# Patient Record
Sex: MALE | Race: WHITE | NOT HISPANIC OR LATINO | Employment: OTHER | URBAN - METROPOLITAN AREA
[De-identification: names, ages, dates, MRNs, and addresses within clinical notes are randomized per-mention and may not be internally consistent; named-entity substitution may affect disease eponyms.]

---

## 2021-09-14 ENCOUNTER — HOSPITAL ENCOUNTER (INPATIENT)
Facility: HOSPITAL | Age: 76
LOS: 4 days | Discharge: HOME | End: 2021-09-18
Attending: EMERGENCY MEDICINE | Admitting: INTERNAL MEDICINE

## 2021-09-14 DIAGNOSIS — K56.609 SBO (SMALL BOWEL OBSTRUCTION) (HCC): Primary | ICD-10-CM

## 2021-09-14 DIAGNOSIS — R10.9 ABDOMINAL PAIN IN MALE: ICD-10-CM

## 2021-09-14 DIAGNOSIS — R11.2 NAUSEA AND VOMITING IN ADULT: ICD-10-CM

## 2021-09-14 PROBLEM — N17.9 AKI (ACUTE KIDNEY INJURY) (HCC): Status: ACTIVE | Noted: 2021-09-14

## 2021-09-14 PROBLEM — I51.89 DIASTOLIC DYSFUNCTION: Status: ACTIVE | Noted: 2021-09-14

## 2021-09-14 PROBLEM — N18.9 CKD (CHRONIC KIDNEY DISEASE): Status: ACTIVE | Noted: 2021-09-14

## 2021-09-14 PROBLEM — E11.9 TYPE 2 DIABETES MELLITUS: Status: ACTIVE | Noted: 2021-09-14

## 2021-09-14 PROBLEM — D47.2 MGUS (MONOCLONAL GAMMOPATHY OF UNKNOWN SIGNIFICANCE): Status: ACTIVE | Noted: 2021-09-14

## 2021-09-14 PROBLEM — Z79.01 CHRONIC ANTICOAGULATION: Status: ACTIVE | Noted: 2021-09-14

## 2021-09-14 PROBLEM — I10 HTN (HYPERTENSION): Status: ACTIVE | Noted: 2021-09-14

## 2021-09-14 PROBLEM — K56.7 ILEUS: Status: ACTIVE | Noted: 2021-09-14

## 2021-09-14 PROBLEM — K57.80 DIVERTICULITIS OF INTESTINE WITH PERFORATION: Status: ACTIVE | Noted: 2021-09-14

## 2021-09-14 PROBLEM — Z95.828 PRESENCE OF IVC FILTER: Status: ACTIVE | Noted: 2021-09-14

## 2021-09-14 PROBLEM — J44.9 COPD (CHRONIC OBSTRUCTIVE PULMONARY DISEASE): Status: ACTIVE | Noted: 2021-09-14

## 2021-09-14 PROBLEM — Z86.718 HISTORY OF DVT (DEEP VEIN THROMBOSIS): Status: ACTIVE | Noted: 2021-09-14

## 2021-09-14 LAB
ALBUMIN SERPL-MCNC: 3.7 G/DL (ref 3.5–5.2)
ALBUMIN/GLOB SERPL: 1.2 G/DL
ALP SERPL-CCNC: 79 U/L (ref 39–117)
ALT SERPL W P-5'-P-CCNC: 44 U/L (ref 1–41)
ANION GAP SERPL CALCULATED.3IONS-SCNC: 11.8 MMOL/L (ref 5–15)
AST SERPL-CCNC: 36 U/L (ref 1–40)
BASOPHILS # BLD AUTO: 0.04 10*3/MM3 (ref 0–0.2)
BASOPHILS NFR BLD AUTO: 0.4 % (ref 0–1.5)
BILIRUB SERPL-MCNC: 1.2 MG/DL (ref 0–1.2)
BILIRUB UR QL STRIP: NEGATIVE
BUN SERPL-MCNC: 51 MG/DL (ref 8–23)
BUN/CREAT SERPL: 29.3 (ref 7–25)
CALCIUM SPEC-SCNC: 10 MG/DL (ref 8.6–10.5)
CHLORIDE SERPL-SCNC: 93 MMOL/L (ref 98–107)
CLARITY UR: CLEAR
CO2 SERPL-SCNC: 27.2 MMOL/L (ref 22–29)
COLOR UR: ABNORMAL
CREAT SERPL-MCNC: 1.74 MG/DL (ref 0.76–1.27)
D-LACTATE SERPL-SCNC: 1.2 MMOL/L (ref 0.5–2)
DEPRECATED RDW RBC AUTO: 48 FL (ref 37–54)
EOSINOPHIL # BLD AUTO: 0.02 10*3/MM3 (ref 0–0.4)
EOSINOPHIL NFR BLD AUTO: 0.2 % (ref 0.3–6.2)
ERYTHROCYTE [DISTWIDTH] IN BLOOD BY AUTOMATED COUNT: 13.9 % (ref 12.3–15.4)
GFR SERPL CREATININE-BSD FRML MDRD: 39 ML/MIN/1.73
GLOBULIN UR ELPH-MCNC: 3.1 GM/DL
GLUCOSE SERPL-MCNC: 127 MG/DL (ref 65–99)
GLUCOSE UR STRIP-MCNC: NEGATIVE MG/DL
HCT VFR BLD AUTO: 49.7 % (ref 37.5–51)
HGB BLD-MCNC: 15.7 G/DL (ref 13–17.7)
HGB UR QL STRIP.AUTO: NEGATIVE
HOLD SPECIMEN: NORMAL
HOLD SPECIMEN: NORMAL
IMM GRANULOCYTES # BLD AUTO: 0.05 10*3/MM3 (ref 0–0.05)
IMM GRANULOCYTES NFR BLD AUTO: 0.5 % (ref 0–0.5)
INR PPP: 2.61 (ref 0.9–1.1)
KETONES UR QL STRIP: ABNORMAL
LEUKOCYTE ESTERASE UR QL STRIP.AUTO: NEGATIVE
LIPASE SERPL-CCNC: 14 U/L (ref 13–60)
LYMPHOCYTES # BLD AUTO: 1.58 10*3/MM3 (ref 0.7–3.1)
LYMPHOCYTES NFR BLD AUTO: 14.3 % (ref 19.6–45.3)
MCH RBC QN AUTO: 29.7 PG (ref 26.6–33)
MCHC RBC AUTO-ENTMCNC: 31.6 G/DL (ref 31.5–35.7)
MCV RBC AUTO: 94 FL (ref 79–97)
MONOCYTES # BLD AUTO: 1.18 10*3/MM3 (ref 0.1–0.9)
MONOCYTES NFR BLD AUTO: 10.7 % (ref 5–12)
NEUTROPHILS NFR BLD AUTO: 73.9 % (ref 42.7–76)
NEUTROPHILS NFR BLD AUTO: 8.15 10*3/MM3 (ref 1.7–7)
NITRITE UR QL STRIP: NEGATIVE
NRBC BLD AUTO-RTO: 0 /100 WBC (ref 0–0.2)
PH UR STRIP.AUTO: 5.5 [PH] (ref 5–8)
PLATELET # BLD AUTO: 207 10*3/MM3 (ref 140–450)
PMV BLD AUTO: 9.4 FL (ref 6–12)
POTASSIUM SERPL-SCNC: 3.9 MMOL/L (ref 3.5–5.2)
PROT SERPL-MCNC: 6.8 G/DL (ref 6–8.5)
PROT UR QL STRIP: ABNORMAL
PROTHROMBIN TIME: 27.7 SECONDS (ref 11.7–14.2)
RBC # BLD AUTO: 5.29 10*6/MM3 (ref 4.14–5.8)
SARS-COV-2 RNA RESP QL NAA+PROBE: NOT DETECTED
SODIUM SERPL-SCNC: 132 MMOL/L (ref 136–145)
SP GR UR STRIP: >=1.03 (ref 1–1.03)
UROBILINOGEN UR QL STRIP: ABNORMAL
WBC # BLD AUTO: 11.02 10*3/MM3 (ref 3.4–10.8)
WHOLE BLOOD HOLD SPECIMEN: NORMAL
WHOLE BLOOD HOLD SPECIMEN: NORMAL

## 2021-09-14 PROCEDURE — 99284 EMERGENCY DEPT VISIT MOD MDM: CPT

## 2021-09-14 PROCEDURE — 83690 ASSAY OF LIPASE: CPT | Performed by: NURSE PRACTITIONER

## 2021-09-14 PROCEDURE — 81003 URINALYSIS AUTO W/O SCOPE: CPT | Performed by: NURSE PRACTITIONER

## 2021-09-14 PROCEDURE — 85025 COMPLETE CBC W/AUTO DIFF WBC: CPT | Performed by: NURSE PRACTITIONER

## 2021-09-14 PROCEDURE — 80053 COMPREHEN METABOLIC PANEL: CPT | Performed by: NURSE PRACTITIONER

## 2021-09-14 PROCEDURE — U0005 INFEC AGEN DETEC AMPLI PROBE: HCPCS | Performed by: NURSE PRACTITIONER

## 2021-09-14 PROCEDURE — 99221 1ST HOSP IP/OBS SF/LOW 40: CPT | Performed by: SURGERY

## 2021-09-14 PROCEDURE — 83605 ASSAY OF LACTIC ACID: CPT | Performed by: NURSE PRACTITIONER

## 2021-09-14 PROCEDURE — U0003 INFECTIOUS AGENT DETECTION BY NUCLEIC ACID (DNA OR RNA); SEVERE ACUTE RESPIRATORY SYNDROME CORONAVIRUS 2 (SARS-COV-2) (CORONAVIRUS DISEASE [COVID-19]), AMPLIFIED PROBE TECHNIQUE, MAKING USE OF HIGH THROUGHPUT TECHNOLOGIES AS DESCRIBED BY CMS-2020-01-R: HCPCS | Performed by: NURSE PRACTITIONER

## 2021-09-14 PROCEDURE — 85610 PROTHROMBIN TIME: CPT | Performed by: NURSE PRACTITIONER

## 2021-09-14 PROCEDURE — 25010000002 PIPERACILLIN SOD-TAZOBACTAM PER 1 G: Performed by: NURSE PRACTITIONER

## 2021-09-14 RX ORDER — SODIUM CHLORIDE 0.9 % (FLUSH) 0.9 %
10 SYRINGE (ML) INJECTION AS NEEDED
Status: DISCONTINUED | OUTPATIENT
Start: 2021-09-14 | End: 2021-09-18 | Stop reason: HOSPADM

## 2021-09-14 RX ORDER — INSULIN LISPRO 100 [IU]/ML
0-9 INJECTION, SOLUTION INTRAVENOUS; SUBCUTANEOUS
Status: DISCONTINUED | OUTPATIENT
Start: 2021-09-15 | End: 2021-09-18 | Stop reason: HOSPADM

## 2021-09-14 RX ORDER — CIPROFLOXACIN 500 MG/1
500 TABLET, FILM COATED ORAL
COMMUNITY
Start: 2021-09-13 | End: 2021-09-18 | Stop reason: HOSPADM

## 2021-09-14 RX ORDER — MORPHINE SULFATE 2 MG/ML
2 INJECTION, SOLUTION INTRAMUSCULAR; INTRAVENOUS EVERY 4 HOURS PRN
Status: DISCONTINUED | OUTPATIENT
Start: 2021-09-14 | End: 2021-09-18 | Stop reason: HOSPADM

## 2021-09-14 RX ORDER — NITROGLYCERIN 0.4 MG/1
0.4 TABLET SUBLINGUAL
Status: DISCONTINUED | OUTPATIENT
Start: 2021-09-14 | End: 2021-09-18 | Stop reason: HOSPADM

## 2021-09-14 RX ORDER — SODIUM CHLORIDE AND POTASSIUM CHLORIDE 150; 900 MG/100ML; MG/100ML
100 INJECTION, SOLUTION INTRAVENOUS CONTINUOUS
Status: DISCONTINUED | OUTPATIENT
Start: 2021-09-14 | End: 2021-09-18 | Stop reason: HOSPADM

## 2021-09-14 RX ORDER — SODIUM CHLORIDE 0.9 % (FLUSH) 0.9 %
10 SYRINGE (ML) INJECTION EVERY 12 HOURS SCHEDULED
Status: DISCONTINUED | OUTPATIENT
Start: 2021-09-14 | End: 2021-09-18 | Stop reason: HOSPADM

## 2021-09-14 RX ORDER — ONDANSETRON 2 MG/ML
4 INJECTION INTRAMUSCULAR; INTRAVENOUS EVERY 6 HOURS PRN
Status: DISCONTINUED | OUTPATIENT
Start: 2021-09-14 | End: 2021-09-18 | Stop reason: HOSPADM

## 2021-09-14 RX ORDER — NICOTINE POLACRILEX 4 MG
15 LOZENGE BUCCAL
Status: DISCONTINUED | OUTPATIENT
Start: 2021-09-14 | End: 2021-09-18 | Stop reason: HOSPADM

## 2021-09-14 RX ORDER — LISINOPRIL 20 MG/1
20 TABLET ORAL DAILY
COMMUNITY
End: 2021-09-18 | Stop reason: HOSPADM

## 2021-09-14 RX ORDER — ACETAMINOPHEN 325 MG/1
650 TABLET ORAL EVERY 4 HOURS PRN
Status: DISCONTINUED | OUTPATIENT
Start: 2021-09-14 | End: 2021-09-18 | Stop reason: HOSPADM

## 2021-09-14 RX ORDER — ISOSORBIDE MONONITRATE 60 MG/1
60 TABLET, EXTENDED RELEASE ORAL EVERY EVENING
COMMUNITY
Start: 2021-09-14

## 2021-09-14 RX ORDER — SIMVASTATIN 20 MG
TABLET ORAL
COMMUNITY
Start: 2021-07-15 | End: 2022-07-21 | Stop reason: SDUPTHER

## 2021-09-14 RX ORDER — SIMVASTATIN 20 MG
20 TABLET ORAL NIGHTLY
COMMUNITY
End: 2021-09-18 | Stop reason: HOSPADM

## 2021-09-14 RX ORDER — NALOXONE HCL 0.4 MG/ML
0.4 VIAL (ML) INJECTION
Status: DISCONTINUED | OUTPATIENT
Start: 2021-09-14 | End: 2021-09-18 | Stop reason: HOSPADM

## 2021-09-14 RX ORDER — WARFARIN SODIUM 1 MG/1
1 TABLET ORAL 2 TIMES WEEKLY
COMMUNITY
Start: 2021-08-02 | End: 2022-09-16

## 2021-09-14 RX ORDER — ACETAMINOPHEN 650 MG/1
650 SUPPOSITORY RECTAL EVERY 4 HOURS PRN
Status: DISCONTINUED | OUTPATIENT
Start: 2021-09-14 | End: 2021-09-18 | Stop reason: HOSPADM

## 2021-09-14 RX ORDER — WARFARIN SODIUM 5 MG/1
5 TABLET ORAL
Status: ON HOLD | COMMUNITY
End: 2022-09-29 | Stop reason: SDUPTHER

## 2021-09-14 RX ORDER — ONDANSETRON 4 MG/1
4 TABLET, ORALLY DISINTEGRATING ORAL
COMMUNITY
Start: 2021-09-13 | End: 2021-09-20

## 2021-09-14 RX ORDER — ACETAMINOPHEN 160 MG/5ML
650 SOLUTION ORAL EVERY 4 HOURS PRN
Status: DISCONTINUED | OUTPATIENT
Start: 2021-09-14 | End: 2021-09-18 | Stop reason: HOSPADM

## 2021-09-14 RX ORDER — DEXTROSE MONOHYDRATE 25 G/50ML
25 INJECTION, SOLUTION INTRAVENOUS
Status: DISCONTINUED | OUTPATIENT
Start: 2021-09-14 | End: 2021-09-18 | Stop reason: HOSPADM

## 2021-09-14 RX ORDER — METRONIDAZOLE 500 MG/1
500 TABLET ORAL 3 TIMES DAILY
COMMUNITY
Start: 2021-09-13 | End: 2021-09-18 | Stop reason: HOSPADM

## 2021-09-14 RX ORDER — LISINOPRIL 20 MG/1
TABLET ORAL
COMMUNITY
Start: 2021-07-15 | End: 2021-09-18 | Stop reason: HOSPADM

## 2021-09-14 RX ADMIN — TAZOBACTAM SODIUM AND PIPERACILLIN SODIUM 3.38 G: 375; 3 INJECTION, SOLUTION INTRAVENOUS at 17:12

## 2021-09-14 RX ADMIN — SODIUM CHLORIDE 1000 ML: 9 INJECTION, SOLUTION INTRAVENOUS at 16:26

## 2021-09-14 NOTE — H&P
Internal medicine history and physical  INTERNAL MEDICINE   [facility]       Patient Identification:  Name: Giovani España  Age: 76 y.o.  Sex: male  :  1945  MRN: [id number]                   Primary Care Physician: Derek Grider MD (Inactive)                               Date of admission:2021    Chief Complaint: Progressive abdominal pain and nausea and vomiting since Thursday night.    History of Present Illness:   Patient is a 76-year-old male who has complicated past medical history consisting of type 2 diabetes COPD hypertension diastolic dysfunction coronary artery disease peripheral vascular disease as well as history of DVT for which he has had IVC filter in place as well as history of chronic anticoagulation and history of monoclonal colopathy of unknown significance was in his usual state of his health until Thursday night when after eating Saulsberry steak, bread for dinner few hours later he developed significant abdominal discomfort and cramps for which he needed to go to the bathroom to have bowel movement.  Since Thursday night he has been having off-and-on abdominal cramps and despite taking Tums was not feeling any better.  He also had significant constipation did not have any blood with his bowel movements he tried to use some laxatives which eventually turned into diarrhea.  Throughout the weekend he was miserable with the symptoms with worsening abdominal pain and eventually was able to get intact with his primary care provider and was seen earlier today and was sent for a stat CT scan of the abdomen and pelvis.  According to the patient and the time he got the CT scan then he started to feel better in fact was feeling good enough to go to the drive-through to get some food.  While he was standing in the drive-through line he received a call from his primary care nurse practitioner that CT scan of the abdomen and pelvis shows evidence of evolving small bowel  obstruction with thickening of the small intestine likely due to inflammation.  Diffuse inflammation of the sigmoid colon with diverticuli noted concerning for diverticulitis with possible contained abscess.  Patient stated he was already feeling better and his appetite was finally back but because of the instruction to come back to the emergency room he did not eat anything and came to the ER.  He is feeling better except for mild discomfort in his belly.  We are asked admit the patient for evolving small bowel obstruction based on the CT scan finding with possible diverticulitis and perforation and general surgery evaluation.  Patient claims that his nephrostomy is feeling better since Thursday night.  Patient currently denies any fever and chills.    Upon further questioning patient blames all of this event he did earlier Thursday when he was throwing away the donuts that his wife brought in a day before which had some black ants on it.  He did eat donuts the day before with his wife without any problem.  While he was throwing them away he noticed that one of the donut did not have any black and on it and he could not help himself but ate a whole donut.  Afterwards he had a uneventful day and the fact was feeling good enough to eat Salsberry steak and bread for dinner and as stated above 3 hours later he was starting to have no symptoms.      Past Medical History:   T2DM (dx'd 2018), COPD, nocturnal hypoxia, tobacco smoker, FH lung cancer, HTN, diastolic dysfunction, HLD, CAD, PAD, hx DVT (right leg, IVC filter in place, confirmed PET CT 2019), chronic anticoagulation, MGUS (2019, PET-CT, bone marrow bx, (-) MM)), hx H pylori infection (dx'd, tx'd 2018), hx skin cancer, hx left knee meniscus tear.    Past Surgical History:  No past surgical history on file.   Home Meds:  (Not in a hospital admission)    Current Meds:     Current Facility-Administered Medications:   •  [COMPLETED] Insert peripheral IV, , , Once  "**AND** sodium chloride 0.9 % flush 10 mL, 10 mL, Intravenous, PRN, Rama Villasenor, GARCÍA  No current outpatient medications on file.  Allergies:  No Known Allergies  Social History:   Social History     Tobacco Use   • Smoking status: Not on file   Substance Use Topics   • Alcohol use: Not on file      Family History:  No family history on file.       Review of Systems  See history of present illness and past medical history.    Constitutional: Remarkable for no fever or chills  Cardiovascular: Remarkable for no chest pain or shortness of breath  GI: As described in the history of present illness currently feeling much better and her appetite back.  : Remarkable for no burning in urination frequency or urgency  Musculoskeletal: Remarkable for no new joint aches and pain  Neurological: Remarkable for no loss of consciousness or continence.      Vitals:   /85   Pulse 82   Temp 97.3 °F (36.3 °C) (Tympanic)   Resp 16   Ht 180.3 cm (71\")   Wt 71.2 kg (157 lb)   SpO2 96%   BMI 21.90 kg/m²   I/O: No intake or output data in the 24 hours ending 09/14/21 6908  Exam:  Patient is examined using the personal protective equipment as per guidelines from infection control for this particular patient as enacted.  Hand washing was performed before and after patient interaction.  General Appearance:    Alert, cooperative, no distress, appears stated age   Head:    Normocephalic, without obvious abnormality, atraumatic   Eyes:    PERRL, conjunctiva/corneas clear, EOM's intact, both eyes   Ears:    Normal external ear canals, both ears   Nose:   Nares normal, septum midline, mucosa normal, no drainage    or sinus tenderness   Throat:   Lips, tongue, gums normal; oral mucosa pink and moist   Neck:   Supple, symmetrical, trachea midline, no adenopathy;     thyroid:  no enlargement/tenderness/nodules; no carotid    bruit or JVD   Back:     Symmetric, no curvature, ROM normal, no CVA tenderness   Lungs:     Clear to " auscultation bilaterally, respirations unlabored   Chest Wall:    No tenderness or deformity    Heart:    Regular rate and rhythm, S1 and S2 normal, no murmur, rub   or gallop   Abdomen:    Soft mild generalized tenderness in the lower abdomen but no guarding rigidity or rebound noted.   Extremities:   Extremities normal, atraumatic, no cyanosis or edema   Pulses:   Pulses palpable in all extremities; symmetric all extremities   Skin:   Skin color normal, Skin is warm and dry,  no rashes or palpable lesions   Neurologic:   CNII-XII intact, motor strength grossly intact, sensation grossly intact to light touch, no focal deficits noted       Data Review:      I reviewed the patient's new clinical results.  Results from last 7 days   Lab Units 09/14/21  1644   WBC 10*3/mm3 11.02*   HEMOGLOBIN g/dL 15.7   PLATELETS 10*3/mm3 207     Results from last 7 days   Lab Units 09/14/21  1644   SODIUM mmol/L 132*   POTASSIUM mmol/L 3.9   CHLORIDE mmol/L 93*   CO2 mmol/L 27.2   BUN mg/dL 51*   CREATININE mg/dL 1.74*   CALCIUM mg/dL 10.0   GLUCOSE mg/dL 127*     No radiology results for the last 30 days.    IMPRESSION:   1.Findings compatible with distal small bowel obstruction with a transition point in the right lower quadrant with thickening of the walls of the distal small intestine most likely reflecting inflammation.   2.Multiple diverticula in the sigmoid colon with evidence of diffuse sigmoid inflammation most likely due to colitis.   3.A small collection of fluid and a small amount of gas adjacent to the distal sigmoid colon which may represent a large fluid-filled diverticulum or a contained perforation. No free peritoneal gas is identified.   4.Fusiform infrarenal abdominal aortic aneurysm reaching 3.2 cm in diameter. Advanced coronary artery disease.   5.Incidental, noncalcified, 9 x 4 mm pulmonary nodule in the right lower lobe. According to 2017 Fleischner Society Pulmonary Nodule Follow-Up Guidelines and  Recommendations, a follow up exam in 6-12 months is recommended. If no enlargement of the nodule    is demonstrated, further follow-up at 18-24 months should be performed.   6.The findings and recommendations were discussed with Ms. LISA HEADLEY on 9/14/2021 12:01 PM.     Dictated by: Efrem Gu M.D.  Assessment:  Active Hospital Problems    Diagnosis  POA   • **Abdominal pain [R10.9]  Unknown   • Nausea and vomiting [R11.2]  Unknown   • Ileus (CMS/HCC) [K56.7]  Unknown   • Diverticulitis of intestine with perforation [K57.80]  Unknown   • LEXIE (acute kidney injury) (CMS/HCC) [N17.9]  Unknown   • CKD (chronic kidney disease) [N18.9]  Unknown   • Type 2 diabetes mellitus (CMS/HCC) [E11.9]  Unknown   • COPD (chronic obstructive pulmonary disease) (CMS/HCC) [J44.9]  Unknown   • HTN (hypertension) [I10]  Unknown   • MGUS (monoclonal gammopathy of unknown significance) [D47.2]  Unknown   • History of DVT (deep vein thrombosis) [Z86.718]  Not Applicable   • Diastolic dysfunction [I51.89]  Unknown   • Presence of IVC filter [Z95.828]  Not Applicable   • Chronic anticoagulation [Z79.01]  Not Applicable       Medical decision making:  Progressive abdominal pain since Thursday with abnormal CT scan of the abdomen and pelvis revealing evolving small bowel obstruction associated with sigmoid inflammation and possible contained diverticular perforation in the setting of improved sense of wellbeing and restoration of appetite-plan is to admit the patient, keep him n.p.o. except for meds, IV fluids and IV Zosyn and consult general surgery service.  Diabetes mellitus-provide him with Accu-Cheks and sliding scale coverage and watch for hypoglycemia as he is n.p.o.  Hold Metformin if he is taking since he has contrast allergy.  COPD-continue with as needed nebulizer treatment we will continue his home medications for the details available.  Hypertension-continue antihypertensive regimen avoid hypotensive episodes.  Acute  on chronic renal failure-provided with IV fluids avoid nephrotoxic agents and hypotensive episodes.  History of DVT on chronic anticoagulation therapy and history of IVC filter-hold anticoagulation therapy in case if needed surgical intervention.  Diastolic dysfunction-monitor for volume overload.    Omid Hoffman MD   9/14/2021  18:48 EDT  Much of this encounter note is an electronic transcription/translation of spoken language to printed text. The electronic translation of spoken language may permit erroneous, or at times, nonsensical words or phrases to be inadvertently transcribed; Although I have reviewed the note for such errors, some may still exist

## 2021-09-14 NOTE — ED TRIAGE NOTES
Pt has lower abd pain.  Has had diarrhea x 5 days.  He had a ct scan at [facility] in [facility].and has a perforated colon.      Patient was placed in face mask during first look triage.  Patient was wearing a face mask throughout encounter.  I wore personal protective equipment throughout the encounter.  Hand hygiene was performed before and after patient encounter.

## 2021-09-14 NOTE — ED PROVIDER NOTES
EMERGENCY DEPARTMENT ENCOUNTER    Room Number:  08/08  Date of encounter:  9/14/2021  PCP: Derek Grider MD (Inactive)  Historian: patient   Full history not obtainable due to: none    HPI:  Chief Complaint: N/v    Context: Giovani España is a 76 y.o. male who presents to the ED c/o Nv onset 5 days ago. Symptoms have been constant and worse with eating or drinking. He states they were moderate to severe until Saturday when he started to improve. He has been able to keep down water and no emesis for 24 hours. He states today he actually had an appetite and felt like eating. Associated lower abdominal pain which also seems to be improving somewhat. He was finally able to get in with his PCP today who ordered him an outpatient ct scan. He was called after he left the scan and instructed to go to the ER due to a perforation of his colon.    No fever. No diarrhea. Not anticoagulated.      MEDICAL RECORD REVIEW:  EXAMINATION: CT of the abdomen and pelvis with contrast     DATE : 09/14/2021     HISTORY: Contrast abdominal pain. Nausea and vomiting.           IMPRESSION:   1.Findings compatible with distal small bowel obstruction with a transition point in the right lower quadrant with thickening of the walls of the distal small intestine most likely reflecting inflammation.   2.Multiple diverticula in the sigmoid colon with evidence of diffuse sigmoid inflammation most likely due to colitis.   3.A small collection of fluid and a small amount of gas adjacent to the distal sigmoid colon which may represent a large fluid-filled diverticulum or a contained perforation. No free peritoneal gas is identified.   4.Fusiform infrarenal abdominal aortic aneurysm reaching 3.2 cm in diameter. Advanced coronary artery disease.   5.Incidental, noncalcified, 9 x 4 mm pulmonary nodule in the right lower lobe. According to 2017 Fleischner Society Pulmonary Nodule Follow-Up Guidelines and Recommendations, a follow up exam in 6-12  months is recommended. If no enlargement of the nodule    is demonstrated, further follow-up at 18-24 months should be performed.   6.The findings and recommendations were discussed with Ms. LISA HEADLEY on 9/14/2021 12:01 PM.     Dictated by: Efrem Gu M.D.       PAST MEDICAL HISTORY    Active Ambulatory Problems     Diagnosis Date Noted   • No Active Ambulatory Problems     Resolved Ambulatory Problems     Diagnosis Date Noted   • No Resolved Ambulatory Problems     No Additional Past Medical History         PAST SURGICAL HISTORY  No past surgical history on file.      FAMILY HISTORY  No family history on file.      SOCIAL HISTORY  Social History     Socioeconomic History   • Marital status:      Spouse name: Not on file   • Number of children: Not on file   • Years of education: Not on file   • Highest education level: Not on file         ALLERGIES  Patient has no known allergies.        REVIEW OF SYSTEMS  Review of Systems   All systems reviewed and marked as negative except as listed in HPI       PHYSICAL EXAM    I have reviewed the triage vital signs and nursing notes.    ED Triage Vitals [09/14/21 1510]   Temp Heart Rate Resp BP SpO2   97.3 °F (36.3 °C) 103 16 -- 95 %      Temp src Heart Rate Source Patient Position BP Location FiO2 (%)   Tympanic Monitor -- -- --       GENERAL: Alert well developed, well nourished in no distress  HENT: NCAT, neck supple, trachea midline. MM dry.   EYES: no scleral icterus, PERRL, normal conjunctivae  CV: regular rhythm, regular rate, no murmur  RESPIRATORY: unlabored effort, CTAB  ABDOMEN: soft, lower abdominal tenderness with out rebound, no guarding , nondistended, bowel sounds hypoactive   MUSCULOSKELETAL: no gross deformity  NEURO: alert,  sensory and motor function of extremities grossly intact, speech clear, mental status normal/baseline  SKIN: warm, dry, no rash  PSYCH:  Appropriate mood and affect    Vital signs and nursing notes  reviewed.          LAB RESULTS  Labs Reviewed   COMPREHENSIVE METABOLIC PANEL - Abnormal; Notable for the following components:       Result Value    Glucose 127 (*)     BUN 51 (*)     Creatinine 1.74 (*)     Sodium 132 (*)     Chloride 93 (*)     ALT (SGPT) 44 (*)     eGFR Non African Amer 39 (*)     BUN/Creatinine Ratio 29.3 (*)     All other components within normal limits    Narrative:     GFR Normal >60  Chronic Kidney Disease <60  Kidney Failure <15     URINALYSIS W/ MICROSCOPIC IF INDICATED (NO CULTURE) - Abnormal; Notable for the following components:    Color, UA Dark Yellow (*)     Ketones, UA Trace (*)     Protein, UA Trace (*)     All other components within normal limits    Narrative:     Urine microscopic not indicated.   CBC WITH AUTO DIFFERENTIAL - Abnormal; Notable for the following components:    WBC 11.02 (*)     Lymphocyte % 14.3 (*)     Eosinophil % 0.2 (*)     Neutrophils, Absolute 8.15 (*)     Monocytes, Absolute 1.18 (*)     All other components within normal limits   PROTIME-INR - Abnormal; Notable for the following components:    Protime 27.7 (*)     INR 2.61 (*)     All other components within normal limits                                                                                                                     LIPASE - Normal   LACTIC ACID, PLASMA - Normal                                                                                          GREEN TOP   LAVENDER TOP   GOLD TOP - SST   LIGHT BLUE TOP       Ordered the above labs and independently reviewed the results.        PROCEDURES    Procedures        MEDICATIONS GIVEN IN ER    Medications - No data to display      PROGRESS, DATA ANALYSIS, CONSULTS, AND MEDICAL DECISION MAKING    All labs have been independently reviewed by me.  All radiology studies have been reviewed by me.   EKG's independently reviewed by me.  Discussion below represents my analysis of pertinent findings related to patient's condition, differential  diagnosis, treatment plan and final disposition.    DIFFERENTIAL DIAGNOSIS INCLUDE BUT NOT LIMITED TO: Gastroenteritis, flatus, appendicitis, pancreatitis, renal colic, nephrolithiasis, renal infarct, splenic infarct, mesenteric ischemia, perforated bowel, SBO, diverticulitis, colitis, abdominal wall pain, muscle spasm, IBS, biliary colic, cholecystitis      ED Course    Tue Sep 14, 2021   1803 Discussed pt with Dr Estrada who will consult .     [JS]   1803 WBC(!): 11.02 [JS]   1804 Lactate: 1.2 [JS]   1804 BUN(!): 51 [JS]   1804 Creatinine(!): 1.74 [JS]   1804 INR(!): 2.61 [JS]      ED Course User Index  [JS] Rama Villasenor APRN       AS OF 16:05 EDT VITALS:        BP -    HR - 103  TEMP - 97.3 °F (36.3 °C) (Tympanic)  O2 SATS - 95%        DIAGNOSIS  Diagnoses that have been ruled out:   None   Diagnoses that are still under consideration:   None   Final diagnoses:   SBO (small bowel obstruction) (CMS/HCC)   Abdominal pain in male   Nausea and vomiting in adult         DISPOSITION  Admission     Pt masked in first look. I wore appropriate PPE throughout my encounters with the pt. I performed hand hygiene on entry into the pt room and upon exit.     Dictated utilizing Dragon dictation:  Much of this encounter note is an electronic transcription/translation of spoken language to printed text. The electronic translation of spoken language may permit erroneous, or at times, nonsensical words or phrases to be inadvertently transcribed; Although I have reviewed the note for such errors, some may still exist.     Rama Villasenor APRN  09/15/21 1545       Rama Villasenor APRN  09/15/21 1545

## 2021-09-14 NOTE — ED PROVIDER NOTES
Pt presents to the ED c/o  nausea and vomiting since last Thursday.  He also reports mild abdominal distention.  He had an outpatient CT performed earlier today and his PCP called him and informed him that it showed he had a small bowel obstruction and possible perforated diverticulitis.  He denies fever or chills.     On exam,   Awake and alert, no acute distress  Abdomen is mildly distended but nontender throughout.     Plan: I have requested the radiology images from the outside facility.  We have started empiric Zosyn due to reported perforated diverticulitis.  He will be admitted for further management of small bowel obstruction.      I wore an N95 mask, face shield, and gloves during this patient encounter.  Patient also wearing a surgical mask.  Hand hygeine performed before and after seeing the patient.     Attestation:  The CURRY and I have discussed this patient's history, physical exam, and treatment plan.  I have reviewed the documentation and personally had a face to face interaction with the patient. I affirm the documentation and agree with the treatment and plan.  The attached note describes my personal findings.            Sukhdev Boyer MD  09/14/21 4688

## 2021-09-15 PROBLEM — K56.600 PARTIAL SMALL BOWEL OBSTRUCTION: Status: ACTIVE | Noted: 2021-09-15

## 2021-09-15 PROBLEM — R91.1 PULMONARY NODULE: Status: ACTIVE | Noted: 2021-09-15

## 2021-09-15 LAB
ALBUMIN SERPL-MCNC: 3.8 G/DL (ref 3.5–5.2)
ALBUMIN/GLOB SERPL: 1.4 G/DL
ALP SERPL-CCNC: 79 U/L (ref 39–117)
ALT SERPL W P-5'-P-CCNC: 42 U/L (ref 1–41)
ANION GAP SERPL CALCULATED.3IONS-SCNC: 11.1 MMOL/L (ref 5–15)
AST SERPL-CCNC: 27 U/L (ref 1–40)
BASOPHILS # BLD AUTO: 0.03 10*3/MM3 (ref 0–0.2)
BASOPHILS NFR BLD AUTO: 0.3 % (ref 0–1.5)
BILIRUB SERPL-MCNC: 1 MG/DL (ref 0–1.2)
BUN SERPL-MCNC: 45 MG/DL (ref 8–23)
BUN/CREAT SERPL: 33.1 (ref 7–25)
CALCIUM SPEC-SCNC: 9.7 MG/DL (ref 8.6–10.5)
CHLORIDE SERPL-SCNC: 99 MMOL/L (ref 98–107)
CO2 SERPL-SCNC: 24.9 MMOL/L (ref 22–29)
CREAT SERPL-MCNC: 1.36 MG/DL (ref 0.76–1.27)
D-LACTATE SERPL-SCNC: 1.2 MMOL/L (ref 0.5–2)
DEPRECATED RDW RBC AUTO: 49 FL (ref 37–54)
EOSINOPHIL # BLD AUTO: 0.03 10*3/MM3 (ref 0–0.4)
EOSINOPHIL NFR BLD AUTO: 0.3 % (ref 0.3–6.2)
ERYTHROCYTE [DISTWIDTH] IN BLOOD BY AUTOMATED COUNT: 13.7 % (ref 12.3–15.4)
GFR SERPL CREATININE-BSD FRML MDRD: 51 ML/MIN/1.73
GLOBULIN UR ELPH-MCNC: 2.8 GM/DL
GLUCOSE BLDC GLUCOMTR-MCNC: 107 MG/DL (ref 70–130)
GLUCOSE BLDC GLUCOMTR-MCNC: 120 MG/DL (ref 70–130)
GLUCOSE BLDC GLUCOMTR-MCNC: 122 MG/DL (ref 70–130)
GLUCOSE BLDC GLUCOMTR-MCNC: 93 MG/DL (ref 70–130)
GLUCOSE SERPL-MCNC: 97 MG/DL (ref 65–99)
HBA1C MFR BLD: 6 % (ref 4.8–5.6)
HCT VFR BLD AUTO: 50 % (ref 37.5–51)
HGB BLD-MCNC: 15.5 G/DL (ref 13–17.7)
IMM GRANULOCYTES # BLD AUTO: 0.04 10*3/MM3 (ref 0–0.05)
IMM GRANULOCYTES NFR BLD AUTO: 0.4 % (ref 0–0.5)
INR PPP: 3.12 (ref 0.9–1.1)
LYMPHOCYTES # BLD AUTO: 1.43 10*3/MM3 (ref 0.7–3.1)
LYMPHOCYTES NFR BLD AUTO: 14.3 % (ref 19.6–45.3)
MCH RBC QN AUTO: 29.7 PG (ref 26.6–33)
MCHC RBC AUTO-ENTMCNC: 31 G/DL (ref 31.5–35.7)
MCV RBC AUTO: 95.8 FL (ref 79–97)
MONOCYTES # BLD AUTO: 0.94 10*3/MM3 (ref 0.1–0.9)
MONOCYTES NFR BLD AUTO: 9.4 % (ref 5–12)
NEUTROPHILS NFR BLD AUTO: 7.54 10*3/MM3 (ref 1.7–7)
NEUTROPHILS NFR BLD AUTO: 75.3 % (ref 42.7–76)
NRBC BLD AUTO-RTO: 0 /100 WBC (ref 0–0.2)
PLATELET # BLD AUTO: 200 10*3/MM3 (ref 140–450)
PMV BLD AUTO: 9.4 FL (ref 6–12)
POTASSIUM SERPL-SCNC: 4.5 MMOL/L (ref 3.5–5.2)
PROT SERPL-MCNC: 6.6 G/DL (ref 6–8.5)
PROTHROMBIN TIME: 31.8 SECONDS (ref 11.7–14.2)
RBC # BLD AUTO: 5.22 10*6/MM3 (ref 4.14–5.8)
SODIUM SERPL-SCNC: 135 MMOL/L (ref 136–145)
WBC # BLD AUTO: 10.01 10*3/MM3 (ref 3.4–10.8)

## 2021-09-15 PROCEDURE — 94640 AIRWAY INHALATION TREATMENT: CPT

## 2021-09-15 PROCEDURE — 94664 DEMO&/EVAL PT USE INHALER: CPT

## 2021-09-15 PROCEDURE — 82962 GLUCOSE BLOOD TEST: CPT

## 2021-09-15 PROCEDURE — 80053 COMPREHEN METABOLIC PANEL: CPT | Performed by: INTERNAL MEDICINE

## 2021-09-15 PROCEDURE — 85610 PROTHROMBIN TIME: CPT | Performed by: INTERNAL MEDICINE

## 2021-09-15 PROCEDURE — 83605 ASSAY OF LACTIC ACID: CPT | Performed by: INTERNAL MEDICINE

## 2021-09-15 PROCEDURE — 83036 HEMOGLOBIN GLYCOSYLATED A1C: CPT | Performed by: INTERNAL MEDICINE

## 2021-09-15 PROCEDURE — 25010000002 PIPERACILLIN SOD-TAZOBACTAM PER 1 G: Performed by: INTERNAL MEDICINE

## 2021-09-15 PROCEDURE — 85025 COMPLETE CBC W/AUTO DIFF WBC: CPT | Performed by: INTERNAL MEDICINE

## 2021-09-15 PROCEDURE — 99232 SBSQ HOSP IP/OBS MODERATE 35: CPT | Performed by: SURGERY

## 2021-09-15 PROCEDURE — 25010000002 SODIUM CHLORIDE 0.9 % WITH KCL 20 MEQ 20-0.9 MEQ/L-% SOLUTION: Performed by: INTERNAL MEDICINE

## 2021-09-15 RX ORDER — WARFARIN SODIUM 5 MG/1
5 TABLET ORAL
Status: DISCONTINUED | OUTPATIENT
Start: 2021-09-16 | End: 2021-09-16 | Stop reason: DRUGHIGH

## 2021-09-15 RX ORDER — AMLODIPINE BESYLATE 5 MG/1
5 TABLET ORAL
Status: DISCONTINUED | OUTPATIENT
Start: 2021-09-15 | End: 2021-09-15

## 2021-09-15 RX ORDER — WARFARIN SODIUM 6 MG/1
6 TABLET ORAL
Status: DISCONTINUED | OUTPATIENT
Start: 2021-09-15 | End: 2021-09-16 | Stop reason: DRUGHIGH

## 2021-09-15 RX ORDER — ISOSORBIDE MONONITRATE 60 MG/1
60 TABLET, EXTENDED RELEASE ORAL DAILY
Status: DISCONTINUED | OUTPATIENT
Start: 2021-09-15 | End: 2021-09-18 | Stop reason: HOSPADM

## 2021-09-15 RX ORDER — ATORVASTATIN CALCIUM 20 MG/1
20 TABLET, FILM COATED ORAL DAILY
Status: DISCONTINUED | OUTPATIENT
Start: 2021-09-15 | End: 2021-09-18 | Stop reason: HOSPADM

## 2021-09-15 RX ORDER — LISINOPRIL 20 MG/1
20 TABLET ORAL DAILY
Status: DISCONTINUED | OUTPATIENT
Start: 2021-09-15 | End: 2021-09-15

## 2021-09-15 RX ORDER — BUDESONIDE AND FORMOTEROL FUMARATE DIHYDRATE 160; 4.5 UG/1; UG/1
2 AEROSOL RESPIRATORY (INHALATION)
Status: DISCONTINUED | OUTPATIENT
Start: 2021-09-15 | End: 2021-09-18 | Stop reason: HOSPADM

## 2021-09-15 RX ADMIN — ISOSORBIDE MONONITRATE 60 MG: 60 TABLET ORAL at 09:33

## 2021-09-15 RX ADMIN — SODIUM CHLORIDE, PRESERVATIVE FREE 10 ML: 5 INJECTION INTRAVENOUS at 00:02

## 2021-09-15 RX ADMIN — POTASSIUM CHLORIDE AND SODIUM CHLORIDE 100 ML/HR: 900; 150 INJECTION, SOLUTION INTRAVENOUS at 15:15

## 2021-09-15 RX ADMIN — TAZOBACTAM SODIUM AND PIPERACILLIN SODIUM 3.38 G: 375; 3 INJECTION, SOLUTION INTRAVENOUS at 23:00

## 2021-09-15 RX ADMIN — TAZOBACTAM SODIUM AND PIPERACILLIN SODIUM 3.38 G: 375; 3 INJECTION, SOLUTION INTRAVENOUS at 09:33

## 2021-09-15 RX ADMIN — METOPROLOL TARTRATE 25 MG: 25 TABLET, FILM COATED ORAL at 20:46

## 2021-09-15 RX ADMIN — ATORVASTATIN CALCIUM 20 MG: 20 TABLET, FILM COATED ORAL at 09:33

## 2021-09-15 RX ADMIN — METOPROLOL TARTRATE 25 MG: 25 TABLET, FILM COATED ORAL at 15:11

## 2021-09-15 RX ADMIN — POTASSIUM CHLORIDE AND SODIUM CHLORIDE 100 ML/HR: 900; 150 INJECTION, SOLUTION INTRAVENOUS at 00:02

## 2021-09-15 RX ADMIN — TAZOBACTAM SODIUM AND PIPERACILLIN SODIUM 3.38 G: 375; 3 INJECTION, SOLUTION INTRAVENOUS at 15:11

## 2021-09-15 RX ADMIN — WARFARIN 6 MG: 6 TABLET ORAL at 18:39

## 2021-09-15 RX ADMIN — SODIUM CHLORIDE, PRESERVATIVE FREE 10 ML: 5 INJECTION INTRAVENOUS at 20:46

## 2021-09-15 RX ADMIN — SODIUM CHLORIDE, PRESERVATIVE FREE 10 ML: 5 INJECTION INTRAVENOUS at 09:33

## 2021-09-15 RX ADMIN — LISINOPRIL 20 MG: 20 TABLET ORAL at 09:33

## 2021-09-15 RX ADMIN — TAZOBACTAM SODIUM AND PIPERACILLIN SODIUM 3.38 G: 375; 3 INJECTION, SOLUTION INTRAVENOUS at 00:02

## 2021-09-15 RX ADMIN — BUDESONIDE AND FORMOTEROL FUMARATE DIHYDRATE 2 PUFF: 160; 4.5 AEROSOL RESPIRATORY (INHALATION) at 21:12

## 2021-09-15 NOTE — PLAN OF CARE
Goal Outcome Evaluation:  Plan of Care Reviewed With: patient        Progress: no change  Outcome Summary: Diverticulitis n/v and abdmoninal pain, geeral surgery consulted, NPO, 2lpm NC, IV zoysn and fluid started, cont to have diarrhea, no c/o pain during this shift. Will cont to monitor

## 2021-09-15 NOTE — ED NOTES
Patient wore surgical mask and I wore N95 mask for entire encounter, and I wore gloves, gown and eye protection as well. Hand hygiene was performed before and after encounter.        Sissy Pereira RN  09/14/21 0339

## 2021-09-15 NOTE — CONSULTS
Cc: Abdominal pain    History of presenting illness:   This is a nice 76-year-old gentleman who says that he is been having about 5 days of nominal problems.  He describes some crampy pain and initially what he thought was constipation starting about 5 days ago.  It started after he ate a doughnut on which he said he found some ants crawling on it.  The next morning he was having fairly severe cramping.  He took some Pepto-Bismol and eventually took some laxatives and then began having diarrhea.  He had some associated nausea and vomiting.  This continued for a couple of days.  Yesterday he contacted his primary care provider who ordered a CT.  The CT was done at an outside hospital, but apparently suggested small bowel obstruction and possible diverticulitis with possible small abscess.  Again images are not available to me at this time.  Overall though, he feels better than he has for the last couple of days, he has passed some flatus and had loose stools, denies any nausea currently and pain is improved.    Past Medical History: Diabetes, COPD, hypertension, peripheral artery disease, monoclonal gammopathy of unknown significance, DVT, abdominal aortic aneurysm    Past Surgical History: Patient has had what sounds like a right inguinal hernia repair in the past.  He had a vena cava filter placed.  He describes having had a colonoscopy around 4 to 5 years ago.    Medications: Patient is unsure, full list of medications is still pending    Allergies: None known    Social History: Patient is a long-term smoker    Family History: Negative for known colon and rectal cancer    Review of Systems:  Constitutional: Positive for weakness and decreased appetite, negative for fever positive for some gradual weight loss over time  Neck: no swollen glands or dysphagia or odynophagia  Respiratory: negative for SOB, cough, hemoptysis or wheezing  Cardiovascular: negative for chest pain, palpitations or peripheral  edema  Gastrointestinal: Positive for abdominal pain, nausea, vomiting, diarrhea, constipation      Physical Exam:  BMI: 21.9  Temperature 97.3 heart rate 74 blood pressure 146/83  General: alert and oriented, appropriate, no acute distress  Eyes: No scleral icterus, extraocular movements are intact  Neck: Supple without lymphadenopathy or thyromegaly, trachea is in the midline  Respiratory: There is good bilateral chest expansion, no use of accessory muscles is noted  Cardiovascular: No jugular venous distention or peripheral edema is seen  Gastrointestinal: Soft, mildly distended, there is minimal tenderness, there is no guarding in the left lower quadrant, there is no mass, there is no hernia felt    Laboratory data:  White blood cell count 11.0 hemoglobin 15.7 creatinine 1.7 INR 2.6 sodium 132    Imaging data: CT images are not available, although per report they are trying to load them into our system.  Report from [facility] CT suggests partial small bowel obstruction with proximally dilated small bowel loops measuring up to 3.5 cm and some distally decompressed loops.  There is apparently evidence of thickening of the sigmoid colon with diverticular changes and a question of either a fluid-filled diverticulum or possible abscess.  No free air is reported.      Assessment and plan:   -Abdominal pain, nausea and vomiting, probable acute diverticulitis, difficult to classify entirely  -CT report of bowel obstruction, although clinically this does not appear to be the case.  At this time I do not see any indication for nasogastric tube placement based on his clinical evaluation, but would leave n.p.o. except for medications and ice chips.  -Per patient he does not have any prior history of acute diverticulitis, at the moment his abdominal exam is benign and I would not recommend any surgical intervention.  We are attempting to get the CT images, if we are not able to do so, repeat imaging may be necessary  here in order to further evaluate.  Discussed with patient.  -Intravenous antibiotics have been started and should be continued for the time being  -Probable dehydration, IV fluid resuscitation as appropriate      Chito Estrada MD, FACS  General, Minimally Invasive and Endoscopic Surgery  [facility]    [address], [address]  [place], [place], [address]  P: [phone]  F: [phone]

## 2021-09-15 NOTE — CASE MANAGEMENT/SOCIAL WORK
Discharge Planning Assessment  [facility]     Patient Name: Giovani España  MRN: [id number]  Today's Date: 9/15/2021    Admit Date: 9/14/2021    Discharge Needs Assessment     Row Name 09/15/21 7934       Living Environment    Lives With  spouse    Name(s) of Who Lives With Patient  spouse Rosaura    Current Living Arrangements  home/apartment/condo    Primary Care Provided by  self    Able to Return to Prior Arrangements  yes       Transition Planning    Patient/Family Anticipates Transition to  home with family    Transportation Anticipated  family or friend will provide       Discharge Needs Assessment    Equipment Currently Used at Home  other (see comments) INR machine    Concerns to be Addressed  adjustment to diagnosis/illness    Equipment Needed After Discharge  none        Discharge Plan     Row Name 09/15/21 1602       Plan    Patient/Family in Agreement with Plan  yes    Plan Comments  CCP spoke with pt @ bedside, confirmed face sheet and primary pharmacy as [facility] in [place].  Pt lives with spouse and is IDAL's.  Pt has his own machine at home to check INR, checks weekly on Thur.  Pt states plans are home.  Instructed that CCP would follow for assist. Emily Rosales RN    Row Name 09/15/21 4531       Plan    Plan  Plans are home, pt does not anticipate any needs.        Continued Care and Services - Admitted Since 9/14/2021    Coordination has not been started for this encounter.         Demographic Summary    No documentation.       Functional Status    No documentation.       Psychosocial    No documentation.       Abuse/Neglect    No documentation.       Legal     Row Name 09/15/21 8660       Financial/Legal    Who Manages Finances if Patient Unable  Pt states he has a Living Will  wife Rosaura is HCS, he realizes we do not have a copy on file        Substance Abuse    No documentation.       Patient Forms    No documentation.           Emily Rosales RN

## 2021-09-15 NOTE — PROGRESS NOTES
Pharmacy Consult: Warfarin Dosing/ Monitoring    Giovani España is a 76 y.o. male, estimated creatinine clearance is 47.7 mL/min (A) (by C-G formula based on SCr of 1.36 mg/dL (H)). weighing 70.8 kg (156 lb).    Results from last 7 days   Lab Units 09/15/21  0333 09/14/21  1644   INR   --  2.61*   HEMOGLOBIN g/dL 15.5 15.7   HEMATOCRIT % 50.0 49.7   PLATELETS 10*3/mm3 200 207     Results from last 7 days   Lab Units 09/15/21  0333 09/14/21  1644   SODIUM mmol/L 135* 132*   POTASSIUM mmol/L 4.5 3.9   CHLORIDE mmol/L 99 93*   CO2 mmol/L 24.9 27.2   BUN mg/dL 45* 51*   CREATININE mg/dL 1.36* 1.74*   CALCIUM mg/dL 9.7 10.0   BILIRUBIN mg/dL 1.0 1.2   ALK PHOS U/L 79 79   ALT (SGPT) U/L 42* 44*   AST (SGOT) U/L 27 36   GLUCOSE mg/dL 97 127*     Anticoagulation history: 6mg Sun and Wed, 5mg all other days    Hospital Anticoagulation:  Consulting provider: Dr Orellana  Start date: 9/15  Indication: DVT  Target INR: 2-3  Expected duration: indefinite   Bridge Therapy: No                Date 9/14 9/15           INR 2.61            Warfarin dose  6mg             Potential drug interactions:     Relevant nutrition status: clear liquids    Other:     Education complete?/ Date:     Assessment/Plan:  Dose: will resume home regimen (6mg today)  Monitor INR daily  Follow up daily    Pharmacy will continue to follow until discharge or discontinuation of warfarin.   Juanito Olivares PharmD

## 2021-09-15 NOTE — PROGRESS NOTES
Chief complaint: Follow-up partial small bowel obstruction/possible diverticulitis    Subjective:  Patient rested well overnight, minimal abdominal pain.  Denies any nausea or vomiting.  Continues to have diarrhea and passing some flatus.  Reports feeling hungry.    Review of systems:  Constitutional: Patient denies fever or chills, appetite is good  Respiratory: Patient denies cough or wheezing    Physical exam:  Patient afebrile, heart rate 83 blood pressure 118/73  General: Asleep when I entered the room, easily awakened and answers questions appropriately, no distress  Eyes: Extraocular movements are intact without icterus  Neck: Supple, trachea midline  Respiratory: No use of accessory muscles, good bilateral chest expansion  Gastrointestinal: Abdomen is soft, nondistended, no tenderness, bowel sounds positive, no hernia felt    Labs are reviewed.  White blood cell count normal today at 10.0 with no left shift.  Hemoglobin 15.5.  Chemistries are improved with sodium up to 135, creatinine improved to 1.36 after hydration.    Radiology study which is supposed to be coming over from [facility] still has not made it.    Assessment and plan:  -Crampy abdominal pain, nausea and vomiting and CT read from outside hospital suggesting partial bowel obstruction and possible acute diverticulitis  -Clinically patient looks quite well this morning, no evidence of bowel obstruction and I will start clear liquids  -Dehydration appears improved, continue IV fluid resuscitation  -Probably still reasonable to continue IV antibiotics for now, as he advances his diet he can probably be transitioned to oral.  We will probably need to consider repeating his CT here in the next day or so.  -At this time, no reason to discuss any surgical intervention, continue conservative management    Chito Estrada MD  General and Endoscopic Surgery  [facility]    [address], [address]  [place], [place], [address]  P:  [phone]  F: [phone]

## 2021-09-15 NOTE — PLAN OF CARE
Goal Outcome Evaluation:           Progress: improving   Pt diet upgraded to clear liquids.  Pt tolerating well.  No c/o of n/v or diarrhea since this AM.  Pt has has a frequent cough which he states is baseline.  Pt stated that he coughs due to excessive secretions.  Placed consult for spt to eval.  Vitals stable.  Will continue to monitor

## 2021-09-15 NOTE — PROGRESS NOTES
Name: Giovani España ADMIT: 2021   : 1945  PCP: Derek Grider MD (Inactive)    MRN: [id number] LOS: 1 days   AGE/SEX: 76 y.o. male  ROOM: [facility]     Subjective   Subjective   Decreased abdominal colic.  No nausea or vomiting.  Continues with watery diarrhea without fresh bright blood per rectum or melena.  No fever or chills.    Review of Systems  .  No dysuria or hematuria.  Cardiovascular/respiratory.  No chest pain/no shortness of breath/positive occasional dry cough.  No hemoptysis.  No ankle edema.     Objective   Objective   Vital Signs  Temp:  [97.3 °F (36.3 °C)-97.8 °F (36.6 °C)] 97.7 °F (36.5 °C)  Heart Rate:  [] 84  Resp:  [16-22] 18  BP: (109-167)/(69-93) 165/89  SpO2:  [93 %-98 %] 93 %  on  Flow (L/min):  [2] 2;   Device (Oxygen Therapy): nasal cannula  No intake or output data in the 24 hours ending 09/15/21 1346  Body mass index is 21.76 kg/m².      21  1607 09/15/21  0648   Weight: 71.2 kg (157 lb) 70.8 kg (156 lb)     Physical Exam  General.  Elderly gentleman.  Alert and oriented x3.  No apparent pain distress or diaphoresis.  Normal mood and affect.  Eyes.  Pupils equal round and reactive.  Intact extraocular musculature.  No pallor or jaundice.  Normal conjunctive and lids.  Oral cavity.  Moist mucous membrane.  Neck.  Supple.  No JVD.  No lymphadenopathy or thyromegaly.  Cardiovascular.  Regular rate and rhythm.  No gallops or murmurs.  Chest.  Poor to auscultation bilaterally with no added sounds.  Abdomen.  Soft lax.  Tenderness in the left iliac fossa.  No guarding or rebound.  No organomegaly.  Positive bowel sounds.  Extremities.  No clubbing cyanosis or edema.    Results Review:      Results from last 7 days   Lab Units 09/15/21  0333 21  1644   SODIUM mmol/L 135* 132*   POTASSIUM mmol/L 4.5 3.9   CHLORIDE mmol/L 99 93*   CO2 mmol/L 24.9 27.2   BUN mg/dL 45* 51*   CREATININE mg/dL 1.36* 1.74*   GLUCOSE mg/dL 97 127*   CALCIUM mg/dL 9.7 10.0   AST  (SGOT) U/L 27 36   ALT (SGPT) U/L 42* 44*     Estimated Creatinine Clearance: 47.7 mL/min (A) (by C-G formula based on SCr of 1.36 mg/dL (H)).  Results from last 7 days   Lab Units 09/15/21  0333   HEMOGLOBIN A1C % 6.00*     Results from last 7 days   Lab Units 09/15/21  0617 09/15/21  0229   GLUCOSE mg/dL 93 107                       Invalid input(s):  PHOS        Invalid input(s): LDLCALC  Results from last 7 days   Lab Units 09/15/21  0333 09/14/21  1644 09/14/21  1644   WBC 10*3/mm3 10.01  --  11.02*   HEMOGLOBIN g/dL 15.5  --  15.7   HEMATOCRIT % 50.0  --  49.7   PLATELETS 10*3/mm3 200  --  207   MCV fL 95.8   < > 94.0   MCH pg 29.7   < > 29.7   MCHC g/dL 31.0*   < > 31.6   RDW % 13.7   < > 13.9   RDW-SD fl 49.0   < > 48.0   MPV fL 9.4   < > 9.4   NEUTROPHIL % % 75.3   < > 73.9   LYMPHOCYTE % % 14.3*   < > 14.3*   MONOCYTES % % 9.4   < > 10.7   EOSINOPHIL % % 0.3   < > 0.2*   BASOPHIL % % 0.3   < > 0.4   IMM GRAN % % 0.4   < > 0.5   NEUTROS ABS 10*3/mm3 7.54*   < > 8.15*   LYMPHS ABS 10*3/mm3 1.43   < > 1.58   MONOS ABS 10*3/mm3 0.94*   < > 1.18*   EOS ABS 10*3/mm3 0.03   < > 0.02   BASOS ABS 10*3/mm3 0.03   < > 0.04   IMMATURE GRANS (ABS) 10*3/mm3 0.04   < > 0.05   NRBC /100 WBC 0.0   < > 0.0    < > = values in this interval not displayed.     Results from last 7 days   Lab Units 09/14/21  1644   INR  2.61*         Results from last 7 days   Lab Units 09/15/21  0333 09/14/21  1644   LACTATE mmol/L 1.2 1.2         Results from last 7 days   Lab Units 09/14/21  1644   LIPASE U/L 14             Results from last 7 days   Lab Units 09/14/21  1701   NITRITE UA  Negative           Imaging:  Imaging Results (Last 24 Hours)     ** No results found for the last 24 hours. **             I reviewed the patient's new clinical results / labs / tests / procedures      Assessment/Plan     Active Hospital Problems    Diagnosis  POA   • **Abdominal pain [R10.9]  Yes   • Partial small bowel obstruction (CMS/HCC) [K56.600]  Yes    • Nausea and vomiting [R11.2]  Yes   • Diverticulitis of intestine with perforation [K57.80]  Yes   • LEXIE (acute kidney injury) (CMS/HCC) [N17.9]  Yes   • Type 2 diabetes mellitus (CMS/HCC) [E11.9]  Yes   • COPD (chronic obstructive pulmonary disease) (CMS/HCC) [J44.9]  Yes   • HTN (hypertension) [I10]  Yes   • MGUS (monoclonal gammopathy of unknown significance) [D47.2]  Yes   • History of DVT (deep vein thrombosis) [Z86.718]  Not Applicable   • Diastolic dysfunction [I51.89]  Yes   • 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           · Partial small bowel obstruction with acute sigmoid diverticulitis and contained perforation.  Clinically improving after n.p.o. status/IV fluids/IV Zosyn.  Surgery has advanced to clear liquids and planning no surgical intervention at this time.  Surgery planning repeat CT in 2 days.  Continue IV fluid and IV Zosyn and pain control.  Lipase and liver function test are normal.  · Type 2 diabetes.  A1c 6.  Metformin on hold secondary to the renal failure.  Continue sliding scale insulin.  · History of COPD/pulmonary nodule by CAT scan.  Need to repeat CT scan of the chest in 6 months.  Will initiate Symbicort and Spiriva.  · Hypertension/chronic diastolic congestive heart failure/history of coronary artery disease.  Blood pressure is on the high side.  Currently on lisinopril and Imdur.  I will stop lisinopril secondary to the acute renal failure.  Will initiate Lopressor and monitor.  Resume anticoagulation.  · History of peripheral vascular disease.  Continue Lipitor and anticoagulation.  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  CBC is normal.  We will continue anticoagulation.  · Acute kidney failure.  Most likely secondary to prerenal azotemia because of volume depletion secondary to  nausea and vomiting and diarrhea.  Will hold Metformin/lisinopril at this time.  We will continue IV fluid.  Patient euvolemic today.  Improving renal function.  Will monitor renal function.  · VT prophylaxis.  Patient is on anticoagulation.      · Discussed with patient/wife      Angel Orellana MD  [facility]  09/15/21  13:46 EDT

## 2021-09-16 LAB
ALBUMIN SERPL-MCNC: 3.3 G/DL (ref 3.5–5.2)
ALBUMIN/GLOB SERPL: 1.3 G/DL
ALP SERPL-CCNC: 62 U/L (ref 39–117)
ALT SERPL W P-5'-P-CCNC: 28 U/L (ref 1–41)
ANION GAP SERPL CALCULATED.3IONS-SCNC: 7.5 MMOL/L (ref 5–15)
AST SERPL-CCNC: 22 U/L (ref 1–40)
BASOPHILS # BLD AUTO: 0.03 10*3/MM3 (ref 0–0.2)
BASOPHILS NFR BLD AUTO: 0.3 % (ref 0–1.5)
BILIRUB SERPL-MCNC: 0.8 MG/DL (ref 0–1.2)
BUN SERPL-MCNC: 21 MG/DL (ref 8–23)
BUN/CREAT SERPL: 19.8 (ref 7–25)
CALCIUM SPEC-SCNC: 8.9 MG/DL (ref 8.6–10.5)
CHLORIDE SERPL-SCNC: 102 MMOL/L (ref 98–107)
CO2 SERPL-SCNC: 25.5 MMOL/L (ref 22–29)
CREAT SERPL-MCNC: 1.06 MG/DL (ref 0.76–1.27)
DEPRECATED RDW RBC AUTO: 44.6 FL (ref 37–54)
EOSINOPHIL # BLD AUTO: 0.08 10*3/MM3 (ref 0–0.4)
EOSINOPHIL NFR BLD AUTO: 0.8 % (ref 0.3–6.2)
ERYTHROCYTE [DISTWIDTH] IN BLOOD BY AUTOMATED COUNT: 13.5 % (ref 12.3–15.4)
GFR SERPL CREATININE-BSD FRML MDRD: 68 ML/MIN/1.73
GLOBULIN UR ELPH-MCNC: 2.6 GM/DL
GLUCOSE BLDC GLUCOMTR-MCNC: 112 MG/DL (ref 70–130)
GLUCOSE BLDC GLUCOMTR-MCNC: 132 MG/DL (ref 70–130)
GLUCOSE BLDC GLUCOMTR-MCNC: 134 MG/DL (ref 70–130)
GLUCOSE BLDC GLUCOMTR-MCNC: 83 MG/DL (ref 70–130)
GLUCOSE SERPL-MCNC: 79 MG/DL (ref 65–99)
HCT VFR BLD AUTO: 42.2 % (ref 37.5–51)
HGB BLD-MCNC: 14.1 G/DL (ref 13–17.7)
IMM GRANULOCYTES # BLD AUTO: 0.08 10*3/MM3 (ref 0–0.05)
IMM GRANULOCYTES NFR BLD AUTO: 0.8 % (ref 0–0.5)
INR PPP: 3.64 (ref 0.9–1.1)
LYMPHOCYTES # BLD AUTO: 1.52 10*3/MM3 (ref 0.7–3.1)
LYMPHOCYTES NFR BLD AUTO: 15.6 % (ref 19.6–45.3)
MCH RBC QN AUTO: 30.3 PG (ref 26.6–33)
MCHC RBC AUTO-ENTMCNC: 33.4 G/DL (ref 31.5–35.7)
MCV RBC AUTO: 90.8 FL (ref 79–97)
MONOCYTES # BLD AUTO: 0.92 10*3/MM3 (ref 0.1–0.9)
MONOCYTES NFR BLD AUTO: 9.5 % (ref 5–12)
NEUTROPHILS NFR BLD AUTO: 7.09 10*3/MM3 (ref 1.7–7)
NEUTROPHILS NFR BLD AUTO: 73 % (ref 42.7–76)
NRBC BLD AUTO-RTO: 0 /100 WBC (ref 0–0.2)
PLATELET # BLD AUTO: 221 10*3/MM3 (ref 140–450)
PMV BLD AUTO: 9.2 FL (ref 6–12)
POTASSIUM SERPL-SCNC: 3.9 MMOL/L (ref 3.5–5.2)
PROT SERPL-MCNC: 5.9 G/DL (ref 6–8.5)
PROTHROMBIN TIME: 35.9 SECONDS (ref 11.7–14.2)
RBC # BLD AUTO: 4.65 10*6/MM3 (ref 4.14–5.8)
SODIUM SERPL-SCNC: 135 MMOL/L (ref 136–145)
WBC # BLD AUTO: 9.72 10*3/MM3 (ref 3.4–10.8)

## 2021-09-16 PROCEDURE — 25010000002 INFLUENZA VAC SPLIT QUAD 0.5 ML SUSPENSION PREFILLED SYRINGE: Performed by: INTERNAL MEDICINE

## 2021-09-16 PROCEDURE — 25010000002 SODIUM CHLORIDE 0.9 % WITH KCL 20 MEQ 20-0.9 MEQ/L-% SOLUTION: Performed by: INTERNAL MEDICINE

## 2021-09-16 PROCEDURE — 85610 PROTHROMBIN TIME: CPT | Performed by: INTERNAL MEDICINE

## 2021-09-16 PROCEDURE — 82962 GLUCOSE BLOOD TEST: CPT

## 2021-09-16 PROCEDURE — 92610 EVALUATE SWALLOWING FUNCTION: CPT

## 2021-09-16 PROCEDURE — 85025 COMPLETE CBC W/AUTO DIFF WBC: CPT | Performed by: INTERNAL MEDICINE

## 2021-09-16 PROCEDURE — 94799 UNLISTED PULMONARY SVC/PX: CPT

## 2021-09-16 PROCEDURE — 94640 AIRWAY INHALATION TREATMENT: CPT

## 2021-09-16 PROCEDURE — G0008 ADMIN INFLUENZA VIRUS VAC: HCPCS | Performed by: INTERNAL MEDICINE

## 2021-09-16 PROCEDURE — 90686 IIV4 VACC NO PRSV 0.5 ML IM: CPT | Performed by: INTERNAL MEDICINE

## 2021-09-16 PROCEDURE — 25010000002 PIPERACILLIN SOD-TAZOBACTAM PER 1 G: Performed by: INTERNAL MEDICINE

## 2021-09-16 PROCEDURE — 99232 SBSQ HOSP IP/OBS MODERATE 35: CPT | Performed by: SURGERY

## 2021-09-16 PROCEDURE — 80053 COMPREHEN METABOLIC PANEL: CPT | Performed by: INTERNAL MEDICINE

## 2021-09-16 RX ADMIN — POTASSIUM CHLORIDE AND SODIUM CHLORIDE 100 ML/HR: 900; 150 INJECTION, SOLUTION INTRAVENOUS at 06:09

## 2021-09-16 RX ADMIN — METOPROLOL TARTRATE 25 MG: 25 TABLET, FILM COATED ORAL at 09:34

## 2021-09-16 RX ADMIN — TAZOBACTAM SODIUM AND PIPERACILLIN SODIUM 3.38 G: 375; 3 INJECTION, SOLUTION INTRAVENOUS at 06:09

## 2021-09-16 RX ADMIN — ISOSORBIDE MONONITRATE 60 MG: 60 TABLET ORAL at 09:35

## 2021-09-16 RX ADMIN — POTASSIUM CHLORIDE AND SODIUM CHLORIDE 100 ML/HR: 900; 150 INJECTION, SOLUTION INTRAVENOUS at 19:29

## 2021-09-16 RX ADMIN — BUDESONIDE AND FORMOTEROL FUMARATE DIHYDRATE 2 PUFF: 160; 4.5 AEROSOL RESPIRATORY (INHALATION) at 09:16

## 2021-09-16 RX ADMIN — ATORVASTATIN CALCIUM 20 MG: 20 TABLET, FILM COATED ORAL at 09:35

## 2021-09-16 RX ADMIN — SODIUM CHLORIDE, PRESERVATIVE FREE 10 ML: 5 INJECTION INTRAVENOUS at 09:35

## 2021-09-16 RX ADMIN — BUDESONIDE AND FORMOTEROL FUMARATE DIHYDRATE 2 PUFF: 160; 4.5 AEROSOL RESPIRATORY (INHALATION) at 19:22

## 2021-09-16 RX ADMIN — TAZOBACTAM SODIUM AND PIPERACILLIN SODIUM 3.38 G: 375; 3 INJECTION, SOLUTION INTRAVENOUS at 15:54

## 2021-09-16 RX ADMIN — TIOTROPIUM BROMIDE INHALATION SPRAY 2 PUFF: 3.12 SPRAY, METERED RESPIRATORY (INHALATION) at 09:17

## 2021-09-16 RX ADMIN — INFLUENZA VIRUS VACCINE 0.5 ML: 15; 15; 15; 15 SUSPENSION INTRAMUSCULAR at 12:12

## 2021-09-16 NOTE — PROGRESS NOTES
Pharmacy Consult: Warfarin Dosing/ Monitoring    Giovani España is a 76 y.o. male, estimated creatinine clearance is 61.2 mL/min (by C-G formula based on SCr of 1.06 mg/dL). weighing 70.8 kg (156 lb).    Results from last 7 days   Lab Units 09/16/21  0615 09/15/21  1810 09/15/21  0333 09/14/21  1644   INR  3.64* 3.12*  --  2.61*   HEMOGLOBIN g/dL 14.1  --  15.5 15.7   HEMATOCRIT % 42.2  --  50.0 49.7   PLATELETS 10*3/mm3 221  --  200 207     Results from last 7 days   Lab Units 09/16/21  0615 09/15/21  0333 09/14/21  1644   SODIUM mmol/L 135* 135* 132*   POTASSIUM mmol/L 3.9 4.5 3.9   CHLORIDE mmol/L 102 99 93*   CO2 mmol/L 25.5 24.9 27.2   BUN mg/dL 21 45* 51*   CREATININE mg/dL 1.06 1.36* 1.74*   CALCIUM mg/dL 8.9 9.7 10.0   BILIRUBIN mg/dL 0.8 1.0 1.2   ALK PHOS U/L 62 79 79   ALT (SGPT) U/L 28 42* 44*   AST (SGOT) U/L 22 27 36   GLUCOSE mg/dL 79 97 127*     Anticoagulation history: 6mg Sun and Wed, 5mg all other days    Hospital Anticoagulation:  Consulting provider: Dr Orellana  Start date: 9/15  Indication: DVT  Target INR: 2-3  Expected duration: indefinite   Bridge Therapy: No                Date 9/14 9/15 9/16          INR 2.61 3.12 3.64          Warfarin dose  6mg HOLD            Potential drug interactions:     Relevant nutrition status: clear liquids    Education complete?/ Date: Pending    Assessment/Plan:  Dose: INR today = 3.64 (high) therefore will hold warfarin dosing for today. Looks like INR was missed yesterday am and redone at 1810 yesterday (it was 3.12) Unfortunately though 6mg dose was given at 1839 (probably b/c result wasn't back before RN gave the med) Nevertheless will hold dosing until INR is <3.   Monitor INR daily  Follow up daily    Pharmacy will continue to follow until discharge or discontinuation of warfarin.   John Arcos [facility]

## 2021-09-16 NOTE — PLAN OF CARE
Goal Outcome Evaluation:  Plan of Care Reviewed With: patient        Progress: no change  Outcome Summary: Patient seen for clinical swallow assessment d/t report of frequent cough. Pt denies dysphagia, but admits to significant post nasal drip following nasal injury. Oral mech exam remarkable for harsh voice and no upper dentition. Pt has upper dentures, but they are not present. Tolerates a regular diet at baseline without dentures. Currently on clear per surgery. No overt s/s of aspiration with thins via cup or straw. Patient is safe to continue thins. SLP to sign off. Please re consult if current status changes.    Patient was not wearing a face mask during this therapy encounter. Therapist used appropriate personal protective equipment including mask, eye protection and gloves.  Mask used was standard procedure mask. Appropriate PPE was worn during the entire therapy session. Hand hygiene was completed before and after therapy session. Patient    is not in enhanced droplet precautions.

## 2021-09-16 NOTE — PLAN OF CARE
Goal Outcome Evaluation:  Plan of Care Reviewed With: patient        Progress: no change  Outcome Summary: VSS. Multiple liquid small BM's today. Diet advanced to full liquids. Will monitor.

## 2021-09-16 NOTE — THERAPY DISCHARGE NOTE
Acute Care - Speech Language Pathology   Swallow Initial Evaluation/Discharge [facility]     Patient Name: Giovani España  : 1945  MRN: [id number]  Today's Date: 2021               Admit Date: 2021    Visit Dx:    ICD-10-CM ICD-9-CM   1. SBO (small bowel obstruction) (CMS/HCC)  K56.609 560.9   2. Abdominal pain in male  R10.9 789.00   3. Nausea and vomiting in adult  R11.2 787.01     Patient Active Problem List   Diagnosis   • Abdominal pain   • Nausea and vomiting   • Diverticulitis of intestine with perforation   • LEXIE (acute kidney injury) (CMS/HCC)   • Type 2 diabetes mellitus (CMS/HCC)   • COPD (chronic obstructive pulmonary disease) (CMS/HCC)   • HTN (hypertension)   • MGUS (monoclonal gammopathy of unknown significance)   • History of DVT (deep vein thrombosis)   • Diastolic dysfunction   • Presence of IVC filter   • Chronic anticoagulation   • Partial small bowel obstruction (CMS/HCC)   • Pulmonary nodule     Past Medical History:   Diagnosis Date   • Cancer (CMS/HCC)    • COPD (chronic obstructive pulmonary disease) (CMS/HCC)    • Diabetes mellitus (CMS/HCC)    • DVT, lower extremity (CMS/HCC)    • Elevated cholesterol    • Hypertension      History reviewed. No pertinent surgical history.    SLP Recommendation and Plan  SLP Swallowing Diagnosis: swallow [facility]  SLP Diet Recommendation: thin liquids     Monitor for Signs of Aspiration: yes, notify SLP if any concerns        Anticipated Discharge Disposition (SLP): unknown     Therapy Frequency (Swallow): evaluation only              Anticipated Discharge Disposition (SLP): unknown             Plan of Care Reviewed With: patient  Progress: no change  Outcome Summary: Patient seen for clinical swallow assessment d/t report of frequent cough. Pt denies dysphagia, but admits to significant post nasal drip following nasal injury. [facility] remarkable for harsh voice and no upper dentition. Pt has upper dentures, but they are not present.  Tolerates a regular diet at baseline without dentures. Currently on clear per surgery. No overt s/s of aspiration with thins via cup or straw. Patient is safe to continue thins. SLP to sign off. Please re consult if current status changes.       SWALLOW EVALUATION (last 72 hours)      SLP Adult Swallow Evaluation     Row Name 09/16/21 0800                   Rehab Evaluation    Document Type  evaluation  -        Patient Effort  excellent  -           General Information    Patient Profile Reviewed  yes  -        Pertinent History Of Current Problem  SBO, baseline cough  -        Current Method of Nutrition  thin liquids  -        Precautions/Limitations, Vision  WFL;for purposes of eval  -        Precautions/Limitations, Hearing  WFL;for purposes of eval  -        Prior Level of Function-Communication  WFL  -        Prior Level of Function-Swallowing  no diet consistency restrictions  -        Plans/Goals Discussed with  patient  -        Barriers to Rehab  none identified  -        Patient's Goals for Discharge  patient did not state  -           Pain    Additional Documentation  Pain Scale: Word Pre/Post-Treatment (Group)  -           Pain Scale: Word Pre/Post-Treatment    Pain: Word Scale, Pretreatment  0 - no pain  -           Oral Motor Structure and Function    Dentition Assessment  other (see comments) upper dentures not present  -        Secretion Management  WNL/WFL  -SH           Oral Musculature and Cranial Nerve Assessment    Oral Motor General Assessment  WFL  -           Clinical Swallow Eval    Clinical Swallow Evaluation Summary  Patient seen for clinical swallow assessment d/t report of frequent cough. Pt denies dysphagia, but admits to significant post nasal drip following nasal injury. Oral mech exam remarkable for harsh voice and no upper dentition. Pt has upper dentures, but they are not present. Tolerates a regular diet at baseline without dentures. Currently on  clear per surgery. No overt s/s of aspiration with thins via cup or straw. Patient is safe to continue thins. SLP to sign off. Please re consult if current status changes.  -           Clinical Impression    SLP Swallowing Diagnosis  swallow WFL  -           Recommendations    Therapy Frequency (Swallow)  evaluation only  -        SLP Diet Recommendation  thin liquids  -        Recommended Precautions and Strategies  upright posture during/after eating  -        Oral Care Recommendations  Oral Care BID/PRN  -        SLP Rec. for Method of Medication Administration  meds whole;with thin liquids;as tolerated  -        Monitor for Signs of Aspiration  yes;notify SLP if any concerns  -        Anticipated Discharge Disposition (SLP)  unknown  -          User Key  (r) = Recorded By, (t) = Taken By, (c) = Cosigned By    Initials Name Effective Dates     Noemy Leo MS CCC-SLP 06/16/21 -           EDUCATION  The patient has been educated in the following areas:   Dysphagia (Swallowing Impairment).               SLP Outcome Measures (last 72 hours)      SLP Outcome Measures     Row Name 09/16/21 1500             SLP Outcome Measures    Outcome Measure Used?  Adult NOMS  -         Adult FCM Scores    FCM Chosen  Swallowing  -      Swallowing FCM Score  7  -        User Key  (r) = Recorded By, (t) = Taken By, (c) = Cosigned By    Initials Name Effective Dates     Noemy Leo MS CCC-SLP 06/16/21 -            Time Calculation:   Time Calculation- SLP     Row Name 09/16/21 1538             Time Calculation- [facility]    SLP Start Time  0800  -      SLP Received On  09/16/21  -         Untimed Charges    30259-[id number] Eval Oral Pharyng Swallow Minutes  45  -         Total Minutes    Untimed Charges Total Minutes  45  -       Total Minutes  45  -SH        User Key  (r) = Recorded By, (t) = Taken By, (c) = Cosigned By    Initials Name Provider Type     Noemy Leo MS CCC-SLP Speech and Language  Pathologist          Therapy Charges for Today     Code Description Service Date Service Provider Modifiers Qty    [id number] HC ST EVAL ORAL PHARYNG SWALLOW 3 9/16/2021 Noemy Leo, MS CCC-SLP GN 1               SLP Discharge Summary  Anticipated Discharge Disposition (SLP): unknown    Noemy Leo MS CCC-SLP  9/16/2021

## 2021-09-16 NOTE — PROGRESS NOTES
Name: Giovani España ADMIT: 2021   : 1945  PCP: Derek Grider MD (Inactive)    MRN: [id number] LOS: 2 days   AGE/SEX: 76 y.o. male  ROOM: [facility]     Subjective   Subjective   No abdominal pain or colic today.  No nausea or vomiting.  Continues with watery diarrhea without fresh bright blood per rectum or melena.  No fever or chills.    Review of Systems    .  No dysuria or hematuria.  Cardiovascular/respiratory.  No chest pain/no shortness of breath/no cough.  No hemoptysis.  No ankle edema.     Objective   Objective   Vital Signs  Temp:  [98.2 °F (36.8 °C)-98.7 °F (37.1 °C)] 98.2 °F (36.8 °C)  Heart Rate:  [56-79] 56  Resp:  [18-20] 18  BP: (112-133)/(62-84) 133/79  SpO2:  [95 %-98 %] 95 %  on   ;   Device (Oxygen Therapy): room air    Intake/Output Summary (Last 24 hours) at 2021 1306  Last data filed at 2021 0609  Gross per 24 hour   Intake 3011.67 ml   Output --   Net 3011.67 ml     Body mass index is 21.76 kg/m².      21  1607 09/15/21  0648   Weight: 71.2 kg (157 lb) 70.8 kg (156 lb)     Physical Exam    General.  Elderly gentleman.  Alert and oriented x3.  No apparent pain distress or diaphoresis.  Normal mood and affect.  Eyes.  Pupils equal round and reactive.  Intact extraocular musculature.  No pallor or jaundice.  Normal conjunctive and lids.  Oral cavity.  Moist mucous membrane.  Neck.  Supple.  No JVD.  No lymphadenopathy or thyromegaly.  Cardiovascular.  Regular rate and rhythm.  No gallops or murmurs.  Chest.  Poor to auscultation bilaterally with no added sounds.  Abdomen.  Soft lax.  No tenderness.  No guarding or rebound.  No organomegaly.  Positive bowel sounds.  Extremities.  No clubbing cyanosis or edema.    Results Review:      Results from last 7 days   Lab Units 21  0615 09/15/21  0333 21  1644   SODIUM mmol/L 135* 135* 132*   POTASSIUM mmol/L 3.9 4.5 3.9   CHLORIDE mmol/L 102 99 93*   CO2 mmol/L 25.5 24.9 27.2   BUN mg/dL 21 45* 51*    CREATININE mg/dL 1.06 1.36* 1.74*   GLUCOSE mg/dL 79 97 127*   CALCIUM mg/dL 8.9 9.7 10.0   AST (SGOT) U/L 22 27 36   ALT (SGPT) U/L 28 42* 44*     Estimated Creatinine Clearance: 61.2 mL/min (by C-G formula based on SCr of 1.06 mg/dL).  Results from last 7 days   Lab Units 09/15/21  0333   HEMOGLOBIN A1C % 6.00*     Results from last 7 days   Lab Units 09/16/21  1115 09/16/21  0627 09/15/21  2127 09/15/21  1642 09/15/21  0617 09/15/21  0229   GLUCOSE mg/dL 134* 83 122 120 93 107                       Invalid input(s):  PHOS        Invalid input(s): LDLCALC  Results from last 7 days   Lab Units 09/16/21  0615 09/15/21  0333 09/15/21  0333 09/14/21  1644 09/14/21  1644   WBC 10*3/mm3 9.72  --  10.01  --  11.02*   HEMOGLOBIN g/dL 14.1  --  15.5  --  15.7   HEMATOCRIT % 42.2  --  50.0  --  49.7   PLATELETS 10*3/mm3 221  --  200  --  207   MCV fL 90.8   < > 95.8   < > 94.0   MCH pg 30.3   < > 29.7   < > 29.7   MCHC g/dL 33.4   < > 31.0*   < > 31.6   RDW % 13.5   < > 13.7   < > 13.9   RDW-SD fl 44.6   < > 49.0   < > 48.0   MPV fL 9.2   < > 9.4   < > 9.4   NEUTROPHIL % % 73.0   < > 75.3   < > 73.9   LYMPHOCYTE % % 15.6*   < > 14.3*   < > 14.3*   MONOCYTES % % 9.5   < > 9.4   < > 10.7   EOSINOPHIL % % 0.8   < > 0.3   < > 0.2*   BASOPHIL % % 0.3   < > 0.3   < > 0.4   IMM GRAN % % 0.8*   < > 0.4   < > 0.5   NEUTROS ABS 10*3/mm3 7.09*   < > 7.54*   < > 8.15*   LYMPHS ABS 10*3/mm3 1.52   < > 1.43   < > 1.58   MONOS ABS 10*3/mm3 0.92*   < > 0.94*   < > 1.18*   EOS ABS 10*3/mm3 0.08   < > 0.03   < > 0.02   BASOS ABS 10*3/mm3 0.03   < > 0.03   < > 0.04   IMMATURE GRANS (ABS) 10*3/mm3 0.08*   < > 0.04   < > 0.05   NRBC /100 WBC 0.0   < > 0.0   < > 0.0    < > = values in this interval not displayed.     Results from last 7 days   Lab Units 09/16/21  0615 09/15/21  1810 09/14/21  1644   INR  3.64* 3.12* 2.61*         Results from last 7 days   Lab Units 09/15/21  0333 09/14/21  1644   LACTATE mmol/L 1.2 1.2         Results from  last 7 days   Lab Units 09/14/21  1644   LIPASE U/L 14             Results from last 7 days   Lab Units 09/14/21  1701   NITRITE UA  Negative           Imaging:  Imaging Results (Last 24 Hours)     ** No results found for the last 24 hours. **             I reviewed the patient's new clinical results / labs / tests / procedures      Assessment/Plan     Active Hospital Problems    Diagnosis  POA   • **Abdominal pain [R10.9]  Yes   • Partial small bowel obstruction (CMS/HCC) [K56.600]  Yes   • Pulmonary nodule [R91.1]  Yes   • Nausea and vomiting [R11.2]  Yes   • Diverticulitis of intestine with perforation [K57.80]  Yes   • LEXIE (acute kidney injury) (CMS/HCC) [N17.9]  Yes   • Type 2 diabetes mellitus (CMS/HCC) [E11.9]  Yes   • COPD (chronic obstructive pulmonary disease) (CMS/HCC) [J44.9]  Yes   • HTN (hypertension) [I10]  Yes   • MGUS (monoclonal gammopathy of unknown significance) [D47.2]  Yes   • History of DVT (deep vein thrombosis) [Z86.718]  Not Applicable   • Diastolic dysfunction [I51.89]  Yes   • 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           · Partial small bowel obstruction with acute sigmoid diverticulitis and contained perforation.  Clinically improving  Surgery has advanced to full liquids and planning no surgical intervention at this time.  Surgery planning repeat CT tomorrow continue IV fluid and IV Zosyn and pain control.  Lipase and liver function test are normal.  · Type 2 diabetes.  A1c 6.  Metformin on hold secondary to the renal failure.  Continue sliding scale insulin.  · History of COPD/pulmonary nodule by CAT scan.  Need to repeat CT scan of the chest in 6 months.  Continue Symbicort and Spiriva.  · Hypertension/chronic diastolic congestive heart failure/history of coronary artery disease.  Blood pressure is on the high side.  Lisinopril stopped secondary to  renal failure.  Currently good blood pressure control with no evidence of angina or congestive heart failure on Imdur/Lopressor and monitor.  Also on anticoagulation.  Pharmacy following INR/dosing of Coumadin.  · History of peripheral vascular disease.  Continue Lipitor and anticoagulation.  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  CBC is normal.  We will continue anticoagulation.  · Acute kidney failure.  Most likely secondary to prerenal azotemia because of volume depletion secondary to nausea and vomiting and diarrhea.  Resolved with Metformin/lisinopril and on IV fluid.  Patient euvolemic today.  We will continue to monitor   · VTE prophylaxis.  Patient is on anticoagulation.      · Discussed with patient/wife      Angel Orellana MD  [facility]  09/16/21  13:06 EDT

## 2021-09-16 NOTE — PROGRESS NOTES
Chief complaint: Follow-up partial small bowel obstruction/possible diverticulitis     Subjective:  Patient rested well overnight, denies much pain.  There was some loose stools, although somewhat improved.  Tolerating clear liquids without nausea.     Review of systems:  Constitutional: Patient denies fever or chills, appetite is good  Respiratory: Patient denies cough or wheezing     Physical exam:  Patient afebrile, heart rate 50s to 60s, blood pressure 133/79  General: Awake and alert, no distress answers questions appropriately, no distress  Eyes: Extraocular movements are intact without icterus  Neck: Supple, trachea midline  Respiratory: No use of accessory muscles, good bilateral chest expansion  Gastrointestinal: Abdomen is soft, nondistended, no tenderness, bowel sounds positive, no hernia felt     Labs are reviewed.  White count is normal at 9.7 hemoglobin 14.1 INR 3.6        Assessment and plan:  -Crampy abdominal pain, nausea and vomiting and CT read from outside hospital suggesting partial bowel obstruction and possible acute diverticulitis  -Clinically the patient continues to look well, no evidence of bowel obstruction and I will advance to full liquid diet  -Dehydration appears improved, continue IV fluid resuscitation  -Probably still reasonable to continue IV antibiotics for now, as he advances his diet he can probably be transitioned to oral.    Tentatively plan to repeat CT tomorrow.  -At this time, no reason to discuss any surgical intervention, continue conservative management     Chito Estrada MD  General and Endoscopic Surgery  [facility]     [address], [address]  [place], [place], [address]  P: [phone]  F: [phone]

## 2021-09-16 NOTE — PLAN OF CARE
Goal Outcome Evaluation:  Plan of Care Reviewed With: patient        Progress: improving  Outcome Summary: IV fluids and IV antibiotics ordered, Clear liquid diet tolerated, no c/o of pain or nausea, will CTM

## 2021-09-17 ENCOUNTER — APPOINTMENT (OUTPATIENT)
Dept: CT IMAGING | Facility: HOSPITAL | Age: 76
End: 2021-09-17

## 2021-09-17 ENCOUNTER — APPOINTMENT (OUTPATIENT)
Dept: OTHER | Facility: HOSPITAL | Age: 76
End: 2021-09-17

## 2021-09-17 DIAGNOSIS — Z09 FOLLOW UP: ICD-10-CM

## 2021-09-17 LAB
ANION GAP SERPL CALCULATED.3IONS-SCNC: 7.8 MMOL/L (ref 5–15)
BASOPHILS # BLD AUTO: 0.03 10*3/MM3 (ref 0–0.2)
BASOPHILS NFR BLD AUTO: 0.4 % (ref 0–1.5)
BUN SERPL-MCNC: 10 MG/DL (ref 8–23)
BUN/CREAT SERPL: 11.1 (ref 7–25)
CALCIUM SPEC-SCNC: 8.5 MG/DL (ref 8.6–10.5)
CHLORIDE SERPL-SCNC: 105 MMOL/L (ref 98–107)
CO2 SERPL-SCNC: 26.2 MMOL/L (ref 22–29)
CREAT SERPL-MCNC: 0.9 MG/DL (ref 0.76–1.27)
DEPRECATED RDW RBC AUTO: 45 FL (ref 37–54)
EOSINOPHIL # BLD AUTO: 0.06 10*3/MM3 (ref 0–0.4)
EOSINOPHIL NFR BLD AUTO: 0.7 % (ref 0.3–6.2)
ERYTHROCYTE [DISTWIDTH] IN BLOOD BY AUTOMATED COUNT: 13.6 % (ref 12.3–15.4)
GFR SERPL CREATININE-BSD FRML MDRD: 82 ML/MIN/1.73
GLUCOSE BLDC GLUCOMTR-MCNC: 112 MG/DL (ref 70–130)
GLUCOSE BLDC GLUCOMTR-MCNC: 113 MG/DL (ref 70–130)
GLUCOSE BLDC GLUCOMTR-MCNC: 94 MG/DL (ref 70–130)
GLUCOSE BLDC GLUCOMTR-MCNC: 99 MG/DL (ref 70–130)
GLUCOSE SERPL-MCNC: 90 MG/DL (ref 65–99)
HCT VFR BLD AUTO: 38.6 % (ref 37.5–51)
HGB BLD-MCNC: 13 G/DL (ref 13–17.7)
IMM GRANULOCYTES # BLD AUTO: 0.06 10*3/MM3 (ref 0–0.05)
IMM GRANULOCYTES NFR BLD AUTO: 0.7 % (ref 0–0.5)
INR PPP: 3.63 (ref 0.9–1.1)
LYMPHOCYTES # BLD AUTO: 1.38 10*3/MM3 (ref 0.7–3.1)
LYMPHOCYTES NFR BLD AUTO: 16.5 % (ref 19.6–45.3)
MCH RBC QN AUTO: 30.4 PG (ref 26.6–33)
MCHC RBC AUTO-ENTMCNC: 33.7 G/DL (ref 31.5–35.7)
MCV RBC AUTO: 90.4 FL (ref 79–97)
MONOCYTES # BLD AUTO: 0.77 10*3/MM3 (ref 0.1–0.9)
MONOCYTES NFR BLD AUTO: 9.2 % (ref 5–12)
NEUTROPHILS NFR BLD AUTO: 6.08 10*3/MM3 (ref 1.7–7)
NEUTROPHILS NFR BLD AUTO: 72.5 % (ref 42.7–76)
NRBC BLD AUTO-RTO: 0 /100 WBC (ref 0–0.2)
PLATELET # BLD AUTO: 221 10*3/MM3 (ref 140–450)
PMV BLD AUTO: 8.8 FL (ref 6–12)
POTASSIUM SERPL-SCNC: 4.2 MMOL/L (ref 3.5–5.2)
PROTHROMBIN TIME: 35.9 SECONDS (ref 11.7–14.2)
RBC # BLD AUTO: 4.27 10*6/MM3 (ref 4.14–5.8)
SODIUM SERPL-SCNC: 139 MMOL/L (ref 136–145)
WBC # BLD AUTO: 8.38 10*3/MM3 (ref 3.4–10.8)

## 2021-09-17 PROCEDURE — 80048 BASIC METABOLIC PNL TOTAL CA: CPT | Performed by: INTERNAL MEDICINE

## 2021-09-17 PROCEDURE — 25010000002 IOPAMIDOL 61 % SOLUTION: Performed by: INTERNAL MEDICINE

## 2021-09-17 PROCEDURE — 25010000002 SODIUM CHLORIDE 0.9 % WITH KCL 20 MEQ 20-0.9 MEQ/L-% SOLUTION: Performed by: INTERNAL MEDICINE

## 2021-09-17 PROCEDURE — 85025 COMPLETE CBC W/AUTO DIFF WBC: CPT | Performed by: INTERNAL MEDICINE

## 2021-09-17 PROCEDURE — 82962 GLUCOSE BLOOD TEST: CPT

## 2021-09-17 PROCEDURE — 99232 SBSQ HOSP IP/OBS MODERATE 35: CPT | Performed by: SURGERY

## 2021-09-17 PROCEDURE — 94799 UNLISTED PULMONARY SVC/PX: CPT

## 2021-09-17 PROCEDURE — 74177 CT ABD & PELVIS W/CONTRAST: CPT

## 2021-09-17 PROCEDURE — 85610 PROTHROMBIN TIME: CPT | Performed by: INTERNAL MEDICINE

## 2021-09-17 PROCEDURE — 25010000002 PIPERACILLIN SOD-TAZOBACTAM PER 1 G: Performed by: INTERNAL MEDICINE

## 2021-09-17 RX ADMIN — POTASSIUM CHLORIDE AND SODIUM CHLORIDE 100 ML/HR: 900; 150 INJECTION, SOLUTION INTRAVENOUS at 20:26

## 2021-09-17 RX ADMIN — METOPROLOL TARTRATE 25 MG: 25 TABLET, FILM COATED ORAL at 20:26

## 2021-09-17 RX ADMIN — TAZOBACTAM SODIUM AND PIPERACILLIN SODIUM 3.38 G: 375; 3 INJECTION, SOLUTION INTRAVENOUS at 22:15

## 2021-09-17 RX ADMIN — TIOTROPIUM BROMIDE INHALATION SPRAY 2 PUFF: 3.12 SPRAY, METERED RESPIRATORY (INHALATION) at 08:26

## 2021-09-17 RX ADMIN — POTASSIUM CHLORIDE AND SODIUM CHLORIDE 100 ML/HR: 900; 150 INJECTION, SOLUTION INTRAVENOUS at 05:37

## 2021-09-17 RX ADMIN — IOPAMIDOL 85 ML: 612 INJECTION, SOLUTION INTRAVENOUS at 10:48

## 2021-09-17 RX ADMIN — METOPROLOL TARTRATE 25 MG: 25 TABLET, FILM COATED ORAL at 08:28

## 2021-09-17 RX ADMIN — SODIUM CHLORIDE, PRESERVATIVE FREE 10 ML: 5 INJECTION INTRAVENOUS at 08:28

## 2021-09-17 RX ADMIN — BUDESONIDE AND FORMOTEROL FUMARATE DIHYDRATE 2 PUFF: 160; 4.5 AEROSOL RESPIRATORY (INHALATION) at 08:26

## 2021-09-17 RX ADMIN — BUDESONIDE AND FORMOTEROL FUMARATE DIHYDRATE 2 PUFF: 160; 4.5 AEROSOL RESPIRATORY (INHALATION) at 19:44

## 2021-09-17 RX ADMIN — TAZOBACTAM SODIUM AND PIPERACILLIN SODIUM 3.38 G: 375; 3 INJECTION, SOLUTION INTRAVENOUS at 15:50

## 2021-09-17 RX ADMIN — TAZOBACTAM SODIUM AND PIPERACILLIN SODIUM 3.38 G: 375; 3 INJECTION, SOLUTION INTRAVENOUS at 05:37

## 2021-09-17 RX ADMIN — ATORVASTATIN CALCIUM 20 MG: 20 TABLET, FILM COATED ORAL at 08:28

## 2021-09-17 RX ADMIN — ISOSORBIDE MONONITRATE 60 MG: 60 TABLET ORAL at 08:28

## 2021-09-17 RX ADMIN — TAZOBACTAM SODIUM AND PIPERACILLIN SODIUM 3.38 G: 375; 3 INJECTION, SOLUTION INTRAVENOUS at 00:41

## 2021-09-17 RX ADMIN — SODIUM CHLORIDE, PRESERVATIVE FREE 10 ML: 5 INJECTION INTRAVENOUS at 20:26

## 2021-09-17 NOTE — PLAN OF CARE
Goal Outcome Evaluation:  Plan of Care Reviewed With: patient        Progress: improving  Outcome Summary: VSS, tolerating FULL Liquid diet, no c/o of pain or nausea, some diarrhea, will CTM

## 2021-09-17 NOTE — PROGRESS NOTES
Name: Giovani España ADMIT: 2021   : 1945  PCP: Derek Grider MD (Inactive)    MRN: [id number] LOS: 3 days   AGE/SEX: 76 y.o. male  ROOM: [facility]     Subjective   Subjective   No abdominal.  No nausea or vomiting.  Continues with watery diarrhea without fresh bright blood per rectum or melena (improved).  No fever or chills.    Review of Systems  .  No dysuria or hematuria.  Cardiovascular/respiratory.  No chest pain/no shortness of breath/no cough.  No hemoptysis.  No ankle edema.     Objective   Objective   Vital Signs  Temp:  [97.8 °F (36.6 °C)-98.6 °F (37 °C)] 97.8 °F (36.6 °C)  Heart Rate:  [49-82] 52  Resp:  [18-22] 18  BP: ()/(64-79) 127/79  SpO2:  [93 %-98 %] 96 %  on   ;   Device (Oxygen Therapy): room air    Intake/Output Summary (Last 24 hours) at 2021 1343  Last data filed at 2021 0537  Gross per 24 hour   Intake 2346.67 ml   Output --   Net 2346.67 ml     Body mass index is 21.76 kg/m².      21  1607 09/15/21  0648   Weight: 71.2 kg (157 lb) 70.8 kg (156 lb)     Physical Exam    General.  Elderly gentleman.  Alert and oriented x3.  No apparent pain distress or diaphoresis.  Normal mood and affect.  Eyes.  Pupils equal round and reactive.  Intact extraocular musculature.  No pallor or jaundice.  Normal conjunctive and lids.  Oral cavity.  Moist mucous membrane.  Neck.  Supple.  No JVD.  No lymphadenopathy or thyromegaly.  Cardiovascular.  Regular rate and rhythm.  No gallops or murmurs.  Chest.  Poor to auscultation bilaterally with no added sounds.  Abdomen.  Soft lax.  No tenderness.  No guarding or rebound.  No organomegaly.  Positive bowel sounds.  Extremities.  No clubbing cyanosis or edema.    Results Review:      Results from last 7 days   Lab Units 21  0712 21  0615 09/15/21  0333 21  1644 21  1708   SODIUM mmol/L 139 135* 135* 132* 135*   POTASSIUM mmol/L 4.2 3.9 4.5 3.9 4.3   CHLORIDE mmol/L 105 102 99 93* 95*   TOTAL CO2  mmol/L  --   --   --   --  21*   CO2 mmol/L 26.2 25.5 24.9 27.2  --    BUN mg/dL 10 21 45* 51* 36*   CREATININE mg/dL 0.90 1.06 1.36* 1.74* 1.3   GLUCOSE mg/dL 90 79 97 127*  --    CALCIUM mg/dL 8.5* 8.9 9.7 10.0 10.3*   AST (SGOT) U/L  --  22 27 36 33   ALT (SGPT) U/L  --  28 42* 44* 28     Estimated Creatinine Clearance: 72.1 mL/min (by C-G formula based on SCr of 0.9 mg/dL).  Results from last 7 days   Lab Units 09/15/21  0333   HEMOGLOBIN A1C % 6.00*     Results from last 7 days   Lab Units 09/17/21  1126 09/17/21  0532 09/16/21  2050 09/16/21  1629 09/16/21  1115 09/16/21  0627 09/15/21  2127 09/15/21  1642   GLUCOSE mg/dL 99 94 132* 112 134* 83 122 120                       Invalid input(s):  PHOS        Invalid input(s): LDLCALC  Results from last 7 days   Lab Units 09/17/21  0712 09/16/21  0615 09/16/21  0615 09/15/21  0333 09/15/21  0333 09/14/21  1644 09/14/21  1644 09/13/21  1708   WBC 10*3/mm3 8.38  --  9.72  --  10.01  --  11.02* 13.16*   HEMOGLOBIN g/dL 13.0  --  14.1  --  15.5  --  15.7 16.6   HEMATOCRIT % 38.6  --  42.2  --  50.0  --  49.7 52.1   PLATELETS 10*3/mm3 221  --  221  --  200  --  207 187   MCV fL 90.4   < > 90.8   < > 95.8   < > 94.0 93.0   MCH pg 30.4   < > 30.3   < > 29.7   < > 29.7 29.6   MCHC g/dL 33.7   < > 33.4   < > 31.0*   < > 31.6 31.9   RDW % 13.6   < > 13.5   < > 13.7   < > 13.9 14.7   RDW-SD fl 45.0   < > 44.6   < > 49.0   < > 48.0  --    MPV fL 8.8   < > 9.2   < > 9.4   < > 9.4 10.9   NEUTROPHIL % % 72.5   < > 73.0   < > 75.3   < > 73.9 77.8   LYMPHOCYTE % % 16.5*   < > 15.6*   < > 14.3*   < > 14.3* 11.7*   MONOCYTES % % 9.2   < > 9.5   < > 9.4   < > 10.7 9.3   EOSINOPHIL % % 0.7   < > 0.8   < > 0.3   < > 0.2* 0.1   BASOPHIL % % 0.4   < > 0.3   < > 0.3   < > 0.4 0.3   IMM GRAN % % 0.7*   < > 0.8*   < > 0.4   < > 0.5 0.8   NEUTROS ABS 10*3/mm3 6.08   < > 7.09*   < > 7.54*   < > 8.15* 10.24*   LYMPHS ABS 10*3/mm3 1.38   < > 1.52   < > 1.43   < > 1.58 1.54   MONOS ABS 10*3/mm3  0.77   < > 0.92*   < > 0.94*   < > 1.18* 1.23   EOS ABS 10*3/mm3 0.06   < > 0.08   < > 0.03   < > 0.02 0.01   BASOS ABS 10*3/mm3 0.03   < > 0.03   < > 0.03   < > 0.04 0.04   IMMATURE GRANS (ABS) 10*3/mm3 0.06*   < > 0.08*   < > 0.04   < > 0.05 0.10   NRBC /100 WBC 0.0   < > 0.0   < > 0.0   < > 0.0 0    < > = values in this interval not displayed.     Results from last 7 days   Lab Units 09/17/21  0712 09/16/21  0615 09/15/21  1810 09/14/21  1644   INR  3.63* 3.64* 3.12* 2.61*         Results from last 7 days   Lab Units 09/15/21  0333 09/14/21  1644   LACTATE mmol/L 1.2 1.2         Results from last 7 days   Lab Units 09/14/21  1644 09/13/21  1708   AMYLASE U/L  --  21*   LIPASE U/L 14  --              Results from last 7 days   Lab Units 09/14/21  1701   NITRITE UA  Negative           Imaging:  Imaging Results (Last 24 Hours)     Procedure Component Value Units Date/Time    CT Abdomen Pelvis With Contrast [[id number]] Collected: 09/17/21 1142     Updated: 09/17/21 1142    Narrative:      CT ABDOMEN AND PELVIS WITH CONTRAST     HISTORY: Followup partial small bowel obstruction.     TECHNIQUE: Axial CT images of the abdomen and pelvis were obtained  following administration of intravenous contrast. The patient was not  given oral contrast Coronal and sagittal reformats were obtained.     COMPARISON: None available at time of dictation.     FINDINGS: There is marked inflammatory change within the pelvis  surrounding the sigmoid colon which is diffusely thickened and contains  numerous diverticula. There are a couple of phlegmonous extraluminal  collections containing air-fluid levels. Index collections as seen on  image 124 posteriorly within the left hemipelvis measuring up to 1.4 cm  in thickness. There is a right more anterior fluid collection measuring  1.3 cm in thickness seen on image 120. Diffuse wall thickening of the  rectum is likely reactive. There is also diffuse small bowel dilatation  within the pelvis.  The dilated small bowel loops measures up to 2.7 cm  with a gradual transition to collapsed distal small bowel likely related  to adjacent inflammation. The appendix is normal. The colon is otherwise  unremarkable. No focal hepatic lesion or biliary dilatation.  Cholelithiasis is present. The spleen is normal. Bilateral adrenal  glands are normal. There is an infrarenal IVC filter in place. Marked  calcified atherosclerotic plaque within the abdominal aorta and its  branches. Infrarenal abdominal aortic aneurysm measuring up to 3.4 cm  with eccentric thrombus. There is marked calcification at the origin of  bilateral common iliac arteries with severe luminal narrowing of the  proximal common iliac arteries bilaterally. There is a low-density  lesion with air-fluid level seen anterior to the aorta adjacent to the  third portion of the duodenum likely representing a duodenal  diverticulum. No pleural or pericardial effusion. There is a subpleural  right lower lobe nodule measuring up to 8mm.       Impression:      1. Findings on CT most suggestive of complicated sigmoid diverticulitis  with small phlegmonous fluid collections within the pelvis containing  air-fluid levels. This is suggestive of a contained perforation. There  is a distal small bowel obstruction/ileus likely related to marked  adjacent inflammatory change.  2. Marked calcified atherosclerotic plaque within the abdominal aorta  with infrarenal abdominal aortic aneurysm. Marked narrowing of the  bilateral proximal common iliac artery secondary to large amount of  calcified plaque.  3. An 8 mm right lower lobe pulmonary nodule. In the absence of remote  imaging available for comparison, I recommend a followup with CT chest  in 6-12 months.     Radiation dose reduction techniques were utilized, including automated  exposure control and exposure modulation based on body size.                   I reviewed the patient's new clinical results / labs / tests /  procedures      Assessment/Plan     Active Hospital Problems    Diagnosis  POA   • **Abdominal pain [R10.9]  Yes   • Partial small bowel obstruction (CMS/HCC) [K56.600]  Yes   • Pulmonary nodule [R91.1]  Yes   • Nausea and vomiting [R11.2]  Yes   • Diverticulitis of intestine with perforation [K57.80]  Yes   • LEXIE (acute kidney injury) (CMS/HCC) [N17.9]  Yes   • Type 2 diabetes mellitus (CMS/HCC) [E11.9]  Yes   • COPD (chronic obstructive pulmonary disease) (CMS/HCC) [J44.9]  Yes   • HTN (hypertension) [I10]  Yes   • MGUS (monoclonal gammopathy of unknown significance) [D47.2]  Yes   • History of DVT (deep vein thrombosis) [Z86.718]  Not Applicable   • Diastolic dysfunction [I51.89]  Yes   • 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           · Partial small bowel obstruction with acute sigmoid diverticulitis and contained perforation.  Clinically improving.  CT scan without significant improvement.  Surgery has advance to low residue diet.  No surgical intervention planned at this time.  Currently on IV Zosyn.  Lipase and liver function tests are normal.  Surgery anticipates discharge of the weekend.   · Type 2 diabetes.  A1c 6.  Metformin on hold secondary to the renal failure.  Continue sliding scale insulin.  · History of COPD/pulmonary nodule by CAT scan.  Need to repeat CT scan of the chest in 6 months.  Continue Symbicort and Spiriva.  · Hypertension/chronic diastolic congestive heart failure/history of coronary artery disease.  Blood pressure is on the high side.  Lisinopril stopped secondary to renal failure.  Currently good blood pressure control with no evidence of angina or congestive heart failure on Imdur/Lopressor.  Continue to monitor monitor.  Also on anticoagulation.  Pharmacy following INR/dosing of Coumadin.  · History of peripheral vascular disease.  Continue Lipitor and  anticoagulation.  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  CBC is normal.  We will continue anticoagulation.  · Acute kidney failure.  Most likely secondary to prerenal azotemia because of volume depletion secondary to nausea and vomiting and diarrhea.  Resolved with Metformin/lisinopril and on IV fluid.  Patient euvolemic today.  We will continue to monitor   · VTE prophylaxis.  Patient is on anticoagulation.      · Discussed with patient/wife  · Disposition.  Anticipate discharge over the next 2 days after surgery releases.      Angel Orellana MD  [facility]  09/17/21  13:43 EDT

## 2021-09-17 NOTE — CASE MANAGEMENT/SOCIAL WORK
Continued Stay Note  [facility]     Patient Name: Giovani España  MRN: [id number]  Today's Date: 9/17/2021    Admit Date: 9/14/2021    Discharge Plan     Row Name 09/17/21 0903       Plan    Plan  Home with spouse    Plan Comments  CCP met with patient at bedside. Patient confirmed plan is to return home. Patient denies any HH/DME needs. Spouse to transport home. Mary PRYOR        Discharge Codes    No documentation.       Expected Discharge Date and Time     Expected Discharge Date Expected Discharge Time    Sep 17, 2021             ROYA Beard

## 2021-09-17 NOTE — PROGRESS NOTES
Pharmacy Consult: Warfarin Dosing/ Monitoring    Giovani España is a 76 y.o. male, estimated creatinine clearance is 61.2 mL/min (by C-G formula based on SCr of 1.06 mg/dL). weighing 70.8 kg (156 lb).    Results from last 7 days   Lab Units 09/17/21  0712 09/16/21  0615 09/15/21  1810 09/15/21  0333 09/14/21  1644   INR  3.63* 3.64* 3.12*  --  2.61*   HEMOGLOBIN g/dL 13.0 14.1  --  15.5 15.7   HEMATOCRIT % 38.6 42.2  --  50.0 49.7   PLATELETS 10*3/mm3 221 221  --  200 207     Results from last 7 days   Lab Units 09/16/21  0615 09/15/21  0333 09/14/21  1644   SODIUM mmol/L 135* 135* 132*   POTASSIUM mmol/L 3.9 4.5 3.9   CHLORIDE mmol/L 102 99 93*   CO2 mmol/L 25.5 24.9 27.2   BUN mg/dL 21 45* 51*   CREATININE mg/dL 1.06 1.36* 1.74*   CALCIUM mg/dL 8.9 9.7 10.0   BILIRUBIN mg/dL 0.8 1.0 1.2   ALK PHOS U/L 62 79 79   ALT (SGPT) U/L 28 42* 44*   AST (SGOT) U/L 22 27 36   GLUCOSE mg/dL 79 97 127*     Anticoagulation history: 6mg Sun and Wed, 5mg all other days    Hospital Anticoagulation:  Consulting provider: Dr Orellana  Start date: 9/15  Indication: DVT  Target INR: 2-3  Expected duration: indefinite   Bridge Therapy: No                Date 9/14 9/15 9/16 9/17         INR 2.61 3.12 3.64 3.63         Warfarin dose  6mg HOLD HOLD           Potential drug interactions:   Piperacillin/tazobactam (increased risk of bleeding)    Relevant nutrition status: full liquid, consistent carbohydrate      Assessment/Plan:   INR supratherapeutic, will hold warfarin dosing for today.   Monitor INR daily  Follow up daily    Pharmacy will continue to follow until discharge or discontinuation of warfarin.     Anna Lea, PharmD  9/17/2021

## 2021-09-17 NOTE — PROGRESS NOTES
Chief complaint: Follow-up partial small bowel obstruction/possible diverticulitis     Subjective:  No significant pain today, still with loose stools, tolerated full liquids without much issue.     Review of systems:  Constitutional: Patient denies fever or chills, appetite is good  Respiratory: Patient denies cough or wheezing     Physical exam:  Patient afebrile, heart rate 50s to 60s, blood pressure 127/79  General: Awake and alert, no distress answers questions appropriately, no distress  Eyes: Extraocular movements are intact without icterus  Neck: Supple, trachea midline  Respiratory: No use of accessory muscles, good bilateral chest expansion  Gastrointestinal: Abdomen is soft, nondistended, no tenderness, bowel sounds positive, no hernia felt     Labs are reviewed.  White count is normal at 8.4 hemoglobin 14.1 INR 3.6        Assessment and plan:  -Crampy abdominal pain, nausea and vomiting and CT read from outside hospital suggesting partial bowel obstruction and possible acute diverticulitis  -Repeat CT today shows evidence of some continued diverticular changes with small air-fluid collections. Nothing that I think is drainable. There are some distally dilated small bowel loops, but clinically no sign of small bowel obstruction at this time  -Clinically the patient continues to look well, no evidence of bowel obstruction and I will advance to low residue diet  -Dehydration appears improved, continue IV fluid resuscitation  -Probably still reasonable to continue IV antibiotics for now, as he advances his diet he can probably be transitioned to oral.     If he continues to improve, could probably be discharged home this weekend with plans for outpatient follow-up with me. He will need repeat outpatient CT.  -At this time, no reason to discuss any surgical intervention, continue conservative management     Chito Estrada MD  General and Endoscopic Surgery  [facility]     [address],  [address]  [place], [place], [address]  P: [phone]  F: [phone]

## 2021-09-18 VITALS
OXYGEN SATURATION: 96 % | BODY MASS INDEX: 21.84 KG/M2 | TEMPERATURE: 97.9 F | WEIGHT: 156 LBS | HEIGHT: 71 IN | SYSTOLIC BLOOD PRESSURE: 148 MMHG | HEART RATE: 63 BPM | DIASTOLIC BLOOD PRESSURE: 90 MMHG | RESPIRATION RATE: 18 BRPM

## 2021-09-18 PROBLEM — K57.20 DIVERTICULITIS OF LARGE INTESTINE WITH PERFORATION AND ABSCESS WITHOUT BLEEDING: Status: ACTIVE | Noted: 2021-09-14

## 2021-09-18 PROBLEM — N17.9 AKI (ACUTE KIDNEY INJURY): Status: RESOLVED | Noted: 2021-09-14 | Resolved: 2021-09-18

## 2021-09-18 PROBLEM — K56.600 PARTIAL SMALL BOWEL OBSTRUCTION: Status: RESOLVED | Noted: 2021-09-15 | Resolved: 2021-09-18

## 2021-09-18 PROBLEM — R10.9 ABDOMINAL PAIN: Status: RESOLVED | Noted: 2021-09-14 | Resolved: 2021-09-18

## 2021-09-18 PROBLEM — R11.2 NAUSEA AND VOMITING: Status: RESOLVED | Noted: 2021-09-14 | Resolved: 2021-09-18

## 2021-09-18 LAB
ALBUMIN SERPL-MCNC: 3.2 G/DL (ref 3.5–5.2)
ALBUMIN/GLOB SERPL: 1.3 G/DL
ALP SERPL-CCNC: 58 U/L (ref 39–117)
ALT SERPL W P-5'-P-CCNC: 25 U/L (ref 1–41)
ANION GAP SERPL CALCULATED.3IONS-SCNC: 5.8 MMOL/L (ref 5–15)
AST SERPL-CCNC: 17 U/L (ref 1–40)
BASOPHILS # BLD AUTO: 0.03 10*3/MM3 (ref 0–0.2)
BASOPHILS NFR BLD AUTO: 0.3 % (ref 0–1.5)
BILIRUB SERPL-MCNC: 0.8 MG/DL (ref 0–1.2)
BUN SERPL-MCNC: 7 MG/DL (ref 8–23)
BUN/CREAT SERPL: 7.4 (ref 7–25)
CALCIUM SPEC-SCNC: 8.8 MG/DL (ref 8.6–10.5)
CHLORIDE SERPL-SCNC: 105 MMOL/L (ref 98–107)
CO2 SERPL-SCNC: 26.2 MMOL/L (ref 22–29)
CREAT SERPL-MCNC: 0.94 MG/DL (ref 0.76–1.27)
DEPRECATED RDW RBC AUTO: 44.1 FL (ref 37–54)
EOSINOPHIL # BLD AUTO: 0.08 10*3/MM3 (ref 0–0.4)
EOSINOPHIL NFR BLD AUTO: 0.9 % (ref 0.3–6.2)
ERYTHROCYTE [DISTWIDTH] IN BLOOD BY AUTOMATED COUNT: 13.3 % (ref 12.3–15.4)
GFR SERPL CREATININE-BSD FRML MDRD: 78 ML/MIN/1.73
GLOBULIN UR ELPH-MCNC: 2.5 GM/DL
GLUCOSE BLDC GLUCOMTR-MCNC: 121 MG/DL (ref 70–130)
GLUCOSE BLDC GLUCOMTR-MCNC: 91 MG/DL (ref 70–130)
GLUCOSE SERPL-MCNC: 87 MG/DL (ref 65–99)
HCT VFR BLD AUTO: 38.5 % (ref 37.5–51)
HGB BLD-MCNC: 12.5 G/DL (ref 13–17.7)
IMM GRANULOCYTES # BLD AUTO: 0.07 10*3/MM3 (ref 0–0.05)
IMM GRANULOCYTES NFR BLD AUTO: 0.8 % (ref 0–0.5)
INR PPP: 2.52 (ref 0.9–1.1)
LYMPHOCYTES # BLD AUTO: 1.75 10*3/MM3 (ref 0.7–3.1)
LYMPHOCYTES NFR BLD AUTO: 19 % (ref 19.6–45.3)
MCH RBC QN AUTO: 29.6 PG (ref 26.6–33)
MCHC RBC AUTO-ENTMCNC: 32.5 G/DL (ref 31.5–35.7)
MCV RBC AUTO: 91.2 FL (ref 79–97)
MONOCYTES # BLD AUTO: 0.76 10*3/MM3 (ref 0.1–0.9)
MONOCYTES NFR BLD AUTO: 8.3 % (ref 5–12)
NEUTROPHILS NFR BLD AUTO: 6.51 10*3/MM3 (ref 1.7–7)
NEUTROPHILS NFR BLD AUTO: 70.7 % (ref 42.7–76)
NRBC BLD AUTO-RTO: 0 /100 WBC (ref 0–0.2)
PLATELET # BLD AUTO: 235 10*3/MM3 (ref 140–450)
PMV BLD AUTO: 9 FL (ref 6–12)
POTASSIUM SERPL-SCNC: 4.5 MMOL/L (ref 3.5–5.2)
PROT SERPL-MCNC: 5.7 G/DL (ref 6–8.5)
PROTHROMBIN TIME: 26.9 SECONDS (ref 11.7–14.2)
RBC # BLD AUTO: 4.22 10*6/MM3 (ref 4.14–5.8)
SODIUM SERPL-SCNC: 137 MMOL/L (ref 136–145)
WBC # BLD AUTO: 9.2 10*3/MM3 (ref 3.4–10.8)

## 2021-09-18 PROCEDURE — 25010000002 PIPERACILLIN SOD-TAZOBACTAM PER 1 G: Performed by: INTERNAL MEDICINE

## 2021-09-18 PROCEDURE — 99232 SBSQ HOSP IP/OBS MODERATE 35: CPT | Performed by: SURGERY

## 2021-09-18 PROCEDURE — 94799 UNLISTED PULMONARY SVC/PX: CPT

## 2021-09-18 PROCEDURE — 25010000002 SODIUM CHLORIDE 0.9 % WITH KCL 20 MEQ 20-0.9 MEQ/L-% SOLUTION: Performed by: INTERNAL MEDICINE

## 2021-09-18 PROCEDURE — 85025 COMPLETE CBC W/AUTO DIFF WBC: CPT | Performed by: INTERNAL MEDICINE

## 2021-09-18 PROCEDURE — 85610 PROTHROMBIN TIME: CPT | Performed by: INTERNAL MEDICINE

## 2021-09-18 PROCEDURE — 82962 GLUCOSE BLOOD TEST: CPT

## 2021-09-18 PROCEDURE — 80053 COMPREHEN METABOLIC PANEL: CPT | Performed by: INTERNAL MEDICINE

## 2021-09-18 RX ORDER — BUDESONIDE AND FORMOTEROL FUMARATE DIHYDRATE 160; 4.5 UG/1; UG/1
2 AEROSOL RESPIRATORY (INHALATION)
Qty: 1 EACH | Refills: 3 | Status: SHIPPED | OUTPATIENT
Start: 2021-09-18 | End: 2022-11-09

## 2021-09-18 RX ORDER — WARFARIN SODIUM 5 MG/1
5 TABLET ORAL
Status: DISCONTINUED | OUTPATIENT
Start: 2021-09-18 | End: 2021-09-18 | Stop reason: HOSPADM

## 2021-09-18 RX ORDER — AMOXICILLIN AND CLAVULANATE POTASSIUM 875; 125 MG/1; MG/1
1 TABLET, FILM COATED ORAL 2 TIMES DAILY
Qty: 20 TABLET | Refills: 0 | Status: SHIPPED | OUTPATIENT
Start: 2021-09-18 | End: 2021-09-28

## 2021-09-18 RX ADMIN — TIOTROPIUM BROMIDE INHALATION SPRAY 2 PUFF: 3.12 SPRAY, METERED RESPIRATORY (INHALATION) at 11:00

## 2021-09-18 RX ADMIN — METOPROLOL TARTRATE 25 MG: 25 TABLET, FILM COATED ORAL at 08:46

## 2021-09-18 RX ADMIN — TAZOBACTAM SODIUM AND PIPERACILLIN SODIUM 3.38 G: 375; 3 INJECTION, SOLUTION INTRAVENOUS at 06:27

## 2021-09-18 RX ADMIN — ISOSORBIDE MONONITRATE 60 MG: 60 TABLET ORAL at 08:46

## 2021-09-18 RX ADMIN — POTASSIUM CHLORIDE AND SODIUM CHLORIDE 100 ML/HR: 900; 150 INJECTION, SOLUTION INTRAVENOUS at 06:27

## 2021-09-18 RX ADMIN — BUDESONIDE AND FORMOTEROL FUMARATE DIHYDRATE 2 PUFF: 160; 4.5 AEROSOL RESPIRATORY (INHALATION) at 10:58

## 2021-09-18 RX ADMIN — ATORVASTATIN CALCIUM 20 MG: 20 TABLET, FILM COATED ORAL at 08:46

## 2021-09-18 RX ADMIN — SODIUM CHLORIDE, PRESERVATIVE FREE 10 ML: 5 INJECTION INTRAVENOUS at 08:46

## 2021-09-18 NOTE — PLAN OF CARE
Goal Outcome Evaluation:  Plan of Care Reviewed With: patient        Progress: no change  Outcome Summary: Patients vitals stable. Patient denies pain. Patient tolerating IV antibiotics without difficulty. Will continue to monitor.

## 2021-09-18 NOTE — PROGRESS NOTES
Pharmacy Consult: Warfarin Dosing/ Monitoring    Giovani España is a 76 y.o. male, estimated creatinine clearance is 69 mL/min (by C-G formula based on SCr of 0.94 mg/dL). weighing 70.8 kg (156 lb).      Results from last 7 days   Lab Units 09/18/21  0642 09/17/21  0712 09/16/21  0615 09/15/21  1810 09/15/21  0333 09/14/21  1644 09/13/21  1708   INR  2.52* 3.63* 3.64* 3.12*  --  2.61*  --    HEMOGLOBIN g/dL 12.5* 13.0 14.1  --  15.5 15.7 16.6   HEMATOCRIT % 38.5 38.6 42.2  --  50.0 49.7 52.1   PLATELETS 10*3/mm3 235 221 221  --  200 207 187     Results from last 7 days   Lab Units 09/18/21  0642 09/17/21  0712 09/16/21  0615 09/15/21  0333 09/15/21  0333   SODIUM mmol/L 137 139 135*   < > 135*   POTASSIUM mmol/L 4.5 4.2 3.9   < > 4.5   CHLORIDE mmol/L 105 105 102   < > 99   CO2 mmol/L 26.2 26.2 25.5   < > 24.9   BUN mg/dL 7* 10 21   < > 45*   CREATININE mg/dL 0.94 0.90 1.06   < > 1.36*   CALCIUM mg/dL 8.8 8.5* 8.9   < > 9.7   BILIRUBIN mg/dL 0.8  --  0.8  --  1.0   ALK PHOS U/L 58  --  62  --  79   ALT (SGPT) U/L 25  --  28  --  42*   AST (SGOT) U/L 17  --  22  --  27   GLUCOSE mg/dL 87 90 79   < > 97    < > = values in this interval not displayed.     Anticoagulation history: 6mg Sun and Wed, 5mg all other days    Hospital Anticoagulation:  Consulting provider: Dr Orellana  Start date: 9/15  Indication: DVT  Target INR: 2-3  Expected duration: indefinite   Bridge Therapy: No                Date 9/14 9/15 9/16 9/17 9/18        INR 2.61 3.12 3.64 3.63 2.52        Warfarin dose  6mg hold hold 5mg          Potential drug interactions: zosyn    Relevant nutrition status: reg diet, GI soft    Other:     Education complete?/ Date:     Assessment/Plan:  Dose: will start 5mg daily  Monitor INR daily  Follow up daily    Pharmacy will continue to follow until discharge or discontinuation of warfarin.   Juanito StrattonD

## 2021-09-19 ENCOUNTER — READMISSION MANAGEMENT (OUTPATIENT)
Dept: CALL CENTER | Facility: HOSPITAL | Age: 76
End: 2021-09-19

## 2021-09-19 NOTE — OUTREACH NOTE
Prep Survey      Responses   [facility]?  [facility]   Is LACE score < 7 ?  No   Emergency Room discharge w/ pulse ox?  No   Eligibility  Readm Mgmt   Discharge diagnosis  Diverticulitis of large intestine   Does the patient have one of the following disease processes/diagnoses(primary or secondary)?  Other   Does the patient have Home health ordered?  No   Is there a DME ordered?  No   Prep survey completed?  Yes          Chika Kunz RN

## 2021-09-19 NOTE — DISCHARGE SUMMARY
Patient Name: Giovani España  : 1945  MRN: [id number]    Date of Admission: 2021  Date of Discharge: 2021.  Primary Care Physician: Derek Grider MD (Inactive)      Discharge Diagnoses     Active Hospital Problems    Diagnosis  POA   • **Diverticulitis of large intestine with perforation and abscess without bleeding [K57.20]  Yes   • Pulmonary nodule [R91.1]  Yes   • Type 2 diabetes mellitus (CMS/HCC) [E11.9]  Yes   • COPD (chronic obstructive pulmonary disease) (CMS/HCC) [J44.9]  Yes   • HTN (hypertension) [I10]  Yes   • MGUS (monoclonal gammopathy of unknown significance) [D47.2]  Yes   • History of DVT (deep vein thrombosis) [Z86.718]  Not Applicable   • Diastolic dysfunction [I51.89]  Yes   • Presence of IVC filter [Z95.828]  Not Applicable   • Chronic anticoagulation [Z79.01]  Not Applicable      Resolved Hospital Problems    Diagnosis Date Resolved POA   • Partial small bowel obstruction (CMS/HCC) [K56.600] 2021 Yes   • Abdominal pain [R10.9] 2021 Yes   • Nausea and vomiting [R11.2] 2021 Yes   • LEXIE (acute kidney injury) (CMS/HCC) [N17.9] 2021 Yes        Hospital Course     Brief admission history and physical.  Please refer to the H&P for full details.  A pleasant 76 years old white gentleman with a past history of type 2 diabetes/COPD/hypertension/chronic diastolic cardiac dysfunction/coronary artery disease/peripheral vascular disease/DVT/history of inferior vena cava placement on chronic anticoagulation who presented to the emergency department with abdominal pain after eating associated with nausea and vomiting.  Positive constipation.  Positive diarrhea after taking laxatives.  No other GI/cardiac/respiratory/CNS symptoms.  On physical examination at admission 8 blood pressure 138/85 a temperature of 97.3 respiratory rate of 16.  The rest of the examination is remarkable for mild abdominal distention with generalized tenderness in the lower abdomen  otherwise negative.  Hospital course.  Initial ER evaluation included CMP that was normal except for sodium of 135 chloride 95 CO2 of 21 glucose 102 BUN 36 GFR 54 calcium 10.3.  Amylase was normal at 21.  PSA was elevated at 6.2.  INR was 2.6.  CBC was normal except a white count of 11.02.  Lactic acid was normal.  Lipase was normal.  Outlying CT scan in an outside facility revealed sigmoid diverticulitis with contained rupture and a partial small bowel obstruction.  The patient was admitted with a partial small bowel obstruction and acute sigmoid diverticulitis and contained perforation.  He was placed n.p.o.  He was placed on IV Zosyn and IV fluid.  Surgery was consulted.  No surgical intervention was decided at this time.  His diet was slowly advanced and eventually he was tolerating regular diet well.  His abdominal pain nausea and vomiting has resolved.  Liver function test remained normal.  Repeat CT scan maintain the above pictures of the initial CT with no fluid collection or abscess formation.  Surgery released the patient for discharge as his symptomatology has resolved and he had no fever during this hospitalization and he is to finish another 10 days of Augmentin.  He is to follow-up with surgery as an outpatient.  He was hemodynamically stable at the time of discharge.  His GI examination was benign at the time of discharge.  He does have a history of type 2 diabetes.  A1c was checked and was 6.  Metformin was held initially secondary to the renal failure and he was placed on sliding scale insulin and placed back on Metformin at the time of discharge since his renal failure has resolved.  He has a history of COPD.  The CAT scan showed a pulmonary nodule.  He remained stable in terms of his respiratory status with Symbicort and Spiriva.  He will need a repeat CAT scan in 6 months and he is aware of that.  He has a history of hypertension/chronic diastolic congestive heart failure/coronary artery disease.   Blood pressure was noted to be on the high side.  Initially lisinopril was stopped because of the renal failure.  He was started on Lopressor with continuation of his Imdur and his blood pressure has improved.  He was maintained on his anticoagulation with Coumadin.  INR was therapeutic through out the hospital stay.  He usually checks his INR at home and call it to his primary care physician and he was instructed to do that in 3 days after initiation of the Augmentin.  He does have a history of peripheral vascular disease we maintained him on the anticoagulation and the Lipitor.  He does have a history of DVT status post inferior vena cava filter and monoclonal gammopathy of uncertain significance.  CBC was normal during this admission.  We continued him on the anticoagulation.  He will developed an acute renal failure early during his admission which was thought to be secondary to prerenal azotemia because of volume depletion secondary to the nausea and the vomiting and the diarrhea.  The acute renal failure has resolved on IV fluid holding lisinopril and Metformin.  At discharge he was euvolemic.  At the time of discharge he was asymptomatic with benign GI examination and hemodynamically stable.  He is to follow-up with his primary care physician/surgery in 2 weeks.    Consultants     Consult Orders (all) (From admission, onward)     Start     Ordered    09/14/21 2157  Inpatient General Surgery Consult  Once     Specialty:  General Surgery  Provider:  Chito Estrada MD    09/14/21 2156 09/14/21 1736  [facility] (on-call MD unless specified) Details  Once     Specialty:  Hospitalist  Provider:  (Not yet assigned)    09/14/21 1735    09/14/21 1736  Surgery (on-call MD unless specified)  Once     Specialty:  General Surgery  Provider:  (Not yet assigned)    09/14/21 1735              Procedures     Imaging Results (All)     Procedure Component Value Units Date/Time    CT Abdomen Pelvis With Contrast [[id number]]  Collected: 09/17/21 1142     Updated: 09/17/21 1408    Narrative:      CT ABDOMEN AND PELVIS WITH CONTRAST     HISTORY: Followup partial small bowel obstruction.     TECHNIQUE: Axial CT images of the abdomen and pelvis were obtained  following administration of intravenous contrast. The patient was not  given oral contrast Coronal and sagittal reformats were obtained.     COMPARISON: None available at time of dictation.     FINDINGS: There is marked inflammatory change within the pelvis  surrounding the sigmoid colon which is diffusely thickened and contains  numerous diverticula. There are a couple of phlegmonous extraluminal  collections containing air-fluid levels. Index collections as seen on  image 124 posteriorly within the left hemipelvis measuring up to 1.4 cm  in thickness. There is a right more anterior fluid collection measuring  1.3 cm in thickness seen on image 120. Diffuse wall thickening of the  rectum is likely reactive. There is also diffuse small bowel dilatation  within the pelvis. The dilated small bowel loops measures up to 2.7 cm  with a gradual transition to collapsed distal small bowel likely related  to adjacent inflammation. The appendix is normal. The colon is otherwise  unremarkable. No focal hepatic lesion or biliary dilatation.  Cholelithiasis is present. The spleen is normal. Bilateral adrenal  glands are normal. There is an infrarenal IVC filter in place. Marked  calcified atherosclerotic plaque within the abdominal aorta and its  branches. Infrarenal abdominal aortic aneurysm measuring up to 3.4 cm  with eccentric thrombus. There is marked calcification at the origin of  bilateral common iliac arteries with severe luminal narrowing of the  proximal common iliac arteries bilaterally. There is a low-density  lesion with air-fluid level seen anterior to the aorta adjacent to the  third portion of the duodenum likely representing a duodenal  diverticulum. No pleural or pericardial  effusion. There is a subpleural  right lower lobe nodule measuring up to 8mm.       Impression:      1. Findings on CT most suggestive of complicated sigmoid diverticulitis  with small phlegmonous fluid collections within the pelvis containing  air-fluid levels. The findings are suggestive of a contained  perforation. There is a distal small bowel obstruction/ileus likely  related to marked adjacent inflammatory change.  2. Marked calcified atherosclerotic plaque within the abdominal aorta  with infrarenal abdominal aortic aneurysm. Marked narrowing of the  bilateral proximal common iliac artery secondary to large amount of  calcified plaque.  3. An 8 mm right lower lobe pulmonary nodule. In the absence of remote  imaging available for comparison, I recommend a followup with CT chest  in 6-12 months.     The findings are compared to prior CT images from 9/14/2021 that have  become available for comparison. The changes of sigmoid diverticulitis  with small foci of extraluminal air are again demonstrated. There is  similar findings are wall thickening involving the distal small bowel  loops along with dilatation of the proximal small bowel loops. The  previous CT had oral contrast present within the dilated small bowel  loops on the prior study is no longer demonstrated suggesting only a  partial obstruction/ileus.     Radiation dose reduction techniques were utilized, including automated  exposure control and exposure modulation based on body size.     This report was finalized on 9/17/2021 2:05 PM by Dr. Gilma Carvajal M.D.             Pertinent Labs     Results from last 7 days   Lab Units 09/18/21  0642 09/17/21  0712 09/16/21  0615 09/15/21  0333   WBC 10*3/mm3 9.20 8.38 9.72 10.01   HEMOGLOBIN g/dL 12.5* 13.0 14.1 15.5   PLATELETS 10*3/mm3 235 221 221 200     Results from last 7 days   Lab Units 09/18/21  0642 09/17/21  0712 09/16/21  0615 09/15/21  0333   SODIUM mmol/L 137 139 135* 135*   POTASSIUM mmol/L 4.5  4.2 3.9 4.5   CHLORIDE mmol/L 105 105 102 99   CO2 mmol/L 26.2 26.2 25.5 24.9   BUN mg/dL 7* 10 21 45*   CREATININE mg/dL 0.94 0.90 1.06 1.36*   GLUCOSE mg/dL 87 90 79 97   Estimated Creatinine Clearance: 69 mL/min (by C-G formula based on SCr of 0.94 mg/dL).  Results from last 7 days   Lab Units 09/18/21  0642 09/16/21  0615 09/15/21  0333 09/14/21  1644   ALBUMIN g/dL 3.20* 3.30* 3.80 3.70   BILIRUBIN mg/dL 0.8 0.8 1.0 1.2   ALK PHOS U/L 58 62 79 79   AST (SGOT) U/L 17 22 27 36   ALT (SGPT) U/L 25 28 42* 44*     Results from last 7 days   Lab Units 09/18/21  0642 09/17/21  0712 09/16/21  0615 09/15/21  0333 09/14/21  1644 09/14/21  1644   CALCIUM mg/dL 8.8 8.5* 8.9 9.7   < > 10.0   ALBUMIN g/dL 3.20*  --  3.30* 3.80  --  3.70    < > = values in this interval not displayed.     Results from last 7 days   Lab Units 09/14/21  1644 09/13/21  1708   AMYLASE U/L  --  21*   LIPASE U/L 14  --              Invalid input(s): LDLCALC      Imaging Results (Last 24 Hours)     ** No results found for the last 24 hours. **          Test Results Pending at Discharge         Discharge Exam   Physical Exam  Vitals.  Temperature 97.9.  A pulse of 63.  Respiratory rate of 18.  Blood pressure 148/90.  O2 sats of 96% on room air.  General.  Elderly gentleman.  Alert and oriented x3.  No apparent pain distress or diaphoresis.  Normal mood and affect.  Eyes.  Pupils equal round and reactive.  Intact extraocular musculature.  No pallor or jaundice.  Normal conjunctive and lids.  Oral cavity.  Moist mucous membrane.  Neck.  Supple.  No JVD.  No lymphadenopathy or thyromegaly.  Cardiovascular.  Regular rate and rhythm.  No gallops or murmurs.  Chest.  Poor to auscultation bilaterally with no added sounds.  Abdomen.  Soft lax.  No tenderness.  No guarding or rebound.  No organomegaly.  Positive bowel sounds.  Extremities.  No clubbing cyanosis or edema.     Discharge Details        Discharge Medications      New Medications      Instructions  Start Date   amoxicillin-clavulanate 875-125 MG per tablet  Commonly known as: Augmentin   1 tablet, Oral, 2 Times Daily      budesonide-formoterol 160-4.5 MCG/ACT inhaler  Commonly known as: SYMBICORT   2 puffs, Inhalation, 2 Times Daily - RT      metoprolol tartrate 25 MG tablet  Commonly known as: LOPRESSOR   25 mg, Oral, Every 12 Hours Scheduled      tiotropium bromide monohydrate 2.5 MCG/ACT aerosol solution inhaler  Commonly known as: SPIRIVA RESPIMAT   2 puffs, Inhalation, Daily - RT         Changes to Medications      Instructions Start Date   simvastatin 20 MG tablet  Commonly known as: ZOCOR  What changed: Another medication with the same name was removed. Continue taking this medication, and follow the directions you see here.   TAKE 1 TABLET BY MOUTH NIGHTLY         Continue These Medications      Instructions Start Date   isosorbide mononitrate 60 MG 24 hr tablet  Commonly known as: IMDUR   TAKE 1 TABLET BY MOUTH DAILY      ondansetron ODT 4 MG disintegrating tablet  Commonly known as: ZOFRAN-ODT   4 mg, Oral      warfarin 5 MG tablet  Commonly known as: COUMADIN   5 mg, Oral, Daily Warfarin, pt currently takes Coumadin 6 mg [place]/W and 5 mg all other days.       warfarin 1 MG tablet  Commonly known as: COUMADIN   1 mg, Oral, 2 Times Weekly         Stop These Medications    ciprofloxacin 500 MG tablet  Commonly known as: CIPRO     lisinopril 20 MG tablet  Commonly known as: PRINIVIL,ZESTRIL     metroNIDAZOLE 500 MG tablet  Commonly known as: FLAGYL            No Known Allergies      Discharge Disposition:  Condition: Stable    Diet: High-fiber diet.  No seeds.  Healthy cardiac.  Diabetic.    Activity:   As tolerated.    Counseling : No nonsteroidal anti-inflammatory medications.    CODE STATUS:    Code Status and Medical Interventions:   Ordered at: 09/15/21 0457     Code Status:    CPR     Medical Interventions (Level of Support Prior to Arrest):    Full       No future appointments.  Additional  Instructions for the Follow-ups that You Need to Schedule     Call MD With Problems / Concerns   As directed      Instructions: Call MD or return to ER if increasing abdominal pain/nausea or vomiting/fever chills/fresh bright blood per rectum/chest pain/shortness of breath    Order Comments: Instructions: Call MD or return to ER if increasing abdominal pain/nausea or vomiting/fever chills/fresh bright blood per rectum/chest pain/shortness of breath          Discharge Follow-up with PCP   As directed       Currently Documented PCP:    Derek Grider MD (Inactive)    PCP Phone Number:    None     Follow Up Details: Acute diverticulitis with contained perforation/hypertension/coronary artery disease/COPD/pulmonary nodule         Discharge Follow-up with Specified Provider: Dr. Chito Estrada; 2 Weeks   As directed      To: Dr. Chito Estrada    Follow Up: 2 Weeks    Follow Up Details: Follow-up in 2 weeks, call [phone] for appointment.         Discharge Follow-up with Specified Provider: General surgery; 2 Weeks   As directed      To: General surgery    Follow Up: 2 Weeks    Follow Up Details: Acute sigmoid diverticulitis with contained rupture           Follow-up Information     Chito Estrada MD Follow up in 2 week(s).    Specialty: General Surgery  Why: Call for appointment.  Contact information:  [facility] [address]  [place] [id number] [phone]             Derek Grider MD .    Specialty: Family Medicine  Why: Acute diverticulitis with contained perforation/hypertension/coronary artery disease/COPD/pulmonary nodule  Contact information:  [address] [facility] [address] [phone]             Derek Grider MD .    Specialty: Family Medicine  Contact information:  [address] [facility] [address] [phone]                     Time Spent on Discharge:  Greater than 30 minutes      Angel Orellana MD  [facility]  09/19/21  11:58 EDT

## 2021-09-23 ENCOUNTER — READMISSION MANAGEMENT (OUTPATIENT)
Dept: CALL CENTER | Facility: HOSPITAL | Age: 76
End: 2021-09-23

## 2021-09-23 NOTE — OUTREACH NOTE
Medical Week 1 Survey      Responses   [facility] patient discharged from?  [place]   Does the patient have one of the following disease processes/diagnoses(primary or secondary)?  Other   Week 1 attempt successful?  No   Unsuccessful attempts  Attempt 1          Joy Do RN

## 2021-09-29 ENCOUNTER — READMISSION MANAGEMENT (OUTPATIENT)
Dept: CALL CENTER | Facility: HOSPITAL | Age: 76
End: 2021-09-29

## 2021-09-29 ENCOUNTER — OFFICE VISIT (OUTPATIENT)
Dept: SURGERY | Facility: CLINIC | Age: 76
End: 2021-09-29

## 2021-09-29 VITALS — HEIGHT: 71 IN | BODY MASS INDEX: 22.31 KG/M2 | WEIGHT: 159.4 LBS

## 2021-09-29 DIAGNOSIS — K57.92 DIVERTICULITIS: Primary | ICD-10-CM

## 2021-09-29 PROCEDURE — 99213 OFFICE O/P EST LOW 20 MIN: CPT | Performed by: SURGERY

## 2021-09-29 NOTE — PROGRESS NOTES
Follow-up diverticulitis    History presenting illness:  Very nice 76-year-old gentleman presenting to the office after recent hospitalization for acute diverticulitis with small abscess, contained.  He was treated conservatively.  There was initial concern for possible partial bowel obstruction, although clinically he never really manifested this.  He was treated conservatively.  The area was really too small to drain and with antibiotics he improved.  He does report some persistent diarrhea, but otherwise really has no pain and his appetite is fairly good.    Past medical history significant for multiple other chronic illnesses including DVT, diabetes and hypertension and he is chronically anticoagulated on warfarin.  He also has a history of what sounds like peripheral vascular disease.    Review of systems:  Constitutional: Negative for fever or chills  Respiratory: Patient denies cough or wheezing    Physical exam:  Weight 159 pounds today, BMI 22.2  General: Awake and alert, no acute distress  Eyes: Extraocular wounds are intact without icterus  Neck: Supple, trachea midline  Respiratory: No use of accessory muscles, good bilateral chest expansion  Gastrointestinal: Abdomen is soft benign, there is no mass, there is no hernia, there is no tenderness    Assessment and plan:  -Acute diverticulitis, now clinically improved.  This is his first episode.  Repeat CT done in the hospital demonstrated some improvement, but there was still a small amount of air and fluid outside the colon.  The patient completed his antibiotics earlier this morning.  Clinically he is feeling well.  I am going to repeat his CT to evaluate for improvement.  -This is his first episode, and although there was evidence of fluid outside the colon, my feeling is that he is a relatively poor candidate for elective sigmoid resection.  Should his symptoms recur, that may alter my opinion.  He had a colonoscopy done a few years ago, but I suppose  repeat colonoscopy could be considered, although I think this is probably not in his best interest.  Obviously anticoagulants would have to be managed.    Chito Estrada MD  General and Endoscopic Surgery  [facility]    [address], [address]  [place], [place], [address]  P: [phone]  F: [phone]

## 2021-09-29 NOTE — OUTREACH NOTE
Medical Week 2 Survey      Responses   [facility] patient discharged from?  [place]   Does the patient have one of the following disease processes/diagnoses(primary or secondary)?  Other   Week 2 attempt successful?  Yes [[facility]   Call start time  1228   Discharge diagnosis  Diverticulitis of large intestine   Rescheduled  Rescheduled-pt requested   Call end time  1228          Payam Musa RN

## 2021-09-30 ENCOUNTER — READMISSION MANAGEMENT (OUTPATIENT)
Dept: CALL CENTER | Facility: HOSPITAL | Age: 76
End: 2021-09-30

## 2021-09-30 NOTE — OUTREACH NOTE
Medical Week 2 Survey      Responses   [facility] patient discharged from?  [place]   Does the patient have one of the following disease processes/diagnoses(primary or secondary)?  Other   Week 2 attempt successful?  Yes   Call end time  1341   Is patient permission given to speak with other caregiver?  Yes   List who call center can speak with  wife   Person spoke with today (if not patient) and relationship  wife   Meds reviewed with patient/caregiver?  Yes   Is the patient taking all medications as directed (includes completed medication regime)?  Yes   Has the patient kept scheduled appointments due by today?  Yes   What is the patient's perception of their health status since discharge?  Improving   Week 2 Call Completed?  Yes   Graduated  Yes   Is the patient interested in additional calls from an ambulatory ?  NOTE:  applies to high risk patients requiring additional follow-up.  No   Did the patient feel the follow up calls were helpful during their recovery period?  Yes   Was the number of calls appropriate?  Yes   Graduated/Revoked comments  Doing well.  Goals met.          Bryanna White RN

## 2021-10-07 ENCOUNTER — HOSPITAL ENCOUNTER (OUTPATIENT)
Dept: CT IMAGING | Facility: HOSPITAL | Age: 76
Discharge: HOME | End: 2021-10-07
Admitting: SURGERY

## 2021-10-07 DIAGNOSIS — K57.92 DIVERTICULITIS: ICD-10-CM

## 2021-10-07 PROCEDURE — 25010000002 IOPAMIDOL 61 % SOLUTION: Performed by: SURGERY

## 2021-10-07 PROCEDURE — 74177 CT ABD & PELVIS W/CONTRAST: CPT

## 2021-10-07 RX ADMIN — IOPAMIDOL 85 ML: 612 INJECTION, SOLUTION INTRAVENOUS at 13:32

## 2021-10-08 ENCOUNTER — TELEPHONE (OUTPATIENT)
Dept: SURGERY | Facility: CLINIC | Age: 76
End: 2021-10-08

## 2021-10-08 ENCOUNTER — DOCUMENTATION (OUTPATIENT)
Dept: SURGERY | Facility: CLINIC | Age: 76
End: 2021-10-08

## 2021-10-08 NOTE — TELEPHONE ENCOUNTER
Patient is calling for his results of CT scan performed yesterday, 10/7.   Oumar is concerned as his weight is dropping continually. He's down another 10 pounds since his visit with you.  Thanks!

## 2021-10-08 NOTE — PROGRESS NOTES
Called and spoke with patient.  CT looks dramatically better and area of abscess is essentially resolved.  There is some chronic diverticular change.  Clinically the patient feels well, he is eating very well and has no abdominal pain.  I do not think that elective sigmoid resection makes sense for him given his age and medical comorbidities.  He does understand that he is at risk for future attacks.  He may follow-up as needed.    Chito Estrada MD  General and Endoscopic Surgery  [facility] [place]    [address], [address]  [place], [place], [address]  P: [phone]  F: [phone]

## 2022-07-20 NOTE — PROGRESS NOTES
Subjective     REASON FOR CONSULTATION: Potential lung cancer  Provide an opinion on any further workup or treatment                             REQUESTING PHYSICIAN: Derek Grider MD,  GARCÍA Durant    RECORDS OBTAINED:  Records of the patients history including those obtained from the referring provider were reviewed and summarized in detail.    HISTORY OF PRESENT ILLNESS:  The patient is a 77 y.o. year old male who is here for an opinion about the above issue.    History of Present Illness     The patient is 77-year-old male with a number of medical issues including type 2 diabetes, COPD, possible MGUS, hypertension, diastolic heart failure, coronary disease, peripheral vascular disease, previous DVT, history of IVC filter placement on chronic anticoagulation.  He had been assessed particularly in the fall 2021 when he presented with nausea and vomiting and abdominal discomfort leading to assessments that revealed sigmoid diverticulitis with contained rupture and partial small bowel obstruction.  Records from mid September 21 indicating that he was admitted with partial small bowel obstruction and treated medically with very slow recovery.  He has history of COPD with CT demonstrating a pulmonary nodule in the right lung base at 7 mm with follow-up planned.  He was seen by general surgery in later September with repeat scans planned and repeat CT abdomen 10/7/2021 did demonstrate interval improvement of sigmoid diverticulitis.      Record indicates an assessment in late June 2022 at different general surgeon for left inguinal hernia, history of heavy tobacco use with COPD and recommendation for surgical repair of his hernia      The patient underwent a CT scan of the chest 5/7/2022 demonstrating diffuse emphysematous changes, ill-defined density measuring 3 mm in the superior segment of the right lower lobe, multiple ill-defined 3 to 4 mm nodular density thought to be inflammatory involving the right  lower lobe and a new spiculated nodule involving the left lower lobe measuring 16 x 14 mm as well as left hilar adenopathy.       Follow-up PET/CT 7/13/2022 reveal a hypermetabolic spiculated lesion medial aspect the left lower lobe adjacent to descending thoracic aorta measuring 1.5 x 1.6 SUV 9.3 with an enlarged hypermetabolic left hilar lymph node measured 1.5 x 1.3 with SUV of 12.1, additional nodules in the lateral aspect right lower lobe and micronodule in the posterior/medial right lower lobe and also additional right lower lobe micronodule.  There is an infrarenal abdominal aortic aneurysm measuring 3.4 cm with a short segment of chronic dissection present.    These clinical findings would be consistent with a possible T1b N1 M0-stage IIb lung cancer.      These findings are discussed with the patient and his wife both understanding that the diagnosis has not been made here.    Past Medical History:   Diagnosis Date   • Cancer (HCC)    • COPD (chronic obstructive pulmonary disease) (HCC)    • Diabetes mellitus (HCC)    • DVT, lower extremity (HCC)    • Elevated cholesterol    • Hypertension         No past surgical history on file.     Current Outpatient Medications on File Prior to Visit   Medication Sig Dispense Refill   • cetirizine (ZyrTEC) 10 MG chewable tablet Chew 10 mg Daily.     • isosorbide mononitrate (IMDUR) 60 MG 24 hr tablet TAKE 1 TABLET BY MOUTH DAILY     • lisinopril (PRINIVIL,ZESTRIL) 10 MG tablet Take 10 mg by mouth Daily.     • simvastatin (ZOCOR) 20 MG tablet Take 1 tablet by mouth Every Night.     • warfarin (COUMADIN) 5 MG tablet Take 5 mg by mouth Daily. pt currently takes Coumadin 6 mg Orantes/W and 5 mg all other days.     • [DISCONTINUED] budesonide (PULMICORT) 0.5 MG/2ML nebulizer solution USE 1 VIAL  IN  NEBULIZER TWICE  DAILY - rinse mouth after treatment     • budesonide-formoterol (SYMBICORT) 160-4.5 MCG/ACT inhaler Inhale 2 puffs 2 (Two) Times a Day. 1 each 3   • metoprolol  "tartrate (LOPRESSOR) 25 MG tablet Take 1 tablet by mouth Every 12 (Twelve) Hours. 60 tablet 3   • tiotropium bromide monohydrate (SPIRIVA RESPIMAT) 2.5 MCG/ACT aerosol solution inhaler Inhale 2 puffs Daily. 1 each 3   • warfarin (COUMADIN) 1 MG tablet Take 1 mg by mouth 2 (Two) Times a Week.     • [DISCONTINUED] arformoterol (BROVANA) 15 MCG/2ML nebulizer solution Inhale.     • [DISCONTINUED] simvastatin (ZOCOR) 20 MG tablet TAKE 1 TABLET BY MOUTH NIGHTLY       No current facility-administered medications on file prior to visit.        ALLERGIES:  No Known Allergies     Social History     Socioeconomic History   • Marital status:    Tobacco Use   • Smoking status: Current Every Day Smoker     Packs/day: 2.00     Types: Cigarettes   • Smokeless tobacco: Never Used   Substance and Sexual Activity   • Alcohol use: Not Currently   • Drug use: Never   • Sexual activity: Defer        Family History   Problem Relation Age of Onset   • Cancer Father    • Diabetes Brother         Review of Systems   Constitutional: Positive for activity change, fatigue and unexpected weight change (Status post his diverticulitis episode, weight has not been regained).   HENT: Negative.    Eyes: Negative.    Respiratory: Positive for shortness of breath. Negative for cough.    Cardiovascular: Negative.    Gastrointestinal: Negative.    Genitourinary: Negative.    Musculoskeletal: Negative.    Skin: Negative.    Neurological: Negative.    Psychiatric/Behavioral: Negative.         Objective     Vitals:    07/21/22 0855   BP: 131/82   Pulse: 71   Resp: 16   Temp: 96.6 °F (35.9 °C)   TempSrc: Temporal   SpO2: 94%   Weight: 67.8 kg (149 lb 6.4 oz)   Height: 176 cm (69.29\")   PainSc: 0-No pain     Current Status 7/21/2022   ECOG score 1       Physical Exam  Constitutional:       Appearance: Normal appearance.   HENT:      Head: Normocephalic and atraumatic.      Nose: Nose normal.      Mouth/Throat:      Mouth: Mucous membranes are moist.    "   Pharynx: Oropharynx is clear.   Eyes:      Extraocular Movements: Extraocular movements intact.      Conjunctiva/sclera: Conjunctivae normal.      Pupils: Pupils are equal, round, and reactive to light.   Cardiovascular:      Rate and Rhythm: Normal rate and regular rhythm.      Pulses: Normal pulses.      Heart sounds: Normal heart sounds.   Pulmonary:      Comments: Prolonged expiratory phase bilaterally  Abdominal:      General: Bowel sounds are normal.      Palpations: Abdomen is soft.      Comments: Scaphoid   Musculoskeletal:         General: Normal range of motion.      Cervical back: Normal range of motion and neck supple.   Skin:     General: Skin is warm and dry.   Neurological:      General: No focal deficit present.      Mental Status: He is alert and oriented to person, place, and time.   Psychiatric:         Mood and Affect: Mood normal.           RECENT LABS:  Hematology WBC   Date Value Ref Range Status   07/21/2022 8.09 3.40 - 10.80 10*3/mm3 Final   05/09/2022 7.54 4.5 - 11.0 10*3/uL Final     RBC   Date Value Ref Range Status   07/21/2022 5.17 4.14 - 5.80 10*6/mm3 Final   05/09/2022 5.52 4.5 - 5.9 10*6/uL Final     Hemoglobin   Date Value Ref Range Status   07/21/2022 15.6 13.0 - 17.7 g/dL Final   05/09/2022 16.4 13.5 - 17.5 g/dL Final     Hematocrit   Date Value Ref Range Status   07/21/2022 46.1 37.5 - 51.0 % Final   05/09/2022 51.0 41.0 - 53.0 % Final     Platelets   Date Value Ref Range Status   07/21/2022 205 140 - 450 10*3/mm3 Final   05/09/2022 204 140 - 440 10*3/uL Final          Assessment & Plan           77-year-old male with medical issues including type 2 diabetes-uncertain control, COPD, hypertension, diastolic heart failure, chronic disease, peripheral vascular disease (follow-up with vascular surgery today), previous DVT on anticoagulation (home monitoring), previous IVC filter placement?,  Status post September 2021 partial small bowel obstruction secondary to sigmoid  diverticulitis treated medically.        He had had a right lung base nodule noted on scan at that point and in follow-up with primary care had developed a left inguinal hernia with repair planned through a different general surgeon then seen with his initial sigmoid diverticulitis.  An additional CT scan of the chest done in follow-up of his previously abnormal study now revealed not only the previously noted right lower lobe and additional nodules but a spiculated nodule in the left lower lobe measuring 16 x 14 mm.                                 Follow-up PET/CT demonstrated a hypermetabolic spiculated lesion medial aspect the left lower lobe adjacent to descending thoracic aorta measuring 1.5 x 1.6 SUV 9.3 with an enlarged hypermetabolic left hilar lymph node measured 1.5 x 1.3 with SUV of 12.1, additional nodules in the lateral aspect right lower lobe and micronodule in the posterior/medial right lower lobe and also additional right lower lobe micronodule.  There is an infrarenal abdominal aortic aneurysm measuring 3.4 cm with a short segment of chronic dissection present.    These clinical findings would be consistent with a possible T1b N1 M0-stage IIb lung cancer.         Plan:      *Return to laboratory today for CEA, CMP, guardant 360, hemoglobin A1c, [place], PE, free serum light chain, LDH level    *Patient's case to be discussed at thoracic conference next week    *MD follow-up 7 to 8 days    *Patient indicates he is to be seen by vascular surgery today to manage his warfarin prior to scheduled left inguinal hernia repair August 2, 2022- surgery at [facility].    I spent 55 minutes caring for Giovani on this date of service. This time includes time spent by me in the following activities: preparing for the visit, reviewing tests, obtaining and/or reviewing a separately obtained history, performing a medically appropriate examination and/or evaluation, counseling and educating the patient/family/caregiver,  ordering medications, tests, or procedures, referring and communicating with other health care professionals, documenting information in the medical record, independently interpreting results and communicating that information with the patient/family/caregiver and care coordination.

## 2022-07-21 ENCOUNTER — LAB (OUTPATIENT)
Dept: LAB | Facility: HOSPITAL | Age: 77
End: 2022-07-21

## 2022-07-21 ENCOUNTER — CONSULT (OUTPATIENT)
Dept: ONCOLOGY | Facility: CLINIC | Age: 77
End: 2022-07-21

## 2022-07-21 VITALS
OXYGEN SATURATION: 94 % | HEART RATE: 71 BPM | HEIGHT: 69 IN | DIASTOLIC BLOOD PRESSURE: 82 MMHG | BODY MASS INDEX: 22.13 KG/M2 | RESPIRATION RATE: 16 BRPM | WEIGHT: 149.4 LBS | TEMPERATURE: 96.6 F | SYSTOLIC BLOOD PRESSURE: 131 MMHG

## 2022-07-21 DIAGNOSIS — C34.32 MALIGNANT NEOPLASM OF LOWER LOBE, LEFT BRONCHUS OR LUNG: ICD-10-CM

## 2022-07-21 DIAGNOSIS — I10 HYPERTENSION, UNSPECIFIED TYPE: Primary | ICD-10-CM

## 2022-07-21 DIAGNOSIS — Z86.718 HISTORY OF DVT (DEEP VEIN THROMBOSIS): ICD-10-CM

## 2022-07-21 DIAGNOSIS — E11.69 TYPE 2 DIABETES MELLITUS WITH OTHER SPECIFIED COMPLICATION, WITHOUT LONG-TERM CURRENT USE OF INSULIN: ICD-10-CM

## 2022-07-21 DIAGNOSIS — R91.1 PULMONARY NODULE: Primary | ICD-10-CM

## 2022-07-21 DIAGNOSIS — D47.2 MGUS (MONOCLONAL GAMMOPATHY OF UNKNOWN SIGNIFICANCE): ICD-10-CM

## 2022-07-21 LAB
ALBUMIN SERPL-MCNC: 4.6 G/DL (ref 3.5–5.2)
ALBUMIN/GLOB SERPL: 1.4 G/DL (ref 1.1–2.4)
ALP SERPL-CCNC: 81 U/L (ref 38–116)
ALT SERPL W P-5'-P-CCNC: 18 U/L (ref 0–41)
ANION GAP SERPL CALCULATED.3IONS-SCNC: 12.1 MMOL/L (ref 5–15)
AST SERPL-CCNC: 19 U/L (ref 0–40)
BASOPHILS # BLD AUTO: 0.05 10*3/MM3 (ref 0–0.2)
BASOPHILS NFR BLD AUTO: 0.6 % (ref 0–1.5)
BILIRUB SERPL-MCNC: 0.4 MG/DL (ref 0.2–1.2)
BUN SERPL-MCNC: 14 MG/DL (ref 6–20)
BUN/CREAT SERPL: 13.7 (ref 7.3–30)
CALCIUM SPEC-SCNC: 10.5 MG/DL (ref 8.5–10.2)
CEA SERPL-MCNC: 3.36 NG/ML
CHLORIDE SERPL-SCNC: 99 MMOL/L (ref 98–107)
CO2 SERPL-SCNC: 26.9 MMOL/L (ref 22–29)
CREAT SERPL-MCNC: 1.02 MG/DL (ref 0.7–1.3)
DEPRECATED RDW RBC AUTO: 50.1 FL (ref 37–54)
EGFRCR SERPLBLD CKD-EPI 2021: 76.6 ML/MIN/1.73
EOSINOPHIL # BLD AUTO: 0.12 10*3/MM3 (ref 0–0.4)
EOSINOPHIL NFR BLD AUTO: 1.5 % (ref 0.3–6.2)
ERYTHROCYTE [DISTWIDTH] IN BLOOD BY AUTOMATED COUNT: 15.3 % (ref 12.3–15.4)
GLOBULIN UR ELPH-MCNC: 3.3 GM/DL (ref 1.8–3.5)
GLUCOSE SERPL-MCNC: 113 MG/DL (ref 74–124)
HBA1C MFR BLD: 6.4 % (ref 4.8–5.6)
HCT VFR BLD AUTO: 46.1 % (ref 37.5–51)
HGB BLD-MCNC: 15.6 G/DL (ref 13–17.7)
IMM GRANULOCYTES # BLD AUTO: 0.04 10*3/MM3 (ref 0–0.05)
IMM GRANULOCYTES NFR BLD AUTO: 0.5 % (ref 0–0.5)
LDH SERPL-CCNC: 195 U/L (ref 99–259)
LYMPHOCYTES # BLD AUTO: 1.82 10*3/MM3 (ref 0.7–3.1)
LYMPHOCYTES NFR BLD AUTO: 22.5 % (ref 19.6–45.3)
MCH RBC QN AUTO: 30.2 PG (ref 26.6–33)
MCHC RBC AUTO-ENTMCNC: 33.8 G/DL (ref 31.5–35.7)
MCV RBC AUTO: 89.2 FL (ref 79–97)
MONOCYTES # BLD AUTO: 0.83 10*3/MM3 (ref 0.1–0.9)
MONOCYTES NFR BLD AUTO: 10.3 % (ref 5–12)
NEUTROPHILS NFR BLD AUTO: 5.23 10*3/MM3 (ref 1.7–7)
NEUTROPHILS NFR BLD AUTO: 64.6 % (ref 42.7–76)
NRBC BLD AUTO-RTO: 0 /100 WBC (ref 0–0.2)
PLATELET # BLD AUTO: 205 10*3/MM3 (ref 140–450)
PMV BLD AUTO: 8.3 FL (ref 6–12)
POTASSIUM SERPL-SCNC: 5 MMOL/L (ref 3.5–4.7)
PROT SERPL-MCNC: 7.9 G/DL (ref 6.3–8)
RBC # BLD AUTO: 5.17 10*6/MM3 (ref 4.14–5.8)
SODIUM SERPL-SCNC: 138 MMOL/L (ref 134–145)
WBC NRBC COR # BLD: 8.09 10*3/MM3 (ref 3.4–10.8)

## 2022-07-21 PROCEDURE — 36415 COLL VENOUS BLD VENIPUNCTURE: CPT | Performed by: INTERNAL MEDICINE

## 2022-07-21 PROCEDURE — 36415 COLL VENOUS BLD VENIPUNCTURE: CPT

## 2022-07-21 PROCEDURE — 85025 COMPLETE CBC W/AUTO DIFF WBC: CPT

## 2022-07-21 PROCEDURE — 83615 LACTATE (LD) (LDH) ENZYME: CPT | Performed by: INTERNAL MEDICINE

## 2022-07-21 PROCEDURE — 82378 CARCINOEMBRYONIC ANTIGEN: CPT | Performed by: INTERNAL MEDICINE

## 2022-07-21 PROCEDURE — 99204 OFFICE O/P NEW MOD 45 MIN: CPT | Performed by: INTERNAL MEDICINE

## 2022-07-21 PROCEDURE — 80053 COMPREHEN METABOLIC PANEL: CPT | Performed by: INTERNAL MEDICINE

## 2022-07-21 PROCEDURE — 83036 HEMOGLOBIN GLYCOSYLATED A1C: CPT | Performed by: INTERNAL MEDICINE

## 2022-07-21 RX ORDER — LISINOPRIL 10 MG/1
10 TABLET ORAL EVERY EVENING
COMMUNITY
Start: 2022-07-20 | End: 2022-09-29 | Stop reason: HOSPADM

## 2022-07-21 RX ORDER — SIMVASTATIN 20 MG
1 TABLET ORAL NIGHTLY
COMMUNITY
Start: 2022-01-25 | End: 2022-08-18

## 2022-07-21 RX ORDER — CETIRIZINE HYDROCHLORIDE 10 MG/1
10 TABLET, CHEWABLE ORAL DAILY
COMMUNITY
End: 2022-09-16

## 2022-07-21 RX ORDER — ARFORMOTEROL TARTRATE 15 UG/2ML
SOLUTION RESPIRATORY (INHALATION)
COMMUNITY
End: 2022-07-21 | Stop reason: SDUPTHER

## 2022-07-21 RX ORDER — BUDESONIDE 0.5 MG/2ML
INHALANT ORAL
COMMUNITY
Start: 2022-06-08 | End: 2022-07-21 | Stop reason: SDUPTHER

## 2022-07-22 LAB
ALBUMIN SERPL ELPH-MCNC: 3.7 G/DL (ref 2.9–4.4)
ALBUMIN/GLOB SERPL: 1.1 {RATIO} (ref 0.7–1.7)
ALPHA1 GLOB SERPL ELPH-MCNC: 0.3 G/DL (ref 0–0.4)
ALPHA2 GLOB SERPL ELPH-MCNC: 0.9 G/DL (ref 0.4–1)
B-GLOBULIN SERPL ELPH-MCNC: 1.1 G/DL (ref 0.7–1.3)
GAMMA GLOB SERPL ELPH-MCNC: 1.4 G/DL (ref 0.4–1.8)
GLOBULIN SER-MCNC: 3.7 G/DL (ref 2.2–3.9)
IGA SERPL-MCNC: 208 MG/DL (ref 61–437)
IGG SERPL-MCNC: 1187 MG/DL (ref 603–1613)
IGM SERPL-MCNC: 234 MG/DL (ref 15–143)
INTERPRETATION SERPL IEP-IMP: ABNORMAL
KAPPA LC FREE SER-MCNC: 120.4 MG/L (ref 3.3–19.4)
KAPPA LC FREE/LAMBDA FREE SER: 8.3 {RATIO} (ref 0.26–1.65)
LABORATORY COMMENT REPORT: ABNORMAL
LAMBDA LC FREE SERPL-MCNC: 14.5 MG/L (ref 5.7–26.3)
M PROTEIN SERPL ELPH-MCNC: 0.4 G/DL
PROT SERPL-MCNC: 7.4 G/DL (ref 6–8.5)

## 2022-07-25 RX ORDER — ENOXAPARIN SODIUM 100 MG/ML
60 INJECTION SUBCUTANEOUS EVERY 12 HOURS
Status: DISPENSED | OUTPATIENT
Start: 2022-07-25 | End: 2022-08-04

## 2022-07-26 ENCOUNTER — MDT ASSESSMENT (OUTPATIENT)
Dept: OTHER | Facility: HOSPITAL | Age: 77
End: 2022-07-26

## 2022-07-26 NOTE — PROGRESS NOTES
MULTIDISCIPLINARY TREATMENT PLANNING CONFERENCE  DATE: 7/28/2022      SPECIALTY: Thoracic Conference   PRESENTER: Kayden Bishop MD    SITE: Right Lung        Please discuss clinical/working stage, TNM, Stage Group, National Treatment Guidelines, and Prognostic Indicators.       CONFERENCE SUMMARY:  Plans made to facilitate work-up as outpatient obtaining pulmonary consultation with EBUS, followed by thoracic surgery consultation review by supportive oncology with older patient assessment.  Guardant 360 pending                          AJCC STAGE: Clinical stage T1b N1 M0-stage IIB lung cancer        REFERRAL SUPPORT   Psychosocial Assessment:  [x]                Clinical Trials:  []                Genetic Testing:  []                Geriatric Assessment:  [x]                Smoking Cessation:  []                Nutrition Assessment:  []                Social Work Evaluation/Barriers to Care:  []                Behavioral Oncology Evaluation:  []                Palliative Care:  []    Consultations to pulmonary for EBUS, thoracic surgery for surgical consideration  EVIDENCE BASED NATIONAL TREATMENT GUIDELINES:  [x]                   SOCIAL HISTORY:   reports that he has been smoking cigarettes. He has been smoking about 2.00 packs per day. He has never used smokeless tobacco. He reports previous alcohol use. He reports that he does not use drugs.                  PAST MEDICAL HISTORY:   has a past medical history of Cancer (HCC), COPD (chronic obstructive pulmonary disease) (HCC), Diabetes mellitus (HCC), DVT, lower extremity (HCC), Elevated cholesterol, H/O Cervical pain, History of colitis, Hyperlipidemia, Hypertension, Peripheral arterial disease (HCC), Right leg claudication (HCC), and Skin cancer.                  PAST SURGICAL HISTORY:  @                 IMAGING:  No radiology results for the last 90 days.                  SURGICAL PROCEDURE / PATHOLOGY:      N/A               Labs & Biomarkers:      N/A

## 2022-07-27 NOTE — PROGRESS NOTES
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  He had been assessed particularly in the fall 2021 when he presented with nausea and vomiting and abdominal discomfort leading to assessments that revealed sigmoid diverticulitis with contained rupture and partial small bowel obstruction.  Records from mid September 21 indicating that he was admitted with partial small bowel obstruction and treated medically with very slow recovery.  He has history of COPD with CT demonstrating a pulmonary nodule in the right lung base at 7 mm with follow-up planned.  He was seen by general surgery in later September with repeat scans planned and repeat CT abdomen 10/7/2021 did demonstrate interval improvement of sigmoid diverticulitis.      Record indicates an assessment in late June 2022 at different general surgeon for left inguinal hernia, history of heavy tobacco use with COPD and recommendation for surgical repair of his hernia      The patient underwent a CT scan of the chest 5/7/2022 demonstrating diffuse emphysematous changes, ill-defined density measuring 3 mm in the superior segment of the right lower lobe, multiple ill-defined 3 to 4 mm nodular density thought to be inflammatory involving the right lower lobe and a new spiculated nodule involving the left lower lobe measuring 16 x 14 mm as well as left hilar adenopathy.       Follow-up PET/CT 7/13/2022 reveal a hypermetabolic spiculated lesion medial aspect the left lower lobe adjacent to descending thoracic aorta measuring 1.5 x 1.6 SUV 9.3 with an enlarged hypermetabolic left hilar lymph node measured 1.5 x 1.3 with SUV of 12.1, additional nodules in the lateral aspect  right lower lobe and micronodule in the posterior/medial right lower lobe and also additional right lower lobe micronodule.  There is an infrarenal abdominal aortic aneurysm measuring 3.4 cm with a short segment of chronic dissection present.    These clinical findings would be consistent with a possible T1b N1 M0-stage IIb lung cancer.      Recognizing a diagnosis has not been made his case was discussed in thoracic conference 7/20/2022.  Recommendations include proceeding to pulmonary assessment with EBUS and the patient undergoing a general assessment per his clinical status-older adult assessment as well as assessment by thoracic surgery.  These issues are discussed with the patient and his wife in detail when they are seen 7/28/2022.    Past Medical History:   Diagnosis Date   • Cancer (HCC)    • COPD (chronic obstructive pulmonary disease) (HCC)    • Diabetes mellitus (HCC)    • DVT, lower extremity (HCC)    • Elevated cholesterol    • H/O Cervical pain    • History of colitis    • Hyperlipidemia    • Hypertension    • Peripheral arterial disease (HCC)    • Right leg claudication (HCC)    • Skin cancer         Past Surgical History:   Procedure Laterality Date   • BALLOON ANGIOPLASTY, ARTERY Right 2015    IR Percutaneious IVC filter placement   • KNEE ARTHROSCOPY Left     Torn meniscus        Current Outpatient Medications on File Prior to Visit   Medication Sig Dispense Refill   • budesonide-formoterol (SYMBICORT) 160-4.5 MCG/ACT inhaler Inhale 2 puffs 2 (Two) Times a Day. 1 each 3   • cetirizine (ZyrTEC) 10 MG chewable tablet Chew 10 mg Daily.     • isosorbide mononitrate (IMDUR) 60 MG 24 hr tablet TAKE 1 TABLET BY MOUTH DAILY     • lisinopril (PRINIVIL,ZESTRIL) 10 MG tablet Take 10 mg by mouth Daily.     • metoprolol tartrate (LOPRESSOR) 25 MG tablet Take 1 tablet by mouth Every 12 (Twelve) Hours. 60 tablet 3   • simvastatin (ZOCOR) 20 MG tablet Take 1 tablet by mouth Every Night.     • tiotropium bromide  "monohydrate (SPIRIVA RESPIMAT) 2.5 MCG/ACT aerosol solution inhaler Inhale 2 puffs Daily. 1 each 3   • warfarin (COUMADIN) 5 MG tablet Take 5 mg by mouth Daily. pt currently takes Coumadin 6 mg Orantes/W and 5 mg all other days.     • warfarin (COUMADIN) 1 MG tablet Take 1 mg by mouth 2 (Two) Times a Week.       Current Facility-Administered Medications on File Prior to Visit   Medication Dose Route Frequency Provider Last Rate Last Admin   • Enoxaparin Sodium (LOVENOX) syringe 60 mg  60 mg Subcutaneous Q12H Patrick Cortez MD            ALLERGIES:  No Known Allergies     Social History     Socioeconomic History   • Marital status:    Tobacco Use   • Smoking status: Current Every Day Smoker     Packs/day: 2.00     Types: Cigarettes   • Smokeless tobacco: Never Used   • Tobacco comment: Less than 1 PPD   Substance and Sexual Activity   • Alcohol use: Not Currently   • Drug use: Never   • Sexual activity: Defer        Family History   Problem Relation Age of Onset   • No Known Problems Mother    • Cancer Father    • Lung cancer Father    • Diabetes Brother    • Other Daughter         S/P stent, age 42   • No Known Problems Son         Review of Systems   Constitutional: Positive for activity change, fatigue and unexpected weight change (Status post his diverticulitis episode, weight has not been regained).   HENT: Negative.    Eyes: Negative.    Respiratory: Positive for shortness of breath. Negative for cough.    Cardiovascular: Negative.    Gastrointestinal: Negative.    Genitourinary: Negative.    Musculoskeletal: Negative.    Skin: Negative.    Neurological: Negative.    Psychiatric/Behavioral: Negative.         Objective     Vitals:    07/28/22 0758   BP: 159/87   Pulse: 76   Resp: 20   Temp: 97.7 °F (36.5 °C)   TempSrc: Temporal   SpO2: 97%   Weight: 68.9 kg (152 lb)   Height: 176 cm (69.29\")   PainSc: 0-No pain     Current Status 7/28/2022   ECOG score 0       Physical Exam  Constitutional:       Appearance: " Normal appearance.   HENT:      Head: Normocephalic and atraumatic.      Nose: Nose normal.      Mouth/Throat:      Mouth: Mucous membranes are moist.      Pharynx: Oropharynx is clear.   Eyes:      Extraocular Movements: Extraocular movements intact.      Conjunctiva/sclera: Conjunctivae normal.      Pupils: Pupils are equal, round, and reactive to light.   Cardiovascular:      Rate and Rhythm: Normal rate and regular rhythm.      Pulses: Normal pulses.      Heart sounds: Normal heart sounds.   Pulmonary:      Comments: Prolonged expiratory phase bilaterally  Abdominal:      General: Bowel sounds are normal.      Palpations: Abdomen is soft.      Comments: Scaphoid   Musculoskeletal:         General: Normal range of motion.      Cervical back: Normal range of motion and neck supple.   Skin:     General: Skin is warm and dry.   Neurological:      General: No focal deficit present.      Mental Status: He is alert and oriented to person, place, and time.   Psychiatric:         Mood and Affect: Mood normal.           RECENT LABS:  Hematology WBC   Date Value Ref Range Status   07/21/2022 8.09 3.40 - 10.80 10*3/mm3 Final   05/09/2022 7.54 4.5 - 11.0 10*3/uL Final     RBC   Date Value Ref Range Status   07/21/2022 5.17 4.14 - 5.80 10*6/mm3 Final   05/09/2022 5.52 4.5 - 5.9 10*6/uL Final     Hemoglobin   Date Value Ref Range Status   07/21/2022 15.6 13.0 - 17.7 g/dL Final   05/09/2022 16.4 13.5 - 17.5 g/dL Final     Hematocrit   Date Value Ref Range Status   07/21/2022 46.1 37.5 - 51.0 % Final   05/09/2022 51.0 41.0 - 53.0 % Final     Platelets   Date Value Ref Range Status   07/21/2022 205 140 - 450 10*3/mm3 Final   05/09/2022 204 140 - 440 10*3/uL Final          Assessment & Plan           77-year-old male with medical issues including type 2 diabetes-uncertain control, COPD, hypertension, diastolic heart failure, chronic disease, peripheral vascular disease (follow-up with vascular surgery today), previous DVT on  anticoagulation (home monitoring), previous IVC filter placement?,  Status post September 2021 partial small bowel obstruction secondary to sigmoid diverticulitis treated medically.        He had had a right lung base nodule noted on scan at that point and in follow-up with primary care had developed a left inguinal hernia with repair planned through a different general surgeon then seen with his initial sigmoid diverticulitis.  An additional CT scan of the chest done in follow-up of his previously abnormal study now revealed not only the previously noted right lower lobe and additional nodules but a spiculated nodule in the left lower lobe measuring 16 x 14 mm.                                 Follow-up PET/CT demonstrated a hypermetabolic spiculated lesion medial aspect the left lower lobe adjacent to descending thoracic aorta measuring 1.5 x 1.6 SUV 9.3 with an enlarged hypermetabolic left hilar lymph node measured 1.5 x 1.3 with SUV of 12.1, additional nodules in the lateral aspect right lower lobe and micronodule in the posterior/medial right lower lobe and also additional right lower lobe micronodule.  There is an infrarenal abdominal aortic aneurysm measuring 3.4 cm with a short segment of chronic dissection present.    These clinical findings would be consistent with a possible T1b N1 M0-stage IIb lung cancer.    The patient's case is discussed in thoracic conference and additional recommendations are described as above.  They are related in detail to the patient and his wife both agreeable to proceed.    Plan:  *Guardant 360 currently pending    *Referrals to pulmonary medicine for EBUS next available, thoracic surgery for consultation post the EBUS procedure and diagnostic material obtained    *Referral for older adult assessment, thoracic surgery navigator  Alert    *MD follow-up 2 to 3 weeks to review these findings    *Patient continues to be evaluated by both vascular and general surgery for his left  inguinal hernia repair August 2, 2022.       I spent 56 minutes caring for Giovani on this date of service. This time includes time spent by me in the following activities: preparing for the visit, reviewing tests, obtaining and/or reviewing a separately obtained history, performing a medically appropriate examination and/or evaluation, counseling and educating the patient/family/caregiver, ordering medications, tests, or procedures, referring and communicating with other health care professionals, documenting information in the medical record, independently interpreting results and communicating that information with the patient/family/caregiver and care coordination.

## 2022-07-28 ENCOUNTER — TELEPHONE (OUTPATIENT)
Dept: ONCOLOGY | Facility: CLINIC | Age: 77
End: 2022-07-28

## 2022-07-28 ENCOUNTER — APPOINTMENT (OUTPATIENT)
Dept: LAB | Facility: HOSPITAL | Age: 77
End: 2022-07-28

## 2022-07-28 ENCOUNTER — OFFICE VISIT (OUTPATIENT)
Dept: ONCOLOGY | Facility: CLINIC | Age: 77
End: 2022-07-28

## 2022-07-28 VITALS
RESPIRATION RATE: 20 BRPM | DIASTOLIC BLOOD PRESSURE: 87 MMHG | SYSTOLIC BLOOD PRESSURE: 159 MMHG | HEIGHT: 69 IN | TEMPERATURE: 97.7 F | BODY MASS INDEX: 22.51 KG/M2 | WEIGHT: 152 LBS | HEART RATE: 76 BPM | OXYGEN SATURATION: 97 %

## 2022-07-28 DIAGNOSIS — R91.1 PULMONARY NODULE: Primary | ICD-10-CM

## 2022-07-28 PROCEDURE — 99215 OFFICE O/P EST HI 40 MIN: CPT | Performed by: INTERNAL MEDICINE

## 2022-07-28 NOTE — TELEPHONE ENCOUNTER
Pt is having hernia surgery on 8-2.  Dr. Dotson needs an ok from Dr. Bishop to have the surgery.

## 2022-07-28 NOTE — TELEPHONE ENCOUNTER
Patient was seen today by Dr. Bishop, he is calling stating that Dr. Chirinos needs approval from Dr. Bishop for patient to have hernia repair on 8/2/2022.

## 2022-07-28 NOTE — TELEPHONE ENCOUNTER
Called pt regarding clearance. He states Dr. Chirinos needed clearance. Called Dr. Chirinos needs office and they faxed forms. Surgical clearance given by Dr. Bishop and faxed to office. Confirmation received.

## 2022-08-03 LAB — REF LAB TEST METHOD: NORMAL

## 2022-08-08 ENCOUNTER — TELEPHONE (OUTPATIENT)
Dept: ONCOLOGY | Facility: CLINIC | Age: 77
End: 2022-08-08

## 2022-08-08 NOTE — TELEPHONE ENCOUNTER
Caller: Giovani España    Relationship: Self    Best call back number: [phone]    What is the best time to reach you: ASAP    Who are you requesting to speak with (clinical staff, provider,  specific staff member): NURSE    Do you know the name of the person who called:     What was the call regarding: PT WANTS TO DISCUSS APPTS    Do you require a callback: YES

## 2022-08-08 NOTE — TELEPHONE ENCOUNTER
Called and s/w pt's caregiver. She states pt cannot get in to pulmonary until October and will see Dr. Joe on 8/18 which is a few days after his visit with Dr. Bishop. They wanted to know if they still need to keep this apt. D/W Dr. Bishop. Per Dr. Bishop, we will keep his apt as scheduled. Informed pt and caregiver and they v/u.

## 2022-08-10 NOTE — PROGRESS NOTES
[facility] MULTIDISCIPLINARY CLINIC  SURVIVORSHIP VISIT: IN CLINIC  Older Adult Functional Assessment Initial Visit        Giovani España is a pleasant 77 y.o. male being followed by Kayden Bishop MD for probable lung cancer. Reviewed today in Multidisciplinary Clinic, for initial older adult functional assessment visit.     HPI  Gentleman with type 2 diabetes, COPD, hypertension, diastolic heart failure, peripheral vascular disease, previous DVT on anticoagulation with home monitoring, question previous IVC filter placement.  Status post 2021 small bowel obstruction secondary to sigmoid diverticulitis treated medically.  Status post left inguinal hernia repair with mesh 8/2/2022.    He had had a right lung base nodule noted on scan at that point and in follow-up with primary care had developed a left inguinal hernia with repair planned through a different general surgeon then seen with his initial sigmoid diverticulitis.  An additional CT scan of the chest done in follow-up of his previously abnormal study now revealed not only the previously noted right lower lobe and additional nodules but a spiculated nodule in the left lower lobe measuring 16 x 14 mm.Follow-up PET/CT demonstrated a hypermetabolic spiculated lesion medial aspect the left lower lobe adjacent to descending thoracic aorta measuring 1.5 x 1.6 SUV 9.3 with an enlarged hypermetabolic left hilar lymph node measured 1.5 x 1.3 with SUV of 12.1, additional nodules in the lateral aspect right lower lobe and micronodule in the posterior/medial right lower lobe and also additional right lower lobe micronodule.  There is an infrarenal abdominal aortic aneurysm measuring 3.4 cm with a short segment of chronic dissection present.      A guardant 360 was drawn on 7/21/2022 per Dr. Bishop and was pending at last office visit.    Patient was referred to pulmonary medicine for next available EBUS unfortunately no openings until October and so this  was not scheduled.  The patient is scheduled to follow-up with Dr. Bishop on 8/15/2022 and with Dr. Joe thoracic surgery on 8/18/2020.     The patient lives with his very close friend of over 40 years Kate.  He has 2 children a son in [facility], [place] and a daughter in [place].      Older Adult Functional Assessment:  Patient presents today with his friend Kate, his best friend of over 40 years, whom he has given consent to participate in the conversation today.    The patient's G8 score is 11 today, indicating at risk of functional decline related to cancer treatment.     Self-reported symptoms per ESAS listed below    Patient is able to perform all instrumental activities of daily living independently.     The patient reports NO falls within the last 6 months.       How many hospitalizations have you had in the last year? ONE   Because of a health problem, do you have difficulty pushing or pulling objects like a living room chair? yes   Do you use any vision aids? no   Do you use any hearing aids? no   Do you use any mobility aids? no   Do you feel unsteady when standing or walking? no   Do you worry about falling? no   Have you had any falls in the last 6 months? no       History of autoimmune disorders? no   History of organ/stem cell transplant? no     Detailed Symptom Assessment  Symptom Distress  Pain Score: 1 (right knee left groin)   ESAS Tiredness Score: 2  ESAS Nausea Score: No nausea  ESAS Depression Score: No depression  ESAS Anxiety Score: No anxiety  ESAS Drowsiness Score: 1  ESAS Lack of Appetite Score: 1  ESAS Wellbeing Score: 1  ESAS Dyspnea Score: 5  ESAS Source of Information: patient  ESAS Intervention: other (see comment) (see note of 8/11)  Palliative Performance Scale Score: 90%  Last Bowel Movement: 08/11/22  Bowel movement in last 24-48 hrs?: yes      TREATMENT TOXICITY PREDICTIVE ASSESSMENT:    RAW SCORE REPORTING:  ECOG 0   G8 Questionnaire 11/17   Mini Nutritional  Assessment (MNA) Screening score: 10/14  At risk of malnutrition   Mini-Mental State Examination (MMS) 30/30   Timed Up and Go (TUG) 3 meters (Goal <12 seconds): 9.9 seconds       TREATMENT HISTORY:     Oncology/Hematology History    No history exists.       Past Medical History:   Diagnosis Date   • Arthritis    • Cancer (HCC)    • COPD (chronic obstructive pulmonary disease) (HCC)    • Diabetes mellitus (HCC)    • DVT, lower extremity (HCC)    • Elevated cholesterol    • H/O Cervical pain    • History of colitis    • Hyperlipidemia    • Hypertension    • Peripheral arterial disease (HCC)    • Right leg claudication (HCC)    • Skin cancer        Past Surgical History:   Procedure Laterality Date   • BALLOON ANGIOPLASTY, ARTERY Right 2015    IR Percutaneious IVC filter placement   • KNEE ARTHROSCOPY Left     Torn meniscus       Social History     Socioeconomic History   • Marital status:    • Years of education: 1 year college   Tobacco Use   • Smoking status: Current Every Day Smoker     Packs/day: 2.00     Years: 50.00     Pack years: 100.00     Types: Cigarettes   • Smokeless tobacco: Never Used   • Tobacco comment: Less than 1 PPD   Substance and Sexual Activity   • Alcohol use: Not Currently   • Drug use: Never   • Sexual activity: Defer         LDH   Date Value Ref Range Status   07/21/2022 195 99 - 259 U/L Final       Lab Results   Component Value Date    GLUCOSE 113 07/21/2022    BUN 14 07/21/2022    CREATININE 1.02 07/21/2022    EGFRIFNONA 78 09/18/2021    BCR 13.7 07/21/2022    K 5.0 (H) 07/21/2022    CO2 26.9 07/21/2022    CALCIUM 10.5 (H) 07/21/2022    PROTENTOTREF 7.4 07/21/2022    ALBUMIN 4.60 07/21/2022    ALBUMIN 3.7 07/21/2022    LABIL2 1.1 07/21/2022    AST 19 07/21/2022    ALT 18 07/21/2022       CBC w/diff    CBC w/Diff 10/12/21 5/9/22 7/21/22   WBC 7.79 7.54 8.09   RBC 4.60 5.52 5.17   Hemoglobin 13.7 16.4 15.6   Hematocrit 43.9 51.0 46.1   MCV 95.4 92.4 89.2   MCH 29.8 29.7 30.2   MCHC  31.2 32.2 33.8   RDW 15.4 15.6 15.3   Platelets 195 204 205   Neutrophil Rel % 56.2 68.4 64.6   Immature Granulocyte Rel % 0.3 0.3 0.5   Lymphocyte Rel % 31.6 21.2 22.5   Monocyte Rel % 9.2 9.2 10.3   Eosinophil Rel % 1.8 0.4 1.5   Basophil Rel % 0.9 0.5 0.6             Allergies as of 08/11/2022   • (No Known Allergies)       MEDICATIONS:  Medication list reviewed today and Most recent H&P dated 7/29/22 on record from Dr Bishop reviewed today    Review of Systems   Constitutional: Positive for fatigue (mild). Negative for activity change, appetite change and unexpected weight change (30 pound weight loss september 2021, unable to regain ).   Respiratory: Positive for shortness of breath (with exertion resolves with rest). Negative for cough and chest tightness.         O2 3L/min at HS   Gastrointestinal: Negative for constipation and diarrhea.   Genitourinary: Negative for dysuria.   Musculoskeletal: Positive for arthralgias (blood clot behind right knee?). Negative for myalgias.   Skin: Negative for rash.   Neurological: Negative for dizziness and light-headedness.        No history of falls   Psychiatric/Behavioral: Negative for decreased concentration, dysphoric mood, self-injury, sleep disturbance and suicidal ideas. The patient is not nervous/anxious.        /80   Pulse 74   Temp 97.8 °F (36.6 °C) (Temporal)   Resp 18   Wt 68 kg (149 lb 14.4 oz)   SpO2 96% Comment: room air  BMI 21.95 kg/m²     Wt Readings from Last 3 Encounters:   08/11/22 68 kg (149 lb 14.4 oz)   07/28/22 68.9 kg (152 lb)   07/21/22 67.8 kg (149 lb 6.4 oz)       Pain Score    08/11/22 1628   PainSc: 0-No pain       PHQ-9 Total Score: 5   GAD7 Total Score: 2  Distress Score: N/A  Fatigue Severity Scale: N/A      Physical Exam  Constitutional:       Appearance: Normal appearance.   HENT:      Head: Normocephalic and atraumatic.   Cardiovascular:      Rate and Rhythm: Normal rate and regular rhythm.   Pulmonary:      Effort:  Pulmonary effort is normal.      Comments: Mild DOA noted with ambulation, resolves quickly with rest  Abdominal:      General: Abdomen is flat.   Musculoskeletal:      Right lower leg: Edema present.      Left lower leg: No edema.   Skin:     General: Skin is warm and dry.      Nails: There is clubbing.   Neurological:      Mental Status: He is alert and oriented to person, place, and time.      Gait: Gait is intact.   Psychiatric:         Attention and Perception: Attention and perception normal.         Mood and Affect: Mood and affect normal.         Speech: Speech normal.         Behavior: Behavior normal. Behavior is cooperative.         Thought Content: Thought content normal.         Cognition and Memory: Cognition normal.         Judgment: Judgment normal.           Advance Care Planning     Patient does not have advance care planning complete, we do not have a copy on file    I reviewed with Giovani ambriz to consider when choosing a healthcare agent. Giovani is considering his son Giovani EspañaJr. as his healthcare agent.  In fact patient notes they began to have some conversations last night on the phone regarding Oumar's wishes.  He is unable to communicate.  I encouraged Giovani to continue to discuss his preferences for future care with healthcare agents and others close to him.    Goals of medical care for severe, permanent brain injury were explored: Yes Oumar states in the event of a accident or sudden illness that left him unable to communicate or know who he is or who is with, if there is no reasonable chance of recovery he would not like his life prolonged with mechanical ventilation.    Oumar tells me he WOULD want artificial fluids and nutrition provided at least for a short time.     Education materials were provided on: Advance Care Planning, Healthcare Agent Selection and Conversations that matter booklet    Each section of the advance care/living will document was reviewed and  discussed.    Advance care/living will document finished during this facilitation? no, patient will review with family and consider his wishes further and we will plan to continue this conversation at next follow-up.    DISCUSSION HELD TODAY:   GOALS OF CARE:  1. Plan in place for cancer treatment    Problems identified:  1. Nutrition: Mini nutritional assessment demonstrates patient at risk for malnutrition.  Current BMI less than 23, 30 pound weight loss in September 2021 after small bowel obstruction secondary to sigmoid diverticulitis and inability to regain weight.  We did discuss oncology nutrition services and I would like to get them connected once a treatment plan is established.  This was discussed with nurse navigator Anna TUCKER today  2. The patient denies a history of falls however on review of the records there is a fall documented 6/9/2022 stating the patient did have a stumble and fell and landed on the right knee.  Patient states he does have continued pain in the right knee he believes this is related to history of DVT.  He tells me his stent was attempted but was unsuccessful.  He is treated with Coumadin for this monitoring at home.  3. Timed up and go of less than 10 seconds today which is well within normal limits.  No gait abnormalities noted.  We did discuss fall prevention and importance of maintaining current level of physical activity.  4. Cognition: MMSE 30/30 today.  Patient does not report any concerns related to cognition.  Reviewed strategies for preservation of cognitive function including adequate sleep, nutrition, hydration, physical activity, cognitively stimulating activities- learning a new hobby, puzzles.  5. Mood: Patient self-reports 0/10 for both depression and anxiety on self-reported ESAS today.  PHQ-9 score 5 affirming little interest or pleasure in doing things, feeling tired and having little energy and being fidgety several days and having trouble falling asleep more  than half the days.  Neither he nor his  today have concerns in this regard.  Will be important to continue to monitor patient's level of interest leisure and engagement in activities moving forward as this is often the only indicator of depressed mood and older adults.      Plan and recommendations:  1. Generally fit older adult with vulnerabilities related to weight loss of 30 pounds last September with inability to regain, COPD with decreased exercise tolerance, S/P Left inguinal hernia repair with mesh 08/02/22  2. Oncology nutrition to be consulted after treatment plan is established  3. good family support and no indication of cognitive impairment at baseline  4. Fall documented at 6/9/2022 [facility] visit per records review - per note: stumbled and fell and landed on right knee  5. Further treatment toxicity risk (CRASH score with heme and non-heme toxicity risk) pending proposed treatment selection if chemotherapy is pursued.  6. Dr Bishop 8/15  7. Warm hand-off/introduction to thoracic navigator Anna TUCKER who will see patient with Dr Joe on 8/18  8. Current tobacco user: this was not addressed today - will plan for further discussion at next follow up  9. STEADI fall prevention education provided  10. Reviewed strategies for cognitive preservation   11. Advance care planning and goals of care conversation ongoing  12. We have scheduled follow up for 9/8 and I advised we can move this as needed to accommodate his treatment schedule  13. Call my office as needed at [phone] for additional information, resources or support.        ICD-10-CM ICD-9-CM   1. Pulmonary nodule  R91.1 793.11   2. At risk for malnutrition  Z91.89 V49.89   3. Counseling regarding advance care planning and goals of care  Z71.89 V65.49       No orders of the defined types were placed in this encounter.      I spent 60 minutes caring for this patient on this date of service by face-to-face counseling. This  time includes time spent by me in the following activities: preparing for the visit, reviewing tests, obtaining and/or reviewing a separately obtained history, performing a medically appropriate examination and/or evaluation, counseling and educating the patient/family/caregiver, referring and communicating with other health care professionals, documenting information in the medical record and care coordination     I spent 20 minutes on the separately reported service of advance care planning including review of each section of [place] living will form, identification of healthcare surrogate, exploration of wishes for life-prolonging treatment. This time is not included in the time used to support the E/M service also reported today.

## 2022-08-11 ENCOUNTER — OFFICE VISIT (OUTPATIENT)
Dept: OTHER | Facility: HOSPITAL | Age: 77
End: 2022-08-11

## 2022-08-11 VITALS
DIASTOLIC BLOOD PRESSURE: 80 MMHG | TEMPERATURE: 97.8 F | BODY MASS INDEX: 21.95 KG/M2 | HEART RATE: 74 BPM | SYSTOLIC BLOOD PRESSURE: 154 MMHG | RESPIRATION RATE: 18 BRPM | WEIGHT: 149.9 LBS | OXYGEN SATURATION: 96 %

## 2022-08-11 DIAGNOSIS — Z91.89 AT RISK FOR MALNUTRITION: ICD-10-CM

## 2022-08-11 DIAGNOSIS — Z71.89 COUNSELING REGARDING ADVANCE CARE PLANNING AND GOALS OF CARE: ICD-10-CM

## 2022-08-11 DIAGNOSIS — R91.1 PULMONARY NODULE: Primary | ICD-10-CM

## 2022-08-11 PROCEDURE — 99215 OFFICE O/P EST HI 40 MIN: CPT | Performed by: NURSE PRACTITIONER

## 2022-08-11 PROCEDURE — 99497 ADVNCD CARE PLAN 30 MIN: CPT | Performed by: NURSE PRACTITIONER

## 2022-08-13 NOTE — PROGRESS NOTES
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  He had been assessed particularly in the fall 2021 when he presented with nausea and vomiting and abdominal discomfort leading to assessments that revealed sigmoid diverticulitis with contained rupture and partial small bowel obstruction.  Records from mid September 21 indicating that he was admitted with partial small bowel obstruction and treated medically with very slow recovery.  He has history of COPD with CT demonstrating a pulmonary nodule in the right lung base at 7 mm with follow-up planned.  He was seen by general surgery in later September with repeat scans planned and repeat CT abdomen 10/7/2021 did demonstrate interval improvement of sigmoid diverticulitis.      Record indicates an assessment in late June 2022 at different general surgeon for left inguinal hernia, history of heavy tobacco use with COPD and recommendation for surgical repair of his hernia      The patient underwent a CT scan of the chest 5/7/2022 demonstrating diffuse emphysematous changes, ill-defined density measuring 3 mm in the superior segment of the right lower lobe, multiple ill-defined 3 to 4 mm nodular density thought to be inflammatory involving the right lower lobe and a new spiculated nodule involving the left lower lobe measuring 16 x 14 mm as well as left hilar adenopathy.       Follow-up PET/CT 7/13/2022 reveal a hypermetabolic spiculated lesion medial aspect the left lower lobe adjacent to descending thoracic aorta measuring 1.5 x 1.6 SUV 9.3 with an enlarged hypermetabolic left hilar lymph node measured 1.5 x 1.3 with SUV of 12.1, additional nodules in the lateral aspect  right lower lobe and micronodule in the posterior/medial right lower lobe and also additional right lower lobe micronodule.  There is an infrarenal abdominal aortic aneurysm measuring 3.4 cm with a short segment of chronic dissection present.    These clinical findings would be consistent with a possible T1b N1 M0-stage IIb lung cancer.      Recognizing a diagnosis has not been made his case was discussed in thoracic conference 7/20/2022.  Recommendations include proceeding to pulmonary assessment with EBUS and the patient undergoing a general assessment per his clinical status-older adult assessment as well as assessment by thoracic surgery.  These issues are discussed with the patient and his wife in detail when they are seen 7/28/2022.    The patient proceeded to undergo his hernia surgery 8/2/2022 by Dr. Dotson.  Additionally the patient was unable to undergo assessment by pulmonary until October and, thereafter, was scheduled to see Dr. Joe 8/18/2022 undergoing older adult functional assessment with a JAGUAR score of 11 indicating a risk of functional decline related to cancer therapy.    The patient is seen with his wife 8/15/2022 and doing very well with recent hernia surgery.  His performance status is reasonably good at present and we have learned now that he would not be able to see pulmonary medicine until October, thoracic surgery is scheduled this week with Dr. Joe.  Perhaps he could be a surgical candidate.  Particularly we know by van Mata that T p53 splice site mutation is present and would certainly be consistent as well the most common mutations found in lung cancers particularly squamous cancers.  We have discussed obtaining pulmonary functions at this point, thoracic surgery assessment this week and also restarting Chantix as possible for the patient.      Past Medical History:   Diagnosis Date   • Arthritis    • Cancer (HCC)    • COPD (chronic obstructive pulmonary disease) (HCC)    •  Diabetes mellitus (HCC)    • DVT, lower extremity (HCC)    • Elevated cholesterol    • H/O Cervical pain    • History of colitis    • Hyperlipidemia    • Hypertension    • Peripheral arterial disease (HCC)    • Right leg claudication (HCC)    • Skin cancer         Past Surgical History:   Procedure Laterality Date   • BALLOON ANGIOPLASTY, ARTERY Right 2015    IR Percutaneious IVC filter placement   • KNEE ARTHROSCOPY Left     Torn meniscus        Current Outpatient Medications on File Prior to Visit   Medication Sig Dispense Refill   • budesonide-formoterol (SYMBICORT) 160-4.5 MCG/ACT inhaler Inhale 2 puffs 2 (Two) Times a Day. 1 each 3   • cetirizine (ZyrTEC) 10 MG chewable tablet Chew 10 mg Daily.     • isosorbide mononitrate (IMDUR) 60 MG 24 hr tablet TAKE 1 TABLET BY MOUTH DAILY     • lisinopril (PRINIVIL,ZESTRIL) 10 MG tablet Take 10 mg by mouth Daily.     • metoprolol tartrate (LOPRESSOR) 25 MG tablet Take 1 tablet by mouth Every 12 (Twelve) Hours. 60 tablet 3   • simvastatin (ZOCOR) 20 MG tablet Take 1 tablet by mouth Every Night.     • simvastatin (ZOCOR) 20 MG tablet Take 20 mg by mouth Daily.     • tiotropium bromide monohydrate (SPIRIVA RESPIMAT) 2.5 MCG/ACT aerosol solution inhaler Inhale 2 puffs Daily. 1 each 3   • warfarin (COUMADIN) 5 MG tablet Take 5 mg by mouth Daily. pt currently takes Coumadin 6 mg [place]/W and 5 mg all other days.     • warfarin (COUMADIN) 1 MG tablet Take 1 mg by mouth 2 (Two) Times a Week.       No current facility-administered medications on file prior to visit.        ALLERGIES:  No Known Allergies     Social History     Socioeconomic History   • Marital status:    • Years of education: 1 year college   Tobacco Use   • Smoking status: Current Every Day Smoker     Packs/day: 2.00     Years: 50.00     Pack years: 100.00     Types: Cigarettes   • Smokeless tobacco: Never Used   • Tobacco comment: Less than 1 PPD   Substance and Sexual Activity   • Alcohol use: Not Currently  "  • Drug use: Never   • Sexual activity: Defer        Family History   Problem Relation Age of Onset   • No Known Problems Mother    • Cancer Father    • Lung cancer Father    • Diabetes Brother    • Other Daughter         S/P stent, age 42   • No Known Problems Son         Review of Systems   Constitutional: Positive for activity change, fatigue and unexpected weight change (Status post his diverticulitis episode, weight has not been regained).   HENT: Negative.    Eyes: Negative.    Respiratory: Positive for shortness of breath. Negative for cough.    Cardiovascular: Negative.    Gastrointestinal: Negative.    Genitourinary: Negative.    Musculoskeletal: Negative.    Skin: Negative.    Neurological: Negative.    Psychiatric/Behavioral: Negative.         Objective     Vitals:    08/15/22 1100   BP: 156/83   Pulse: 75   Resp: 18   Temp: 97.5 °F (36.4 °C)   TempSrc: Temporal   SpO2: 95%   Weight: 68.8 kg (151 lb 9.6 oz)   Height: 176 cm (69.29\")   PainSc: 0-No pain     Current Status 8/15/2022   ECOG score 0       Physical Exam  Constitutional:       Appearance: Normal appearance.   HENT:      Head: Normocephalic and atraumatic.      Nose: Nose normal.      Mouth/Throat:      Mouth: Mucous membranes are moist.      Pharynx: Oropharynx is clear.   Eyes:      Extraocular Movements: Extraocular movements intact.      Conjunctiva/sclera: Conjunctivae normal.      Pupils: Pupils are equal, round, and reactive to light.   Cardiovascular:      Rate and Rhythm: Normal rate and regular rhythm.      Pulses: Normal pulses.      Heart sounds: Normal heart sounds.   Pulmonary:      Comments: Prolonged expiratory phase bilaterally  Abdominal:      General: Bowel sounds are normal.      Palpations: Abdomen is soft.      Comments: Scaphoid   Musculoskeletal:         General: Normal range of motion.      Cervical back: Normal range of motion and neck supple.   Skin:     General: Skin is warm and dry.   Neurological:      General: No " focal deficit present.      Mental Status: He is alert and oriented to person, place, and time.   Psychiatric:         Mood and Affect: Mood normal.           RECENT LABS:  Hematology WBC   Date Value Ref Range Status   08/15/2022 8.40 3.40 - 10.80 10*3/mm3 Final   05/09/2022 7.54 4.5 - 11.0 10*3/uL Final     RBC   Date Value Ref Range Status   08/15/2022 5.07 4.14 - 5.80 10*6/mm3 Final   05/09/2022 5.52 4.5 - 5.9 10*6/uL Final     Hemoglobin   Date Value Ref Range Status   08/15/2022 15.1 13.0 - 17.7 g/dL Final   05/09/2022 16.4 13.5 - 17.5 g/dL Final     Hematocrit   Date Value Ref Range Status   08/15/2022 46.2 37.5 - 51.0 % Final   05/09/2022 51.0 41.0 - 53.0 % Final     Platelets   Date Value Ref Range Status   08/15/2022 233 140 - 450 10*3/mm3 Final   05/09/2022 204 140 - 440 10*3/uL Final          Assessment & Plan           77-year-old male with medical issues including type 2 diabetes-uncertain control, COPD, hypertension, diastolic heart failure, chronic disease, peripheral vascular disease (follow-up with vascular surgery today), previous DVT on anticoagulation (home monitoring), previous IVC filter placement?,  Status post September 2021 partial small bowel obstruction secondary to sigmoid diverticulitis treated medically.        He had had a right lung base nodule noted on scan at that point and in follow-up with primary care had developed a left inguinal hernia with repair planned through a different general surgeon then seen with his initial sigmoid diverticulitis.  An additional CT scan of the chest done in follow-up of his previously abnormal study now revealed not only the previously noted right lower lobe and additional nodules but a spiculated nodule in the left lower lobe measuring 16 x 14 mm.                                 Follow-up PET/CT demonstrated a hypermetabolic spiculated lesion medial aspect the left lower lobe adjacent to descending thoracic aorta measuring 1.5 x 1.6 SUV 9.3 with an  enlarged hypermetabolic left hilar lymph node measured 1.5 x 1.3 with SUV of 12.1, additional nodules in the lateral aspect right lower lobe and micronodule in the posterior/medial right lower lobe and also additional right lower lobe micronodule.  There is an infrarenal abdominal aortic aneurysm measuring 3.4 cm with a short segment of chronic dissection present.    These clinical findings would be consistent with a possible T1b N1 M0-stage IIb lung cancer.   Recognizing a diagnosis has not been made his case was discussed in thoracic conference 7/20/2022.  Recommendations include proceeding to pulmonary assessment with EBUS and the patient undergoing a general assessment per his clinical status-older adult assessment as well as assessment by thoracic surgery.  These issues are discussed with the patient and his wife in detail when they are seen 7/28/2022.    The patient proceeded to undergo his hernia surgery 8/2/2022 by Dr. Dotson.  Additionally the patient was unable to undergo assessment by pulmonary until October and, thereafter, was scheduled to see Dr. Joe 8/18/2022 undergoing older adult functional assessment with a JAGUAR score of 11 indicating a risk of functional decline related to cancer therapy.    The patient is seen with his wife 8/15/2022 and doing very well with recent hernia surgery.  His performance status is reasonably good at present and we have learned now that he would not be able to see pulmonary medicine until October, thoracic surgery is scheduled this week with Dr. Joe.  Perhaps he could be a surgical candidate.  Particularly we know by van Mata that T p53 splice site mutation is present and would certainly be consistent as well the most common mutations found in lung cancers particularly squamous cancers.  We have discussed obtaining pulmonary functions at this point, thoracic surgery assessment this week and also restarting Chantix as possible for the  patient.    Plan:  *Schedule pulmonary function test with DLCO this week    *Discussed smoking cessation with Vannessa MARIANO- co-pay card for Chantix available, request also sent to [facility] pharmacy    *Thoracic surgery consultation 8/18/2022    *MD follow-up 3 to 4 weeks

## 2022-08-15 ENCOUNTER — OFFICE VISIT (OUTPATIENT)
Dept: ONCOLOGY | Facility: CLINIC | Age: 77
End: 2022-08-15

## 2022-08-15 ENCOUNTER — LAB (OUTPATIENT)
Dept: LAB | Facility: HOSPITAL | Age: 77
End: 2022-08-15

## 2022-08-15 VITALS
TEMPERATURE: 97.5 F | BODY MASS INDEX: 22.45 KG/M2 | HEART RATE: 75 BPM | RESPIRATION RATE: 18 BRPM | WEIGHT: 151.6 LBS | SYSTOLIC BLOOD PRESSURE: 156 MMHG | OXYGEN SATURATION: 95 % | DIASTOLIC BLOOD PRESSURE: 83 MMHG | HEIGHT: 69 IN

## 2022-08-15 DIAGNOSIS — R91.1 PULMONARY NODULE: ICD-10-CM

## 2022-08-15 DIAGNOSIS — R91.1 PULMONARY NODULE: Primary | ICD-10-CM

## 2022-08-15 LAB
BASOPHILS # BLD AUTO: 0.03 10*3/MM3 (ref 0–0.2)
BASOPHILS NFR BLD AUTO: 0.4 % (ref 0–1.5)
DEPRECATED RDW RBC AUTO: 49.5 FL (ref 37–54)
EOSINOPHIL # BLD AUTO: 0.12 10*3/MM3 (ref 0–0.4)
EOSINOPHIL NFR BLD AUTO: 1.4 % (ref 0.3–6.2)
ERYTHROCYTE [DISTWIDTH] IN BLOOD BY AUTOMATED COUNT: 14.8 % (ref 12.3–15.4)
HCT VFR BLD AUTO: 46.2 % (ref 37.5–51)
HGB BLD-MCNC: 15.1 G/DL (ref 13–17.7)
IMM GRANULOCYTES # BLD AUTO: 0.09 10*3/MM3 (ref 0–0.05)
IMM GRANULOCYTES NFR BLD AUTO: 1.1 % (ref 0–0.5)
LYMPHOCYTES # BLD AUTO: 2.27 10*3/MM3 (ref 0.7–3.1)
LYMPHOCYTES NFR BLD AUTO: 27 % (ref 19.6–45.3)
MCH RBC QN AUTO: 29.8 PG (ref 26.6–33)
MCHC RBC AUTO-ENTMCNC: 32.7 G/DL (ref 31.5–35.7)
MCV RBC AUTO: 91.1 FL (ref 79–97)
MONOCYTES # BLD AUTO: 0.62 10*3/MM3 (ref 0.1–0.9)
MONOCYTES NFR BLD AUTO: 7.4 % (ref 5–12)
NEUTROPHILS NFR BLD AUTO: 5.27 10*3/MM3 (ref 1.7–7)
NEUTROPHILS NFR BLD AUTO: 62.7 % (ref 42.7–76)
NRBC BLD AUTO-RTO: 0 /100 WBC (ref 0–0.2)
PLATELET # BLD AUTO: 233 10*3/MM3 (ref 140–450)
PMV BLD AUTO: 8.7 FL (ref 6–12)
RBC # BLD AUTO: 5.07 10*6/MM3 (ref 4.14–5.8)
WBC NRBC COR # BLD: 8.4 10*3/MM3 (ref 3.4–10.8)

## 2022-08-15 PROCEDURE — 36415 COLL VENOUS BLD VENIPUNCTURE: CPT

## 2022-08-15 PROCEDURE — 85025 COMPLETE CBC W/AUTO DIFF WBC: CPT

## 2022-08-15 PROCEDURE — 99214 OFFICE O/P EST MOD 30 MIN: CPT | Performed by: INTERNAL MEDICINE

## 2022-08-15 RX ORDER — SIMVASTATIN 20 MG
20 TABLET ORAL NIGHTLY
COMMUNITY
Start: 2022-08-10

## 2022-08-16 ENCOUNTER — TRANSCRIBE ORDERS (OUTPATIENT)
Dept: ADMINISTRATIVE | Facility: HOSPITAL | Age: 77
End: 2022-08-16

## 2022-08-16 ENCOUNTER — LAB (OUTPATIENT)
Dept: LAB | Facility: HOSPITAL | Age: 77
End: 2022-08-16

## 2022-08-16 ENCOUNTER — DOCUMENTATION (OUTPATIENT)
Dept: ONCOLOGY | Facility: CLINIC | Age: 77
End: 2022-08-16

## 2022-08-16 DIAGNOSIS — Z01.818 OTHER SPECIFIED PRE-OPERATIVE EXAMINATION: Primary | ICD-10-CM

## 2022-08-16 DIAGNOSIS — Z01.818 OTHER SPECIFIED PRE-OPERATIVE EXAMINATION: ICD-10-CM

## 2022-08-16 LAB — SARS-COV-2 ORF1AB RESP QL NAA+PROBE: NOT DETECTED

## 2022-08-16 PROCEDURE — U0004 COV-19 TEST NON-CDC HGH THRU: HCPCS

## 2022-08-16 PROCEDURE — U0005 INFEC AGEN DETEC AMPLI PROBE: HCPCS

## 2022-08-16 PROCEDURE — C9803 HOPD COVID-19 SPEC COLLECT: HCPCS

## 2022-08-16 NOTE — PROGRESS NOTES
Staff message rec from Dr Bishop yesterday asking to check on availability for Chantix. He states it is costly for pt. Pt did rec a Copay Card from [employer].    It does not look like he has any prescription coverage. GoodRx card can reduce the cost to $192.25 if he was to use [facility]. Not much of a saving with his local [facility] pharmacy ($359.01). Since he has no prescription coverage-we can assist with application through [employer] and ask for FREE Chantix.  I will call pt to discuss.     Above information provided to Dr Bishop.    Kayden Bishop MD sent to [facility] Oral Onc Pool; Clary Love.  Please assess availability of Chantix- costly to patient currently. CoPay card from [employer] provided thus far. Thanks, AMARA Love  Specialty Pharmacy Technician

## 2022-08-17 ENCOUNTER — HOSPITAL ENCOUNTER (OUTPATIENT)
Dept: RESPIRATORY THERAPY | Facility: HOSPITAL | Age: 77
Discharge: HOME | End: 2022-08-17
Admitting: INTERNAL MEDICINE

## 2022-08-17 ENCOUNTER — NURSE NAVIGATOR (OUTPATIENT)
Dept: ONCOLOGY | Facility: CLINIC | Age: 77
End: 2022-08-17

## 2022-08-17 DIAGNOSIS — R91.1 PULMONARY NODULE: ICD-10-CM

## 2022-08-17 PROCEDURE — 94010 BREATHING CAPACITY TEST: CPT

## 2022-08-17 PROCEDURE — 94729 DIFFUSING CAPACITY: CPT

## 2022-08-17 NOTE — PROGRESS NOTES
Referral received from Dr. Bishop.  I met Mr. España briefly last week during his visit with Vannessa Brothers at the cancer center (older adult assessment), introduced myself explained navigational services.  He was receptive to navigational services.  I called Mr. España 8/16/22 at his home and again introduced myself and explained navigational services.  He met with Dr. Bishop on 8/15, is having pulmonary function tests 8/17 and meets with Dr. Joe on 8/18 to discuss options, as he was unable to get an appointment with pulmonology until October.   I explained the purpose of PFTs and he verbalized understanding. Mr. España agreed for me to attend his appointment with Dr. Joe later this week.  Mr. España verbalizes an adequate support system.  He states that his best friend, Kate, is his caregiver. She previously worked at a physician office and reportedly has been helping Mr. España understand medical terms.  Mr. España states his son lives in [place] although he has two brothers that live in [place] who can provide support as needed.  Mr. España reports a 30 pound weight loss due to diverticulitis/bowel obstruction warranting an admission 9/2021. He gained 5-7 pounds back and states he is maintaining his weight between 150-155 pounds.  If cancer is confirmed and he pursues treatment, we can refer to N[place]/dietician as appropriate.  Mr. España states he has no financial or transportation concerns at this time. He reports having Medicare and an [place] supplement.  He has no resource needs or ongoing concerns at this time.  Mr. España stated that he has no emotional or support needs at this time.  Mr. España verbalized that he is doing well following his hernia repair, although still has a few restrictions for several more weeks.   Mr. España is a current smoker. Smoking cessation was discussed by Dr. Bishop during his 8/15 appointment and will be addressed as well in future appointments with  Vannessa.  Advanced care planning was discussed during his visit with Vannessa and he verbalizes the plan is still being finalized.  I will plan to meet Mr. sEpaña at his appointment with Dr. Joe tomorrow.  He has no current questions or concerns and has my contact information.  Acuity TBD until diagnosis confirmed/treatment plan established.

## 2022-08-18 ENCOUNTER — PREP FOR SURGERY (OUTPATIENT)
Dept: OTHER | Facility: HOSPITAL | Age: 77
End: 2022-08-18

## 2022-08-18 ENCOUNTER — OFFICE VISIT (OUTPATIENT)
Dept: SURGERY | Facility: CLINIC | Age: 77
End: 2022-08-18

## 2022-08-18 VITALS
DIASTOLIC BLOOD PRESSURE: 82 MMHG | WEIGHT: 152 LBS | HEART RATE: 74 BPM | HEIGHT: 70 IN | BODY MASS INDEX: 21.76 KG/M2 | OXYGEN SATURATION: 98 % | SYSTOLIC BLOOD PRESSURE: 128 MMHG

## 2022-08-18 DIAGNOSIS — R79.1 ABNORMAL COAGULATION PROFILE: ICD-10-CM

## 2022-08-18 DIAGNOSIS — R91.1 PULMONARY NODULE: Primary | ICD-10-CM

## 2022-08-18 PROCEDURE — 99204 OFFICE O/P NEW MOD 45 MIN: CPT | Performed by: THORACIC SURGERY (CARDIOTHORACIC VASCULAR SURGERY)

## 2022-08-18 RX ORDER — BUDESONIDE 0.5 MG/2ML
0.5 INHALANT ORAL 2 TIMES DAILY
COMMUNITY

## 2022-08-18 RX ORDER — ARFORMOTEROL TARTRATE 15 UG/2ML
15 SOLUTION RESPIRATORY (INHALATION)
COMMUNITY

## 2022-08-18 RX ORDER — CELECOXIB 200 MG/1
200 CAPSULE ORAL ONCE
Status: CANCELLED | OUTPATIENT
Start: 2022-09-02 | End: 2022-08-18

## 2022-08-18 RX ORDER — GABAPENTIN 300 MG/1
300 CAPSULE ORAL ONCE
Status: CANCELLED | OUTPATIENT
Start: 2022-09-02 | End: 2022-08-18

## 2022-08-18 RX ORDER — ACETAMINOPHEN 500 MG
1000 TABLET ORAL ONCE
Status: CANCELLED | OUTPATIENT
Start: 2022-09-02 | End: 2022-08-18

## 2022-08-18 RX ORDER — SODIUM CHLORIDE 0.9 % (FLUSH) 0.9 %
3-10 SYRINGE (ML) INJECTION AS NEEDED
Status: CANCELLED | OUTPATIENT
Start: 2022-09-02

## 2022-08-18 RX ORDER — SODIUM CHLORIDE 0.9 % (FLUSH) 0.9 %
3 SYRINGE (ML) INJECTION EVERY 12 HOURS SCHEDULED
Status: CANCELLED | OUTPATIENT
Start: 2022-09-02

## 2022-08-18 RX ORDER — CEFAZOLIN SODIUM 2 G/100ML
2 INJECTION, SOLUTION INTRAVENOUS ONCE
Status: CANCELLED | OUTPATIENT
Start: 2022-09-02 | End: 2022-08-18

## 2022-08-18 RX ORDER — HEPARIN SODIUM 5000 [USP'U]/ML
5000 INJECTION, SOLUTION INTRAVENOUS; SUBCUTANEOUS ONCE
Status: CANCELLED | OUTPATIENT
Start: 2022-09-02 | End: 2022-08-18

## 2022-08-18 NOTE — PROGRESS NOTES
Chief Complaint  Left lower lobe lung nodule    Subjective        Giovani España presents to [facility] THORACIC SURGERY  History of Present Illness     Mr España was seen in our office today for evaluation and treatment of the left lower lobe lung nodule with hilar adenopathy.  Mr. España is 77-year-old  male.  He has type 2 diabetes, COPD with chronic bronchitis, hypertension, diastolic heart failure, coronary artery disease, peripheral vascular disease, previous lower extremity DVT, history of IVC filter, and is on chronic anticoagulation.  Patient was hospitalized in September 2021 with small bowel obstruction.  CT scan at that time showed a 7 mm nodule in the base of the right lung.  Because of several medical issues follow-up was delayed until May 2022.  CT scan showed ill-defined density measuring 3 mm in the superior segment of the right lower lobe, multiple ill-defined 3 to 4 mm nodular densities in the right lower lobe a new spiculated nodule involving the left lower lobe measuring 16 x 14 mm with left hilar adenopathy.  This lesion was further characterized with CT PET scan which showed the lesion to be hypermetabolic with an SUV of 9.3.  The hilar lymph nodes showed hypermetabolism with an SUV of 12.1.  No other hypermetabolic activities were identified.  Patient was thought to have a clinical T1b N1 M0 stage IIb lung cancer.  He was referred to pulmonary medicine for EBUS biopsy.  There was a prolonged delay in getting this accomplished and a guardant 360 blood test was obtained which showed a TP53 splice site mutation to be present.  This is suspicious for squamous cell cancer.  Patient is also undergone an older adult functional assessment through our multidisciplinary thoracic oncology clinic.  His G8 score is 11 which indicates risk of functional decline related to cancer treatment.  Patient was found to be able to perform all instrumental activities of daily living  "independently.  His mini nutritional assessment score was 10 out of 14.  His Mini-Mental state examination was 30 out of 30.  His timed up and go for 3 m was 9.9 seconds with the goal being less than 12 seconds.    Patient relates that he can walk more than a block before getting short of breath.  Activity is somewhat limited due to claudication in his right leg.  Patient has difficulty with shortness of breath after 1 flight of steps.  He does his own yard work cutting his grass and gardening without any significant limitations.    Objective   Vital Signs:  /82 (BP Location: Right arm, Patient Position: Sitting, Cuff Size: Adult)   Pulse 74   Ht 177.8 cm (70\")   Wt 68.9 kg (152 lb)   SpO2 98%   BMI 21.81 kg/m²   Estimated body mass index is 21.81 kg/m² as calculated from the following:    Height as of this encounter: 177.8 cm (70\").    Weight as of this encounter: 68.9 kg (152 lb).    BMI is within normal parameters. No other follow-up for BMI required.      Physical Exam     Neck: There are no masses.  No cervical or supraclavicular adenopathy    Pulmonary: Scattered rhonchi with a few scattered rails.  Rhonchi clear with coughing.  No wheezes.    Cardiac: Regular rate and rhythm.  No murmurs or gallops.  Decreased pulses in the lower extremities from the knee down.  Trace edema in the right leg.    Abdomen: New left inguinal hernia scar which is mildly tender.  Incision is clean dry and healing well without signs of infection.  Abdomen otherwise is soft and nontender.  No masses.  Good bowel sounds.    Result Review :  The following data was reviewed by: Nicola Joe III, MD on 08/18/2022:    CT PET scan performed July 13, 2022 was reviewed.  15 by 16mm nodule in the medial basilar segment of the left lower lobe has a SUV of 9.3.  There is is enlarged left hilar lymph node measuring 15 x 13 mm with a maximum SUV of 12.1.  No other suspicious hilar or mediastinal adenopathy.  11 mm nodule in the " right lower lobe with several right lower lobe micronodules.  Infrarenal abdominal aortic aneurysm measuring 3.4 cm.            Pulmonary function test performed August 17, 2022 were independently reviewed.    FVC is 3.05 which is 75% predicted   FEV1 is 1.5 which is 50% of predicted   FEV1 FVC ratio is 49  DLCO is 11.16 which is 45% of predicted       Assessment and Plan   Diagnoses and all orders for this visit:    1. Pulmonary nodule (Primary)  -     Case Request      Mr. Alvarez's case was discussed in our multidisciplinary thoracic oncology conference.  Concern is that this represents a T1b N1 M0 stage IIb carcinoma of the left lower lobe of the lung.  Patient is not a good candidate for CT directed needle biopsy because of the location of the nodule.  Patient's pulmonary function tests are borderline.  Functional assessment is better than I would have expected.  If this is a cancer the patient would benefit from a left lower lobectomy.  I have recommended that we do a robot-assisted biopsy of the nodule in the nodes.  If they are malignant then we would proceed onto left lower lobectomy.  I have explained the procedure to the patient and his wife in detail.  We have discussed the risks and benefits.  Alternative forms of therapy and their prognosis were also discussed.  All of his questions were answered to his satisfaction.  Patient has requested that we proceed.    Will ask cardiology to evaluate the patient preoperatively.  If he is cleared from cardiac standpoint we will proceed with surgery.    Case request and preoperative orders have been placed in epic.  I will keep you informed of his progress.       I spent 59 minutes caring for Giovani on this date of service. This time includes time spent by me in the following activities:preparing for the visit, reviewing tests, obtaining and/or reviewing a separately obtained history, performing a medically appropriate examination and/or evaluation , counseling and  educating the patient/family/caregiver, ordering medications, tests, or procedures, referring and communicating with other health care professionals , documenting information in the medical record, independently interpreting results and communicating that information with the patient/family/caregiver and care coordination  Follow Up   Return for Return to office following surgery.  Patient was given instructions and counseling regarding his condition or for health maintenance advice. Please see specific information pulled into the AVS if appropriate.

## 2022-08-19 ENCOUNTER — NURSE NAVIGATOR (OUTPATIENT)
Dept: ONCOLOGY | Facility: CLINIC | Age: 77
End: 2022-08-19

## 2022-08-19 NOTE — PROGRESS NOTES
Call from Kate, friend/caregiver. Patient gave permission for navigator to speak with her. Answered questions she had about location of nodule to be addressed during surgery and referred her back to thoracic surgery to discuss his anticoagulant therapy adjustments surrounding surgery. Kate states they will likely push surgery back to 9/9 or 9/23 to make sure he has healed completely following hernia surgery. She has already discussed this with Dr. Joe's office. Kate reported placing calls to Oumar's surgeon regarding pain at his inguinal hernia repair site (waiting for call back) and his vascular physician (Dr. Cortez) to make sure he is cleared for the upcoming thoracic surgery (waiting for call back).     All questions answered and Kate has no additional questions at this time.  Will contact patient following surgery and encouraged Kate/Oumar to call as needed.

## 2022-08-22 ENCOUNTER — DOCUMENTATION (OUTPATIENT)
Dept: PHARMACY | Facility: HOSPITAL | Age: 77
End: 2022-08-22

## 2022-08-22 NOTE — PROGRESS NOTES
I was asked to determine the cost/availability of Chantix for pt.  Pt has no prescription insurance. Pt could get free drug from . However, when  was called, Chantix is on backorder. I was told to contact them again in a month to see if off backorder and they will process the free drug application if it is.   After contacting pt, Nicotine lozenge and Chantix are only products tried. Waiting on response from MD to determine what to do during the time that Chantix is on backorder.

## 2022-08-23 ENCOUNTER — TELEPHONE (OUTPATIENT)
Dept: ONCOLOGY | Facility: CLINIC | Age: 77
End: 2022-08-23

## 2022-08-23 NOTE — TELEPHONE ENCOUNTER
----- Message from Jeanie Peralta sent at 8/23/2022 12:05 PM EDT -----    ----- Message -----  From: Kayden Bishop MD  Sent: 8/23/2022  11:54 AM EDT  To: Jeanie Peralta    Understood, current issue is that he is likely proceeding to surgery.  I will recontact you once he has hopefully recovered.  AMARA Henry  ----- Message -----  From: Jeanie Peralta  Sent: 8/22/2022   1:01 PM EDT  To: Kayden Bishop MD, Pau Walton, RN, #    Dr Dario,  I called the pt as you asked:  The only thing he has tried to stop smoking are the following:  Nicotine lozenge-he couldn't stand this.  Chantix- only thing that worked.    He has not tried the nicotine patches or gum, or even Wellbutrin.  He's willing to try anything mentioned above or whatever else you might suggest and switch to Chantix when it becomes available.  I will be calling the  after I get back from my cruise (middle of September is when I get back) to determine if Chantix is being manufactured. I will still try to get free drug for him  Thanks  Jeanie.  ----- Message -----  From: Kayden Bishop MD  Sent: 8/21/2022   8:31 PM EDT  To: Jeanie Fabian    Could you ask him what he used before that was also successful.  I believe he was not successful with patches but we would be willing to try them again.  AMARA Henry  ----- Message -----  From: Jeanie Peralta  Sent: 8/19/2022  11:12 AM EDT  To: Kayden Bishop MD, Pau Walton, RN, #    Hello,  I called the [employer] Patient Assistance Program.   Unfortunately, the Chantix is on backorder at this time. Which means they can't process the free drug application at this time.   They suggest alternative therapies until the drug becomes available.   I will make myself a reminder in a few weeks to double check the status of Chantix and try to attempt the application process again.  In the mean time, do we try to change his therapy? What do you want me to tell the patient? I don't want to call him  until we have a game plan.  Thanks  Jeanie  ----- Message -----  From: Glenis Alvarez [facility]  Sent: 8/18/2022   9:23 AM EDT  To: Kayden Bishop MD, Clary Love, #    Thank you Clary!  ----- Message -----  From: Clary Love  Sent: 8/16/2022   9:03 AM EDT  To: Kayden Bishop MD, Jeanie Peralta, #    It does not look like he has any prescription coverage. GoodRx card can reduce the cost to $192.25 if he was to use [employer]. Not much of a saving with his local [facility] pharmacy ($359.01). Since he has no prescription coverage-we can assist with application through [employer] and ask for FREE Chantix.  I can call him to discuss.      ----- Message -----  From: Kayden Bishop MD  Sent: 8/15/2022  11:58 AM EDT  To: Clary Love, #    Please assess availability of Chantix- costly to patient currently. [employer] card from [employer] provided thus far. Thanks, AMARA

## 2022-08-26 ENCOUNTER — APPOINTMENT (OUTPATIENT)
Dept: PREADMISSION TESTING | Facility: HOSPITAL | Age: 77
End: 2022-08-26

## 2022-09-08 ENCOUNTER — OFFICE VISIT (OUTPATIENT)
Dept: OTHER | Facility: HOSPITAL | Age: 77
End: 2022-09-08

## 2022-09-08 VITALS
TEMPERATURE: 96.8 F | WEIGHT: 152.4 LBS | BODY MASS INDEX: 21.87 KG/M2 | SYSTOLIC BLOOD PRESSURE: 137 MMHG | HEART RATE: 67 BPM | OXYGEN SATURATION: 96 % | RESPIRATION RATE: 18 BRPM | DIASTOLIC BLOOD PRESSURE: 71 MMHG

## 2022-09-08 DIAGNOSIS — Z71.89 COUNSELING REGARDING ADVANCE CARE PLANNING AND GOALS OF CARE: ICD-10-CM

## 2022-09-08 DIAGNOSIS — F17.200 TOBACCO USE DISORDER: ICD-10-CM

## 2022-09-08 DIAGNOSIS — R91.1 NODULE OF LOWER LOBE OF LEFT LUNG: Primary | ICD-10-CM

## 2022-09-08 PROCEDURE — 99407 BEHAV CHNG SMOKING > 10 MIN: CPT | Performed by: NURSE PRACTITIONER

## 2022-09-08 PROCEDURE — 99215 OFFICE O/P EST HI 40 MIN: CPT | Performed by: NURSE PRACTITIONER

## 2022-09-08 RX ORDER — NICOTINE 21 MG/24HR
1 PATCH, TRANSDERMAL 24 HOURS TRANSDERMAL AS NEEDED
COMMUNITY
End: 2022-11-09 | Stop reason: SDDI

## 2022-09-08 NOTE — PROGRESS NOTES
[facility] MULTIDISCIPLINARY CLINIC  SURVIVORSHIP VISIT: IN CLINIC  Smoking Cessation and Older Adult Functional Assessment Follow-Up Visit        Giovani España is a pleasant 77 y.o. male  reviewed today in Multidisciplinary Clinic, for follow-up smoking cessation and older adult functional assessment visit.     Here today with friend Kate whom he has consented to participate in the discussion today.    HPI  Since our last visit the patient has completed further work-up he had been seen for evaluation by Dr. Joe.  His current plan is to proceed with left lower lobectomy and that is scheduled for 9/23/2022.  He has spoken with Dr. Bishop regarding smoking cessation and has lot them know Chantix has been helpful in the past but is currently not affordable.  The office has attempted to reach out to "[employer] to get the patient connected to the patient assistance program however the [employer] program has not yet resumed.  He is motivated to make a quit attempt and he feels like he is in the right frame of mind to do so.  He has gone from smoking about 40 cigarettes/day to less than 20 cigarettes/day by gradually tapering down.    SMOKING CESSATION HPI:    Patient importance of quitting smoking 5-6/10.   Rates confidence of being able to quit 6-7/10.   They are ready to quit smoking within the next 2-4 weeks.    Patient is willing to set a quit date  Patient identified motivating factors for quitting include current health status.   Patient identified potential support resources available including his best friend Kate. He also feels he is in a different frame of mind and feeling motivated to move towards a quit.   Patient anticipates future challenges/barriers including cost of chantix, which has worked for him in the past but unfortunately not affordable at this time.      Prior quit attempts: He has found chantix helpful in the past. Tried nicotine patches, not for a significant amount of time.  Has  tried nicotine lozenges but found them unpalatable.    Strongly encouraged patient to stop smoking. Explained that multiple attempts to quit are often needed before patients can achieve prolonged periods of time free from tobacco.,       Detailed Symptom Assessment  Symptom Distress  Pain Score: no pain   ESAS Tiredness Score: 5  ESAS Nausea Score: No nausea  ESAS Depression Score: No depression  ESAS Anxiety Score: No anxiety  ESAS Drowsiness Score: 4  ESAS Lack of Appetite Score: 3  ESAS Wellbeing Score: 2  ESAS Dyspnea Score: 4  ESAS Other Problem Score: 1  ESAS Source of Information: patient  Palliative Performance Scale Score: 80%      TREATMENT HISTORY:     Oncology/Hematology History    No history exists.       Past Medical History:   Diagnosis Date   • Arthritis    • Cancer (HCC)    • COPD (chronic obstructive pulmonary disease) (HCC)    • Diabetes mellitus (HCC)    • DVT, lower extremity (HCC)    • Elevated cholesterol    • H/O Cervical pain    • History of colitis    • Hyperlipidemia    • Hypertension    • Peripheral arterial disease (HCC)    • Right leg claudication (HCC)    • Skin cancer        Past Surgical History:   Procedure Laterality Date   • BALLOON ANGIOPLASTY, ARTERY Right 2015    IR Percutaneious IVC filter placement   • KNEE ARTHROSCOPY Left     Torn meniscus       Social History     Socioeconomic History   • Marital status:    • Years of education: 1 year college   Tobacco Use   • Smoking status: Current Every Day Smoker     Packs/day: 2.00     Years: 59.00     Pack years: 118.00     Types: Cigarettes     Start date: 1963   • Smokeless tobacco: Never Used   • Tobacco comment: 9/8/22: Down to Less than 1 PPD   Substance and Sexual Activity   • Alcohol use: Not Currently   • Drug use: Never   • Sexual activity: Defer         LDH   Date Value Ref Range Status   07/21/2022 195 99 - 259 U/L Final       Lab Results   Component Value Date    GLUCOSE 113 07/21/2022    BUN 14 07/21/2022     CREATININE 1.02 07/21/2022    EGFRIFNONA 78 09/18/2021    BCR 13.7 07/21/2022    K 5.0 (H) 07/21/2022    CO2 26.9 07/21/2022    CALCIUM 10.5 (H) 07/21/2022    PROTENTOTREF 7.4 07/21/2022    ALBUMIN 4.60 07/21/2022    ALBUMIN 3.7 07/21/2022    LABIL2 1.1 07/21/2022    AST 19 07/21/2022    ALT 18 07/21/2022       CBC w/diff    CBC w/Diff 5/9/22 7/21/22 8/15/22   WBC 7.54 8.09 8.40   RBC 5.52 5.17 5.07   Hemoglobin 16.4 15.6 15.1   Hematocrit 51.0 46.1 46.2   MCV 92.4 89.2 91.1   MCH 29.7 30.2 29.8   MCHC 32.2 33.8 32.7   RDW 15.6 15.3 14.8   Platelets 204 205 233   Neutrophil Rel % 68.4 64.6 62.7   Immature Granulocyte Rel % 0.3 0.5 1.1 (A)   Lymphocyte Rel % 21.2 22.5 27.0   Monocyte Rel % 9.2 10.3 7.4   Eosinophil Rel % 0.4 1.5 1.4   Basophil Rel % 0.5 0.6 0.4   (A) Abnormal value              Allergies as of 09/08/2022   • (No Known Allergies)       MEDICATIONS:  Medication list reviewed today    Review of Systems   Constitutional: Positive for appetite change and fatigue. Negative for activity change and unexpected weight change.   Respiratory: Positive for shortness of breath. Negative for chest tightness.    Gastrointestinal: Negative for constipation and diarrhea.   Genitourinary: Negative for difficulty urinating.   Musculoskeletal: Negative for arthralgias and myalgias.   Neurological: Negative for dizziness and light-headedness.   Psychiatric/Behavioral: Negative for decreased concentration, dysphoric mood and sleep disturbance. The patient is not nervous/anxious.        /71   Pulse 67   Temp 96.8 °F (36 °C) (Temporal)   Resp 18   Wt 69.1 kg (152 lb 6.4 oz)   SpO2 96% Comment: room air  BMI 21.87 kg/m²     Wt Readings from Last 3 Encounters:   09/08/22 69.1 kg (152 lb 6.4 oz)   08/18/22 68.9 kg (152 lb)   08/15/22 68.8 kg (151 lb 9.6 oz)       Pain Score    09/08/22 1122   PainSc: 0-No pain       PHQ-9 Total Score: 2   GAD7 Total Score: 0  Distress Score: 2        Physical Exam  Constitutional:        Appearance: Normal appearance. He is well-groomed.   HENT:      Head: Normocephalic and atraumatic.   Cardiovascular:      Rate and Rhythm: Normal rate and regular rhythm.   Pulmonary:      Effort: Pulmonary effort is normal.   Abdominal:      General: Abdomen is flat.   Musculoskeletal:      Right lower leg: No edema.      Left lower leg: No edema.   Skin:     General: Skin is warm and dry.      Nails: There is clubbing.   Neurological:      Mental Status: He is alert and oriented to person, place, and time.   Psychiatric:         Attention and Perception: Attention and perception normal.         Mood and Affect: Mood and affect normal.         Speech: Speech normal.         Behavior: Behavior normal. Behavior is cooperative.         Thought Content: Thought content normal.         Cognition and Memory: Cognition normal.         Judgment: Judgment normal.           Advance Care Planning     Patient does not have advance care planning complete, we did discuss his wishes at our last visit and these will remain unchanged per our discussion today.  The patient has designated his son Giovani España as his primary healthcare surrogate.    He has started completing his forms which are at home.  I did advise him that our conversations have been documented in the record which the team will be able to refer to if needed.  Advised we can finish completing this and anytime I setting an appointment however I did also inform the patient that the hospital chaplains are available to help with document completion as well while he is inpatient for surgery.  We will plan to follow-up postoperatively        DISCUSSION HELD TODAY:   GOALS OF CARE:  1. Get rid of the cancer    QUIT PLAN (STAR):  Set quit date: Practice quit plan before next follow-up, target quit date 9/23/2022    Tell friends/family: His friend Kate is supportive of his efforts    Anticipate challenges: We discussed exploring smoking triggers.  Discussed common  withdrawal symptoms and strategies to manage with typical cravings for smoking lasting 5 to 10 minutes before going away.    Remove tobacco from environment: He does not smoke inside his home nor in his Kate's vehicle.     Medication plan: We did discuss recent Surgeon General recommendations of 2020 suggesting people who smoke may benefit from dual nicotine replacement therapy utilizing a long acting nicotine replacement (nicotine transdermal patch) and in conjunction using a short-term nicotine replacement for breakthrough cravings (nicotine gum, nicotine lozenge, nicotine oral or nasal inhaler)    Unfortunately we were unable to assist him in reducing the financial barriers to Chantix.    Introduce the concept of combination nicotine replacement therapy with nicotine patch plus short acting nicotine replacement in combination.  I think this approach is better for someone who is willing to set a hard quit date.  As of now the patient will benefit from skills building strategies.     He has very briefly used the patch in the past and found it tolerable.  We reviewed proper use of patches today and discussed that 1  benefit of the nicotine patch is that I will provide a steady systemic nicotine levels to reduce withdrawal symptoms consistently throughout the day.  He would like to continue gradually tapering his smoking prior to surgery however he is willing to entertain a 24-hour practice quit with a nicotine patch.  Reviewed possible side effects of nicotine patch including local skin irritation, sleep disturbances (insomnia/vivid dreams), headache.  We discussed that the patches can be continued throughout the perioperative period and that my hope is for him to be discharged home wearing a patch to jumpstart smoking abstinence    I have advised starting dose of 21 mg nicotine transdermal patch.  For the practice quit date he will apply the patch the morning of the practice quit and I have instructed him to take  it off before bed at the end of the day.    Practical counseling: Reviewed worksheet today regarding exploring motivations for quitting and we began discussing behavioral strategies to begin implementing in the place of smoking behavior.    We discussed evidence-based approaches to quit smoking including options for pharmacotherapy, nicotine replacement therapy, and supportive counseling in group, individual or phone/web based settings. Discussed that counseling or medications used alone are effective but effectiveness is increased when both methods are used.  Advised that e-cigarettes and/or electronic cigarettes are not recommended for smoking cessation or as a safe alternative to smoking.    Plan and recommendations:  1. The patient will select 1 day for practice quit prior to next follow-up using 21 mg nicotine transdermal patch  2. Wishes for life-prolonging treatment and healthcare surrogate reviewed with patient and his friend today.  We will plan to follow-up regarding living will completion at next in person follow-up.  Alternately I have advised the hospital chaplains may be able to assist him while inpatient  3. I have encouraged him to participate in some physical activity daily leading up to surgery to maintain his current level of function  4. Plan to reassess nutrition status postop  5. We will follow-up by phone few days prior to his scheduled surgery to review successes and challenges of practice quit and discuss further strategies for cessation attempt upon discharge home  6. Patient will plan to use nicotine 21 mg patches throughout his hospital admission beginning 9/23/2022  7. Call my office as needed at [phone] for additional information, resources or support.        ICD-10-CM ICD-9-CM   1. Nodule of lower lobe of left lung  R91.1 793.11   2. Counseling regarding advance care planning and goals of care  Z71.89 V65.49   3. Tobacco use disorder  F17.200 305.1       No orders of the defined  types were placed in this encounter.      I spent 60 minutes caring for this patient on this date of service by face-to-face counseling. This time includes time spent by me in the following activities: preparing for the visit, reviewing tests, obtaining and/or reviewing a separately obtained history, performing a medically appropriate examination and/or evaluation, counseling and educating the patient/family/caregiver, referring and communicating with other health care professionals, documenting information in the medical record and care coordination     I spent 15 minutes on the separately reported service of smoking cessation. This time is not included in the time used to support the E/M service also reported today.

## 2022-09-10 NOTE — PROGRESS NOTES
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  He had been assessed particularly in the fall 2021 when he presented with nausea and vomiting and abdominal discomfort leading to assessments that revealed sigmoid diverticulitis with contained rupture and partial small bowel obstruction.  Records from mid September 21 indicating that he was admitted with partial small bowel obstruction and treated medically with very slow recovery.  He has history of COPD with CT demonstrating a pulmonary nodule in the right lung base at 7 mm with follow-up planned.  He was seen by general surgery in later September with repeat scans planned and repeat CT abdomen 10/7/2021 did demonstrate interval improvement of sigmoid diverticulitis.      Record indicates an assessment in late June 2022 at different general surgeon for left inguinal hernia, history of heavy tobacco use with COPD and recommendation for surgical repair of his hernia      The patient underwent a CT scan of the chest 5/7/2022 demonstrating diffuse emphysematous changes, ill-defined density measuring 3 mm in the superior segment of the right lower lobe, multiple ill-defined 3 to 4 mm nodular density thought to be inflammatory involving the right lower lobe and a new spiculated nodule involving the left lower lobe measuring 16 x 14 mm as well as left hilar adenopathy.       Follow-up PET/CT 7/13/2022 reveal a hypermetabolic spiculated lesion medial aspect the left lower lobe adjacent to descending thoracic aorta measuring 1.5 x 1.6 SUV 9.3 with an enlarged hypermetabolic left hilar lymph node measured 1.5 x 1.3 with SUV of 12.1, additional nodules in the lateral aspect  right lower lobe and micronodule in the posterior/medial right lower lobe and also additional right lower lobe micronodule.  There is an infrarenal abdominal aortic aneurysm measuring 3.4 cm with a short segment of chronic dissection present.    These clinical findings would be consistent with a possible T1b N1 M0-stage IIb lung cancer.      Recognizing a diagnosis has not been made his case was discussed in thoracic conference 7/20/2022.  Recommendations include proceeding to pulmonary assessment with EBUS and the patient undergoing a general assessment per his clinical status-older adult assessment as well as assessment by thoracic surgery.  These issues are discussed with the patient and his wife in detail when they are seen 7/28/2022.    The patient proceeded to undergo his hernia surgery 8/2/2022 by Dr. Dotson.  Additionally the patient was unable to undergo assessment by pulmonary until October and, thereafter, was scheduled to see Dr. Joe 8/18/2022 undergoing older adult functional assessment with a JAGUAR score of 11 indicating a risk of functional decline related to cancer therapy.    The patient is seen with his wife 8/15/2022 and doing very well with recent hernia surgery.  His performance status is reasonably good at present and we have learned now that he would not be able to see pulmonary medicine until October, thoracic surgery is scheduled this week with Dr. Joe.  Perhaps he could be a surgical candidate.  Particularly we know by [facility] that T p53 splice site mutation is present and would certainly be consistent as well the most common mutations found in lung cancers particularly squamous cancers.  We have discussed obtaining pulmonary functions at this point, thoracic surgery assessment this week and also restarting Chantix as possible for the patient.    There was difficulty obtaining Chantix and while this was being assessed the patient was reviewed 8/18 by thoracic surgery.  He was felt  to have a T1b N1 M0 stage IIb carcinoma left lower lobe along and not a candidate for biopsy.  PFTs were borderline, functional assessment was reasonably good and the patient was felt to be a candidate for robot-assisted biopsy of his nodes and if malignant proceed to left lower lobe lobectomy.  He was reviewed 9/8 and offered 21 mg nicotine transdermal patching.    He is next seen 9/10/2022.  He is seen with his wife and both are prepared for surgery 9/23/2022.  We discussed that additional therapy may be considered once we have more information about the type and stage.  We would hope to see him postoperatively.      Past Medical History:   Diagnosis Date   • Arthritis    • Cancer (HCC)    • COPD (chronic obstructive pulmonary disease) (HCC)    • Diabetes mellitus (HCC)    • DVT, lower extremity (HCC)    • Elevated cholesterol    • H/O Cervical pain    • History of colitis    • Hyperlipidemia    • Hypertension    • Peripheral arterial disease (HCC)    • Right leg claudication (HCC)    • Skin cancer         Past Surgical History:   Procedure Laterality Date   • BALLOON ANGIOPLASTY, ARTERY Right 2015    IR Percutaneious IVC filter placement   • KNEE ARTHROSCOPY Left     Torn meniscus        Current Outpatient Medications on File Prior to Visit   Medication Sig Dispense Refill   • arformoterol (BROVANA) 15 MCG/2ML nebulizer solution Take  by nebulization 2 (Two) Times a Day.     • budesonide (PULMICORT) 0.5 MG/2ML nebulizer solution Take 0.5 mg by nebulization Daily.     • cetirizine (ZyrTEC) 10 MG chewable tablet Chew 10 mg Daily.     • isosorbide mononitrate (IMDUR) 60 MG 24 hr tablet TAKE 1 TABLET BY MOUTH DAILY     • lisinopril (PRINIVIL,ZESTRIL) 10 MG tablet Take 10 mg by mouth Daily.     • nicotine (NICODERM CQ) 21 MG/24HR patch Place 1 patch on the skin as directed by provider Daily.     • simvastatin (ZOCOR) 20 MG tablet Take 20 mg by mouth Daily.     • warfarin (COUMADIN) 5 MG tablet Take 5 mg by mouth  "Daily. pt currently takes Coumadin 6 mg Orantes/W and 5 mg all other days.     • budesonide-formoterol (SYMBICORT) 160-4.5 MCG/ACT inhaler Inhale 2 puffs 2 (Two) Times a Day. 1 each 3   • warfarin (COUMADIN) 1 MG tablet Take 1 mg by mouth 2 (Two) Times a Week.       No current facility-administered medications on file prior to visit.        ALLERGIES:  No Known Allergies     Social History     Socioeconomic History   • Marital status:    • Years of education: 1 year college   Tobacco Use   • Smoking status: Current Every Day Smoker     Packs/day: 2.00     Years: 59.00     Pack years: 118.00     Types: Cigarettes     Start date: 1963   • Smokeless tobacco: Never Used   • Tobacco comment: 9/8/22: Down to Less than 1 PPD   Substance and Sexual Activity   • Alcohol use: Not Currently   • Drug use: Never   • Sexual activity: Defer        Family History   Problem Relation Age of Onset   • No Known Problems Mother    • Cancer Father    • Lung cancer Father    • Diabetes Brother    • Other Daughter         S/P stent, age 42   • No Known Problems Son         Review of Systems   Constitutional: Positive for activity change, fatigue and unexpected weight change (Status post his diverticulitis episode, weight has not been regained).   HENT: Negative.    Eyes: Negative.    Respiratory: Positive for shortness of breath. Negative for cough.    Cardiovascular: Negative.    Gastrointestinal: Negative.    Genitourinary: Negative.    Musculoskeletal: Negative.    Skin: Negative.    Neurological: Negative.    Psychiatric/Behavioral: Negative.         Objective     Vitals:    09/12/22 1007   BP: 156/78   Pulse: 57   Resp: 18   Temp: 97.1 °F (36.2 °C)   TempSrc: Temporal   SpO2: 94%   Weight: 69.2 kg (152 lb 8 oz)   Height: 177.8 cm (70\")   PainSc: 0-No pain     Current Status 9/12/2022   ECOG score 0       Physical Exam  Constitutional:       Appearance: Normal appearance.   HENT:      Head: Normocephalic and atraumatic.      Nose: " Nose normal.      Mouth/Throat:      Mouth: Mucous membranes are moist.      Pharynx: Oropharynx is clear.   Eyes:      Extraocular Movements: Extraocular movements intact.      Conjunctiva/sclera: Conjunctivae normal.      Pupils: Pupils are equal, round, and reactive to light.   Cardiovascular:      Rate and Rhythm: Normal rate and regular rhythm.      Pulses: Normal pulses.      Heart sounds: Normal heart sounds.   Pulmonary:      Comments: Prolonged expiratory phase bilaterally  Abdominal:      General: Bowel sounds are normal.      Palpations: Abdomen is soft.      Comments: Scaphoid   Musculoskeletal:         General: Normal range of motion.      Cervical back: Normal range of motion and neck supple.   Skin:     General: Skin is warm and dry.   Neurological:      General: No focal deficit present.      Mental Status: He is alert and oriented to person, place, and time.   Psychiatric:         Mood and Affect: Mood normal.           RECENT LABS:  Hematology WBC   Date Value Ref Range Status   09/12/2022 7.51 3.40 - 10.80 10*3/mm3 Final   05/09/2022 7.54 4.5 - 11.0 10*3/uL Final     RBC   Date Value Ref Range Status   09/12/2022 5.04 4.14 - 5.80 10*6/mm3 Final   05/09/2022 5.52 4.5 - 5.9 10*6/uL Final     Hemoglobin   Date Value Ref Range Status   09/12/2022 15.4 13.0 - 17.7 g/dL Final   05/09/2022 16.4 13.5 - 17.5 g/dL Final     Hematocrit   Date Value Ref Range Status   09/12/2022 45.8 37.5 - 51.0 % Final   05/09/2022 51.0 41.0 - 53.0 % Final     Platelets   Date Value Ref Range Status   09/12/2022 193 140 - 450 10*3/mm3 Final   05/09/2022 204 140 - 440 10*3/uL Final          Assessment & Plan           77-year-old male with medical issues including type 2 diabetes-uncertain control, COPD, hypertension, diastolic heart failure, chronic disease, peripheral vascular disease (follow-up with vascular surgery today), previous DVT on anticoagulation (home monitoring), previous IVC filter placement?,  Status post  September 2021 partial small bowel obstruction secondary to sigmoid diverticulitis treated medically.        He had had a right lung base nodule noted on scan at that point and in follow-up with primary care had developed a left inguinal hernia with repair planned through a different general surgeon then seen with his initial sigmoid diverticulitis.  An additional CT scan of the chest done in follow-up of his previously abnormal study now revealed not only the previously noted right lower lobe and additional nodules but a spiculated nodule in the left lower lobe measuring 16 x 14 mm.                                 Follow-up PET/CT demonstrated a hypermetabolic spiculated lesion medial aspect the left lower lobe adjacent to descending thoracic aorta measuring 1.5 x 1.6 SUV 9.3 with an enlarged hypermetabolic left hilar lymph node measured 1.5 x 1.3 with SUV of 12.1, additional nodules in the lateral aspect right lower lobe and micronodule in the posterior/medial right lower lobe and also additional right lower lobe micronodule.  There is an infrarenal abdominal aortic aneurysm measuring 3.4 cm with a short segment of chronic dissection present.    These clinical findings would be consistent with a possible T1b N1 M0-stage IIb lung cancer.   Recognizing a diagnosis has not been made his case was discussed in thoracic conference 7/20/2022.  Recommendations include proceeding to pulmonary assessment with EBUS and the patient undergoing a general assessment per his clinical status-older adult assessment as well as assessment by thoracic surgery.  These issues are discussed with the patient and his wife in detail when they are seen 7/28/2022.    The patient proceeded to undergo his hernia surgery 8/2/2022 by Dr. Dotson.  Additionally the patient was unable to undergo assessment by pulmonary until October and, thereafter, was scheduled to see Dr. Joe 8/18/2022 undergoing older adult functional assessment with a JAGUAR  score of 11 indicating a risk of functional decline related to cancer therapy.    The patient is seen with his wife 8/15/2022 and doing very well with recent hernia surgery.  His performance status is reasonably good at present and we have learned now that he would not be able to see pulmonary medicine until October, thoracic surgery is scheduled this week with Dr. Joe.  Perhaps he could be a surgical candidate.  Particularly we know by [employer] that T p53 splice site mutation is present and would certainly be consistent as well the most common mutations found in lung cancers particularly squamous cancers.  We have discussed obtaining pulmonary functions at this point, thoracic surgery assessment this week and also restarting Chantix as possible for the patient.    There was difficulty obtaining Chantix and while this was being assessed the patient was reviewed 8/18 by thoracic surgery.  He was felt to have a T1b N1 M0 stage IIb carcinoma left lower lobe along and not a candidate for biopsy.  PFTs were borderline, functional assessment was reasonably good and the patient was felt to be a candidate for robot-assisted biopsy of his nodes and if malignant proceed to left lower lobe lobectomy.  He was reviewed 9/8 and offered 21 mg nicotine transdermal patching.    He is next seen 9/10/2022.  He is seen with his wife and both are prepared for surgery 9/23/2022.  We discussed that additional therapy may be considered once we have more information about the type and stage.  We would hope to see him postoperatively.      Plan:  *Surgery now planned 9/23/2022    *MD follow-up 4 to 6 weeks to review findings

## 2022-09-12 ENCOUNTER — OFFICE VISIT (OUTPATIENT)
Dept: ONCOLOGY | Facility: CLINIC | Age: 77
End: 2022-09-12

## 2022-09-12 ENCOUNTER — DOCUMENTATION (OUTPATIENT)
Dept: ONCOLOGY | Facility: CLINIC | Age: 77
End: 2022-09-12

## 2022-09-12 ENCOUNTER — LAB (OUTPATIENT)
Dept: LAB | Facility: HOSPITAL | Age: 77
End: 2022-09-12

## 2022-09-12 VITALS
SYSTOLIC BLOOD PRESSURE: 156 MMHG | DIASTOLIC BLOOD PRESSURE: 78 MMHG | BODY MASS INDEX: 21.83 KG/M2 | OXYGEN SATURATION: 94 % | HEIGHT: 70 IN | TEMPERATURE: 97.1 F | RESPIRATION RATE: 18 BRPM | WEIGHT: 152.5 LBS | HEART RATE: 57 BPM

## 2022-09-12 DIAGNOSIS — D47.2 MGUS (MONOCLONAL GAMMOPATHY OF UNKNOWN SIGNIFICANCE): ICD-10-CM

## 2022-09-12 DIAGNOSIS — D47.2 MGUS (MONOCLONAL GAMMOPATHY OF UNKNOWN SIGNIFICANCE): Primary | ICD-10-CM

## 2022-09-12 DIAGNOSIS — R91.1 PULMONARY NODULE: ICD-10-CM

## 2022-09-12 LAB
BASOPHILS # BLD AUTO: 0.04 10*3/MM3 (ref 0–0.2)
BASOPHILS NFR BLD AUTO: 0.5 % (ref 0–1.5)
DEPRECATED RDW RBC AUTO: 49.2 FL (ref 37–54)
EOSINOPHIL # BLD AUTO: 0.14 10*3/MM3 (ref 0–0.4)
EOSINOPHIL NFR BLD AUTO: 1.9 % (ref 0.3–6.2)
ERYTHROCYTE [DISTWIDTH] IN BLOOD BY AUTOMATED COUNT: 14.8 % (ref 12.3–15.4)
HCT VFR BLD AUTO: 45.8 % (ref 37.5–51)
HGB BLD-MCNC: 15.4 G/DL (ref 13–17.7)
IMM GRANULOCYTES # BLD AUTO: 0.07 10*3/MM3 (ref 0–0.05)
IMM GRANULOCYTES NFR BLD AUTO: 0.9 % (ref 0–0.5)
LYMPHOCYTES # BLD AUTO: 2.36 10*3/MM3 (ref 0.7–3.1)
LYMPHOCYTES NFR BLD AUTO: 31.4 % (ref 19.6–45.3)
MCH RBC QN AUTO: 30.6 PG (ref 26.6–33)
MCHC RBC AUTO-ENTMCNC: 33.6 G/DL (ref 31.5–35.7)
MCV RBC AUTO: 90.9 FL (ref 79–97)
MONOCYTES # BLD AUTO: 0.79 10*3/MM3 (ref 0.1–0.9)
MONOCYTES NFR BLD AUTO: 10.5 % (ref 5–12)
NEUTROPHILS NFR BLD AUTO: 4.11 10*3/MM3 (ref 1.7–7)
NEUTROPHILS NFR BLD AUTO: 54.8 % (ref 42.7–76)
NRBC BLD AUTO-RTO: 0 /100 WBC (ref 0–0.2)
PLATELET # BLD AUTO: 193 10*3/MM3 (ref 140–450)
PMV BLD AUTO: 8.8 FL (ref 6–12)
RBC # BLD AUTO: 5.04 10*6/MM3 (ref 4.14–5.8)
WBC NRBC COR # BLD: 7.51 10*3/MM3 (ref 3.4–10.8)

## 2022-09-12 PROCEDURE — 99214 OFFICE O/P EST MOD 30 MIN: CPT | Performed by: INTERNAL MEDICINE

## 2022-09-12 PROCEDURE — 36415 COLL VENOUS BLD VENIPUNCTURE: CPT

## 2022-09-12 PROCEDURE — 85025 COMPLETE CBC W/AUTO DIFF WBC: CPT

## 2022-09-12 NOTE — PROGRESS NOTES
Called Dr. Cortez's office and s/w office . She took a message for Dr. Cortez's nurse to return call. In message, advised that pt needs more lovenox to be prescribed to him by Dr. Cortez and instructions on how to take it. She v/u.

## 2022-09-13 ENCOUNTER — TELEPHONE (OUTPATIENT)
Dept: ONCOLOGY | Facility: CLINIC | Age: 77
End: 2022-09-13

## 2022-09-13 NOTE — TELEPHONE ENCOUNTER
Received a return call from Dr. Diego Huizar's nurse. He states pt has all the Lovenox he needs for procedure. Nothing additional needed for our office.

## 2022-09-16 ENCOUNTER — PRE-ADMISSION TESTING (OUTPATIENT)
Dept: PREADMISSION TESTING | Facility: HOSPITAL | Age: 77
End: 2022-09-16

## 2022-09-16 VITALS
RESPIRATION RATE: 18 BRPM | WEIGHT: 153.2 LBS | BODY MASS INDEX: 21.93 KG/M2 | OXYGEN SATURATION: 96 % | HEART RATE: 68 BPM | TEMPERATURE: 98 F | SYSTOLIC BLOOD PRESSURE: 111 MMHG | DIASTOLIC BLOOD PRESSURE: 70 MMHG | HEIGHT: 70 IN

## 2022-09-16 DIAGNOSIS — R91.1 PULMONARY NODULE: ICD-10-CM

## 2022-09-16 LAB
ABO GROUP BLD: NORMAL
ANION GAP SERPL CALCULATED.3IONS-SCNC: 6 MMOL/L (ref 5–15)
BLD GP AB SCN SERPL QL: NEGATIVE
BUN SERPL-MCNC: 10 MG/DL (ref 8–23)
BUN/CREAT SERPL: 10.3 (ref 7–25)
CALCIUM SPEC-SCNC: 9.9 MG/DL (ref 8.6–10.5)
CHLORIDE SERPL-SCNC: 100 MMOL/L (ref 98–107)
CO2 SERPL-SCNC: 30 MMOL/L (ref 22–29)
CREAT SERPL-MCNC: 0.97 MG/DL (ref 0.76–1.27)
DEPRECATED RDW RBC AUTO: 49.7 FL (ref 37–54)
EGFRCR SERPLBLD CKD-EPI 2021: 81.4 ML/MIN/1.73
ERYTHROCYTE [DISTWIDTH] IN BLOOD BY AUTOMATED COUNT: 14.4 % (ref 12.3–15.4)
GLUCOSE SERPL-MCNC: 128 MG/DL (ref 65–99)
HCT VFR BLD AUTO: 47.9 % (ref 37.5–51)
HGB BLD-MCNC: 15.3 G/DL (ref 13–17.7)
INR PPP: 2.62 (ref 0.9–1.1)
MCH RBC QN AUTO: 30 PG (ref 26.6–33)
MCHC RBC AUTO-ENTMCNC: 31.9 G/DL (ref 31.5–35.7)
MCV RBC AUTO: 93.9 FL (ref 79–97)
PLATELET # BLD AUTO: 213 10*3/MM3 (ref 140–450)
PMV BLD AUTO: 9.3 FL (ref 6–12)
POTASSIUM SERPL-SCNC: 4.3 MMOL/L (ref 3.5–5.2)
PROTHROMBIN TIME: 27.3 SECONDS (ref 11.7–14.2)
RBC # BLD AUTO: 5.1 10*6/MM3 (ref 4.14–5.8)
RH BLD: POSITIVE
SODIUM SERPL-SCNC: 136 MMOL/L (ref 136–145)
T&S EXPIRATION DATE: NORMAL
WBC NRBC COR # BLD: 5.69 10*3/MM3 (ref 3.4–10.8)

## 2022-09-16 PROCEDURE — 80048 BASIC METABOLIC PNL TOTAL CA: CPT

## 2022-09-16 PROCEDURE — 85027 COMPLETE CBC AUTOMATED: CPT

## 2022-09-16 PROCEDURE — 86850 RBC ANTIBODY SCREEN: CPT

## 2022-09-16 PROCEDURE — 86900 BLOOD TYPING SEROLOGIC ABO: CPT

## 2022-09-16 PROCEDURE — 93005 ELECTROCARDIOGRAM TRACING: CPT

## 2022-09-16 PROCEDURE — 85610 PROTHROMBIN TIME: CPT

## 2022-09-16 PROCEDURE — 36415 COLL VENOUS BLD VENIPUNCTURE: CPT

## 2022-09-16 PROCEDURE — 93010 ELECTROCARDIOGRAM REPORT: CPT | Performed by: INTERNAL MEDICINE

## 2022-09-16 PROCEDURE — 86901 BLOOD TYPING SEROLOGIC RH(D): CPT

## 2022-09-16 RX ORDER — CHLORHEXIDINE GLUCONATE 500 MG/1
1 CLOTH TOPICAL TAKE AS DIRECTED
Status: ON HOLD | COMMUNITY
End: 2022-09-23

## 2022-09-16 RX ORDER — CETIRIZINE HYDROCHLORIDE 10 MG/1
10 TABLET ORAL DAILY
COMMUNITY

## 2022-09-16 RX ORDER — ENOXAPARIN SODIUM 100 MG/ML
60 INJECTION SUBCUTANEOUS EVERY 12 HOURS SCHEDULED
COMMUNITY
End: 2022-09-29 | Stop reason: HOSPADM

## 2022-09-16 NOTE — DISCHARGE INSTRUCTIONS
Take the following medications the morning of surgery:  NEBULIZER    Arrive to hospital on your day of surgery at  5:30 AM.    If you are on prescription narcotic pain medication to control your pain you may also take that medication the morning of surgery.    General Instructions:  Do not eat solid food after midnight the night before surgery.  You may drink clear liquids day of surgery but must stop at least one hour before your hospital arrival time.  It is beneficial for you to have a clear drink that contains carbohydrates the day of surgery.  We suggest a 12 to 20 ounce bottle of Gatorade or Powerade for non-diabetic patients or a 12 to 20 ounce bottle of G2 or Powerade Zero for diabetic patients. (Pediatric patients, are not advised to drink a 12 to 20 ounce carbohydrate drink)    Clear liquids are liquids you can see through.  Nothing red in color.     Plain water                               Sports drinks  Sodas                                   Gelatin (Jell-O)  Fruit juices without pulp such as white grape juice and apple juice  Popsicles that contain no fruit or yogurt  Tea or coffee (no cream or milk added)  Gatorade / Powerade  G2 / Powerade Zero    Infants may have breast milk up to four hours before surgery.  Infants drinking formula may drink formula up to six hours before surgery.   Patients who avoid smoking, chewing tobacco and alcohol for 4 weeks prior to surgery have a reduced risk of post-operative complications.  Quit smoking as many days before surgery as you can.  Do not smoke, use chewing tobacco or drink alcohol the day of surgery.   If applicable bring your C-PAP/ BI-PAP machine.  Bring any papers given to you in the doctor’s office.  Wear clean comfortable clothes.  Do not wear contact lenses, false eyelashes or make-up.  Bring a case for your glasses.   Bring crutches or walker if applicable.  Remove all piercings.  Leave jewelry and any other valuables at home.  Hair extensions with  metal clips must be removed prior to surgery.  The Pre-Admission Testing nurse will instruct you to bring medications if unable to obtain an accurate list in Pre-Admission Testing.        If you were given a blood bank ID arm band remember to bring it with you the day of surgery.    Preventing a Surgical Site Infection:  For 2 to 3 days before surgery, avoid shaving with a razor because the razor can irritate skin and make it easier to develop an infection.    Any areas of open skin can increase the risk of a post-operative wound infection by allowing bacteria to enter and travel throughout the body.  Notify your surgeon if you have any skin wounds / rashes even if it is not near the expected surgical site.  The area will need assessed to determine if surgery should be delayed until it is healed.  The night prior to surgery shower using a fresh bar of anti-bacterial soap (such as Dial) and clean washcloth.  Sleep in a clean bed with clean clothing.  Do not allow pets to sleep with you.  Shower on the morning of surgery using a fresh bar of anti-bacterial soap (such as Dial) and clean washcloth.  Dry with a clean towel and dress in clean clothing.  Ask your surgeon if you will be receiving antibiotics prior to surgery.  Make sure you, your family, and all healthcare providers clean their hands with soap and water or an alcohol based hand  before caring for you or your wound.    Day of surgery:  Your arrival time is approximately two hours before your scheduled surgery time.  Upon arrival, a Pre-op nurse and Anesthesiologist will review your health history, obtain vital signs, and answer questions you may have.  The only belongings needed at this time will be a list of your home medications and if applicable your C-PAP/BI-PAP machine.  A Pre-op nurse will start an IV and you may receive medication in preparation for surgery, including something to help you relax.     Please be aware that surgery does come  with discomfort.  We want to make every effort to control your discomfort so please discuss any uncontrolled symptoms with your nurse.   Your doctor will most likely have prescribed pain medications.      If you are going home after surgery you will receive individualized written care instructions before being discharged.  A responsible adult must drive you to and from the hospital on the day of your surgery and stay with you for 24 hours.  Discharge prescriptions can be filled by the hospital pharmacy during regular pharmacy hours.  If you are having surgery late in the day/evening your prescription may be e-prescribed to your pharmacy.  Please verify your pharmacy hours or chose a 24 hour pharmacy to avoid not having access to your prescription because your pharmacy has closed for the day.    If you are staying overnight following surgery, you will be transported to your hospital room following the recovery period.  [facility] has all private rooms.    If you have any questions please call Pre-Admission Testing at [phone].  Deductibles and co-payments are collected on the day of service. Please be prepared to pay the required co-pay, deductible or deposit on the day of service as defined by your plan.    Call your surgeon immediately if you experience any of the following symptoms:  Sore Throat  Shortness of Breath or difficulty breathing  Cough  Chills  Body soreness or muscle pain  Headache  Fever  New loss of taste or smell  Do not arrive for your surgery ill.  Your procedure will need to be rescheduled to another time.  You will need to call your physician before the day of surgery to avoid any unnecessary exposure to hospital staff as well as other patients.     CHLORHEXIDINE CLOTH INSTRUCTIONS  The morning of surgery follow these instructions using the Chlorhexidine cloths you've been given.  These steps reduce bacteria on the body.  Do not use the cloths near your eyes, ears mouth,  genitalia or on open wounds.  Throw the cloths away after use but do not try to flush them down a toilet.      Open and remove one cloth at a time from the package.    Leave the cloth unfolded and begin the bathing.  Massage the skin with the cloths using gentle pressure to remove bacteria.  Do not scrub harshly.   Follow the steps below with one 2% CHG cloth per area (6 total cloths).  One cloth for neck, shoulders and chest.  One cloth for both arms, hands, fingers and underarms (do underarms last).  One cloth for the abdomen followed by groin.  One cloth for right leg and foot including between the toes.  One cloth for left leg and foot including between the toes.  The last cloth is to be used for the back of the neck, back and buttocks.    Allow the CHG to air dry 3 minutes on the skin which will give it time to work and decrease the chance of irritation.  The skin may feel sticky until it is dry.  Do not rinse with water or any other liquid or you will lose the beneficial effects of the CHG.  If mild skin irritation occurs, do rinse the skin to remove the CHG.  Report this to the nurse at time of admission.  Do not apply lotions, creams, ointments, deodorants or perfumes after using the clothes. Dress in clean clothes before coming to the hospital.

## 2022-09-17 LAB — QT INTERVAL: 431 MS

## 2022-09-20 ENCOUNTER — OFFICE VISIT (OUTPATIENT)
Dept: OTHER | Facility: HOSPITAL | Age: 77
End: 2022-09-20

## 2022-09-20 DIAGNOSIS — F17.200 TOBACCO USE DISORDER: ICD-10-CM

## 2022-09-20 DIAGNOSIS — R91.1 NODULE OF LOWER LOBE OF LEFT LUNG: Primary | ICD-10-CM

## 2022-09-20 PROCEDURE — 99406 BEHAV CHNG SMOKING 3-10 MIN: CPT | Performed by: NURSE PRACTITIONER

## 2022-09-20 PROCEDURE — 99443 PR PHYS/QHP TELEPHONE EVALUATION 21-30 MIN: CPT | Performed by: NURSE PRACTITIONER

## 2022-09-20 NOTE — PROGRESS NOTES
[facility] MULTIDISCIPLINARY CLINIC  SURVIVORSHIP VISIT: TELEHEALTH - PHONE  Smoking Cessation and Older Adult Functional Assessment Follow-Up Visit        Giovani España is a pleasant 77 y.o. male reviewed today by PHONE, for follow-up smoking cessation and older adult functional assessment visit.   You have chosen to receive care through a telephone visit. Do you consent to use a telephone visit for your medical care today? Yes      HPI  Patient currently scheduled for thoracic surgery on 9/23/2022.  He has been working on using the nicotine transdermal patches.  The first night he slept with the patch on he did experience some nightmares.  He did not use the patch the next night but then resumed again the day after and was removing the patch at bedtime finding that this alleviated the sleep interruptions with nightmares.  Unfortunately the patch alone is not sufficient to control urges to smoke.  He is continuing to wear the patch however and working on decreasing daily intake of cigarettes.      TREATMENT HISTORY:     Oncology/Hematology History    No history exists.       Past Medical History:   Diagnosis Date   • Arthritis    • COPD (chronic obstructive pulmonary disease) (HCC)     O2 3L AT HS   • Diabetes mellitus (HCC)     DIET CONTROL   • Diverticulitis    • DVT, lower extremity (HCC)     RLE   • Elevated cholesterol    • H/O Cervical pain    • History of colitis    • Hyperlipidemia    • Hypertension    • Left shoulder pain    • Low back pain    • Lung cancer (HCC) 2022    LEFT LUNG   • On anticoagulant therapy    • Peripheral arterial disease (HCC)    • Right leg claudication (HCC)    • Skin cancer     HX       Past Surgical History:   Procedure Laterality Date   • BALLOON ANGIOPLASTY, ARTERY Right 2015    IR Percutaneious IVC filter placement   • INGUINAL HERNIA REPAIR Left    • KNEE ARTHROSCOPY Left     Torn meniscus       Social History     Socioeconomic History   • Marital status:     • Years of education: 1 year college   Tobacco Use   • Smoking status: Current Every Day Smoker     Packs/day: 2.00     Years: 59.00     Pack years: 118.00     Types: Cigarettes     Start date: 1963   • Smokeless tobacco: Never Used   • Tobacco comment: 9/8/22: Down to Less than 1 PPD   Vaping Use   • Vaping Use: Never used   Substance and Sexual Activity   • Alcohol use: Not Currently   • Drug use: Never   • Sexual activity: Defer         LDH   Date Value Ref Range Status   07/21/2022 195 99 - 259 U/L Final       Lab Results   Component Value Date    GLUCOSE 128 (H) 09/16/2022    BUN 10 09/16/2022    CREATININE 0.97 09/16/2022    EGFRIFNONA 78 09/18/2021    BCR 10.3 09/16/2022    K 4.3 09/16/2022    CO2 30.0 (H) 09/16/2022    CALCIUM 9.9 09/16/2022    PROTENTOTREF 7.4 07/21/2022    ALBUMIN 4.60 07/21/2022    ALBUMIN 3.7 07/21/2022    LABIL2 1.1 07/21/2022    AST 19 07/21/2022    ALT 18 07/21/2022       CBC w/diff    CBC w/Diff 8/15/22 9/12/22 9/16/22   WBC 8.40 7.51 5.69   RBC 5.07 5.04 5.10   Hemoglobin 15.1 15.4 15.3   Hematocrit 46.2 45.8 47.9   MCV 91.1 90.9 93.9   MCH 29.8 30.6 30.0   MCHC 32.7 33.6 31.9   RDW 14.8 14.8 14.4   Platelets 233 193 213   Neutrophil Rel % 62.7 54.8    Immature Granulocyte Rel % 1.1 (A) 0.9 (A)    Lymphocyte Rel % 27.0 31.4    Monocyte Rel % 7.4 10.5    Eosinophil Rel % 1.4 1.9    Basophil Rel % 0.4 0.5    (A) Abnormal value              Allergies as of 09/20/2022   • (No Known Allergies)       MEDICATIONS:  Medication list reviewed today    Review of Systems   Respiratory: Negative.    Gastrointestinal: Negative.    Skin:        Itch at nicotine patch application site   Psychiatric/Behavioral: Positive for sleep disturbance. Negative for dysphoric mood. The patient is not nervous/anxious.        There were no vitals taken for this visit.    Wt Readings from Last 3 Encounters:   09/16/22 69.5 kg (153 lb 3.2 oz)   09/12/22 69.2 kg (152 lb 8 oz)   09/08/22 69.1 kg (152 lb  6.4 oz)       Pain Score    09/20/22 1248   PainSc: 0-No pain       Physical Exam  Pulmonary:      Effort: Pulmonary effort is normal.   Neurological:      Mental Status: He is alert and oriented to person, place, and time.   Psychiatric:         Attention and Perception: Attention and perception normal.         Mood and Affect: Mood and affect normal.         Speech: Speech normal.         Behavior: Behavior normal. Behavior is cooperative.         Thought Content: Thought content normal.         Cognition and Memory: Cognition normal.         Judgment: Judgment normal.           DISCUSSION HELD TODAY:     QUIT PLAN (STAR):  Set quit date: Target quit date 9/23/2022 to coincide with surgery    Tell friends/family: His friend Yoselin is supportive of his efforts as well as his daughter    Anticipate challenges: He is noticing his biggest trigger is his morning cup of coffee.  We discussed some strategies for managing this with initial focus being optimizing nicotine replacement therapy with a plan for a piece of nicotine gum first thing in the morning prior to coffee.    Remove tobacco from environment: He does not smoke inside his home nor in his vehicle    Medication plan:   He was unable to obtain Chantix due to financial barriers.  He did purchase some 21 mg nicotine patches and has been working on further reduction of cigarettes wearing the patches.  We discussed removing the patch at bedtime is perfectly acceptable to avoid unpleasant dreams and sleep disturbances.  Advised patient to keep the next days patch at his bedside and apply immediately upon waking the next morning.  Advised it will take approximately an hour for him to reach therapeutic levels of nicotine and that I would like him to chew a piece of 4 mg nicotine gum as soon as he applies his patch for the day and prior to coffee.  Instructed okay to use nicotine gum every 2 hours as needed throughout the day for breakthrough cravings and continue to  "monitor her urges to smoke.     Advised that any n.p.o. instructions he may receive perioperatively apply to nicotine gum as well.      Reviewed \"chew and park\" technique for proper use of nicotine gum. Reviewed potential side effects of nicotine gum including mouth/jaw soreness, dyspepsia, hiccups and hypersalivation. Reviewed side effects typically associated with incorrect chewing technique including lightheadedness, nausea/vomiting and throat/mouth irritation.    He is noting some itching at the nicotine patch application site and I told him he can use a hydrocortisone cream as needed for this however if skin blistering or breakdown should occur I would like him to discontinue the patch and call me - often can be resolved with selection of alternate  or transdermal patch product    Practical counseling: He will continue working on monitoring cravings, delay and distract techniques.          Plan and recommendations:  1. I have asked him to call me at any point to discuss  any new side effects or urges to smoke uncontrolled  2. He is noting some itching at the nicotine patch application site and I told him he can use a hydrocortisone cream as needed for this however if skin blistering or breakdown should occur I would like him to discontinue the patch and call me  3. I have recommended that he continue his nicotine replacement therapy as appropriate throughout his perioperative period  4. We will plan to reassess nutrition status postop  5. Encouraged to continue engaging in physical activity daily leading up to surgery  6. We have arranged follow-up for the end of October at which point he will have met with medical oncology to review pathology and discuss any further treatment recommendations  7. Call my office as needed at [phone] for additional information, resources or support.        ICD-10-CM ICD-9-CM   1. Nodule of lower lobe of left lung  R91.1 793.11   2. Tobacco use disorder  F17.200 " 305.1       No orders of the defined types were placed in this encounter.      I spent 30 minutes caring for this patient on this date of service by telephone. This time includes time spent by me in the following activities: preparing for the visit, reviewing tests, obtaining and/or reviewing a separately obtained history, performing a medically appropriate examination and/or evaluation, counseling and educating the patient/family/caregiver, referring and communicating with other health care professionals, documenting information in the medical record and care coordination     I spent 8 minutes on the separately reported service of tobacco treatment including evaluation of medication efficacy and side effects, behavioral and cognitive modifications, preparation for anticipated quit date. This time is not included in the time used to support the E/M service also reported today.

## 2022-09-23 ENCOUNTER — APPOINTMENT (OUTPATIENT)
Dept: GENERAL RADIOLOGY | Facility: HOSPITAL | Age: 77
End: 2022-09-23

## 2022-09-23 ENCOUNTER — ANESTHESIA EVENT (OUTPATIENT)
Dept: PERIOP | Facility: HOSPITAL | Age: 77
End: 2022-09-23

## 2022-09-23 ENCOUNTER — HOSPITAL ENCOUNTER (INPATIENT)
Facility: HOSPITAL | Age: 77
LOS: 6 days | Discharge: HOME | End: 2022-09-29
Attending: THORACIC SURGERY (CARDIOTHORACIC VASCULAR SURGERY) | Admitting: THORACIC SURGERY (CARDIOTHORACIC VASCULAR SURGERY)

## 2022-09-23 ENCOUNTER — ANESTHESIA (OUTPATIENT)
Dept: PERIOP | Facility: HOSPITAL | Age: 77
End: 2022-09-23

## 2022-09-23 DIAGNOSIS — R91.1 PULMONARY NODULE: ICD-10-CM

## 2022-09-23 DIAGNOSIS — R91.1 NODULE OF LOWER LOBE OF LEFT LUNG: ICD-10-CM

## 2022-09-23 DIAGNOSIS — F17.200 TOBACCO USE DISORDER: Primary | ICD-10-CM

## 2022-09-23 LAB
GLUCOSE BLDC GLUCOMTR-MCNC: 151 MG/DL (ref 70–130)
GLUCOSE BLDC GLUCOMTR-MCNC: 156 MG/DL (ref 70–130)
GLUCOSE BLDC GLUCOMTR-MCNC: 99 MG/DL (ref 70–130)
INR PPP: 1.1 (ref 0.9–1.1)
PROTHROMBIN TIME: 14 SECONDS (ref 11.7–14.2)

## 2022-09-23 PROCEDURE — 82803 BLOOD GASES ANY COMBINATION: CPT

## 2022-09-23 PROCEDURE — 25010000002 NEOSTIGMINE 5 MG/10ML SOLUTION: Performed by: NURSE ANESTHETIST, CERTIFIED REGISTERED

## 2022-09-23 PROCEDURE — 85610 PROTHROMBIN TIME: CPT | Performed by: THORACIC SURGERY (CARDIOTHORACIC VASCULAR SURGERY)

## 2022-09-23 PROCEDURE — 25010000002 HEPARIN (PORCINE) PER 1000 UNITS: Performed by: THORACIC SURGERY (CARDIOTHORACIC VASCULAR SURGERY)

## 2022-09-23 PROCEDURE — 32674 THORACOSCOPY LYMPH NODE EXC: CPT | Performed by: REGISTERED NURSE

## 2022-09-23 PROCEDURE — C1729 CATH, DRAINAGE: HCPCS | Performed by: THORACIC SURGERY (CARDIOTHORACIC VASCULAR SURGERY)

## 2022-09-23 PROCEDURE — 32663 THORACOSCOPY W/LOBECTOMY: CPT | Performed by: REGISTERED NURSE

## 2022-09-23 PROCEDURE — 25010000002 PHENYLEPHRINE 10 MG/ML SOLUTION 5 ML VIAL: Performed by: NURSE ANESTHETIST, CERTIFIED REGISTERED

## 2022-09-23 PROCEDURE — 0BNL4ZZ RELEASE LEFT LUNG, PERCUTANEOUS ENDOSCOPIC APPROACH: ICD-10-PCS | Performed by: THORACIC SURGERY (CARDIOTHORACIC VASCULAR SURGERY)

## 2022-09-23 PROCEDURE — 0BJ08ZZ INSPECTION OF TRACHEOBRONCHIAL TREE, VIA NATURAL OR ARTIFICIAL OPENING ENDOSCOPIC: ICD-10-PCS | Performed by: THORACIC SURGERY (CARDIOTHORACIC VASCULAR SURGERY)

## 2022-09-23 PROCEDURE — 07B74ZX EXCISION OF THORAX LYMPHATIC, PERCUTANEOUS ENDOSCOPIC APPROACH, DIAGNOSTIC: ICD-10-PCS | Performed by: THORACIC SURGERY (CARDIOTHORACIC VASCULAR SURGERY)

## 2022-09-23 PROCEDURE — S0260 H&P FOR SURGERY: HCPCS | Performed by: THORACIC SURGERY (CARDIOTHORACIC VASCULAR SURGERY)

## 2022-09-23 PROCEDURE — 88360 TUMOR IMMUNOHISTOCHEM/MANUAL: CPT | Performed by: THORACIC SURGERY (CARDIOTHORACIC VASCULAR SURGERY)

## 2022-09-23 PROCEDURE — 8E0W4CZ ROBOTIC ASSISTED PROCEDURE OF TRUNK REGION, PERCUTANEOUS ENDOSCOPIC APPROACH: ICD-10-PCS | Performed by: THORACIC SURGERY (CARDIOTHORACIC VASCULAR SURGERY)

## 2022-09-23 PROCEDURE — 25010000002 PROPOFOL 10 MG/ML EMULSION: Performed by: NURSE ANESTHETIST, CERTIFIED REGISTERED

## 2022-09-23 PROCEDURE — 25010000002 FENTANYL CITRATE (PF) 50 MCG/ML SOLUTION: Performed by: ANESTHESIOLOGY

## 2022-09-23 PROCEDURE — 94640 AIRWAY INHALATION TREATMENT: CPT

## 2022-09-23 PROCEDURE — 25010000002 ALBUMIN HUMAN 5% PER 50 ML: Performed by: NURSE ANESTHETIST, CERTIFIED REGISTERED

## 2022-09-23 PROCEDURE — 85018 HEMOGLOBIN: CPT

## 2022-09-23 PROCEDURE — 76942 ECHO GUIDE FOR BIOPSY: CPT | Performed by: THORACIC SURGERY (CARDIOTHORACIC VASCULAR SURGERY)

## 2022-09-23 PROCEDURE — 85014 HEMATOCRIT: CPT

## 2022-09-23 PROCEDURE — 82962 GLUCOSE BLOOD TEST: CPT

## 2022-09-23 PROCEDURE — 0BBJ4ZZ EXCISION OF LEFT LOWER LUNG LOBE, PERCUTANEOUS ENDOSCOPIC APPROACH: ICD-10-PCS | Performed by: THORACIC SURGERY (CARDIOTHORACIC VASCULAR SURGERY)

## 2022-09-23 PROCEDURE — 25010000002 CEFAZOLIN IN DEXTROSE 2-4 GM/100ML-% SOLUTION: Performed by: NURSE ANESTHETIST, CERTIFIED REGISTERED

## 2022-09-23 PROCEDURE — 94799 UNLISTED PULMONARY SVC/PX: CPT

## 2022-09-23 PROCEDURE — 0 BUPIVACAINE LIPOSOME 1.3 % SUSPENSION: Performed by: ANESTHESIOLOGY

## 2022-09-23 PROCEDURE — 32651 THORACOSCOPY REMOVE CORTEX: CPT | Performed by: THORACIC SURGERY (CARDIOTHORACIC VASCULAR SURGERY)

## 2022-09-23 PROCEDURE — C9290 INJ, BUPIVACAINE LIPOSOME: HCPCS | Performed by: ANESTHESIOLOGY

## 2022-09-23 PROCEDURE — 88331 PATH CONSLTJ SURG 1 BLK 1SPC: CPT | Performed by: THORACIC SURGERY (CARDIOTHORACIC VASCULAR SURGERY)

## 2022-09-23 PROCEDURE — 32651 THORACOSCOPY REMOVE CORTEX: CPT | Performed by: REGISTERED NURSE

## 2022-09-23 PROCEDURE — 25010000002 KETOROLAC TROMETHAMINE PER 15 MG: Performed by: THORACIC SURGERY (CARDIOTHORACIC VASCULAR SURGERY)

## 2022-09-23 PROCEDURE — 88309 TISSUE EXAM BY PATHOLOGIST: CPT | Performed by: THORACIC SURGERY (CARDIOTHORACIC VASCULAR SURGERY)

## 2022-09-23 PROCEDURE — P9041 ALBUMIN (HUMAN),5%, 50ML: HCPCS | Performed by: NURSE ANESTHETIST, CERTIFIED REGISTERED

## 2022-09-23 PROCEDURE — 88341 IMHCHEM/IMCYTCHM EA ADD ANTB: CPT | Performed by: THORACIC SURGERY (CARDIOTHORACIC VASCULAR SURGERY)

## 2022-09-23 PROCEDURE — 88342 IMHCHEM/IMCYTCHM 1ST ANTB: CPT | Performed by: THORACIC SURGERY (CARDIOTHORACIC VASCULAR SURGERY)

## 2022-09-23 PROCEDURE — 25010000002 CEFAZOLIN IN DEXTROSE 2-4 GM/100ML-% SOLUTION: Performed by: THORACIC SURGERY (CARDIOTHORACIC VASCULAR SURGERY)

## 2022-09-23 PROCEDURE — 25010000002 DEXAMETHASONE SODIUM PHOSPHATE 20 MG/5ML SOLUTION: Performed by: NURSE ANESTHETIST, CERTIFIED REGISTERED

## 2022-09-23 PROCEDURE — 32663 THORACOSCOPY W/LOBECTOMY: CPT | Performed by: THORACIC SURGERY (CARDIOTHORACIC VASCULAR SURGERY)

## 2022-09-23 PROCEDURE — 32674 THORACOSCOPY LYMPH NODE EXC: CPT | Performed by: THORACIC SURGERY (CARDIOTHORACIC VASCULAR SURGERY)

## 2022-09-23 PROCEDURE — 88305 TISSUE EXAM BY PATHOLOGIST: CPT | Performed by: THORACIC SURGERY (CARDIOTHORACIC VASCULAR SURGERY)

## 2022-09-23 PROCEDURE — 71045 X-RAY EXAM CHEST 1 VIEW: CPT

## 2022-09-23 PROCEDURE — 82947 ASSAY GLUCOSE BLOOD QUANT: CPT

## 2022-09-23 PROCEDURE — 25010000002 MAGNESIUM SULFATE PER 500 MG OF MAGNESIUM: Performed by: NURSE ANESTHETIST, CERTIFIED REGISTERED

## 2022-09-23 PROCEDURE — 25010000002 ONDANSETRON PER 1 MG: Performed by: NURSE ANESTHETIST, CERTIFIED REGISTERED

## 2022-09-23 DEVICE — STAPLER 30 RELOAD
Type: IMPLANTABLE DEVICE | Site: FLANK | Status: FUNCTIONAL
Brand: ENDOWRIST

## 2022-09-23 DEVICE — SUREFORM 45 RELOAD BLACK
Type: IMPLANTABLE DEVICE | Site: FLANK | Status: FUNCTIONAL
Brand: SUREFORM

## 2022-09-23 DEVICE — STAPLER 30 RELOAD WHITE
Type: IMPLANTABLE DEVICE | Site: FLANK | Status: FUNCTIONAL
Brand: ENDOWRIST

## 2022-09-23 DEVICE — CLIP LIGAT VASC HORIZON TI SM/WD RD 6CT: Type: IMPLANTABLE DEVICE | Site: FLANK | Status: FUNCTIONAL

## 2022-09-23 DEVICE — SUREFORM 45 RELOAD GREEN
Type: IMPLANTABLE DEVICE | Site: FLANK | Status: FUNCTIONAL
Brand: SUREFORM

## 2022-09-23 DEVICE — HEMOLOK ML 6 CLIPS/CART
Type: IMPLANTABLE DEVICE | Site: FLANK | Status: FUNCTIONAL
Brand: WECK

## 2022-09-23 RX ORDER — KETOROLAC TROMETHAMINE 30 MG/ML
15 INJECTION, SOLUTION INTRAMUSCULAR; INTRAVENOUS EVERY 6 HOURS
Status: COMPLETED | OUTPATIENT
Start: 2022-09-23 | End: 2022-09-24

## 2022-09-23 RX ORDER — CEFAZOLIN SODIUM 2 G/100ML
INJECTION, SOLUTION INTRAVENOUS AS NEEDED
Status: DISCONTINUED | OUTPATIENT
Start: 2022-09-23 | End: 2022-09-23 | Stop reason: SURG

## 2022-09-23 RX ORDER — HEPARIN SODIUM 5000 [USP'U]/ML
5000 INJECTION, SOLUTION INTRAVENOUS; SUBCUTANEOUS ONCE
Status: COMPLETED | OUTPATIENT
Start: 2022-09-23 | End: 2022-09-23

## 2022-09-23 RX ORDER — ISOSORBIDE MONONITRATE 60 MG/1
60 TABLET, EXTENDED RELEASE ORAL EVERY EVENING
Status: DISCONTINUED | OUTPATIENT
Start: 2022-09-23 | End: 2022-09-29 | Stop reason: HOSPADM

## 2022-09-23 RX ORDER — NALOXONE HCL 0.4 MG/ML
0.4 VIAL (ML) INJECTION
Status: DISCONTINUED | OUTPATIENT
Start: 2022-09-23 | End: 2022-09-23 | Stop reason: HOSPADM

## 2022-09-23 RX ORDER — ATORVASTATIN CALCIUM 20 MG/1
10 TABLET, FILM COATED ORAL DAILY
Status: DISCONTINUED | OUTPATIENT
Start: 2022-09-23 | End: 2022-09-29 | Stop reason: HOSPADM

## 2022-09-23 RX ORDER — OXYCODONE HYDROCHLORIDE 5 MG/1
5 TABLET ORAL EVERY 4 HOURS PRN
Status: DISCONTINUED | OUTPATIENT
Start: 2022-09-23 | End: 2022-09-29 | Stop reason: HOSPADM

## 2022-09-23 RX ORDER — KETAMINE HCL IN NACL, ISO-OSM 100MG/10ML
SYRINGE (ML) INJECTION AS NEEDED
Status: DISCONTINUED | OUTPATIENT
Start: 2022-09-23 | End: 2022-09-23 | Stop reason: SURG

## 2022-09-23 RX ORDER — ALBUMIN, HUMAN INJ 5% 5 %
SOLUTION INTRAVENOUS CONTINUOUS PRN
Status: DISCONTINUED | OUTPATIENT
Start: 2022-09-23 | End: 2022-09-23 | Stop reason: SURG

## 2022-09-23 RX ORDER — NICOTINE 21 MG/24HR
1 PATCH, TRANSDERMAL 24 HOURS TRANSDERMAL EVERY 24 HOURS
Status: DISCONTINUED | OUTPATIENT
Start: 2022-09-23 | End: 2022-09-29 | Stop reason: HOSPADM

## 2022-09-23 RX ORDER — DIPHENHYDRAMINE HYDROCHLORIDE 50 MG/ML
25 INJECTION INTRAMUSCULAR; INTRAVENOUS EVERY 4 HOURS PRN
Status: DISCONTINUED | OUTPATIENT
Start: 2022-09-23 | End: 2022-09-23 | Stop reason: HOSPADM

## 2022-09-23 RX ORDER — HEPARIN SODIUM 5000 [USP'U]/ML
5000 INJECTION, SOLUTION INTRAVENOUS; SUBCUTANEOUS EVERY 8 HOURS SCHEDULED
Status: DISCONTINUED | OUTPATIENT
Start: 2022-09-24 | End: 2022-09-29 | Stop reason: HOSPADM

## 2022-09-23 RX ORDER — MAGNESIUM SULFATE HEPTAHYDRATE 500 MG/ML
INJECTION, SOLUTION INTRAMUSCULAR; INTRAVENOUS AS NEEDED
Status: DISCONTINUED | OUTPATIENT
Start: 2022-09-23 | End: 2022-09-23 | Stop reason: SURG

## 2022-09-23 RX ORDER — CEFAZOLIN SODIUM 2 G/100ML
2 INJECTION, SOLUTION INTRAVENOUS ONCE
Status: COMPLETED | OUTPATIENT
Start: 2022-09-23 | End: 2022-09-23

## 2022-09-23 RX ORDER — BUPIVACAINE HCL/0.9 % NACL/PF 0.125 %
PLASTIC BAG, INJECTION (ML) EPIDURAL AS NEEDED
Status: DISCONTINUED | OUTPATIENT
Start: 2022-09-23 | End: 2022-09-23 | Stop reason: SURG

## 2022-09-23 RX ORDER — ACETAMINOPHEN 500 MG
1000 TABLET ORAL 3 TIMES DAILY
Status: DISCONTINUED | OUTPATIENT
Start: 2022-09-23 | End: 2022-09-29 | Stop reason: HOSPADM

## 2022-09-23 RX ORDER — CEFAZOLIN SODIUM 2 G/100ML
2 INJECTION, SOLUTION INTRAVENOUS EVERY 8 HOURS
Status: COMPLETED | OUTPATIENT
Start: 2022-09-23 | End: 2022-09-24

## 2022-09-23 RX ORDER — MIDAZOLAM HYDROCHLORIDE 1 MG/ML
0.5 INJECTION INTRAMUSCULAR; INTRAVENOUS
Status: DISCONTINUED | OUTPATIENT
Start: 2022-09-23 | End: 2022-09-23 | Stop reason: HOSPADM

## 2022-09-23 RX ORDER — PROPOFOL 10 MG/ML
VIAL (ML) INTRAVENOUS AS NEEDED
Status: DISCONTINUED | OUTPATIENT
Start: 2022-09-23 | End: 2022-09-23 | Stop reason: SURG

## 2022-09-23 RX ORDER — LISINOPRIL 10 MG/1
10 TABLET ORAL EVERY EVENING
Status: DISCONTINUED | OUTPATIENT
Start: 2022-09-23 | End: 2022-09-24

## 2022-09-23 RX ORDER — GABAPENTIN 100 MG/1
200 CAPSULE ORAL EVERY 8 HOURS SCHEDULED
Status: DISCONTINUED | OUTPATIENT
Start: 2022-09-23 | End: 2022-09-29 | Stop reason: HOSPADM

## 2022-09-23 RX ORDER — SODIUM CHLORIDE 9 MG/ML
INJECTION, SOLUTION INTRAVENOUS AS NEEDED
Status: DISCONTINUED | OUTPATIENT
Start: 2022-09-23 | End: 2022-09-23 | Stop reason: HOSPADM

## 2022-09-23 RX ORDER — ROCURONIUM BROMIDE 10 MG/ML
INJECTION, SOLUTION INTRAVENOUS AS NEEDED
Status: DISCONTINUED | OUTPATIENT
Start: 2022-09-23 | End: 2022-09-23 | Stop reason: SURG

## 2022-09-23 RX ORDER — DEXAMETHASONE SODIUM PHOSPHATE 4 MG/ML
INJECTION, SOLUTION INTRA-ARTICULAR; INTRALESIONAL; INTRAMUSCULAR; INTRAVENOUS; SOFT TISSUE AS NEEDED
Status: DISCONTINUED | OUTPATIENT
Start: 2022-09-23 | End: 2022-09-23 | Stop reason: SURG

## 2022-09-23 RX ORDER — OXYCODONE HYDROCHLORIDE 5 MG/1
5 TABLET ORAL ONCE AS NEEDED
Status: DISCONTINUED | OUTPATIENT
Start: 2022-09-23 | End: 2022-09-23 | Stop reason: HOSPADM

## 2022-09-23 RX ORDER — BUDESONIDE 0.5 MG/2ML
0.5 INHALANT ORAL 2 TIMES DAILY
Status: DISCONTINUED | OUTPATIENT
Start: 2022-09-23 | End: 2022-09-29 | Stop reason: HOSPADM

## 2022-09-23 RX ORDER — CETIRIZINE HYDROCHLORIDE 10 MG/1
10 TABLET ORAL DAILY
Status: DISCONTINUED | OUTPATIENT
Start: 2022-09-23 | End: 2022-09-29 | Stop reason: HOSPADM

## 2022-09-23 RX ORDER — BISACODYL 10 MG
10 SUPPOSITORY, RECTAL RECTAL DAILY PRN
Status: DISCONTINUED | OUTPATIENT
Start: 2022-09-23 | End: 2022-09-29 | Stop reason: HOSPADM

## 2022-09-23 RX ORDER — ONDANSETRON 2 MG/ML
4 INJECTION INTRAMUSCULAR; INTRAVENOUS EVERY 6 HOURS PRN
Status: DISCONTINUED | OUTPATIENT
Start: 2022-09-23 | End: 2022-09-29 | Stop reason: HOSPADM

## 2022-09-23 RX ORDER — ONDANSETRON 2 MG/ML
INJECTION INTRAMUSCULAR; INTRAVENOUS AS NEEDED
Status: DISCONTINUED | OUTPATIENT
Start: 2022-09-23 | End: 2022-09-23 | Stop reason: SURG

## 2022-09-23 RX ORDER — PROPARACAINE HYDROCHLORIDE 5 MG/ML
1 SOLUTION/ DROPS OPHTHALMIC ONCE
Status: COMPLETED | OUTPATIENT
Start: 2022-09-23 | End: 2022-09-23

## 2022-09-23 RX ORDER — DIPHENHYDRAMINE HCL 25 MG
25 CAPSULE ORAL EVERY 4 HOURS PRN
Status: DISCONTINUED | OUTPATIENT
Start: 2022-09-23 | End: 2022-09-23 | Stop reason: HOSPADM

## 2022-09-23 RX ORDER — POLYETHYLENE GLYCOL 3350 17 G/17G
17 POWDER, FOR SOLUTION ORAL DAILY
Status: DISCONTINUED | OUTPATIENT
Start: 2022-09-23 | End: 2022-09-29 | Stop reason: HOSPADM

## 2022-09-23 RX ORDER — METOCLOPRAMIDE HYDROCHLORIDE 5 MG/ML
10 INJECTION INTRAMUSCULAR; INTRAVENOUS ONCE AS NEEDED
Status: DISCONTINUED | OUTPATIENT
Start: 2022-09-23 | End: 2022-09-23 | Stop reason: HOSPADM

## 2022-09-23 RX ORDER — CELECOXIB 200 MG/1
200 CAPSULE ORAL ONCE
Status: COMPLETED | OUTPATIENT
Start: 2022-09-23 | End: 2022-09-23

## 2022-09-23 RX ORDER — SODIUM CHLORIDE 0.9 % (FLUSH) 0.9 %
3 SYRINGE (ML) INJECTION EVERY 12 HOURS SCHEDULED
Status: DISCONTINUED | OUTPATIENT
Start: 2022-09-23 | End: 2022-09-23 | Stop reason: HOSPADM

## 2022-09-23 RX ORDER — HYDROMORPHONE HYDROCHLORIDE 1 MG/ML
0.5 INJECTION, SOLUTION INTRAMUSCULAR; INTRAVENOUS; SUBCUTANEOUS
Status: DISCONTINUED | OUTPATIENT
Start: 2022-09-23 | End: 2022-09-29 | Stop reason: HOSPADM

## 2022-09-23 RX ORDER — SODIUM CHLORIDE, SODIUM LACTATE, POTASSIUM CHLORIDE, CALCIUM CHLORIDE 600; 310; 30; 20 MG/100ML; MG/100ML; MG/100ML; MG/100ML
INJECTION, SOLUTION INTRAVENOUS CONTINUOUS PRN
Status: DISCONTINUED | OUTPATIENT
Start: 2022-09-23 | End: 2022-09-23 | Stop reason: SURG

## 2022-09-23 RX ORDER — GLYCOPYRROLATE 0.2 MG/ML
INJECTION INTRAMUSCULAR; INTRAVENOUS AS NEEDED
Status: DISCONTINUED | OUTPATIENT
Start: 2022-09-23 | End: 2022-09-23 | Stop reason: SURG

## 2022-09-23 RX ORDER — NITROGLYCERIN 0.4 MG/1
0.4 TABLET SUBLINGUAL
Status: DISCONTINUED | OUTPATIENT
Start: 2022-09-23 | End: 2022-09-29 | Stop reason: HOSPADM

## 2022-09-23 RX ORDER — NEOSTIGMINE METHYLSULFATE 0.5 MG/ML
INJECTION, SOLUTION INTRAVENOUS AS NEEDED
Status: DISCONTINUED | OUTPATIENT
Start: 2022-09-23 | End: 2022-09-23 | Stop reason: SURG

## 2022-09-23 RX ORDER — BUPIVACAINE HYDROCHLORIDE 2.5 MG/ML
INJECTION, SOLUTION EPIDURAL; INFILTRATION; INTRACAUDAL AS NEEDED
Status: DISCONTINUED | OUTPATIENT
Start: 2022-09-23 | End: 2022-09-23 | Stop reason: HOSPADM

## 2022-09-23 RX ORDER — HYDROMORPHONE HYDROCHLORIDE 1 MG/ML
0.25 INJECTION, SOLUTION INTRAMUSCULAR; INTRAVENOUS; SUBCUTANEOUS
Status: DISCONTINUED | OUTPATIENT
Start: 2022-09-23 | End: 2022-09-23 | Stop reason: HOSPADM

## 2022-09-23 RX ORDER — ARFORMOTEROL TARTRATE 15 UG/2ML
15 SOLUTION RESPIRATORY (INHALATION)
Status: DISCONTINUED | OUTPATIENT
Start: 2022-09-23 | End: 2022-09-29 | Stop reason: HOSPADM

## 2022-09-23 RX ORDER — FENTANYL CITRATE 50 UG/ML
50 INJECTION, SOLUTION INTRAMUSCULAR; INTRAVENOUS ONCE
Status: COMPLETED | OUTPATIENT
Start: 2022-09-23 | End: 2022-09-23

## 2022-09-23 RX ORDER — GABAPENTIN 300 MG/1
300 CAPSULE ORAL ONCE
Status: COMPLETED | OUTPATIENT
Start: 2022-09-23 | End: 2022-09-23

## 2022-09-23 RX ORDER — ACETAMINOPHEN 500 MG
1000 TABLET ORAL ONCE
Status: COMPLETED | OUTPATIENT
Start: 2022-09-23 | End: 2022-09-23

## 2022-09-23 RX ORDER — SODIUM CHLORIDE 9 MG/ML
100 INJECTION, SOLUTION INTRAVENOUS CONTINUOUS
Status: DISCONTINUED | OUTPATIENT
Start: 2022-09-23 | End: 2022-09-24

## 2022-09-23 RX ORDER — DOCUSATE SODIUM 100 MG/1
100 CAPSULE, LIQUID FILLED ORAL DAILY
Status: DISCONTINUED | OUTPATIENT
Start: 2022-09-23 | End: 2022-09-29 | Stop reason: HOSPADM

## 2022-09-23 RX ORDER — BUPIVACAINE HYDROCHLORIDE 2.5 MG/ML
INJECTION, SOLUTION EPIDURAL; INFILTRATION; INTRACAUDAL
Status: COMPLETED | OUTPATIENT
Start: 2022-09-23 | End: 2022-09-23

## 2022-09-23 RX ORDER — ERYTHROMYCIN 5 MG/G
OINTMENT OPHTHALMIC EVERY 12 HOURS
Status: DISCONTINUED | OUTPATIENT
Start: 2022-09-23 | End: 2022-09-29 | Stop reason: HOSPADM

## 2022-09-23 RX ORDER — ONDANSETRON 2 MG/ML
4 INJECTION INTRAMUSCULAR; INTRAVENOUS ONCE AS NEEDED
Status: DISCONTINUED | OUTPATIENT
Start: 2022-09-23 | End: 2022-09-23 | Stop reason: HOSPADM

## 2022-09-23 RX ORDER — SODIUM CHLORIDE 0.9 % (FLUSH) 0.9 %
3-10 SYRINGE (ML) INJECTION AS NEEDED
Status: DISCONTINUED | OUTPATIENT
Start: 2022-09-23 | End: 2022-09-23 | Stop reason: HOSPADM

## 2022-09-23 RX ORDER — SODIUM CHLORIDE 0.9 % (FLUSH) 0.9 %
10 SYRINGE (ML) INJECTION AS NEEDED
Status: DISCONTINUED | OUTPATIENT
Start: 2022-09-23 | End: 2022-09-23 | Stop reason: HOSPADM

## 2022-09-23 RX ORDER — SODIUM CHLORIDE, SODIUM LACTATE, POTASSIUM CHLORIDE, AND CALCIUM CHLORIDE .6; .31; .03; .02 G/100ML; G/100ML; G/100ML; G/100ML
9 INJECTION, SOLUTION INTRAVENOUS CONTINUOUS
Status: DISCONTINUED | OUTPATIENT
Start: 2022-09-23 | End: 2022-09-29 | Stop reason: HOSPADM

## 2022-09-23 RX ORDER — SODIUM CHLORIDE 0.9 % (FLUSH) 0.9 %
10 SYRINGE (ML) INJECTION EVERY 12 HOURS SCHEDULED
Status: DISCONTINUED | OUTPATIENT
Start: 2022-09-23 | End: 2022-09-23 | Stop reason: HOSPADM

## 2022-09-23 RX ORDER — LIDOCAINE HYDROCHLORIDE 20 MG/ML
INJECTION, SOLUTION EPIDURAL; INFILTRATION; INTRACAUDAL; PERINEURAL AS NEEDED
Status: DISCONTINUED | OUTPATIENT
Start: 2022-09-23 | End: 2022-09-23 | Stop reason: SURG

## 2022-09-23 RX ORDER — ONDANSETRON 4 MG/1
4 TABLET, FILM COATED ORAL EVERY 6 HOURS PRN
Status: DISCONTINUED | OUTPATIENT
Start: 2022-09-23 | End: 2022-09-29 | Stop reason: HOSPADM

## 2022-09-23 RX ADMIN — ROCURONIUM BROMIDE 50 MG: 100 INJECTION, SOLUTION INTRAVENOUS at 07:42

## 2022-09-23 RX ADMIN — SODIUM CHLORIDE, POTASSIUM CHLORIDE, SODIUM LACTATE AND CALCIUM CHLORIDE 1000 ML: 600; 310; 30; 20 INJECTION, SOLUTION INTRAVENOUS at 13:12

## 2022-09-23 RX ADMIN — CEFAZOLIN SODIUM 2 G: 2 INJECTION, SOLUTION INTRAVENOUS at 07:27

## 2022-09-23 RX ADMIN — ALBUMIN HUMAN: 0.05 INJECTION, SOLUTION INTRAVENOUS at 10:49

## 2022-09-23 RX ADMIN — DEXAMETHASONE SODIUM PHOSPHATE 8 MG: 4 INJECTION, SOLUTION INTRAMUSCULAR; INTRAVENOUS at 08:05

## 2022-09-23 RX ADMIN — ISOSORBIDE MONONITRATE 60 MG: 60 TABLET ORAL at 21:33

## 2022-09-23 RX ADMIN — Medication 100 MCG: at 10:40

## 2022-09-23 RX ADMIN — Medication 10 MG: at 10:01

## 2022-09-23 RX ADMIN — Medication 20 MG: at 07:49

## 2022-09-23 RX ADMIN — ACETAMINOPHEN 1000 MG: 500 TABLET, FILM COATED ORAL at 21:33

## 2022-09-23 RX ADMIN — BUPIVACAINE 10 ML: 13.3 INJECTION, SUSPENSION, LIPOSOMAL INFILTRATION at 07:22

## 2022-09-23 RX ADMIN — FENTANYL CITRATE 50 MCG: 50 INJECTION INTRAMUSCULAR; INTRAVENOUS at 06:36

## 2022-09-23 RX ADMIN — GABAPENTIN 200 MG: 100 CAPSULE ORAL at 21:32

## 2022-09-23 RX ADMIN — GABAPENTIN 300 MG: 300 CAPSULE ORAL at 06:30

## 2022-09-23 RX ADMIN — BUDESONIDE 0.5 MG: 0.5 INHALANT ORAL at 21:20

## 2022-09-23 RX ADMIN — GABAPENTIN 200 MG: 100 CAPSULE ORAL at 15:47

## 2022-09-23 RX ADMIN — LIDOCAINE HYDROCHLORIDE 60 MG: 20 INJECTION, SOLUTION EPIDURAL; INFILTRATION; INTRACAUDAL; PERINEURAL at 07:42

## 2022-09-23 RX ADMIN — Medication 10 MG: at 11:02

## 2022-09-23 RX ADMIN — Medication 100 MCG: at 08:25

## 2022-09-23 RX ADMIN — ERYTHROMYCIN: 5 OINTMENT OPHTHALMIC at 15:39

## 2022-09-23 RX ADMIN — Medication 10 MG: at 09:01

## 2022-09-23 RX ADMIN — SODIUM CHLORIDE 100 ML/HR: 9 INJECTION, SOLUTION INTRAVENOUS at 17:01

## 2022-09-23 RX ADMIN — SODIUM CHLORIDE, POTASSIUM CHLORIDE, SODIUM LACTATE AND CALCIUM CHLORIDE 9 ML/HR: 600; 310; 30; 20 INJECTION, SOLUTION INTRAVENOUS at 06:31

## 2022-09-23 RX ADMIN — GLYCOPYRROLATE 0.6 MCG: 1 INJECTION INTRAMUSCULAR; INTRAVENOUS at 12:15

## 2022-09-23 RX ADMIN — OXYCODONE 5 MG: 5 TABLET ORAL at 21:32

## 2022-09-23 RX ADMIN — CETIRIZINE HYDROCHLORIDE 10 MG: 10 TABLET ORAL at 21:31

## 2022-09-23 RX ADMIN — ACETAMINOPHEN 1000 MG: 500 TABLET, FILM COATED ORAL at 16:53

## 2022-09-23 RX ADMIN — CELECOXIB 200 MG: 200 CAPSULE ORAL at 06:29

## 2022-09-23 RX ADMIN — KETOROLAC TROMETHAMINE 15 MG: 30 INJECTION, SOLUTION INTRAMUSCULAR at 16:54

## 2022-09-23 RX ADMIN — SODIUM CHLORIDE, POTASSIUM CHLORIDE, SODIUM LACTATE AND CALCIUM CHLORIDE: 600; 310; 30; 20 INJECTION, SOLUTION INTRAVENOUS at 07:52

## 2022-09-23 RX ADMIN — CEFAZOLIN SODIUM 2 G: 2 INJECTION, SOLUTION INTRAVENOUS at 21:32

## 2022-09-23 RX ADMIN — KETOROLAC TROMETHAMINE 15 MG: 30 INJECTION, SOLUTION INTRAMUSCULAR at 21:33

## 2022-09-23 RX ADMIN — PROPOFOL 150 MG: 10 INJECTION, EMULSION INTRAVENOUS at 07:42

## 2022-09-23 RX ADMIN — MAGNESIUM SULFATE HEPTAHYDRATE 2 G: 500 INJECTION, SOLUTION INTRAMUSCULAR; INTRAVENOUS at 08:10

## 2022-09-23 RX ADMIN — PHENYLEPHRINE HYDROCHLORIDE 0.5 MCG/KG/MIN: 10 INJECTION INTRAVENOUS at 08:29

## 2022-09-23 RX ADMIN — PROPARACAINE HYDROCHLORIDE 1 DROP: 5 SOLUTION/ DROPS OPHTHALMIC at 15:40

## 2022-09-23 RX ADMIN — NEOSTIGMINE METHYLSULFATE 3 MG: 0.5 INJECTION INTRAVENOUS at 12:15

## 2022-09-23 RX ADMIN — LISINOPRIL 10 MG: 10 TABLET ORAL at 21:31

## 2022-09-23 RX ADMIN — ONDANSETRON 4 MG: 2 INJECTION INTRAMUSCULAR; INTRAVENOUS at 12:15

## 2022-09-23 RX ADMIN — Medication 100 MCG: at 08:05

## 2022-09-23 RX ADMIN — HEPARIN SODIUM 5000 UNITS: 5000 INJECTION INTRAVENOUS; SUBCUTANEOUS at 07:26

## 2022-09-23 RX ADMIN — ALBUMIN HUMAN: 0.05 INJECTION, SOLUTION INTRAVENOUS at 10:38

## 2022-09-23 RX ADMIN — DOCUSATE SODIUM 100 MG: 100 CAPSULE, LIQUID FILLED ORAL at 21:32

## 2022-09-23 RX ADMIN — POLYETHYLENE GLYCOL 3350 17 G: 17 POWDER, FOR SOLUTION ORAL at 21:31

## 2022-09-23 RX ADMIN — NICOTINE 1 PATCH: 21 PATCH, EXTENDED RELEASE TRANSDERMAL at 21:32

## 2022-09-23 RX ADMIN — ATORVASTATIN CALCIUM 10 MG: 20 TABLET, FILM COATED ORAL at 21:33

## 2022-09-23 RX ADMIN — ARFORMOTEROL TARTRATE 15 MCG: 15 SOLUTION RESPIRATORY (INHALATION) at 21:19

## 2022-09-23 RX ADMIN — Medication 100 MCG: at 08:55

## 2022-09-23 RX ADMIN — BUPIVACAINE HYDROCHLORIDE 20 ML: 2.5 INJECTION, SOLUTION EPIDURAL; INFILTRATION; INTRACAUDAL; PERINEURAL at 07:22

## 2022-09-23 RX ADMIN — CEFAZOLIN SODIUM 2 G: 2 INJECTION, SOLUTION INTRAVENOUS at 11:27

## 2022-09-23 RX ADMIN — GLYCOPYRROLATE 0.2 MCG: 1 INJECTION INTRAMUSCULAR; INTRAVENOUS at 08:18

## 2022-09-23 RX ADMIN — ACETAMINOPHEN 1000 MG: 500 TABLET, FILM COATED ORAL at 06:29

## 2022-09-23 NOTE — ANESTHESIA PROCEDURE NOTES
Airway  Urgency: elective    Date/Time: 9/23/2022 7:45 AM  Airway not difficult    General Information and Staff    Patient location during procedure: OR  Anesthesiologist: Stan Belcher MD  CRNA/CAA: Slade Del Rio CRNA    Indications and Patient Condition  Indications for airway management: airway protection    Preoxygenated: yes  MILS maintained throughout  Mask difficulty assessment: 2 - vent by mask + OA or adjuvant +/- NMBA    Final Airway Details  Final airway type: endotracheal airway      Successful airway: EBT - double lumen left  Cuffed: yes   Successful intubation technique: direct laryngoscopy  Facilitating devices/methods: intubating stylet  Endotracheal tube insertion site: oral  Blade: Hi  Blade size: 2  EBT DL size (fr): 39  Cormack-Lehane Classification: grade I - full view of glottis  Placement verified by: chest auscultation, bronchoscopy, capnometry and single lung ventilation   Measured from: lips  ETT/EBT  to lips (cm): 31  Number of attempts at approach: 1  Assessment: lips, teeth, and gum same as pre-op and atraumatic intubation

## 2022-09-23 NOTE — ANESTHESIA POSTPROCEDURE EVALUATION
Patient: Giovani España    Procedure Summary     Date: 09/23/22 Room / Location: [facility] OR  / [facility] MAIN OR    Anesthesia Start: 0733 Anesthesia Stop: 1316    Procedure: BRONCHOSCOPY, LEFT VIDEO ASSISTED THORACOSCOPY, ROBOT ASSISTED TOTAL DECORTICATION  LEFT LUNG, LEFT LOWER LOBE LOBECTOMY, MEDIASTINAL LYMPHECTOMY, INTERCOSTAL NERVE BLOCK (Left Chest) Diagnosis:       Pulmonary nodule      (Pulmonary nodule [R91.1])    Surgeons: Nicola Joe III, MD Provider: Stan Belcher MD    Anesthesia Type: general with block ASA Status: 4          Anesthesia Type: general with block    Vitals  Vitals Value Taken Time   /56 09/23/22 1501   Temp 36.4 °C (97.5 °F) 09/23/22 1312   Pulse 67 09/23/22 1503   Resp 16 09/23/22 1445   SpO2 96 % 09/23/22 1503   Vitals shown include unvalidated device data.        Post Anesthesia Care and Evaluation    Patient location during evaluation: bedside  Patient participation: complete - patient participated  Level of consciousness: awake  Pain management: adequate    Airway patency: patent  Anesthetic complications: No anesthetic complications  PONV Status: controlled  Cardiovascular status: acceptable  Respiratory status: acceptable  Hydration status: acceptable    Comments: BP 94/56 (BP Location: Left arm, Patient Position: Lying)   Pulse 66   Temp 36.4 °C (97.5 °F) (Oral)   Resp 16   SpO2 96%

## 2022-09-23 NOTE — ANESTHESIA PREPROCEDURE EVALUATION
Anesthesia Evaluation     Patient summary reviewed   NPO Solid Status: > 6 hours  NPO Liquid Status: > 2 hours           Airway   Mallampati: II  TM distance: >3 FB  Neck ROM: full  Dental    (+) upper dentures    Pulmonary    (+) lung cancer, COPD severe, home oxygen, decreased breath sounds,   (-) sleep apnea  Cardiovascular     ECG reviewed  Rhythm: regular  Rate: normal    (+) hypertension, PVD, DVT current, hyperlipidemia,   (-) CAD, dysrhythmias, angina, cardiac stents    ROS comment: Mets ~4. Walks 1-2 blocks before stopping w/SoA. No recent changes in exercise tolerance. Denies CP.    Pt had echo and stress test at [facility] w/no interventions necessary.    Neuro/Psych  (-) seizures, CVA  GI/Hepatic/Renal/Endo    (+)   diabetes mellitus type 2,   (-) GERD, liver disease, no renal disease, no thyroid disorder    Musculoskeletal     Abdominal    Substance History      OB/GYN          Other                      Anesthesia Plan    ASA 4     general with block       Anesthetic plan, risks, benefits, and alternatives have been provided, discussed and informed consent has been obtained with: patient.        CODE STATUS:

## 2022-09-23 NOTE — ANESTHESIA PROCEDURE NOTES
Arterial Line      Patient location during procedure: pre-op   Line placed for hemodynamic monitoring and ABGs/Labs/ISTAT.  Performed By   Anesthesiologist: Stan Belcher MD  Preanesthetic Checklist  Completed: patient identified, IV checked, site marked, risks and benefits discussed, surgical consent, monitors and equipment checked, pre-op evaluation and timeout performed  Arterial Line Prep   Sterile Tech: cap, gloves, sterile barriers and mask  Prep: ChloraPrep  Patient monitoring: EKG, continuous pulse oximetry and blood pressure monitoring  Arterial Line Procedure   Laterality:right  Location:  radial artery  Catheter size: 20 G   Number of attempts: 1  Successful placement: yes  Post Assessment   Dressing Type: wrist guard applied, secured with tape and occlusive dressing applied.   Complications no  Circ/Move/Sens Assessment: normal and unchanged.   Patient Tolerance: patient tolerated the procedure well with no apparent complications

## 2022-09-23 NOTE — ANESTHESIA PROCEDURE NOTES
Peripheral Block      Patient location during procedure: pre-op  Reason for block: at surgeon's request and post-op pain management  Performed by  Anesthesiologist: Stan Belcher MD  Preanesthetic Checklist  Completed: patient identified, IV checked, site marked, risks and benefits discussed, surgical consent, monitors and equipment checked, pre-op evaluation and timeout performed  Prep:  Pt Position: sitting  Sterile barriers:cap, gloves, sterile barriers and mask  Prep: ChloraPrep  Patient monitoring: blood pressure monitoring, continuous pulse oximetry and EKG  Procedure    Sedation: yes    Guidance:ultrasound guided    ULTRASOUND INTERPRETATION. Using ultrasound guidance a 21 G gauge needle was placed in close proximity to the nerve, at which point, under ultrasound guidance anesthetic was injected in the area of the nerve and spread of the anesthesia was seen on ultrasound in close proximity thereto.  There were no abnormalities seen on ultrasound; a digital image was taken; and the patient tolerated the procedure with no complications. Nerve locations: Transverse processes visualized. Images:still images obtained, printed/placed on chart    Laterality:left  Block Type:erector spinae block  Injection Technique:single-shot  Needle Type:echogenic  Needle Gauge:21 G  Resistance on Injection: none    Medications Used: bupivacaine liposome (EXPAREL) 1.3 % injection, 10 mL  bupivacaine PF (MARCAINE) 0.25 % injection, 20 mL      Post Assessment  Injection Assessment: negative aspiration for heme, no paresthesia on injection and incremental injection  Patient Tolerance:comfortable throughout block  Complications:no

## 2022-09-24 ENCOUNTER — APPOINTMENT (OUTPATIENT)
Dept: GENERAL RADIOLOGY | Facility: HOSPITAL | Age: 77
End: 2022-09-24

## 2022-09-24 LAB
ANION GAP SERPL CALCULATED.3IONS-SCNC: 8.5 MMOL/L (ref 5–15)
BASE EXCESS BLDA CALC-SCNC: 2 MMOL/L (ref -5–5)
BASE EXCESS BLDA CALC-SCNC: 2 MMOL/L (ref -5–5)
BASE EXCESS BLDA CALC-SCNC: 4 MMOL/L (ref -5–5)
BASOPHILS # BLD AUTO: 0.01 10*3/MM3 (ref 0–0.2)
BASOPHILS NFR BLD AUTO: 0.1 % (ref 0–1.5)
BUN SERPL-MCNC: 22 MG/DL (ref 8–23)
BUN/CREAT SERPL: 19.6 (ref 7–25)
CA-I BLDA-SCNC: ABNORMAL MMOL/L
CALCIUM SPEC-SCNC: 8.4 MG/DL (ref 8.6–10.5)
CHLORIDE SERPL-SCNC: 99 MMOL/L (ref 98–107)
CO2 BLDA-SCNC: 31 MMOL/L (ref 24–29)
CO2 BLDA-SCNC: 32 MMOL/L (ref 24–29)
CO2 BLDA-SCNC: 33 MMOL/L (ref 24–29)
CO2 SERPL-SCNC: 24.5 MMOL/L (ref 22–29)
CREAT SERPL-MCNC: 1.12 MG/DL (ref 0.76–1.27)
DEPRECATED RDW RBC AUTO: 47.2 FL (ref 37–54)
EGFRCR SERPLBLD CKD-EPI 2021: 68.5 ML/MIN/1.73
EOSINOPHIL # BLD AUTO: 0 10*3/MM3 (ref 0–0.4)
EOSINOPHIL NFR BLD AUTO: 0 % (ref 0.3–6.2)
ERYTHROCYTE [DISTWIDTH] IN BLOOD BY AUTOMATED COUNT: 13.9 % (ref 12.3–15.4)
GLUCOSE BLDC GLUCOMTR-MCNC: 170 MG/DL (ref 70–130)
GLUCOSE BLDC GLUCOMTR-MCNC: 176 MG/DL (ref 70–130)
GLUCOSE BLDC GLUCOMTR-MCNC: 180 MG/DL (ref 70–130)
GLUCOSE SERPL-MCNC: 133 MG/DL (ref 65–99)
HCO3 BLDA-SCNC: 29.5 MMOL/L (ref 22–26)
HCO3 BLDA-SCNC: 29.8 MMOL/L (ref 22–26)
HCO3 BLDA-SCNC: 30.7 MMOL/L (ref 22–26)
HCT VFR BLD AUTO: 34.6 % (ref 37.5–51)
HCT VFR BLDA CALC: 39 % (ref 38–51)
HCT VFR BLDA CALC: 40 % (ref 38–51)
HCT VFR BLDA CALC: 41 % (ref 38–51)
HGB BLD-MCNC: 11.3 G/DL (ref 13–17.7)
HGB BLDA-MCNC: 13.3 G/DL (ref 12–17)
HGB BLDA-MCNC: 13.6 G/DL (ref 12–17)
HGB BLDA-MCNC: 13.9 G/DL (ref 12–17)
IMM GRANULOCYTES # BLD AUTO: 0.06 10*3/MM3 (ref 0–0.05)
IMM GRANULOCYTES NFR BLD AUTO: 0.5 % (ref 0–0.5)
LYMPHOCYTES # BLD AUTO: 1.53 10*3/MM3 (ref 0.7–3.1)
LYMPHOCYTES NFR BLD AUTO: 11.5 % (ref 19.6–45.3)
MCH RBC QN AUTO: 30.5 PG (ref 26.6–33)
MCHC RBC AUTO-ENTMCNC: 32.7 G/DL (ref 31.5–35.7)
MCV RBC AUTO: 93.5 FL (ref 79–97)
MONOCYTES # BLD AUTO: 1.32 10*3/MM3 (ref 0.1–0.9)
MONOCYTES NFR BLD AUTO: 9.9 % (ref 5–12)
NEUTROPHILS NFR BLD AUTO: 10.41 10*3/MM3 (ref 1.7–7)
NEUTROPHILS NFR BLD AUTO: 78 % (ref 42.7–76)
NRBC BLD AUTO-RTO: 0 /100 WBC (ref 0–0.2)
PCO2 BLDA: 51.1 MM HG (ref 35–45)
PCO2 BLDA: 69.1 MM HG (ref 35–45)
PCO2 BLDA: 82.6 MM HG (ref 35–45)
PH BLDA: 7.2 PH UNITS (ref 7.35–7.6)
PH BLDA: 7.26 PH UNITS (ref 7.35–7.6)
PH BLDA: 7.38 PH UNITS (ref 7.35–7.6)
PLATELET # BLD AUTO: 151 10*3/MM3 (ref 140–450)
PMV BLD AUTO: 9.6 FL (ref 6–12)
PO2 BLDA: 353 MMHG (ref 80–105)
PO2 BLDA: 361 MMHG (ref 80–105)
PO2 BLDA: 594 MMHG (ref 80–105)
POTASSIUM BLDA-SCNC: 4.7 MMOL/L (ref 3.5–4.9)
POTASSIUM BLDA-SCNC: 4.9 MMOL/L (ref 3.5–4.9)
POTASSIUM BLDA-SCNC: 4.9 MMOL/L (ref 3.5–4.9)
POTASSIUM SERPL-SCNC: 4.7 MMOL/L (ref 3.5–5.2)
RBC # BLD AUTO: 3.7 10*6/MM3 (ref 4.14–5.8)
SAO2 % BLDA: 100 % (ref 95–98)
SODIUM SERPL-SCNC: 132 MMOL/L (ref 136–145)
WBC NRBC COR # BLD: 13.33 10*3/MM3 (ref 3.4–10.8)

## 2022-09-24 PROCEDURE — 94799 UNLISTED PULMONARY SVC/PX: CPT

## 2022-09-24 PROCEDURE — 25010000002 CEFAZOLIN IN DEXTROSE 2-4 GM/100ML-% SOLUTION: Performed by: THORACIC SURGERY (CARDIOTHORACIC VASCULAR SURGERY)

## 2022-09-24 PROCEDURE — 80048 BASIC METABOLIC PNL TOTAL CA: CPT | Performed by: THORACIC SURGERY (CARDIOTHORACIC VASCULAR SURGERY)

## 2022-09-24 PROCEDURE — 71045 X-RAY EXAM CHEST 1 VIEW: CPT

## 2022-09-24 PROCEDURE — 25010000002 HEPARIN (PORCINE) PER 1000 UNITS: Performed by: THORACIC SURGERY (CARDIOTHORACIC VASCULAR SURGERY)

## 2022-09-24 PROCEDURE — 25010000002 KETOROLAC TROMETHAMINE PER 15 MG: Performed by: THORACIC SURGERY (CARDIOTHORACIC VASCULAR SURGERY)

## 2022-09-24 PROCEDURE — 99024 POSTOP FOLLOW-UP VISIT: CPT | Performed by: THORACIC SURGERY (CARDIOTHORACIC VASCULAR SURGERY)

## 2022-09-24 PROCEDURE — 94664 DEMO&/EVAL PT USE INHALER: CPT

## 2022-09-24 PROCEDURE — 85025 COMPLETE CBC W/AUTO DIFF WBC: CPT | Performed by: THORACIC SURGERY (CARDIOTHORACIC VASCULAR SURGERY)

## 2022-09-24 RX ADMIN — ATORVASTATIN CALCIUM 10 MG: 20 TABLET, FILM COATED ORAL at 09:03

## 2022-09-24 RX ADMIN — ARFORMOTEROL TARTRATE 15 MCG: 15 SOLUTION RESPIRATORY (INHALATION) at 08:17

## 2022-09-24 RX ADMIN — POLYETHYLENE GLYCOL 3350 17 G: 17 POWDER, FOR SOLUTION ORAL at 09:04

## 2022-09-24 RX ADMIN — CEFAZOLIN SODIUM 2 G: 2 INJECTION, SOLUTION INTRAVENOUS at 05:21

## 2022-09-24 RX ADMIN — KETOROLAC TROMETHAMINE 15 MG: 30 INJECTION, SOLUTION INTRAMUSCULAR at 05:21

## 2022-09-24 RX ADMIN — KETOROLAC TROMETHAMINE 15 MG: 30 INJECTION, SOLUTION INTRAMUSCULAR at 16:28

## 2022-09-24 RX ADMIN — DOCUSATE SODIUM 100 MG: 100 CAPSULE, LIQUID FILLED ORAL at 09:03

## 2022-09-24 RX ADMIN — CETIRIZINE HYDROCHLORIDE 10 MG: 10 TABLET ORAL at 09:04

## 2022-09-24 RX ADMIN — ACETAMINOPHEN 1000 MG: 500 TABLET, FILM COATED ORAL at 09:03

## 2022-09-24 RX ADMIN — KETOROLAC TROMETHAMINE 15 MG: 30 INJECTION, SOLUTION INTRAMUSCULAR at 09:04

## 2022-09-24 RX ADMIN — HEPARIN SODIUM 5000 UNITS: 5000 INJECTION, SOLUTION INTRAVENOUS; SUBCUTANEOUS at 13:44

## 2022-09-24 RX ADMIN — ISOSORBIDE MONONITRATE 60 MG: 60 TABLET ORAL at 16:35

## 2022-09-24 RX ADMIN — NICOTINE 1 PATCH: 21 PATCH, EXTENDED RELEASE TRANSDERMAL at 18:42

## 2022-09-24 RX ADMIN — SODIUM CHLORIDE 100 ML/HR: 9 INJECTION, SOLUTION INTRAVENOUS at 05:20

## 2022-09-24 RX ADMIN — HEPARIN SODIUM 5000 UNITS: 5000 INJECTION, SOLUTION INTRAVENOUS; SUBCUTANEOUS at 05:22

## 2022-09-24 RX ADMIN — GABAPENTIN 200 MG: 100 CAPSULE ORAL at 20:20

## 2022-09-24 RX ADMIN — BUDESONIDE 0.5 MG: 0.5 INHALANT ORAL at 19:15

## 2022-09-24 RX ADMIN — OXYCODONE 5 MG: 5 TABLET ORAL at 13:44

## 2022-09-24 RX ADMIN — ACETAMINOPHEN 1000 MG: 500 TABLET, FILM COATED ORAL at 16:28

## 2022-09-24 RX ADMIN — GABAPENTIN 200 MG: 100 CAPSULE ORAL at 05:21

## 2022-09-24 RX ADMIN — HEPARIN SODIUM 5000 UNITS: 5000 INJECTION, SOLUTION INTRAVENOUS; SUBCUTANEOUS at 20:21

## 2022-09-24 RX ADMIN — ERYTHROMYCIN: 5 OINTMENT OPHTHALMIC at 05:23

## 2022-09-24 RX ADMIN — GABAPENTIN 200 MG: 100 CAPSULE ORAL at 13:44

## 2022-09-24 RX ADMIN — BUDESONIDE 0.5 MG: 0.5 INHALANT ORAL at 08:17

## 2022-09-24 RX ADMIN — ACETAMINOPHEN 1000 MG: 500 TABLET, FILM COATED ORAL at 20:20

## 2022-09-24 RX ADMIN — ARFORMOTEROL TARTRATE 15 MCG: 15 SOLUTION RESPIRATORY (INHALATION) at 19:14

## 2022-09-24 RX ADMIN — OXYCODONE 5 MG: 5 TABLET ORAL at 20:20

## 2022-09-25 ENCOUNTER — APPOINTMENT (OUTPATIENT)
Dept: GENERAL RADIOLOGY | Facility: HOSPITAL | Age: 77
End: 2022-09-25

## 2022-09-25 PROCEDURE — 99024 POSTOP FOLLOW-UP VISIT: CPT | Performed by: THORACIC SURGERY (CARDIOTHORACIC VASCULAR SURGERY)

## 2022-09-25 PROCEDURE — 94799 UNLISTED PULMONARY SVC/PX: CPT

## 2022-09-25 PROCEDURE — 94761 N-INVAS EAR/PLS OXIMETRY MLT: CPT

## 2022-09-25 PROCEDURE — 25010000002 HEPARIN (PORCINE) PER 1000 UNITS: Performed by: THORACIC SURGERY (CARDIOTHORACIC VASCULAR SURGERY)

## 2022-09-25 PROCEDURE — 94664 DEMO&/EVAL PT USE INHALER: CPT

## 2022-09-25 PROCEDURE — 71045 X-RAY EXAM CHEST 1 VIEW: CPT

## 2022-09-25 RX ADMIN — CETIRIZINE HYDROCHLORIDE 10 MG: 10 TABLET ORAL at 09:47

## 2022-09-25 RX ADMIN — ARFORMOTEROL TARTRATE 15 MCG: 15 SOLUTION RESPIRATORY (INHALATION) at 18:53

## 2022-09-25 RX ADMIN — ACETAMINOPHEN 1000 MG: 500 TABLET, FILM COATED ORAL at 21:05

## 2022-09-25 RX ADMIN — ISOSORBIDE MONONITRATE 60 MG: 60 TABLET ORAL at 17:35

## 2022-09-25 RX ADMIN — ACETAMINOPHEN 1000 MG: 500 TABLET, FILM COATED ORAL at 17:35

## 2022-09-25 RX ADMIN — GABAPENTIN 200 MG: 100 CAPSULE ORAL at 06:44

## 2022-09-25 RX ADMIN — BUDESONIDE 0.5 MG: 0.5 INHALANT ORAL at 18:53

## 2022-09-25 RX ADMIN — HEPARIN SODIUM 5000 UNITS: 5000 INJECTION, SOLUTION INTRAVENOUS; SUBCUTANEOUS at 21:05

## 2022-09-25 RX ADMIN — ATORVASTATIN CALCIUM 10 MG: 20 TABLET, FILM COATED ORAL at 09:47

## 2022-09-25 RX ADMIN — ARFORMOTEROL TARTRATE 15 MCG: 15 SOLUTION RESPIRATORY (INHALATION) at 11:15

## 2022-09-25 RX ADMIN — ACETAMINOPHEN 1000 MG: 500 TABLET, FILM COATED ORAL at 09:47

## 2022-09-25 RX ADMIN — GABAPENTIN 200 MG: 100 CAPSULE ORAL at 14:23

## 2022-09-25 RX ADMIN — BUDESONIDE 0.5 MG: 0.5 INHALANT ORAL at 11:16

## 2022-09-25 RX ADMIN — POLYETHYLENE GLYCOL 3350 17 G: 17 POWDER, FOR SOLUTION ORAL at 09:47

## 2022-09-25 RX ADMIN — HEPARIN SODIUM 5000 UNITS: 5000 INJECTION, SOLUTION INTRAVENOUS; SUBCUTANEOUS at 14:23

## 2022-09-25 RX ADMIN — GABAPENTIN 200 MG: 100 CAPSULE ORAL at 21:05

## 2022-09-25 RX ADMIN — HEPARIN SODIUM 5000 UNITS: 5000 INJECTION, SOLUTION INTRAVENOUS; SUBCUTANEOUS at 06:44

## 2022-09-25 RX ADMIN — DOCUSATE SODIUM 100 MG: 100 CAPSULE, LIQUID FILLED ORAL at 09:47

## 2022-09-26 ENCOUNTER — APPOINTMENT (OUTPATIENT)
Dept: GENERAL RADIOLOGY | Facility: HOSPITAL | Age: 77
End: 2022-09-26

## 2022-09-26 PROCEDURE — 25010000002 HEPARIN (PORCINE) PER 1000 UNITS: Performed by: THORACIC SURGERY (CARDIOTHORACIC VASCULAR SURGERY)

## 2022-09-26 PROCEDURE — 71045 X-RAY EXAM CHEST 1 VIEW: CPT

## 2022-09-26 PROCEDURE — 99024 POSTOP FOLLOW-UP VISIT: CPT | Performed by: NURSE PRACTITIONER

## 2022-09-26 PROCEDURE — 94761 N-INVAS EAR/PLS OXIMETRY MLT: CPT

## 2022-09-26 PROCEDURE — 94799 UNLISTED PULMONARY SVC/PX: CPT

## 2022-09-26 PROCEDURE — 94664 DEMO&/EVAL PT USE INHALER: CPT

## 2022-09-26 RX ADMIN — POLYETHYLENE GLYCOL 3350 17 G: 17 POWDER, FOR SOLUTION ORAL at 08:49

## 2022-09-26 RX ADMIN — BUDESONIDE 0.5 MG: 0.5 INHALANT ORAL at 06:56

## 2022-09-26 RX ADMIN — GABAPENTIN 200 MG: 100 CAPSULE ORAL at 22:02

## 2022-09-26 RX ADMIN — BUDESONIDE 0.5 MG: 0.5 INHALANT ORAL at 19:34

## 2022-09-26 RX ADMIN — ACETAMINOPHEN 1000 MG: 500 TABLET, FILM COATED ORAL at 18:18

## 2022-09-26 RX ADMIN — NICOTINE 1 PATCH: 21 PATCH, EXTENDED RELEASE TRANSDERMAL at 18:18

## 2022-09-26 RX ADMIN — GABAPENTIN 200 MG: 100 CAPSULE ORAL at 05:03

## 2022-09-26 RX ADMIN — HEPARIN SODIUM 5000 UNITS: 5000 INJECTION, SOLUTION INTRAVENOUS; SUBCUTANEOUS at 22:02

## 2022-09-26 RX ADMIN — CETIRIZINE HYDROCHLORIDE 10 MG: 10 TABLET ORAL at 08:50

## 2022-09-26 RX ADMIN — HEPARIN SODIUM 5000 UNITS: 5000 INJECTION, SOLUTION INTRAVENOUS; SUBCUTANEOUS at 05:03

## 2022-09-26 RX ADMIN — GABAPENTIN 200 MG: 100 CAPSULE ORAL at 14:29

## 2022-09-26 RX ADMIN — ARFORMOTEROL TARTRATE 15 MCG: 15 SOLUTION RESPIRATORY (INHALATION) at 19:34

## 2022-09-26 RX ADMIN — ERYTHROMYCIN: 5 OINTMENT OPHTHALMIC at 18:18

## 2022-09-26 RX ADMIN — ARFORMOTEROL TARTRATE 15 MCG: 15 SOLUTION RESPIRATORY (INHALATION) at 06:56

## 2022-09-26 RX ADMIN — OXYCODONE 5 MG: 5 TABLET ORAL at 12:15

## 2022-09-26 RX ADMIN — OXYCODONE 5 MG: 5 TABLET ORAL at 05:02

## 2022-09-26 RX ADMIN — ACETAMINOPHEN 1000 MG: 500 TABLET, FILM COATED ORAL at 08:49

## 2022-09-26 RX ADMIN — ACETAMINOPHEN 1000 MG: 500 TABLET, FILM COATED ORAL at 23:02

## 2022-09-26 RX ADMIN — ISOSORBIDE MONONITRATE 60 MG: 60 TABLET ORAL at 18:18

## 2022-09-26 RX ADMIN — DOCUSATE SODIUM 100 MG: 100 CAPSULE, LIQUID FILLED ORAL at 08:49

## 2022-09-26 RX ADMIN — ATORVASTATIN CALCIUM 10 MG: 20 TABLET, FILM COATED ORAL at 08:49

## 2022-09-26 RX ADMIN — HEPARIN SODIUM 5000 UNITS: 5000 INJECTION, SOLUTION INTRAVENOUS; SUBCUTANEOUS at 14:29

## 2022-09-27 ENCOUNTER — APPOINTMENT (OUTPATIENT)
Dept: GENERAL RADIOLOGY | Facility: HOSPITAL | Age: 77
End: 2022-09-27

## 2022-09-27 LAB
ANION GAP SERPL CALCULATED.3IONS-SCNC: 10.7 MMOL/L (ref 5–15)
BUN SERPL-MCNC: 20 MG/DL (ref 8–23)
BUN/CREAT SERPL: 22 (ref 7–25)
CALCIUM SPEC-SCNC: 10.1 MG/DL (ref 8.6–10.5)
CHLORIDE SERPL-SCNC: 101 MMOL/L (ref 98–107)
CO2 SERPL-SCNC: 24.3 MMOL/L (ref 22–29)
CREAT SERPL-MCNC: 0.91 MG/DL (ref 0.76–1.27)
EGFRCR SERPLBLD CKD-EPI 2021: 87.9 ML/MIN/1.73
GLUCOSE SERPL-MCNC: 145 MG/DL (ref 65–99)
MAGNESIUM SERPL-MCNC: 2.1 MG/DL (ref 1.6–2.4)
POTASSIUM SERPL-SCNC: 4.1 MMOL/L (ref 3.5–5.2)
SODIUM SERPL-SCNC: 136 MMOL/L (ref 136–145)

## 2022-09-27 PROCEDURE — 25010000002 HEPARIN (PORCINE) PER 1000 UNITS: Performed by: THORACIC SURGERY (CARDIOTHORACIC VASCULAR SURGERY)

## 2022-09-27 PROCEDURE — 80048 BASIC METABOLIC PNL TOTAL CA: CPT | Performed by: SURGERY

## 2022-09-27 PROCEDURE — 94799 UNLISTED PULMONARY SVC/PX: CPT

## 2022-09-27 PROCEDURE — 93010 ELECTROCARDIOGRAM REPORT: CPT | Performed by: INTERNAL MEDICINE

## 2022-09-27 PROCEDURE — 71045 X-RAY EXAM CHEST 1 VIEW: CPT

## 2022-09-27 PROCEDURE — 25010000002 AMIODARONE IN DEXTROSE 5% 150-4.21 MG/100ML-% SOLUTION: Performed by: SURGERY

## 2022-09-27 PROCEDURE — 94761 N-INVAS EAR/PLS OXIMETRY MLT: CPT

## 2022-09-27 PROCEDURE — 94664 DEMO&/EVAL PT USE INHALER: CPT

## 2022-09-27 PROCEDURE — 99024 POSTOP FOLLOW-UP VISIT: CPT | Performed by: NURSE PRACTITIONER

## 2022-09-27 PROCEDURE — 25010000002 AMIODARONE IN DEXTROSE 5% 360-4.14 MG/200ML-% SOLUTION: Performed by: SURGERY

## 2022-09-27 PROCEDURE — 94760 N-INVAS EAR/PLS OXIMETRY 1: CPT

## 2022-09-27 PROCEDURE — 83735 ASSAY OF MAGNESIUM: CPT | Performed by: SURGERY

## 2022-09-27 PROCEDURE — 93005 ELECTROCARDIOGRAM TRACING: CPT | Performed by: THORACIC SURGERY (CARDIOTHORACIC VASCULAR SURGERY)

## 2022-09-27 RX ADMIN — DOCUSATE SODIUM 100 MG: 100 CAPSULE, LIQUID FILLED ORAL at 08:18

## 2022-09-27 RX ADMIN — HEPARIN SODIUM 5000 UNITS: 5000 INJECTION, SOLUTION INTRAVENOUS; SUBCUTANEOUS at 21:03

## 2022-09-27 RX ADMIN — BUDESONIDE 0.5 MG: 0.5 INHALANT ORAL at 18:51

## 2022-09-27 RX ADMIN — POLYETHYLENE GLYCOL 3350 17 G: 17 POWDER, FOR SOLUTION ORAL at 08:18

## 2022-09-27 RX ADMIN — ACETAMINOPHEN 1000 MG: 500 TABLET, FILM COATED ORAL at 21:02

## 2022-09-27 RX ADMIN — ISOSORBIDE MONONITRATE 60 MG: 60 TABLET ORAL at 19:22

## 2022-09-27 RX ADMIN — ACETAMINOPHEN 1000 MG: 500 TABLET, FILM COATED ORAL at 16:11

## 2022-09-27 RX ADMIN — AMIODARONE HYDROCHLORIDE 1 MG/MIN: 1.8 INJECTION, SOLUTION INTRAVENOUS at 23:52

## 2022-09-27 RX ADMIN — ARFORMOTEROL TARTRATE 15 MCG: 15 SOLUTION RESPIRATORY (INHALATION) at 08:31

## 2022-09-27 RX ADMIN — ATORVASTATIN CALCIUM 10 MG: 20 TABLET, FILM COATED ORAL at 08:19

## 2022-09-27 RX ADMIN — GABAPENTIN 200 MG: 100 CAPSULE ORAL at 06:43

## 2022-09-27 RX ADMIN — BUDESONIDE 0.5 MG: 0.5 INHALANT ORAL at 08:31

## 2022-09-27 RX ADMIN — GABAPENTIN 200 MG: 100 CAPSULE ORAL at 16:11

## 2022-09-27 RX ADMIN — AMIODARONE HYDROCHLORIDE 150 MG: 1.5 INJECTION, SOLUTION INTRAVENOUS at 23:35

## 2022-09-27 RX ADMIN — NICOTINE 1 PATCH: 21 PATCH, EXTENDED RELEASE TRANSDERMAL at 19:22

## 2022-09-27 RX ADMIN — HEPARIN SODIUM 5000 UNITS: 5000 INJECTION, SOLUTION INTRAVENOUS; SUBCUTANEOUS at 06:43

## 2022-09-27 RX ADMIN — METOPROLOL TARTRATE 5 MG: 1 INJECTION, SOLUTION INTRAVENOUS at 20:47

## 2022-09-27 RX ADMIN — ACETAMINOPHEN 1000 MG: 500 TABLET, FILM COATED ORAL at 08:18

## 2022-09-27 RX ADMIN — HEPARIN SODIUM 5000 UNITS: 5000 INJECTION, SOLUTION INTRAVENOUS; SUBCUTANEOUS at 16:11

## 2022-09-27 RX ADMIN — ARFORMOTEROL TARTRATE 15 MCG: 15 SOLUTION RESPIRATORY (INHALATION) at 18:50

## 2022-09-27 RX ADMIN — GABAPENTIN 200 MG: 100 CAPSULE ORAL at 21:02

## 2022-09-27 RX ADMIN — CETIRIZINE HYDROCHLORIDE 10 MG: 10 TABLET ORAL at 08:19

## 2022-09-28 ENCOUNTER — APPOINTMENT (OUTPATIENT)
Dept: GENERAL RADIOLOGY | Facility: HOSPITAL | Age: 77
End: 2022-09-28

## 2022-09-28 PROBLEM — I48.91 ATRIAL FIBRILLATION WITH RVR: Status: ACTIVE | Noted: 2022-09-28

## 2022-09-28 LAB
INR PPP: 1.07 (ref 0.9–1.1)
PROTHROMBIN TIME: 13.8 SECONDS (ref 11.7–14.2)
QT INTERVAL: 298 MS
QT INTERVAL: 384 MS

## 2022-09-28 PROCEDURE — 94799 UNLISTED PULMONARY SVC/PX: CPT

## 2022-09-28 PROCEDURE — 93010 ELECTROCARDIOGRAM REPORT: CPT | Performed by: INTERNAL MEDICINE

## 2022-09-28 PROCEDURE — 71045 X-RAY EXAM CHEST 1 VIEW: CPT

## 2022-09-28 PROCEDURE — 99222 1ST HOSP IP/OBS MODERATE 55: CPT | Performed by: INTERNAL MEDICINE

## 2022-09-28 PROCEDURE — 94664 DEMO&/EVAL PT USE INHALER: CPT

## 2022-09-28 PROCEDURE — 25010000002 HEPARIN (PORCINE) PER 1000 UNITS: Performed by: THORACIC SURGERY (CARDIOTHORACIC VASCULAR SURGERY)

## 2022-09-28 PROCEDURE — 99024 POSTOP FOLLOW-UP VISIT: CPT | Performed by: NURSE PRACTITIONER

## 2022-09-28 PROCEDURE — 93005 ELECTROCARDIOGRAM TRACING: CPT | Performed by: SURGERY

## 2022-09-28 PROCEDURE — 94761 N-INVAS EAR/PLS OXIMETRY MLT: CPT

## 2022-09-28 PROCEDURE — 25010000002 AMIODARONE IN DEXTROSE 5% 360-4.14 MG/200ML-% SOLUTION: Performed by: SURGERY

## 2022-09-28 PROCEDURE — 85610 PROTHROMBIN TIME: CPT | Performed by: NURSE PRACTITIONER

## 2022-09-28 RX ORDER — METOPROLOL SUCCINATE 25 MG/1
25 TABLET, EXTENDED RELEASE ORAL DAILY
Status: DISCONTINUED | OUTPATIENT
Start: 2022-09-28 | End: 2022-09-29 | Stop reason: HOSPADM

## 2022-09-28 RX ORDER — DIAZEPAM 2 MG/1
2 TABLET ORAL EVERY 6 HOURS PRN
Status: DISCONTINUED | OUTPATIENT
Start: 2022-09-28 | End: 2022-09-29 | Stop reason: HOSPADM

## 2022-09-28 RX ORDER — WARFARIN SODIUM 7.5 MG/1
7.5 TABLET ORAL
Status: COMPLETED | OUTPATIENT
Start: 2022-09-28 | End: 2022-09-28

## 2022-09-28 RX ORDER — POTASSIUM CHLORIDE 29.8 MG/ML
20 INJECTION INTRAVENOUS
Status: DISCONTINUED | OUTPATIENT
Start: 2022-09-28 | End: 2022-09-29 | Stop reason: HOSPADM

## 2022-09-28 RX ORDER — POTASSIUM CHLORIDE 1.5 G/1.77G
40 POWDER, FOR SOLUTION ORAL AS NEEDED
Status: DISCONTINUED | OUTPATIENT
Start: 2022-09-28 | End: 2022-09-29 | Stop reason: HOSPADM

## 2022-09-28 RX ORDER — POTASSIUM CHLORIDE 750 MG/1
40 TABLET, FILM COATED, EXTENDED RELEASE ORAL AS NEEDED
Status: DISCONTINUED | OUTPATIENT
Start: 2022-09-28 | End: 2022-09-29 | Stop reason: HOSPADM

## 2022-09-28 RX ADMIN — ATORVASTATIN CALCIUM 10 MG: 20 TABLET, FILM COATED ORAL at 08:41

## 2022-09-28 RX ADMIN — HEPARIN SODIUM 5000 UNITS: 5000 INJECTION, SOLUTION INTRAVENOUS; SUBCUTANEOUS at 21:10

## 2022-09-28 RX ADMIN — AMIODARONE HYDROCHLORIDE 0.5 MG/MIN: 1.8 INJECTION, SOLUTION INTRAVENOUS at 05:38

## 2022-09-28 RX ADMIN — METOPROLOL SUCCINATE 25 MG: 25 TABLET, EXTENDED RELEASE ORAL at 14:15

## 2022-09-28 RX ADMIN — ACETAMINOPHEN 1000 MG: 500 TABLET, FILM COATED ORAL at 08:41

## 2022-09-28 RX ADMIN — ACETAMINOPHEN 1000 MG: 500 TABLET, FILM COATED ORAL at 21:10

## 2022-09-28 RX ADMIN — NICOTINE 1 PATCH: 21 PATCH, EXTENDED RELEASE TRANSDERMAL at 18:28

## 2022-09-28 RX ADMIN — HEPARIN SODIUM 5000 UNITS: 5000 INJECTION, SOLUTION INTRAVENOUS; SUBCUTANEOUS at 07:27

## 2022-09-28 RX ADMIN — WARFARIN 7.5 MG: 7.5 TABLET ORAL at 18:27

## 2022-09-28 RX ADMIN — GABAPENTIN 200 MG: 100 CAPSULE ORAL at 21:10

## 2022-09-28 RX ADMIN — DIAZEPAM 2 MG: 2 TABLET ORAL at 13:34

## 2022-09-28 RX ADMIN — ARFORMOTEROL TARTRATE 15 MCG: 15 SOLUTION RESPIRATORY (INHALATION) at 07:34

## 2022-09-28 RX ADMIN — ACETAMINOPHEN 1000 MG: 500 TABLET, FILM COATED ORAL at 16:27

## 2022-09-28 RX ADMIN — BUDESONIDE 0.5 MG: 0.5 INHALANT ORAL at 07:34

## 2022-09-28 RX ADMIN — CETIRIZINE HYDROCHLORIDE 10 MG: 10 TABLET ORAL at 08:41

## 2022-09-28 RX ADMIN — BUDESONIDE 0.5 MG: 0.5 INHALANT ORAL at 20:03

## 2022-09-28 RX ADMIN — HEPARIN SODIUM 5000 UNITS: 5000 INJECTION, SOLUTION INTRAVENOUS; SUBCUTANEOUS at 14:15

## 2022-09-28 RX ADMIN — GABAPENTIN 200 MG: 100 CAPSULE ORAL at 07:27

## 2022-09-28 RX ADMIN — ARFORMOTEROL TARTRATE 15 MCG: 15 SOLUTION RESPIRATORY (INHALATION) at 20:03

## 2022-09-28 RX ADMIN — ISOSORBIDE MONONITRATE 60 MG: 60 TABLET ORAL at 16:27

## 2022-09-28 RX ADMIN — GABAPENTIN 200 MG: 100 CAPSULE ORAL at 14:15

## 2022-09-28 RX ADMIN — DIAZEPAM 2 MG: 2 TABLET ORAL at 21:15

## 2022-09-28 RX ADMIN — ERYTHROMYCIN: 5 OINTMENT OPHTHALMIC at 18:27

## 2022-09-29 ENCOUNTER — APPOINTMENT (OUTPATIENT)
Dept: GENERAL RADIOLOGY | Facility: HOSPITAL | Age: 77
End: 2022-09-29

## 2022-09-29 ENCOUNTER — READMISSION MANAGEMENT (OUTPATIENT)
Dept: CALL CENTER | Facility: HOSPITAL | Age: 77
End: 2022-09-29

## 2022-09-29 VITALS
WEIGHT: 155 LBS | DIASTOLIC BLOOD PRESSURE: 77 MMHG | HEART RATE: 59 BPM | HEIGHT: 70 IN | BODY MASS INDEX: 22.19 KG/M2 | TEMPERATURE: 98.3 F | SYSTOLIC BLOOD PRESSURE: 120 MMHG | OXYGEN SATURATION: 100 % | RESPIRATION RATE: 16 BRPM

## 2022-09-29 LAB
DEPRECATED RDW RBC AUTO: 47.4 FL (ref 37–54)
ERYTHROCYTE [DISTWIDTH] IN BLOOD BY AUTOMATED COUNT: 13.8 % (ref 12.3–15.4)
HCT VFR BLD AUTO: 34.2 % (ref 37.5–51)
HGB BLD-MCNC: 10.9 G/DL (ref 13–17.7)
INR PPP: 1.05 (ref 0.9–1.1)
MCH RBC QN AUTO: 29.8 PG (ref 26.6–33)
MCHC RBC AUTO-ENTMCNC: 31.9 G/DL (ref 31.5–35.7)
MCV RBC AUTO: 93.4 FL (ref 79–97)
PLATELET # BLD AUTO: 211 10*3/MM3 (ref 140–450)
PMV BLD AUTO: 8.7 FL (ref 6–12)
PROTHROMBIN TIME: 13.6 SECONDS (ref 11.7–14.2)
QT INTERVAL: 367 MS
QT INTERVAL: 381 MS
QT INTERVAL: 428 MS
RBC # BLD AUTO: 3.66 10*6/MM3 (ref 4.14–5.8)
WBC NRBC COR # BLD: 7.59 10*3/MM3 (ref 3.4–10.8)

## 2022-09-29 PROCEDURE — 94761 N-INVAS EAR/PLS OXIMETRY MLT: CPT

## 2022-09-29 PROCEDURE — 94799 UNLISTED PULMONARY SVC/PX: CPT

## 2022-09-29 PROCEDURE — 93005 ELECTROCARDIOGRAM TRACING: CPT | Performed by: SURGERY

## 2022-09-29 PROCEDURE — 99024 POSTOP FOLLOW-UP VISIT: CPT | Performed by: NURSE PRACTITIONER

## 2022-09-29 PROCEDURE — 85610 PROTHROMBIN TIME: CPT | Performed by: NURSE PRACTITIONER

## 2022-09-29 PROCEDURE — 71045 X-RAY EXAM CHEST 1 VIEW: CPT

## 2022-09-29 PROCEDURE — 99232 SBSQ HOSP IP/OBS MODERATE 35: CPT | Performed by: NURSE PRACTITIONER

## 2022-09-29 PROCEDURE — 85027 COMPLETE CBC AUTOMATED: CPT | Performed by: NURSE PRACTITIONER

## 2022-09-29 PROCEDURE — 71046 X-RAY EXAM CHEST 2 VIEWS: CPT

## 2022-09-29 PROCEDURE — 94664 DEMO&/EVAL PT USE INHALER: CPT

## 2022-09-29 PROCEDURE — 25010000002 HEPARIN (PORCINE) PER 1000 UNITS: Performed by: THORACIC SURGERY (CARDIOTHORACIC VASCULAR SURGERY)

## 2022-09-29 PROCEDURE — 93010 ELECTROCARDIOGRAM REPORT: CPT | Performed by: INTERNAL MEDICINE

## 2022-09-29 RX ORDER — WARFARIN SODIUM 5 MG/1
5 TABLET ORAL
Start: 2022-09-29

## 2022-09-29 RX ORDER — ACETAMINOPHEN 500 MG
1000 TABLET ORAL 3 TIMES DAILY
Qty: 54 TABLET | Refills: 0 | Status: SHIPPED | OUTPATIENT
Start: 2022-09-29 | End: 2022-10-08

## 2022-09-29 RX ORDER — OXYCODONE HYDROCHLORIDE 5 MG/1
5 TABLET ORAL EVERY 4 HOURS PRN
Qty: 18 TABLET | Refills: 0 | Status: SHIPPED | OUTPATIENT
Start: 2022-09-29 | End: 2022-11-09

## 2022-09-29 RX ORDER — WARFARIN SODIUM 5 MG/1
5 TABLET ORAL
Status: CANCELLED | OUTPATIENT
Start: 2022-09-29

## 2022-09-29 RX ORDER — TAMSULOSIN HYDROCHLORIDE 0.4 MG/1
0.4 CAPSULE ORAL DAILY
Qty: 30 CAPSULE | Refills: 5 | Status: SHIPPED | OUTPATIENT
Start: 2022-09-29

## 2022-09-29 RX ORDER — METOPROLOL SUCCINATE 25 MG/1
25 TABLET, EXTENDED RELEASE ORAL DAILY
Qty: 30 TABLET | Refills: 0 | Status: SHIPPED | OUTPATIENT
Start: 2022-09-30 | End: 2022-11-21

## 2022-09-29 RX ORDER — WARFARIN SODIUM 10 MG/1
10 TABLET ORAL
Status: DISCONTINUED | OUTPATIENT
Start: 2022-09-29 | End: 2022-09-29 | Stop reason: HOSPADM

## 2022-09-29 RX ORDER — TAMSULOSIN HYDROCHLORIDE 0.4 MG/1
0.4 CAPSULE ORAL DAILY
Status: DISCONTINUED | OUTPATIENT
Start: 2022-09-29 | End: 2022-09-29 | Stop reason: HOSPADM

## 2022-09-29 RX ORDER — GABAPENTIN 100 MG/1
100 CAPSULE ORAL 3 TIMES DAILY
Qty: 90 CAPSULE | Refills: 0 | Status: SHIPPED | OUTPATIENT
Start: 2022-09-29 | End: 2022-11-09

## 2022-09-29 RX ADMIN — ERYTHROMYCIN 1 APPLICATION: 5 OINTMENT OPHTHALMIC at 05:55

## 2022-09-29 RX ADMIN — HEPARIN SODIUM 5000 UNITS: 5000 INJECTION, SOLUTION INTRAVENOUS; SUBCUTANEOUS at 05:54

## 2022-09-29 RX ADMIN — GABAPENTIN 200 MG: 100 CAPSULE ORAL at 05:54

## 2022-09-29 RX ADMIN — ACETAMINOPHEN 1000 MG: 500 TABLET, FILM COATED ORAL at 08:56

## 2022-09-29 RX ADMIN — CETIRIZINE HYDROCHLORIDE 10 MG: 10 TABLET ORAL at 08:56

## 2022-09-29 RX ADMIN — METOPROLOL SUCCINATE 25 MG: 25 TABLET, EXTENDED RELEASE ORAL at 08:56

## 2022-09-29 RX ADMIN — BUDESONIDE 0.5 MG: 0.5 INHALANT ORAL at 09:10

## 2022-09-29 RX ADMIN — DIAZEPAM 2 MG: 2 TABLET ORAL at 08:59

## 2022-09-29 RX ADMIN — ATORVASTATIN CALCIUM 10 MG: 20 TABLET, FILM COATED ORAL at 08:56

## 2022-09-30 NOTE — OUTREACH NOTE
Prep Survey    Flowsheet Row Responses   [facility] patient discharged from? [facility]   Is LACE score < 7 ? No   Emergency Room discharge w/ pulse ox? No   Eligibility Readm Mgmt   Discharge diagnosis  Pulmonary nodule, T1CN1 large cell neuroendocrine carcinoma   Does the patient have one of the following disease processes/diagnoses(primary or secondary)? Cardiothoracic surgery   Does the patient have Home health ordered? No   Is there a DME ordered? No   Prep survey completed? Yes          BRANDI Diaz Registered Nurse

## 2022-10-03 ENCOUNTER — READMISSION MANAGEMENT (OUTPATIENT)
Dept: CALL CENTER | Facility: HOSPITAL | Age: 77
End: 2022-10-03

## 2022-10-03 NOTE — OUTREACH NOTE
CT Surgery Week 1 Survey    Flowsheet Row Responses   [facility] patient discharged from? [place]   Does the patient have one of the following disease processes/diagnoses(primary or secondary)? Cardiothoracic surgery   Week 1 attempt successful? Yes   Call start time 1325   Call end time 1328   Discharge diagnosis  Pulmonary nodule, T1CN1 large cell neuroendocrine carcinoma   Is patient permission given to speak with other caregiver? Yes   List who call center can speak with Kate-SO   Person spoke with today (if not patient) and relationship Kate-SO   Meds reviewed with patient/caregiver? Yes   Is the patient having any side effects they believe may be caused by any medication additions or changes? No   Does the patient have all medications related to this admission filled (includes all antibiotics, pain medications, cardiac medications, etc.) Yes   Is the patient taking all medications as directed (includes completed medication regime)? Yes   Comments regarding appointments Instructed to schedule PCP appt   Does the patient have a primary care provider?  Yes   Does the patient have an appointment scheduled with their C/T surgeon? Yes   Has the patient kept scheduled appointments due by today? N/A   Comments CTS follow up 10/13/22   Has home health visited the patient within 72 hours of discharge? N/A   Psychosocial issues? No   Did the patient receive a copy of their discharge instructions? Yes   Nursing interventions Reviewed instructions with patient   What is the patient's perception of their health status since discharge? Improving   Nursing interventions Nurse provided patient education   Is the patient/caregiver able to teach back normal signs of recovery? Nausea and lack of appetite, Depression or irritability, Constipation, Pain or discomfort at incisional site   Nursing interventions Reassured on normal signs of recovery   Is the patient /caregiver able to teach back basic post-op care? Continue  use of incentive spirometry at least 1 week post discharge, Practice 'cough and deep breath', Drive as instructed by MD in discharge instructions, Lifting as instructed by MD in discharge instructions, No tub bath, swimming, or hot tub until instructed by MD, Keep incision areas clean, dry and protected   Is the patient/caregiver able to teach back signs and symptoms of incisional infection? Increased redness, swelling or pain at the incisonal site, Increased drainage or bleeding, Incisional warmth, Pus or odor from incision, Fever   Is the patient/caregiver able to teach back steps to recovery at home? Set small, achievable goals for return to baseline health, Rest and rebuild strength, gradually increase activity, Eat a well-balance diet   If the patient is a current smoker, are they able to teach back resources for cessation? Smoking cessation medications  [Using nicotine patches.]   Is the patient/caregiver able to teach back the hierarchy of who to call/visit for symptoms/problems? PCP, Specialist, Home health nurse, Urgent Care, ED, 911 Yes   Week 1 call completed? Yes            KADY FISHMAN - Registered Nurse

## 2022-10-04 DIAGNOSIS — R91.1 NODULE OF LOWER LOBE OF LEFT LUNG: Primary | ICD-10-CM

## 2022-10-06 ENCOUNTER — NURSE NAVIGATOR (OUTPATIENT)
Dept: OTHER | Facility: HOSPITAL | Age: 77
End: 2022-10-06

## 2022-10-06 NOTE — PROGRESS NOTES
Rec'd call from Kate, significant other. She states Giovani is doing well post surgery. He did require a urinary catheter for a while, which has been removed. He is voiding better and has a follow up appointment with urology next week. He sees Dr. Joe next week, Dr. Bishop 10/27 and has an OAFA with Vannessa 10/27. Kate states he will likely require chemotherapy, which will be discussed at the end of the month when he sees Dr. Bishop.  He is not really taking his neurotin much as Kate states urinary issues are a side effect. He is taking it on occasion as well as his pain medication as needed.  He is moving around the house fairly well and only has minimal shortness of breath, which they expected post surgery.  He does use oxygen as needed throughout the day.     Kate verbalized next steps in his cancer care including development of his treatment plan following upcoming appts.    Resources through the cancer center were again discussed including  and [facility]. Kate denied any resource needs at this time.     I will follow up early next month and they will call as needed    [employer] 2

## 2022-10-11 ENCOUNTER — DOCUMENTATION (OUTPATIENT)
Dept: PHARMACY | Facility: HOSPITAL | Age: 77
End: 2022-10-11

## 2022-10-11 NOTE — PROGRESS NOTES
I called [employer] to determine whether Chantix had come off backorder. It has not and there is no future release date given.  I called the pt to let him know that I'm still keeping track of the situation. He told me that he's cut down of his smoking anyway.  He's gone from 2 packs a day to just 5-6 cigarettes a day.  He said he's done it gradually.

## 2022-10-12 ENCOUNTER — READMISSION MANAGEMENT (OUTPATIENT)
Dept: CALL CENTER | Facility: HOSPITAL | Age: 77
End: 2022-10-12

## 2022-10-12 NOTE — OUTREACH NOTE
CT Surgery Week 2 Survey    Flowsheet Row Responses   [facility] patient discharged from? [place]   Does the patient have one of the following disease processes/diagnoses(primary or secondary)? Cardiothoracic surgery   Week 2 attempt successful? Yes   Call start time 1316   Call end time 1321   Discharge diagnosis  Pulmonary nodule, T1CN1 large cell neuroendocrine carcinoma   Is patient permission given to speak with other caregiver? Yes   List who call center can speak with Kate-SO   Person spoke with today (if not patient) and relationship Kate-SO   Meds reviewed with patient/caregiver? Yes   Does the patient have all medications related to this admission filled (includes all antibiotics, pain medications, cardiac medications, etc.) Yes   Is the patient taking all medications as directed (includes completed medication regime)? Yes  [patient has decreased the gabapentin because they feel it may be causing some issue with urination. Advised to discuss with physician. ]   Does the patient have a primary care provider?  Yes   Does the patient have an appointment scheduled with their C/T surgeon? Yes   Has the patient kept scheduled appointments due by today? Yes   Comments CTS follow up 10/13/22   Has home health visited the patient within 72 hours of discharge? N/A   Psychosocial issues? No   Did the patient receive a copy of their discharge instructions? Yes   Nursing interventions Reviewed instructions with patient   What is the patient's perception of their health status since discharge? Improving   Is the patient/caregiver able to teach back normal signs of recovery? Nausea and lack of appetite   Nursing interventions Reassured on normal signs of recovery   Is the patient /caregiver able to teach back basic post-op care? Practice 'cough and deep breath', Keep incision areas clean, dry and protected   Is the patient/caregiver able to teach back signs and symptoms of incisional infection? Increased  redness, swelling or pain at the incisonal site, Increased drainage or bleeding, Incisional warmth, Pus or odor from incision, Fever   Is the patient/caregiver able to teach back steps to recovery at home? Set small, achievable goals for return to baseline health, Rest and rebuild strength, gradually increase activity, Eat a well-balance diet   Is the patient/caregiver able to teach back the hierarchy of who to call/visit for symptoms/problems? PCP, Specialist, Home health nurse, Urgent Care, ED, 911 Yes   Week 2 call completed? Yes          LINDA MCCAIN - Registered Nurse

## 2022-10-13 ENCOUNTER — HOSPITAL ENCOUNTER (OUTPATIENT)
Dept: GENERAL RADIOLOGY | Facility: HOSPITAL | Age: 77
Discharge: HOME | End: 2022-10-13
Admitting: NURSE PRACTITIONER

## 2022-10-13 ENCOUNTER — OFFICE VISIT (OUTPATIENT)
Dept: SURGERY | Facility: CLINIC | Age: 77
End: 2022-10-13

## 2022-10-13 VITALS
HEIGHT: 70 IN | OXYGEN SATURATION: 99 % | SYSTOLIC BLOOD PRESSURE: 118 MMHG | BODY MASS INDEX: 22.19 KG/M2 | WEIGHT: 154.98 LBS | HEART RATE: 80 BPM | DIASTOLIC BLOOD PRESSURE: 60 MMHG

## 2022-10-13 DIAGNOSIS — R91.1 NODULE OF LOWER LOBE OF LEFT LUNG: ICD-10-CM

## 2022-10-13 DIAGNOSIS — R91.1 PULMONARY NODULE: Primary | ICD-10-CM

## 2022-10-13 DIAGNOSIS — Z09 FOLLOW-UP EXAMINATION FOLLOWING SURGERY: ICD-10-CM

## 2022-10-13 DIAGNOSIS — F17.200 TOBACCO USE DISORDER: ICD-10-CM

## 2022-10-13 PROCEDURE — 99024 POSTOP FOLLOW-UP VISIT: CPT | Performed by: THORACIC SURGERY (CARDIOTHORACIC VASCULAR SURGERY)

## 2022-10-13 PROCEDURE — 71046 X-RAY EXAM CHEST 2 VIEWS: CPT

## 2022-10-13 RX ORDER — IBUPROFEN 600 MG/1
500 TABLET ORAL EVERY 6 HOURS PRN
COMMUNITY
End: 2022-11-09

## 2022-10-13 NOTE — PROGRESS NOTES
"Answers for HPI/ROS submitted by the patient on 10/12/2022  What is the primary reason for your visit?: Other  Please describe your symptoms.: Fu from lung cancer surgery- back pain  Have you had these symptoms before?: No  How long have you been having these symptoms?: 1-2 weeks  Please list any medications you are currently taking for this condition.: Gabapentin oxicodone tylenol- not taking routinely  Please describe any probable cause for these symptoms. : Unknown unless from surgery    Chief Complaint  First postoperative visit    Subjective        Giovani España presents to [facility] THORACIC SURGERY  History of Present Illness     Giovani España was seen in the office today for his first follow-up visit following a robot-assisted total decortication left lung followed by left lower lobectomy with mediastinal lymphadenectomy.  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  Patient has follow-up appointment with Dr. Bishop of the [facility] oncology group on October 27.  Patient reports no chest wall pain.  He has been having exacerbation of his chronic low back pain.  He has been using a heating pad to his low back and this seems to help.  He denies shortness of breath.  He has no cough or hemoptysis.  He has no pleuritic pain.  His appetite has returned to normal.    Objective   Vital Signs:  /60 (BP Location: Right arm, Patient Position: Sitting, Cuff Size: Adult)   Pulse 80   Ht 177.8 cm (70\")   Wt 70.3 kg (154 lb 15.7 oz)   SpO2 99%   BMI 22.24 kg/m²   Estimated body mass index is 22.24 kg/m² as calculated from the following:    Height as of this encounter: 177.8 cm (70\").    Weight as of this encounter: 70.3 kg (154 lb 15.7 oz).    BMI is within normal parameters. No other follow-up for BMI required.      Physical Exam     Neck: No masses and no adenopathy    Chest: Incisions are clean and dry and healing well.  One of the port sites has separation of the " skin edges but this is healing and without signs of infection.  The chest wall is stable.    Pulmonary: The lungs are clear to auscultation with equal breath sounds bilaterally    Cardiac: Regular rate and rhythm.  No murmurs or gallops.  No dependent edema.    Result Review :  The following data was reviewed by: Nicola Joe III, MD on 10/13/2022:    Chest x-ray performed today was independently reviewed and compared to previous x-rays.  No significant pneumothorax.  Some blunting of the left costophrenic angle.  Elevation of the left hemidiaphragm with volume loss in the left chest.  No pulmonary infiltrates.  Right lung remains clear.  Generally the usual postoperative findings.         Assessment and Plan   Diagnoses and all orders for this visit:    1. Pulmonary nodule (Primary)  -     XR Chest 2 View; Future    2. Follow-up examination following surgery  -     XR Chest 2 View; Future    Patient is making a good recovery from his left lower lobectomy.  Low back pain is probably just exacerbation of a chronic situation.  Patient may resume all of his normal activities without any restrictions.  He will follow-up with Dr. Bishop on October 27.  I will see him in 4 weeks with a repeat chest x-ray for further follow-up and for wound check.  I will keep you informed of his progress.       I spent 18 minutes caring for Giovani on this date of service. This time includes time spent by me in the following activities:preparing for the visit, reviewing tests, performing a medically appropriate examination and/or evaluation , counseling and educating the patient/family/caregiver, ordering medications, tests, or procedures, referring and communicating with other health care professionals , documenting information in the medical record, independently interpreting results and communicating that information with the patient/family/caregiver and care coordination  Follow Up   Return in about 4 weeks (around 11/10/2022) for  Recheck.  Patient was given instructions and counseling regarding his condition or for health maintenance advice. Please see specific information pulled into the AVS if appropriate.

## 2022-10-20 ENCOUNTER — READMISSION MANAGEMENT (OUTPATIENT)
Dept: CALL CENTER | Facility: HOSPITAL | Age: 77
End: 2022-10-20

## 2022-10-20 NOTE — OUTREACH NOTE
CT Surgery Week 3 Survey    Flowsheet Row Responses   [facility] patient discharged from? [facility]   Does the patient have one of the following disease processes/diagnoses(primary or secondary)? Cardiothoracic surgery   Week 3 attempt successful? No   Unsuccessful attempts Attempt 1          KADY FISHMAN - Registered Nurse

## 2022-10-25 ENCOUNTER — READMISSION MANAGEMENT (OUTPATIENT)
Dept: CALL CENTER | Facility: HOSPITAL | Age: 77
End: 2022-10-25

## 2022-10-25 NOTE — OUTREACH NOTE
CT Surgery Week 3 Survey    Flowsheet Row Responses   [facility] [place] patient discharged from? [facility]   Does the patient have one of the following disease processes/diagnoses(primary or secondary)? Cardiothoracic surgery   Week 3 attempt successful? No   Unsuccessful attempts Attempt 2          BEBO MONTAÑO - Registered Nurse

## 2022-10-26 NOTE — PROGRESS NOTES
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  He had been assessed particularly in the fall 2021 when he presented with nausea and vomiting and abdominal discomfort leading to assessments that revealed sigmoid diverticulitis with contained rupture and partial small bowel obstruction.  Records from mid September 21 indicating that he was admitted with partial small bowel obstruction and treated medically with very slow recovery.  He has history of COPD with CT demonstrating a pulmonary nodule in the right lung base at 7 mm with follow-up planned.  He was seen by general surgery in later September with repeat scans planned and repeat CT abdomen 10/7/2021 did demonstrate interval improvement of sigmoid diverticulitis.      Record indicates an assessment in late June 2022 at different general surgeon for left inguinal hernia, history of heavy tobacco use with COPD and recommendation for surgical repair of his hernia      The patient underwent a CT scan of the chest 5/7/2022 demonstrating diffuse emphysematous changes, ill-defined density measuring 3 mm in the superior segment of the right lower lobe, multiple ill-defined 3 to 4 mm nodular density thought to be inflammatory involving the right lower lobe and a new spiculated nodule involving the left lower lobe measuring 16 x 14 mm as well as left hilar adenopathy.       Follow-up PET/CT 7/13/2022 reveal a hypermetabolic spiculated lesion medial aspect the left lower lobe adjacent to descending thoracic aorta measuring 1.5 x 1.6 SUV 9.3 with an enlarged hypermetabolic left hilar lymph node measured 1.5 x 1.3 with SUV of 12.1, additional nodules in the lateral aspect  right lower lobe and micronodule in the posterior/medial right lower lobe and also additional right lower lobe micronodule.  There is an infrarenal abdominal aortic aneurysm measuring 3.4 cm with a short segment of chronic dissection present.    These clinical findings would be consistent with a possible T1b N1 M0-stage IIb lung cancer.      Recognizing a diagnosis has not been made his case was discussed in thoracic conference 7/20/2022.  Recommendations include proceeding to pulmonary assessment with EBUS and the patient undergoing a general assessment per his clinical status-older adult assessment as well as assessment by thoracic surgery.  These issues are discussed with the patient and his wife in detail when they are seen 7/28/2022.    The patient proceeded to undergo his hernia surgery 8/2/2022 by Dr. Dotson.  Additionally the patient was unable to undergo assessment by pulmonary until October and, thereafter, was scheduled to see Dr. Joe 8/18/2022 undergoing older adult functional assessment with a JAGUAR score of 11 indicating a risk of functional decline related to cancer therapy.    The patient is seen with his wife 8/15/2022 and doing very well with recent hernia surgery.  His performance status is reasonably good at present and we have learned now that he would not be able to see pulmonary medicine until October, thoracic surgery is scheduled this week with Dr. Joe.  Perhaps he could be a surgical candidate.  Particularly we know by van Mata that T p53 splice site mutation is present and would certainly be consistent as well the most common mutations found in lung cancers particularly squamous cancers.  We have discussed obtaining pulmonary functions at this point, thoracic surgery assessment this week and also restarting Chantix as possible for the patient.    There was difficulty obtaining Chantix and while this was being assessed the patient was reviewed 8/18 by thoracic surgery.  He was felt  to have a T1b N1 M0 stage IIb carcinoma left lower lobe along and not a candidate for biopsy.  PFTs were borderline, functional assessment was reasonably good and the patient was felt to be a candidate for robot-assisted biopsy of his nodes and if malignant proceed to left lower lobe lobectomy.  He was reviewed 9/8 and offered 21 mg nicotine transdermal patching.    He is next seen 9/10/2022.  He is seen with his wife and both are prepared for surgery 9/23/2022.  We discussed that additional therapy may be considered once we have more information about the type and stage.  We would hope to see him postoperatively.    The patient was admitted 9//2022 undergoing 9/23/2022  a bronchoscopy with left video-assisted thoracoscopy with robot-assisted total decortication of the left lung, left lower lobectomy, mediastinal lymphadenectomy and intercostal nerve block with Dr. Nicola Joe.  Fortunately he did fairly well postoperatively though did develop atrial fibrillation with RVR treated with amiodarone converting back to sinus rhythm, treated with IV metoprolol subsequent chronic anticoagulation.  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  Notably there is invasive carcinoma present at the margin.  Is a, and only 2 positive lymph nodes adjacent to the bronchovesicular margin which appears to have been transected difficult to enumerate with extranodal extension present.    The patient is seen 10/27/2022.  He is recovering well from surgery, albeit slowly, and we have discussed his findings that are concerning as to residual disease with a positive margin described as above.  There is also the significance potentially of PD-L1 expression which has been described as producing a poor survival.  Nivolumab has been used as second line therapy to positive effect in the disease and this begs  the question as to whether adjuvant therapy plus immunotherapy could be utilized though the patient would be difficult to treat with platinum based chemotherapy as well.      Past Medical History:   Diagnosis Date   • Arthritis    • COPD (chronic obstructive pulmonary disease) (HCC)     O2 3L AT HS   • Diabetes mellitus (HCC)     DIET CONTROL   • Diverticulitis    • DVT, lower extremity (HCC)     RLE   • Elevated cholesterol    • H/O Cervical pain    • History of colitis    • Hyperlipidemia    • Hypertension    • Left shoulder pain    • Low back pain    • Lung cancer (HCC) 2022    LEFT LUNG   • On anticoagulant therapy    • Peripheral arterial disease (HCC)    • Right leg claudication (HCC)    • Skin cancer     HX        Past Surgical History:   Procedure Laterality Date   • BALLOON ANGIOPLASTY, ARTERY Right 2015    IR Percutaneious IVC filter placement   • INGUINAL HERNIA REPAIR Left    • KNEE ARTHROSCOPY Left     Torn meniscus   • LOBECTOMY Left 9/23/2022    Procedure: BRONCHOSCOPY, LEFT VIDEO ASSISTED THORACOSCOPY, ROBOT ASSISTED TOTAL DECORTICATION  LEFT LUNG, LEFT LOWER LOBE LOBECTOMY, MEDIASTINAL LYMPHECTOMY, INTERCOSTAL NERVE BLOCK;  Surgeon: Nicola Joe III, MD;  [facility];  Service: Robotics - [facility];  Laterality: Left;        Current Outpatient Medications on File Prior to Visit   Medication Sig Dispense Refill   • arformoterol (BROVANA) 15 MCG/2ML nebulizer solution Take 15 mcg by nebulization 2 (Two) Times a Day.     • budesonide (PULMICORT) 0.5 MG/2ML nebulizer solution Take 0.5 mg by nebulization 2 (Two) Times a Day.     • cetirizine (zyrTEC) 10 MG tablet Take 10 mg by mouth Daily.     • isosorbide mononitrate (IMDUR) 60 MG 24 hr tablet Take 60 mg by mouth Every Evening.     • metoprolol succinate XL (TOPROL-XL) 25 MG 24 hr tablet Take 1 tablet by mouth Daily for 30 days. 30 tablet 0   • nicotine (NICODERM CQ) 21 MG/24HR patch Place 1 patch on the skin as directed by provider As  Needed.     • simvastatin (ZOCOR) 20 MG tablet Take 20 mg by mouth Every Night.     • tamsulosin (FLOMAX) 0.4 MG capsule 24 hr capsule Take 1 capsule by mouth Daily. 30 capsule 5   • warfarin (COUMADIN) 5 MG tablet Take 1 tablet by mouth Daily. Take 10mg on 9/29/22 and then resume regular home schedule.     • budesonide-formoterol (SYMBICORT) 160-4.5 MCG/ACT inhaler Inhale 2 puffs 2 (Two) Times a Day. 1 each 3   • gabapentin (NEURONTIN) 100 MG capsule Take 1 capsule by mouth 3 (Three) Times a Day for 30 days. 90 capsule 0   • ibuprofen (ADVIL,MOTRIN) 600 MG tablet Take 500 mg by mouth Every 6 (Six) Hours As Needed for Mild Pain. PATIENT IS TAKING 500mg     • nicotine polacrilex (NICORETTE) 4 MG gum Chew 4 mg As Needed for Smoking Cessation.     • oxyCODONE (ROXICODONE) 5 MG immediate release tablet Take 1 tablet by mouth Every 4 (Four) Hours As Needed for Moderate Pain. 18 tablet 0     No current facility-administered medications on file prior to visit.        ALLERGIES:  No Known Allergies     Social History     Socioeconomic History   • Marital status:    • Years of education: 1 year college   Tobacco Use   • Smoking status: Every Day     Packs/day: 2.00     Years: 59.00     Pack years: 118.00     Types: Cigarettes     Start date: 1963   • Smokeless tobacco: Never   • Tobacco comments:     9/8/22: Down to Less than 1 PPD   Vaping Use   • Vaping Use: Never used   Substance and Sexual Activity   • Alcohol use: Not Currently   • Drug use: Never   • Sexual activity: Defer        Family History   Problem Relation Age of Onset   • No Known Problems Mother    • Cancer Father    • Lung cancer Father    • Diabetes Brother    • Other Daughter         S/P stent, age 42   • No Known Problems Son    • Malig Hyperthermia Neg Hx         Review of Systems   Constitutional: Positive for activity change, fatigue and unexpected weight change (Status post his diverticulitis episode, weight has not been regained).   HENT:  "Negative.    Eyes: Negative.    Respiratory: Positive for shortness of breath. Negative for cough.    Cardiovascular: Negative.    Gastrointestinal: Negative.    Genitourinary: Negative.    Musculoskeletal: Negative.    Skin: Negative.    Neurological: Negative.    Psychiatric/Behavioral: Negative.         Objective     Vitals:    10/27/22 1121   BP: 116/71   Pulse: 67   Resp: 16   Temp: 96.9 °F (36.1 °C)   TempSrc: Temporal   SpO2: 93%   Weight: 68.7 kg (151 lb 8 oz)   Height: 177.8 cm (70\")   PainSc: 0-No pain     Current Status 10/27/2022   ECOG score 0       Physical Exam  Constitutional:       Appearance: Normal appearance.   HENT:      Head: Normocephalic and atraumatic.      Nose: Nose normal.      Mouth/Throat:      Mouth: Mucous membranes are moist.      Pharynx: Oropharynx is clear.   Eyes:      Extraocular Movements: Extraocular movements intact.      Conjunctiva/sclera: Conjunctivae normal.      Pupils: Pupils are equal, round, and reactive to light.   Cardiovascular:      Rate and Rhythm: Normal rate and regular rhythm.      Pulses: Normal pulses.      Heart sounds: Normal heart sounds.   Pulmonary:      Comments: Prolonged expiratory phase bilaterally  Abdominal:      General: Bowel sounds are normal.      Palpations: Abdomen is soft.      Comments: Scaphoid   Musculoskeletal:         General: Normal range of motion.      Cervical back: Normal range of motion and neck supple.   Skin:     General: Skin is warm and dry.   Neurological:      General: No focal deficit present.      Mental Status: He is alert and oriented to person, place, and time.   Psychiatric:         Mood and Affect: Mood normal.           RECENT LABS:  Hematology WBC   Date Value Ref Range Status   10/27/2022 10.03 3.40 - 10.80 10*3/mm3 Final   05/09/2022 7.54 4.5 - 11.0 10*3/uL Final     RBC   Date Value Ref Range Status   10/27/2022 3.89 (L) 4.14 - 5.80 10*6/mm3 Final   05/09/2022 5.52 4.5 - 5.9 10*6/uL Final     Hemoglobin   Date " Value Ref Range Status   10/27/2022 12.0 (L) 13.0 - 17.7 g/dL Final   05/09/2022 16.4 13.5 - 17.5 g/dL Final     Hematocrit   Date Value Ref Range Status   10/27/2022 36.1 (L) 37.5 - 51.0 % Final   05/09/2022 51.0 41.0 - 53.0 % Final     Platelets   Date Value Ref Range Status   10/27/2022 139 (L) 140 - 450 10*3/mm3 Final   05/09/2022 204 140 - 440 10*3/uL Final          Assessment & Plan           77-year-old male with medical issues including type 2 diabetes-uncertain control, COPD, hypertension, diastolic heart failure, chronic disease, peripheral vascular disease (follow-up with vascular surgery today), previous DVT on anticoagulation (home monitoring), previous IVC filter placement?,  Status post September 2021 partial small bowel obstruction secondary to sigmoid diverticulitis treated medically.        He had had a right lung base nodule noted on scan at that point and in follow-up with primary care had developed a left inguinal hernia with repair planned through a different general surgeon then seen with his initial sigmoid diverticulitis.  An additional CT scan of the chest done in follow-up of his previously abnormal study now revealed not only the previously noted right lower lobe and additional nodules but a spiculated nodule in the left lower lobe measuring 16 x 14 mm.                                 Follow-up PET/CT demonstrated a hypermetabolic spiculated lesion medial aspect the left lower lobe adjacent to descending thoracic aorta measuring 1.5 x 1.6 SUV 9.3 with an enlarged hypermetabolic left hilar lymph node measured 1.5 x 1.3 with SUV of 12.1, additional nodules in the lateral aspect right lower lobe and micronodule in the posterior/medial right lower lobe and also additional right lower lobe micronodule.  There is an infrarenal abdominal aortic aneurysm measuring 3.4 cm with a short segment of chronic dissection present.    These clinical findings would be consistent with a possible T1b N1  M0-stage IIb lung cancer.   Recognizing a diagnosis has not been made his case was discussed in thoracic conference 7/20/2022.  Recommendations include proceeding to pulmonary assessment with EBUS and the patient undergoing a general assessment per his clinical status-older adult assessment as well as assessment by thoracic surgery.  These issues are discussed with the patient and his wife in detail when they are seen 7/28/2022.    The patient proceeded to undergo his hernia surgery 8/2/2022 by Dr. Dotson.  Additionally the patient was unable to undergo assessment by pulmonary until October and, thereafter, was scheduled to see Dr. Joe 8/18/2022 undergoing older adult functional assessment with a JAGUAR score of 11 indicating a risk of functional decline related to cancer therapy.    The patient is seen with his wife 8/15/2022 and doing very well with recent hernia surgery.  His performance status is reasonably good at present and we have learned now that he would not be able to see pulmonary medicine until October, thoracic surgery is scheduled this week with Dr. Joe.  Perhaps he could be a surgical candidate.  Particularly we know by van 360 that T p53 splice site mutation is present and would certainly be consistent as well the most common mutations found in lung cancers particularly squamous cancers.  We have discussed obtaining pulmonary functions at this point, thoracic surgery assessment this week and also restarting Chantix as possible for the patient.    There was difficulty obtaining Chantix and while this was being assessed the patient was reviewed 8/18 by thoracic surgery.  He was felt to have a T1b N1 M0 stage IIb carcinoma left lower lobe along and not a candidate for biopsy.  PFTs were borderline, functional assessment was reasonably good and the patient was felt to be a candidate for robot-assisted biopsy of his nodes and if malignant proceed to left lower lobe lobectomy.  He was reviewed  9/8 and offered 21 mg nicotine transdermal patching.    He is next seen 9/10/2022.  He is seen with his wife and both are prepared for surgery 9/23/2022.  We discussed that additional therapy may be considered once we have more information about the type and stage.  We would hope to see him postoperatively.    The patient was admitted 9//2022 undergoing 9/23/2022  a bronchoscopy with left video-assisted thoracoscopy with robot-assisted total decortication of the left lung, left lower lobectomy, mediastinal lymphadenectomy and intercostal nerve block with Dr. Nicola Joe.  Fortunately he did fairly well postoperatively though did develop atrial fibrillation with RVR treated with amiodarone converting back to sinus rhythm, treated with IV metoprolol subsequent chronic anticoagulation.  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  Notably there is invasive carcinoma present at the margin.  Is a, and only 2 positive lymph nodes adjacent to the bronchovesicular margin which appears to have been transected difficult to enumerate with extranodal extension present.       The patient is seen 10/27/2022.  He is recovering well from surgery, albeit slowly, and we have discussed his findings that are concerning as to residual disease with a positive margin described as above.  There is also the significance potentially of PD-L1 expression which has been described as producing a poor survival.  Nivolumab has been used as second line therapy to positive effect in the disease and this begs the question as to whether adjuvant therapy plus immunotherapy could be utilized though the patient would be difficult to treat with platinum based chemotherapy as well.       Options could well be Carboplatin and Taxol considering the patient's comorbidity and worry of using cisplatin-based chemotherapy in  this patient followed by atezolizumab with pathology having been notified to assess PD-L1 expression on the tumor as well also await review by [facility] molecular profiling.  Finally radiation therapy consultation be requested.    Plan:  *Radiation therapy consultation    * to see per patient's current insurance coverage    *Plan to present patient's circumstances/case at thoracic conference next week    *10 days to 2 weeks MD DUNHAM spent 70 minutes caring for Giovani on this date of service. This time includes time spent by me in the following activities: preparing for the visit, reviewing tests, obtaining and/or reviewing a separately obtained history, performing a medically appropriate examination and/or evaluation, counseling and educating the patient/family/caregiver, ordering medications, tests, or procedures, referring and communicating with other health care professionals, documenting information in the medical record, independently interpreting results and communicating that information with the patient/family/caregiver and care coordination.

## 2022-10-27 ENCOUNTER — OFFICE VISIT (OUTPATIENT)
Dept: ONCOLOGY | Facility: CLINIC | Age: 77
End: 2022-10-27

## 2022-10-27 ENCOUNTER — OFFICE VISIT (OUTPATIENT)
Dept: OTHER | Facility: HOSPITAL | Age: 77
End: 2022-10-27

## 2022-10-27 ENCOUNTER — LAB (OUTPATIENT)
Dept: LAB | Facility: HOSPITAL | Age: 77
End: 2022-10-27

## 2022-10-27 VITALS
HEART RATE: 67 BPM | TEMPERATURE: 96.9 F | HEIGHT: 70 IN | RESPIRATION RATE: 16 BRPM | BODY MASS INDEX: 21.69 KG/M2 | DIASTOLIC BLOOD PRESSURE: 71 MMHG | OXYGEN SATURATION: 93 % | WEIGHT: 151.5 LBS | SYSTOLIC BLOOD PRESSURE: 116 MMHG

## 2022-10-27 DIAGNOSIS — C7A.8 NEUROENDOCRINE CARCINOMA OF LUNG: Primary | ICD-10-CM

## 2022-10-27 DIAGNOSIS — R91.1 NODULE OF LOWER LOBE OF LEFT LUNG: Primary | ICD-10-CM

## 2022-10-27 DIAGNOSIS — D47.2 MGUS (MONOCLONAL GAMMOPATHY OF UNKNOWN SIGNIFICANCE): ICD-10-CM

## 2022-10-27 DIAGNOSIS — R91.1 PULMONARY NODULE: ICD-10-CM

## 2022-10-27 DIAGNOSIS — F17.200 TOBACCO USE DISORDER: ICD-10-CM

## 2022-10-27 DIAGNOSIS — C7A.8 NEUROENDOCRINE CARCINOMA OF LUNG: ICD-10-CM

## 2022-10-27 LAB
BASOPHILS # BLD AUTO: 0.06 10*3/MM3 (ref 0–0.2)
BASOPHILS NFR BLD AUTO: 0.6 % (ref 0–1.5)
DEPRECATED RDW RBC AUTO: 49.6 FL (ref 37–54)
EOSINOPHIL # BLD AUTO: 0.15 10*3/MM3 (ref 0–0.4)
EOSINOPHIL NFR BLD AUTO: 1.5 % (ref 0.3–6.2)
ERYTHROCYTE [DISTWIDTH] IN BLOOD BY AUTOMATED COUNT: 14.6 % (ref 12.3–15.4)
HCT VFR BLD AUTO: 36.1 % (ref 37.5–51)
HGB BLD-MCNC: 12 G/DL (ref 13–17.7)
IMM GRANULOCYTES # BLD AUTO: 0.13 10*3/MM3 (ref 0–0.05)
IMM GRANULOCYTES NFR BLD AUTO: 1.3 % (ref 0–0.5)
LYMPHOCYTES # BLD AUTO: 2.6 10*3/MM3 (ref 0.7–3.1)
LYMPHOCYTES NFR BLD AUTO: 25.9 % (ref 19.6–45.3)
MCH RBC QN AUTO: 30.8 PG (ref 26.6–33)
MCHC RBC AUTO-ENTMCNC: 33.2 G/DL (ref 31.5–35.7)
MCV RBC AUTO: 92.8 FL (ref 79–97)
MONOCYTES # BLD AUTO: 1.02 10*3/MM3 (ref 0.1–0.9)
MONOCYTES NFR BLD AUTO: 10.2 % (ref 5–12)
NEUTROPHILS NFR BLD AUTO: 6.07 10*3/MM3 (ref 1.7–7)
NEUTROPHILS NFR BLD AUTO: 60.5 % (ref 42.7–76)
NRBC BLD AUTO-RTO: 0 /100 WBC (ref 0–0.2)
PLATELET # BLD AUTO: 139 10*3/MM3 (ref 140–450)
PMV BLD AUTO: 10.2 FL (ref 6–12)
RBC # BLD AUTO: 3.89 10*6/MM3 (ref 4.14–5.8)
WBC NRBC COR # BLD: 10.03 10*3/MM3 (ref 3.4–10.8)

## 2022-10-27 PROCEDURE — G2212 PROLONG OUTPT/OFFICE VIS: HCPCS | Performed by: INTERNAL MEDICINE

## 2022-10-27 PROCEDURE — 99213 OFFICE O/P EST LOW 20 MIN: CPT | Performed by: NURSE PRACTITIONER

## 2022-10-27 PROCEDURE — 99406 BEHAV CHNG SMOKING 3-10 MIN: CPT | Performed by: NURSE PRACTITIONER

## 2022-10-27 PROCEDURE — 99215 OFFICE O/P EST HI 40 MIN: CPT | Performed by: INTERNAL MEDICINE

## 2022-10-27 PROCEDURE — 36415 COLL VENOUS BLD VENIPUNCTURE: CPT

## 2022-10-27 PROCEDURE — 85025 COMPLETE CBC W/AUTO DIFF WBC: CPT

## 2022-10-27 NOTE — PROGRESS NOTES
[facility] MULTIDISCIPLINARY CLINIC  SURVIVORSHIP VISIT: IN CLINIC  Smoking Cessation Follow-Up Visit        Regina Locke is a pleasant 77 y.o. male reviewed today in Multidisciplinary Clinic, for follow-up smoking cessation and older adult functional assessment visit.     HPI  He comes in today with his friend Kate. Had follow up with Dr Bishop just prior. Additional studies have been ordered prior to further treatment planning and they will return to the office in 10 days to follow up with Dr Bishop and discuss next steps.     He has managed to continue to reduce his daily intake of cigarettes. Anywhere between 5-16 cigarettes per day. Has been taking patch off at night and the bad nightmares have stopped. He is however not always remembering to replace it the next morning. Feels like his smoking patterns are pretty regular and predictable each day.    Generally doing ok. Appetite fair. Some increased dyspnea with ambulation, resolving with rest. Doing a little walking when able      Detailed Symptom Assessment  Symptom Distress  Pain Score: no pain   ESAS Tiredness Score: 6  ESAS Nausea Score: No nausea  ESAS Depression Score: No depression  ESAS Anxiety Score: 3  ESAS Drowsiness Score: 5  ESAS Lack of Appetite Score: 5  ESAS Wellbeing Score: 9  ESAS Dyspnea Score: 6  ESAS Source of Information: patient      TREATMENT HISTORY:     Oncology/Hematology History   Neuroendocrine carcinoma of lung (HCC)   2022 Imaging    FACILITY:  [facility]   UNIT/AGE/GENDER: BONCTCVA  OP      AGE:77 Y          SEX:M   PATIENT NAME/:  REGINA LOCKE E    1945   UNIT NUMBER:  [id number]   ACCOUNT NUMBER:  [id number]   ACCESSION NUMBER:  [id number]     EXAM: PET W/CT TUMOR SKULL MID-THIGH     DATE OF EXAM: 2022     CLINICAL HISTORY: Lung cancer screening, >= 20 pk-yr smoking history (Age >= 50y)   [id number] LEFT LUNG NODULE 2.5CM HILAR LEFT LYMPHADENOPATHY initial PET  evaluation.     COMPARISON: None available.     TECHNIQUE: PET/CT imaging was performed from the skull base to the mid-thigh following the intravenous administration of 10.36 mCi of F-18 labeled FDG. CT imaging was performed for attenuation correction and lesion localization. The patient's blood   Glucose level was 106 mg/dl at the time of radiotracer injection.   Radiation dose reduction techniques were utilized per ALARA protocol.     FINDINGS:   HEAD & NECK:   No abnormal focus of radiotracer accumulation.   Visualized portions of the brain grossly unremarkable. Upper airway patent. No cervical adenopathy.   .   CHEST:   There is a hypermetabolic spiculated lesion in the medial aspect of the left lower lobe (adjacent to the descending thoracic aorta) that measures 1.5 x 1.6 cm (AP x TRV) , max SUV 9.3. This lung lesion is favored to represent primary malignancy.   There is an enlarged hypermetabolic left hilar lymph node that measures 1.5 x 1.3 cm (AP x TRV)  (max SUV 12.1), favored to represent a site of alta metastatic disease.   Background changes of emphysema are noted with mild scarring and architectural distortion in the right lung apex.   No additional hypermetabolic lung lesions are present.   There is an 11 mm nodule in the lateral aspect of the right lower lobe (series 3 image 81). There is also a micronodule in the posterior/medial right lower lobe on series 3 image 76. There is also a micronodule in the right lower lobe on series 3 image   72.   No effusions. A small pleural calcification is noted in left posterior hemithorax.   Thoracic aortic and coronary artery calcifications are noted.   .   ABDOMEN/PELVIS:   No abnormal focus of radiotracer accumulation.   The liver is grossly unremarkable. No biliary ductal dilatation. Cholecystectomy.   Small scattered calcified splenic granulomas are noted. The spleen is otherwise unremarkable.   The adrenals and pancreas are grossly unremarkable.   The  kidneys, ureters and urinary bladder are unremarkable.   Sigmoid diverticulosis is noted. No evidence of bowel obstruction or inflammation. Appendix normal.   No hypermetabolic lymphadenopathy, free fluid or well-formed fluid collections are evident in the abdomen/pelvis.   There is an infrarenal abdominal aortic aneurysm demonstrated which measures 3.4 x 3.2 cm (AP x TRV) . The abdominal aortic aneurysm demonstrates a short segment area of chronic dissection.   IVC filter noted.   .   MUSCULOSKELETAL:   No abnormal focus of radiotracer accumulation.        .   IMPRESSION:   1. There is a spiculated lung lesion in the medial aspect of the left lower lobe that measures 1.5 cm in long axis and demonstrates hypermetabolic uptake. This is favored to represent a primary malignancy.   2. There is an enlarged hypermetabolic left hilar lymph node. This is favored to represent a site of alta metastatic disease.   3. There is an additional 11 mm nodule in the right lower lobe. There are also a couple of additional right lower lobe micronodules. These demonstrate no metabolic uptake but should be followed for monitoring.   4. An infrarenal abdominal aortic aneurysm measuring 3.4 cm is noted with a short segment area of chronic dissection present.   5. Secondary findings are discussed in the body of the report.      9/23/2022 Initial Diagnosis    Neuroendocrine carcinoma of lung (HCC)     9/23/2022 Surgery    Final Diagnosis   1. Lung, Left Lower Lobe, Lobectomy:               A. Large cell neuroendocrine carcinoma.               B. 8 of 11 lymph nodes positive for metastatic large cell neuroendocrine carcinoma (8/11).               C. See synoptic template for complete tumor details.     2. Lymph Node, L9, Excision:                A. Benign lymph node (0/1).     3. Lymph Node, L9 #2, Excision:               A. Benign lymph node (0/1).     4. Lymph Node, L7, Excision:               A. Benign lymph node (0/1).                  5. Lymph Node, L7 #2, Excision:               A. Benign lymph node (0/1).     6. Lymph Node, L7 #3, Excision:               A. Fragments of benign lymph node (0/1).                 7. Lymph Node, L10, Excision:               A. 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     Synoptic Checklist   LUNG  8th Edition - Protocol posted: 6/22/2022  LUNG: RESECTION - All Specimens  SPECIMEN   Procedure  Lobectomy    Specimen Laterality  Left    TUMOR   Tumor Focality  Single focus    Tumor Site  Lower lobe of lung    Tumor Size     Total Tumor Size (size of entire tumor)  Greatest Dimension (Centimeters): 2.5 cm   Histologic Type  Large cell neuroendocrine carcinoma    Histologic Grade  G4, undifferentiated    Spread Through Air Spaces (ADDY)  Present    Visceral Pleura Invasion  Not identified    Direct Invasion of Adjacent Structures  Not applicable (no adjacent structures present)    Treatment Effect  No known presurgical therapy    Lymphovascular Invasion  Present    MARGINS   Margin Status for Invasive Carcinoma  Invasive carcinoma present at margin    Margin(s) Involved by Invasive Carcinoma  Bronchial    Margin Status for Non-Invasive Tumor  Not applicable    REGIONAL LYMPH NODES   Lymph Node(s) from Prior Procedures  Not included    Regional Lymph Node Status  Tumor present in regional lymph node(s)    Number of Lymph Nodes with Tumor  9    Jennifer Site(s) with Tumor  10L: Hilar      12L: Lobar      13L: Segmental    Extranodal Extension  Present    Number of Lymph Nodes Examined  17    Jennifer Site(s) Examined  7: Subcarinal      9L: Pulmonary ligament      10L: Hilar      12L: Lobar      13L: Segmental         *Guardant 360: Detected alteration in TP53 Splice Site SNV; several variants of uncertain significance       10/27/2022 Cancer Staged    Staging form: Lung, AJCC 8th Edition  - Pathologic stage from 10/27/2022: Stage IIB (pT1c, pN1, cM0) - Signed by Kayden Bishop MD on  10/27/2022         Past Medical History:   Diagnosis Date   • Arthritis    • COPD (chronic obstructive pulmonary disease) (HCC)     O2 3L AT HS   • Diabetes mellitus (HCC)     DIET CONTROL   • Diverticulitis    • DVT, lower extremity (HCC)     RLE   • Elevated cholesterol    • H/O Cervical pain    • History of colitis    • Hyperlipidemia    • Hypertension    • Left shoulder pain    • Low back pain    • Lung cancer (HCC) 2022    LEFT LUNG   • On anticoagulant therapy    • Peripheral arterial disease (HCC)    • Right leg claudication (HCC)    • Skin cancer     HX       Past Surgical History:   Procedure Laterality Date   • BALLOON ANGIOPLASTY, ARTERY Right 2015    IR Percutaneious IVC filter placement   • INGUINAL HERNIA REPAIR Left    • KNEE ARTHROSCOPY Left     Torn meniscus   • LOBECTOMY Left 9/23/2022    Procedure: BRONCHOSCOPY, LEFT VIDEO ASSISTED THORACOSCOPY, ROBOT ASSISTED TOTAL DECORTICATION  LEFT LUNG, LEFT LOWER LOBE LOBECTOMY, MEDIASTINAL LYMPHECTOMY, INTERCOSTAL NERVE BLOCK;  Surgeon: Nicola Joe III, MD;  Location: [facility];  Service: [facility] - [facility];  Laterality: Left;       Social History     Socioeconomic History   • Marital status:    • Years of education: 1 year college   Tobacco Use   • Smoking status: Every Day     Packs/day: 2.00     Years: 59.00     Pack years: 118.00     Types: Cigarettes     Start date: 1963   • Smokeless tobacco: Never   • Tobacco comments:     9/8/22: Down to Less than 1 PPD   Vaping Use   • Vaping Use: Never used   Substance and Sexual Activity   • Alcohol use: Not Currently   • Drug use: Never   • Sexual activity: Defer         LDH   Date Value Ref Range Status   07/21/2022 195 99 - 259 U/L Final       Lab Results   Component Value Date    GLUCOSE 145 (H) 09/27/2022    BUN 20 09/27/2022    CREATININE 0.91 09/27/2022    EGFRIFNONA 78 09/18/2021    BCR 22.0 09/27/2022    K 4.1 09/27/2022    CO2 24.3 09/27/2022    CALCIUM 10.1 09/27/2022    PROTENTOTREF  7.4 07/21/2022    ALBUMIN 4.60 07/21/2022    ALBUMIN 3.7 07/21/2022    LABIL2 1.1 07/21/2022    AST 19 07/21/2022    ALT 18 07/21/2022       CBC w/diff    CBC w/Diff 9/24/22 9/29/22 10/27/22   WBC 13.33 (A) 7.59 10.03   RBC 3.70 (A) 3.66 (A) 3.89 (A)   Hemoglobin 11.3 (A) 10.9 (A) 12.0 (A)   Hematocrit 34.6 (A) 34.2 (A) 36.1 (A)   MCV 93.5 93.4 92.8   MCH 30.5 29.8 30.8   MCHC 32.7 31.9 33.2   RDW 13.9 13.8 14.6   Platelets 151 211 139 (A)   Neutrophil Rel % 78.0 (A)  60.5   Immature Granulocyte Rel % 0.5  1.3 (A)   Lymphocyte Rel % 11.5 (A)  25.9   Monocyte Rel % 9.9  10.2   Eosinophil Rel % 0.0 (A)  1.5   Basophil Rel % 0.1  0.6   (A) Abnormal value              Allergies as of 10/27/2022   • (No Known Allergies)       MEDICATIONS:  Medication list reviewed today    Review of Systems   Constitutional: Positive for appetite change and fatigue. Negative for activity change and unexpected weight change.   Respiratory: Positive for shortness of breath. Negative for chest tightness.    Gastrointestinal: Negative for constipation and diarrhea.   Genitourinary: Negative for difficulty urinating.   Musculoskeletal: Negative for arthralgias and myalgias.   Neurological: Negative for dizziness and light-headedness.   Psychiatric/Behavioral: Negative for dysphoric mood and sleep disturbance. The patient is nervous/anxious.        There were no vitals taken for this visit.    Wt Readings from Last 3 Encounters:   10/27/22 68.7 kg (151 lb 8 oz)   10/13/22 70.3 kg (154 lb 15.7 oz)   09/28/22 70.3 kg (155 lb)       There were no vitals filed for this visit.      Physical Exam  Constitutional:       Appearance: Normal appearance.   HENT:      Head: Normocephalic and atraumatic.   Pulmonary:      Effort: Pulmonary effort is normal.   Musculoskeletal:      Right lower leg: No edema.      Left lower leg: No edema.   Skin:     General: Skin is warm and dry.   Neurological:      Mental Status: He is alert and oriented to person,  place, and time.   Psychiatric:         Attention and Perception: Attention and perception normal.         Mood and Affect: Mood and affect normal.         Speech: Speech normal.         Behavior: Behavior normal. Behavior is cooperative.         Thought Content: Thought content normal.         Cognition and Memory: Cognition normal.         Judgment: Judgment normal.           DISCUSSION HELD TODAY:       Problems identified:  1. Difficulty remembering to replace patch in the mornings vs loss of motivation, he affirms perhaps a little of both. Encouraged to wear the patch daily. Has obtained nicotine gum. We discussed starting to replace one of his predictable cigarettes each day with a piece of nicotine gum. Urge to smoke too great in the mornings but thinks he can target his after dinner cigarette. Encouraged to wear the patch daily, exchange after dinner cigarette with piece of gum, and wait for at least one hour after gum finished before additional nicotine. He feels this is a reasonable goal.      Plan and recommendations:  1. Continue nicotine patch 21 mg daily, remove at bedtime, replace upon waking  2. 4mg nicotine gum in exchange for after dinner cigarette  3. Coping with uncertainty related to treatment planning - encouraged them to call as needed for additional support or information  4. Follow up arranged in the office for 11/14  5. Needs assistance with open enrollment for part D coverage - will follow up with social work [place]/zach gale.  6. Call my office as needed at [phone] for additional information, resources or support.        ICD-10-CM ICD-9-CM   1. Neuroendocrine carcinoma of lung (HCC)  C7A.8 209.21   2. Tobacco use disorder  F17.200 305.1       Orders Placed This Encounter   Procedures   • Ambulatory Referral to ONC Social Work     Referral Priority:   Routine     Referral Type:   Clinical Pathway     Referral Reason:   Specialty Services Required     Requested Specialty:   Social  Services     Number of Visits Requested:   1       I spent 20 minutes caring for this patient on this date of service by face-to-face counseling. This time includes time spent by me in the following activities: preparing for the visit, reviewing tests, obtaining and/or reviewing a separately obtained history, performing a medically appropriate examination and/or evaluation, counseling and educating the patient/family/caregiver, referring and communicating with other health care professionals, documenting information in the medical record and care coordination     I spent 5 minutes on the separately reported service of smoking cessation including review proper use of NRT, identifying triggers, cognitive/behavioral modifications. This time is not included in the time used to support the E/M service also reported today.

## 2022-10-28 ENCOUNTER — TELEPHONE (OUTPATIENT)
Dept: ONCOLOGY | Facility: CLINIC | Age: 77
End: 2022-10-28

## 2022-10-28 NOTE — TELEPHONE ENCOUNTER
Oncology Social Work  Referral Follow Up    OSW received referral from GARCÍA Atkinson for aid with information on prescription drug coverage. OSW called and left VM with Hannah, introducing self and a brief explanation of support available. OSW provided direct contact number, [phone], and will remain available to provide additional support.    Virginia CROWELL LCSW  Oncology Social Worker

## 2022-11-01 ENCOUNTER — NURSE NAVIGATOR (OUTPATIENT)
Dept: OTHER | Facility: HOSPITAL | Age: 77
End: 2022-11-01

## 2022-11-03 ENCOUNTER — TELEPHONE (OUTPATIENT)
Dept: ONCOLOGY | Facility: CLINIC | Age: 77
End: 2022-11-03

## 2022-11-03 ENCOUNTER — NURSE NAVIGATOR (OUTPATIENT)
Dept: OTHER | Facility: HOSPITAL | Age: 77
End: 2022-11-03

## 2022-11-03 NOTE — TELEPHONE ENCOUNTER
Oncology Social Work  Referral Follow Up    OSW spoke with Carmen on patient's behalf. Pt is in the process of re-enrolling with medicare and had questions about Part B. OSW provided them with information about the SHIP program, including their contact information to schedule an appt. OSW also collaborated with Clary Love in pharmacy and relayed information about those programs which typically provide the best coverage. OSW explained that the MTM team can also aid with addressing issues with cost of a wide variety of treatments.    Carmen bone and expressed her gratitude for OSW's help.    Carmen denied any additional needs at this time. OSW will remain available for future needs and follow up.     Virginia CROWELL LCSW  Oncology Social Worker

## 2022-11-03 NOTE — PROGRESS NOTES
Spoke with Kate, Oumar's friend. Reports Oumar's healing after surgery has been slow. He is not feeling well today and has been coughing a lot.  He met with Dr. Bishop earlier this week and will meet him again 11/7. They are waiting to hear back about his testing for possible immunotherapy. They are meeting with Dr. Marrero on 11/9.      He is not eating very well. Discussed a possible referral to [facility], which she may discuss with Dr. Bishop next week.      Kate asked about the OSW referral. I found a note in Epic that Virginia called and left a vm although Kate states she never got the vm. Gave her Virginia's number. She will call to discuss their insurance issues. We also discussed financial navigation. She agreed that Sly, financial navigator could call them to see if he could offer any assistance.    Kate denied any other issues, supportive care needs or concerns at this time. I will call next week; she or Oumar will call as needed

## 2022-11-07 ENCOUNTER — LAB (OUTPATIENT)
Dept: LAB | Facility: HOSPITAL | Age: 77
End: 2022-11-07

## 2022-11-07 ENCOUNTER — OFFICE VISIT (OUTPATIENT)
Dept: ONCOLOGY | Facility: CLINIC | Age: 77
End: 2022-11-07

## 2022-11-07 VITALS
TEMPERATURE: 96.8 F | RESPIRATION RATE: 16 BRPM | SYSTOLIC BLOOD PRESSURE: 130 MMHG | OXYGEN SATURATION: 97 % | BODY MASS INDEX: 21.64 KG/M2 | HEART RATE: 53 BPM | WEIGHT: 151.2 LBS | HEIGHT: 70 IN | DIASTOLIC BLOOD PRESSURE: 70 MMHG

## 2022-11-07 DIAGNOSIS — C7A.8 NEUROENDOCRINE CARCINOMA OF LUNG: Primary | ICD-10-CM

## 2022-11-07 DIAGNOSIS — C7A.8 NEUROENDOCRINE CARCINOMA OF LUNG: ICD-10-CM

## 2022-11-07 DIAGNOSIS — R91.1 NODULE OF LOWER LOBE OF LEFT LUNG: ICD-10-CM

## 2022-11-07 LAB
BASOPHILS # BLD AUTO: 0.04 10*3/MM3 (ref 0–0.2)
BASOPHILS NFR BLD AUTO: 0.5 % (ref 0–1.5)
DEPRECATED RDW RBC AUTO: 49.1 FL (ref 37–54)
EOSINOPHIL # BLD AUTO: 0.12 10*3/MM3 (ref 0–0.4)
EOSINOPHIL NFR BLD AUTO: 1.4 % (ref 0.3–6.2)
ERYTHROCYTE [DISTWIDTH] IN BLOOD BY AUTOMATED COUNT: 14.5 % (ref 12.3–15.4)
HCT VFR BLD AUTO: 37.2 % (ref 37.5–51)
HGB BLD-MCNC: 12.1 G/DL (ref 13–17.7)
IMM GRANULOCYTES # BLD AUTO: 0.05 10*3/MM3 (ref 0–0.05)
IMM GRANULOCYTES NFR BLD AUTO: 0.6 % (ref 0–0.5)
LYMPHOCYTES # BLD AUTO: 2.48 10*3/MM3 (ref 0.7–3.1)
LYMPHOCYTES NFR BLD AUTO: 30 % (ref 19.6–45.3)
MCH RBC QN AUTO: 29.9 PG (ref 26.6–33)
MCHC RBC AUTO-ENTMCNC: 32.5 G/DL (ref 31.5–35.7)
MCV RBC AUTO: 91.9 FL (ref 79–97)
MONOCYTES # BLD AUTO: 1.02 10*3/MM3 (ref 0.1–0.9)
MONOCYTES NFR BLD AUTO: 12.3 % (ref 5–12)
NEUTROPHILS NFR BLD AUTO: 4.57 10*3/MM3 (ref 1.7–7)
NEUTROPHILS NFR BLD AUTO: 55.2 % (ref 42.7–76)
NRBC BLD AUTO-RTO: 0 /100 WBC (ref 0–0.2)
PLATELET # BLD AUTO: 246 10*3/MM3 (ref 140–450)
PMV BLD AUTO: 8.7 FL (ref 6–12)
RBC # BLD AUTO: 4.05 10*6/MM3 (ref 4.14–5.8)
WBC NRBC COR # BLD: 8.28 10*3/MM3 (ref 3.4–10.8)

## 2022-11-07 PROCEDURE — 36415 COLL VENOUS BLD VENIPUNCTURE: CPT

## 2022-11-07 PROCEDURE — 99214 OFFICE O/P EST MOD 30 MIN: CPT | Performed by: INTERNAL MEDICINE

## 2022-11-07 PROCEDURE — 85025 COMPLETE CBC W/AUTO DIFF WBC: CPT

## 2022-11-07 NOTE — PROGRESS NOTES
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  He had been assessed particularly in the fall 2021 when he presented with nausea and vomiting and abdominal discomfort leading to assessments that revealed sigmoid diverticulitis with contained rupture and partial small bowel obstruction.  Records from mid September 21 indicating that he was admitted with partial small bowel obstruction and treated medically with very slow recovery.  He has history of COPD with CT demonstrating a pulmonary nodule in the right lung base at 7 mm with follow-up planned.  He was seen by general surgery in later September with repeat scans planned and repeat CT abdomen 10/7/2021 did demonstrate interval improvement of sigmoid diverticulitis.      Record indicates an assessment in late June 2022 at different general surgeon for left inguinal hernia, history of heavy tobacco use with COPD and recommendation for surgical repair of his hernia      The patient underwent a CT scan of the chest 5/7/2022 demonstrating diffuse emphysematous changes, ill-defined density measuring 3 mm in the superior segment of the right lower lobe, multiple ill-defined 3 to 4 mm nodular density thought to be inflammatory involving the right lower lobe and a new spiculated nodule involving the left lower lobe measuring 16 x 14 mm as well as left hilar adenopathy.       Follow-up PET/CT 7/13/2022 reveal a hypermetabolic spiculated lesion medial aspect the left lower lobe adjacent to descending thoracic aorta measuring 1.5 x 1.6 SUV 9.3 with an enlarged hypermetabolic left hilar lymph node measured 1.5 x 1.3 with SUV of 12.1, additional nodules in the lateral aspect  right lower lobe and micronodule in the posterior/medial right lower lobe and also additional right lower lobe micronodule.  There is an infrarenal abdominal aortic aneurysm measuring 3.4 cm with a short segment of chronic dissection present.    These clinical findings would be consistent with a possible T1b N1 M0-stage IIb lung cancer.      Recognizing a diagnosis has not been made his case was discussed in thoracic conference 7/20/2022.  Recommendations include proceeding to pulmonary assessment with EBUS and the patient undergoing a general assessment per his clinical status-older adult assessment as well as assessment by thoracic surgery.  These issues are discussed with the patient and his wife in detail when they are seen 7/28/2022.    The patient proceeded to undergo his hernia surgery 8/2/2022 by Dr. Dotson.  Additionally the patient was unable to undergo assessment by pulmonary until October and, thereafter, was scheduled to see Dr. Joe 8/18/2022 undergoing older adult functional assessment with a JAGUAR score of 11 indicating a risk of functional decline related to cancer therapy.    The patient is seen with his wife 8/15/2022 and doing very well with recent hernia surgery.  His performance status is reasonably good at present and we have learned now that he would not be able to see pulmonary medicine until October, thoracic surgery is scheduled this week with Dr. Joe.  Perhaps he could be a surgical candidate.  Particularly we know by [employer] that T p53 splice site mutation is present and would certainly be consistent as well the most common mutations found in lung cancers particularly squamous cancers.  We have discussed obtaining pulmonary functions at this point, thoracic surgery assessment this week and also restarting Chantix as possible for the patient.    There was difficulty obtaining Chantix and while this was being assessed the patient was reviewed 8/18 by thoracic surgery.  He was felt  to have a T1b N1 M0 stage IIb carcinoma left lower lobe along and not a candidate for biopsy.  PFTs were borderline, functional assessment was reasonably good and the patient was felt to be a candidate for robot-assisted biopsy of his nodes and if malignant proceed to left lower lobe lobectomy.  He was reviewed 9/8 and offered 21 mg nicotine transdermal patching.    He is next seen 9/10/2022.  He is seen with his wife and both are prepared for surgery 9/23/2022.  We discussed that additional therapy may be considered once we have more information about the type and stage.  We would hope to see him postoperatively.    The patient was admitted 9//2022 undergoing 9/23/2022  a bronchoscopy with left video-assisted thoracoscopy with robot-assisted total decortication of the left lung, left lower lobectomy, mediastinal lymphadenectomy and intercostal nerve block with Dr. Nicola oJe.  Fortunately he did fairly well postoperatively though did develop atrial fibrillation with RVR treated with amiodarone converting back to sinus rhythm, treated with IV metoprolol subsequent chronic anticoagulation.  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  Notably there is invasive carcinoma present at the margin.  Is a, and only 2 positive lymph nodes adjacent to the bronchovesicular margin which appears to have been transected difficult to enumerate with extranodal extension present.    The patient is seen 10/27/2022.  He is recovering well from surgery, albeit slowly, and we have discussed his findings that are concerning as to residual disease with a positive margin described as above.  There is also the significance potentially of PD-L1 expression which has been described as producing a poor survival.  Nivolumab has been used as second line therapy to positive effect in the disease and this begs  the question as to whether adjuvant therapy plus immunotherapy could be utilized though the patient would be difficult to treat with platinum based chemotherapy as well.    The patient is next assessed 11/7/2022 for significant assessments by supportive oncology at [facility] as well as with plans to see Dr. Marrero 11/9/2022.  Unfortunately he has been very slow to gradually recover from the effects of surgery with reduced appetite and only fair performance status.  At present it would be difficult to give him cisplatin based chemotherapy and we discussed whether we might be able to use Tecentriq (atezolizumab) as his primary adjuvant therapy.  We have contacted pathology about completing Caris testing and a PD-L1 analysis that has not yet completed.    Past Medical History:   Diagnosis Date   • Arthritis    • COPD (chronic obstructive pulmonary disease) (HCC)     O2 3L AT HS   • Diabetes mellitus (HCC)     DIET CONTROL   • Diverticulitis    • DVT, lower extremity (HCC)     RLE   • Elevated cholesterol    • H/O Cervical pain    • History of colitis    • Hyperlipidemia    • Hypertension    • Left shoulder pain    • Low back pain    • Lung cancer (HCC) 2022    LEFT LUNG   • On anticoagulant therapy    • Peripheral arterial disease (HCC)    • Right leg claudication (HCC)    • Skin cancer     HX        Past Surgical History:   Procedure Laterality Date   • BALLOON ANGIOPLASTY, ARTERY Right 2015    IR Percutaneious IVC filter placement   • INGUINAL HERNIA REPAIR Left    • KNEE ARTHROSCOPY Left     Torn meniscus   • LOBECTOMY Left 9/23/2022    Procedure: BRONCHOSCOPY, LEFT VIDEO ASSISTED THORACOSCOPY, ROBOT ASSISTED TOTAL DECORTICATION  LEFT LUNG, LEFT LOWER LOBE LOBECTOMY, MEDIASTINAL LYMPHECTOMY, INTERCOSTAL NERVE BLOCK;  Surgeon: Nicola Joe III, MD;  Location: [facility];  Service: [facility] - [facility];  Laterality: Left;        Current Outpatient Medications on File Prior to Visit   Medication Sig Dispense Refill   •  arformoterol (BROVANA) 15 MCG/2ML nebulizer solution Take 15 mcg by nebulization 2 (Two) Times a Day.     • budesonide (PULMICORT) 0.5 MG/2ML nebulizer solution Take 0.5 mg by nebulization 2 (Two) Times a Day.     • cetirizine (zyrTEC) 10 MG tablet Take 10 mg by mouth Daily.     • isosorbide mononitrate (IMDUR) 60 MG 24 hr tablet Take 60 mg by mouth Every Evening.     • nicotine (NICODERM CQ) 21 MG/24HR patch Place 1 patch on the skin as directed by provider As Needed.     • simvastatin (ZOCOR) 20 MG tablet Take 20 mg by mouth Every Night.     • tamsulosin (FLOMAX) 0.4 MG capsule 24 hr capsule Take 1 capsule by mouth Daily. 30 capsule 5   • warfarin (COUMADIN) 5 MG tablet Take 1 tablet by mouth Daily. Take 10mg on 9/29/22 and then resume regular home schedule.     • budesonide-formoterol (SYMBICORT) 160-4.5 MCG/ACT inhaler Inhale 2 puffs 2 (Two) Times a Day. 1 each 3   • gabapentin (NEURONTIN) 100 MG capsule Take 1 capsule by mouth 3 (Three) Times a Day for 30 days. 90 capsule 0   • ibuprofen (ADVIL,MOTRIN) 600 MG tablet Take 500 mg by mouth Every 6 (Six) Hours As Needed for Mild Pain. PATIENT IS TAKING 500mg     • metoprolol succinate XL (TOPROL-XL) 25 MG 24 hr tablet Take 1 tablet by mouth Daily for 30 days. 30 tablet 0   • nicotine polacrilex (NICORETTE) 4 MG gum Chew 4 mg As Needed for Smoking Cessation.     • oxyCODONE (ROXICODONE) 5 MG immediate release tablet Take 1 tablet by mouth Every 4 (Four) Hours As Needed for Moderate Pain. 18 tablet 0     No current facility-administered medications on file prior to visit.        ALLERGIES:  No Known Allergies     Social History     Socioeconomic History   • Marital status:    • Years of education: 1 year college   Tobacco Use   • Smoking status: Every Day     Packs/day: 2.00     Years: 59.00     Pack years: 118.00     Types: Cigarettes     Start date: 1963   • Smokeless tobacco: Never   • Tobacco comments:     9/8/22: Down to Less than 1 PPD   Vaping Use   •  "Vaping Use: Never used   Substance and Sexual Activity   • Alcohol use: Not Currently   • Drug use: Never   • Sexual activity: Defer        Family History   Problem Relation Age of Onset   • No Known Problems Mother    • Cancer Father    • Lung cancer Father    • Diabetes Brother    • Other Daughter         S/P stent, age 42   • No Known Problems Son    • Malig Hyperthermia Neg Hx         Review of Systems   Constitutional: Positive for activity change, fatigue and unexpected weight change (Status post his diverticulitis episode, weight has not been regained).   HENT: Negative.    Eyes: Negative.    Respiratory: Positive for shortness of breath. Negative for cough.    Cardiovascular: Negative.    Gastrointestinal: Negative.    Genitourinary: Negative.    Musculoskeletal: Negative.    Skin: Negative.    Neurological: Negative.    Psychiatric/Behavioral: Negative.         Objective     Vitals:    11/07/22 1318   BP: 130/70   Pulse: 53   Resp: 16   Temp: 96.8 °F (36 °C)   TempSrc: Temporal   SpO2: 97%   Weight: 68.6 kg (151 lb 3.2 oz)   Height: 177.8 cm (70\")   PainSc: 0-No pain     Current Status 11/7/2022   ECOG score 0       Physical Exam  Constitutional:       Appearance: Normal appearance.   HENT:      Head: Normocephalic and atraumatic.      Nose: Nose normal.      Mouth/Throat:      Mouth: Mucous membranes are moist.      Pharynx: Oropharynx is clear.   Eyes:      Extraocular Movements: Extraocular movements intact.      Conjunctiva/sclera: Conjunctivae normal.      Pupils: Pupils are equal, round, and reactive to light.   Cardiovascular:      Rate and Rhythm: Normal rate and regular rhythm.      Pulses: Normal pulses.      Heart sounds: Normal heart sounds.   Pulmonary:      Comments: Prolonged expiratory phase bilaterally  Abdominal:      General: Bowel sounds are normal.      Palpations: Abdomen is soft.      Comments: Scaphoid   Musculoskeletal:         General: Normal range of motion.      Cervical back: " Normal range of motion and neck supple.   Skin:     General: Skin is warm and dry.   Neurological:      General: No focal deficit present.      Mental Status: He is alert and oriented to person, place, and time.   Psychiatric:         Mood and Affect: Mood normal.           RECENT LABS:  Hematology WBC   Date Value Ref Range Status   11/07/2022 8.28 3.40 - 10.80 10*3/mm3 Final   05/09/2022 7.54 4.5 - 11.0 10*3/uL Final     RBC   Date Value Ref Range Status   11/07/2022 4.05 (L) 4.14 - 5.80 10*6/mm3 Final   05/09/2022 5.52 4.5 - 5.9 10*6/uL Final     Hemoglobin   Date Value Ref Range Status   11/07/2022 12.1 (L) 13.0 - 17.7 g/dL Final   05/09/2022 16.4 13.5 - 17.5 g/dL Final     Hematocrit   Date Value Ref Range Status   11/07/2022 37.2 (L) 37.5 - 51.0 % Final   05/09/2022 51.0 41.0 - 53.0 % Final     Platelets   Date Value Ref Range Status   11/07/2022 246 140 - 450 10*3/mm3 Final   05/09/2022 204 140 - 440 10*3/uL Final          Assessment & Plan           77-year-old male with medical issues including type 2 diabetes-uncertain control, COPD, hypertension, diastolic heart failure, chronic disease, peripheral vascular disease (follow-up with vascular surgery today), previous DVT on anticoagulation (home monitoring), previous IVC filter placement?,  Status post September 2021 partial small bowel obstruction secondary to sigmoid diverticulitis treated medically.        He had had a right lung base nodule noted on scan at that point and in follow-up with primary care had developed a left inguinal hernia with repair planned through a different general surgeon then seen with his initial sigmoid diverticulitis.  An additional CT scan of the chest done in follow-up of his previously abnormal study now revealed not only the previously noted right lower lobe and additional nodules but a spiculated nodule in the left lower lobe measuring 16 x 14 mm.                                 Follow-up PET/CT demonstrated a  hypermetabolic spiculated lesion medial aspect the left lower lobe adjacent to descending thoracic aorta measuring 1.5 x 1.6 SUV 9.3 with an enlarged hypermetabolic left hilar lymph node measured 1.5 x 1.3 with SUV of 12.1, additional nodules in the lateral aspect right lower lobe and micronodule in the posterior/medial right lower lobe and also additional right lower lobe micronodule.  There is an infrarenal abdominal aortic aneurysm measuring 3.4 cm with a short segment of chronic dissection present.    These clinical findings would be consistent with a possible T1b N1 M0-stage IIb lung cancer.   Recognizing a diagnosis has not been made his case was discussed in thoracic conference 7/20/2022.  Recommendations include proceeding to pulmonary assessment with EBUS and the patient undergoing a general assessment per his clinical status-older adult assessment as well as assessment by thoracic surgery.  These issues are discussed with the patient and his wife in detail when they are seen 7/28/2022.    The patient proceeded to undergo his hernia surgery 8/2/2022 by Dr. Dotson.  Additionally the patient was unable to undergo assessment by pulmonary until October and, thereafter, was scheduled to see Dr. Joe 8/18/2022 undergoing older adult functional assessment with a JAGUAR score of 11 indicating a risk of functional decline related to cancer therapy.    The patient is seen with his wife 8/15/2022 and doing very well with recent hernia surgery.  His performance status is reasonably good at present and we have learned now that he would not be able to see pulmonary medicine until October, thoracic surgery is scheduled this week with Dr. Joe.  Perhaps he could be a surgical candidate.  Particularly we know by [employer] that T p53 splice site mutation is present and would certainly be consistent as well the most common mutations found in lung cancers particularly squamous cancers.  We have discussed obtaining  pulmonary functions at this point, thoracic surgery assessment this week and also restarting Chantix as possible for the patient.    There was difficulty obtaining Chantix and while this was being assessed the patient was reviewed 8/18 by thoracic surgery.  He was felt to have a T1b N1 M0 stage IIb carcinoma left lower lobe along and not a candidate for biopsy.  PFTs were borderline, functional assessment was reasonably good and the patient was felt to be a candidate for robot-assisted biopsy of his nodes and if malignant proceed to left lower lobe lobectomy.  He was reviewed 9/8 and offered 21 mg nicotine transdermal patching.    He is next seen 9/10/2022.  He is seen with his wife and both are prepared for surgery 9/23/2022.  We discussed that additional therapy may be considered once we have more information about the type and stage.  We would hope to see him postoperatively.    The patient was admitted 9//2022 undergoing 9/23/2022  a bronchoscopy with left video-assisted thoracoscopy with robot-assisted total decortication of the left lung, left lower lobectomy, mediastinal lymphadenectomy and intercostal nerve block with Dr. Nicola Joe.  Fortunately he did fairly well postoperatively though did develop atrial fibrillation with RVR treated with amiodarone converting back to sinus rhythm, treated with IV metoprolol subsequent chronic anticoagulation.  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  Notably there is invasive carcinoma present at the margin.  Is a, and only 2 positive lymph nodes adjacent to the bronchovesicular margin which appears to have been transected difficult to enumerate with extranodal extension present.       The patient is seen 10/27/2022.  He is recovering well from surgery, albeit slowly, and we have discussed his findings that are concerning as to  residual disease with a positive margin described as above.  There is also the significance potentially of PD-L1 expression which has been described as producing a poor survival.  Nivolumab has been used as second line therapy to positive effect in the disease and this begs the question as to whether adjuvant therapy plus immunotherapy could be utilized though the patient would be difficult to treat with platinum based chemotherapy as well.       Options could well be Carboplatin and Taxol considering the patient's comorbidity and worry of using cisplatin-based chemotherapy in this patient followed by atezolizumab with pathology having been notified to assess PD-L1 expression on the tumor as well also await review by [facility] molecular profiling.  Finally radiation therapy consultation be requested.    The patient is next assessed 11/7/2022 for significant [facility] well as with plans to see Dr. Marrero 11/9/2022.  Unfortunately he has been very slow to gradually recover from the effects of surgery with reduced appetite and only fair performance status.  At present it would be difficult to give him cisplatin based chemotherapy and we discussed whether we might be able to use Tecentriq (atezolizumab) as his primary adjuvant therapy.  We have contacted pathology about completing Caris testing and a PD-L1 analysis that has not yet completed.    Plan:  *Radiation therapy consultation as planned 11/9/2022    *Teach that day for Tecentriq as adjuvant therapy every 3 weeks for up to a year.    * to see per patient's current insurance coverage    *Plan to present patient's circumstances/case at thoracic conference as additional results become available.    *1 to 2 weeks MD

## 2022-11-08 PROBLEM — Z79.899 LONG-TERM USE OF HIGH-RISK MEDICATION: Status: ACTIVE | Noted: 2022-11-08

## 2022-11-09 ENCOUNTER — APPOINTMENT (OUTPATIENT)
Dept: OTHER | Facility: HOSPITAL | Age: 77
End: 2022-11-09

## 2022-11-09 ENCOUNTER — OFFICE VISIT (OUTPATIENT)
Dept: ONCOLOGY | Facility: CLINIC | Age: 77
End: 2022-11-09

## 2022-11-09 ENCOUNTER — APPOINTMENT (OUTPATIENT)
Dept: RADIATION ONCOLOGY | Facility: HOSPITAL | Age: 77
End: 2022-11-09

## 2022-11-09 ENCOUNTER — TELEPHONE (OUTPATIENT)
Dept: ONCOLOGY | Facility: CLINIC | Age: 77
End: 2022-11-09

## 2022-11-09 ENCOUNTER — CONSULT (OUTPATIENT)
Dept: RADIATION ONCOLOGY | Facility: HOSPITAL | Age: 77
End: 2022-11-09

## 2022-11-09 VITALS
HEIGHT: 70 IN | DIASTOLIC BLOOD PRESSURE: 63 MMHG | BODY MASS INDEX: 21.73 KG/M2 | WEIGHT: 151.8 LBS | RESPIRATION RATE: 18 BRPM | TEMPERATURE: 97.1 F | OXYGEN SATURATION: 99 % | SYSTOLIC BLOOD PRESSURE: 133 MMHG | HEART RATE: 59 BPM

## 2022-11-09 VITALS
HEART RATE: 62 BPM | DIASTOLIC BLOOD PRESSURE: 74 MMHG | OXYGEN SATURATION: 97 % | BODY MASS INDEX: 21.92 KG/M2 | SYSTOLIC BLOOD PRESSURE: 131 MMHG | WEIGHT: 152.8 LBS

## 2022-11-09 DIAGNOSIS — C7A.8 NEUROENDOCRINE CARCINOMA OF LUNG: Primary | ICD-10-CM

## 2022-11-09 DIAGNOSIS — R91.1 NODULE OF LOWER LOBE OF LEFT LUNG: ICD-10-CM

## 2022-11-09 PROCEDURE — 99215 OFFICE O/P EST HI 40 MIN: CPT | Performed by: NURSE PRACTITIONER

## 2022-11-09 PROCEDURE — G0463 HOSPITAL OUTPT CLINIC VISIT: HCPCS | Performed by: STUDENT IN AN ORGANIZED HEALTH CARE EDUCATION/TRAINING PROGRAM

## 2022-11-09 PROCEDURE — 99205 OFFICE O/P NEW HI 60 MIN: CPT | Performed by: STUDENT IN AN ORGANIZED HEALTH CARE EDUCATION/TRAINING PROGRAM

## 2022-11-09 RX ORDER — ONDANSETRON HYDROCHLORIDE 8 MG/1
8 TABLET, FILM COATED ORAL EVERY 8 HOURS PRN
Qty: 30 TABLET | Refills: 1 | Status: SHIPPED | OUTPATIENT
Start: 2022-11-09

## 2022-11-09 NOTE — PROGRESS NOTES
[facility] Hematology/Oncology Treatment Plan Summary    Name: Giovani España  Acct# [id number]  MD: Dr. Bishop    Diagnosis:     ICD-10-CM ICD-9-CM   1. Neuroendocrine carcinoma of lung (HCC)  C7A.8 209.21     Goal of treatment: adjuvant    Treatment Medication(s):   1. Tecentriq    Frequency: every 3 weeks    Number of cycles: up to one year    Starting on: 11/16/2022    Items for home use: Thermometer    Rx written for: [x] Nausea    [] Pre-Treatment   ondansetron 8 mg by mouth every 8 hours as needed for nausea    Notes:     Next Steps:     Completing Provider: GARCÍA Moreau           Date/time: 11/09/2022      Please note: You will be seen by a provider frequently with your treatment plan. This plan may change depending on many factors, if so, this will be discussed with you by your physician.  Last update 03/2022.

## 2022-11-09 NOTE — TELEPHONE ENCOUNTER
Pt wanted to know what Dr. Bishop's thoughts were about Dr. Marrero's plan for radiation. D/W Dr. Bishop. He actually s/w Dr. Marrero this morning and agrees with his plan. Pt should proceed with radiation. Informed pt's significant other, Yoselin. She v/u.   
Saw radiation doctor this morning.  Want to get Dr. Bishop's input on what was recommended.  
Abdomen soft, non-tender, no guarding.

## 2022-11-09 NOTE — PROGRESS NOTES
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  He was found to have diffuse emphysematous changes as well as multiple pulmonary nodules on CT chest 5/27/2022.  The largest of which was 16 x 14 mm.  PET/CT the largest lesion to be hypermetabolic, without other hypermetabolic pulmonary nodules.  In addition he had a hypermetabolic left hilar lymph node.    The patient met with Dr. Joe and ultimately decided on surgical resection.  Patient underwent left lower lobe lobectomy and lymph node dissection.  Final pathology demonstrated large cell neuroendocrine tumor with a 2.7 cm primary, 8 of 11 lymph nodes positive, invasive carcinoma present at margin.    He has met with Dr. Bishop postoperatively who is considering CarboTaxol followed by immunotherapy vs immunotherapy alone.  Dr. Bishop referred for radiation oncology consultation.      Imaging:      CT Chest 5/27/2022    FINDINGS:   No supraclavicular or axillary adenopathy. Scarring involving the right apex appears similar. Diffuse emphysematous changes are again noted. New ill-defined density measuring 3 mm is noted involving the superior segment of the right lower lobe. Heart and pericardium appear intact. 8mm noncalcified nodule noted involving the right lower lobe not seen on the prior exam. Multiple ill-defined 3 to 4 mm  nodular densities thought to be inflammatory noted involving the right lower lobe. New spiculated nodule is noted involving the left lower lobe measuring 16 x 14 mm seen on image 90. Left hilar adenopathy is noted measuring 2.5 cm. Gallstones are demonstrated.     IMPRESSION:   New pulmonary nodules demonstrated throughout the lung parenchyma the largest involving the left lower lobe appears spiculated measuring 16 x 14 mm with left hilar adenopathy concerning for malignancy. Correlate with PET scan and/or tissue diagnosis for evaluation. Recommend follow-up.       PET CT 7/13/2022    IMPRESSION:   1. There is a spiculated lung lesion in the medial aspect of the left lower lobe that measures 1.5 cm in long axis and demonstrates hypermetabolic uptake. This is favored to represent a primary malignancy.   2. There is an enlarged hypermetabolic left hilar lymph node. This is favored to represent a site of alta metastatic disease.   3. There is an additional 11 mm nodule in the right lower lobe. There are also a couple of additional right lower lobe micronodules. These demonstrate no metabolic uptake but should be followed for monitoring.   4. An infrarenal abdominal aortic aneurysm measuring 3.4 cm is noted with a short segment area of chronic dissection present.   5. Secondary findings are discussed in the body of the report.       Pathology:    Left Lower Lobectomy and Lymph Node Dissection Pathology 9/23/2022    Final Diagnosis   1. Lung, Left Lower Lobe, Lobectomy:               A. Large cell neuroendocrine carcinoma.               B. 8 of 11 lymph nodes positive for metastatic large cell neuroendocrine carcinoma (8/11).               C. See synoptic template for complete tumor details.     2. Lymph Node, L9, Excision:                A. Benign lymph node (0/1).     3. Lymph Node, L9 #2, Excision:               A. Benign lymph node (0/1).     4. Lymph Node, L7, Excision:               A. Benign lymph node (0/1).                  5. Lymph Node, L7 #2, Excision:               A. Benign lymph node (0/1).     6. Lymph Node, L7 #3, Excision:               A. Fragments of benign lymph node (0/1).                 7. Lymph Node, L10, Excision:               A. 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     mec/jse    Electronically signed by Rigo Davies MD on 9/26/2022 at 1328   Synoptic Checklist   LUNG  8th Edition - Protocol posted: 6/22/2022  LUNG: RESECTION - All Specimens  SPECIMEN   Procedure  Lobectomy    Specimen Laterality  Left    TUMOR   Tumor Focality  Single focus    Tumor Site  Lower lobe of lung    Tumor Size     Total Tumor Size (size of entire tumor)  Greatest Dimension (Centimeters): 2.5 cm   Histologic Type  Large cell neuroendocrine carcinoma    Histologic Grade  G4, undifferentiated    Spread Through Air Spaces (ADDY)  Present    Visceral Pleura Invasion  Not identified    Direct Invasion of Adjacent Structures  Not applicable (no adjacent structures present)    Treatment Effect  No known presurgical therapy    Lymphovascular Invasion  Present    MARGINS   Margin Status for Invasive Carcinoma  Invasive carcinoma present at margin    Margin(s) Involved by Invasive Carcinoma  Bronchial    Margin Status for Non-Invasive Tumor  Not applicable    REGIONAL LYMPH NODES   Lymph Node(s) from Prior Procedures  Not included    Regional Lymph Node Status  Tumor present in regional lymph node(s)    Number of Lymph Nodes with Tumor  9    Jennifer Site(s) with Tumor  10L: Hilar      12L: Lobar      13L: Segmental    Extranodal Extension  Present    Number of Lymph Nodes Examined  17    Jennifer Site(s) Examined  7: Subcarinal      9L: Pulmonary ligament      10L: Hilar      12L: Lobar      13L: Segmental    PATHOLOGIC STAGE CLASSIFICATION (pTNM, AJCC 8th Edition)   The suffix m (or a specific number) should only be used in the setting of multifocal ground-glass / lepidic nodules that histologically present  as adenocarcinomas with prominent lepidic component or multifocal tumors of same histologic type that are too numerous for individual separate synoptic report and that are not better classified as intrapulmonary metastases (e.g. numerous carcinoid tumors). Multiple primary lung cancers showing different histologic type or different morphology based on comprehensive histologic subtyping are better staged as independent tumors without m suffix.   pT Category  pT1c    pN Category  pN1             Labs:    Lab Results   Component Value Date    CREATININE 0.91 09/27/2022               Problem List:  Patient Active Problem List   Diagnosis   • Diverticulitis of large intestine with perforation and abscess without bleeding   • Type 2 diabetes mellitus (HCC)   • COPD (chronic obstructive pulmonary disease) (HCC)   • HTN (hypertension)   • MGUS (monoclonal gammopathy of unknown significance)   • History of DVT (deep vein thrombosis)   • Diastolic dysfunction   • Presence of IVC filter   • Chronic anticoagulation   • Pulmonary nodule   • At risk for malnutrition   • Counseling regarding advance care planning and goals of care   • Nodule of lower lobe of left lung   • Tobacco use disorder   • Atrial fibrillation with RVR (HCC)   • Neuroendocrine carcinoma of lung (HCC)   • Long-term use of high-risk medication          Medications:  Current Outpatient Medications on File Prior to Visit   Medication Sig Dispense Refill   • arformoterol (BROVANA) 15 MCG/2ML nebulizer solution Take 15 mcg by nebulization 2 (Two) Times a Day.     • budesonide (PULMICORT) 0.5 MG/2ML nebulizer solution Take 0.5 mg by nebulization 2 (Two) Times a Day.     • cetirizine (zyrTEC) 10 MG tablet Take 10 mg by mouth Daily.     • isosorbide mononitrate (IMDUR) 60 MG 24 hr tablet Take 60 mg by mouth Every Evening.     • simvastatin (ZOCOR) 20 MG tablet Take 20 mg by mouth Every Night.     • tamsulosin (FLOMAX) 0.4 MG capsule 24 hr capsule Take 1 capsule by  mouth Daily. 30 capsule 5   • warfarin (COUMADIN) 5 MG tablet Take 1 tablet by mouth Daily. Take 10mg on 9/29/22 and then resume regular home schedule.     • budesonide-formoterol (SYMBICORT) 160-4.5 MCG/ACT inhaler Inhale 2 puffs 2 (Two) Times a Day. 1 each 3   • gabapentin (NEURONTIN) 100 MG capsule Take 1 capsule by mouth 3 (Three) Times a Day for 30 days. 90 capsule 0   • ibuprofen (ADVIL,MOTRIN) 600 MG tablet Take 500 mg by mouth Every 6 (Six) Hours As Needed for Mild Pain. PATIENT IS TAKING 500mg     • metoprolol succinate XL (TOPROL-XL) 25 MG 24 hr tablet Take 1 tablet by mouth Daily for 30 days. 30 tablet 0   • nicotine (NICODERM CQ) 21 MG/24HR patch Place 1 patch on the skin as directed by provider As Needed.     • nicotine polacrilex (NICORETTE) 4 MG gum Chew 4 mg As Needed for Smoking Cessation.     • oxyCODONE (ROXICODONE) 5 MG immediate release tablet Take 1 tablet by mouth Every 4 (Four) Hours As Needed for Moderate Pain. 18 tablet 0     No current facility-administered medications on file prior to visit.          Allergies:  No Known Allergies      Family History:  The patient has no family history of conditions which would be contraindications to radiation therapy      Social History:  Current Smoker    Distance From Clinic: 30 minutes - 1 hour    Patient has someone who can assist with transportation: yes      Review of Systems:    Review of Systems   Constitutional: Positive for fatigue and unexpected weight change.   HENT: Positive for hearing loss.    Respiratory: Positive for cough, chest tightness and shortness of breath.    Cardiovascular: Positive for leg swelling.   Musculoskeletal: Positive for back pain.   Hematological: Bruises/bleeds easily.   All other systems reviewed and are negative.  Uses 3L home O2 at night      Vital Signs:  /74   Pulse 62   Wt 69.3 kg (152 lb 12.8 oz)   SpO2 97%   BMI 21.92 kg/m²   Estimated body mass index is 21.92 kg/m² as calculated from the  "following:    Height as of 11/7/22: 177.8 cm (70\").    Weight as of this encounter: 69.3 kg (152 lb 12.8 oz).  Pain Score    11/09/22 0823   PainSc: 0-No pain         ECOG: Restricted in physically strenuous activity but ambulatory and able to carry out work of a light or sedentary nature, e.g., light house work, office work = 1    Physical Exam  Vitals reviewed.   Constitutional:       General: He is not in acute distress.     Appearance: Normal appearance.   HENT:      Head: Normocephalic and atraumatic.   Eyes:      Extraocular Movements: Extraocular movements intact.      Pupils: Pupils are equal, round, and reactive to light.   Pulmonary:      Effort: Pulmonary effort is normal.      Breath sounds: Normal breath sounds.   Abdominal:      General: Abdomen is flat.      Palpations: Abdomen is soft.   Musculoskeletal:      Cervical back: Normal range of motion and neck supple.   Skin:     General: Skin is warm and dry.   Neurological:      General: No focal deficit present.      Mental Status: He is alert and oriented to person, place, and time.   Psychiatric:         Mood and Affect: Mood normal.         Behavior: Behavior normal.            Result Review :  The following data was reviewed by: Kaz Marrero MD on 11/09/2022:  Labs: Last Creatinine   Data reviewed: Radiologic studies CT Chest, PET CT            Diagnoses and all orders for this visit:    1. Neuroendocrine carcinoma of lung (HCC) (Primary)    2. Nodule of lower lobe of left lung  -     MRI Brain With & Without Contrast; Future        Assessment:    The patient is a 77-year-old male who has been diagnosed with locally advanced neuroendocrine carcinoma of the lung, large cell.  The patient underwent lobectomy and mediastinal lymph node dissection with Dr. Joe on 9/23/2022.  His pathology was not known prior to resection.  Unfortunately, the patient had a positive margin on this resection along the bronchial margin, specifically 2 lymph nodes with " likely extracapsular extension appear to have been transected.    The patient has met with Dr. Bishop who is considering immunotherapy, he is concerned about patient's ability to tolerate conventional chemotherapy.  Genetic tests are still pending which may influence final decisions.    I met with the patient and discussed the risk, benefits, side effects, and logistics of adjuvant radiation therapy.  I emphasized that his case is unusual given his histology.  Extrapolating from other postoperative lung radiation settings, would expect that radiation may offer a local regional recurrence free survival benefit but may not improve overall survival.    The patient is going to take a few days to discuss treatment options and radiation with family before making his decision regarding postoperative radiation therapy.      Plan:    -Have offered the patient postoperative radiation therapy to a large cell neuroendocrine tumor with positive margin  - We will continue to coordinate with Dr. Bishop regarding systemic therapy options       I spent 65 minutes caring for Giovani on this date of service. This time includes time spent by me in the following activities:preparing for the visit, reviewing tests, obtaining and/or reviewing a separately obtained history, documenting information in the medical record, independently interpreting results and communicating that information with the patient/family/caregiver and care coordination  Follow Up   No follow-ups on file.  Patient was given instructions and counseling regarding his condition or for health maintenance advice. Please see specific information pulled into the AVS if appropriate.     Kaz Marrero MD

## 2022-11-09 NOTE — PROGRESS NOTES
TREATMENT  PREPARATION    Regina Locke  [id number]  1945    Chief Complaint: Treatment preparation and needs assessment    History of present illness:  Regina Locke is a 77 y.o. year old male who is here today for treatment preparation and needs assessment.  The patient has been diagnosed with   Encounter Diagnosis   Name Primary?   • Neuroendocrine carcinoma of lung (HCC) Yes    and is scheduled to begin treatment with:     Oncology History:    Oncology/Hematology History   Neuroendocrine carcinoma of lung (HCC)   2022 Imaging    FACILITY:  [facility]   UNIT/AGE/GENDER: [id number]  OP      AGE:77 Y          SEX:M   PATIENT NAME/:  REGINA LOCKE E    1945   UNIT NUMBER:  [id number]   ACCOUNT NUMBER:  [id number]   ACCESSION NUMBER:  [id number]     EXAM: PET W/CT TUMOR SKULL MID-THIGH     DATE OF EXAM: 2022     CLINICAL HISTORY: Lung cancer screening, >= 20 pk-yr smoking history (Age >= 50y)   03S67MS LEFT LUNG NODULE 2.5CM HILAR LEFT LYMPHADENOPATHY initial PET evaluation.     COMPARISON: None available.     TECHNIQUE: PET/CT imaging was performed from the skull base to the mid-thigh following the intravenous administration of 10.36 mCi of F-18 labeled FDG. CT imaging was performed for attenuation correction and lesion localization. The patient's blood   Glucose level was 106 mg/dl at the time of radiotracer injection.   Radiation dose reduction techniques were utilized per ALARA protocol.     FINDINGS:   HEAD & NECK:   No abnormal focus of radiotracer accumulation.   Visualized portions of the brain grossly unremarkable. Upper airway patent. No cervical adenopathy.   .   CHEST:   There is a hypermetabolic spiculated lesion in the medial aspect of the left lower lobe (adjacent to the descending thoracic aorta) that measures 1.5 x 1.6 cm (AP x TRV) , max SUV 9.3. This lung lesion is favored to represent primary malignancy.   There is an enlarged  hypermetabolic left hilar lymph node that measures 1.5 x 1.3 cm (AP x TRV)  (max SUV 12.1), favored to represent a site of alta metastatic disease.   Background changes of emphysema are noted with mild scarring and architectural distortion in the right lung apex.   No additional hypermetabolic lung lesions are present.   There is an 11 mm nodule in the lateral aspect of the right lower lobe (series 3 image 81). There is also a micronodule in the posterior/medial right lower lobe on series 3 image 76. There is also a micronodule in the right lower lobe on series 3 image   72.   No effusions. A small pleural calcification is noted in left posterior hemithorax.   Thoracic aortic and coronary artery calcifications are noted.   .   ABDOMEN/PELVIS:   No abnormal focus of radiotracer accumulation.   The liver is grossly unremarkable. No biliary ductal dilatation. Cholecystectomy.   Small scattered calcified splenic granulomas are noted. The spleen is otherwise unremarkable.   The adrenals and pancreas are grossly unremarkable.   The kidneys, ureters and urinary bladder are unremarkable.   Sigmoid diverticulosis is noted. No evidence of bowel obstruction or inflammation. Appendix normal.   No hypermetabolic lymphadenopathy, free fluid or well-formed fluid collections are evident in the abdomen/pelvis.   There is an infrarenal abdominal aortic aneurysm demonstrated which measures 3.4 x 3.2 cm (AP x TRV) . The abdominal aortic aneurysm demonstrates a short segment area of chronic dissection.   IVC filter noted.   .   MUSCULOSKELETAL:   No abnormal focus of radiotracer accumulation.        .   IMPRESSION:   1. There is a spiculated lung lesion in the medial aspect of the left lower lobe that measures 1.5 cm in long axis and demonstrates hypermetabolic uptake. This is favored to represent a primary malignancy.   2. There is an enlarged hypermetabolic left hilar lymph node. This is favored to represent a site of alta  metastatic disease.   3. There is an additional 11 mm nodule in the right lower lobe. There are also a couple of additional right lower lobe micronodules. These demonstrate no metabolic uptake but should be followed for monitoring.   4. An infrarenal abdominal aortic aneurysm measuring 3.4 cm is noted with a short segment area of chronic dissection present.   5. Secondary findings are discussed in the body of the report.      9/23/2022 Initial Diagnosis    Neuroendocrine carcinoma of lung (HCC)     9/23/2022 Surgery    Final Diagnosis   1. Lung, Left Lower Lobe, Lobectomy:               A. Large cell neuroendocrine carcinoma.               B. 8 of 11 lymph nodes positive for metastatic large cell neuroendocrine carcinoma (8/11).               C. See synoptic template for complete tumor details.     2. Lymph Node, L9, Excision:                A. Benign lymph node (0/1).     3. Lymph Node, L9 #2, Excision:               A. Benign lymph node (0/1).     4. Lymph Node, L7, Excision:               A. Benign lymph node (0/1).                 5. Lymph Node, L7 #2, Excision:               A. Benign lymph node (0/1).     6. Lymph Node, L7 #3, Excision:               A. Fragments of benign lymph node (0/1).                 7. Lymph Node, L10, Excision:               A. 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     Synoptic Checklist   LUNG  8th Edition - Protocol posted: 6/22/2022  LUNG: RESECTION - All Specimens  SPECIMEN   Procedure  Lobectomy    Specimen Laterality  Left    TUMOR   Tumor Focality  Single focus    Tumor Site  Lower lobe of lung    Tumor Size     Total Tumor Size (size of entire tumor)  Greatest Dimension (Centimeters): 2.5 cm   Histologic Type  Large cell neuroendocrine carcinoma    Histologic Grade  G4, undifferentiated    Spread Through Air Spaces (ADDY)  Present    Visceral Pleura Invasion  Not identified    Direct Invasion of Adjacent Structures  Not applicable (no adjacent  structures present)    Treatment Effect  No known presurgical therapy    Lymphovascular Invasion  Present    MARGINS   Margin Status for Invasive Carcinoma  Invasive carcinoma present at margin    Margin(s) Involved by Invasive Carcinoma  Bronchial    Margin Status for Non-Invasive Tumor  Not applicable    REGIONAL LYMPH NODES   Lymph Node(s) from Prior Procedures  Not included    Regional Lymph Node Status  Tumor present in regional lymph node(s)    Number of Lymph Nodes with Tumor  9    Jennifer Site(s) with Tumor  10L: Hilar      12L: Lobar      13L: Segmental    Extranodal Extension  Present    Number of Lymph Nodes Examined  17    Jennifer Site(s) Examined  7: Subcarinal      9L: Pulmonary ligament      10L: Hilar      12L: Lobar      13L: Segmental         *Guardant 360: Detected alteration in TP53 Splice Site SNV; several variants of uncertain significance       10/27/2022 Cancer Staged    Staging form: Lung, AJCC 8th Edition  - Pathologic stage from 10/27/2022: Stage IIB (pT1c, pN1, cM0) - Signed by Kayden Bishop MD on 10/27/2022     11/16/2022 -  Chemotherapy    OP LUNG Atezolizumab         The current medication list and allergy list were reviewed and reconciled.     Past Medical History, Past Surgical History, Social History, Family History have been reviewed and are without significant changes except as mentioned.    Physical Exam:    Vitals:    11/09/22 1330   BP: 133/63   Pulse: 59   Resp: 18   Temp: 97.1 °F (36.2 °C)   SpO2: 99%     Vitals:    11/09/22 1330   PainSc: 0-No pain        ECOG score: 0             Physical Exam  HENT:      Head: Normocephalic and atraumatic.   Eyes:      Extraocular Movements: Extraocular movements intact.      Conjunctiva/sclera: Conjunctivae normal.   Pulmonary:      Effort: Pulmonary effort is normal. No respiratory distress.   Neurological:      General: No focal deficit present.      Mental Status: He is alert and oriented to person, place, and time.   Psychiatric:          Mood and Affect: Mood normal.         Behavior: Behavior normal.           NEEDS ASSESSMENTS    Genetics  The patient's new diagnosis and family history have been reviewed for genetic counseling needs. A genetic referral is not recommended.     Psychosocial and Barriers to care  The patient has completed a PHQ-9 Depression Screening and the Distress Thermometer (DT) today.  PHQ-9 results show PHQ-2 Total Score: 4 PHQ-9 Total Score: PHQ-9 Total Score: 4     The patient scored their distress today as Distress Level: 1 on a scale of 0-10 with 0 being no distress and 10 being extreme distress. Problems marked by the patient as being an issue for them within the last week include Physical concerns  Fatigue: Yes  Pain: Yes .      Results were reviewed along with psychosocial resources offered by our cancer center.  Our Supportive Oncology team will be flagged for a score of 4 or above, and a same day call will be made for a score of 9 or 10.  A mental health referral is offered at that time. Patients who score less than 4 have been educated on our support services and can be referred to our  upon request.  The patient will not be referred to our .       Nutrition  The patient has completed the malnutrition screening today. They scored Malnutrition Screening Tool  Have you recently lost weight without trying?  If yes, how much weight have you lost?: 0--> No  Have you been eating poorly because of a decreased appetite?: 0--> No  MST score: 0   with a score of 0-1 meaning not at risk in a score of 2 or greater meaning at risk.  Patients with a score of 3 or higher will be referred to our oncology dietitian for support. Patients beginning at risk treatment regimens or who have dietary concerns will also be referred to our oncology dietitian. The patient will not be referred.    Functional Assessment  Persons who are age 70 or greater will be screened for qualification of a comprehensive  geriatric assessment by our survivorship nurse practitioner.  Older adults with cancer face unique challenges. These may include an increased risk of drug reactions, financial burdens, and caregiver stress. The patient scored G8 Questionnaire  Has food intake declined over the past 3 months due to loss of appetite, digestive problems, chewing or swallowing difficulties?: No decrease in food intake  Weight loss during the last 3 months: No weight loss  Mobility: Goes out  Neuropsychological Problems: No psychological problems  Takes more than 3 medications a day: Yes, takes more than 3 prescription drugs per day  In comparison with other people of the same age, how does the patient consider his/her health status?: As good  Age: < 80 . Patients scoring 14 or lower will referred for an older adult functional assessment with the survivorship advanced practice registered nurse to ensure all needed support is provided as patients plan for their treatments. The patient will not be referred.    Intravenous Access Assessment  The patient and I discussed planned intravenous chemo/biotherapy as well as other IV treatments that are often needed throughout the course of treatment. These may include, but are not limited to blood transfusions, antibiotics, and IV hydration. Discussed that depending on selected treatment and vein assessment, patient may require venous access device (VAD) which could include but not limited to a Mediport or PICC line. Risks and benefits of VADs reviewed. The patient will be treated via PIV.    Reproductive/Sexual Activity   People should avoid becoming pregnant and should not get a partner pregnant while undergoing chemo/biotherapy.  People of childbearing age should use effective contraception during active therapy. The best recommendation for all people is to use a barrier method for a minimum of 1 week after the last infusion of chemo/biotherapy to prevent your partner being exposed to byproducts  "from treatment medications in bodily fluids. Effective contraception should be discussed with your oncology team to make sure it is safe to take based on your diagnosis. Possible options include oral contraceptives, barrier methods. Chemo/biotherapy can change your ability to reproduce children in the future.  There are options for fertility preservation. NOT APPLICABLE    Advanced Care Planning  Advance Care Planning   The patient and I discussed advanced care planning, \"Conversations that Matter\".   This service is offered for development of advance directives with a certified ACP facilitator.  The patient does not have an up-to-date advanced directive. This document is not on file with our office. The patient is not interested in an appointment with one of our facilitators to create or update their advanced directives.     Smoking cessation  Tobacco Use: High Risk   • Smoking Tobacco Use: Every Day   • Smokeless Tobacco Use: Never   • Passive Exposure: Not on file       Patient and I discussed their tobacco use history. Referral will not be made for smoking cessation.  He has already had appointment.    Palliative Care  When appropriate, the patient and I discussed the availability palliative care services and when appropriate Hospice care. Palliative care is not the same as Hospice care which was explained to the patient.  The patient is not interested in additional information from our  on these services.     Survivorship   When appropriate, we discussed that we will refer the patient to survivorship clinic to discuss next steps following completion of planned treatment.  Reviewed this visit will include assessment of your physical, psychological, functional, and spiritual needs as a survivor and the need at attend this visit when scheduled.    TREATMENT EDUCATION    Today I met with the patient to discuss the chemo/biotherapy regimen recommended for treatment of Neuroendocrine carcinoma of lung " (HCC)  .  The patient was given explanation of treatment premed side effects including office policy that prohibits patients to drive if sedating medications are administered, MD explanation given regarding benefits, side effects, toxicities and goals of treatment.  The patient received a Chemotherapy/Biotherapy Plan Summary including diagnosis and explanation of specific treatment plan.    SIDE EFFECTS:  Common side effects were discussed with the patient and/or significant other.  Discussion included where applicable hair loss/discoloration, anemia/fatigue, infection/chills/fever, appetite, bleeding risk/precautions, constipation, diarrhea, mouth sores, taste alteration, loss of appetite, nausea/vomiting, peripheral neuropathy, skin/nail changes, rash, muscle aches/weakness, photosensitivity, weight gain/loss, hearing loss, dizziness, menopausal symptoms, menstrual irregularity, sterility, high blood pressure, heart damage, liver damage, lung damage, kidney damage, DVT/PE risk, fluid retention, pleural/pericardial effusion, somnolence, electrolyte/LFT imbalance, vein exercises and/or the possible need for vascular access/port placement.  The patient was advised that although uncommon, leakage of an infused medication from the vein or venous access device may lead to skin breakdown and/or other tissue damage.  The patient was advised that he/she may have pain, bleeding, and/or bruising from the insertion of a needle in their vein or venous access device (port).  The patient was further advised that, in spite of proper technique, infection with redness and irritation may rarely occur at the site where the needle was inserted.  The patient was advised that if complications occur, additional medical treatment is available.  Finally, where applicable we have reviewed rare but potential immune mediated side effects including shortness of breath, cough, chest pain (pneumonitis), abdominal pain, diarrhea (colitis),  thyroiditis (hypothyroid or hyperthyroid), hepatitis and liver dysfunction, nephritis and renal dysfunction.    Discussion also included side effects specific to drugs in the treatment plan, specifically:    Treatment Plans     Name Type Plan Dates Plan Provider         Active    OP LUNG Atezolizumab ONCOLOGY TREATMENT  11/16/2022 - Present Kayden Bishop MD                    Questions answered and additional information discussed on topics including:  Anemia, Nutrition and appetite changes, Constipation, Diarrhea, Nausea & vomiting, Nervous system changes, Pain, Skin & nail changes and Organ toxicities       Assessment and Plan:    Diagnoses and all orders for this visit:    1. Neuroendocrine carcinoma of lung (HCC) (Primary)    Other orders  -     ondansetron (Zofran) 8 MG tablet; Take 1 tablet by mouth Every 8 (Eight) Hours As Needed for Nausea or Vomiting.  Dispense: 30 tablet; Refill: 1      No orders of the defined types were placed in this encounter.        1. The patient and I have reviewed their diagnosis and scheduled treatment plan. Needs assessment was completed where applicable including genetics, psychosocial needs, barriers to care, VAD evaluation, advanced care planning, survivorship, and palliative care services where indicated. Referrals have been ordered as appropriate based upon evaluation today and patient desires.   2. Chemo/biotherapy teaching was completed today and consent obtained. See separate documentation for further details.  3. Adequate time was given to answer questions.  Patient made aware of their care team members and contact information if they have questions or problems throughout the treatment course.  4. Discussion held and written information provided describing frequency of office visits and ongoing monitoring throughout the treatment plan.     5. Reviewed with patient any prescribed medication sent to pharmacy.  Education provided regarding proper storage, safe  handling, and proper disposal of unused medication.  6. Proper handling of body fluids and waste discussed and written information provided.  7. If appropriate, patient had pretreatment labs drawn today.    Learning assessment completed at initial patient encounter. See separate flowsheet. Chemo/biotherapy education comprehension assessed at today's visit.    I spent 55 minutes caring for Giovani on this date of service. This time includes time spent by me in the following activities: preparing for the visit, reviewing tests, obtaining and/or reviewing a separately obtained history, performing a medically appropriate examination and/or evaluation, counseling and educating the patient/family/caregiver, ordering medications, tests, or procedures and documenting information in the medical record.     Rama Mario, APRN   11/09/22

## 2022-11-10 ENCOUNTER — HOSPITAL ENCOUNTER (OUTPATIENT)
Dept: MRI IMAGING | Facility: HOSPITAL | Age: 77
Discharge: HOME | End: 2022-11-10
Admitting: STUDENT IN AN ORGANIZED HEALTH CARE EDUCATION/TRAINING PROGRAM

## 2022-11-10 DIAGNOSIS — R91.1 NODULE OF LOWER LOBE OF LEFT LUNG: ICD-10-CM

## 2022-11-10 PROCEDURE — 0 GADOBENATE DIMEGLUMINE 529 MG/ML SOLUTION: Performed by: STUDENT IN AN ORGANIZED HEALTH CARE EDUCATION/TRAINING PROGRAM

## 2022-11-10 PROCEDURE — 82565 ASSAY OF CREATININE: CPT

## 2022-11-10 PROCEDURE — 70553 MRI BRAIN STEM W/O & W/DYE: CPT

## 2022-11-10 PROCEDURE — A9577 INJ MULTIHANCE: HCPCS | Performed by: STUDENT IN AN ORGANIZED HEALTH CARE EDUCATION/TRAINING PROGRAM

## 2022-11-10 RX ADMIN — GADOBENATE DIMEGLUMINE 14 ML: 529 INJECTION, SOLUTION INTRAVENOUS at 11:56

## 2022-11-10 NOTE — NURSING NOTE
Per call from Dr. Galarza, OK to let the patient leave; Dr. Galarza stated he spoke with the pt's provider

## 2022-11-11 ENCOUNTER — NURSE NAVIGATOR (OUTPATIENT)
Dept: OTHER | Facility: HOSPITAL | Age: 77
End: 2022-11-11

## 2022-11-11 LAB — CREAT BLDA-MCNC: 0.9 MG/DL (ref 0.6–1.3)

## 2022-11-15 LAB
LAB AP CASE REPORT: NORMAL
LAB AP DIAGNOSIS COMMENT: NORMAL
LAB AP SPECIAL STAINS: NORMAL
LAB AP SYNOPTIC CHECKLIST: NORMAL
Lab: NORMAL
Lab: NORMAL
PATH REPORT.ADDENDUM SPEC: NORMAL
PATH REPORT.FINAL DX SPEC: NORMAL
PATH REPORT.GROSS SPEC: NORMAL

## 2022-11-15 PROCEDURE — 77334 RADIATION TREATMENT AID(S): CPT | Performed by: STUDENT IN AN ORGANIZED HEALTH CARE EDUCATION/TRAINING PROGRAM

## 2022-11-15 PROCEDURE — 77263 THER RADIOLOGY TX PLNG CPLX: CPT | Performed by: STUDENT IN AN ORGANIZED HEALTH CARE EDUCATION/TRAINING PROGRAM

## 2022-11-16 ENCOUNTER — APPOINTMENT (OUTPATIENT)
Dept: ONCOLOGY | Facility: HOSPITAL | Age: 77
End: 2022-11-16

## 2022-11-16 ENCOUNTER — OFFICE VISIT (OUTPATIENT)
Dept: ONCOLOGY | Facility: CLINIC | Age: 77
End: 2022-11-16

## 2022-11-16 ENCOUNTER — LAB (OUTPATIENT)
Dept: LAB | Facility: HOSPITAL | Age: 77
End: 2022-11-16

## 2022-11-16 VITALS
SYSTOLIC BLOOD PRESSURE: 129 MMHG | BODY MASS INDEX: 22.09 KG/M2 | RESPIRATION RATE: 18 BRPM | OXYGEN SATURATION: 95 % | TEMPERATURE: 98.6 F | WEIGHT: 154.3 LBS | HEIGHT: 70 IN | DIASTOLIC BLOOD PRESSURE: 66 MMHG | HEART RATE: 55 BPM

## 2022-11-16 DIAGNOSIS — C34.32 MALIGNANT NEOPLASM OF LOWER LOBE, LEFT BRONCHUS OR LUNG: Primary | ICD-10-CM

## 2022-11-16 DIAGNOSIS — C34.32 MALIGNANT NEOPLASM OF LOWER LOBE, LEFT BRONCHUS OR LUNG: ICD-10-CM

## 2022-11-16 DIAGNOSIS — C7A.8 NEUROENDOCRINE CARCINOMA OF LUNG: ICD-10-CM

## 2022-11-16 LAB
ALBUMIN SERPL-MCNC: 3.9 G/DL (ref 3.5–5.2)
ALBUMIN/GLOB SERPL: 1.3 G/DL (ref 1.1–2.4)
ALP SERPL-CCNC: 72 U/L (ref 38–116)
ALT SERPL W P-5'-P-CCNC: 9 U/L (ref 0–41)
ANION GAP SERPL CALCULATED.3IONS-SCNC: 9.3 MMOL/L (ref 5–15)
AST SERPL-CCNC: 14 U/L (ref 0–40)
BASOPHILS # BLD AUTO: 0.02 10*3/MM3 (ref 0–0.2)
BASOPHILS NFR BLD AUTO: 0.3 % (ref 0–1.5)
BILIRUB SERPL-MCNC: 0.4 MG/DL (ref 0.2–1.2)
BUN SERPL-MCNC: 12 MG/DL (ref 6–20)
BUN/CREAT SERPL: 12.8 (ref 7.3–30)
CALCIUM SPEC-SCNC: 9.8 MG/DL (ref 8.5–10.2)
CHLORIDE SERPL-SCNC: 98 MMOL/L (ref 98–107)
CO2 SERPL-SCNC: 26.7 MMOL/L (ref 22–29)
CREAT SERPL-MCNC: 0.94 MG/DL (ref 0.7–1.3)
DEPRECATED RDW RBC AUTO: 50.8 FL (ref 37–54)
EGFRCR SERPLBLD CKD-EPI 2021: 84.5 ML/MIN/1.73
EOSINOPHIL # BLD AUTO: 0.16 10*3/MM3 (ref 0–0.4)
EOSINOPHIL NFR BLD AUTO: 2.5 % (ref 0.3–6.2)
ERYTHROCYTE [DISTWIDTH] IN BLOOD BY AUTOMATED COUNT: 14.7 % (ref 12.3–15.4)
GLOBULIN UR ELPH-MCNC: 3.1 GM/DL (ref 1.8–3.5)
GLUCOSE SERPL-MCNC: 170 MG/DL (ref 74–124)
HCT VFR BLD AUTO: 37.4 % (ref 37.5–51)
HGB BLD-MCNC: 12 G/DL (ref 13–17.7)
IMM GRANULOCYTES # BLD AUTO: 0.01 10*3/MM3 (ref 0–0.05)
IMM GRANULOCYTES NFR BLD AUTO: 0.2 % (ref 0–0.5)
LYMPHOCYTES # BLD AUTO: 1.84 10*3/MM3 (ref 0.7–3.1)
LYMPHOCYTES NFR BLD AUTO: 28.2 % (ref 19.6–45.3)
MCH RBC QN AUTO: 30.1 PG (ref 26.6–33)
MCHC RBC AUTO-ENTMCNC: 32.1 G/DL (ref 31.5–35.7)
MCV RBC AUTO: 93.7 FL (ref 79–97)
MONOCYTES # BLD AUTO: 0.67 10*3/MM3 (ref 0.1–0.9)
MONOCYTES NFR BLD AUTO: 10.3 % (ref 5–12)
NEUTROPHILS NFR BLD AUTO: 3.83 10*3/MM3 (ref 1.7–7)
NEUTROPHILS NFR BLD AUTO: 58.5 % (ref 42.7–76)
NRBC BLD AUTO-RTO: 0 /100 WBC (ref 0–0.2)
PLATELET # BLD AUTO: 200 10*3/MM3 (ref 140–450)
PMV BLD AUTO: 8.6 FL (ref 6–12)
POTASSIUM SERPL-SCNC: 4.2 MMOL/L (ref 3.5–4.7)
PROT SERPL-MCNC: 7 G/DL (ref 6.3–8)
RBC # BLD AUTO: 3.99 10*6/MM3 (ref 4.14–5.8)
SODIUM SERPL-SCNC: 134 MMOL/L (ref 134–145)
WBC NRBC COR # BLD: 6.53 10*3/MM3 (ref 3.4–10.8)

## 2022-11-16 PROCEDURE — 80053 COMPREHEN METABOLIC PANEL: CPT

## 2022-11-16 PROCEDURE — 85025 COMPLETE CBC W/AUTO DIFF WBC: CPT

## 2022-11-16 PROCEDURE — 99215 OFFICE O/P EST HI 40 MIN: CPT | Performed by: INTERNAL MEDICINE

## 2022-11-16 PROCEDURE — 36415 COLL VENOUS BLD VENIPUNCTURE: CPT

## 2022-11-16 RX ORDER — DOXYCYCLINE 100 MG/1
100 CAPSULE ORAL 2 TIMES DAILY
Status: ON HOLD | COMMUNITY
Start: 2022-11-07 | End: 2022-11-21

## 2022-11-16 NOTE — PROGRESS NOTES
"Chief Complaint  Follow up, NSCLC    Subjective        Giovani España presents to [facility] THORACIC SURGERY  History of Present Illness    Mr. España is a pleasant 77-year-old gentleman who is status post left VATS with left lower lobectomy by Dr. Joe on September 23, 2023 for a T1 cN1 M0 neuroendocrine carcinoma.  He presents today for a second postop appointment.  He is followed by Dr. Bishop of medical oncology and will need a port placed to facilitate chemotherapy.  He is returning to his daily activities.  He is sleeping well.  His appetite is stable.  He has no new onset shortness of breath or hemoptysis.      Objective   Vital Signs:  /79 (BP Location: Right arm, Patient Position: Sitting, Cuff Size: Adult)   Pulse 69   Ht 177.8 cm (70\")   Wt 69.6 kg (153 lb 8 oz)   SpO2 98%   BMI 22.02 kg/m²   Estimated body mass index is 22.02 kg/m² as calculated from the following:    Height as of this encounter: 177.8 cm (70\").    Weight as of this encounter: 69.6 kg (153 lb 8 oz).    BMI is within normal parameters. No other follow-up for BMI required.      Physical Exam  Constitutional:       General: He is not in acute distress.     Appearance: Normal appearance. He is not ill-appearing.   HENT:      Head: Normocephalic and atraumatic.   Cardiovascular:      Rate and Rhythm: Normal rate and regular rhythm.      Pulses: Normal pulses.   Pulmonary:      Effort: Pulmonary effort is normal. No respiratory distress.   Musculoskeletal:         General: Normal range of motion.      Cervical back: Normal range of motion and neck supple.   Skin:     General: Skin is warm.   Neurological:      General: No focal deficit present.      Mental Status: He is alert.   Psychiatric:         Mood and Affect: Mood normal.        Result Review :               I have independently reviewed the chest x-ray performed prior to his appointment today which demonstrates adequate aeration and expansion of the " lungs bilaterally.  No pleural effusion or pneumothorax.      Assessment and Plan   Diagnoses and all orders for this visit:    1. Pulmonary nodule (Primary)  -     Case Request; Standing  -     CBC and Differential; Future  -     Basic metabolic panel; Future  -     APTT; Future  -     Protime-INR; Future  -     Type and screen; Future  -     heparin (porcine) 5000 UNIT/ML injection 5,000 Units  -     sodium chloride 0.9 % flush 3 mL  -     sodium chloride 0.9 % flush 3-10 mL  -     ceFAZolin (ANCEF) 2 g in sodium chloride 0.9 % 100 mL IVPB  -     Case Request  -     CT Chest Without Contrast; Future    2. Abnormal coagulation profile  -     CT Chest Without Contrast; Future    3. Neuroendocrine carcinoma of lung (HCC)  -     CT Chest Without Contrast; Future    Other orders  -     Follow Anesthesia Guidelines / Protocol; Future  -     Obtain Informed Consent; Future  -     Provide NPO Instructions to Patient; Future  -     Chlorhexidine Skin Prep; Future  -     Follow Anesthesia Guidelines / Protocol; Standing  -     Verify / Perform Chlorhexidine Skin Prep; Standing  -     Verify / Perform Chlorhexidine Skin Prep if Indicated (If Not Already Completed); Standing  -     Insert Peripheral IV; Standing  -     Saline Lock & Maintain IV Access; Standing    Mr. España is status post left lower lobe resection for a stage II neuroendocrine tumor.  He will need a port placed to facilitate chemotherapy.  We will plan for this in the near future.  Orders have been placed into [facility] and our surgery scheduler will contact him to arrange this.  We will also order a CT of the chest followed by an office visit in 4 months for continued surveillance.       I spent 33 minutes caring for Giovani on this date of service. This time includes time spent by me in the following activities:preparing for the visit, reviewing tests, performing a medically appropriate examination and/or evaluation , counseling and educating the  patient/family/caregiver, ordering medications, tests, or procedures, referring and communicating with other health care professionals  and documenting information in the medical record     Follow Up   Return in about 4 months (around 3/17/2023) for Next scheduled follow up.  Patient was given instructions and counseling regarding his condition or for health maintenance advice. Please see specific information pulled into the AVS if appropriate.

## 2022-11-16 NOTE — PROGRESS NOTES
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  Notably there is invasive carcinoma present at the margin.  Is a, and only 2 positive lymph nodes adjacent to the bronchovesicular margin which appears to have been transected difficult to enumerate with extranodal extension present.      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  He had been assessed particularly in the fall 2021 when he presented with nausea and vomiting and abdominal discomfort leading to assessments that revealed sigmoid diverticulitis with contained rupture and partial small bowel obstruction.  Records from mid September 21 indicating that he was admitted with partial small bowel obstruction and treated medically with very slow recovery.  He has history of COPD with CT demonstrating a pulmonary nodule in the right lung base at 7 mm with follow-up planned.  He was seen by general surgery in later September with repeat scans planned and repeat CT abdomen 10/7/2021 did demonstrate interval improvement of sigmoid diverticulitis.      Record indicates an assessment in late June 2022 at different general surgeon for left inguinal hernia, history of heavy tobacco use with COPD and recommendation for surgical repair of his hernia      The patient underwent a CT scan of the chest 5/7/2022 demonstrating diffuse emphysematous changes, ill-defined density measuring 3 mm in the superior segment of the right lower lobe, multiple ill-defined 3 to 4 mm nodular density thought to be inflammatory involving the  right lower lobe and a new spiculated nodule involving the left lower lobe measuring 16 x 14 mm as well as left hilar adenopathy.       Follow-up PET/CT 7/13/2022 reveal a hypermetabolic spiculated lesion medial aspect the left lower lobe adjacent to descending thoracic aorta measuring 1.5 x 1.6 SUV 9.3 with an enlarged hypermetabolic left hilar lymph node measured 1.5 x 1.3 with SUV of 12.1, additional nodules in the lateral aspect right lower lobe and micronodule in the posterior/medial right lower lobe and also additional right lower lobe micronodule.  There is an infrarenal abdominal aortic aneurysm measuring 3.4 cm with a short segment of chronic dissection present.    These clinical findings would be consistent with a possible T1b N1 M0-stage IIb lung cancer.      Recognizing a diagnosis has not been made his case was discussed in thoracic conference 7/20/2022.  Recommendations include proceeding to pulmonary assessment with EBUS and the patient undergoing a general assessment per his clinical status-older adult assessment as well as assessment by thoracic surgery.  These issues are discussed with the patient and his wife in detail when they are seen 7/28/2022.    The patient proceeded to undergo his hernia surgery 8/2/2022 by Dr. Dotson.  Additionally the patient was unable to undergo assessment by pulmonary until October and, thereafter, was scheduled to see Dr. Joe 8/18/2022 undergoing older adult functional assessment with a JAGUAR score of 11 indicating a risk of functional decline related to cancer therapy.    The patient is seen with his wife 8/15/2022 and doing very well with recent hernia surgery.  His performance status is reasonably good at present and we have learned now that he would not be able to see pulmonary medicine until October, thoracic surgery is scheduled this week with Dr. Joe.  Perhaps he could be a surgical candidate.  Particularly we know by [employer] that T p53 splice site  mutation is present and would certainly be consistent as well the most common mutations found in lung cancers particularly squamous cancers.  We have discussed obtaining pulmonary functions at this point, thoracic surgery assessment this week and also restarting Chantix as possible for the patient.    There was difficulty obtaining Chantix and while this was being assessed the patient was reviewed 8/18 by thoracic surgery.  He was felt to have a T1b N1 M0 stage IIb carcinoma left lower lobe along and not a candidate for biopsy.  PFTs were borderline, functional assessment was reasonably good and the patient was felt to be a candidate for robot-assisted biopsy of his nodes and if malignant proceed to left lower lobe lobectomy.  He was reviewed 9/8 and offered 21 mg nicotine transdermal patching.    He is next seen 9/10/2022.  He is seen with his wife and both are prepared for surgery 9/23/2022.  We discussed that additional therapy may be considered once we have more information about the type and stage.  We would hope to see him postoperatively.    The patient was admitted 9//2022 undergoing 9/23/2022  a bronchoscopy with left video-assisted thoracoscopy with robot-assisted total decortication of the left lung, left lower lobectomy, mediastinal lymphadenectomy and intercostal nerve block with Dr. Nicola Joe.  Fortunately he did fairly well postoperatively though did develop atrial fibrillation with RVR treated with amiodarone converting back to sinus rhythm, treated with IV metoprolol subsequent chronic anticoagulation.  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  Notably there is invasive carcinoma present at the margin.  Is a, and only 2 positive lymph nodes adjacent to the bronchovesicular margin which appears to have been transected difficult to enumerate with extranodal  extension present.    The patient is seen 10/27/2022.  He is recovering well from surgery, albeit slowly, and we have discussed his findings that are concerning as to residual disease with a positive margin described as above.  There is also the significance potentially of PD-L1 expression which has been described as producing a poor survival.  Nivolumab has been used as second line therapy to positive effect in the disease and this begs the question as to whether adjuvant therapy plus immunotherapy could be utilized though the patient would be difficult to treat with platinum based chemotherapy as well.    The patient is next assessed 11/7/2022 for significant assessments by supportive oncology at [facility] as well as with plans to see Dr. Marrero 11/9/2022.  Unfortunately he has been very slow to gradually recover from the effects of surgery with reduced appetite and only fair performance status.  At present it would be difficult to give him cisplatin based chemotherapy and we discussed whether we might be able to use Tecentriq (atezolizumab) as his primary adjuvant therapy.  We have contacted pathology about completing Caris testing and a PD-L1 analysis that has not yet completed.      The patient is seen, however, 11/16/2022 and his genomic analysis has returned as being significant for broad sensitivity to immunotherapy.  This would argue for the administration of weekly Carboplatin/Taxol as the patient proceeds to radiation therapy and upon completion, then using Tecentriq as further adjuvant therapy over a year.  This is discussed with the patient and his  in detail as well as discussed with Dr. Marrero of radiation therapy.  We have also discussed the patient require port placement.      Past Medical History:   Diagnosis Date   • Arthritis    • COPD (chronic obstructive pulmonary disease) (HCC)     O2 3L AT HS   • Diabetes mellitus (HCC)     DIET CONTROL   • Diverticulitis    • DVT, lower extremity (HCC)      RLE   • Elevated cholesterol    • H/O Cervical pain    • History of colitis    • Hyperlipidemia    • Hypertension    • Left shoulder pain    • Low back pain    • Lung cancer (HCC) 2022    LEFT LUNG   • On anticoagulant therapy    • Peripheral arterial disease (HCC)    • Right leg claudication (HCC)    • Skin cancer     HX        Past Surgical History:   Procedure Laterality Date   • BALLOON ANGIOPLASTY, ARTERY Right 2015    IR Percutaneious IVC filter placement   • INGUINAL HERNIA REPAIR Left    • KNEE ARTHROSCOPY Left     Torn meniscus   • LOBECTOMY Left 9/23/2022    Procedure: BRONCHOSCOPY, LEFT VIDEO ASSISTED THORACOSCOPY, ROBOT ASSISTED TOTAL DECORTICATION  LEFT LUNG, LEFT LOWER LOBE LOBECTOMY, MEDIASTINAL LYMPHECTOMY, INTERCOSTAL NERVE BLOCK;  Surgeon: Nicola Joe III, MD;  Location: [facility];  Service: Robotics - [facility];  Laterality: Left;        Current Outpatient Medications on File Prior to Visit   Medication Sig Dispense Refill   • arformoterol (BROVANA) 15 MCG/2ML nebulizer solution Take 15 mcg by nebulization 2 (Two) Times a Day.     • budesonide (PULMICORT) 0.5 MG/2ML nebulizer solution Take 0.5 mg by nebulization 2 (Two) Times a Day.     • cetirizine (zyrTEC) 10 MG tablet Take 10 mg by mouth Daily.     • DOXYCYCLINE PO Take  by mouth 2 (Two) Times a Day.     • isosorbide mononitrate (IMDUR) 60 MG 24 hr tablet Take 60 mg by mouth Every Evening.     • metoprolol succinate XL (TOPROL-XL) 25 MG 24 hr tablet Take 1 tablet by mouth Daily for 30 days. 30 tablet 0   • ondansetron (Zofran) 8 MG tablet Take 1 tablet by mouth Every 8 (Eight) Hours As Needed for Nausea or Vomiting. 30 tablet 1   • simvastatin (ZOCOR) 20 MG tablet Take 20 mg by mouth Every Night.     • tamsulosin (FLOMAX) 0.4 MG capsule 24 hr capsule Take 1 capsule by mouth Daily. 30 capsule 5   • warfarin (COUMADIN) 5 MG tablet Take 1 tablet by mouth Daily. Take 10mg on 9/29/22 and then resume regular home schedule.       No current  facility-administered medications on file prior to visit.        ALLERGIES:  No Known Allergies     Social History     Socioeconomic History   • Marital status:    • Years of education: 1 year college   Tobacco Use   • Smoking status: Every Day     Packs/day: 1.00     Years: 59.00     Pack years: 59.00     Types: Cigarettes     Start date: 1963   • Smokeless tobacco: Never   • Tobacco comments:     9/8/22: Down to Less than 1 PPD   Vaping Use   • Vaping Use: Never used   Substance and Sexual Activity   • Alcohol use: Not Currently   • Drug use: Never   • Sexual activity: Defer        Family History   Problem Relation Age of Onset   • No Known Problems Mother    • Cancer Father    • Lung cancer Father    • Diabetes Brother    • Other Daughter         S/P stent, age 42   • No Known Problems Son    • Malig Hyperthermia Neg Hx         Review of Systems   Constitutional: Positive for activity change, fatigue and unexpected weight change (Status post his diverticulitis episode, weight has not been regained).   HENT: Negative.    Eyes: Negative.    Respiratory: Positive for shortness of breath. Negative for cough.    Cardiovascular: Negative.    Gastrointestinal: Negative.    Genitourinary: Negative.    Musculoskeletal: Negative.    Skin: Negative.    Neurological: Negative.    Psychiatric/Behavioral: Negative.         Objective     There were no vitals filed for this visit.  Current Status 11/9/2022   ECOG score 0       Physical Exam  Constitutional:       Appearance: Normal appearance.   HENT:      Head: Normocephalic and atraumatic.      Nose: Nose normal.      Mouth/Throat:      Mouth: Mucous membranes are moist.      Pharynx: Oropharynx is clear.   Eyes:      Extraocular Movements: Extraocular movements intact.      Conjunctiva/sclera: Conjunctivae normal.      Pupils: Pupils are equal, round, and reactive to light.   Cardiovascular:      Rate and Rhythm: Normal rate and regular rhythm.      Pulses: Normal  pulses.      Heart sounds: Normal heart sounds.   Pulmonary:      Comments: Prolonged expiratory phase bilaterally  Abdominal:      General: Bowel sounds are normal.      Palpations: Abdomen is soft.      Comments: Scaphoid   Musculoskeletal:         General: Normal range of motion.      Cervical back: Normal range of motion and neck supple.   Skin:     General: Skin is warm and dry.   Neurological:      General: No focal deficit present.      Mental Status: He is alert and oriented to person, place, and time.   Psychiatric:         Mood and Affect: Mood normal.           RECENT LABS:  Hematology WBC   Date Value Ref Range Status   11/07/2022 8.28 3.40 - 10.80 10*3/mm3 Final   05/09/2022 7.54 4.5 - 11.0 10*3/uL Final     RBC   Date Value Ref Range Status   11/07/2022 4.05 (L) 4.14 - 5.80 10*6/mm3 Final   05/09/2022 5.52 4.5 - 5.9 10*6/uL Final     Hemoglobin   Date Value Ref Range Status   11/07/2022 12.1 (L) 13.0 - 17.7 g/dL Final   05/09/2022 16.4 13.5 - 17.5 g/dL Final     Hematocrit   Date Value Ref Range Status   11/07/2022 37.2 (L) 37.5 - 51.0 % Final   05/09/2022 51.0 41.0 - 53.0 % Final     Platelets   Date Value Ref Range Status   11/07/2022 246 140 - 450 10*3/mm3 Final   05/09/2022 204 140 - 440 10*3/uL Final          Assessment & Plan           77-year-old male with medical issues including type 2 diabetes-uncertain control, COPD, hypertension, diastolic heart failure, chronic disease, peripheral vascular disease (follow-up with vascular surgery today), previous DVT on anticoagulation (home monitoring), previous IVC filter placement?,  Status post September 2021 partial small bowel obstruction secondary to sigmoid diverticulitis treated medically.        He had had a right lung base nodule noted on scan at that point and in follow-up with primary care had developed a left inguinal hernia with repair planned through a different general surgeon then seen with his initial sigmoid diverticulitis.  An  additional CT scan of the chest done in follow-up of his previously abnormal study now revealed not only the previously noted right lower lobe and additional nodules but a spiculated nodule in the left lower lobe measuring 16 x 14 mm.                                 Follow-up PET/CT demonstrated a hypermetabolic spiculated lesion medial aspect the left lower lobe adjacent to descending thoracic aorta measuring 1.5 x 1.6 SUV 9.3 with an enlarged hypermetabolic left hilar lymph node measured 1.5 x 1.3 with SUV of 12.1, additional nodules in the lateral aspect right lower lobe and micronodule in the posterior/medial right lower lobe and also additional right lower lobe micronodule.  There is an infrarenal abdominal aortic aneurysm measuring 3.4 cm with a short segment of chronic dissection present.    These clinical findings would be consistent with a possible T1b N1 M0-stage IIb lung cancer.   Recognizing a diagnosis has not been made his case was discussed in thoracic conference 7/20/2022.  Recommendations include proceeding to pulmonary assessment with EBUS and the patient undergoing a general assessment per his clinical status-older adult assessment as well as assessment by thoracic surgery.  These issues are discussed with the patient and his wife in detail when they are seen 7/28/2022.    The patient proceeded to undergo his hernia surgery 8/2/2022 by Dr. Dotson.  Additionally the patient was unable to undergo assessment by pulmonary until October and, thereafter, was scheduled to see Dr. Joe 8/18/2022 undergoing older adult functional assessment with a JAGUAR score of 11 indicating a risk of functional decline related to cancer therapy.    The patient is seen with his wife 8/15/2022 and doing very well with recent hernia surgery.  His performance status is reasonably good at present and we have learned now that he would not be able to see pulmonary medicine until October, thoracic surgery is scheduled this week  with Dr. Joe.  Perhaps he could be a surgical candidate.  Particularly we know by [employer] that T p53 splice site mutation is present and would certainly be consistent as well the most common mutations found in lung cancers particularly squamous cancers.  We have discussed obtaining pulmonary functions at this point, thoracic surgery assessment this week and also restarting Chantix as possible for the patient.    There was difficulty obtaining Chantix and while this was being assessed the patient was reviewed 8/18 by thoracic surgery.  He was felt to have a T1b N1 M0 stage IIb carcinoma left lower lobe along and not a candidate for biopsy.  PFTs were borderline, functional assessment was reasonably good and the patient was felt to be a candidate for robot-assisted biopsy of his nodes and if malignant proceed to left lower lobe lobectomy.  He was reviewed 9/8 and offered 21 mg nicotine transdermal patching.    He is next seen 9/10/2022.  He is seen with his wife and both are prepared for surgery 9/23/2022.  We discussed that additional therapy may be considered once we have more information about the type and stage.  We would hope to see him postoperatively.    The patient was admitted 9//2022 undergoing 9/23/2022  a bronchoscopy with left video-assisted thoracoscopy with robot-assisted total decortication of the left lung, left lower lobectomy, mediastinal lymphadenectomy and intercostal nerve block with Dr. Nicola Joe.  Fortunately he did fairly well postoperatively though did develop atrial fibrillation with RVR treated with amiodarone converting back to sinus rhythm, treated with IV metoprolol subsequent chronic anticoagulation.  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  Notably there is invasive carcinoma present at the margin.  Is a, and only 2 positive lymph nodes  adjacent to the bronchovesicular margin which appears to have been transected difficult to enumerate with extranodal extension present.       The patient is seen 10/27/2022.  He is recovering well from surgery, albeit slowly, and we have discussed his findings that are concerning as to residual disease with a positive margin described as above.  There is also the significance potentially of PD-L1 expression which has been described as producing a poor survival.  Nivolumab has been used as second line therapy to positive effect in the disease and this begs the question as to whether adjuvant therapy plus immunotherapy could be utilized though the patient would be difficult to treat with platinum based chemotherapy as well.       Options could well be Carboplatin and Taxol considering the patient's comorbidity and worry of using cisplatin-based chemotherapy in this patient followed by atezolizumab with pathology having been notified to assess PD-L1 expression on the tumor as well also await review by Caris molecular profiling.  Finally radiation therapy consultation be requested.    The patient is next assessed 11/7/2022 for significant assessments by supportive oncology at [facility] as well as with plans to see Dr. Marrero 11/9/2022.  Unfortunately he has been very slow to gradually recover from the effects of surgery with reduced appetite and only fair performance status.  At present it would be difficult to give him cisplatin based chemotherapy and we discussed whether we might be able to use Tecentriq (atezolizumab) as his primary adjuvant therapy.  We have contacted pathology about completing Caris testing and a PD-L1 analysis that has not yet completed.    The patient was reviewed by radiation therapy seen 11/9/2022 and radiation therapy offered.  We asked the patient to return back for follow-up and when seen 11/15/2022 we discussed that the patient's Caris analysis indicates benefit from immunotherapy at relatively  high levels.  This would allow radiation therapy given with Carboplatin Taxol weekly (better tolerated) and then subsequent atezolizumab adjuvantly.    Plan:  *Patient has been reviewing the possibility of radiation therapy and today we discussed concurrent therapies- Carboplatin/Taxol weekly with radiation therapy-subsequent treatment with immunotherapy  *Tecentriq as adjuvant therapy every 3 weeks for up to a year.    *Patient to be referred back to Dr. Joe for port placement    *Teach in the next week for weekly Carboplatin Taxol with plans to proceed to commendation radiation therapy and weekly therapy to begin approximately November 28, 2022 with the patient seen MD or NP.    *At the completion of weekly radiation therapy (over 5 to 6 weeks) the patient will be offered Tecentriq every 3 weeks for a year as further adjuvant therapy.    *Patient agreeable with this plan and follow-up.    I spent 45 minutes caring for Giovani on this date of service. This time includes time spent by me in the following activities: preparing for the visit, reviewing tests, obtaining and/or reviewing a separately obtained history, performing a medically appropriate examination and/or evaluation, counseling and educating the patient/family/caregiver, ordering medications, tests, or procedures, referring and communicating with other health care professionals, documenting information in the medical record, independently interpreting results and communicating that information with the patient/family/caregiver and care coordination.

## 2022-11-16 NOTE — H&P (VIEW-ONLY)
"Chief Complaint  Follow up, NSCLC    Subjective        Giovani España presents to [facility] THORACIC SURGERY  History of Present Illness    Mr. España is a pleasant 77-year-old gentleman who is status post left VATS with left lower lobectomy by Dr. Joe on September 23, 2023 for a T1 cN1 M0 neuroendocrine carcinoma.  He presents today for a second postop appointment.  He is followed by Dr. Bishop of medical oncology and will need a port placed to facilitate chemotherapy.  He is returning to his daily activities.  He is sleeping well.  His appetite is stable.  He has no new onset shortness of breath or hemoptysis.      Objective   Vital Signs:  /79 (BP Location: Right arm, Patient Position: Sitting, Cuff Size: Adult)   Pulse 69   Ht 177.8 cm (70\")   Wt 69.6 kg (153 lb 8 oz)   SpO2 98%   BMI 22.02 kg/m²   Estimated body mass index is 22.02 kg/m² as calculated from the following:    Height as of this encounter: 177.8 cm (70\").    Weight as of this encounter: 69.6 kg (153 lb 8 oz).    BMI is within normal parameters. No other follow-up for BMI required.      Physical Exam  Constitutional:       General: He is not in acute distress.     Appearance: Normal appearance. He is not ill-appearing.   HENT:      Head: Normocephalic and atraumatic.   Cardiovascular:      Rate and Rhythm: Normal rate and regular rhythm.      Pulses: Normal pulses.   Pulmonary:      Effort: Pulmonary effort is normal. No respiratory distress.   Musculoskeletal:         General: Normal range of motion.      Cervical back: Normal range of motion and neck supple.   Skin:     General: Skin is warm.   Neurological:      General: No focal deficit present.      Mental Status: He is alert.   Psychiatric:         Mood and Affect: Mood normal.        Result Review :               I have independently reviewed the chest x-ray performed prior to his appointment today which demonstrates adequate aeration and expansion of the " lungs bilaterally.  No pleural effusion or pneumothorax.      Assessment and Plan   Diagnoses and all orders for this visit:    1. Pulmonary nodule (Primary)  -     Case Request; Standing  -     CBC and Differential; Future  -     Basic metabolic panel; Future  -     APTT; Future  -     Protime-INR; Future  -     Type and screen; Future  -     heparin (porcine) 5000 UNIT/ML injection 5,000 Units  -     sodium chloride 0.9 % flush 3 mL  -     sodium chloride 0.9 % flush 3-10 mL  -     ceFAZolin (ANCEF) 2 g in sodium chloride 0.9 % 100 mL IVPB  -     Case Request  -     CT Chest Without Contrast; Future    2. Abnormal coagulation profile  -     CT Chest Without Contrast; Future    3. Neuroendocrine carcinoma of lung (HCC)  -     CT Chest Without Contrast; Future    Other orders  -     Follow Anesthesia Guidelines / Protocol; Future  -     Obtain Informed Consent; Future  -     Provide NPO Instructions to Patient; Future  -     Chlorhexidine Skin Prep; Future  -     Follow Anesthesia Guidelines / Protocol; Standing  -     Verify / Perform Chlorhexidine Skin Prep; Standing  -     Verify / Perform Chlorhexidine Skin Prep if Indicated (If Not Already Completed); Standing  -     Insert Peripheral IV; Standing  -     Saline Lock & Maintain IV Access; Standing    Mr. España is status post left lower lobe resection for a stage II neuroendocrine tumor.  He will need a port placed to facilitate chemotherapy.  We will plan for this in the near future.  Orders have been placed into [facility] and our surgery scheduler will contact him to arrange this.  We will also order a CT of the chest followed by an office visit in 4 months for continued surveillance.       I spent 33 minutes caring for Giovani on this date of service. This time includes time spent by me in the following activities:preparing for the visit, reviewing tests, performing a medically appropriate examination and/or evaluation , counseling and educating the  patient/family/caregiver, ordering medications, tests, or procedures, referring and communicating with other health care professionals  and documenting information in the medical record     Follow Up   Return in about 4 months (around 3/17/2023) for Next scheduled follow up.  Patient was given instructions and counseling regarding his condition or for health maintenance advice. Please see specific information pulled into the AVS if appropriate.

## 2022-11-17 ENCOUNTER — LAB (OUTPATIENT)
Dept: LAB | Facility: HOSPITAL | Age: 77
End: 2022-11-17

## 2022-11-17 ENCOUNTER — OFFICE VISIT (OUTPATIENT)
Dept: SURGERY | Facility: CLINIC | Age: 77
End: 2022-11-17

## 2022-11-17 ENCOUNTER — HOSPITAL ENCOUNTER (OUTPATIENT)
Dept: GENERAL RADIOLOGY | Facility: HOSPITAL | Age: 77
Discharge: HOME | End: 2022-11-17

## 2022-11-17 VITALS
OXYGEN SATURATION: 98 % | WEIGHT: 153.5 LBS | HEIGHT: 70 IN | SYSTOLIC BLOOD PRESSURE: 142 MMHG | DIASTOLIC BLOOD PRESSURE: 79 MMHG | BODY MASS INDEX: 21.98 KG/M2 | HEART RATE: 69 BPM

## 2022-11-17 DIAGNOSIS — R91.1 PULMONARY NODULE: ICD-10-CM

## 2022-11-17 DIAGNOSIS — Z09 FOLLOW-UP EXAMINATION FOLLOWING SURGERY: ICD-10-CM

## 2022-11-17 DIAGNOSIS — C7A.8 NEUROENDOCRINE CARCINOMA OF LUNG: ICD-10-CM

## 2022-11-17 DIAGNOSIS — R79.1 ABNORMAL COAGULATION PROFILE: ICD-10-CM

## 2022-11-17 DIAGNOSIS — R91.1 PULMONARY NODULE: Primary | ICD-10-CM

## 2022-11-17 LAB
ABO GROUP BLD: NORMAL
ALBUMIN SERPL-MCNC: 4.3 G/DL (ref 3.5–5.2)
ALBUMIN/GLOB SERPL: 1.4 G/DL
ALP SERPL-CCNC: 73 U/L (ref 39–117)
ALT SERPL W P-5'-P-CCNC: 11 U/L (ref 1–41)
ANION GAP SERPL CALCULATED.3IONS-SCNC: 8 MMOL/L (ref 5–15)
APTT PPP: 50.9 SECONDS (ref 22.7–35.4)
AST SERPL-CCNC: 15 U/L (ref 1–40)
BASOPHILS # BLD AUTO: 0.03 10*3/MM3 (ref 0–0.2)
BASOPHILS NFR BLD AUTO: 0.4 % (ref 0–1.5)
BILIRUB SERPL-MCNC: 0.4 MG/DL (ref 0–1.2)
BLD GP AB SCN SERPL QL: NEGATIVE
BUN SERPL-MCNC: 11 MG/DL (ref 8–23)
BUN/CREAT SERPL: 12.1 (ref 7–25)
CALCIUM SPEC-SCNC: 10.5 MG/DL (ref 8.6–10.5)
CHLORIDE SERPL-SCNC: 98 MMOL/L (ref 98–107)
CO2 SERPL-SCNC: 30 MMOL/L (ref 22–29)
CREAT SERPL-MCNC: 0.91 MG/DL (ref 0.76–1.27)
DEPRECATED RDW RBC AUTO: 48 FL (ref 37–54)
EGFRCR SERPLBLD CKD-EPI 2021: 87.9 ML/MIN/1.73
EOSINOPHIL # BLD AUTO: 0.07 10*3/MM3 (ref 0–0.4)
EOSINOPHIL NFR BLD AUTO: 0.9 % (ref 0.3–6.2)
ERYTHROCYTE [DISTWIDTH] IN BLOOD BY AUTOMATED COUNT: 13.9 % (ref 12.3–15.4)
GLOBULIN UR ELPH-MCNC: 3 GM/DL
GLUCOSE SERPL-MCNC: 107 MG/DL (ref 65–99)
HCT VFR BLD AUTO: 41.1 % (ref 37.5–51)
HGB BLD-MCNC: 12.8 G/DL (ref 13–17.7)
IMM GRANULOCYTES # BLD AUTO: 0.03 10*3/MM3 (ref 0–0.05)
IMM GRANULOCYTES NFR BLD AUTO: 0.4 % (ref 0–0.5)
INR PPP: 2.85 (ref 0.9–1.1)
LYMPHOCYTES # BLD AUTO: 1.93 10*3/MM3 (ref 0.7–3.1)
LYMPHOCYTES NFR BLD AUTO: 25.2 % (ref 19.6–45.3)
MCH RBC QN AUTO: 29.2 PG (ref 26.6–33)
MCHC RBC AUTO-ENTMCNC: 31.1 G/DL (ref 31.5–35.7)
MCV RBC AUTO: 93.6 FL (ref 79–97)
MONOCYTES # BLD AUTO: 0.7 10*3/MM3 (ref 0.1–0.9)
MONOCYTES NFR BLD AUTO: 9.2 % (ref 5–12)
NEUTROPHILS NFR BLD AUTO: 4.89 10*3/MM3 (ref 1.7–7)
NEUTROPHILS NFR BLD AUTO: 63.9 % (ref 42.7–76)
NRBC BLD AUTO-RTO: 0 /100 WBC (ref 0–0.2)
PLATELET # BLD AUTO: 243 10*3/MM3 (ref 140–450)
PMV BLD AUTO: 9 FL (ref 6–12)
POTASSIUM SERPL-SCNC: 4.9 MMOL/L (ref 3.5–5.2)
PROT SERPL-MCNC: 7.3 G/DL (ref 6–8.5)
PROTHROMBIN TIME: 30.3 SECONDS (ref 11.7–14.2)
RBC # BLD AUTO: 4.39 10*6/MM3 (ref 4.14–5.8)
RH BLD: POSITIVE
SODIUM SERPL-SCNC: 136 MMOL/L (ref 136–145)
T&S EXPIRATION DATE: NORMAL
WBC NRBC COR # BLD: 7.65 10*3/MM3 (ref 3.4–10.8)

## 2022-11-17 PROCEDURE — 85610 PROTHROMBIN TIME: CPT

## 2022-11-17 PROCEDURE — 86900 BLOOD TYPING SEROLOGIC ABO: CPT

## 2022-11-17 PROCEDURE — 86901 BLOOD TYPING SEROLOGIC RH(D): CPT

## 2022-11-17 PROCEDURE — 86850 RBC ANTIBODY SCREEN: CPT

## 2022-11-17 PROCEDURE — 85025 COMPLETE CBC W/AUTO DIFF WBC: CPT

## 2022-11-17 PROCEDURE — 71046 X-RAY EXAM CHEST 2 VIEWS: CPT

## 2022-11-17 PROCEDURE — 99024 POSTOP FOLLOW-UP VISIT: CPT | Performed by: NURSE PRACTITIONER

## 2022-11-17 PROCEDURE — 85730 THROMBOPLASTIN TIME PARTIAL: CPT

## 2022-11-17 PROCEDURE — 80053 COMPREHEN METABOLIC PANEL: CPT

## 2022-11-17 PROCEDURE — 36415 COLL VENOUS BLD VENIPUNCTURE: CPT

## 2022-11-17 RX ORDER — CEFAZOLIN SODIUM 2 G/100ML
2 INJECTION, SOLUTION INTRAVENOUS ONCE
Status: CANCELLED | OUTPATIENT
Start: 2022-11-21 | End: 2022-11-17

## 2022-11-17 RX ORDER — SODIUM CHLORIDE 0.9 % (FLUSH) 0.9 %
3-10 SYRINGE (ML) INJECTION AS NEEDED
Status: CANCELLED | OUTPATIENT
Start: 2022-11-21

## 2022-11-17 RX ORDER — SODIUM CHLORIDE 0.9 % (FLUSH) 0.9 %
3 SYRINGE (ML) INJECTION EVERY 12 HOURS SCHEDULED
Status: CANCELLED | OUTPATIENT
Start: 2022-11-21

## 2022-11-17 RX ORDER — HEPARIN SODIUM 5000 [USP'U]/ML
5000 INJECTION, SOLUTION INTRAVENOUS; SUBCUTANEOUS ONCE
Status: CANCELLED | OUTPATIENT
Start: 2022-11-21

## 2022-11-17 NOTE — PROGRESS NOTES
TREATMENT  PREPARATION    Regina Locke  [id number]  1945    Chief Complaint: Treatment preparation and needs assessment    History of present illness:  Regina Locke is a 77 y.o. year old male who is here today for treatment preparation and needs assessment.  The patient has been diagnosed with   Encounter Diagnoses   Name Primary?   • Malignant neoplasm of lower lobe, left bronchus or lung (HCC) Yes   • Neuroendocrine carcinoma of lung (HCC)     and is scheduled to begin treatment with:     Oncology History:    Oncology/Hematology History   Neuroendocrine carcinoma of lung (HCC)   2022 Imaging    FACILITY:  [facility]   UNIT/AGE/GENDER: F.CTCVA  OP      AGE:77 Y          SEX:M   PATIENT NAME/:  REGINA LOCKE E    1945   UNIT NUMBER:  [id number]   ACCOUNT NUMBER:  [id number]   ACCESSION NUMBER:  [id number]     EXAM: PET W/CT TUMOR SKULL MID-THIGH     DATE OF EXAM: 2022     CLINICAL HISTORY: Lung cancer screening, >= 20 pk-yr smoking history (Age >= 50y)   [id number] LEFT LUNG NODULE 2.5CM HILAR LEFT LYMPHADENOPATHY initial PET evaluation.     COMPARISON: None available.     TECHNIQUE: PET/CT imaging was performed from the skull base to the mid-thigh following the intravenous administration of 10.36 mCi of F-18 labeled FDG. CT imaging was performed for attenuation correction and lesion localization. The patient's blood   Glucose level was 106 mg/dl at the time of radiotracer injection.   Radiation dose reduction techniques were utilized per ALARA protocol.     FINDINGS:   HEAD & NECK:   No abnormal focus of radiotracer accumulation.   Visualized portions of the brain grossly unremarkable. Upper airway patent. No cervical adenopathy.   .   CHEST:   There is a hypermetabolic spiculated lesion in the medial aspect of the left lower lobe (adjacent to the descending thoracic aorta) that measures 1.5 x 1.6 cm (AP x TRV) , max SUV 9.3. This lung lesion is favored to  represent primary malignancy.   There is an enlarged hypermetabolic left hilar lymph node that measures 1.5 x 1.3 cm (AP x TRV)  (max SUV 12.1), favored to represent a site of alta metastatic disease.   Background changes of emphysema are noted with mild scarring and architectural distortion in the right lung apex.   No additional hypermetabolic lung lesions are present.   There is an 11 mm nodule in the lateral aspect of the right lower lobe (series 3 image 81). There is also a micronodule in the posterior/medial right lower lobe on series 3 image 76. There is also a micronodule in the right lower lobe on series 3 image   72.   No effusions. A small pleural calcification is noted in left posterior hemithorax.   Thoracic aortic and coronary artery calcifications are noted.   .   ABDOMEN/PELVIS:   No abnormal focus of radiotracer accumulation.   The liver is grossly unremarkable. No biliary ductal dilatation. Cholecystectomy.   Small scattered calcified splenic granulomas are noted. The spleen is otherwise unremarkable.   The adrenals and pancreas are grossly unremarkable.   The kidneys, ureters and urinary bladder are unremarkable.   Sigmoid diverticulosis is noted. No evidence of bowel obstruction or inflammation. Appendix normal.   No hypermetabolic lymphadenopathy, free fluid or well-formed fluid collections are evident in the abdomen/pelvis.   There is an infrarenal abdominal aortic aneurysm demonstrated which measures 3.4 x 3.2 cm (AP x TRV) . The abdominal aortic aneurysm demonstrates a short segment area of chronic dissection.   IVC filter noted.   .   MUSCULOSKELETAL:   No abnormal focus of radiotracer accumulation.        .   IMPRESSION:   1. There is a spiculated lung lesion in the medial aspect of the left lower lobe that measures 1.5 cm in long axis and demonstrates hypermetabolic uptake. This is favored to represent a primary malignancy.   2. There is an enlarged hypermetabolic left hilar lymph node.  This is favored to represent a site of alta metastatic disease.   3. There is an additional 11 mm nodule in the right lower lobe. There are also a couple of additional right lower lobe micronodules. These demonstrate no metabolic uptake but should be followed for monitoring.   4. An infrarenal abdominal aortic aneurysm measuring 3.4 cm is noted with a short segment area of chronic dissection present.   5. Secondary findings are discussed in the body of the report.      9/23/2022 Initial Diagnosis    Neuroendocrine carcinoma of lung (HCC)     9/23/2022 Surgery    Final Diagnosis   1. Lung, Left Lower Lobe, Lobectomy:               A. Large cell neuroendocrine carcinoma.               B. 8 of 11 lymph nodes positive for metastatic large cell neuroendocrine carcinoma (8/11).               C. See synoptic template for complete tumor details.     2. Lymph Node, L9, Excision:                A. Benign lymph node (0/1).     3. Lymph Node, L9 #2, Excision:               A. Benign lymph node (0/1).     4. Lymph Node, L7, Excision:               A. Benign lymph node (0/1).                 5. Lymph Node, L7 #2, Excision:               A. Benign lymph node (0/1).     6. Lymph Node, L7 #3, Excision:               A. Fragments of benign lymph node (0/1).                 7. Lymph Node, L10, Excision:               A. 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     Synoptic Checklist   LUNG  8th Edition - Protocol posted: 6/22/2022  LUNG: RESECTION - All Specimens  SPECIMEN   Procedure  Lobectomy    Specimen Laterality  Left    TUMOR   Tumor Focality  Single focus    Tumor Site  Lower lobe of lung    Tumor Size     Total Tumor Size (size of entire tumor)  Greatest Dimension (Centimeters): 2.5 cm   Histologic Type  Large cell neuroendocrine carcinoma    Histologic Grade  G4, undifferentiated    Spread Through Air Spaces (ADDY)  Present    Visceral Pleura Invasion  Not identified    Direct Invasion of Adjacent  Structures  Not applicable (no adjacent structures present)    Treatment Effect  No known presurgical therapy    Lymphovascular Invasion  Present    MARGINS   Margin Status for Invasive Carcinoma  Invasive carcinoma present at margin    Margin(s) Involved by Invasive Carcinoma  Bronchial    Margin Status for Non-Invasive Tumor  Not applicable    REGIONAL LYMPH NODES   Lymph Node(s) from Prior Procedures  Not included    Regional Lymph Node Status  Tumor present in regional lymph node(s)    Number of Lymph Nodes with Tumor  9    Jennifer Site(s) with Tumor  10L: Hilar      12L: Lobar      13L: Segmental    Extranodal Extension  Present    Number of Lymph Nodes Examined  17    Jennifer Site(s) Examined  7: Subcarinal      9L: Pulmonary ligament      10L: Hilar      12L: Lobar      13L: Segmental         *Guardant 360: Detected alteration in TP53 Splice Site SNV; several variants of uncertain significance       10/27/2022 Cancer Staged    Staging form: Lung, AJCC 8th Edition  - Pathologic stage from 10/27/2022: Stage IIB (pT1c, pN1, cM0) - Signed by Kayden Bishop MD on 10/27/2022     11/28/2022 -  Chemotherapy    OP LUNG PACLitaxel / CARBOplatin AUC=2 (Weekly) + XRT          The current medication list and allergy list were reviewed and reconciled.     Past Medical History, Past Surgical History, Social History, Family History have been reviewed and are without significant changes except as mentioned.    Physical Exam:    Vitals:    11/18/22 0832   BP: 106/63   Pulse: 68   Resp: 18   Temp: 97.1 °F (36.2 °C)   SpO2: 99%     Vitals:    11/18/22 0832   PainSc: 0-No pain        ECOG score: 0             Physical Exam  HENT:      Head: Normocephalic and atraumatic.   Eyes:      Extraocular Movements: Extraocular movements intact.      Conjunctiva/sclera: Conjunctivae normal.   Pulmonary:      Effort: Pulmonary effort is normal. No respiratory distress.   Neurological:      General: No focal deficit present.      Mental  Status: He is alert and oriented to person, place, and time.   Psychiatric:         Mood and Affect: Mood normal.         Behavior: Behavior normal.           NEEDS ASSESSMENTS    Genetics  The patient's new diagnosis and family history have been reviewed for genetic counseling needs. A genetic referral is not recommended.     Psychosocial and Barriers to care  The patient has completed a PHQ-9 Depression Screening and the Distress Thermometer (DT) today.  PHQ-9 results show PHQ-2 Total Score: 0 PHQ-9 Total Score: PHQ-9 Total Score: 0     The patient scored their distress today as Distress Level: 0-No distress on a scale of 0-10 with 0 being no distress and 10 being extreme distress. Problems marked by the patient as being an issue for them within the last week include Physical concerns  Fatigue: Yes .      Results were reviewed along with psychosocial resources offered by our cancer center.  Our Supportive Oncology team will be flagged for a score of 4 or above, and a same day call will be made for a score of 9 or 10.  A mental health referral is offered at that time. Patients who score less than 4 have been educated on our support services and can be referred to our  upon request.  The patient will not be referred to our .       Nutrition  The patient has completed the malnutrition screening today. They scored Malnutrition Screening Tool  Have you recently lost weight without trying?  If yes, how much weight have you lost?: 0--> No  Have you been eating poorly because of a decreased appetite?: 0--> No  MST score: 0   with a score of 0-1 meaning not at risk in a score of 2 or greater meaning at risk.  Patients with a score of 3 or higher will be referred to our oncology dietitian for support. Patients beginning at risk treatment regimens or who have dietary concerns will also be referred to our oncology dietitian. The patient will not be referred.    Functional Assessment  Persons who are  age 70 or greater will be screened for qualification of a comprehensive geriatric assessment by our survivorship nurse practitioner.  Older adults with cancer face unique challenges. These may include an increased risk of drug reactions, financial burdens, and caregiver stress. The patient scored G8 Questionnaire  Has food intake declined over the past 3 months due to loss of appetite, digestive problems, chewing or swallowing difficulties?: No decrease in food intake  Weight loss during the last 3 months: No weight loss  Mobility: Goes out  Neuropsychological Problems: No psychological problems  Body Mass Index (BMI (weight in kg) / (height in m2)): BMI 21 to BMI < 23  Takes more than 3 medications a day: Yes, takes more than 3 prescription drugs per day  In comparison with other people of the same age, how does the patient consider his/her health status?: As good  Age: < 80  Total Score: 14 . Patients scoring 14 or lower will referred for an older adult functional assessment with the survivorship advanced practice registered nurse to ensure all needed support is provided as patients plan for their treatments. The patient will be referred.    Intravenous Access Assessment  The patient and I discussed planned intravenous chemo/biotherapy as well as other IV treatments that are often needed throughout the course of treatment. These may include, but are not limited to blood transfusions, antibiotics, and IV hydration. Discussed that depending on selected treatment and vein assessment, patient may require venous access device (VAD) which could include but not limited to a Mediport or PICC line. Risks and benefits of VADs reviewed. The patient will be treated via Port.    Reproductive/Sexual Activity   People should avoid becoming pregnant and should not get a partner pregnant while undergoing chemo/biotherapy.  People of childbearing age should use effective contraception during active therapy. The best recommendation  "for all people is to use a barrier method for a minimum of 1 week after the last infusion of chemo/biotherapy to prevent your partner being exposed to byproducts from treatment medications in bodily fluids. Effective contraception should be discussed with your oncology team to make sure it is safe to take based on your diagnosis. Possible options include oral contraceptives, barrier methods. Chemo/biotherapy can change your ability to reproduce children in the future.  There are options for fertility preservation. NOT APPLICABLE    Advanced Care Planning  Advance Care Planning   The patient and I discussed advanced care planning, \"Conversations that Matter\".   This service is offered for development of advance directives with a certified ACP facilitator.  The patient does not have an up-to-date advanced directive. This document is not on file with our office. The patient is interested in an appointment with one of our facilitators to create or update their advanced directives.     Smoking cessation  Tobacco Use: High Risk   • Smoking Tobacco Use: Every Day   • Smokeless Tobacco Use: Never   • Passive Exposure: Not on file       Patient and I discussed their tobacco use history. Referral has been be made for smoking cessation.      Palliative Care  When appropriate, the patient and I discussed the availability palliative care services and when appropriate Hospice care. Palliative care is not the same as Hospice care which was explained to the patient.  The patient is not interested in additional information from our  on these services.     Survivorship   When appropriate, we discussed that we will refer the patient to survivorship clinic to discuss next steps following completion of planned treatment.  Reviewed this visit will include assessment of your physical, psychological, functional, and spiritual needs as a survivor and the need at attend this visit when scheduled.    TREATMENT EDUCATION    Today " I met with the patient to discuss the chemo/biotherapy regimen recommended for treatment of Malignant neoplasm of lower lobe, left bronchus or lung (HCC)    Neuroendocrine carcinoma of lung (HCC)  .  The patient was given explanation of treatment premed side effects including office policy that prohibits patients to drive if sedating medications are administered, MD explanation given regarding benefits, side effects, toxicities and goals of treatment.  The patient received a Chemotherapy/Biotherapy Plan Summary including diagnosis and explanation of specific treatment plan.    SIDE EFFECTS:  Common side effects were discussed with the patient and/or significant other.  Discussion included where applicable hair loss/discoloration, anemia/fatigue, infection/chills/fever, appetite, bleeding risk/precautions, constipation, diarrhea, mouth sores, taste alteration, loss of appetite, nausea/vomiting, peripheral neuropathy, skin/nail changes, rash, muscle aches/weakness, photosensitivity, weight gain/loss, hearing loss, dizziness, menopausal symptoms, menstrual irregularity, sterility, high blood pressure, heart damage, liver damage, lung damage, kidney damage, DVT/PE risk, fluid retention, pleural/pericardial effusion, somnolence, electrolyte/LFT imbalance, vein exercises and/or the possible need for vascular access/port placement.  The patient was advised that although uncommon, leakage of an infused medication from the vein or venous access device may lead to skin breakdown and/or other tissue damage.  The patient was advised that he/she may have pain, bleeding, and/or bruising from the insertion of a needle in their vein or venous access device (port).  The patient was further advised that, in spite of proper technique, infection with redness and irritation may rarely occur at the site where the needle was inserted.  The patient was advised that if complications occur, additional medical treatment is available.   Finally, where applicable we have reviewed rare but potential immune mediated side effects including shortness of breath, cough, chest pain (pneumonitis), abdominal pain, diarrhea (colitis), thyroiditis (hypothyroid or hyperthyroid), hepatitis and liver dysfunction, nephritis and renal dysfunction.    Discussion also included side effects specific to drugs in the treatment plan, specifically:    Treatment Plans     Name Type Plan Dates Plan Provider         Active    OP LUNG PACLitaxel / CARBOplatin AUC=2 (Weekly) + XRT  ONCOLOGY TREATMENT  11/21/2022 - Present Kayden Bishop MD                    Questions answered and additional information discussed on topics including:  Anemia, Thrombocytopenia, Neutropenia, Nutrition and appetite changes, Constipation, Diarrhea, Nausea & vomiting, Mouth sores, Alopecia, Intimate activity, Nervous system changes, Pain, Skin & nail changes, Organ toxicities, Survivorship and Home care       Assessment and Plan:    Diagnoses and all orders for this visit:    1. Malignant neoplasm of lower lobe, left bronchus or lung (HCC) (Primary)    2. Neuroendocrine carcinoma of lung (HCC)      No orders of the defined types were placed in this encounter.        1. The patient and I have reviewed their diagnosis and scheduled treatment plan. Needs assessment was completed where applicable including genetics, psychosocial needs, barriers to care, VAD evaluation, advanced care planning, survivorship, and palliative care services where indicated. Referrals have been ordered as appropriate based upon evaluation today and patient desires.   2. Chemo/biotherapy teaching was completed today and consent obtained. See separate documentation for further details.  3. Adequate time was given to answer questions.  Patient made aware of their care team members and contact information if they have questions or problems throughout the treatment course.  4. Discussion held and written information provided  describing frequency of office visits and ongoing monitoring throughout the treatment plan.     5. Reviewed with patient any prescribed medication sent to pharmacy.  Education provided regarding proper storage, safe handling, and proper disposal of unused medication.  6. Proper handling of body fluids and waste discussed and written information provided.  7. If appropriate, patient had pretreatment labs drawn today.    Learning assessment completed at initial patient encounter. See separate flowsheet. Chemo/biotherapy education comprehension assessed at today's visit.    I spent 42 minutes caring for Giovani on this date of service. This time includes time spent by me in the following activities: preparing for the visit, obtaining and/or reviewing a separately obtained history, performing a medically appropriate examination and/or evaluation, counseling and educating the patient/family/caregiver, ordering medications, tests, or procedures, referring and communicating with other health care professionals and documenting information in the medical record.     Traci Gutierres, APRN   11/18/22

## 2022-11-17 NOTE — PROGRESS NOTES
[facility] Hematology/Oncology Treatment Plan Summary    Name[facility] Giov[facility] España  [id number]  MD: Dr. Bishop     Diagnosis:     ICD-10-CM ICD-9-CM   1. Malignant neoplasm of lower lobe, left bronchus or lung (HCC)  C34.32 162.5   2. Neuroendocrine carcinoma of lung (HCC)  C7A.8 209.21     Stage: IIB      Goal of treatment: adjuvant and disease control    Treatment Medication(s):   1. Carboplatin  2. Taxol    Frequency: weekly while undergoing radiation    Number of cycles: 6    Starting on: 11/28/2022    Repeat after following chemoradiation cycles: CT Scan    Items for home use: Thermometer    Rx written for: [x] Nausea    [] Pre-Treatment   ondansetron 8 mg by mouth every 8 hours as needed for nausea    Notes:     Next Steps: PORT and Radiation     Completing Provider: GARCÍA Fuchs           Date/time: 11/18/2022      Please note: You will be seen by a provider frequently with your treatment plan. This plan may change depending on many factors, if so, this will be discussed with you by your physician.  Last update 03/2022.

## 2022-11-18 ENCOUNTER — TELEPHONE (OUTPATIENT)
Dept: OTHER | Facility: HOSPITAL | Age: 77
End: 2022-11-18

## 2022-11-18 ENCOUNTER — OFFICE VISIT (OUTPATIENT)
Dept: ONCOLOGY | Facility: CLINIC | Age: 77
End: 2022-11-18

## 2022-11-18 ENCOUNTER — APPOINTMENT (OUTPATIENT)
Dept: LAB | Facility: HOSPITAL | Age: 77
End: 2022-11-18

## 2022-11-18 VITALS
HEIGHT: 70 IN | RESPIRATION RATE: 18 BRPM | HEART RATE: 68 BPM | WEIGHT: 155.1 LBS | SYSTOLIC BLOOD PRESSURE: 106 MMHG | BODY MASS INDEX: 22.2 KG/M2 | OXYGEN SATURATION: 99 % | DIASTOLIC BLOOD PRESSURE: 63 MMHG | TEMPERATURE: 97.1 F

## 2022-11-18 DIAGNOSIS — C7A.8 NEUROENDOCRINE CARCINOMA OF LUNG: ICD-10-CM

## 2022-11-18 DIAGNOSIS — C34.32 MALIGNANT NEOPLASM OF LOWER LOBE, LEFT BRONCHUS OR LUNG: Primary | ICD-10-CM

## 2022-11-18 PROCEDURE — 99215 OFFICE O/P EST HI 40 MIN: CPT | Performed by: NURSE PRACTITIONER

## 2022-11-18 NOTE — TELEPHONE ENCOUNTER
Referral received from Traci MARIANO with [facility] regarding scheduling an appt with Vannessa MARIANO for follow-up appt.  Spoke with Hannah she states she will have  return call but wasn't sure if he would make an appt.  Will await phone call from .  Direct number given to Hannah. /SP

## 2022-11-21 ENCOUNTER — APPOINTMENT (OUTPATIENT)
Dept: GENERAL RADIOLOGY | Facility: HOSPITAL | Age: 77
End: 2022-11-21

## 2022-11-21 ENCOUNTER — ANESTHESIA (OUTPATIENT)
Dept: PERIOP | Facility: HOSPITAL | Age: 77
End: 2022-11-21

## 2022-11-21 ENCOUNTER — ANESTHESIA EVENT (OUTPATIENT)
Dept: PERIOP | Facility: HOSPITAL | Age: 77
End: 2022-11-21

## 2022-11-21 ENCOUNTER — HOSPITAL ENCOUNTER (OUTPATIENT)
Facility: HOSPITAL | Age: 77
Setting detail: HOSPITAL OUTPATIENT SURGERY
Discharge: HOME | End: 2022-11-21
Attending: THORACIC SURGERY (CARDIOTHORACIC VASCULAR SURGERY) | Admitting: THORACIC SURGERY (CARDIOTHORACIC VASCULAR SURGERY)

## 2022-11-21 VITALS
DIASTOLIC BLOOD PRESSURE: 67 MMHG | RESPIRATION RATE: 16 BRPM | TEMPERATURE: 97.5 F | HEART RATE: 62 BPM | OXYGEN SATURATION: 95 % | SYSTOLIC BLOOD PRESSURE: 113 MMHG

## 2022-11-21 DIAGNOSIS — R91.1 PULMONARY NODULE: ICD-10-CM

## 2022-11-21 LAB
GLUCOSE BLDC GLUCOMTR-MCNC: 129 MG/DL (ref 70–130)
GLUCOSE BLDC GLUCOMTR-MCNC: 153 MG/DL (ref 70–130)
INR PPP: 1.13 (ref 0.9–1.1)
PROTHROMBIN TIME: 14.6 SECONDS (ref 11.7–14.2)

## 2022-11-21 PROCEDURE — 77001 FLUOROGUIDE FOR VEIN DEVICE: CPT | Performed by: THORACIC SURGERY (CARDIOTHORACIC VASCULAR SURGERY)

## 2022-11-21 PROCEDURE — 76000 FLUOROSCOPY <1 HR PHYS/QHP: CPT

## 2022-11-21 PROCEDURE — 25010000002 PROPOFOL 500 MG/50ML EMULSION: Performed by: NURSE ANESTHETIST, CERTIFIED REGISTERED

## 2022-11-21 PROCEDURE — 25010000002 FENTANYL CITRATE (PF) 50 MCG/ML SOLUTION: Performed by: NURSE ANESTHETIST, CERTIFIED REGISTERED

## 2022-11-21 PROCEDURE — 82962 GLUCOSE BLOOD TEST: CPT

## 2022-11-21 PROCEDURE — C1788 PORT, INDWELLING, IMP: HCPCS | Performed by: THORACIC SURGERY (CARDIOTHORACIC VASCULAR SURGERY)

## 2022-11-21 PROCEDURE — 0 LIDOCAINE 1 % SOLUTION 20 ML VIAL: Performed by: THORACIC SURGERY (CARDIOTHORACIC VASCULAR SURGERY)

## 2022-11-21 PROCEDURE — 25010000002 CEFAZOLIN IN DEXTROSE 2-4 GM/100ML-% SOLUTION: Performed by: NURSE PRACTITIONER

## 2022-11-21 PROCEDURE — 36561 INSERT TUNNELED CV CATH: CPT | Performed by: THORACIC SURGERY (CARDIOTHORACIC VASCULAR SURGERY)

## 2022-11-21 PROCEDURE — 25010000002 HEPARIN (PORCINE) PER 1000 UNITS: Performed by: THORACIC SURGERY (CARDIOTHORACIC VASCULAR SURGERY)

## 2022-11-21 PROCEDURE — 25010000002 HEPARIN (PORCINE) PER 1000 UNITS: Performed by: NURSE PRACTITIONER

## 2022-11-21 PROCEDURE — 85610 PROTHROMBIN TIME: CPT | Performed by: THORACIC SURGERY (CARDIOTHORACIC VASCULAR SURGERY)

## 2022-11-21 DEVICE — PRT INTRO VASC/INTERV VORTEX FILL/HL DETACH/POLYURET/CATH 8F: Type: IMPLANTABLE DEVICE | Site: SUBCLAVIAN | Status: FUNCTIONAL

## 2022-11-21 RX ORDER — SODIUM CHLORIDE, SODIUM LACTATE, POTASSIUM CHLORIDE, CALCIUM CHLORIDE 600; 310; 30; 20 MG/100ML; MG/100ML; MG/100ML; MG/100ML
9 INJECTION, SOLUTION INTRAVENOUS CONTINUOUS
Status: DISCONTINUED | OUTPATIENT
Start: 2022-11-21 | End: 2022-11-21 | Stop reason: HOSPADM

## 2022-11-21 RX ORDER — SODIUM CHLORIDE 0.9 % (FLUSH) 0.9 %
3-10 SYRINGE (ML) INJECTION AS NEEDED
Status: DISCONTINUED | OUTPATIENT
Start: 2022-11-21 | End: 2022-11-21 | Stop reason: HOSPADM

## 2022-11-21 RX ORDER — ONDANSETRON 2 MG/ML
4 INJECTION INTRAMUSCULAR; INTRAVENOUS ONCE AS NEEDED
Status: DISCONTINUED | OUTPATIENT
Start: 2022-11-21 | End: 2022-11-21 | Stop reason: HOSPADM

## 2022-11-21 RX ORDER — HYDROCODONE BITARTRATE AND ACETAMINOPHEN 7.5; 325 MG/1; MG/1
1 TABLET ORAL ONCE AS NEEDED
Status: DISCONTINUED | OUTPATIENT
Start: 2022-11-21 | End: 2022-11-21 | Stop reason: HOSPADM

## 2022-11-21 RX ORDER — LABETALOL HYDROCHLORIDE 5 MG/ML
5 INJECTION, SOLUTION INTRAVENOUS
Status: DISCONTINUED | OUTPATIENT
Start: 2022-11-21 | End: 2022-11-21 | Stop reason: HOSPADM

## 2022-11-21 RX ORDER — MORPHINE SULFATE 2 MG/ML
2 INJECTION, SOLUTION INTRAMUSCULAR; INTRAVENOUS EVERY 4 HOURS PRN
Status: CANCELLED | OUTPATIENT
Start: 2022-11-21 | End: 2022-11-28

## 2022-11-21 RX ORDER — CEPHALEXIN 250 MG/1
250 CAPSULE ORAL 3 TIMES DAILY
Qty: 15 CAPSULE | Refills: 0 | Status: SHIPPED | OUTPATIENT
Start: 2022-11-21 | End: 2022-11-26

## 2022-11-21 RX ORDER — PROMETHAZINE HYDROCHLORIDE 25 MG/1
25 TABLET ORAL ONCE AS NEEDED
Status: DISCONTINUED | OUTPATIENT
Start: 2022-11-21 | End: 2022-11-21 | Stop reason: HOSPADM

## 2022-11-21 RX ORDER — EPHEDRINE SULFATE 50 MG/ML
5 INJECTION, SOLUTION INTRAVENOUS ONCE AS NEEDED
Status: DISCONTINUED | OUTPATIENT
Start: 2022-11-21 | End: 2022-11-21 | Stop reason: HOSPADM

## 2022-11-21 RX ORDER — LIDOCAINE HYDROCHLORIDE 10 MG/ML
0.5 INJECTION, SOLUTION EPIDURAL; INFILTRATION; INTRACAUDAL; PERINEURAL ONCE AS NEEDED
Status: DISCONTINUED | OUTPATIENT
Start: 2022-11-21 | End: 2022-11-21 | Stop reason: HOSPADM

## 2022-11-21 RX ORDER — FAMOTIDINE 10 MG/ML
20 INJECTION, SOLUTION INTRAVENOUS ONCE
Status: COMPLETED | OUTPATIENT
Start: 2022-11-21 | End: 2022-11-21

## 2022-11-21 RX ORDER — FENTANYL CITRATE 50 UG/ML
50 INJECTION, SOLUTION INTRAMUSCULAR; INTRAVENOUS
Status: DISCONTINUED | OUTPATIENT
Start: 2022-11-21 | End: 2022-11-21 | Stop reason: HOSPADM

## 2022-11-21 RX ORDER — HYDROMORPHONE HYDROCHLORIDE 1 MG/ML
0.5 INJECTION, SOLUTION INTRAMUSCULAR; INTRAVENOUS; SUBCUTANEOUS
Status: DISCONTINUED | OUTPATIENT
Start: 2022-11-21 | End: 2022-11-21 | Stop reason: HOSPADM

## 2022-11-21 RX ORDER — BUPIVACAINE HCL/0.9 % NACL/PF 0.125 %
PLASTIC BAG, INJECTION (ML) EPIDURAL AS NEEDED
Status: DISCONTINUED | OUTPATIENT
Start: 2022-11-21 | End: 2022-11-21 | Stop reason: SURG

## 2022-11-21 RX ORDER — DIPHENHYDRAMINE HYDROCHLORIDE 50 MG/ML
12.5 INJECTION INTRAMUSCULAR; INTRAVENOUS
Status: DISCONTINUED | OUTPATIENT
Start: 2022-11-21 | End: 2022-11-21 | Stop reason: HOSPADM

## 2022-11-21 RX ORDER — CEFAZOLIN SODIUM 2 G/100ML
2 INJECTION, SOLUTION INTRAVENOUS ONCE
Status: COMPLETED | OUTPATIENT
Start: 2022-11-21 | End: 2022-11-21

## 2022-11-21 RX ORDER — FLUMAZENIL 0.1 MG/ML
0.2 INJECTION INTRAVENOUS AS NEEDED
Status: DISCONTINUED | OUTPATIENT
Start: 2022-11-21 | End: 2022-11-21 | Stop reason: HOSPADM

## 2022-11-21 RX ORDER — FENTANYL CITRATE 50 UG/ML
INJECTION, SOLUTION INTRAMUSCULAR; INTRAVENOUS AS NEEDED
Status: DISCONTINUED | OUTPATIENT
Start: 2022-11-21 | End: 2022-11-21 | Stop reason: SURG

## 2022-11-21 RX ORDER — PROPOFOL 10 MG/ML
INJECTION, EMULSION INTRAVENOUS AS NEEDED
Status: DISCONTINUED | OUTPATIENT
Start: 2022-11-21 | End: 2022-11-21 | Stop reason: SURG

## 2022-11-21 RX ORDER — LIDOCAINE HYDROCHLORIDE 20 MG/ML
INJECTION, SOLUTION EPIDURAL; INFILTRATION; INTRACAUDAL; PERINEURAL AS NEEDED
Status: DISCONTINUED | OUTPATIENT
Start: 2022-11-21 | End: 2022-11-21 | Stop reason: SURG

## 2022-11-21 RX ORDER — NALOXONE HCL 0.4 MG/ML
0.4 VIAL (ML) INJECTION
Status: CANCELLED | OUTPATIENT
Start: 2022-11-21

## 2022-11-21 RX ORDER — SODIUM CHLORIDE 0.9 % (FLUSH) 0.9 %
3 SYRINGE (ML) INJECTION EVERY 12 HOURS SCHEDULED
Status: DISCONTINUED | OUTPATIENT
Start: 2022-11-21 | End: 2022-11-21 | Stop reason: HOSPADM

## 2022-11-21 RX ORDER — HEPARIN SODIUM 5000 [USP'U]/ML
5000 INJECTION, SOLUTION INTRAVENOUS; SUBCUTANEOUS ONCE
Status: COMPLETED | OUTPATIENT
Start: 2022-11-21 | End: 2022-11-21

## 2022-11-21 RX ORDER — OXYCODONE AND ACETAMINOPHEN 7.5; 325 MG/1; MG/1
1 TABLET ORAL EVERY 4 HOURS PRN
Status: DISCONTINUED | OUTPATIENT
Start: 2022-11-21 | End: 2022-11-21 | Stop reason: HOSPADM

## 2022-11-21 RX ORDER — DIPHENHYDRAMINE HCL 25 MG
25 CAPSULE ORAL
Status: DISCONTINUED | OUTPATIENT
Start: 2022-11-21 | End: 2022-11-21 | Stop reason: HOSPADM

## 2022-11-21 RX ORDER — NALOXONE HCL 0.4 MG/ML
0.2 VIAL (ML) INJECTION AS NEEDED
Status: DISCONTINUED | OUTPATIENT
Start: 2022-11-21 | End: 2022-11-21 | Stop reason: HOSPADM

## 2022-11-21 RX ORDER — GLYCOPYRROLATE 0.2 MG/ML
INJECTION INTRAMUSCULAR; INTRAVENOUS AS NEEDED
Status: DISCONTINUED | OUTPATIENT
Start: 2022-11-21 | End: 2022-11-21 | Stop reason: SURG

## 2022-11-21 RX ORDER — EPHEDRINE SULFATE 50 MG/ML
INJECTION INTRAVENOUS AS NEEDED
Status: DISCONTINUED | OUTPATIENT
Start: 2022-11-21 | End: 2022-11-21 | Stop reason: SURG

## 2022-11-21 RX ORDER — PROMETHAZINE HYDROCHLORIDE 25 MG/1
25 SUPPOSITORY RECTAL ONCE AS NEEDED
Status: DISCONTINUED | OUTPATIENT
Start: 2022-11-21 | End: 2022-11-21 | Stop reason: HOSPADM

## 2022-11-21 RX ORDER — HYDRALAZINE HYDROCHLORIDE 20 MG/ML
5 INJECTION INTRAMUSCULAR; INTRAVENOUS
Status: DISCONTINUED | OUTPATIENT
Start: 2022-11-21 | End: 2022-11-21 | Stop reason: HOSPADM

## 2022-11-21 RX ADMIN — FAMOTIDINE 20 MG: 10 INJECTION INTRAVENOUS at 06:21

## 2022-11-21 RX ADMIN — GLYCOPYRROLATE 0.2 MG: 0.2 INJECTION INTRAMUSCULAR; INTRAVENOUS at 07:28

## 2022-11-21 RX ADMIN — LIDOCAINE HYDROCHLORIDE 80 MG: 20 INJECTION, SOLUTION EPIDURAL; INFILTRATION; INTRACAUDAL; PERINEURAL at 07:28

## 2022-11-21 RX ADMIN — SODIUM CHLORIDE, POTASSIUM CHLORIDE, SODIUM LACTATE AND CALCIUM CHLORIDE 9 ML/HR: 600; 310; 30; 20 INJECTION, SOLUTION INTRAVENOUS at 06:23

## 2022-11-21 RX ADMIN — HEPARIN SODIUM 5000 UNITS: 5000 INJECTION INTRAVENOUS; SUBCUTANEOUS at 06:23

## 2022-11-21 RX ADMIN — Medication 200 MCG: at 08:02

## 2022-11-21 RX ADMIN — EPHEDRINE SULFATE 10 MG: 50 INJECTION INTRAVENOUS at 07:47

## 2022-11-21 RX ADMIN — Medication 100 MCG: at 07:57

## 2022-11-21 RX ADMIN — PROPOFOL 30 MG: 10 INJECTION, EMULSION INTRAVENOUS at 08:06

## 2022-11-21 RX ADMIN — PROPOFOL 100 MCG/KG/MIN: 10 INJECTION, EMULSION INTRAVENOUS at 07:29

## 2022-11-21 RX ADMIN — EPHEDRINE SULFATE 10 MG: 50 INJECTION INTRAVENOUS at 07:52

## 2022-11-21 RX ADMIN — PROPOFOL 40 MG: 10 INJECTION, EMULSION INTRAVENOUS at 07:28

## 2022-11-21 RX ADMIN — FENTANYL CITRATE 25 MCG: 50 INJECTION, SOLUTION INTRAMUSCULAR; INTRAVENOUS at 07:33

## 2022-11-21 RX ADMIN — CEFAZOLIN SODIUM 2 G: 2 INJECTION, SOLUTION INTRAVENOUS at 07:17

## 2022-11-21 RX ADMIN — FENTANYL CITRATE 25 MCG: 50 INJECTION, SOLUTION INTRAMUSCULAR; INTRAVENOUS at 07:44

## 2022-11-21 NOTE — ANESTHESIA POSTPROCEDURE EVALUATION
Patient: Giovani España    Procedure Summary     Date: 11/21/22 Room / Location: [facility] OR [address] [place] [address] MAIN OR    Anesthesia Start: 0722 Anesthesia Stop: 0821    Procedure: INSERTION VENOUS ACCESS DEVICE (Right) Diagnosis:       Pulmonary nodule      (Pulmonary nodule [R91.1])    Surgeons: Nicola Joe III, MD Provider: Clemente Finley MD    Anesthesia Type: MAC ASA Status: 3          Anesthesia Type: MAC    Vitals  Vitals Value Taken Time   /67 11/21/22 0900   Temp 36.4 °C (97.5 °F) 11/21/22 0820   Pulse 57 11/21/22 0906   Resp 16 11/21/22 0900   SpO2 96 % 11/21/22 0906   Vitals shown include unvalidated device data.        Post Anesthesia Care and Evaluation    Patient location during evaluation: PHASE II  Patient participation: complete - patient participated  Level of consciousness: awake  Pain score: 1  Pain management: satisfactory to patient  Anesthetic complications: No anesthetic complications  PONV Status: none  Cardiovascular status: acceptable  Respiratory status: acceptable  Hydration status: acceptable

## 2022-11-21 NOTE — ANESTHESIA PREPROCEDURE EVALUATION
Anesthesia Evaluation     Patient summary reviewed and Nursing notes reviewed   no history of anesthetic complications:  NPO Solid Status: > 8 hours  NPO Liquid Status: > 2 hours           Airway   Mallampati: II  TM distance: >3 FB  Neck ROM: full  no difficulty expected  Dental - normal exam   (+) poor dentition and upper dentures    Pulmonary    (+) a smoker Current Abstained day of surgery, lung cancer, COPD moderate, home oxygen, shortness of breath, rhonchi, decreased breath sounds,   Cardiovascular - normal exam  Exercise tolerance: good (4-7 METS)    ECG reviewed  PT is on anticoagulation therapy  Patient on routine beta blocker and Beta blocker given within 24 hours of surgery  Rhythm: regular  Rate: normal    (+) hypertension well controlled 2 medications or greater, CAD, dysrhythmias Atrial Fib, PVD, DVT resolved, hyperlipidemia,   (-) valvular problems/murmurs, past MI, cardiac stents, CABG    ROS comment: Mild medically treated CAD as well as a known stable infra renal AAA.    Neuro/Psych- negative ROS  (-) seizures, TIA, CVA  GI/Hepatic/Renal/Endo    (+)   diabetes mellitus type 2 well controlled,   (-) hiatal hernia, GERD, PUD, hepatitis, liver disease, no renal disease, GI bleed    Musculoskeletal     (+) neck pain,   Abdominal  - normal exam   Substance History - negative use     OB/GYN          Other   arthritis,    history of cancer active      Other Comment: Left lung CA                  Anesthesia Plan    ASA 3     MAC     intravenous induction     Anesthetic plan, risks, benefits, and alternatives have been provided, discussed and informed consent has been obtained with: patient.    Plan discussed with CRNA.        CODE STATUS:

## 2022-11-21 NOTE — OP NOTE
INSERTION VENOUS ACCESS DEVICE  Progress Note    Giovani España  11/21/2022    Pre-op Diagnosis:   Pulmonary nodule [R91.1]       Post-Op Diagnosis Codes:     * Pulmonary nodule [R91.1]    Procedure/CPT® Codes:        Procedure(s):  INSERTION VENOUS ACCESS DEVICE        Surgeon(s):  Nicola Joe III, MD    Anesthesia: Monitored Anesthesia Care    Staff:   Circulator: Noemy Arechiga RN; Franci Cooper RN  Radiology Technologist: Aidee Schroeder  Scrub Person: Ingrid EspañaTYLER         Estimated Blood Loss: minimal    Urine Voided: * No values recorded between 11/21/2022  7:22 AM and 11/21/2022  8:16 AM *    Specimens:                None          Drains:   Urethral Catheter (Active)       Findings: Right subclavian vein was accessed without difficulty.  Port flushed and aspirated easily.  Port was filled with heparin        Complications: None    Summary procedure: Mr. Giovani España was brought to the operating room and placed on the operating table in supine position.  Blood pressure EKG pulse oximetry and airway were monitored by the anesthesia service.  Supplemental oxygen and intravenous sedation were administered by anesthesia.  With the patient adequately sedated a roll was placed between the shoulders and the head was turned to the left.  The right neck chest and shoulders were prepped and draped in usual sterile manner.  Patient was placed in Trendelenburg.  An area beneath the right clavicle was infiltrated with 1% Xylocaine.  A direct percutaneous venipuncture of the right subclavian vein was performed without difficulty.  Through this venipuncture a guidewire was threaded under fluoroscopic control and positioned in the superior vena cava.    Next, an area 2 fingerbreadths below the clavicle was infiltrated with 1% Xylocaine.  A transverse incision was made in the skin and carried down through the skin and subcutaneous tissues.  A subcutaneous pocket was created.  The catheter was now  tunneled from the pocket up to the insertion site.  Dilator and introducer were passed over the guidewire.  Catheter was threaded through the introducer and using fluoroscopic control the tip of the catheter was positioned at the caval atrial junction.  The catheter was noted to flush and aspirate easily.  Catheter was connected to the port.  Port flushed and aspirated easily.  The port was then filled with heparin.  The port was now positioned in the subcutaneous pocket and secured to the chest wall with 2 sutures of 2-0 silk.  The wounds were closed in layers with 3-0 Vicryl and the skin with 4-0 Vicryl subcuticular.  Sterile dressing was applied.    Patient was awakened and transported to recovery in satisfactory condition having tolerated the procedure well.  Sponge instrument and needle counts were correct.    .        Dictated utilizing Dragon dictation          Nicola Joe III, MD     Date: 11/21/2022  Time: 08:24 EST

## 2022-11-23 ENCOUNTER — PATIENT OUTREACH (OUTPATIENT)
Dept: OTHER | Facility: HOSPITAL | Age: 77
End: 2022-11-23

## 2022-11-23 PROCEDURE — 77300 RADIATION THERAPY DOSE PLAN: CPT | Performed by: STUDENT IN AN ORGANIZED HEALTH CARE EDUCATION/TRAINING PROGRAM

## 2022-11-23 PROCEDURE — 77293 RESPIRATOR MOTION MGMT SIMUL: CPT | Performed by: STUDENT IN AN ORGANIZED HEALTH CARE EDUCATION/TRAINING PROGRAM

## 2022-11-23 PROCEDURE — 77301 RADIOTHERAPY DOSE PLAN IMRT: CPT | Performed by: STUDENT IN AN ORGANIZED HEALTH CARE EDUCATION/TRAINING PROGRAM

## 2022-11-23 PROCEDURE — 77338 DESIGN MLC DEVICE FOR IMRT: CPT | Performed by: STUDENT IN AN ORGANIZED HEALTH CARE EDUCATION/TRAINING PROGRAM

## 2022-11-23 NOTE — PROGRESS NOTES
Spoke with Kate, girlfriend. He is asleep right now. He just had a port placed and it's a little sore. They will stop in the cancer center next week to get seatbelt cover to prevent port irritation. He is scheduled to start weekly chemo with radiation daily for 4-6 weeks on Monday. They completed chemo teaching and she denies questions/concerns.    We again discussed resources available through the cancer center-she denies any needs at this time. States Oumar is doing pretty well.  Patient may schedule a massage session soon due to his ongoing back pain. Gave her main Cancer Center number to arrange appt.    I will call in 2 weeks-she will call as needed

## 2022-11-28 ENCOUNTER — OFFICE VISIT (OUTPATIENT)
Dept: ONCOLOGY | Facility: CLINIC | Age: 77
End: 2022-11-28

## 2022-11-28 ENCOUNTER — TREATMENT (OUTPATIENT)
Dept: RADIATION ONCOLOGY | Facility: HOSPITAL | Age: 77
End: 2022-11-28

## 2022-11-28 ENCOUNTER — INFUSION (OUTPATIENT)
Dept: ONCOLOGY | Facility: HOSPITAL | Age: 77
End: 2022-11-28

## 2022-11-28 VITALS
OXYGEN SATURATION: 98 % | HEART RATE: 61 BPM | RESPIRATION RATE: 18 BRPM | HEIGHT: 70 IN | TEMPERATURE: 97.5 F | WEIGHT: 153.5 LBS | BODY MASS INDEX: 21.98 KG/M2 | DIASTOLIC BLOOD PRESSURE: 62 MMHG | SYSTOLIC BLOOD PRESSURE: 100 MMHG

## 2022-11-28 VITALS — HEART RATE: 62 BPM | SYSTOLIC BLOOD PRESSURE: 146 MMHG | DIASTOLIC BLOOD PRESSURE: 78 MMHG

## 2022-11-28 DIAGNOSIS — C7A.8 NEUROENDOCRINE CARCINOMA OF LUNG: ICD-10-CM

## 2022-11-28 DIAGNOSIS — Z79.899 HIGH RISK MEDICATION USE: ICD-10-CM

## 2022-11-28 DIAGNOSIS — C7A.8 NEUROENDOCRINE CARCINOMA OF LUNG: Primary | ICD-10-CM

## 2022-11-28 LAB
ALBUMIN SERPL-MCNC: 3.9 G/DL (ref 3.5–5.2)
ALBUMIN/GLOB SERPL: 1.3 G/DL (ref 1.1–2.4)
ALP SERPL-CCNC: 63 U/L (ref 38–116)
ALT SERPL W P-5'-P-CCNC: 9 U/L (ref 0–41)
ANION GAP SERPL CALCULATED.3IONS-SCNC: 10.3 MMOL/L (ref 5–15)
AST SERPL-CCNC: 16 U/L (ref 0–40)
BASOPHILS # BLD AUTO: 0.02 10*3/MM3 (ref 0–0.2)
BASOPHILS NFR BLD AUTO: 0.4 % (ref 0–1.5)
BILIRUB SERPL-MCNC: 0.3 MG/DL (ref 0.2–1.2)
BUN SERPL-MCNC: 13 MG/DL (ref 6–20)
BUN/CREAT SERPL: 14.8 (ref 7.3–30)
CALCIUM SPEC-SCNC: 9.8 MG/DL (ref 8.5–10.2)
CHLORIDE SERPL-SCNC: 100 MMOL/L (ref 98–107)
CO2 SERPL-SCNC: 24.7 MMOL/L (ref 22–29)
CREAT SERPL-MCNC: 0.88 MG/DL (ref 0.7–1.3)
DEPRECATED RDW RBC AUTO: 51.1 FL (ref 37–54)
EGFRCR SERPLBLD CKD-EPI 2021: 89.7 ML/MIN/1.73
EOSINOPHIL # BLD AUTO: 0.06 10*3/MM3 (ref 0–0.4)
EOSINOPHIL NFR BLD AUTO: 1.1 % (ref 0.3–6.2)
ERYTHROCYTE [DISTWIDTH] IN BLOOD BY AUTOMATED COUNT: 14.7 % (ref 12.3–15.4)
GLOBULIN UR ELPH-MCNC: 3 GM/DL (ref 1.8–3.5)
GLUCOSE SERPL-MCNC: 170 MG/DL (ref 74–124)
HCT VFR BLD AUTO: 38.5 % (ref 37.5–51)
HGB BLD-MCNC: 12 G/DL (ref 13–17.7)
IMM GRANULOCYTES # BLD AUTO: 0.01 10*3/MM3 (ref 0–0.05)
IMM GRANULOCYTES NFR BLD AUTO: 0.2 % (ref 0–0.5)
LYMPHOCYTES # BLD AUTO: 1.57 10*3/MM3 (ref 0.7–3.1)
LYMPHOCYTES NFR BLD AUTO: 29.5 % (ref 19.6–45.3)
MCH RBC QN AUTO: 29.5 PG (ref 26.6–33)
MCHC RBC AUTO-ENTMCNC: 31.2 G/DL (ref 31.5–35.7)
MCV RBC AUTO: 94.6 FL (ref 79–97)
MONOCYTES # BLD AUTO: 0.67 10*3/MM3 (ref 0.1–0.9)
MONOCYTES NFR BLD AUTO: 12.6 % (ref 5–12)
NEUTROPHILS NFR BLD AUTO: 3 10*3/MM3 (ref 1.7–7)
NEUTROPHILS NFR BLD AUTO: 56.2 % (ref 42.7–76)
NRBC BLD AUTO-RTO: 0 /100 WBC (ref 0–0.2)
PLATELET # BLD AUTO: 185 10*3/MM3 (ref 140–450)
PMV BLD AUTO: 8.6 FL (ref 6–12)
POTASSIUM SERPL-SCNC: 4.2 MMOL/L (ref 3.5–4.7)
PROT SERPL-MCNC: 6.9 G/DL (ref 6.3–8)
RAD ONC ARIA COURSE ID: NORMAL
RAD ONC ARIA COURSE INTENT: NORMAL
RAD ONC ARIA COURSE LAST TREATMENT DATE: NORMAL
RAD ONC ARIA COURSE START DATE: NORMAL
RAD ONC ARIA COURSE TREATMENT ELAPSED DAYS: 0
RAD ONC ARIA FIRST TREATMENT DATE: NORMAL
RAD ONC ARIA PLAN FRACTIONS TREATED TO DATE: 1
RAD ONC ARIA PLAN ID: NORMAL
RAD ONC ARIA PLAN PRESCRIBED DOSE PER FRACTION: 2 GY
RAD ONC ARIA PLAN PRIMARY REFERENCE POINT: NORMAL
RAD ONC ARIA PLAN TOTAL FRACTIONS PRESCRIBED: 30
RAD ONC ARIA PLAN TOTAL PRESCRIBED DOSE: 6000 CGY
RAD ONC ARIA REFERENCE POINT DOSAGE GIVEN TO DATE: 2 GY
RAD ONC ARIA REFERENCE POINT DOSAGE GIVEN TO DATE: 2.08 GY
RAD ONC ARIA REFERENCE POINT ID: NORMAL
RAD ONC ARIA REFERENCE POINT ID: NORMAL
RAD ONC ARIA REFERENCE POINT SESSION DOSAGE GIVEN: 2 GY
RAD ONC ARIA REFERENCE POINT SESSION DOSAGE GIVEN: 2.08 GY
RBC # BLD AUTO: 4.07 10*6/MM3 (ref 4.14–5.8)
SODIUM SERPL-SCNC: 135 MMOL/L (ref 134–145)
WBC NRBC COR # BLD: 5.33 10*3/MM3 (ref 3.4–10.8)

## 2022-11-28 PROCEDURE — 80053 COMPREHEN METABOLIC PANEL: CPT

## 2022-11-28 PROCEDURE — 25010000002 CARBOPLATIN PER 50 MG: Performed by: INTERNAL MEDICINE

## 2022-11-28 PROCEDURE — 25010000002 PACLITAXEL PER 1 MG: Performed by: INTERNAL MEDICINE

## 2022-11-28 PROCEDURE — 25010000002 DEXAMETHASONE SODIUM PHOSPHATE 100 MG/10ML SOLUTION: Performed by: INTERNAL MEDICINE

## 2022-11-28 PROCEDURE — 77386 CHG INTENSITY MODULATED RADIATION TX DLVR COMPLEX: CPT | Performed by: STUDENT IN AN ORGANIZED HEALTH CARE EDUCATION/TRAINING PROGRAM

## 2022-11-28 PROCEDURE — 77427 RADIATION TX MANAGEMENT X5: CPT | Performed by: STUDENT IN AN ORGANIZED HEALTH CARE EDUCATION/TRAINING PROGRAM

## 2022-11-28 PROCEDURE — 77386: CPT | Performed by: STUDENT IN AN ORGANIZED HEALTH CARE EDUCATION/TRAINING PROGRAM

## 2022-11-28 PROCEDURE — 99214 OFFICE O/P EST MOD 30 MIN: CPT | Performed by: NURSE PRACTITIONER

## 2022-11-28 PROCEDURE — 25010000002 DIPHENHYDRAMINE PER 50 MG: Performed by: INTERNAL MEDICINE

## 2022-11-28 PROCEDURE — 96375 TX/PRO/DX INJ NEW DRUG ADDON: CPT

## 2022-11-28 PROCEDURE — 96413 CHEMO IV INFUSION 1 HR: CPT

## 2022-11-28 PROCEDURE — 25010000002 PALONOSETRON PER 25 MCG: Performed by: INTERNAL MEDICINE

## 2022-11-28 PROCEDURE — 85025 COMPLETE CBC W/AUTO DIFF WBC: CPT

## 2022-11-28 PROCEDURE — 96417 CHEMO IV INFUS EACH ADDL SEQ: CPT

## 2022-11-28 RX ORDER — FAMOTIDINE 10 MG/ML
20 INJECTION, SOLUTION INTRAVENOUS ONCE
Status: CANCELLED | OUTPATIENT
Start: 2022-11-28

## 2022-11-28 RX ORDER — DIPHENHYDRAMINE HYDROCHLORIDE 50 MG/ML
50 INJECTION INTRAMUSCULAR; INTRAVENOUS AS NEEDED
Status: CANCELLED | OUTPATIENT
Start: 2022-11-28

## 2022-11-28 RX ORDER — FAMOTIDINE 10 MG/ML
20 INJECTION, SOLUTION INTRAVENOUS AS NEEDED
Status: CANCELLED | OUTPATIENT
Start: 2022-11-28

## 2022-11-28 RX ORDER — SODIUM CHLORIDE 9 MG/ML
250 INJECTION, SOLUTION INTRAVENOUS ONCE
Status: CANCELLED | OUTPATIENT
Start: 2022-11-28

## 2022-11-28 RX ORDER — FAMOTIDINE 10 MG/ML
20 INJECTION, SOLUTION INTRAVENOUS ONCE
Status: COMPLETED | OUTPATIENT
Start: 2022-11-28 | End: 2022-11-28

## 2022-11-28 RX ORDER — SODIUM CHLORIDE 9 MG/ML
250 INJECTION, SOLUTION INTRAVENOUS ONCE
Status: COMPLETED | OUTPATIENT
Start: 2022-11-28 | End: 2022-11-28

## 2022-11-28 RX ORDER — PALONOSETRON 0.05 MG/ML
0.25 INJECTION, SOLUTION INTRAVENOUS ONCE
Status: COMPLETED | OUTPATIENT
Start: 2022-11-28 | End: 2022-11-28

## 2022-11-28 RX ORDER — PALONOSETRON 0.05 MG/ML
0.25 INJECTION, SOLUTION INTRAVENOUS ONCE
Status: CANCELLED | OUTPATIENT
Start: 2022-11-28

## 2022-11-28 RX ADMIN — SODIUM CHLORIDE 250 ML: 9 INJECTION, SOLUTION INTRAVENOUS at 10:45

## 2022-11-28 RX ADMIN — FAMOTIDINE 20 MG: 10 INJECTION INTRAVENOUS at 10:45

## 2022-11-28 RX ADMIN — CARBOPLATIN 190 MG: 10 INJECTION INTRAVENOUS at 13:35

## 2022-11-28 RX ADMIN — DEXAMETHASONE SODIUM PHOSPHATE 12 MG: 10 INJECTION, SOLUTION INTRAMUSCULAR; INTRAVENOUS at 11:20

## 2022-11-28 RX ADMIN — DIPHENHYDRAMINE HYDROCHLORIDE 50 MG: 50 INJECTION, SOLUTION INTRAMUSCULAR; INTRAVENOUS at 10:49

## 2022-11-28 RX ADMIN — PALONOSETRON 0.25 MG: 0.05 INJECTION, SOLUTION INTRAVENOUS at 11:18

## 2022-11-28 RX ADMIN — PACLITAXEL 95 MG: 6 INJECTION, SOLUTION INTRAVENOUS at 12:28

## 2022-11-29 ENCOUNTER — TREATMENT (OUTPATIENT)
Dept: RADIATION ONCOLOGY | Facility: HOSPITAL | Age: 77
End: 2022-11-29

## 2022-11-29 LAB
RAD ONC ARIA COURSE ID: NORMAL
RAD ONC ARIA COURSE INTENT: NORMAL
RAD ONC ARIA COURSE LAST TREATMENT DATE: NORMAL
RAD ONC ARIA COURSE START DATE: NORMAL
RAD ONC ARIA COURSE TREATMENT ELAPSED DAYS: 1
RAD ONC ARIA FIRST TREATMENT DATE: NORMAL
RAD ONC ARIA PLAN FRACTIONS TREATED TO DATE: 2
RAD ONC ARIA PLAN ID: NORMAL
RAD ONC ARIA PLAN PRESCRIBED DOSE PER FRACTION: 2 GY
RAD ONC ARIA PLAN PRIMARY REFERENCE POINT: NORMAL
RAD ONC ARIA PLAN TOTAL FRACTIONS PRESCRIBED: 30
RAD ONC ARIA PLAN TOTAL PRESCRIBED DOSE: 6000 CGY
RAD ONC ARIA REFERENCE POINT DOSAGE GIVEN TO DATE: 4 GY
RAD ONC ARIA REFERENCE POINT DOSAGE GIVEN TO DATE: 4.16 GY
RAD ONC ARIA REFERENCE POINT ID: NORMAL
RAD ONC ARIA REFERENCE POINT ID: NORMAL
RAD ONC ARIA REFERENCE POINT SESSION DOSAGE GIVEN: 2 GY
RAD ONC ARIA REFERENCE POINT SESSION DOSAGE GIVEN: 2.08 GY

## 2022-11-29 PROCEDURE — 77386 CHG INTENSITY MODULATED RADIATION TX DLVR COMPLEX: CPT | Performed by: RADIOLOGY

## 2022-11-29 PROCEDURE — 77386: CPT | Performed by: RADIOLOGY

## 2022-11-30 ENCOUNTER — TREATMENT (OUTPATIENT)
Dept: RADIATION ONCOLOGY | Facility: HOSPITAL | Age: 77
End: 2022-11-30

## 2022-11-30 LAB
RAD ONC ARIA COURSE ID: NORMAL
RAD ONC ARIA COURSE INTENT: NORMAL
RAD ONC ARIA COURSE LAST TREATMENT DATE: NORMAL
RAD ONC ARIA COURSE START DATE: NORMAL
RAD ONC ARIA COURSE TREATMENT ELAPSED DAYS: 2
RAD ONC ARIA FIRST TREATMENT DATE: NORMAL
RAD ONC ARIA PLAN FRACTIONS TREATED TO DATE: 3
RAD ONC ARIA PLAN ID: NORMAL
RAD ONC ARIA PLAN PRESCRIBED DOSE PER FRACTION: 2 GY
RAD ONC ARIA PLAN PRIMARY REFERENCE POINT: NORMAL
RAD ONC ARIA PLAN TOTAL FRACTIONS PRESCRIBED: 30
RAD ONC ARIA PLAN TOTAL PRESCRIBED DOSE: 6000 CGY
RAD ONC ARIA REFERENCE POINT DOSAGE GIVEN TO DATE: 6 GY
RAD ONC ARIA REFERENCE POINT DOSAGE GIVEN TO DATE: 6.24 GY
RAD ONC ARIA REFERENCE POINT ID: NORMAL
RAD ONC ARIA REFERENCE POINT ID: NORMAL
RAD ONC ARIA REFERENCE POINT SESSION DOSAGE GIVEN: 2 GY
RAD ONC ARIA REFERENCE POINT SESSION DOSAGE GIVEN: 2.08 GY

## 2022-11-30 PROCEDURE — 77386 CHG INTENSITY MODULATED RADIATION TX DLVR COMPLEX: CPT | Performed by: STUDENT IN AN ORGANIZED HEALTH CARE EDUCATION/TRAINING PROGRAM

## 2022-11-30 PROCEDURE — 77386: CPT | Performed by: STUDENT IN AN ORGANIZED HEALTH CARE EDUCATION/TRAINING PROGRAM

## 2022-11-30 PROCEDURE — 77336 RADIATION PHYSICS CONSULT: CPT | Performed by: STUDENT IN AN ORGANIZED HEALTH CARE EDUCATION/TRAINING PROGRAM

## 2022-11-30 NOTE — PROGRESS NOTES
REASON FOR FOLLOW-UP:    1. 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  Notably there is invasive carcinoma present at the margin.  Is a, and only 2 positive lymph nodes adjacent to the bronchovesicular margin which appears to have been transected difficult to enumerate with extranodal extension present.      History of Present Illness    The patient is a 77 y.o. male with the above-mentioned history, who returns the office today in anticipation of beginning carboplatin Taxol which she is going to receive weekly with radiation.  He has undergone formal chemotherapy education and is ready to proceed.  He is feeling well.  He feels he has fully recovered from previous thoracotomy.  He denies shortness of breath.  He denies any pain.  He is eating and drinking adequately.    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  He had been assessed particularly in the fall 2021 when he presented with nausea and vomiting and abdominal discomfort leading to assessments that revealed sigmoid diverticulitis with contained rupture and partial small bowel obstruction.  Records from mid September 21 indicating that he was admitted with partial small bowel obstruction and treated medically with very slow recovery.  He has history of COPD with CT demonstrating a pulmonary nodule in the right lung base at 7 mm with follow-up planned.  He was seen by general surgery in later September with repeat scans planned and repeat CT abdomen 10/7/2021 did demonstrate interval improvement of sigmoid diverticulitis.      Record indicates an assessment in late June 2022 at different  general surgeon for left inguinal hernia, history of heavy tobacco use with COPD and recommendation for surgical repair of his hernia      The patient underwent a CT scan of the chest 5/7/2022 demonstrating diffuse emphysematous changes, ill-defined density measuring 3 mm in the superior segment of the right lower lobe, multiple ill-defined 3 to 4 mm nodular density thought to be inflammatory involving the right lower lobe and a new spiculated nodule involving the left lower lobe measuring 16 x 14 mm as well as left hilar adenopathy.       Follow-up PET/CT 7/13/2022 reveal a hypermetabolic spiculated lesion medial aspect the left lower lobe adjacent to descending thoracic aorta measuring 1.5 x 1.6 SUV 9.3 with an enlarged hypermetabolic left hilar lymph node measured 1.5 x 1.3 with SUV of 12.1, additional nodules in the lateral aspect right lower lobe and micronodule in the posterior/medial right lower lobe and also additional right lower lobe micronodule.  There is an infrarenal abdominal aortic aneurysm measuring 3.4 cm with a short segment of chronic dissection present.    These clinical findings would be consistent with a possible T1b N1 M0-stage IIb lung cancer.      Recognizing a diagnosis has not been made his case was discussed in thoracic conference 7/20/2022.  Recommendations include proceeding to pulmonary assessment with EBUS and the patient undergoing a general assessment per his clinical status-older adult assessment as well as assessment by thoracic surgery.  These issues are discussed with the patient and his wife in detail when they are seen 7/28/2022.    The patient proceeded to undergo his hernia surgery 8/2/2022 by Dr. Dotson.  Additionally the patient was unable to undergo assessment by pulmonary until October and, thereafter, was scheduled to see Dr. Joe 8/18/2022 undergoing older adult functional assessment with a JAGUAR score of 11 indicating a risk of functional decline related to cancer  therapy.    The patient is seen with his wife 8/15/2022 and doing very well with recent hernia surgery.  His performance status is reasonably good at present and we have learned now that he would not be able to see pulmonary medicine until October, thoracic surgery is scheduled this week with Dr. Joe.  Perhaps he could be a surgical candidate.  Particularly we know by guardant 360 that T p53 splice site mutation is present and would certainly be consistent as well the most common mutations found in lung cancers particularly squamous cancers.  We have discussed obtaining pulmonary functions at this point, thoracic surgery assessment this week and also restarting Chantix as possible for the patient.    There was difficulty obtaining Chantix and while this was being assessed the patient was reviewed 8/18 by thoracic surgery.  He was felt to have a T1b N1 M0 stage IIb carcinoma left lower lobe along and not a candidate for biopsy.  PFTs were borderline, functional assessment was reasonably good and the patient was felt to be a candidate for robot-assisted biopsy of his nodes and if malignant proceed to left lower lobe lobectomy.  He was reviewed 9/8 and offered 21 mg nicotine transdermal patching.    He is next seen 9/10/2022.  He is seen with his wife and both are prepared for surgery 9/23/2022.  We discussed that additional therapy may be considered once we have more information about the type and stage.  We would hope to see him postoperatively.    The patient was admitted 9//2022 undergoing 9/23/2022  a bronchoscopy with left video-assisted thoracoscopy with robot-assisted total decortication of the left lung, left lower lobectomy, mediastinal lymphadenectomy and intercostal nerve block with Dr. Nicola Joe.  Fortunately he did fairly well postoperatively though did develop atrial fibrillation with RVR treated with amiodarone converting back to sinus rhythm, treated with IV metoprolol subsequent chronic  anticoagulation.  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  Notably there is invasive carcinoma present at the margin.  Is a, and only 2 positive lymph nodes adjacent to the bronchovesicular margin which appears to have been transected difficult to enumerate with extranodal extension present.    The patient is seen 10/27/2022.  He is recovering well from surgery, albeit slowly, and we have discussed his findings that are concerning as to residual disease with a positive margin described as above.  There is also the significance potentially of PD-L1 expression which has been described as producing a poor survival.  Nivolumab has been used as second line therapy to positive effect in the disease and this begs the question as to whether adjuvant therapy plus immunotherapy could be utilized though the patient would be difficult to treat with platinum based chemotherapy as well.    The patient is next assessed 11/7/2022 for significant assessments by supportive oncology at [facility] as well as with plans to see Dr. Marrero 11/9/2022.  Unfortunately he has been very slow to gradually recover from the effects of surgery with reduced appetite and only fair performance status.  At present it would be difficult to give him cisplatin based chemotherapy and we discussed whether we might be able to use Tecentriq (atezolizumab) as his primary adjuvant therapy.  We have contacted pathology about completing Caris testing and a PD-L1 analysis that has not yet completed.      The patient is seen, however, 11/16/2022 and his genomic analysis has returned as being significant for broad sensitivity to immunotherapy.  This would argue for the administration of weekly Carboplatin/Taxol as the patient proceeds to radiation therapy and upon completion, then using Tecentriq as further adjuvant therapy over a year.  This  is discussed with the patient and his  in detail as well as discussed with Dr. Marrero of radiation therapy.  We have also discussed the patient require port placement.      Past Medical History:   Diagnosis Date   • Arthritis    • COPD (chronic obstructive pulmonary disease) (HCC)     O2 3L AT HS   • Diabetes mellitus (HCC)     DIET CONTROL   • Diverticulitis    • DVT, lower extremity (HCC)     RLE   • Elevated cholesterol    • H/O Cervical pain    • History of colitis    • Hyperlipidemia    • Hypertension    • Left shoulder pain    • Low back pain    • Lung cancer (HCC) 2022    LEFT LUNG   • On anticoagulant therapy    • Peripheral arterial disease (HCC)    • Right leg claudication (HCC)    • Skin cancer     HX        Past Surgical History:   Procedure Laterality Date   • BALLOON ANGIOPLASTY, ARTERY Right 2015    IR Percutaneious IVC filter placement   • INGUINAL HERNIA REPAIR Left    • KNEE ARTHROSCOPY Left     Torn meniscus   • LOBECTOMY Left 9/23/2022    Procedure: BRONCHOSCOPY, LEFT VIDEO ASSISTED THORACOSCOPY, ROBOT ASSISTED TOTAL DECORTICATION  LEFT LUNG, LEFT LOWER LOBE LOBECTOMY, MEDIASTINAL LYMPHECTOMY, INTERCOSTAL NERVE BLOCK;  Surgeon: Nicola Joe III, MD;  Location: [facility];  Service: Rob[facility] - [facility];  Laterality: Left;   • VENOUS ACCESS DEVICE (PORT) INSERTION Right 11/21/2022    Procedure: INSERTION VENOUS ACCESS DEVICE;  Surgeon: Nicola Joe III, MD;  Location: [facility];  Service: Thoracic;  Laterality: Right;        Current Outpatient Medications on File Prior to Visit   Medication Sig Dispense Refill   • arformoterol (BROVANA) 15 MCG/2ML nebulizer solution Take 15 mcg by nebulization 2 (Two) Times a Day.     • budesonide (PULMICORT) 0.5 MG/2ML nebulizer solution Take 0.5 mg by nebulization 2 (Two) Times a Day.     • cetirizine (zyrTEC) 10 MG tablet Take 10 mg by mouth Daily.     • isosorbide mononitrate (IMDUR) 60 MG 24 hr tablet Take 60 mg by mouth Every Evening.  "    • ondansetron (Zofran) 8 MG tablet Take 1 tablet by mouth Every 8 (Eight) Hours As Needed for Nausea or Vomiting. 30 tablet 1   • simvastatin (ZOCOR) 20 MG tablet Take 20 mg by mouth Every Night.     • tamsulosin (FLOMAX) 0.4 MG capsule 24 hr capsule Take 1 capsule by mouth Daily. 30 capsule 5   • warfarin (COUMADIN) 5 MG tablet Take 1 tablet by mouth Daily. Take 10mg on 9/29/22 and then resume regular home schedule.     • metoprolol succinate XL (TOPROL-XL) 25 MG 24 hr tablet Take 1 tablet by mouth Daily for 30 days. (Patient taking differently: Take 25 mg by mouth Every Evening.) 30 tablet 0     No current facility-administered medications on file prior to visit.        ALLERGIES:  No Known Allergies     Social History     Socioeconomic History   • Marital status:    • Years of education: 1 year college   Tobacco Use   • Smoking status: Every Day     Packs/day: 1.00     Years: 59.00     Pack years: 59.00     Types: Cigarettes     Start date: 1963   • Smokeless tobacco: Never   • Tobacco comments:     9/8/22: Down to Less than 1 PPD   Vaping Use   • Vaping Use: Never used   Substance and Sexual Activity   • Alcohol use: Not Currently   • Drug use: Never   • Sexual activity: Defer        Family History   Problem Relation Age of Onset   • No Known Problems Mother    • Cancer Father    • Lung cancer Father    • Diabetes Brother    • Other Daughter         S/P stent, age 42   • No Known Problems Son    • Malig Hyperthermia Neg Hx         Review of Systems   ROS as per HPI    Objective     Vitals:    11/28/22 0942   BP: 100/62   Pulse: 61   Resp: 18   Temp: 97.5 °F (36.4 °C)   TempSrc: Temporal   SpO2: 98%   Weight: 69.6 kg (153 lb 8 oz)   Height: 177.8 cm (70\")   PainSc: 0-No pain     Current Status 11/28/2022   ECOG score 0       Physical Exam  Constitutional:       Appearance: Normal appearance.   HENT:      Head: Normocephalic and atraumatic.      Nose: Nose normal.      Mouth/Throat:      Mouth: Mucous " membranes are moist.      Pharynx: Oropharynx is clear.   Eyes:      Extraocular Movements: Extraocular movements intact.      Conjunctiva/sclera: Conjunctivae normal.      Pupils: Pupils are equal, round, and reactive to light.   Cardiovascular:      Rate and Rhythm: Normal rate and regular rhythm.      Pulses: Normal pulses.      Heart sounds: Normal heart sounds.   Pulmonary:      Comments: Prolonged expiratory phase bilaterally  Abdominal:      General: Bowel sounds are normal.      Palpations: Abdomen is soft.      Comments: Scaphoid   Musculoskeletal:         General: Normal range of motion.      Cervical back: Normal range of motion and neck supple.   Skin:     General: Skin is warm and dry.   Neurological:      General: No focal deficit present.      Mental Status: He is alert and oriented to person, place, and time.   Psychiatric:         Mood and Affect: Mood normal.           RECENT LABS:  Results from last 7 days   Lab Units 11/28/22  0900   WBC 10*3/mm3 5.33   NEUTROS ABS 10*3/mm3 3.00   HEMOGLOBIN g/dL 12.0*   HEMATOCRIT % 38.5   PLATELETS 10*3/mm3 185     Results from last 7 days   Lab Units 11/28/22  0900   SODIUM mmol/L 135   POTASSIUM mmol/L 4.2   CHLORIDE mmol/L 100   CO2 mmol/L 24.7   BUN mg/dL 13   CREATININE mg/dL 0.88   CALCIUM mg/dL 9.8   ALBUMIN g/dL 3.90   BILIRUBIN mg/dL 0.3   ALK PHOS U/L 63   ALT (SGPT) U/L 9   AST (SGOT) U/L 16   GLUCOSE mg/dL 170*           Assessment & Plan        77 y.o. male  with medical issues including type 2 diabetes-uncertain control, COPD, hypertension, diastolic heart failure, chronic disease, peripheral vascular disease (follow-up with vascular surgery today), previous DVT on anticoagulation (home monitoring), previous IVC filter placement?,  Status post September 2021 partial small bowel obstruction secondary to sigmoid diverticulitis treated medically.    1.   T1b N1 M0-stage IIb lung cancer.  · right lung base nodule noted on scan at that point and in  follow-up with primary care had developed a left inguinal hernia with repair planned through a different general surgeon then seen with his initial sigmoid diverticulitis.  · PET scan 7/13/2022 demonstrates a hypermetabolic spiculated lesion medial aspect of the left lobe adjacent to descending thoracic aorta measuring1.5 x 1.6 SUV 9.3 with an enlarged hypermetabolic left hilar lymph node measured 1.5 x 1.3 with SUV of 12.1, additional nodules in the lateral aspect right lower lobe and micronodule in the posterior/medial right lower lobe and also additional right lower lobe micronodule.  There is an infrarenal abdominal aortic aneurysm measuring 3.4 cm with a short segment of chronic dissection present.  · Clinical findings would be consistent with a possible T1b N1 M0-stage IIb lung cancer.  · discussed in thoracic conference 7/20/2022.  · Recommendations to proceed with pulmonary assessment with EBUS.  · The patient proceeded to undergo his hernia surgery 8/2/2022 by Dr. Dotson.   · Guardant 360 that T p53 splice site mutation is present and would certainly be consistent as well the most common mutations found in lung cancers particularly squamous cancers.  · The patient was admitted 9//2022 undergoing 9/23/2022  a bronchoscopy with left video-assisted thoracoscopy with robot-assisted total decortication of the left lung, left lower lobectomy, mediastinal lymphadenectomy and intercostal nerve block with Dr. Nicola Joe.  · Fortunately he did fairly well postoperatively though did develop atrial fibrillation with RVR treated with amiodarone converting back to sinus rhythm, treated with IV metoprolol subsequent chronic anticoagulation.  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  Notably there is invasive carcinoma present at the margin. only 2 positive lymph nodes adjacent to  the bronchovesicular margin which appears to have been transected difficult to enumerate with extranodal extension present.  · Options could well be Carboplatin and Taxol considering the patient's comorbidity and worry of using cisplatin-based chemotherapy in this patient followed by atezolizumab with pathology having been notified to assess PD-L1 expression on the tumor as well also await review by Caris molecular profiling.  Finally radiation therapy consultation be requested.  ·  Caris analysis indicates benefit from immunotherapy at relatively high levels.  This would allow radiation therapy given with Carboplatin Taxol weekly (better tolerated) and then subsequent atezolizumab adjuvantly.  · Weekly carboplatin Taxol initiated 11/28/2022 given concurrently with radiation therapy    Plan:  1. Proceed today with week 1 carboplatin Taxol which will be given weekly  2. Continue radiation under direction of radiation oncology  3. Following the completion of concurrent chemoradiation the patient will be treated with Tecentriq adjuvantly every 3 weeks for 1 year  4. Return weekly for CBC, CMP, MD or NP follow-up and ongoing weekly chemotherapy    The patient is on high risk medication requiring close monitoring for toxicity    Anna Varma, APRN  11/28/2022

## 2022-12-01 ENCOUNTER — APPOINTMENT (OUTPATIENT)
Dept: RADIATION ONCOLOGY | Facility: HOSPITAL | Age: 77
End: 2022-12-01
Payer: MEDICARE

## 2022-12-01 LAB
RAD ONC ARIA COURSE ID: NORMAL
RAD ONC ARIA COURSE INTENT: NORMAL
RAD ONC ARIA COURSE LAST TREATMENT DATE: NORMAL
RAD ONC ARIA COURSE START DATE: NORMAL
RAD ONC ARIA COURSE TREATMENT ELAPSED DAYS: 3
RAD ONC ARIA FIRST TREATMENT DATE: NORMAL
RAD ONC ARIA PLAN FRACTIONS TREATED TO DATE: 4
RAD ONC ARIA PLAN ID: NORMAL
RAD ONC ARIA PLAN PRESCRIBED DOSE PER FRACTION: 2 GY
RAD ONC ARIA PLAN PRIMARY REFERENCE POINT: NORMAL
RAD ONC ARIA PLAN TOTAL FRACTIONS PRESCRIBED: 30
RAD ONC ARIA PLAN TOTAL PRESCRIBED DOSE: 6000 CGY
RAD ONC ARIA REFERENCE POINT DOSAGE GIVEN TO DATE: 8 GY
RAD ONC ARIA REFERENCE POINT DOSAGE GIVEN TO DATE: 8.31 GY
RAD ONC ARIA REFERENCE POINT ID: NORMAL
RAD ONC ARIA REFERENCE POINT ID: NORMAL
RAD ONC ARIA REFERENCE POINT SESSION DOSAGE GIVEN: 2 GY
RAD ONC ARIA REFERENCE POINT SESSION DOSAGE GIVEN: 2.08 GY

## 2022-12-01 PROCEDURE — 77386 CHG INTENSITY MODULATED RADIATION TX DLVR COMPLEX: CPT | Performed by: RADIOLOGY

## 2022-12-01 PROCEDURE — 77386: CPT | Performed by: RADIOLOGY

## 2022-12-02 ENCOUNTER — TREATMENT (OUTPATIENT)
Dept: RADIATION ONCOLOGY | Facility: HOSPITAL | Age: 77
End: 2022-12-02

## 2022-12-02 LAB
RAD ONC ARIA COURSE ID: NORMAL
RAD ONC ARIA COURSE INTENT: NORMAL
RAD ONC ARIA COURSE LAST TREATMENT DATE: NORMAL
RAD ONC ARIA COURSE START DATE: NORMAL
RAD ONC ARIA COURSE TREATMENT ELAPSED DAYS: 4
RAD ONC ARIA FIRST TREATMENT DATE: NORMAL
RAD ONC ARIA PLAN FRACTIONS TREATED TO DATE: 5
RAD ONC ARIA PLAN ID: NORMAL
RAD ONC ARIA PLAN PRESCRIBED DOSE PER FRACTION: 2 GY
RAD ONC ARIA PLAN PRIMARY REFERENCE POINT: NORMAL
RAD ONC ARIA PLAN TOTAL FRACTIONS PRESCRIBED: 30
RAD ONC ARIA PLAN TOTAL PRESCRIBED DOSE: 6000 CGY
RAD ONC ARIA REFERENCE POINT DOSAGE GIVEN TO DATE: 10 GY
RAD ONC ARIA REFERENCE POINT DOSAGE GIVEN TO DATE: 10.39 GY
RAD ONC ARIA REFERENCE POINT ID: NORMAL
RAD ONC ARIA REFERENCE POINT ID: NORMAL
RAD ONC ARIA REFERENCE POINT SESSION DOSAGE GIVEN: 2 GY
RAD ONC ARIA REFERENCE POINT SESSION DOSAGE GIVEN: 2.08 GY

## 2022-12-02 PROCEDURE — 77386 CHG INTENSITY MODULATED RADIATION TX DLVR COMPLEX: CPT | Performed by: STUDENT IN AN ORGANIZED HEALTH CARE EDUCATION/TRAINING PROGRAM

## 2022-12-02 PROCEDURE — 77293 RESPIRATOR MOTION MGMT SIMUL: CPT | Performed by: STUDENT IN AN ORGANIZED HEALTH CARE EDUCATION/TRAINING PROGRAM

## 2022-12-02 PROCEDURE — 77386: CPT | Performed by: STUDENT IN AN ORGANIZED HEALTH CARE EDUCATION/TRAINING PROGRAM

## 2022-12-04 NOTE — PROGRESS NOTES
REASON FOR FOLLOW-UP:    1. 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  Notably there is invasive carcinoma present at the margin.  Is a, and only 2 positive lymph nodes adjacent to the bronchovesicular margin which appears to have been transected difficult to enumerate with extranodal extension present.      History of Present Illness    The patient is a 77 y.o. male with the above mentioned history here today for lab review and evaluation prior to cycle 2 weekly carboplatin, Taxol with concurrent radiation.  He reports that he has been feeling quite well.  Denies and issues with nausea or vomiting, diarrhea, or constipation.  He has a good appetite.  No increase in shortness of breath. No other new problems or concerns today.       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  He had been assessed particularly in the fall 2021 when he presented with nausea and vomiting and abdominal discomfort leading to assessments that revealed sigmoid diverticulitis with contained rupture and partial small bowel obstruction.  Records from mid September 21 indicating that he was admitted with partial small bowel obstruction and treated medically with very slow recovery.  He has history of COPD with CT demonstrating a pulmonary nodule in the right lung base at 7 mm with follow-up planned.  He was seen by general surgery in later September with repeat scans planned and repeat CT abdomen 10/7/2021 did demonstrate interval improvement of sigmoid diverticulitis.      Record indicates an assessment in late June 2022 at different general surgeon for left inguinal hernia,  history of heavy tobacco use with COPD and recommendation for surgical repair of his hernia      The patient underwent a CT scan of the chest 5/7/2022 demonstrating diffuse emphysematous changes, ill-defined density measuring 3 mm in the superior segment of the right lower lobe, multiple ill-defined 3 to 4 mm nodular density thought to be inflammatory involving the right lower lobe and a new spiculated nodule involving the left lower lobe measuring 16 x 14 mm as well as left hilar adenopathy.       Follow-up PET/CT 7/13/2022 reveal a hypermetabolic spiculated lesion medial aspect the left lower lobe adjacent to descending thoracic aorta measuring 1.5 x 1.6 SUV 9.3 with an enlarged hypermetabolic left hilar lymph node measured 1.5 x 1.3 with SUV of 12.1, additional nodules in the lateral aspect right lower lobe and micronodule in the posterior/medial right lower lobe and also additional right lower lobe micronodule.  There is an infrarenal abdominal aortic aneurysm measuring 3.4 cm with a short segment of chronic dissection present.    These clinical findings would be consistent with a possible T1b N1 M0-stage IIb lung cancer.      Recognizing a diagnosis has not been made his case was discussed in thoracic conference 7/20/2022.  Recommendations include proceeding to pulmonary assessment with EBUS and the patient undergoing a general assessment per his clinical status-older adult assessment as well as assessment by thoracic surgery.  These issues are discussed with the patient and his wife in detail when they are seen 7/28/2022.    The patient proceeded to undergo his hernia surgery 8/2/2022 by Dr. Dotson.  Additionally the patient was unable to undergo assessment by pulmonary until October and, thereafter, was scheduled to see Dr. Joe 8/18/2022 undergoing older adult functional assessment with a JAGUAR score of 11 indicating a risk of functional decline related to cancer therapy.    The patient is seen with his  wife 8/15/2022 and doing very well with recent hernia surgery.  His performance status is reasonably good at present and we have learned now that he would not be able to see pulmonary medicine until October, thoracic surgery is scheduled this week with Dr. Joe.  Perhaps he could be a surgical candidate.  Particularly we know by guardant 360 that T p53 splice site mutation is present and would certainly be consistent as well the most common mutations found in lung cancers particularly squamous cancers.  We have discussed obtaining pulmonary functions at this point, thoracic surgery assessment this week and also restarting Chantix as possible for the patient.    There was difficulty obtaining Chantix and while this was being assessed the patient was reviewed 8/18 by thoracic surgery.  He was felt to have a T1b N1 M0 stage IIb carcinoma left lower lobe along and not a candidate for biopsy.  PFTs were borderline, functional assessment was reasonably good and the patient was felt to be a candidate for robot-assisted biopsy of his nodes and if malignant proceed to left lower lobe lobectomy.  He was reviewed 9/8 and offered 21 mg nicotine transdermal patching.    He is next seen 9/10/2022.  He is seen with his wife and both are prepared for surgery 9/23/2022.  We discussed that additional therapy may be considered once we have more information about the type and stage.  We would hope to see him postoperatively.    The patient was admitted 9//2022 undergoing 9/23/2022  a bronchoscopy with left video-assisted thoracoscopy with robot-assisted total decortication of the left lung, left lower lobectomy, mediastinal lymphadenectomy and intercostal nerve block with Dr. Nicola Joe.  Fortunately he did fairly well postoperatively though did develop atrial fibrillation with RVR treated with amiodarone converting back to sinus rhythm, treated with IV metoprolol subsequent chronic anticoagulation.  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  Notably there is invasive carcinoma present at the margin.  Is a, and only 2 positive lymph nodes adjacent to the bronchovesicular margin which appears to have been transected difficult to enumerate with extranodal extension present.    The patient is seen 10/27/2022.  He is recovering well from surgery, albeit slowly, and we have discussed his findings that are concerning as to residual disease with a positive margin described as above.  There is also the significance potentially of PD-L1 expression which has been described as producing a poor survival.  Nivolumab has been used as second line therapy to positive effect in the disease and this begs the question as to whether adjuvant therapy plus immunotherapy could be utilized though the patient would be difficult to treat with platinum based chemotherapy as well.    The patient is next assessed 11/7/2022 for significant assessments by supportive oncology at [facility] as well as with plans to see Dr. Marrero 11/9/2022.  Unfortunately he has been very slow to gradually recover from the effects of surgery with reduced appetite and only fair performance status.  At present it would be difficult to give him cisplatin based chemotherapy and we discussed whether we might be able to use Tecentriq (atezolizumab) as his primary adjuvant therapy.  We have contacted pathology about completing Caris testing and a PD-L1 analysis that has not yet completed.      The patient is seen, however, 11/16/2022 and his genomic analysis has returned as being significant for broad sensitivity to immunotherapy.  This would argue for the administration of weekly Carboplatin/Taxol as the patient proceeds to radiation therapy and upon completion, then using Tecentriq as further adjuvant therapy over a year.  This is discussed with the patient and  his  in detail as well as discussed with Dr. Marrero of radiation therapy.  We have also discussed the patient require port placement.      Past Medical History:   Diagnosis Date   • Arthritis    • COPD (chronic obstructive pulmonary disease) (HCC)     O2 3L AT HS   • Diabetes mellitus (HCC)     DIET CONTROL   • Diverticulitis    • DVT, lower extremity (HCC)     RLE   • Elevated cholesterol    • H/O Cervical pain    • History of colitis    • Hyperlipidemia    • Hypertension    • Left shoulder pain    • Low back pain    • Lung cancer (HCC) 2022    LEFT LUNG   • On anticoagulant therapy    • Peripheral arterial disease (HCC)    • Right leg claudication (HCC)    • Skin cancer     HX        Past Surgical History:   Procedure Laterality Date   • BALLOON ANGIOPLASTY, ARTERY Right 2015    IR Percutaneious IVC filter placement   • INGUINAL HERNIA REPAIR Left    • KNEE ARTHROSCOPY Left     Torn meniscus   • LOBECTOMY Left 9/23/2022    Procedure: BRONCHOSCOPY, LEFT VIDEO ASSISTED THORACOSCOPY, ROBOT ASSISTED TOTAL DECORTICATION  LEFT LUNG, LEFT LOWER LOBE LOBECTOMY, MEDIASTINAL LYMPHECTOMY, INTERCOSTAL NERVE BLOCK;  Surgeon: Nicola Joe III, MD;  Location: [facility];  Service: [facility] - [facility];  Laterality: Left;   • VENOUS ACCESS DEVICE (PORT) INSERTION Right 11/21/2022    Procedure: INSERTION VENOUS ACCESS DEVICE;  Surgeon: Nicola Joe III, MD;  Location: [facility] OR;  Service: Thoracic;  Laterality: Right;        Current Outpatient Medications on File Prior to Visit   Medication Sig Dispense Refill   • arformoterol (BROVANA) 15 MCG/2ML nebulizer solution Take 15 mcg by nebulization 2 (Two) Times a Day.     • budesonide (PULMICORT) 0.5 MG/2ML nebulizer solution Take 0.5 mg by nebulization 2 (Two) Times a Day.     • cetirizine (zyrTEC) 10 MG tablet Take 10 mg by mouth Daily.     • isosorbide mononitrate (IMDUR) 60 MG 24 hr tablet Take 60 mg by mouth Every Evening.     • ondansetron (Zofran) 8 MG  "tablet Take 1 tablet by mouth Every 8 (Eight) Hours As Needed for Nausea or Vomiting. 30 tablet 1   • simvastatin (ZOCOR) 20 MG tablet Take 20 mg by mouth Every Night.     • tamsulosin (FLOMAX) 0.4 MG capsule 24 hr capsule Take 1 capsule by mouth Daily. 30 capsule 5   • warfarin (COUMADIN) 5 MG tablet Take 1 tablet by mouth Daily. Take 10mg on 9/29/22 and then resume regular home schedule.     • metoprolol succinate XL (TOPROL-XL) 25 MG 24 hr tablet Take 1 tablet by mouth Daily for 30 days. (Patient taking differently: Take 25 mg by mouth Every Evening.) 30 tablet 0     No current facility-administered medications on file prior to visit.        ALLERGIES:  No Known Allergies     Social History     Socioeconomic History   • Marital status:    • Years of education: 1 year college   Tobacco Use   • Smoking status: Every Day     Packs/day: 1.00     Years: 59.00     Pack years: 59.00     Types: Cigarettes     Start date: 1963   • Smokeless tobacco: Never   • Tobacco comments:     9/8/22: Down to Less than 1 PPD   Vaping Use   • Vaping Use: Never used   Substance and Sexual Activity   • Alcohol use: Not Currently   • Drug use: Never   • Sexual activity: Defer        Family History   Problem Relation Age of Onset   • No Known Problems Mother    • Cancer Father    • Lung cancer Father    • Diabetes Brother    • Other Daughter         S/P stent, age 42   • No Known Problems Son    • Malig Hyperthermia Neg Hx         Review of Systems   ROS as per HPI    Objective     Vitals:    12/05/22 0855   BP: 134/75   Pulse: 62   Resp: 16   Temp: 97.7 °F (36.5 °C)   TempSrc: Temporal   SpO2: 98%   Weight: 70.1 kg (154 lb 9.6 oz)   Height: 177.8 cm (70\")   PainSc: 0-No pain     Current Status 12/5/2022   ECOG score 0       Physical Exam  Vitals reviewed.   Constitutional:       General: He is not in acute distress.     Appearance: Normal appearance. He is well-developed.   HENT:      Head: Normocephalic and atraumatic.   Eyes:    "   Pupils: Pupils are equal, round, and reactive to light.   Cardiovascular:      Rate and Rhythm: Normal rate and regular rhythm.      Heart sounds: Normal heart sounds. No murmur heard.  Pulmonary:      Effort: Pulmonary effort is normal. No respiratory distress.      Breath sounds: Wheezing present. No rhonchi or rales.   Abdominal:      General: Bowel sounds are normal. There is no distension.      Palpations: Abdomen is soft.   Musculoskeletal:         General: Normal range of motion.      Cervical back: Normal range of motion.   Skin:     General: Skin is warm and dry.      Findings: No rash.   Neurological:      Mental Status: He is alert and oriented to person, place, and time.           RECENT LABS:  Results from last 7 days   Lab Units 12/05/22  0835   WBC 10*3/mm3 5.91   NEUTROS ABS 10*3/mm3 3.57   HEMOGLOBIN g/dL 12.4*   HEMATOCRIT % 38.6   PLATELETS 10*3/mm3 218     Results from last 7 days   Lab Units 12/05/22  0835   SODIUM mmol/L 135   POTASSIUM mmol/L 4.5   CHLORIDE mmol/L 100   CO2 mmol/L 26.6   BUN mg/dL 13   CREATININE mg/dL 0.86   CALCIUM mg/dL 9.8   ALBUMIN g/dL 3.90   BILIRUBIN mg/dL 0.4   ALK PHOS U/L 60   ALT (SGPT) U/L 11   AST (SGOT) U/L 14   GLUCOSE mg/dL 165*           Assessment & Plan        77 y.o. male  with medical issues including type 2 diabetes-uncertain control, COPD, hypertension, diastolic heart failure, chronic disease, peripheral vascular disease (follow-up with vascular surgery today), previous DVT on anticoagulation (home monitoring), previous IVC filter placement?,  Status post September 2021 partial small bowel obstruction secondary to sigmoid diverticulitis treated medically.    1.   T1b N1 M0-stage IIb lung cancer.  · right lung base nodule noted on scan at that point and in follow-up with primary care had developed a left inguinal hernia with repair planned through a different general surgeon then seen with his initial sigmoid diverticulitis.  · PET scan 7/13/2022  demonstrates a hypermetabolic spiculated lesion medial aspect of the left lobe adjacent to descending thoracic aorta measuring1.5 x 1.6 SUV 9.3 with an enlarged hypermetabolic left hilar lymph node measured 1.5 x 1.3 with SUV of 12.1, additional nodules in the lateral aspect right lower lobe and micronodule in the posterior/medial right lower lobe and also additional right lower lobe micronodule.  There is an infrarenal abdominal aortic aneurysm measuring 3.4 cm with a short segment of chronic dissection present.  · Clinical findings would be consistent with a possible T1b N1 M0-stage IIb lung cancer.  · discussed in thoracic conference 7/20/2022.  · Recommendations to proceed with pulmonary assessment with EBUS.  · The patient proceeded to undergo his hernia surgery 8/2/2022 by Dr. Dotson.   · Guardant 360 that T p53 splice site mutation is present and would certainly be consistent as well the most common mutations found in lung cancers particularly squamous cancers.  · The patient was admitted 9//2022 undergoing 9/23/2022  a bronchoscopy with left video-assisted thoracoscopy with robot-assisted total decortication of the left lung, left lower lobectomy, mediastinal lymphadenectomy and intercostal nerve block with Dr. Nicola Joe.  · Fortunately he did fairly well postoperatively though did develop atrial fibrillation with RVR treated with amiodarone converting back to sinus rhythm, treated with IV metoprolol subsequent chronic anticoagulation.  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  Notably there is invasive carcinoma present at the margin. only 2 positive lymph nodes adjacent to the bronchovesicular margin which appears to have been transected difficult to enumerate with extranodal extension present.  · Options could well be Carboplatin and Taxol considering the  patient's comorbidity and worry of using cisplatin-based chemotherapy in this patient followed by atezolizumab with pathology having been notified to assess PD-L1 expression on the tumor as well also await review by Caris molecular profiling.  Finally radiation therapy consultation be requested.  ·  Caris analysis indicates benefit from immunotherapy at relatively high levels.  This would allow radiation therapy given with Carboplatin Taxol weekly (better tolerated) and then subsequent atezolizumab adjuvantly.  · Weekly carboplatin Taxol initiated 11/28/2022 given concurrently with radiation therapy  · 12/5/2022 here for cycle 2 weekly carboplatin/Taxol, overall tolerating treatment quite well so far.    Plan:  1. Proceed today with week 2 carboplatin Taxol.  2. Continue radiation under the care of radiation oncology.  3. Return in 1 week for follow-up with MD or NP with repeat labs and continued weekly carbo/Taxol.  4. Following the completion of concurrent chemoradiation the patient will be treated with Tecentriq adjuvantly every 3 weeks for 1 year  5. Call/ return sooner should he develop any new concerns or problems.      Patient is on a high risk medication requiring close monitoring for toxicity.        Rama Mario, APRN  12/05/2022

## 2022-12-05 ENCOUNTER — OFFICE VISIT (OUTPATIENT)
Dept: ONCOLOGY | Facility: CLINIC | Age: 77
End: 2022-12-05

## 2022-12-05 ENCOUNTER — TREATMENT (OUTPATIENT)
Dept: RADIATION ONCOLOGY | Facility: HOSPITAL | Age: 77
End: 2022-12-05

## 2022-12-05 ENCOUNTER — RADIATION ONCOLOGY WEEKLY ASSESSMENT (OUTPATIENT)
Dept: RADIATION ONCOLOGY | Facility: HOSPITAL | Age: 77
End: 2022-12-05

## 2022-12-05 ENCOUNTER — INFUSION (OUTPATIENT)
Dept: ONCOLOGY | Facility: HOSPITAL | Age: 77
End: 2022-12-05

## 2022-12-05 VITALS
OXYGEN SATURATION: 98 % | SYSTOLIC BLOOD PRESSURE: 134 MMHG | HEIGHT: 70 IN | BODY MASS INDEX: 22.13 KG/M2 | TEMPERATURE: 97.7 F | WEIGHT: 154.6 LBS | DIASTOLIC BLOOD PRESSURE: 75 MMHG | RESPIRATION RATE: 16 BRPM | HEART RATE: 62 BPM

## 2022-12-05 VITALS — HEART RATE: 53 BPM | DIASTOLIC BLOOD PRESSURE: 66 MMHG | SYSTOLIC BLOOD PRESSURE: 129 MMHG

## 2022-12-05 VITALS
HEART RATE: 76 BPM | BODY MASS INDEX: 22.18 KG/M2 | WEIGHT: 154.6 LBS | DIASTOLIC BLOOD PRESSURE: 74 MMHG | SYSTOLIC BLOOD PRESSURE: 140 MMHG | OXYGEN SATURATION: 96 %

## 2022-12-05 DIAGNOSIS — Z45.2 FITTING AND ADJUSTMENT OF VASCULAR CATHETER: ICD-10-CM

## 2022-12-05 DIAGNOSIS — C7A.8 NEUROENDOCRINE CARCINOMA OF LUNG: Primary | ICD-10-CM

## 2022-12-05 DIAGNOSIS — C7A.8 NEUROENDOCRINE CARCINOMA OF LUNG: ICD-10-CM

## 2022-12-05 LAB
ALBUMIN SERPL-MCNC: 3.9 G/DL (ref 3.5–5.2)
ALBUMIN/GLOB SERPL: 1.3 G/DL (ref 1.1–2.4)
ALP SERPL-CCNC: 60 U/L (ref 38–116)
ALT SERPL W P-5'-P-CCNC: 11 U/L (ref 0–41)
ANION GAP SERPL CALCULATED.3IONS-SCNC: 8.4 MMOL/L (ref 5–15)
AST SERPL-CCNC: 14 U/L (ref 0–40)
BASOPHILS # BLD AUTO: 0.05 10*3/MM3 (ref 0–0.2)
BASOPHILS NFR BLD AUTO: 0.8 % (ref 0–1.5)
BILIRUB SERPL-MCNC: 0.4 MG/DL (ref 0.2–1.2)
BUN SERPL-MCNC: 13 MG/DL (ref 6–20)
BUN/CREAT SERPL: 15.1 (ref 7.3–30)
CALCIUM SPEC-SCNC: 9.8 MG/DL (ref 8.5–10.2)
CHLORIDE SERPL-SCNC: 100 MMOL/L (ref 98–107)
CO2 SERPL-SCNC: 26.6 MMOL/L (ref 22–29)
CREAT SERPL-MCNC: 0.86 MG/DL (ref 0.7–1.3)
DEPRECATED RDW RBC AUTO: 51.4 FL (ref 37–54)
EGFRCR SERPLBLD CKD-EPI 2021: 90.3 ML/MIN/1.73
EOSINOPHIL # BLD AUTO: 0.14 10*3/MM3 (ref 0–0.4)
EOSINOPHIL NFR BLD AUTO: 2.4 % (ref 0.3–6.2)
ERYTHROCYTE [DISTWIDTH] IN BLOOD BY AUTOMATED COUNT: 14.7 % (ref 12.3–15.4)
GLOBULIN UR ELPH-MCNC: 3 GM/DL (ref 1.8–3.5)
GLUCOSE SERPL-MCNC: 165 MG/DL (ref 74–124)
HCT VFR BLD AUTO: 38.6 % (ref 37.5–51)
HGB BLD-MCNC: 12.4 G/DL (ref 13–17.7)
IMM GRANULOCYTES # BLD AUTO: 0.04 10*3/MM3 (ref 0–0.05)
IMM GRANULOCYTES NFR BLD AUTO: 0.7 % (ref 0–0.5)
LYMPHOCYTES # BLD AUTO: 1.73 10*3/MM3 (ref 0.7–3.1)
LYMPHOCYTES NFR BLD AUTO: 29.3 % (ref 19.6–45.3)
MCH RBC QN AUTO: 30.5 PG (ref 26.6–33)
MCHC RBC AUTO-ENTMCNC: 32.1 G/DL (ref 31.5–35.7)
MCV RBC AUTO: 94.8 FL (ref 79–97)
MONOCYTES # BLD AUTO: 0.38 10*3/MM3 (ref 0.1–0.9)
MONOCYTES NFR BLD AUTO: 6.4 % (ref 5–12)
NEUTROPHILS NFR BLD AUTO: 3.57 10*3/MM3 (ref 1.7–7)
NEUTROPHILS NFR BLD AUTO: 60.4 % (ref 42.7–76)
NRBC BLD AUTO-RTO: 0 /100 WBC (ref 0–0.2)
PLATELET # BLD AUTO: 218 10*3/MM3 (ref 140–450)
PMV BLD AUTO: 9 FL (ref 6–12)
POTASSIUM SERPL-SCNC: 4.5 MMOL/L (ref 3.5–4.7)
PROT SERPL-MCNC: 6.9 G/DL (ref 6.3–8)
RAD ONC ARIA COURSE ID: NORMAL
RAD ONC ARIA COURSE INTENT: NORMAL
RAD ONC ARIA COURSE LAST TREATMENT DATE: NORMAL
RAD ONC ARIA COURSE START DATE: NORMAL
RAD ONC ARIA COURSE TREATMENT ELAPSED DAYS: 7
RAD ONC ARIA FIRST TREATMENT DATE: NORMAL
RAD ONC ARIA PLAN FRACTIONS TREATED TO DATE: 6
RAD ONC ARIA PLAN ID: NORMAL
RAD ONC ARIA PLAN PRESCRIBED DOSE PER FRACTION: 2 GY
RAD ONC ARIA PLAN PRIMARY REFERENCE POINT: NORMAL
RAD ONC ARIA PLAN TOTAL FRACTIONS PRESCRIBED: 30
RAD ONC ARIA PLAN TOTAL PRESCRIBED DOSE: 6000 CGY
RAD ONC ARIA REFERENCE POINT DOSAGE GIVEN TO DATE: 12 GY
RAD ONC ARIA REFERENCE POINT DOSAGE GIVEN TO DATE: 12.47 GY
RAD ONC ARIA REFERENCE POINT ID: NORMAL
RAD ONC ARIA REFERENCE POINT ID: NORMAL
RAD ONC ARIA REFERENCE POINT SESSION DOSAGE GIVEN: 2 GY
RAD ONC ARIA REFERENCE POINT SESSION DOSAGE GIVEN: 2.08 GY
RBC # BLD AUTO: 4.07 10*6/MM3 (ref 4.14–5.8)
SODIUM SERPL-SCNC: 135 MMOL/L (ref 134–145)
WBC NRBC COR # BLD: 5.91 10*3/MM3 (ref 3.4–10.8)

## 2022-12-05 PROCEDURE — 25010000002 PACLITAXEL PER 1 MG: Performed by: NURSE PRACTITIONER

## 2022-12-05 PROCEDURE — 25010000002 HEPARIN LOCK FLUSH PER 10 UNITS: Performed by: INTERNAL MEDICINE

## 2022-12-05 PROCEDURE — 77427 RADIATION TX MANAGEMENT X5: CPT | Performed by: STUDENT IN AN ORGANIZED HEALTH CARE EDUCATION/TRAINING PROGRAM

## 2022-12-05 PROCEDURE — 25010000002 DEXAMETHASONE SODIUM PHOSPHATE 100 MG/10ML SOLUTION: Performed by: NURSE PRACTITIONER

## 2022-12-05 PROCEDURE — 25010000002 PALONOSETRON PER 25 MCG: Performed by: NURSE PRACTITIONER

## 2022-12-05 PROCEDURE — 25010000002 DIPHENHYDRAMINE PER 50 MG: Performed by: NURSE PRACTITIONER

## 2022-12-05 PROCEDURE — 96413 CHEMO IV INFUSION 1 HR: CPT

## 2022-12-05 PROCEDURE — 96417 CHEMO IV INFUS EACH ADDL SEQ: CPT

## 2022-12-05 PROCEDURE — 99214 OFFICE O/P EST MOD 30 MIN: CPT | Performed by: NURSE PRACTITIONER

## 2022-12-05 PROCEDURE — 96375 TX/PRO/DX INJ NEW DRUG ADDON: CPT

## 2022-12-05 PROCEDURE — 77386: CPT | Performed by: STUDENT IN AN ORGANIZED HEALTH CARE EDUCATION/TRAINING PROGRAM

## 2022-12-05 PROCEDURE — 85025 COMPLETE CBC W/AUTO DIFF WBC: CPT

## 2022-12-05 PROCEDURE — 80053 COMPREHEN METABOLIC PANEL: CPT

## 2022-12-05 PROCEDURE — 25010000002 CARBOPLATIN PER 50 MG: Performed by: NURSE PRACTITIONER

## 2022-12-05 PROCEDURE — 77386 CHG INTENSITY MODULATED RADIATION TX DLVR COMPLEX: CPT | Performed by: STUDENT IN AN ORGANIZED HEALTH CARE EDUCATION/TRAINING PROGRAM

## 2022-12-05 RX ORDER — FAMOTIDINE 10 MG/ML
20 INJECTION, SOLUTION INTRAVENOUS ONCE
Status: CANCELLED | OUTPATIENT
Start: 2022-12-05

## 2022-12-05 RX ORDER — HEPARIN SODIUM (PORCINE) LOCK FLUSH IV SOLN 100 UNIT/ML 100 UNIT/ML
500 SOLUTION INTRAVENOUS AS NEEDED
Status: DISCONTINUED | OUTPATIENT
Start: 2022-12-05 | End: 2022-12-05 | Stop reason: HOSPADM

## 2022-12-05 RX ORDER — SODIUM CHLORIDE 9 MG/ML
250 INJECTION, SOLUTION INTRAVENOUS ONCE
Status: CANCELLED | OUTPATIENT
Start: 2022-12-05

## 2022-12-05 RX ORDER — SODIUM CHLORIDE 9 MG/ML
250 INJECTION, SOLUTION INTRAVENOUS ONCE
Status: COMPLETED | OUTPATIENT
Start: 2022-12-05 | End: 2022-12-05

## 2022-12-05 RX ORDER — PALONOSETRON 0.05 MG/ML
0.25 INJECTION, SOLUTION INTRAVENOUS ONCE
Status: CANCELLED | OUTPATIENT
Start: 2022-12-05

## 2022-12-05 RX ORDER — SODIUM CHLORIDE 0.9 % (FLUSH) 0.9 %
10 SYRINGE (ML) INJECTION AS NEEDED
Status: DISCONTINUED | OUTPATIENT
Start: 2022-12-05 | End: 2022-12-05 | Stop reason: HOSPADM

## 2022-12-05 RX ORDER — PALONOSETRON 0.05 MG/ML
0.25 INJECTION, SOLUTION INTRAVENOUS ONCE
Status: COMPLETED | OUTPATIENT
Start: 2022-12-05 | End: 2022-12-05

## 2022-12-05 RX ORDER — HEPARIN SODIUM (PORCINE) LOCK FLUSH IV SOLN 100 UNIT/ML 100 UNIT/ML
500 SOLUTION INTRAVENOUS AS NEEDED
Status: CANCELLED | OUTPATIENT
Start: 2022-12-05

## 2022-12-05 RX ORDER — SODIUM CHLORIDE 0.9 % (FLUSH) 0.9 %
10 SYRINGE (ML) INJECTION AS NEEDED
Status: CANCELLED | OUTPATIENT
Start: 2022-12-05

## 2022-12-05 RX ORDER — FAMOTIDINE 10 MG/ML
20 INJECTION, SOLUTION INTRAVENOUS AS NEEDED
Status: CANCELLED | OUTPATIENT
Start: 2022-12-05

## 2022-12-05 RX ORDER — DIPHENHYDRAMINE HYDROCHLORIDE 50 MG/ML
50 INJECTION INTRAMUSCULAR; INTRAVENOUS AS NEEDED
Status: CANCELLED | OUTPATIENT
Start: 2022-12-05

## 2022-12-05 RX ORDER — FAMOTIDINE 10 MG/ML
20 INJECTION, SOLUTION INTRAVENOUS ONCE
Status: COMPLETED | OUTPATIENT
Start: 2022-12-05 | End: 2022-12-05

## 2022-12-05 RX ADMIN — SODIUM CHLORIDE 95 MG: 9 INJECTION, SOLUTION INTRAVENOUS at 10:35

## 2022-12-05 RX ADMIN — CARBOPLATIN 200 MG: 10 INJECTION INTRAVENOUS at 11:36

## 2022-12-05 RX ADMIN — DEXAMETHASONE SODIUM PHOSPHATE 12 MG: 10 INJECTION, SOLUTION INTRAMUSCULAR; INTRAVENOUS at 09:31

## 2022-12-05 RX ADMIN — FAMOTIDINE 20 MG: 10 INJECTION INTRAVENOUS at 09:30

## 2022-12-05 RX ADMIN — DIPHENHYDRAMINE HYDROCHLORIDE 25 MG: 50 INJECTION, SOLUTION INTRAMUSCULAR; INTRAVENOUS at 09:47

## 2022-12-05 RX ADMIN — SODIUM CHLORIDE 250 ML: 9 INJECTION, SOLUTION INTRAVENOUS at 09:29

## 2022-12-05 RX ADMIN — Medication 500 UNITS: at 12:10

## 2022-12-05 RX ADMIN — Medication 10 ML: at 12:10

## 2022-12-05 RX ADMIN — PALONOSETRON 0.25 MG: 0.05 INJECTION, SOLUTION INTRAVENOUS at 09:29

## 2022-12-05 NOTE — PROGRESS NOTES
Radiation Oncology  On-Treatment Note      Patient: Giovani España    MRN: [id number]    Attending Physician: Kaz Marrero MD     Diagnosis:     ICD-10-CM ICD-9-CM   1. Neuroendocrine carcinoma of lung (HCC)  C7A.8 209.21       Radiation Therapy Visit:  Continue radiation therapy, Dosimetry plan remains acceptable, Films reviewed and remains acceptable, Pain assessed, Pain management planned, Radiation dose schedule reviewed and remains acceptable, Radiation technique remains acceptable and Symptoms within expected range    Radiation Treatments     Active   Plans   Lt Lung   Most recent treatment: Dose planned: 200 cGy (fraction 6 on 12/5/2022)   Total: Dose planned: 6,000 cGy (30 fractions)   Elapsed Days: 7      Reference Points   Lt Lung Calc   Most recent treatment: Dose given: 208 cGy (on 12/5/2022)   Total: Dose given: 1,247 cGy   Elapsed Days: 7      RX L Lung   Most recent treatment: Dose given: 200 cGy (on 12/5/2022)   Total: Dose given: 1,200 cGy   Elapsed Days: 7                    Physical Examination:  Vitals: Blood pressure 140/74, pulse 76, weight 70.1 kg (154 lb 9.6 oz), SpO2 96 %.  Vitals:    12/05/22 1306   BP: 140/74   Pulse: 76   SpO2: 96%   Weight: 70.1 kg (154 lb 9.6 oz)     Pain Score    12/05/22 1306   PainSc: 0-No pain       Restricted in physically strenuous activity but ambulatory and able to carry out work of a light or sedentary nature, e.g., light house work, office work = 1    We examined the relevant areas: yes  Findings are within the expected range for this stage of treatment: yes  -------------------------------------------------------------------------------------------------------------------    ACTION ITEMS:  Patient tolerating treatment well and as expected for this stage in their treatment and Continue radiation therapy as planned    Estimated Completion Date: 1/9/2023      Kaz Marrero MD  Radiation Oncology

## 2022-12-06 ENCOUNTER — TREATMENT (OUTPATIENT)
Dept: RADIATION ONCOLOGY | Facility: HOSPITAL | Age: 77
End: 2022-12-06

## 2022-12-06 LAB
RAD ONC ARIA COURSE ID: NORMAL
RAD ONC ARIA COURSE INTENT: NORMAL
RAD ONC ARIA COURSE LAST TREATMENT DATE: NORMAL
RAD ONC ARIA COURSE START DATE: NORMAL
RAD ONC ARIA COURSE TREATMENT ELAPSED DAYS: 8
RAD ONC ARIA FIRST TREATMENT DATE: NORMAL
RAD ONC ARIA PLAN FRACTIONS TREATED TO DATE: 7
RAD ONC ARIA PLAN ID: NORMAL
RAD ONC ARIA PLAN PRESCRIBED DOSE PER FRACTION: 2 GY
RAD ONC ARIA PLAN PRIMARY REFERENCE POINT: NORMAL
RAD ONC ARIA PLAN TOTAL FRACTIONS PRESCRIBED: 30
RAD ONC ARIA PLAN TOTAL PRESCRIBED DOSE: 6000 CGY
RAD ONC ARIA REFERENCE POINT DOSAGE GIVEN TO DATE: 14 GY
RAD ONC ARIA REFERENCE POINT DOSAGE GIVEN TO DATE: 14.55 GY
RAD ONC ARIA REFERENCE POINT ID: NORMAL
RAD ONC ARIA REFERENCE POINT ID: NORMAL
RAD ONC ARIA REFERENCE POINT SESSION DOSAGE GIVEN: 2 GY
RAD ONC ARIA REFERENCE POINT SESSION DOSAGE GIVEN: 2.08 GY

## 2022-12-06 PROCEDURE — 77386: CPT | Performed by: STUDENT IN AN ORGANIZED HEALTH CARE EDUCATION/TRAINING PROGRAM

## 2022-12-06 PROCEDURE — 77386 CHG INTENSITY MODULATED RADIATION TX DLVR COMPLEX: CPT | Performed by: STUDENT IN AN ORGANIZED HEALTH CARE EDUCATION/TRAINING PROGRAM

## 2022-12-07 ENCOUNTER — TREATMENT (OUTPATIENT)
Dept: RADIATION ONCOLOGY | Facility: HOSPITAL | Age: 77
End: 2022-12-07

## 2022-12-07 LAB
RAD ONC ARIA COURSE ID: NORMAL
RAD ONC ARIA COURSE INTENT: NORMAL
RAD ONC ARIA COURSE LAST TREATMENT DATE: NORMAL
RAD ONC ARIA COURSE START DATE: NORMAL
RAD ONC ARIA COURSE TREATMENT ELAPSED DAYS: 9
RAD ONC ARIA FIRST TREATMENT DATE: NORMAL
RAD ONC ARIA PLAN FRACTIONS TREATED TO DATE: 8
RAD ONC ARIA PLAN ID: NORMAL
RAD ONC ARIA PLAN PRESCRIBED DOSE PER FRACTION: 2 GY
RAD ONC ARIA PLAN PRIMARY REFERENCE POINT: NORMAL
RAD ONC ARIA PLAN TOTAL FRACTIONS PRESCRIBED: 30
RAD ONC ARIA PLAN TOTAL PRESCRIBED DOSE: 6000 CGY
RAD ONC ARIA REFERENCE POINT DOSAGE GIVEN TO DATE: 16 GY
RAD ONC ARIA REFERENCE POINT DOSAGE GIVEN TO DATE: 16.63 GY
RAD ONC ARIA REFERENCE POINT ID: NORMAL
RAD ONC ARIA REFERENCE POINT ID: NORMAL
RAD ONC ARIA REFERENCE POINT SESSION DOSAGE GIVEN: 2 GY
RAD ONC ARIA REFERENCE POINT SESSION DOSAGE GIVEN: 2.08 GY

## 2022-12-07 PROCEDURE — 77386 CHG INTENSITY MODULATED RADIATION TX DLVR COMPLEX: CPT | Performed by: STUDENT IN AN ORGANIZED HEALTH CARE EDUCATION/TRAINING PROGRAM

## 2022-12-07 PROCEDURE — 77336 RADIATION PHYSICS CONSULT: CPT | Performed by: STUDENT IN AN ORGANIZED HEALTH CARE EDUCATION/TRAINING PROGRAM

## 2022-12-07 PROCEDURE — 77386: CPT | Performed by: STUDENT IN AN ORGANIZED HEALTH CARE EDUCATION/TRAINING PROGRAM

## 2022-12-08 ENCOUNTER — PATIENT OUTREACH (OUTPATIENT)
Dept: OTHER | Facility: HOSPITAL | Age: 77
End: 2022-12-08

## 2022-12-08 ENCOUNTER — TREATMENT (OUTPATIENT)
Dept: RADIATION ONCOLOGY | Facility: HOSPITAL | Age: 77
End: 2022-12-08

## 2022-12-08 LAB
RAD ONC ARIA COURSE ID: NORMAL
RAD ONC ARIA COURSE INTENT: NORMAL
RAD ONC ARIA COURSE LAST TREATMENT DATE: NORMAL
RAD ONC ARIA COURSE START DATE: NORMAL
RAD ONC ARIA COURSE TREATMENT ELAPSED DAYS: 10
RAD ONC ARIA FIRST TREATMENT DATE: NORMAL
RAD ONC ARIA PLAN FRACTIONS TREATED TO DATE: 9
RAD ONC ARIA PLAN ID: NORMAL
RAD ONC ARIA PLAN PRESCRIBED DOSE PER FRACTION: 2 GY
RAD ONC ARIA PLAN PRIMARY REFERENCE POINT: NORMAL
RAD ONC ARIA PLAN TOTAL FRACTIONS PRESCRIBED: 30
RAD ONC ARIA PLAN TOTAL PRESCRIBED DOSE: 6000 CGY
RAD ONC ARIA REFERENCE POINT DOSAGE GIVEN TO DATE: 18 GY
RAD ONC ARIA REFERENCE POINT DOSAGE GIVEN TO DATE: 18.71 GY
RAD ONC ARIA REFERENCE POINT ID: NORMAL
RAD ONC ARIA REFERENCE POINT ID: NORMAL
RAD ONC ARIA REFERENCE POINT SESSION DOSAGE GIVEN: 2 GY
RAD ONC ARIA REFERENCE POINT SESSION DOSAGE GIVEN: 2.08 GY

## 2022-12-08 PROCEDURE — 77386: CPT | Performed by: STUDENT IN AN ORGANIZED HEALTH CARE EDUCATION/TRAINING PROGRAM

## 2022-12-08 PROCEDURE — 77386 CHG INTENSITY MODULATED RADIATION TX DLVR COMPLEX: CPT | Performed by: STUDENT IN AN ORGANIZED HEALTH CARE EDUCATION/TRAINING PROGRAM

## 2022-12-08 NOTE — PROGRESS NOTES
REASON FOR FOLLOW-UP:    1. 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  Notably there is invasive carcinoma present at the margin.  Is a, and only 2 positive lymph nodes adjacent to the bronchovesicular margin which appears to have been transected difficult to enumerate with extranodal extension present.      History of Present Illness   The patient returns today for follow-up and evaluation prior to cycle #3 weekly carboplatin and Taxol with concurrent radiation.  He is seen today with his wife present.  He continues to tolerate treatment well.  Eating and drinking adequately, weight remains stable.  Bowels moving regularly.  Denies fever or chills.  Denies nausea or vomiting.  Denies new or worsening pain.  Denies signs or symptoms of peripheral neuropathy.  No new concerns today.    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  He had been assessed particularly in the fall 2021 when he presented with nausea and vomiting and abdominal discomfort leading to assessments that revealed sigmoid diverticulitis with contained rupture and partial small bowel obstruction.  Records from mid September 21 indicating that he was admitted with partial small bowel obstruction and treated medically with very slow recovery.  He has history of COPD with CT demonstrating a pulmonary nodule in the right lung base at 7 mm with follow-up planned.  He was seen by general surgery in later September with repeat scans planned and repeat CT abdomen 10/7/2021 did demonstrate interval improvement of sigmoid diverticulitis.      Record indicates an assessment in late June 2022  at different general surgeon for left inguinal hernia, history of heavy tobacco use with COPD and recommendation for surgical repair of his hernia      The patient underwent a CT scan of the chest 5/7/2022 demonstrating diffuse emphysematous changes, ill-defined density measuring 3 mm in the superior segment of the right lower lobe, multiple ill-defined 3 to 4 mm nodular density thought to be inflammatory involving the right lower lobe and a new spiculated nodule involving the left lower lobe measuring 16 x 14 mm as well as left hilar adenopathy.       Follow-up PET/CT 7/13/2022 reveal a hypermetabolic spiculated lesion medial aspect the left lower lobe adjacent to descending thoracic aorta measuring 1.5 x 1.6 SUV 9.3 with an enlarged hypermetabolic left hilar lymph node measured 1.5 x 1.3 with SUV of 12.1, additional nodules in the lateral aspect right lower lobe and micronodule in the posterior/medial right lower lobe and also additional right lower lobe micronodule.  There is an infrarenal abdominal aortic aneurysm measuring 3.4 cm with a short segment of chronic dissection present.    These clinical findings would be consistent with a possible T1b N1 M0-stage IIb lung cancer.      Recognizing a diagnosis has not been made his case was discussed in thoracic conference 7/20/2022.  Recommendations include proceeding to pulmonary assessment with EBUS and the patient undergoing a general assessment per his clinical status-older adult assessment as well as assessment by thoracic surgery.  These issues are discussed with the patient and his wife in detail when they are seen 7/28/2022.    The patient proceeded to undergo his hernia surgery 8/2/2022 by Dr. Dotson.  Additionally the patient was unable to undergo assessment by pulmonary until October and, thereafter, was scheduled to see Dr. Joe 8/18/2022 undergoing older adult functional assessment with a JAGUAR score of 11 indicating a risk of functional decline related  to cancer therapy.    The patient is seen with his wife 8/15/2022 and doing very well with recent hernia surgery.  His performance status is reasonably good at present and we have learned now that he would not be able to see pulmonary medicine until October, thoracic surgery is scheduled this week with Dr. Joe.  Perhaps he could be a surgical candidate.  Particularly we know by guardant 360 that T p53 splice site mutation is present and would certainly be consistent as well the most common mutations found in lung cancers particularly squamous cancers.  We have discussed obtaining pulmonary functions at this point, thoracic surgery assessment this week and also restarting Chantix as possible for the patient.    There was difficulty obtaining Chantix and while this was being assessed the patient was reviewed 8/18 by thoracic surgery.  He was felt to have a T1b N1 M0 stage IIb carcinoma left lower lobe along and not a candidate for biopsy.  PFTs were borderline, functional assessment was reasonably good and the patient was felt to be a candidate for robot-assisted biopsy of his nodes and if malignant proceed to left lower lobe lobectomy.  He was reviewed 9/8 and offered 21 mg nicotine transdermal patching.    He is next seen 9/10/2022.  He is seen with his wife and both are prepared for surgery 9/23/2022.  We discussed that additional therapy may be considered once we have more information about the type and stage.  We would hope to see him postoperatively.    The patient was admitted 9//2022 undergoing 9/23/2022  a bronchoscopy with left video-assisted thoracoscopy with robot-assisted total decortication of the left lung, left lower lobectomy, mediastinal lymphadenectomy and intercostal nerve block with Dr. Nicola Joe.  Fortunately he did fairly well postoperatively though did develop atrial fibrillation with RVR treated with amiodarone converting back to sinus rhythm, treated with IV metoprolol  subsequent chronic anticoagulation.  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  Notably there is invasive carcinoma present at the margin.  Is a, and only 2 positive lymph nodes adjacent to the bronchovesicular margin which appears to have been transected difficult to enumerate with extranodal extension present.    The patient is seen 10/27/2022.  He is recovering well from surgery, albeit slowly, and we have discussed his findings that are concerning as to residual disease with a positive margin described as above.  There is also the significance potentially of PD-L1 expression which has been described as producing a poor survival.  Nivolumab has been used as second line therapy to positive effect in the disease and this begs the question as to whether adjuvant therapy plus immunotherapy could be utilized though the patient would be difficult to treat with platinum based chemotherapy as well.    The patient is next assessed 11/7/2022 for significant assessments by supportive oncology at [facility] as well as with plans to see Dr. Marrero 11/9/2022.  Unfortunately he has been very slow to gradually recover from the effects of surgery with reduced appetite and only fair performance status.  At present it would be difficult to give him cisplatin based chemotherapy and we discussed whether we might be able to use Tecentriq (atezolizumab) as his primary adjuvant therapy.  We have contacted pathology about completing Caris testing and a PD-L1 analysis that has not yet completed.      The patient is seen, however, 11/16/2022 and his genomic analysis has returned as being significant for broad sensitivity to immunotherapy.  This would argue for the administration of weekly Carboplatin/Taxol as the patient proceeds to radiation therapy and upon completion, then using Tecentriq as further adjuvant therapy  over a year.  This is discussed with the patient and his  in detail as well as discussed with Dr. Marrero of radiation therapy.  We have also discussed the patient require port placement.      Past Medical History:   Diagnosis Date   • Arthritis    • COPD (chronic obstructive pulmonary disease) (HCC)     O2 3L AT HS   • Diabetes mellitus (HCC)     DIET CONTROL   • Diverticulitis    • DVT, lower extremity (HCC)     RLE   • Elevated cholesterol    • H/O Cervical pain    • History of colitis    • Hyperlipidemia    • Hypertension    • Left shoulder pain    • Low back pain    • Lung cancer (HCC) 2022    LEFT LUNG   • On anticoagulant therapy    • Peripheral arterial disease (HCC)    • Right leg claudication (HCC)    • Skin cancer     HX        Past Surgical History:   Procedure Laterality Date   • BALLOON ANGIOPLASTY, ARTERY Right 2015    IR Percutaneious IVC filter placement   • INGUINAL HERNIA REPAIR Left    • KNEE ARTHROSCOPY Left     Torn meniscus   • LOBECTOMY Left 9/23/2022    Procedure: BRONCHOSCOPY, LEFT VIDEO ASSISTED THORACOSCOPY, ROBOT ASSISTED TOTAL DECORTICATION  LEFT LUNG, LEFT LOWER LOBE LOBECTOMY, MEDIASTINAL LYMPHECTOMY, INTERCOSTAL NERVE BLOCK;  Surgeon: Nicola Joe III, MD;  Location: [facility];  Service: Robotics - [facility];  Laterality: Left;   • VENOUS ACCESS DEVICE (PORT) INSERTION Right 11/21/2022    Procedure: INSERTION VENOUS ACCESS DEVICE;  Surgeon: Nicola Joe III, MD;  Location: [facility];  Service: Thoracic;  Laterality: Right;        Current Outpatient Medications on File Prior to Visit   Medication Sig Dispense Refill   • arformoterol (BROVANA) 15 MCG/2ML nebulizer solution Take 15 mcg by nebulization 2 (Two) Times a Day.     • budesonide (PULMICORT) 0.5 MG/2ML nebulizer solution Take 0.5 mg by nebulization 2 (Two) Times a Day.     • cetirizine (zyrTEC) 10 MG tablet Take 10 mg by mouth Daily.     • isosorbide mononitrate (IMDUR) 60 MG 24 hr tablet Take 60 mg by  "mouth Every Evening.     • ondansetron (Zofran) 8 MG tablet Take 1 tablet by mouth Every 8 (Eight) Hours As Needed for Nausea or Vomiting. 30 tablet 1   • simvastatin (ZOCOR) 20 MG tablet Take 20 mg by mouth Every Night.     • tamsulosin (FLOMAX) 0.4 MG capsule 24 hr capsule Take 1 capsule by mouth Daily. 30 capsule 5   • warfarin (COUMADIN) 5 MG tablet Take 1 tablet by mouth Daily. Take 10mg on 9/29/22 and then resume regular home schedule.     • metoprolol succinate XL (TOPROL-XL) 25 MG 24 hr tablet Take 1 tablet by mouth Daily for 30 days. (Patient taking differently: Take 25 mg by mouth Every Evening.) 30 tablet 0     No current facility-administered medications on file prior to visit.        ALLERGIES:  No Known Allergies     Social History     Socioeconomic History   • Marital status:    • Years of education: 1 year college   Tobacco Use   • Smoking status: Every Day     Packs/day: 1.00     Years: 59.00     Pack years: 59.00     Types: Cigarettes     Start date: 1963   • Smokeless tobacco: Never   • Tobacco comments:     9/8/22: Down to Less than 1 PPD   Vaping Use   • Vaping Use: Never used   Substance and Sexual Activity   • Alcohol use: Not Currently   • Drug use: Never   • Sexual activity: Defer        Family History   Problem Relation Age of Onset   • No Known Problems Mother    • Cancer Father    • Lung cancer Father    • Diabetes Brother    • Other Daughter         S/P stent, age 42   • No Known Problems Son    • Malig Hyperthermia Neg Hx         Review of Systems   ROS as per HPI    Objective     Vitals:    12/12/22 0948   BP: 114/62   Pulse: 68   Resp: 16   Temp: 97.1 °F (36.2 °C)   TempSrc: Temporal   SpO2: 99%   Weight: 69.9 kg (154 lb 1.6 oz)   Height: 177.8 cm (70\")   PainSc: 0-No pain     Current Status 12/12/2022   ECOG score 0     Physical Exam  Vitals reviewed.   Constitutional:       General: He is not in acute distress.     Appearance: Normal appearance. He is well-developed.   HENT: "      Head: Normocephalic and atraumatic.   Eyes:      Pupils: Pupils are equal, round, and reactive to light.   Cardiovascular:      Rate and Rhythm: Normal rate and regular rhythm.      Heart sounds: Normal heart sounds. No murmur heard.  Pulmonary:      Effort: Pulmonary effort is normal. No respiratory distress.      Breath sounds: Wheezing present. No rhonchi or rales.   Abdominal:      General: Bowel sounds are normal. There is no distension.      Palpations: Abdomen is soft.   Musculoskeletal:         General: Normal range of motion.      Cervical back: Normal range of motion.   Skin:     General: Skin is warm and dry.      Findings: No rash.   Neurological:      Mental Status: He is alert and oriented to person, place, and time.           RECENT LABS:  Results from last 7 days   Lab Units 12/12/22  0940   WBC 10*3/mm3 4.28   NEUTROS ABS 10*3/mm3 2.75   HEMOGLOBIN g/dL 11.8*   HEMATOCRIT % 37.1*   PLATELETS 10*3/mm3 200     Results from last 7 days   Lab Units 12/12/22  0940   SODIUM mmol/L 133*   POTASSIUM mmol/L 4.7   CHLORIDE mmol/L 99   CO2 mmol/L 26.2   BUN mg/dL 12   CREATININE mg/dL 0.88   CALCIUM mg/dL 9.8   ALBUMIN g/dL 3.80   BILIRUBIN mg/dL 0.4   ALK PHOS U/L 60   ALT (SGPT) U/L 9   AST (SGOT) U/L 13   GLUCOSE mg/dL 161*           Assessment & Plan        77 y.o. male  with medical issues including type 2 diabetes-uncertain control, COPD, hypertension, diastolic heart failure, chronic disease, peripheral vascular disease (follow-up with vascular surgery today), previous DVT on anticoagulation (home monitoring), previous IVC filter placement?,  Status post September 2021 partial small bowel obstruction secondary to sigmoid diverticulitis treated medically.    1.   T1b N1 M0-stage IIb lung cancer.  · right lung base nodule noted on scan at that point and in follow-up with primary care had developed a left inguinal hernia with repair planned through a different general surgeon then seen with his  initial sigmoid diverticulitis.  · PET scan 7/13/2022 demonstrates a hypermetabolic spiculated lesion medial aspect of the left lobe adjacent to descending thoracic aorta measuring1.5 x 1.6 SUV 9.3 with an enlarged hypermetabolic left hilar lymph node measured 1.5 x 1.3 with SUV of 12.1, additional nodules in the lateral aspect right lower lobe and micronodule in the posterior/medial right lower lobe and also additional right lower lobe micronodule.  There is an infrarenal abdominal aortic aneurysm measuring 3.4 cm with a short segment of chronic dissection present.  · Clinical findings would be consistent with a possible T1b N1 M0-stage IIb lung cancer.  · discussed in thoracic conference 7/20/2022.  · Recommendations to proceed with pulmonary assessment with EBUS.  · The patient proceeded to undergo his hernia surgery 8/2/2022 by Dr. Dotson.   · Guardant 360 that T p53 splice site mutation is present and would certainly be consistent as well the most common mutations found in lung cancers particularly squamous cancers.  · The patient was admitted 9//2022 undergoing 9/23/2022  a bronchoscopy with left video-assisted thoracoscopy with robot-assisted total decortication of the left lung, left lower lobectomy, mediastinal lymphadenectomy and intercostal nerve block with Dr. Nicola Joe.  · Fortunately he did fairly well postoperatively though did develop atrial fibrillation with RVR treated with amiodarone converting back to sinus rhythm, treated with IV metoprolol subsequent chronic anticoagulation.  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  Notably there is invasive carcinoma present at the margin. only 2 positive lymph nodes adjacent to the bronchovesicular margin which appears to have been transected difficult to enumerate with extranodal extension present.  · Options  could well be Carboplatin and Taxol considering the patient's comorbidity and worry of using cisplatin-based chemotherapy in this patient followed by atezolizumab with pathology having been notified to assess PD-L1 expression on the tumor as well also await review by Caris molecular profiling.  Finally radiation therapy consultation be requested.  ·  Caris analysis indicates benefit from immunotherapy at relatively high levels.  This would allow radiation therapy given with Carboplatin Taxol weekly (better tolerated) and then subsequent atezolizumab adjuvantly.  · Weekly carboplatin Taxol initiated 11/28/2022 given concurrently with radiation therapy  · 12/5/2022 here for cycle 2 weekly carboplatin/Taxol, overall tolerating treatment quite well so far.  · 12/12/2022: Proceed with cycle #3 weekly carboplatin/Taxol with concurrent radiation.  Continues to tolerate treatment well.    Plan:   1. Proceed today with week 3 carboplatin Taxol.  2. Continue radiation under the care of radiation oncology.  3. Return in 1 week for follow-up with MD with repeat labs and continued weekly carbo/Taxol.  4. Following the completion of concurrent chemoradiation the patient will be treated with Tecentriq adjuvantly every 3 weeks for 1 year  5. Call/ return sooner should he develop any new concerns or problems.    Patient is on a high risk medication requiring close monitoring for toxicity.    Rachel Hummel, GARCÍA  12/12/2022

## 2022-12-09 ENCOUNTER — TREATMENT (OUTPATIENT)
Dept: RADIATION ONCOLOGY | Facility: HOSPITAL | Age: 77
End: 2022-12-09

## 2022-12-09 LAB
RAD ONC ARIA COURSE ID: NORMAL
RAD ONC ARIA COURSE INTENT: NORMAL
RAD ONC ARIA COURSE LAST TREATMENT DATE: NORMAL
RAD ONC ARIA COURSE START DATE: NORMAL
RAD ONC ARIA COURSE TREATMENT ELAPSED DAYS: 11
RAD ONC ARIA FIRST TREATMENT DATE: NORMAL
RAD ONC ARIA PLAN FRACTIONS TREATED TO DATE: 10
RAD ONC ARIA PLAN ID: NORMAL
RAD ONC ARIA PLAN PRESCRIBED DOSE PER FRACTION: 2 GY
RAD ONC ARIA PLAN PRIMARY REFERENCE POINT: NORMAL
RAD ONC ARIA PLAN TOTAL FRACTIONS PRESCRIBED: 30
RAD ONC ARIA PLAN TOTAL PRESCRIBED DOSE: 6000 CGY
RAD ONC ARIA REFERENCE POINT DOSAGE GIVEN TO DATE: 20 GY
RAD ONC ARIA REFERENCE POINT DOSAGE GIVEN TO DATE: 20.79 GY
RAD ONC ARIA REFERENCE POINT ID: NORMAL
RAD ONC ARIA REFERENCE POINT ID: NORMAL
RAD ONC ARIA REFERENCE POINT SESSION DOSAGE GIVEN: 2 GY
RAD ONC ARIA REFERENCE POINT SESSION DOSAGE GIVEN: 2.08 GY

## 2022-12-09 PROCEDURE — 77386: CPT | Performed by: STUDENT IN AN ORGANIZED HEALTH CARE EDUCATION/TRAINING PROGRAM

## 2022-12-09 PROCEDURE — 77386 CHG INTENSITY MODULATED RADIATION TX DLVR COMPLEX: CPT | Performed by: STUDENT IN AN ORGANIZED HEALTH CARE EDUCATION/TRAINING PROGRAM

## 2022-12-12 ENCOUNTER — INFUSION (OUTPATIENT)
Dept: ONCOLOGY | Facility: HOSPITAL | Age: 77
End: 2022-12-12

## 2022-12-12 ENCOUNTER — RADIATION ONCOLOGY WEEKLY ASSESSMENT (OUTPATIENT)
Dept: RADIATION ONCOLOGY | Facility: HOSPITAL | Age: 77
End: 2022-12-12

## 2022-12-12 ENCOUNTER — TREATMENT (OUTPATIENT)
Dept: RADIATION ONCOLOGY | Facility: HOSPITAL | Age: 77
End: 2022-12-12

## 2022-12-12 ENCOUNTER — OFFICE VISIT (OUTPATIENT)
Dept: ONCOLOGY | Facility: CLINIC | Age: 77
End: 2022-12-12

## 2022-12-12 VITALS
SYSTOLIC BLOOD PRESSURE: 114 MMHG | BODY MASS INDEX: 22.06 KG/M2 | RESPIRATION RATE: 16 BRPM | OXYGEN SATURATION: 99 % | DIASTOLIC BLOOD PRESSURE: 62 MMHG | HEIGHT: 70 IN | HEART RATE: 68 BPM | WEIGHT: 154.1 LBS | TEMPERATURE: 97.1 F

## 2022-12-12 VITALS
BODY MASS INDEX: 22.27 KG/M2 | HEART RATE: 98 BPM | WEIGHT: 155.2 LBS | DIASTOLIC BLOOD PRESSURE: 69 MMHG | SYSTOLIC BLOOD PRESSURE: 123 MMHG | OXYGEN SATURATION: 93 %

## 2022-12-12 DIAGNOSIS — C7A.8 NEUROENDOCRINE CARCINOMA OF LUNG: Primary | ICD-10-CM

## 2022-12-12 DIAGNOSIS — C7A.8 NEUROENDOCRINE CARCINOMA OF LUNG: ICD-10-CM

## 2022-12-12 DIAGNOSIS — Z79.899 HIGH RISK MEDICATION USE: ICD-10-CM

## 2022-12-12 LAB
ALBUMIN SERPL-MCNC: 3.8 G/DL (ref 3.5–5.2)
ALBUMIN/GLOB SERPL: 1.3 G/DL (ref 1.1–2.4)
ALP SERPL-CCNC: 60 U/L (ref 38–116)
ALT SERPL W P-5'-P-CCNC: 9 U/L (ref 0–41)
ANION GAP SERPL CALCULATED.3IONS-SCNC: 7.8 MMOL/L (ref 5–15)
AST SERPL-CCNC: 13 U/L (ref 0–40)
BASOPHILS # BLD AUTO: 0.03 10*3/MM3 (ref 0–0.2)
BASOPHILS NFR BLD AUTO: 0.7 % (ref 0–1.5)
BILIRUB SERPL-MCNC: 0.4 MG/DL (ref 0.2–1.2)
BUN SERPL-MCNC: 12 MG/DL (ref 6–20)
BUN/CREAT SERPL: 13.6 (ref 7.3–30)
CALCIUM SPEC-SCNC: 9.8 MG/DL (ref 8.5–10.2)
CHLORIDE SERPL-SCNC: 99 MMOL/L (ref 98–107)
CO2 SERPL-SCNC: 26.2 MMOL/L (ref 22–29)
CREAT SERPL-MCNC: 0.88 MG/DL (ref 0.7–1.3)
DEPRECATED RDW RBC AUTO: 51.5 FL (ref 37–54)
EGFRCR SERPLBLD CKD-EPI 2021: 89.7 ML/MIN/1.73
EOSINOPHIL # BLD AUTO: 0.07 10*3/MM3 (ref 0–0.4)
EOSINOPHIL NFR BLD AUTO: 1.6 % (ref 0.3–6.2)
ERYTHROCYTE [DISTWIDTH] IN BLOOD BY AUTOMATED COUNT: 15 % (ref 12.3–15.4)
GLOBULIN UR ELPH-MCNC: 3 GM/DL (ref 1.8–3.5)
GLUCOSE SERPL-MCNC: 161 MG/DL (ref 74–124)
HCT VFR BLD AUTO: 37.1 % (ref 37.5–51)
HGB BLD-MCNC: 11.8 G/DL (ref 13–17.7)
IMM GRANULOCYTES # BLD AUTO: 0.02 10*3/MM3 (ref 0–0.05)
IMM GRANULOCYTES NFR BLD AUTO: 0.5 % (ref 0–0.5)
LYMPHOCYTES # BLD AUTO: 1.11 10*3/MM3 (ref 0.7–3.1)
LYMPHOCYTES NFR BLD AUTO: 25.9 % (ref 19.6–45.3)
MCH RBC QN AUTO: 30.2 PG (ref 26.6–33)
MCHC RBC AUTO-ENTMCNC: 31.8 G/DL (ref 31.5–35.7)
MCV RBC AUTO: 94.9 FL (ref 79–97)
MONOCYTES # BLD AUTO: 0.3 10*3/MM3 (ref 0.1–0.9)
MONOCYTES NFR BLD AUTO: 7 % (ref 5–12)
NEUTROPHILS NFR BLD AUTO: 2.75 10*3/MM3 (ref 1.7–7)
NEUTROPHILS NFR BLD AUTO: 64.3 % (ref 42.7–76)
NRBC BLD AUTO-RTO: 0 /100 WBC (ref 0–0.2)
PLATELET # BLD AUTO: 200 10*3/MM3 (ref 140–450)
PMV BLD AUTO: 8.7 FL (ref 6–12)
POTASSIUM SERPL-SCNC: 4.7 MMOL/L (ref 3.5–4.7)
PROT SERPL-MCNC: 6.8 G/DL (ref 6.3–8)
RAD ONC ARIA COURSE ID: NORMAL
RAD ONC ARIA COURSE INTENT: NORMAL
RAD ONC ARIA COURSE LAST TREATMENT DATE: NORMAL
RAD ONC ARIA COURSE START DATE: NORMAL
RAD ONC ARIA COURSE TREATMENT ELAPSED DAYS: 14
RAD ONC ARIA FIRST TREATMENT DATE: NORMAL
RAD ONC ARIA PLAN FRACTIONS TREATED TO DATE: 11
RAD ONC ARIA PLAN ID: NORMAL
RAD ONC ARIA PLAN PRESCRIBED DOSE PER FRACTION: 2 GY
RAD ONC ARIA PLAN PRIMARY REFERENCE POINT: NORMAL
RAD ONC ARIA PLAN TOTAL FRACTIONS PRESCRIBED: 30
RAD ONC ARIA PLAN TOTAL PRESCRIBED DOSE: 6000 CGY
RAD ONC ARIA REFERENCE POINT DOSAGE GIVEN TO DATE: 22 GY
RAD ONC ARIA REFERENCE POINT DOSAGE GIVEN TO DATE: 22.87 GY
RAD ONC ARIA REFERENCE POINT ID: NORMAL
RAD ONC ARIA REFERENCE POINT ID: NORMAL
RAD ONC ARIA REFERENCE POINT SESSION DOSAGE GIVEN: 2 GY
RAD ONC ARIA REFERENCE POINT SESSION DOSAGE GIVEN: 2.08 GY
RBC # BLD AUTO: 3.91 10*6/MM3 (ref 4.14–5.8)
SODIUM SERPL-SCNC: 133 MMOL/L (ref 134–145)
WBC NRBC COR # BLD: 4.28 10*3/MM3 (ref 3.4–10.8)

## 2022-12-12 PROCEDURE — 80053 COMPREHEN METABOLIC PANEL: CPT

## 2022-12-12 PROCEDURE — 99214 OFFICE O/P EST MOD 30 MIN: CPT | Performed by: NURSE PRACTITIONER

## 2022-12-12 PROCEDURE — 77386 CHG INTENSITY MODULATED RADIATION TX DLVR COMPLEX: CPT | Performed by: STUDENT IN AN ORGANIZED HEALTH CARE EDUCATION/TRAINING PROGRAM

## 2022-12-12 PROCEDURE — 77386: CPT | Performed by: STUDENT IN AN ORGANIZED HEALTH CARE EDUCATION/TRAINING PROGRAM

## 2022-12-12 PROCEDURE — 25010000002 PALONOSETRON PER 25 MCG: Performed by: NURSE PRACTITIONER

## 2022-12-12 PROCEDURE — 77427 RADIATION TX MANAGEMENT X5: CPT | Performed by: STUDENT IN AN ORGANIZED HEALTH CARE EDUCATION/TRAINING PROGRAM

## 2022-12-12 PROCEDURE — 96413 CHEMO IV INFUSION 1 HR: CPT

## 2022-12-12 PROCEDURE — 25010000002 PACLITAXEL PER 1 MG: Performed by: NURSE PRACTITIONER

## 2022-12-12 PROCEDURE — 25010000002 CARBOPLATIN PER 50 MG: Performed by: NURSE PRACTITIONER

## 2022-12-12 PROCEDURE — 96417 CHEMO IV INFUS EACH ADDL SEQ: CPT

## 2022-12-12 PROCEDURE — 25010000002 DIPHENHYDRAMINE PER 50 MG: Performed by: NURSE PRACTITIONER

## 2022-12-12 PROCEDURE — 25010000002 DEXAMETHASONE SODIUM PHOSPHATE 100 MG/10ML SOLUTION: Performed by: NURSE PRACTITIONER

## 2022-12-12 PROCEDURE — 96375 TX/PRO/DX INJ NEW DRUG ADDON: CPT

## 2022-12-12 PROCEDURE — 85025 COMPLETE CBC W/AUTO DIFF WBC: CPT

## 2022-12-12 RX ORDER — FAMOTIDINE 10 MG/ML
20 INJECTION, SOLUTION INTRAVENOUS ONCE
Status: COMPLETED | OUTPATIENT
Start: 2022-12-12 | End: 2022-12-12

## 2022-12-12 RX ORDER — FAMOTIDINE 10 MG/ML
20 INJECTION, SOLUTION INTRAVENOUS ONCE
Status: CANCELLED | OUTPATIENT
Start: 2022-12-12

## 2022-12-12 RX ORDER — PALONOSETRON 0.05 MG/ML
0.25 INJECTION, SOLUTION INTRAVENOUS ONCE
Status: CANCELLED | OUTPATIENT
Start: 2022-12-12

## 2022-12-12 RX ORDER — PALONOSETRON 0.05 MG/ML
0.25 INJECTION, SOLUTION INTRAVENOUS ONCE
Status: COMPLETED | OUTPATIENT
Start: 2022-12-12 | End: 2022-12-12

## 2022-12-12 RX ORDER — SODIUM CHLORIDE 9 MG/ML
250 INJECTION, SOLUTION INTRAVENOUS ONCE
Status: COMPLETED | OUTPATIENT
Start: 2022-12-12 | End: 2022-12-12

## 2022-12-12 RX ORDER — SODIUM CHLORIDE 9 MG/ML
250 INJECTION, SOLUTION INTRAVENOUS ONCE
Status: CANCELLED | OUTPATIENT
Start: 2022-12-12

## 2022-12-12 RX ORDER — DIPHENHYDRAMINE HYDROCHLORIDE 50 MG/ML
50 INJECTION INTRAMUSCULAR; INTRAVENOUS AS NEEDED
Status: CANCELLED | OUTPATIENT
Start: 2022-12-12

## 2022-12-12 RX ORDER — FAMOTIDINE 10 MG/ML
20 INJECTION, SOLUTION INTRAVENOUS AS NEEDED
Status: CANCELLED | OUTPATIENT
Start: 2022-12-12

## 2022-12-12 RX ADMIN — FAMOTIDINE 20 MG: 10 INJECTION INTRAVENOUS at 10:46

## 2022-12-12 RX ADMIN — PACLITAXEL 95 MG: 6 INJECTION, SOLUTION INTRAVENOUS at 11:18

## 2022-12-12 RX ADMIN — CARBOPLATIN 190 MG: 10 INJECTION INTRAVENOUS at 12:20

## 2022-12-12 RX ADMIN — DEXAMETHASONE SODIUM PHOSPHATE 12 MG: 10 INJECTION, SOLUTION INTRAMUSCULAR; INTRAVENOUS at 10:15

## 2022-12-12 RX ADMIN — SODIUM CHLORIDE 250 ML: 9 INJECTION, SOLUTION INTRAVENOUS at 10:15

## 2022-12-12 RX ADMIN — DIPHENHYDRAMINE HYDROCHLORIDE 25 MG: 50 INJECTION, SOLUTION INTRAMUSCULAR; INTRAVENOUS at 10:32

## 2022-12-12 RX ADMIN — PALONOSETRON 0.25 MG: 0.05 INJECTION, SOLUTION INTRAVENOUS at 10:46

## 2022-12-12 NOTE — PROGRESS NOTES
Radiation Oncology  On-Treatment Note      Patient: Giovani España    MRN: [id number]    Attending Physician: Kaz Marrero MD     Diagnosis:     ICD-10-CM ICD-9-CM   1. Neuroendocrine carcinoma of lung (HCC)  C7A.8 209.21       Radiation Therapy Visit:  Continue radiation therapy, Dosimetry plan remains acceptable, Films reviewed and remains acceptable, Pain assessed, Pain management planned, Radiation dose schedule reviewed and remains acceptable, Radiation technique remains acceptable and Symptoms within expected range    Radiation Treatments     Active   Plans   Lt Lung   Most recent treatment: Dose planned: 200 cGy (fraction 11 on 12/12/2022)   Total: Dose planned: 6,000 cGy (30 fractions)   Elapsed Days: 14      Reference Points   Lt Lung Calc   Most recent treatment: Dose given: 208 cGy (on 12/12/2022)   Total: Dose given: 2,287 cGy   Elapsed Days: 14      RX L Lung   Most recent treatment: Dose given: 200 cGy (on 12/12/2022)   Total: Dose given: 2,200 cGy   Elapsed Days: 14                    Physical Examination:  Vitals: Blood pressure 123/69, pulse 98, weight 70.4 kg (155 lb 3.2 oz), SpO2 93 %.  Vitals:    12/12/22 1353   BP: 123/69   Pulse: 98   SpO2: 93%   Weight: 70.4 kg (155 lb 3.2 oz)     Pain Score    12/12/22 1353   PainSc: 0-No pain       Restricted in physically strenuous activity but ambulatory and able to carry out work of a light or sedentary nature, e.g., light house work, office work = 1    We examined the relevant areas: yes  Findings are within the expected range for this stage of treatment: yes  -------------------------------------------------------------------------------------------------------------------    ACTION ITEMS:  Patient tolerating treatment well and as expected for this stage in their treatment and Continue radiation therapy as planned    Estimated Completion Date: 1/9/2023      Kaz Marrero MD  Radiation Oncology

## 2022-12-13 ENCOUNTER — TREATMENT (OUTPATIENT)
Dept: RADIATION ONCOLOGY | Facility: HOSPITAL | Age: 77
End: 2022-12-13

## 2022-12-13 LAB
RAD ONC ARIA COURSE ID: NORMAL
RAD ONC ARIA COURSE INTENT: NORMAL
RAD ONC ARIA COURSE LAST TREATMENT DATE: NORMAL
RAD ONC ARIA COURSE START DATE: NORMAL
RAD ONC ARIA COURSE TREATMENT ELAPSED DAYS: 15
RAD ONC ARIA FIRST TREATMENT DATE: NORMAL
RAD ONC ARIA PLAN FRACTIONS TREATED TO DATE: 12
RAD ONC ARIA PLAN ID: NORMAL
RAD ONC ARIA PLAN PRESCRIBED DOSE PER FRACTION: 2 GY
RAD ONC ARIA PLAN PRIMARY REFERENCE POINT: NORMAL
RAD ONC ARIA PLAN TOTAL FRACTIONS PRESCRIBED: 30
RAD ONC ARIA PLAN TOTAL PRESCRIBED DOSE: 6000 CGY
RAD ONC ARIA REFERENCE POINT DOSAGE GIVEN TO DATE: 24 GY
RAD ONC ARIA REFERENCE POINT DOSAGE GIVEN TO DATE: 24.94 GY
RAD ONC ARIA REFERENCE POINT ID: NORMAL
RAD ONC ARIA REFERENCE POINT ID: NORMAL
RAD ONC ARIA REFERENCE POINT SESSION DOSAGE GIVEN: 2 GY
RAD ONC ARIA REFERENCE POINT SESSION DOSAGE GIVEN: 2.08 GY

## 2022-12-13 PROCEDURE — 77386: CPT | Performed by: STUDENT IN AN ORGANIZED HEALTH CARE EDUCATION/TRAINING PROGRAM

## 2022-12-13 PROCEDURE — 77386 CHG INTENSITY MODULATED RADIATION TX DLVR COMPLEX: CPT | Performed by: STUDENT IN AN ORGANIZED HEALTH CARE EDUCATION/TRAINING PROGRAM

## 2022-12-14 ENCOUNTER — TREATMENT (OUTPATIENT)
Dept: RADIATION ONCOLOGY | Facility: HOSPITAL | Age: 77
End: 2022-12-14

## 2022-12-14 LAB
RAD ONC ARIA COURSE ID: NORMAL
RAD ONC ARIA COURSE INTENT: NORMAL
RAD ONC ARIA COURSE LAST TREATMENT DATE: NORMAL
RAD ONC ARIA COURSE START DATE: NORMAL
RAD ONC ARIA COURSE TREATMENT ELAPSED DAYS: 16
RAD ONC ARIA FIRST TREATMENT DATE: NORMAL
RAD ONC ARIA PLAN FRACTIONS TREATED TO DATE: 13
RAD ONC ARIA PLAN ID: NORMAL
RAD ONC ARIA PLAN PRESCRIBED DOSE PER FRACTION: 2 GY
RAD ONC ARIA PLAN PRIMARY REFERENCE POINT: NORMAL
RAD ONC ARIA PLAN TOTAL FRACTIONS PRESCRIBED: 30
RAD ONC ARIA PLAN TOTAL PRESCRIBED DOSE: 6000 CGY
RAD ONC ARIA REFERENCE POINT DOSAGE GIVEN TO DATE: 26 GY
RAD ONC ARIA REFERENCE POINT DOSAGE GIVEN TO DATE: 27.02 GY
RAD ONC ARIA REFERENCE POINT ID: NORMAL
RAD ONC ARIA REFERENCE POINT ID: NORMAL
RAD ONC ARIA REFERENCE POINT SESSION DOSAGE GIVEN: 2 GY
RAD ONC ARIA REFERENCE POINT SESSION DOSAGE GIVEN: 2.08 GY

## 2022-12-14 PROCEDURE — 77386: CPT | Performed by: STUDENT IN AN ORGANIZED HEALTH CARE EDUCATION/TRAINING PROGRAM

## 2022-12-14 PROCEDURE — 77336 RADIATION PHYSICS CONSULT: CPT | Performed by: STUDENT IN AN ORGANIZED HEALTH CARE EDUCATION/TRAINING PROGRAM

## 2022-12-14 PROCEDURE — 77386 CHG INTENSITY MODULATED RADIATION TX DLVR COMPLEX: CPT | Performed by: STUDENT IN AN ORGANIZED HEALTH CARE EDUCATION/TRAINING PROGRAM

## 2022-12-15 ENCOUNTER — TREATMENT (OUTPATIENT)
Dept: RADIATION ONCOLOGY | Facility: HOSPITAL | Age: 77
End: 2022-12-15

## 2022-12-15 LAB
RAD ONC ARIA COURSE ID: NORMAL
RAD ONC ARIA COURSE INTENT: NORMAL
RAD ONC ARIA COURSE LAST TREATMENT DATE: NORMAL
RAD ONC ARIA COURSE START DATE: NORMAL
RAD ONC ARIA COURSE TREATMENT ELAPSED DAYS: 17
RAD ONC ARIA FIRST TREATMENT DATE: NORMAL
RAD ONC ARIA PLAN FRACTIONS TREATED TO DATE: 14
RAD ONC ARIA PLAN ID: NORMAL
RAD ONC ARIA PLAN PRESCRIBED DOSE PER FRACTION: 2 GY
RAD ONC ARIA PLAN PRIMARY REFERENCE POINT: NORMAL
RAD ONC ARIA PLAN TOTAL FRACTIONS PRESCRIBED: 30
RAD ONC ARIA PLAN TOTAL PRESCRIBED DOSE: 6000 CGY
RAD ONC ARIA REFERENCE POINT DOSAGE GIVEN TO DATE: 28 GY
RAD ONC ARIA REFERENCE POINT DOSAGE GIVEN TO DATE: 29.1 GY
RAD ONC ARIA REFERENCE POINT ID: NORMAL
RAD ONC ARIA REFERENCE POINT ID: NORMAL
RAD ONC ARIA REFERENCE POINT SESSION DOSAGE GIVEN: 2 GY
RAD ONC ARIA REFERENCE POINT SESSION DOSAGE GIVEN: 2.08 GY

## 2022-12-15 PROCEDURE — 77386 CHG INTENSITY MODULATED RADIATION TX DLVR COMPLEX: CPT | Performed by: STUDENT IN AN ORGANIZED HEALTH CARE EDUCATION/TRAINING PROGRAM

## 2022-12-15 PROCEDURE — 77386: CPT | Performed by: STUDENT IN AN ORGANIZED HEALTH CARE EDUCATION/TRAINING PROGRAM

## 2022-12-16 ENCOUNTER — TREATMENT (OUTPATIENT)
Dept: RADIATION ONCOLOGY | Facility: HOSPITAL | Age: 77
End: 2022-12-16

## 2022-12-16 LAB
RAD ONC ARIA COURSE ID: NORMAL
RAD ONC ARIA COURSE INTENT: NORMAL
RAD ONC ARIA COURSE LAST TREATMENT DATE: NORMAL
RAD ONC ARIA COURSE START DATE: NORMAL
RAD ONC ARIA COURSE TREATMENT ELAPSED DAYS: 18
RAD ONC ARIA FIRST TREATMENT DATE: NORMAL
RAD ONC ARIA PLAN FRACTIONS TREATED TO DATE: 15
RAD ONC ARIA PLAN ID: NORMAL
RAD ONC ARIA PLAN PRESCRIBED DOSE PER FRACTION: 2 GY
RAD ONC ARIA PLAN PRIMARY REFERENCE POINT: NORMAL
RAD ONC ARIA PLAN TOTAL FRACTIONS PRESCRIBED: 30
RAD ONC ARIA PLAN TOTAL PRESCRIBED DOSE: 6000 CGY
RAD ONC ARIA REFERENCE POINT DOSAGE GIVEN TO DATE: 30 GY
RAD ONC ARIA REFERENCE POINT DOSAGE GIVEN TO DATE: 31.18 GY
RAD ONC ARIA REFERENCE POINT ID: NORMAL
RAD ONC ARIA REFERENCE POINT ID: NORMAL
RAD ONC ARIA REFERENCE POINT SESSION DOSAGE GIVEN: 2 GY
RAD ONC ARIA REFERENCE POINT SESSION DOSAGE GIVEN: 2.08 GY

## 2022-12-16 PROCEDURE — 77386 CHG INTENSITY MODULATED RADIATION TX DLVR COMPLEX: CPT | Performed by: STUDENT IN AN ORGANIZED HEALTH CARE EDUCATION/TRAINING PROGRAM

## 2022-12-16 PROCEDURE — 77386: CPT | Performed by: STUDENT IN AN ORGANIZED HEALTH CARE EDUCATION/TRAINING PROGRAM

## 2022-12-17 NOTE — PROGRESS NOTES
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  Notably there is invasive carcinoma present at the margin.  Is a, and only 2 positive lymph nodes adjacent to the bronchovesicular margin which appears to have been transected difficult to enumerate with extranodal extension present.      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  He had been assessed particularly in the fall 2021 when he presented with nausea and vomiting and abdominal discomfort leading to assessments that revealed sigmoid diverticulitis with contained rupture and partial small bowel obstruction.  Records from mid September 21 indicating that he was admitted with partial small bowel obstruction and treated medically with very slow recovery.  He has history of COPD with CT demonstrating a pulmonary nodule in the right lung base at 7 mm with follow-up planned.  He was seen by general surgery in later September with repeat scans planned and repeat CT abdomen 10/7/2021 did demonstrate interval improvement of sigmoid diverticulitis.      Record indicates an assessment in late June 2022 at different general surgeon for left inguinal hernia, history of heavy tobacco use with COPD and recommendation for surgical repair of his hernia      The patient underwent a CT scan of the chest 5/7/2022 demonstrating diffuse emphysematous changes, ill-defined density measuring 3 mm in the superior segment of the right lower lobe, multiple ill-defined 3 to 4 mm nodular density thought to be inflammatory involving the  right lower lobe and a new spiculated nodule involving the left lower lobe measuring 16 x 14 mm as well as left hilar adenopathy.       Follow-up PET/CT 7/13/2022 reveal a hypermetabolic spiculated lesion medial aspect the left lower lobe adjacent to descending thoracic aorta measuring 1.5 x 1.6 SUV 9.3 with an enlarged hypermetabolic left hilar lymph node measured 1.5 x 1.3 with SUV of 12.1, additional nodules in the lateral aspect right lower lobe and micronodule in the posterior/medial right lower lobe and also additional right lower lobe micronodule.  There is an infrarenal abdominal aortic aneurysm measuring 3.4 cm with a short segment of chronic dissection present.    These clinical findings would be consistent with a possible T1b N1 M0-stage IIb lung cancer.      Recognizing a diagnosis has not been made his case was discussed in thoracic conference 7/20/2022.  Recommendations include proceeding to pulmonary assessment with EBUS and the patient undergoing a general assessment per his clinical status-older adult assessment as well as assessment by thoracic surgery.  These issues are discussed with the patient and his wife in detail when they are seen 7/28/2022.    The patient proceeded to undergo his hernia surgery 8/2/2022 by Dr. Dotson.  Additionally the patient was unable to undergo assessment by pulmonary until October and, thereafter, was scheduled to see Dr. Joe 8/18/2022 undergoing older adult functional assessment with a JAGUAR score of 11 indicating a risk of functional decline related to cancer therapy.    The patient is seen with his wife 8/15/2022 and doing very well with recent hernia surgery.  His performance status is reasonably good at present and we have learned now that he would not be able to see pulmonary medicine until October, thoracic surgery is scheduled this week with Dr. Joe.  Perhaps he could be a surgical candidate.  Particularly we know by [employer] that T p53 splice site  mutation is present and would certainly be consistent as well the most common mutations found in lung cancers particularly squamous cancers.  We have discussed obtaining pulmonary functions at this point, thoracic surgery assessment this week and also restarting Chantix as possible for the patient.    There was difficulty obtaining Chantix and while this was being assessed the patient was reviewed 8/18 by thoracic surgery.  He was felt to have a T1b N1 M0 stage IIb carcinoma left lower lobe along and not a candidate for biopsy.  PFTs were borderline, functional assessment was reasonably good and the patient was felt to be a candidate for robot-assisted biopsy of his nodes and if malignant proceed to left lower lobe lobectomy.  He was reviewed 9/8 and offered 21 mg nicotine transdermal patching.    He is next seen 9/10/2022.  He is seen with his wife and both are prepared for surgery 9/23/2022.  We discussed that additional therapy may be considered once we have more information about the type and stage.  We would hope to see him postoperatively.    The patient was admitted 9//2022 undergoing 9/23/2022  a bronchoscopy with left video-assisted thoracoscopy with robot-assisted total decortication of the left lung, left lower lobectomy, mediastinal lymphadenectomy and intercostal nerve block with Dr. Nicola Joe.  Fortunately he did fairly well postoperatively though did develop atrial fibrillation with RVR treated with amiodarone converting back to sinus rhythm, treated with IV metoprolol subsequent chronic anticoagulation.  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  Notably there is invasive carcinoma present at the margin.  Is a, and only 2 positive lymph nodes adjacent to the bronchovesicular margin which appears to have been transected difficult to enumerate with extranodal  extension present.    The patient is seen 10/27/2022.  He is recovering well from surgery, albeit slowly, and we have discussed his findings that are concerning as to residual disease with a positive margin described as above.  There is also the significance potentially of PD-L1 expression which has been described as producing a poor survival.  Nivolumab has been used as second line therapy to positive effect in the disease and this begs the question as to whether adjuvant therapy plus immunotherapy could be utilized though the patient would be difficult to treat with platinum based chemotherapy as well.    The patient is next assessed 11/7/2022 for significant assessments by supportive oncology at [facility] as well as with plans to see Dr. Marrero 11/9/2022.  Unfortunately he has been very slow to gradually recover from the effects of surgery with reduced appetite and only fair performance status.  At present it would be difficult to give him cisplatin based chemotherapy and we discussed whether we might be able to use Tecentriq (atezolizumab) as his primary adjuvant therapy.  We have contacted pathology about completing Caris testing and a PD-L1 analysis that has not yet completed.      The patient is seen, however, 11/16/2022 and his genomic analysis has returned as being significant for broad sensitivity to immunotherapy.  This would argue for the administration of weekly Carboplatin/Taxol as the patient proceeds to radiation therapy and upon completion, then using Tecentriq as further adjuvant therapy over a year.  This is discussed with the patient and his  in detail as well as discussed with Dr. Marrero of radiation therapy.  We have also discussed the patient require port placement.    The patient proceeded to treatment taking weekly carboplatin Taxol with concurrent radiation therapy last seen 12/12/2022 with third weekly treatment.    He is next seen 12/19/2022 with H&H 11.9 and 38.5, white count 3460 and  platelet count of 168,000.  He is feeling well without any significant complications of therapy except for right calf dermatitis that is been developing in the last several days.  Past Medical History:   Diagnosis Date   • Arthritis    • COPD (chronic obstructive pulmonary disease) ([facility])     O2 3L AT HS   • Diabetes mellitus ([facility])     DIET CONTROL   • Diverticulitis    • DVT, lower extremity (HCC)     RLE   • Elevated cholesterol    • H/O Cervical pain    • History of colitis    • Hyperlipidemia    • Hypertension    • Left shoulder pain    • Low back pain    • Lung cancer (HCC) 2022    LEFT LUNG   • On anticoagulant therapy    • Peripheral arterial disease (HCC)    • Right leg claudication ([facility])    • Skin cancer     HX        Past Surgical History:   Procedure Laterality Date   • BALLOON ANGIOPLASTY, ARTERY Right 2015    IR Percutaneious IVC filter placement   • INGUINAL HERNIA REPAIR Left    • KNEE ARTHROSCOPY Left     Torn meniscus   • LOBECTOMY Left 9/23/2022    Procedure: BRONCHOSCOPY, LEFT VIDEO ASSISTED THORACOSCOPY, ROBOT ASSISTED TOTAL DECORTICATION  LEFT LUNG, LEFT LOWER LOBE LOBECTOMY, MEDIASTINAL LYMPHECTOMY, INTERCOSTAL NERVE BLOCK;  Surgeon: Nicola Joe III, MD;  Location: [facility];  Service: Robotics - [facility];  Laterality: Left;   • VENOUS ACCESS DEVICE (PORT) INSERTION Right 11/21/2022    Procedure: INSERTION VENOUS ACCESS DEVICE;  Surgeon: Nicola Joe III, MD;  Location: [facility];  Service: Thoracic;  Laterality: Right;        Current Outpatient Medications on File Prior to Visit   Medication Sig Dispense Refill   • arformoterol (BROVANA) 15 MCG/2ML nebulizer solution Take 15 mcg by nebulization 2 (Two) Times a Day.     • budesonide (PULMICORT) 0.5 MG/2ML nebulizer solution Take 0.5 mg by nebulization 2 (Two) Times a Day.     • cetirizine (zyrTEC) 10 MG tablet Take 10 mg by mouth Daily.     • isosorbide mononitrate (IMDUR) 60 MG 24 hr tablet Take 60 mg by mouth Every Evening.      • metoprolol succinate XL (TOPROL-XL) 25 MG 24 hr tablet Take 1 tablet by mouth Daily for 30 days. (Patient taking differently: Take 25 mg by mouth Every Evening.) 30 tablet 0   • ondansetron (Zofran) 8 MG tablet Take 1 tablet by mouth Every 8 (Eight) Hours As Needed for Nausea or Vomiting. 30 tablet 1   • simvastatin (ZOCOR) 20 MG tablet Take 20 mg by mouth Every Night.     • tamsulosin (FLOMAX) 0.4 MG capsule 24 hr capsule Take 1 capsule by mouth Daily. 30 capsule 5   • warfarin (COUMADIN) 5 MG tablet Take 1 tablet by mouth Daily. Take 10mg on 9/29/22 and then resume regular home schedule.       No current facility-administered medications on file prior to visit.        ALLERGIES:  No Known Allergies     Social History     Socioeconomic History   • Marital status:    • Years of education: 1 year college   Tobacco Use   • Smoking status: Every Day     Packs/day: 1.00     Years: 59.00     Pack years: 59.00     Types: Cigarettes     Start date: 1963   • Smokeless tobacco: Never   • Tobacco comments:     9/8/22: Down to Less than 1 PPD   Vaping Use   • Vaping Use: Never used   Substance and Sexual Activity   • Alcohol use: Not Currently   • Drug use: Never   • Sexual activity: Defer        Family History   Problem Relation Age of Onset   • No Known Problems Mother    • Cancer Father    • Lung cancer Father    • Diabetes Brother    • Other Daughter         S/P stent, age 42   • No Known Problems Son    • Malig Hyperthermia Neg Hx         Review of Systems   Constitutional: Positive for activity change, fatigue and unexpected weight change (Status post his diverticulitis episode, weight has not been regained).   HENT: Negative.    Eyes: Negative.    Respiratory: Positive for shortness of breath. Negative for cough.    Cardiovascular: Negative.    Gastrointestinal: Negative.    Genitourinary: Negative.    Musculoskeletal: Negative.    Skin: Positive for rash (Involving right calf).   Neurological: Negative.     Psychiatric/Behavioral: Negative.         Objective     There were no vitals filed for this visit.  Current Status 12/12/2022   ECOG score 0       Physical Exam  Constitutional:       Appearance: Normal appearance.   HENT:      Head: Normocephalic and atraumatic.      Nose: Nose normal.      Mouth/Throat:      Mouth: Mucous membranes are moist.      Pharynx: Oropharynx is clear.   Eyes:      Extraocular Movements: Extraocular movements intact.      Conjunctiva/sclera: Conjunctivae normal.      Pupils: Pupils are equal, round, and reactive to light.   Cardiovascular:      Rate and Rhythm: Normal rate and regular rhythm.      Pulses: Normal pulses.      Heart sounds: Normal heart sounds.   Pulmonary:      Comments: Prolonged expiratory phase bilaterally  Abdominal:      General: Bowel sounds are normal.      Palpations: Abdomen is soft.      Comments: Scaphoid   Musculoskeletal:         General: Normal range of motion.      Cervical back: Normal range of motion and neck supple.   Skin:     General: Skin is warm and dry.      Findings: Rash (Right calf, macular erythematous rash) present.   Neurological:      General: No focal deficit present.      Mental Status: He is alert and oriented to person, place, and time.   Psychiatric:         Mood and Affect: Mood normal.           RECENT LABS:  Hematology WBC   Date Value Ref Range Status   12/12/2022 4.28 3.40 - 10.80 10*3/mm3 Final   05/09/2022 7.54 4.5 - 11.0 10*3/uL Final     RBC   Date Value Ref Range Status   12/12/2022 3.91 (L) 4.14 - 5.80 10*6/mm3 Final   05/09/2022 5.52 4.5 - 5.9 10*6/uL Final     Hemoglobin   Date Value Ref Range Status   12/12/2022 11.8 (L) 13.0 - 17.7 g/dL Final   05/09/2022 16.4 13.5 - 17.5 g/dL Final     Hematocrit   Date Value Ref Range Status   12/12/2022 37.1 (L) 37.5 - 51.0 % Final   05/09/2022 51.0 41.0 - 53.0 % Final     Platelets   Date Value Ref Range Status   12/12/2022 200 140 - 450 10*3/mm3 Final   05/09/2022 204 140 - 440  10*3/uL Final          Assessment & Plan           77-year-old male with medical issues including type 2 diabetes-uncertain control, COPD, hypertension, diastolic heart failure, chronic disease, peripheral vascular disease (follow-up with vascular surgery today), previous DVT on anticoagulation (home monitoring), previous IVC filter placement?,  Status post September 2021 partial small bowel obstruction secondary to sigmoid diverticulitis treated medically.        He had had a right lung base nodule noted on scan at that point and in follow-up with primary care had developed a left inguinal hernia with repair planned through a different general surgeon then seen with his initial sigmoid diverticulitis.  An additional CT scan of the chest done in follow-up of his previously abnormal study now revealed not only the previously noted right lower lobe and additional nodules but a spiculated nodule in the left lower lobe measuring 16 x 14 mm.                                 Follow-up PET/CT demonstrated a hypermetabolic spiculated lesion medial aspect the left lower lobe adjacent to descending thoracic aorta measuring 1.5 x 1.6 SUV 9.3 with an enlarged hypermetabolic left hilar lymph node measured 1.5 x 1.3 with SUV of 12.1, additional nodules in the lateral aspect right lower lobe and micronodule in the posterior/medial right lower lobe and also additional right lower lobe micronodule.  There is an infrarenal abdominal aortic aneurysm measuring 3.4 cm with a short segment of chronic dissection present.    These clinical findings would be consistent with a possible T1b N1 M0-stage IIb lung cancer.   Recognizing a diagnosis has not been made his case was discussed in thoracic conference 7/20/2022.  Recommendations include proceeding to pulmonary assessment with EBUS and the patient undergoing a general assessment per his clinical status-older adult assessment as well as assessment by thoracic surgery.  These issues are  discussed with the patient and his wife in detail when they are seen 7/28/2022.    The patient proceeded to undergo his hernia surgery 8/2/2022 by Dr. Dotson.  Additionally the patient was unable to undergo assessment by pulmonary until October and, thereafter, was scheduled to see Dr. Joe 8/18/2022 undergoing older adult functional assessment with a JAGUAR score of 11 indicating a risk of functional decline related to cancer therapy.    The patient is seen with his wife 8/15/2022 and doing very well with recent hernia surgery.  His performance status is reasonably good at present and we have learned now that he would not be able to see pulmonary medicine until October, thoracic surgery is scheduled this week with Dr. Joe.  Perhaps he could be a surgical candidate.  Particularly we know by [employer] that T p53 splice site mutation is present and would certainly be consistent as well the most common mutations found in lung cancers particularly squamous cancers.  We have discussed obtaining pulmonary functions at this point, thoracic surgery assessment this week and also restarting Chantix as possible for the patient.    There was difficulty obtaining Chantix and while this was being assessed the patient was reviewed 8/18 by thoracic surgery.  He was felt to have a T1b N1 M0 stage IIb carcinoma left lower lobe along and not a candidate for biopsy.  PFTs were borderline, functional assessment was reasonably good and the patient was felt to be a candidate for robot-assisted biopsy of his nodes and if malignant proceed to left lower lobe lobectomy.  He was reviewed 9/8 and offered 21 mg nicotine transdermal patching.    He is next seen 9/10/2022.  He is seen with his wife and both are prepared for surgery 9/23/2022.  We discussed that additional therapy may be considered once we have more information about the type and stage.  We would hope to see him postoperatively.    The patient was admitted 9//2022  undergoing 9/23/2022  a bronchoscopy with left video-assisted thoracoscopy with robot-assisted total decortication of the left lung, left lower lobectomy, mediastinal lymphadenectomy and intercostal nerve block with Dr. Nicola Joe.  Fortunately he did fairly well postoperatively though did develop atrial fibrillation with RVR treated with amiodarone converting back to sinus rhythm, treated with IV metoprolol subsequent chronic anticoagulation.  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  Notably there is invasive carcinoma present at the margin.  Is a, and only 2 positive lymph nodes adjacent to the bronchovesicular margin which appears to have been transected difficult to enumerate with extranodal extension present.       The patient is seen 10/27/2022.  He is recovering well from surgery, albeit slowly, and we have discussed his findings that are concerning as to residual disease with a positive margin described as above.  There is also the significance potentially of PD-L1 expression which has been described as producing a poor survival.  Nivolumab has been used as second line therapy to positive effect in the disease and this begs the question as to whether adjuvant therapy plus immunotherapy could be utilized though the patient would be difficult to treat with platinum based chemotherapy as well.       Options could well be Carboplatin and Taxol considering the patient's comorbidity and worry of using cisplatin-based chemotherapy in this patient followed by atezolizumab with pathology having been notified to assess PD-L1 expression on the tumor as well also await review by [facility] molecular profiling.  Finally radiation therapy consultation be requested.    The patient is next assessed 11/7/2022 for significant assessments by supportive oncology at [facility] as well as with plans to see  Dr. Marrero 11/9/2022.  Unfortunately he has been very slow to gradually recover from the effects of surgery with reduced appetite and only fair performance status.  At present it would be difficult to give him cisplatin based chemotherapy and we discussed whether we might be able to use Tecentriq (atezolizumab) as his primary adjuvant therapy.  We have contacted pathology about completing Caris testing and a PD-L1 analysis that has not yet completed.    The patient was reviewed by radiation therapy seen 11/9/2022 and radiation therapy offered.  We asked the patient to return back for follow-up and when seen 11/15/2022 we discussed that the patient's Caris analysis indicates benefit from immunotherapy at relatively high levels.  This would allow radiation therapy given with Carboplatin Taxol weekly (better tolerated) and then subsequent atezolizumab adjuvantly.    The patient proceeded to treatment taking weekly carboplatin Taxol with concurrent radiation therapy beginning 11/28/2022 and last seen 12/12/2022 with third weekly treatment.    He is next seen 12/19/2022 with H&H 11.9 and 38.5, white count 3460 and platelet count of 168,000.  He is feeling well without any significant complications of therapy except for right calf dermatitis that is been developing in the last several days.      Plan:    *Patient seen 12/19/2022 for his fourth week of therapy, excellent tolerance.    *Right calf dermatitis possibly contact in nature.  Plan hydrocortisone 1% twice daily with weekly reassessment    *12/27/2022 NP, CarboTaxol therapy though the patient would like to move back to Monday treatments thereafter.

## 2022-12-19 ENCOUNTER — OFFICE VISIT (OUTPATIENT)
Dept: ONCOLOGY | Facility: CLINIC | Age: 77
End: 2022-12-19

## 2022-12-19 ENCOUNTER — INFUSION (OUTPATIENT)
Dept: ONCOLOGY | Facility: HOSPITAL | Age: 77
End: 2022-12-19

## 2022-12-19 ENCOUNTER — TREATMENT (OUTPATIENT)
Dept: RADIATION ONCOLOGY | Facility: HOSPITAL | Age: 77
End: 2022-12-19

## 2022-12-19 ENCOUNTER — RADIATION ONCOLOGY WEEKLY ASSESSMENT (OUTPATIENT)
Dept: RADIATION ONCOLOGY | Facility: HOSPITAL | Age: 77
End: 2022-12-19

## 2022-12-19 VITALS
HEART RATE: 71 BPM | DIASTOLIC BLOOD PRESSURE: 65 MMHG | OXYGEN SATURATION: 97 % | SYSTOLIC BLOOD PRESSURE: 128 MMHG | BODY MASS INDEX: 22.67 KG/M2 | WEIGHT: 158 LBS

## 2022-12-19 VITALS
HEART RATE: 58 BPM | HEIGHT: 70 IN | RESPIRATION RATE: 16 BRPM | SYSTOLIC BLOOD PRESSURE: 121 MMHG | WEIGHT: 154.7 LBS | OXYGEN SATURATION: 99 % | BODY MASS INDEX: 22.15 KG/M2 | DIASTOLIC BLOOD PRESSURE: 65 MMHG | TEMPERATURE: 97.5 F

## 2022-12-19 DIAGNOSIS — C7A.8 NEUROENDOCRINE CARCINOMA OF LUNG: ICD-10-CM

## 2022-12-19 DIAGNOSIS — C7A.8 NEUROENDOCRINE CARCINOMA OF LUNG: Primary | ICD-10-CM

## 2022-12-19 LAB
ALBUMIN SERPL-MCNC: 3.8 G/DL (ref 3.5–5.2)
ALBUMIN/GLOB SERPL: 1.2 G/DL (ref 1.1–2.4)
ALP SERPL-CCNC: 60 U/L (ref 38–116)
ALT SERPL W P-5'-P-CCNC: 9 U/L (ref 0–41)
ANION GAP SERPL CALCULATED.3IONS-SCNC: 9.2 MMOL/L (ref 5–15)
AST SERPL-CCNC: 15 U/L (ref 0–40)
BASOPHILS # BLD AUTO: 0.03 10*3/MM3 (ref 0–0.2)
BASOPHILS NFR BLD AUTO: 0.9 % (ref 0–1.5)
BILIRUB SERPL-MCNC: 0.4 MG/DL (ref 0.2–1.2)
BUN SERPL-MCNC: 13 MG/DL (ref 6–20)
BUN/CREAT SERPL: 15.1 (ref 7.3–30)
CALCIUM SPEC-SCNC: 9.9 MG/DL (ref 8.5–10.2)
CHLORIDE SERPL-SCNC: 101 MMOL/L (ref 98–107)
CO2 SERPL-SCNC: 24.8 MMOL/L (ref 22–29)
CREAT SERPL-MCNC: 0.86 MG/DL (ref 0.7–1.3)
DEPRECATED RDW RBC AUTO: 52.6 FL (ref 37–54)
EGFRCR SERPLBLD CKD-EPI 2021: 90.3 ML/MIN/1.73
EOSINOPHIL # BLD AUTO: 0.04 10*3/MM3 (ref 0–0.4)
EOSINOPHIL NFR BLD AUTO: 1.2 % (ref 0.3–6.2)
ERYTHROCYTE [DISTWIDTH] IN BLOOD BY AUTOMATED COUNT: 15.3 % (ref 12.3–15.4)
GLOBULIN UR ELPH-MCNC: 3.1 GM/DL (ref 1.8–3.5)
GLUCOSE SERPL-MCNC: 153 MG/DL (ref 74–124)
HCT VFR BLD AUTO: 38.5 % (ref 37.5–51)
HGB BLD-MCNC: 11.9 G/DL (ref 13–17.7)
IMM GRANULOCYTES # BLD AUTO: 0.02 10*3/MM3 (ref 0–0.05)
IMM GRANULOCYTES NFR BLD AUTO: 0.6 % (ref 0–0.5)
LYMPHOCYTES # BLD AUTO: 1.23 10*3/MM3 (ref 0.7–3.1)
LYMPHOCYTES NFR BLD AUTO: 35.5 % (ref 19.6–45.3)
MCH RBC QN AUTO: 29.7 PG (ref 26.6–33)
MCHC RBC AUTO-ENTMCNC: 30.9 G/DL (ref 31.5–35.7)
MCV RBC AUTO: 96 FL (ref 79–97)
MONOCYTES # BLD AUTO: 0.32 10*3/MM3 (ref 0.1–0.9)
MONOCYTES NFR BLD AUTO: 9.2 % (ref 5–12)
NEUTROPHILS NFR BLD AUTO: 1.82 10*3/MM3 (ref 1.7–7)
NEUTROPHILS NFR BLD AUTO: 52.6 % (ref 42.7–76)
NRBC BLD AUTO-RTO: 0 /100 WBC (ref 0–0.2)
PLATELET # BLD AUTO: 168 10*3/MM3 (ref 140–450)
PMV BLD AUTO: 8.5 FL (ref 6–12)
POTASSIUM SERPL-SCNC: 4.4 MMOL/L (ref 3.5–4.7)
PROT SERPL-MCNC: 6.9 G/DL (ref 6.3–8)
RAD ONC ARIA COURSE ID: NORMAL
RAD ONC ARIA COURSE INTENT: NORMAL
RAD ONC ARIA COURSE LAST TREATMENT DATE: NORMAL
RAD ONC ARIA COURSE START DATE: NORMAL
RAD ONC ARIA COURSE TREATMENT ELAPSED DAYS: 21
RAD ONC ARIA FIRST TREATMENT DATE: NORMAL
RAD ONC ARIA PLAN FRACTIONS TREATED TO DATE: 16
RAD ONC ARIA PLAN ID: NORMAL
RAD ONC ARIA PLAN PRESCRIBED DOSE PER FRACTION: 2 GY
RAD ONC ARIA PLAN PRIMARY REFERENCE POINT: NORMAL
RAD ONC ARIA PLAN TOTAL FRACTIONS PRESCRIBED: 30
RAD ONC ARIA PLAN TOTAL PRESCRIBED DOSE: 6000 CGY
RAD ONC ARIA REFERENCE POINT DOSAGE GIVEN TO DATE: 32 GY
RAD ONC ARIA REFERENCE POINT DOSAGE GIVEN TO DATE: 33.26 GY
RAD ONC ARIA REFERENCE POINT ID: NORMAL
RAD ONC ARIA REFERENCE POINT ID: NORMAL
RAD ONC ARIA REFERENCE POINT SESSION DOSAGE GIVEN: 2 GY
RAD ONC ARIA REFERENCE POINT SESSION DOSAGE GIVEN: 2.08 GY
RBC # BLD AUTO: 4.01 10*6/MM3 (ref 4.14–5.8)
SODIUM SERPL-SCNC: 135 MMOL/L (ref 134–145)
WBC NRBC COR # BLD: 3.46 10*3/MM3 (ref 3.4–10.8)

## 2022-12-19 PROCEDURE — 25010000002 CARBOPLATIN PER 50 MG: Performed by: INTERNAL MEDICINE

## 2022-12-19 PROCEDURE — 85025 COMPLETE CBC W/AUTO DIFF WBC: CPT

## 2022-12-19 PROCEDURE — 77386: CPT | Performed by: STUDENT IN AN ORGANIZED HEALTH CARE EDUCATION/TRAINING PROGRAM

## 2022-12-19 PROCEDURE — 96413 CHEMO IV INFUSION 1 HR: CPT

## 2022-12-19 PROCEDURE — 96417 CHEMO IV INFUS EACH ADDL SEQ: CPT

## 2022-12-19 PROCEDURE — 80053 COMPREHEN METABOLIC PANEL: CPT

## 2022-12-19 PROCEDURE — 25010000002 DEXAMETHASONE SODIUM PHOSPHATE 100 MG/10ML SOLUTION 10 ML VIAL: Performed by: INTERNAL MEDICINE

## 2022-12-19 PROCEDURE — 25010000002 PALONOSETRON PER 25 MCG: Performed by: INTERNAL MEDICINE

## 2022-12-19 PROCEDURE — 96375 TX/PRO/DX INJ NEW DRUG ADDON: CPT

## 2022-12-19 PROCEDURE — 77386 CHG INTENSITY MODULATED RADIATION TX DLVR COMPLEX: CPT | Performed by: STUDENT IN AN ORGANIZED HEALTH CARE EDUCATION/TRAINING PROGRAM

## 2022-12-19 PROCEDURE — 77427 RADIATION TX MANAGEMENT X5: CPT | Performed by: STUDENT IN AN ORGANIZED HEALTH CARE EDUCATION/TRAINING PROGRAM

## 2022-12-19 PROCEDURE — 99214 OFFICE O/P EST MOD 30 MIN: CPT | Performed by: INTERNAL MEDICINE

## 2022-12-19 PROCEDURE — 25010000002 PACLITAXEL PER 1 MG: Performed by: INTERNAL MEDICINE

## 2022-12-19 RX ORDER — SODIUM CHLORIDE 9 MG/ML
250 INJECTION, SOLUTION INTRAVENOUS ONCE
Status: COMPLETED | OUTPATIENT
Start: 2022-12-19 | End: 2022-12-19

## 2022-12-19 RX ORDER — PALONOSETRON 0.05 MG/ML
0.25 INJECTION, SOLUTION INTRAVENOUS ONCE
Status: COMPLETED | OUTPATIENT
Start: 2022-12-19 | End: 2022-12-19

## 2022-12-19 RX ORDER — DIPHENHYDRAMINE HYDROCHLORIDE 50 MG/ML
50 INJECTION INTRAMUSCULAR; INTRAVENOUS AS NEEDED
Status: CANCELLED | OUTPATIENT
Start: 2022-12-19

## 2022-12-19 RX ORDER — PALONOSETRON 0.05 MG/ML
0.25 INJECTION, SOLUTION INTRAVENOUS ONCE
Status: CANCELLED | OUTPATIENT
Start: 2022-12-19

## 2022-12-19 RX ORDER — FAMOTIDINE 10 MG/ML
20 INJECTION, SOLUTION INTRAVENOUS ONCE
Status: CANCELLED | OUTPATIENT
Start: 2022-12-19

## 2022-12-19 RX ORDER — FAMOTIDINE 10 MG/ML
20 INJECTION, SOLUTION INTRAVENOUS AS NEEDED
Status: CANCELLED | OUTPATIENT
Start: 2022-12-19

## 2022-12-19 RX ORDER — FAMOTIDINE 10 MG/ML
20 INJECTION, SOLUTION INTRAVENOUS ONCE
Status: COMPLETED | OUTPATIENT
Start: 2022-12-19 | End: 2022-12-19

## 2022-12-19 RX ORDER — SODIUM CHLORIDE 9 MG/ML
250 INJECTION, SOLUTION INTRAVENOUS ONCE
Status: CANCELLED | OUTPATIENT
Start: 2022-12-19

## 2022-12-19 RX ADMIN — FAMOTIDINE 20 MG: 10 INJECTION INTRAVENOUS at 09:50

## 2022-12-19 RX ADMIN — CARBOPLATIN 200 MG: 10 INJECTION INTRAVENOUS at 11:44

## 2022-12-19 RX ADMIN — PACLITAXEL 95 MG: 6 INJECTION, SOLUTION INTRAVENOUS at 10:42

## 2022-12-19 RX ADMIN — DEXAMETHASONE SODIUM PHOSPHATE 6 MG: 10 INJECTION, SOLUTION INTRAMUSCULAR; INTRAVENOUS at 09:51

## 2022-12-19 RX ADMIN — SODIUM CHLORIDE 250 ML: 9 INJECTION, SOLUTION INTRAVENOUS at 09:49

## 2022-12-19 RX ADMIN — PALONOSETRON 0.25 MG: 0.05 INJECTION, SOLUTION INTRAVENOUS at 09:49

## 2022-12-19 NOTE — PROGRESS NOTES
Radiation Oncology  On-Treatment Note      Patient: Giovani España    MRN: [id number]    Attending Physician: Kaz Marrero MD     Diagnosis:     ICD-10-CM ICD-9-CM   1. Neuroendocrine carcinoma of lung (HCC)  C7A.8 209.21       Radiation Therapy Visit:  Continue radiation therapy, Dosimetry plan remains acceptable, Films reviewed and remains acceptable, Pain assessed, Pain management planned, Radiation dose schedule reviewed and remains acceptable, Radiation technique remains acceptable and Symptoms within expected range    Radiation Treatments     Active   Plans   Lt Lung   Most recent treatment: Dose planned: 200 cGy (fraction 16 on 12/19/2022)   Total: Dose planned: 6,000 cGy (30 fractions)   Elapsed Days: 21      Reference Points   Lt Lung Calc   Most recent treatment: Dose given: 208 cGy (on 12/19/2022)   Total: Dose given: 3,326 cGy   Elapsed Days: 21      RX L Lung   Most recent treatment: Dose given: 200 cGy (on 12/19/2022)   Total: Dose given: 3,200 cGy   Elapsed Days: 21                    Physical Examination:  Vitals: Blood pressure 128/65, pulse 71, weight 71.7 kg (158 lb), SpO2 97 %.  Vitals:    12/19/22 1341   BP: 128/65   Pulse: 71   SpO2: 97%   Weight: 71.7 kg (158 lb)     Pain Score    12/19/22 1341   PainSc: 0-No pain       Fully active, able to carry on all pre-disease performance without restriction = 0    We examined the relevant areas: yes  Findings are within the expected range for this stage of treatment: yes  -------------------------------------------------------------------------------------------------------------------    ACTION ITEMS:  Patient tolerating treatment well and as expected for this stage in their treatment and Continue radiation therapy as planned    Estimated Completion Date: 1/9/2023      Kaz Marrero MD  Radiation Oncology

## 2022-12-20 ENCOUNTER — TREATMENT (OUTPATIENT)
Dept: RADIATION ONCOLOGY | Facility: HOSPITAL | Age: 77
End: 2022-12-20

## 2022-12-20 LAB
RAD ONC ARIA COURSE ID: NORMAL
RAD ONC ARIA COURSE INTENT: NORMAL
RAD ONC ARIA COURSE LAST TREATMENT DATE: NORMAL
RAD ONC ARIA COURSE START DATE: NORMAL
RAD ONC ARIA COURSE TREATMENT ELAPSED DAYS: 22
RAD ONC ARIA FIRST TREATMENT DATE: NORMAL
RAD ONC ARIA PLAN FRACTIONS TREATED TO DATE: 17
RAD ONC ARIA PLAN ID: NORMAL
RAD ONC ARIA PLAN PRESCRIBED DOSE PER FRACTION: 2 GY
RAD ONC ARIA PLAN PRIMARY REFERENCE POINT: NORMAL
RAD ONC ARIA PLAN TOTAL FRACTIONS PRESCRIBED: 30
RAD ONC ARIA PLAN TOTAL PRESCRIBED DOSE: 6000 CGY
RAD ONC ARIA REFERENCE POINT DOSAGE GIVEN TO DATE: 34 GY
RAD ONC ARIA REFERENCE POINT DOSAGE GIVEN TO DATE: 35.34 GY
RAD ONC ARIA REFERENCE POINT ID: NORMAL
RAD ONC ARIA REFERENCE POINT ID: NORMAL
RAD ONC ARIA REFERENCE POINT SESSION DOSAGE GIVEN: 2 GY
RAD ONC ARIA REFERENCE POINT SESSION DOSAGE GIVEN: 2.08 GY

## 2022-12-20 PROCEDURE — 77386 CHG INTENSITY MODULATED RADIATION TX DLVR COMPLEX: CPT | Performed by: STUDENT IN AN ORGANIZED HEALTH CARE EDUCATION/TRAINING PROGRAM

## 2022-12-20 PROCEDURE — 77386: CPT | Performed by: STUDENT IN AN ORGANIZED HEALTH CARE EDUCATION/TRAINING PROGRAM

## 2022-12-21 ENCOUNTER — TREATMENT (OUTPATIENT)
Dept: RADIATION ONCOLOGY | Facility: HOSPITAL | Age: 77
End: 2022-12-21

## 2022-12-21 LAB
RAD ONC ARIA COURSE ID: NORMAL
RAD ONC ARIA COURSE INTENT: NORMAL
RAD ONC ARIA COURSE LAST TREATMENT DATE: NORMAL
RAD ONC ARIA COURSE START DATE: NORMAL
RAD ONC ARIA COURSE TREATMENT ELAPSED DAYS: 23
RAD ONC ARIA FIRST TREATMENT DATE: NORMAL
RAD ONC ARIA PLAN FRACTIONS TREATED TO DATE: 18
RAD ONC ARIA PLAN ID: NORMAL
RAD ONC ARIA PLAN PRESCRIBED DOSE PER FRACTION: 2 GY
RAD ONC ARIA PLAN PRIMARY REFERENCE POINT: NORMAL
RAD ONC ARIA PLAN TOTAL FRACTIONS PRESCRIBED: 30
RAD ONC ARIA PLAN TOTAL PRESCRIBED DOSE: 6000 CGY
RAD ONC ARIA REFERENCE POINT DOSAGE GIVEN TO DATE: 36 GY
RAD ONC ARIA REFERENCE POINT DOSAGE GIVEN TO DATE: 37.42 GY
RAD ONC ARIA REFERENCE POINT ID: NORMAL
RAD ONC ARIA REFERENCE POINT ID: NORMAL
RAD ONC ARIA REFERENCE POINT SESSION DOSAGE GIVEN: 2 GY
RAD ONC ARIA REFERENCE POINT SESSION DOSAGE GIVEN: 2.08 GY

## 2022-12-21 PROCEDURE — 77386 CHG INTENSITY MODULATED RADIATION TX DLVR COMPLEX: CPT | Performed by: STUDENT IN AN ORGANIZED HEALTH CARE EDUCATION/TRAINING PROGRAM

## 2022-12-21 PROCEDURE — 77336 RADIATION PHYSICS CONSULT: CPT | Performed by: STUDENT IN AN ORGANIZED HEALTH CARE EDUCATION/TRAINING PROGRAM

## 2022-12-21 PROCEDURE — 77386: CPT | Performed by: STUDENT IN AN ORGANIZED HEALTH CARE EDUCATION/TRAINING PROGRAM

## 2022-12-22 ENCOUNTER — TREATMENT (OUTPATIENT)
Dept: RADIATION ONCOLOGY | Facility: HOSPITAL | Age: 77
End: 2022-12-22

## 2022-12-22 LAB
RAD ONC ARIA COURSE ID: NORMAL
RAD ONC ARIA COURSE INTENT: NORMAL
RAD ONC ARIA COURSE LAST TREATMENT DATE: NORMAL
RAD ONC ARIA COURSE START DATE: NORMAL
RAD ONC ARIA COURSE TREATMENT ELAPSED DAYS: 24
RAD ONC ARIA FIRST TREATMENT DATE: NORMAL
RAD ONC ARIA PLAN FRACTIONS TREATED TO DATE: 19
RAD ONC ARIA PLAN ID: NORMAL
RAD ONC ARIA PLAN PRESCRIBED DOSE PER FRACTION: 2 GY
RAD ONC ARIA PLAN PRIMARY REFERENCE POINT: NORMAL
RAD ONC ARIA PLAN TOTAL FRACTIONS PRESCRIBED: 30
RAD ONC ARIA PLAN TOTAL PRESCRIBED DOSE: 6000 CGY
RAD ONC ARIA REFERENCE POINT DOSAGE GIVEN TO DATE: 38 GY
RAD ONC ARIA REFERENCE POINT DOSAGE GIVEN TO DATE: 39.5 GY
RAD ONC ARIA REFERENCE POINT ID: NORMAL
RAD ONC ARIA REFERENCE POINT ID: NORMAL
RAD ONC ARIA REFERENCE POINT SESSION DOSAGE GIVEN: 2 GY
RAD ONC ARIA REFERENCE POINT SESSION DOSAGE GIVEN: 2.08 GY

## 2022-12-22 PROCEDURE — 77386: CPT | Performed by: RADIOLOGY

## 2022-12-22 PROCEDURE — 77386 CHG INTENSITY MODULATED RADIATION TX DLVR COMPLEX: CPT | Performed by: RADIOLOGY

## 2022-12-27 ENCOUNTER — TREATMENT (OUTPATIENT)
Dept: RADIATION ONCOLOGY | Facility: HOSPITAL | Age: 77
End: 2022-12-27

## 2022-12-27 ENCOUNTER — RADIATION ONCOLOGY WEEKLY ASSESSMENT (OUTPATIENT)
Dept: RADIATION ONCOLOGY | Facility: HOSPITAL | Age: 77
End: 2022-12-27

## 2022-12-27 VITALS
WEIGHT: 155.8 LBS | DIASTOLIC BLOOD PRESSURE: 78 MMHG | OXYGEN SATURATION: 97 % | BODY MASS INDEX: 22.35 KG/M2 | SYSTOLIC BLOOD PRESSURE: 136 MMHG | HEART RATE: 87 BPM

## 2022-12-27 DIAGNOSIS — C7A.8 NEUROENDOCRINE CARCINOMA OF LUNG: Primary | ICD-10-CM

## 2022-12-27 LAB
RAD ONC ARIA COURSE ID: NORMAL
RAD ONC ARIA COURSE INTENT: NORMAL
RAD ONC ARIA COURSE LAST TREATMENT DATE: NORMAL
RAD ONC ARIA COURSE START DATE: NORMAL
RAD ONC ARIA COURSE TREATMENT ELAPSED DAYS: 29
RAD ONC ARIA FIRST TREATMENT DATE: NORMAL
RAD ONC ARIA PLAN FRACTIONS TREATED TO DATE: 20
RAD ONC ARIA PLAN ID: NORMAL
RAD ONC ARIA PLAN PRESCRIBED DOSE PER FRACTION: 2 GY
RAD ONC ARIA PLAN PRIMARY REFERENCE POINT: NORMAL
RAD ONC ARIA PLAN TOTAL FRACTIONS PRESCRIBED: 30
RAD ONC ARIA PLAN TOTAL PRESCRIBED DOSE: 6000 CGY
RAD ONC ARIA REFERENCE POINT DOSAGE GIVEN TO DATE: 40 GY
RAD ONC ARIA REFERENCE POINT DOSAGE GIVEN TO DATE: 41.57 GY
RAD ONC ARIA REFERENCE POINT ID: NORMAL
RAD ONC ARIA REFERENCE POINT ID: NORMAL
RAD ONC ARIA REFERENCE POINT SESSION DOSAGE GIVEN: 2 GY
RAD ONC ARIA REFERENCE POINT SESSION DOSAGE GIVEN: 2.08 GY

## 2022-12-27 PROCEDURE — 77386 CHG INTENSITY MODULATED RADIATION TX DLVR COMPLEX: CPT | Performed by: STUDENT IN AN ORGANIZED HEALTH CARE EDUCATION/TRAINING PROGRAM

## 2022-12-27 PROCEDURE — 77386: CPT | Performed by: STUDENT IN AN ORGANIZED HEALTH CARE EDUCATION/TRAINING PROGRAM

## 2022-12-27 NOTE — PROGRESS NOTES
Radiation Oncology  On-Treatment Note      Patient: Giovani España    MRN: [id number]    Attending Physician: Kaz Marrero MD     Diagnosis:     ICD-10-CM ICD-9-CM   1. Neuroendocrine carcinoma of lung (HCC)  C7A.8 209.21       Radiation Therapy Visit:  Continue radiation therapy, Dosimetry plan remains acceptable, Films reviewed and remains acceptable, Pain assessed, Pain management planned, Radiation dose schedule reviewed and remains acceptable, Radiation technique remains acceptable and Symptoms within expected range    Radiation Treatments     Active   Plans   Lt Lung   Most recent treatment: Dose planned: 200 cGy (fraction 20 on 12/27/2022)   Total: Dose planned: 6,000 cGy (30 fractions)   Elapsed Days: 29      Reference Points   Lt Lung Calc   Most recent treatment: Dose given: 208 cGy (on 12/27/2022)   Total: Dose given: 4,157 cGy   Elapsed Days: 29      RX L Lung   Most recent treatment: Dose given: 200 cGy (on 12/27/2022)   Total: Dose given: 4,000 cGy   Elapsed Days: 29                    Physical Examination:  Vitals: Blood pressure 136/78, pulse 87, weight 70.7 kg (155 lb 12.8 oz), SpO2 97 %.  Vitals:    12/27/22 0832   BP: 136/78   Pulse: 87   SpO2: 97%   Weight: 70.7 kg (155 lb 12.8 oz)     Pain Score    12/27/22 0832   PainSc: 0-No pain       Restricted in physically strenuous activity but ambulatory and able to carry out work of a light or sedentary nature, e.g., light house work, office work = 1    We examined the relevant areas: yes  Findings are within the expected range for this stage of treatment: yes  -------------------------------------------------------------------------------------------------------------------    ACTION ITEMS:  Patient tolerating treatment well and as expected for this stage in their treatment and Continue radiation therapy as planned    Estimated Completion Date: 1/9/2023      Kaz Marrero MD  Radiation Oncology

## 2022-12-28 ENCOUNTER — INFUSION (OUTPATIENT)
Dept: ONCOLOGY | Facility: HOSPITAL | Age: 77
End: 2022-12-28

## 2022-12-28 ENCOUNTER — OFFICE VISIT (OUTPATIENT)
Dept: ONCOLOGY | Facility: CLINIC | Age: 77
End: 2022-12-28

## 2022-12-28 ENCOUNTER — TREATMENT (OUTPATIENT)
Dept: RADIATION ONCOLOGY | Facility: HOSPITAL | Age: 77
End: 2022-12-28

## 2022-12-28 VITALS — HEART RATE: 61 BPM | DIASTOLIC BLOOD PRESSURE: 71 MMHG | SYSTOLIC BLOOD PRESSURE: 127 MMHG

## 2022-12-28 VITALS
SYSTOLIC BLOOD PRESSURE: 121 MMHG | RESPIRATION RATE: 18 BRPM | BODY MASS INDEX: 22.15 KG/M2 | WEIGHT: 154.7 LBS | DIASTOLIC BLOOD PRESSURE: 63 MMHG | HEIGHT: 70 IN | TEMPERATURE: 98.2 F | HEART RATE: 73 BPM | OXYGEN SATURATION: 98 %

## 2022-12-28 DIAGNOSIS — Z79.899 LONG-TERM USE OF HIGH-RISK MEDICATION: ICD-10-CM

## 2022-12-28 DIAGNOSIS — C7A.8 NEUROENDOCRINE CARCINOMA OF LUNG: Primary | ICD-10-CM

## 2022-12-28 DIAGNOSIS — D70.1 CHEMOTHERAPY INDUCED NEUTROPENIA: ICD-10-CM

## 2022-12-28 DIAGNOSIS — T45.1X5A CHEMOTHERAPY INDUCED NEUTROPENIA: ICD-10-CM

## 2022-12-28 DIAGNOSIS — C7A.8 NEUROENDOCRINE CARCINOMA OF LUNG: ICD-10-CM

## 2022-12-28 DIAGNOSIS — Z45.2 FITTING AND ADJUSTMENT OF VASCULAR CATHETER: ICD-10-CM

## 2022-12-28 LAB
ALBUMIN SERPL-MCNC: 4 G/DL (ref 3.5–5.2)
ALBUMIN/GLOB SERPL: 1.4 G/DL
ALP SERPL-CCNC: 61 U/L (ref 39–117)
ALT SERPL W P-5'-P-CCNC: 11 U/L (ref 1–41)
ANION GAP SERPL CALCULATED.3IONS-SCNC: 7.5 MMOL/L (ref 5–15)
AST SERPL-CCNC: 17 U/L (ref 1–40)
BASOPHILS # BLD AUTO: 0.02 10*3/MM3 (ref 0–0.2)
BASOPHILS NFR BLD AUTO: 0.8 % (ref 0–1.5)
BILIRUB SERPL-MCNC: 0.5 MG/DL (ref 0–1.2)
BUN SERPL-MCNC: 16 MG/DL (ref 8–23)
BUN/CREAT SERPL: 17.8 (ref 7–25)
CALCIUM SPEC-SCNC: 10.2 MG/DL (ref 8.6–10.5)
CHLORIDE SERPL-SCNC: 101 MMOL/L (ref 98–107)
CO2 SERPL-SCNC: 26.5 MMOL/L (ref 22–29)
CREAT SERPL-MCNC: 0.9 MG/DL (ref 0.76–1.27)
DEPRECATED RDW RBC AUTO: 52.9 FL (ref 37–54)
EGFRCR SERPLBLD CKD-EPI 2021: 89.1 ML/MIN/1.73
EOSINOPHIL # BLD AUTO: 0.01 10*3/MM3 (ref 0–0.4)
EOSINOPHIL NFR BLD AUTO: 0.4 % (ref 0.3–6.2)
ERYTHROCYTE [DISTWIDTH] IN BLOOD BY AUTOMATED COUNT: 15.8 % (ref 12.3–15.4)
GLOBULIN UR ELPH-MCNC: 2.8 GM/DL
GLUCOSE SERPL-MCNC: 157 MG/DL (ref 65–99)
HCT VFR BLD AUTO: 34.2 % (ref 37.5–51)
HGB BLD-MCNC: 10.7 G/DL (ref 13–17.7)
IMM GRANULOCYTES # BLD AUTO: 0.01 10*3/MM3 (ref 0–0.05)
IMM GRANULOCYTES NFR BLD AUTO: 0.4 % (ref 0–0.5)
LYMPHOCYTES # BLD AUTO: 0.89 10*3/MM3 (ref 0.7–3.1)
LYMPHOCYTES NFR BLD AUTO: 36.3 % (ref 19.6–45.3)
MCH RBC QN AUTO: 29.5 PG (ref 26.6–33)
MCHC RBC AUTO-ENTMCNC: 31.3 G/DL (ref 31.5–35.7)
MCV RBC AUTO: 94.2 FL (ref 79–97)
MONOCYTES # BLD AUTO: 0.3 10*3/MM3 (ref 0.1–0.9)
MONOCYTES NFR BLD AUTO: 12.2 % (ref 5–12)
NEUTROPHILS NFR BLD AUTO: 1.22 10*3/MM3 (ref 1.7–7)
NEUTROPHILS NFR BLD AUTO: 49.9 % (ref 42.7–76)
NRBC BLD AUTO-RTO: 0 /100 WBC (ref 0–0.2)
PLATELET # BLD AUTO: 117 10*3/MM3 (ref 140–450)
PMV BLD AUTO: 8.7 FL (ref 6–12)
POTASSIUM SERPL-SCNC: 4.1 MMOL/L (ref 3.5–5.2)
PROT SERPL-MCNC: 6.8 G/DL (ref 6–8.5)
RAD ONC ARIA COURSE ID: NORMAL
RAD ONC ARIA COURSE INTENT: NORMAL
RAD ONC ARIA COURSE LAST TREATMENT DATE: NORMAL
RAD ONC ARIA COURSE START DATE: NORMAL
RAD ONC ARIA COURSE TREATMENT ELAPSED DAYS: 30
RAD ONC ARIA FIRST TREATMENT DATE: NORMAL
RAD ONC ARIA PLAN FRACTIONS TREATED TO DATE: 21
RAD ONC ARIA PLAN ID: NORMAL
RAD ONC ARIA PLAN PRESCRIBED DOSE PER FRACTION: 2 GY
RAD ONC ARIA PLAN PRIMARY REFERENCE POINT: NORMAL
RAD ONC ARIA PLAN TOTAL FRACTIONS PRESCRIBED: 30
RAD ONC ARIA PLAN TOTAL PRESCRIBED DOSE: 6000 CGY
RAD ONC ARIA REFERENCE POINT DOSAGE GIVEN TO DATE: 42 GY
RAD ONC ARIA REFERENCE POINT DOSAGE GIVEN TO DATE: 43.65 GY
RAD ONC ARIA REFERENCE POINT ID: NORMAL
RAD ONC ARIA REFERENCE POINT ID: NORMAL
RAD ONC ARIA REFERENCE POINT SESSION DOSAGE GIVEN: 2 GY
RAD ONC ARIA REFERENCE POINT SESSION DOSAGE GIVEN: 2.08 GY
RBC # BLD AUTO: 3.63 10*6/MM3 (ref 4.14–5.8)
SODIUM SERPL-SCNC: 135 MMOL/L (ref 136–145)
WBC NRBC COR # BLD: 2.45 10*3/MM3 (ref 3.4–10.8)

## 2022-12-28 PROCEDURE — 77386 CHG INTENSITY MODULATED RADIATION TX DLVR COMPLEX: CPT | Performed by: STUDENT IN AN ORGANIZED HEALTH CARE EDUCATION/TRAINING PROGRAM

## 2022-12-28 PROCEDURE — 25010000002 DEXAMETHASONE PER 1 MG: Performed by: NURSE PRACTITIONER

## 2022-12-28 PROCEDURE — 96375 TX/PRO/DX INJ NEW DRUG ADDON: CPT

## 2022-12-28 PROCEDURE — 96417 CHEMO IV INFUS EACH ADDL SEQ: CPT

## 2022-12-28 PROCEDURE — 25010000002 PALONOSETRON PER 25 MCG: Performed by: NURSE PRACTITIONER

## 2022-12-28 PROCEDURE — 25010000002 CARBOPLATIN PER 50 MG: Performed by: NURSE PRACTITIONER

## 2022-12-28 PROCEDURE — 77427 RADIATION TX MANAGEMENT X5: CPT | Performed by: STUDENT IN AN ORGANIZED HEALTH CARE EDUCATION/TRAINING PROGRAM

## 2022-12-28 PROCEDURE — 25010000002 HEPARIN LOCK FLUSH PER 10 UNITS: Performed by: INTERNAL MEDICINE

## 2022-12-28 PROCEDURE — 96413 CHEMO IV INFUSION 1 HR: CPT

## 2022-12-28 PROCEDURE — 77386: CPT | Performed by: STUDENT IN AN ORGANIZED HEALTH CARE EDUCATION/TRAINING PROGRAM

## 2022-12-28 PROCEDURE — 25010000002 PACLITAXEL PER 1 MG: Performed by: NURSE PRACTITIONER

## 2022-12-28 PROCEDURE — 99214 OFFICE O/P EST MOD 30 MIN: CPT | Performed by: NURSE PRACTITIONER

## 2022-12-28 PROCEDURE — 85025 COMPLETE CBC W/AUTO DIFF WBC: CPT | Performed by: INTERNAL MEDICINE

## 2022-12-28 PROCEDURE — 80053 COMPREHEN METABOLIC PANEL: CPT | Performed by: INTERNAL MEDICINE

## 2022-12-28 RX ORDER — DIPHENHYDRAMINE HYDROCHLORIDE 50 MG/ML
50 INJECTION INTRAMUSCULAR; INTRAVENOUS AS NEEDED
Status: CANCELLED | OUTPATIENT
Start: 2022-12-28

## 2022-12-28 RX ORDER — FAMOTIDINE 10 MG/ML
20 INJECTION, SOLUTION INTRAVENOUS ONCE
Status: COMPLETED | OUTPATIENT
Start: 2022-12-28 | End: 2022-12-28

## 2022-12-28 RX ORDER — SODIUM CHLORIDE 9 MG/ML
250 INJECTION, SOLUTION INTRAVENOUS ONCE
Status: COMPLETED | OUTPATIENT
Start: 2022-12-28 | End: 2022-12-28

## 2022-12-28 RX ORDER — FAMOTIDINE 10 MG/ML
20 INJECTION, SOLUTION INTRAVENOUS ONCE
Status: CANCELLED | OUTPATIENT
Start: 2022-12-28

## 2022-12-28 RX ORDER — PALONOSETRON 0.05 MG/ML
0.25 INJECTION, SOLUTION INTRAVENOUS ONCE
Status: CANCELLED | OUTPATIENT
Start: 2022-12-28

## 2022-12-28 RX ORDER — SODIUM CHLORIDE 9 MG/ML
250 INJECTION, SOLUTION INTRAVENOUS ONCE
Status: CANCELLED | OUTPATIENT
Start: 2022-12-28

## 2022-12-28 RX ORDER — HEPARIN SODIUM (PORCINE) LOCK FLUSH IV SOLN 100 UNIT/ML 100 UNIT/ML
500 SOLUTION INTRAVENOUS AS NEEDED
Status: DISCONTINUED | OUTPATIENT
Start: 2022-12-28 | End: 2022-12-28 | Stop reason: HOSPADM

## 2022-12-28 RX ORDER — SODIUM CHLORIDE 0.9 % (FLUSH) 0.9 %
10 SYRINGE (ML) INJECTION AS NEEDED
Status: CANCELLED | OUTPATIENT
Start: 2022-12-28

## 2022-12-28 RX ORDER — PALONOSETRON 0.05 MG/ML
0.25 INJECTION, SOLUTION INTRAVENOUS ONCE
Status: COMPLETED | OUTPATIENT
Start: 2022-12-28 | End: 2022-12-28

## 2022-12-28 RX ORDER — HEPARIN SODIUM (PORCINE) LOCK FLUSH IV SOLN 100 UNIT/ML 100 UNIT/ML
500 SOLUTION INTRAVENOUS AS NEEDED
Status: CANCELLED | OUTPATIENT
Start: 2022-12-28

## 2022-12-28 RX ORDER — FAMOTIDINE 10 MG/ML
20 INJECTION, SOLUTION INTRAVENOUS AS NEEDED
Status: CANCELLED | OUTPATIENT
Start: 2022-12-28

## 2022-12-28 RX ORDER — SODIUM CHLORIDE 0.9 % (FLUSH) 0.9 %
10 SYRINGE (ML) INJECTION AS NEEDED
Status: DISCONTINUED | OUTPATIENT
Start: 2022-12-28 | End: 2022-12-28 | Stop reason: HOSPADM

## 2022-12-28 RX ADMIN — PACLITAXEL 95 MG: 6 INJECTION, SOLUTION INTRAVENOUS at 12:46

## 2022-12-28 RX ADMIN — Medication 10 ML: at 14:21

## 2022-12-28 RX ADMIN — SODIUM CHLORIDE 250 ML: 9 INJECTION, SOLUTION INTRAVENOUS at 11:52

## 2022-12-28 RX ADMIN — FAMOTIDINE 20 MG: 10 INJECTION INTRAVENOUS at 11:52

## 2022-12-28 RX ADMIN — DEXAMETHASONE SODIUM PHOSPHATE 6 MG: 10 INJECTION INTRAMUSCULAR; INTRAVENOUS at 11:55

## 2022-12-28 RX ADMIN — CARBOPLATIN 190 MG: 10 INJECTION INTRAVENOUS at 13:48

## 2022-12-28 RX ADMIN — Medication 500 UNITS: at 14:21

## 2022-12-28 RX ADMIN — PALONOSETRON 0.25 MG: 0.05 INJECTION, SOLUTION INTRAVENOUS at 11:52

## 2022-12-28 NOTE — PROGRESS NOTES
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  Notably there is invasive carcinoma present at the margin.  Is a, and only 2 positive lymph nodes adjacent to the bronchovesicular margin which appears to have been transected difficult to enumerate with extranodal extension present.      History of Present Illness   Mr. España is a 77-year-old male with the above-mentioned history who is here today for lab review and evaluation prior to week 5 carboplatin/Taxol with concurrent radiation.  He is overall tolerating treatment quite well.  He does have some mild fatigue but denies any significant issues with nausea, vomiting, diarrhea, or constipation.  He denies any worsening shortness of breath.  No issues with fevers, chills, signs of infection.      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  He had been assessed particularly in the fall 2021 when he presented with nausea and vomiting and abdominal discomfort leading to assessments that revealed sigmoid diverticulitis with contained rupture and partial small bowel obstruction.  Records from mid September 21 indicating that he was admitted with partial small bowel obstruction and treated medically with very slow recovery.  He has history of COPD with CT demonstrating a pulmonary nodule in the right lung base at 7 mm with follow-up planned.  He was seen by general surgery in later September with repeat scans planned and repeat CT abdomen 10/7/2021 did demonstrate interval improvement of sigmoid diverticulitis.       Record indicates an assessment in late June 2022 at different general surgeon for left inguinal hernia, history of heavy tobacco use with COPD and recommendation for surgical repair of his hernia      The patient underwent a CT scan of the chest 5/7/2022 demonstrating diffuse emphysematous changes, ill-defined density measuring 3 mm in the superior segment of the right lower lobe, multiple ill-defined 3 to 4 mm nodular density thought to be inflammatory involving the right lower lobe and a new spiculated nodule involving the left lower lobe measuring 16 x 14 mm as well as left hilar adenopathy.       Follow-up PET/CT 7/13/2022 reveal a hypermetabolic spiculated lesion medial aspect the left lower lobe adjacent to descending thoracic aorta measuring 1.5 x 1.6 SUV 9.3 with an enlarged hypermetabolic left hilar lymph node measured 1.5 x 1.3 with SUV of 12.1, additional nodules in the lateral aspect right lower lobe and micronodule in the posterior/medial right lower lobe and also additional right lower lobe micronodule.  There is an infrarenal abdominal aortic aneurysm measuring 3.4 cm with a short segment of chronic dissection present.    These clinical findings would be consistent with a possible T1b N1 M0-stage IIb lung cancer.      Recognizing a diagnosis has not been made his case was discussed in thoracic conference 7/20/2022.  Recommendations include proceeding to pulmonary assessment with EBUS and the patient undergoing a general assessment per his clinical status-older adult assessment as well as assessment by thoracic surgery.  These issues are discussed with the patient and his wife in detail when they are seen 7/28/2022.    The patient proceeded to undergo his hernia surgery 8/2/2022 by Dr. Dotson.  Additionally the patient was unable to undergo assessment by pulmonary until October and, thereafter, was scheduled to see Dr. Joe 8/18/2022 undergoing older adult functional assessment with a JAGUAR score of  11 indicating a risk of functional decline related to cancer therapy.    The patient is seen with his wife 8/15/2022 and doing very well with recent hernia surgery.  His performance status is reasonably good at present and we have learned now that he would not be able to see pulmonary medicine until October, thoracic surgery is scheduled this week with Dr. Joe.  Perhaps he could be a surgical candidate.  Particularly we know by guardant 360 that T p53 splice site mutation is present and would certainly be consistent as well the most common mutations found in lung cancers particularly squamous cancers.  We have discussed obtaining pulmonary functions at this point, thoracic surgery assessment this week and also restarting Chantix as possible for the patient.    There was difficulty obtaining Chantix and while this was being assessed the patient was reviewed 8/18 by thoracic surgery.  He was felt to have a T1b N1 M0 stage IIb carcinoma left lower lobe along and not a candidate for biopsy.  PFTs were borderline, functional assessment was reasonably good and the patient was felt to be a candidate for robot-assisted biopsy of his nodes and if malignant proceed to left lower lobe lobectomy.  He was reviewed 9/8 and offered 21 mg nicotine transdermal patching.    He is next seen 9/10/2022.  He is seen with his wife and both are prepared for surgery 9/23/2022.  We discussed that additional therapy may be considered once we have more information about the type and stage.  We would hope to see him postoperatively.    The patient was admitted 9//2022 undergoing 9/23/2022  a bronchoscopy with left video-assisted thoracoscopy with robot-assisted total decortication of the left lung, left lower lobectomy, mediastinal lymphadenectomy and intercostal nerve block with Dr. Nicola Joe.  Fortunately he did fairly well postoperatively though did develop atrial fibrillation with RVR treated with amiodarone converting back to  sinus rhythm, treated with IV metoprolol subsequent chronic anticoagulation.  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  Notably there is invasive carcinoma present at the margin.  Is a, and only 2 positive lymph nodes adjacent to the bronchovesicular margin which appears to have been transected difficult to enumerate with extranodal extension present.    The patient is seen 10/27/2022.  He is recovering well from surgery, albeit slowly, and we have discussed his findings that are concerning as to residual disease with a positive margin described as above.  There is also the significance potentially of PD-L1 expression which has been described as producing a poor survival.  Nivolumab has been used as second line therapy to positive effect in the disease and this begs the question as to whether adjuvant therapy plus immunotherapy could be utilized though the patient would be difficult to treat with platinum based chemotherapy as well.    The patient is next assessed 11/7/2022 for significant assessments by supportive oncology at [facility] as well as with plans to see Dr. Marrero 11/9/2022.  Unfortunately he has been very slow to gradually recover from the effects of surgery with reduced appetite and only fair performance status.  At present it would be difficult to give him cisplatin based chemotherapy and we discussed whether we might be able to use Tecentriq (atezolizumab) as his primary adjuvant therapy.  We have contacted pathology about completing Caris testing and a PD-L1 analysis that has not yet completed.      The patient is seen, however, 11/16/2022 and his genomic analysis has returned as being significant for broad sensitivity to immunotherapy.  This would argue for the administration of weekly Carboplatin/Taxol as the patient proceeds to radiation therapy and upon completion, then  using Tecentriq as further adjuvant therapy over a year.  This is discussed with the patient and his  in detail as well as discussed with Dr. Marrero of radiation therapy.  We have also discussed the patient require port placement.    The patient proceeded to treatment taking weekly carboplatin Taxol with concurrent radiation therapy last seen 12/12/2022 with third weekly treatment.    He is next seen 12/19/2022 with H&H 11.9 and 38.5, white count 3460 and platelet count of 168,000.  He is feeling well without any significant complications of therapy except for right calf dermatitis that is been developing in the last several days.  Past Medical History:   Diagnosis Date   • Arthritis    • COPD (chronic obstructive pulmonary disease) (HCC)     O2 3L AT HS   • Diabetes mellitus (HCC)     DIET CONTROL   • Diverticulitis    • DVT, lower extremity (HCC)     RLE   • Elevated cholesterol    • H/O Cervical pain    • History of colitis    • Hyperlipidemia    • Hypertension    • Left shoulder pain    • Low back pain    • Lung cancer (HCC) 2022    LEFT LUNG   • On anticoagulant therapy    • Peripheral arterial disease (HCC)    • Right leg claudication (HCC)    • Skin cancer     HX        Past Surgical History:   Procedure Laterality Date   • BALLOON ANGIOPLASTY, ARTERY Right 2015    IR Percutaneious IVC filter placement   • INGUINAL HERNIA REPAIR Left    • KNEE ARTHROSCOPY Left     Torn meniscus   • LOBECTOMY Left 9/23/2022    Procedure: BRONCHOSCOPY, LEFT VIDEO ASSISTED THORACOSCOPY, ROBOT ASSISTED TOTAL DECORTICATION  LEFT LUNG, LEFT LOWER LOBE LOBECTOMY, MEDIASTINAL LYMPHECTOMY, INTERCOSTAL NERVE BLOCK;  Surgeon: Nicola Joe III, MD;  Location: [facility];  Service: Robotics - DaVinci;  Laterality: Left;   • VENOUS ACCESS DEVICE (PORT) INSERTION Right 11/21/2022    Procedure: INSERTION VENOUS ACCESS DEVICE;  Surgeon: Nicola Joe III, MD;  Location: [facility];  Service: Thoracic;  Laterality:  Right;        Current Outpatient Medications on File Prior to Visit   Medication Sig Dispense Refill   • arformoterol (BROVANA) 15 MCG/2ML nebulizer solution Take 15 mcg by nebulization 2 (Two) Times a Day.     • budesonide (PULMICORT) 0.5 MG/2ML nebulizer solution Take 0.5 mg by nebulization 2 (Two) Times a Day.     • cetirizine (zyrTEC) 10 MG tablet Take 10 mg by mouth Daily.     • isosorbide mononitrate (IMDUR) 60 MG 24 hr tablet Take 60 mg by mouth Every Evening.     • ondansetron (Zofran) 8 MG tablet Take 1 tablet by mouth Every 8 (Eight) Hours As Needed for Nausea or Vomiting. 30 tablet 1   • simvastatin (ZOCOR) 20 MG tablet Take 20 mg by mouth Every Night.     • tamsulosin (FLOMAX) 0.4 MG capsule 24 hr capsule Take 1 capsule by mouth Daily. 30 capsule 5   • warfarin (COUMADIN) 5 MG tablet Take 1 tablet by mouth Daily. Take 10mg on 9/29/22 and then resume regular home schedule.     • metoprolol succinate XL (TOPROL-XL) 25 MG 24 hr tablet Take 1 tablet by mouth Daily for 30 days. (Patient taking differently: Take 25 mg by mouth Every Evening.) 30 tablet 0     No current facility-administered medications on file prior to visit.        ALLERGIES:  No Known Allergies     Social History     Socioeconomic History   • Marital status:    • Years of education: 1 year college   Tobacco Use   • Smoking status: Every Day     Packs/day: 1.00     Years: 59.00     Pack years: 59.00     Types: Cigarettes     Start date: 1963   • Smokeless tobacco: Never   • Tobacco comments:     9/8/22: Down to Less than 1 PPD   Vaping Use   • Vaping Use: Never used   Substance and Sexual Activity   • Alcohol use: Not Currently   • Drug use: Never   • Sexual activity: Defer        Family History   Problem Relation Age of Onset   • No Known Problems Mother    • Cancer Father    • Lung cancer Father    • Diabetes Brother    • Other Daughter         S/P stent, age 42   • No Known Problems Son    • Malig Hyperthermia Neg Hx         Review  "of Systems   Constitutional: Positive for activity change, fatigue and unexpected weight change (Status post his diverticulitis episode, weight has not been regained).   HENT: Negative.    Eyes: Negative.    Respiratory: Positive for shortness of breath. Negative for cough.    Cardiovascular: Negative.    Gastrointestinal: Negative.    Genitourinary: Negative.    Musculoskeletal: Negative.    Skin: Positive for rash (Involving right calf).   Neurological: Negative.    Psychiatric/Behavioral: Negative.     12/28/2022 ROS as per HPI and unchanged from above.    Objective     Vitals:    12/28/22 1036   BP: 121/63   Pulse: 73   Resp: 18   Temp: 98.2 °F (36.8 °C)   TempSrc: Temporal   SpO2: 98%   Weight: 70.2 kg (154 lb 11.2 oz)   Height: 177.8 cm (70\")   PainSc: 0-No pain     Current Status 12/28/2022   ECOG score 0       Physical Exam  Vitals reviewed.   Constitutional:       General: He is not in acute distress.     Appearance: Normal appearance. He is well-developed.   HENT:      Head: Normocephalic and atraumatic.   Eyes:      Pupils: Pupils are equal, round, and reactive to light.   Cardiovascular:      Rate and Rhythm: Normal rate and regular rhythm.      Heart sounds: Normal heart sounds. No murmur heard.  Pulmonary:      Effort: Pulmonary effort is normal. No respiratory distress.      Breath sounds: Normal breath sounds. No wheezing, rhonchi or rales.   Abdominal:      General: Bowel sounds are normal. There is no distension.      Palpations: Abdomen is soft.   Musculoskeletal:         General: Normal range of motion.      Cervical back: Normal range of motion.   Skin:     General: Skin is warm and dry.      Findings: No rash.   Neurological:      Mental Status: He is alert and oriented to person, place, and time.           RECENT LABS:  Hematology WBC   Date Value Ref Range Status   12/28/2022 2.45 (L) 3.40 - 10.80 10*3/mm3 Final   05/09/2022 7.54 4.5 - 11.0 10*3/uL Final     RBC   Date Value Ref Range Status "   12/28/2022 3.63 (L) 4.14 - 5.80 10*6/mm3 Final   05/09/2022 5.52 4.5 - 5.9 10*6/uL Final     Hemoglobin   Date Value Ref Range Status   12/28/2022 10.7 (L) 13.0 - 17.7 g/dL Final   05/09/2022 16.4 13.5 - 17.5 g/dL Final     Hematocrit   Date Value Ref Range Status   12/28/2022 34.2 (L) 37.5 - 51.0 % Final   05/09/2022 51.0 41.0 - 53.0 % Final     Platelets   Date Value Ref Range Status   12/28/2022 117 (L) 140 - 450 10*3/mm3 Final   05/09/2022 204 140 - 440 10*3/uL Final          Assessment & Plan     77 y.o. male  with medical issues including type 2 diabetes-uncertain control, COPD, hypertension, diastolic heart failure, chronic disease, peripheral vascular disease (follow-up with vascular surgery today), previous DVT on anticoagulation (home monitoring), previous IVC filter placement?,  Status post September 2021 partial small bowel obstruction secondary to sigmoid diverticulitis treated medically.     1.   T1b N1 M0-stage IIb lung cancer.  • right lung base nodule noted on scan at that point and in follow-up with primary care had developed a left inguinal hernia with repair planned through a different general surgeon then seen with his initial sigmoid diverticulitis.  • PET scan 7/13/2022 demonstrates a hypermetabolic spiculated lesion medial aspect of the left lobe adjacent to descending thoracic aorta measuring1.5 x 1.6 SUV 9.3 with an enlarged hypermetabolic left hilar lymph node measured 1.5 x 1.3 with SUV of 12.1, additional nodules in the lateral aspect right lower lobe and micronodule in the posterior/medial right lower lobe and also additional right lower lobe micronodule.  There is an infrarenal abdominal aortic aneurysm measuring 3.4 cm with a short segment of chronic dissection present.  • Clinical findings would be consistent with a possible T1b N1 M0-stage IIb lung cancer.  • discussed in thoracic conference 7/20/2022.  • Recommendations to proceed with pulmonary assessment with EBUS.  • The  patient proceeded to undergo his hernia surgery 8/2/2022 by Dr. Dotson.   • Guardant 360 that T p53 splice site mutation is present and would certainly be consistent as well the most common mutations found in lung cancers particularly squamous cancers.  • The patient was admitted 9//2022 undergoing 9/23/2022  a bronchoscopy with left video-assisted thoracoscopy with robot-assisted total decortication of the left lung, left lower lobectomy, mediastinal lymphadenectomy and intercostal nerve block with Dr. Nicola Joe.  • Fortunately he did fairly well postoperatively though did develop atrial fibrillation with RVR treated with amiodarone converting back to sinus rhythm, treated with IV metoprolol subsequent chronic anticoagulation.  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  Notably there is invasive carcinoma present at the margin. only 2 positive lymph nodes adjacent to the bronchovesicular margin which appears to have been transected difficult to enumerate with extranodal extension present.  • Options could well be Carboplatin and Taxol considering the patient's comorbidity and worry of using cisplatin-based chemotherapy in this patient followed by atezolizumab with pathology having been notified to assess PD-L1 expression on the tumor as well also await review by Caris molecular profiling.  Finally radiation therapy consultation be requested.  •  Caris analysis indicates benefit from immunotherapy at relatively high levels.  This would allow radiation therapy given with Carboplatin Taxol weekly (better tolerated) and then subsequent atezolizumab adjuvantly.  • Weekly carboplatin Taxol initiated 11/28/2022 given concurrently with radiation therapy  • 12/5/2022 here for cycle 2 weekly carboplatin/Taxol, overall tolerating treatment quite well so far.  • 12/12/2022: Proceed  with cycle #3 weekly carboplatin/Taxol with concurrent radiation.  Continues to tolerate treatment well.  • He is next seen 12/19/2022 with H&H 11.9 and 38.5, white count 3460 and platelet count of 168,000.  He is feeling well without any significant complications of therapy except for right calf dermatitis that is been developing in the last several days.  • 12/28/2022 here for week 5 carbo/Taxol.  Overall tolerating well.  Today WBC 2.45, ANC 1.22, hemoglobin 10.7, platelet count 117,000.  I have reviewed with Dr. Bishop, and we will go ahead and continue with treatment.      PLAN:  1. Proceed with cycle 5 weekly carbo/Taxol today.  2. Continue radiation therapy under the care of radiation oncology.  3. Return in 1 week for follow-up with MD or NP with repeat labs reevaluation and consideration of cycle 6 weekly carbo/Taxol.  4. Call/ return sooner should he develop any new concerns or problems.  5. Continue hydrocortisone over-the-counter twice daily if needed for calf dermatitis.      Patient is on a high risk medication requiring close monitoring for toxicity.      Rama Mario, APRN  12/28/2022

## 2022-12-29 ENCOUNTER — TREATMENT (OUTPATIENT)
Dept: RADIATION ONCOLOGY | Facility: HOSPITAL | Age: 77
End: 2022-12-29

## 2022-12-29 LAB
RAD ONC ARIA COURSE ID: NORMAL
RAD ONC ARIA COURSE INTENT: NORMAL
RAD ONC ARIA COURSE LAST TREATMENT DATE: NORMAL
RAD ONC ARIA COURSE START DATE: NORMAL
RAD ONC ARIA COURSE TREATMENT ELAPSED DAYS: 31
RAD ONC ARIA FIRST TREATMENT DATE: NORMAL
RAD ONC ARIA PLAN FRACTIONS TREATED TO DATE: 22
RAD ONC ARIA PLAN ID: NORMAL
RAD ONC ARIA PLAN PRESCRIBED DOSE PER FRACTION: 2 GY
RAD ONC ARIA PLAN PRIMARY REFERENCE POINT: NORMAL
RAD ONC ARIA PLAN TOTAL FRACTIONS PRESCRIBED: 30
RAD ONC ARIA PLAN TOTAL PRESCRIBED DOSE: 6000 CGY
RAD ONC ARIA REFERENCE POINT DOSAGE GIVEN TO DATE: 44 GY
RAD ONC ARIA REFERENCE POINT DOSAGE GIVEN TO DATE: 45.73 GY
RAD ONC ARIA REFERENCE POINT ID: NORMAL
RAD ONC ARIA REFERENCE POINT ID: NORMAL
RAD ONC ARIA REFERENCE POINT SESSION DOSAGE GIVEN: 2 GY
RAD ONC ARIA REFERENCE POINT SESSION DOSAGE GIVEN: 2.08 GY

## 2022-12-29 PROCEDURE — 77386 CHG INTENSITY MODULATED RADIATION TX DLVR COMPLEX: CPT | Performed by: STUDENT IN AN ORGANIZED HEALTH CARE EDUCATION/TRAINING PROGRAM

## 2022-12-29 PROCEDURE — 77386: CPT | Performed by: STUDENT IN AN ORGANIZED HEALTH CARE EDUCATION/TRAINING PROGRAM

## 2022-12-30 ENCOUNTER — TREATMENT (OUTPATIENT)
Dept: RADIATION ONCOLOGY | Facility: HOSPITAL | Age: 77
End: 2022-12-30

## 2022-12-30 LAB
RAD ONC ARIA COURSE ID: NORMAL
RAD ONC ARIA COURSE INTENT: NORMAL
RAD ONC ARIA COURSE LAST TREATMENT DATE: NORMAL
RAD ONC ARIA COURSE START DATE: NORMAL
RAD ONC ARIA COURSE TREATMENT ELAPSED DAYS: 32
RAD ONC ARIA FIRST TREATMENT DATE: NORMAL
RAD ONC ARIA PLAN FRACTIONS TREATED TO DATE: 23
RAD ONC ARIA PLAN ID: NORMAL
RAD ONC ARIA PLAN PRESCRIBED DOSE PER FRACTION: 2 GY
RAD ONC ARIA PLAN PRIMARY REFERENCE POINT: NORMAL
RAD ONC ARIA PLAN TOTAL FRACTIONS PRESCRIBED: 30
RAD ONC ARIA PLAN TOTAL PRESCRIBED DOSE: 6000 CGY
RAD ONC ARIA REFERENCE POINT DOSAGE GIVEN TO DATE: 46 GY
RAD ONC ARIA REFERENCE POINT DOSAGE GIVEN TO DATE: 47.81 GY
RAD ONC ARIA REFERENCE POINT ID: NORMAL
RAD ONC ARIA REFERENCE POINT ID: NORMAL
RAD ONC ARIA REFERENCE POINT SESSION DOSAGE GIVEN: 2 GY
RAD ONC ARIA REFERENCE POINT SESSION DOSAGE GIVEN: 2.08 GY

## 2022-12-30 PROCEDURE — 77386: CPT | Performed by: STUDENT IN AN ORGANIZED HEALTH CARE EDUCATION/TRAINING PROGRAM

## 2022-12-30 PROCEDURE — 77336 RADIATION PHYSICS CONSULT: CPT | Performed by: STUDENT IN AN ORGANIZED HEALTH CARE EDUCATION/TRAINING PROGRAM

## 2022-12-30 PROCEDURE — 77386 CHG INTENSITY MODULATED RADIATION TX DLVR COMPLEX: CPT | Performed by: STUDENT IN AN ORGANIZED HEALTH CARE EDUCATION/TRAINING PROGRAM

## 2022-12-31 NOTE — PROGRESS NOTES
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  Notably there is invasive carcinoma present at the margin.  Is a, and only 2 positive lymph nodes adjacent to the bronchovesicular margin which appears to have been transected difficult to enumerate with extranodal extension present.      History of Present Illness   Mr. España is a 77-year-old male with the above-mentioned history who is here today for lab review and evaluation prior to week 6 carboplatin/Taxol with concurrent radiation.  He is seen today with his wife present.  He continues to tolerate treatment quite well.  Does feel fatigued, this remains stable.  Eating and drinking well, weight remains stable.  Bowels moving regularly.  Denies fever or chills.  Denies nausea or vomiting.  Denies signs or symptoms of peripheral neuropathy.  No new concerns today.    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  He had been assessed particularly in the fall 2021 when he presented with nausea and vomiting and abdominal discomfort leading to assessments that revealed sigmoid diverticulitis with contained rupture and partial small bowel obstruction.  Records from mid September 21 indicating that he was admitted with partial small bowel obstruction and treated medically with very slow recovery.  He has history of COPD with CT demonstrating a pulmonary nodule in the right lung base at 7 mm with follow-up planned.  He was seen by general surgery in later September with repeat scans planned and repeat CT abdomen  10/7/2021 did demonstrate interval improvement of sigmoid diverticulitis.      Record indicates an assessment in late June 2022 at different general surgeon for left inguinal hernia, history of heavy tobacco use with COPD and recommendation for surgical repair of his hernia      The patient underwent a CT scan of the chest 5/7/2022 demonstrating diffuse emphysematous changes, ill-defined density measuring 3 mm in the superior segment of the right lower lobe, multiple ill-defined 3 to 4 mm nodular density thought to be inflammatory involving the right lower lobe and a new spiculated nodule involving the left lower lobe measuring 16 x 14 mm as well as left hilar adenopathy.       Follow-up PET/CT 7/13/2022 reveal a hypermetabolic spiculated lesion medial aspect the left lower lobe adjacent to descending thoracic aorta measuring 1.5 x 1.6 SUV 9.3 with an enlarged hypermetabolic left hilar lymph node measured 1.5 x 1.3 with SUV of 12.1, additional nodules in the lateral aspect right lower lobe and micronodule in the posterior/medial right lower lobe and also additional right lower lobe micronodule.  There is an infrarenal abdominal aortic aneurysm measuring 3.4 cm with a short segment of chronic dissection present.    These clinical findings would be consistent with a possible T1b N1 M0-stage IIb lung cancer.      Recognizing a diagnosis has not been made his case was discussed in thoracic conference 7/20/2022.  Recommendations include proceeding to pulmonary assessment with EBUS and the patient undergoing a general assessment per his clinical status-older adult assessment as well as assessment by thoracic surgery.  These issues are discussed with the patient and his wife in detail when they are seen 7/28/2022.    The patient proceeded to undergo his hernia surgery 8/2/2022 by Dr. Dotson.  Additionally the patient was unable to undergo assessment by pulmonary until October and, thereafter, was scheduled to see   Tatyana 8/18/2022 undergoing older adult functional assessment with a JAGUAR score of 11 indicating a risk of functional decline related to cancer therapy.    The patient is seen with his wife 8/15/2022 and doing very well with recent hernia surgery.  His performance status is reasonably good at present and we have learned now that he would not be able to see pulmonary medicine until October, thoracic surgery is scheduled this week with Dr. Joe.  Perhaps he could be a surgical candidate.  Particularly we know by guardant 360 that T p53 splice site mutation is present and would certainly be consistent as well the most common mutations found in lung cancers particularly squamous cancers.  We have discussed obtaining pulmonary functions at this point, thoracic surgery assessment this week and also restarting Chantix as possible for the patient.    There was difficulty obtaining Chantix and while this was being assessed the patient was reviewed 8/18 by thoracic surgery.  He was felt to have a T1b N1 M0 stage IIb carcinoma left lower lobe along and not a candidate for biopsy.  PFTs were borderline, functional assessment was reasonably good and the patient was felt to be a candidate for robot-assisted biopsy of his nodes and if malignant proceed to left lower lobe lobectomy.  He was reviewed 9/8 and offered 21 mg nicotine transdermal patching.    He is next seen 9/10/2022.  He is seen with his wife and both are prepared for surgery 9/23/2022.  We discussed that additional therapy may be considered once we have more information about the type and stage.  We would hope to see him postoperatively.    The patient was admitted 9//2022 undergoing 9/23/2022  a bronchoscopy with left video-assisted thoracoscopy with robot-assisted total decortication of the left lung, left lower lobectomy, mediastinal lymphadenectomy and intercostal nerve block with Dr. Nicola Joe.  Fortunately he did fairly well postoperatively though  did develop atrial fibrillation with RVR treated with amiodarone converting back to sinus rhythm, treated with IV metoprolol subsequent chronic anticoagulation.  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  Notably there is invasive carcinoma present at the margin.  Is a, and only 2 positive lymph nodes adjacent to the bronchovesicular margin which appears to have been transected difficult to enumerate with extranodal extension present.    The patient is seen 10/27/2022.  He is recovering well from surgery, albeit slowly, and we have discussed his findings that are concerning as to residual disease with a positive margin described as above.  There is also the significance potentially of PD-L1 expression which has been described as producing a poor survival.  Nivolumab has been used as second line therapy to positive effect in the disease and this begs the question as to whether adjuvant therapy plus immunotherapy could be utilized though the patient would be difficult to treat with platinum based chemotherapy as well.    The patient is next assessed 11/7/2022 for significant assessments by supportive oncology at [facility] as well as with plans to see Dr. Marrero 11/9/2022.  Unfortunately he has been very slow to gradually recover from the effects of surgery with reduced appetite and only fair performance status.  At present it would be difficult to give him cisplatin based chemotherapy and we discussed whether we might be able to use Tecentriq (atezolizumab) as his primary adjuvant therapy.  We have contacted pathology about completing Caris testing and a PD-L1 analysis that has not yet completed.      The patient is seen, however, 11/16/2022 and his genomic analysis has returned as being significant for broad sensitivity to immunotherapy.  This would argue for the administration of weekly  Carboplatin/Taxol as the patient proceeds to radiation therapy and upon completion, then using Tecentriq as further adjuvant therapy over a year.  This is discussed with the patient and his  in detail as well as discussed with Dr. Marrero of radiation therapy.  We have also discussed the patient require port placement.    The patient proceeded to treatment taking weekly carboplatin Taxol with concurrent radiation therapy last seen 12/12/2022 with third weekly treatment.    He is next seen 12/19/2022 with H&H 11.9 and 38.5, white count 3460 and platelet count of 168,000.  He is feeling well without any significant complications of therapy except for right calf dermatitis that is been developing in the last several days.  Past Medical History:   Diagnosis Date   • Arthritis    • COPD (chronic obstructive pulmonary disease) (HCC)     O2 3L AT HS   • Diabetes mellitus (HCC)     DIET CONTROL   • Diverticulitis    • DVT, lower extremity (HCC)     RLE   • Elevated cholesterol    • H/O Cervical pain    • History of colitis    • Hyperlipidemia    • Hypertension    • Left shoulder pain    • Low back pain    • Lung cancer (HCC) 2022    LEFT LUNG   • On anticoagulant therapy    • Peripheral arterial disease (HCC)    • Right leg claudication (HCC)    • Skin cancer     HX        Past Surgical History:   Procedure Laterality Date   • BALLOON ANGIOPLASTY, ARTERY Right 2015    IR Percutaneious IVC filter placement   • INGUINAL HERNIA REPAIR Left    • KNEE ARTHROSCOPY Left     Torn meniscus   • LOBECTOMY Left 9/23/2022    Procedure: BRONCHOSCOPY, LEFT VIDEO ASSISTED THORACOSCOPY, ROBOT ASSISTED TOTAL DECORTICATION  LEFT LUNG, LEFT LOWER LOBE LOBECTOMY, MEDIASTINAL LYMPHECTOMY, INTERCOSTAL NERVE BLOCK;  Surgeon: Nicola Joe III, MD;  Location: [facility];  Service: [facility] - [facility];  Laterality: Left;   • VENOUS ACCESS DEVICE (PORT) INSERTION Right 11/21/2022    Procedure: INSERTION VENOUS ACCESS DEVICE;  Surgeon:  Linker, Nicola FLETCHER III, MD;  Location: [facility] OR;  Service: Thoracic;  Laterality: Right;        Current Outpatient Medications on File Prior to Visit   Medication Sig Dispense Refill   • arformoterol (BROVANA) 15 MCG/2ML nebulizer solution Take 15 mcg by nebulization 2 (Two) Times a Day.     • budesonide (PULMICORT) 0.5 MG/2ML nebulizer solution Take 0.5 mg by nebulization 2 (Two) Times a Day.     • cetirizine (zyrTEC) 10 MG tablet Take 10 mg by mouth Daily.     • isosorbide mononitrate (IMDUR) 60 MG 24 hr tablet Take 60 mg by mouth Every Evening.     • ondansetron (Zofran) 8 MG tablet Take 1 tablet by mouth Every 8 (Eight) Hours As Needed for Nausea or Vomiting. 30 tablet 1   • simvastatin (ZOCOR) 20 MG tablet Take 20 mg by mouth Every Night.     • tamsulosin (FLOMAX) 0.4 MG capsule 24 hr capsule Take 1 capsule by mouth Daily. 30 capsule 5   • warfarin (COUMADIN) 5 MG tablet Take 1 tablet by mouth Daily. Take 10mg on 9/29/22 and then resume regular home schedule.     • metoprolol succinate XL (TOPROL-XL) 25 MG 24 hr tablet Take 1 tablet by mouth Daily for 30 days. (Patient taking differently: Take 25 mg by mouth Every Evening.) 30 tablet 0     No current facility-administered medications on file prior to visit.        ALLERGIES:  No Known Allergies     Social History     Socioeconomic History   • Marital status:    • Years of education: 1 year college   Tobacco Use   • Smoking status: Every Day     Packs/day: 1.00     Years: 59.00     Pack years: 59.00     Types: Cigarettes     Start date: 1963   • Smokeless tobacco: Never   • Tobacco comments:     9/8/22: Down to Less than 1 PPD   Vaping Use   • Vaping Use: Never used   Substance and Sexual Activity   • Alcohol use: Not Currently   • Drug use: Never   • Sexual activity: Defer        Family History   Problem Relation Age of Onset   • No Known Problems Mother    • Cancer Father    • Lung cancer Father    • Diabetes Brother    • Other Daughter          S/P stent, age 42   • No Known Problems Son    • Malig Hyperthermia Neg Hx       Review of Systems   Constitutional: Positive for activity change, fatigue and unexpected weight change (Status post his diverticulitis episode, weight has not been regained).   HENT: Negative.    Eyes: Negative.    Respiratory: Positive for shortness of breath. Negative for cough.    Cardiovascular: Negative.    Gastrointestinal: Negative.    Genitourinary: Negative.    Musculoskeletal: Negative.    Skin: Positive for rash (Involving right calf).   Neurological: Negative.    Psychiatric/Behavioral: Negative.     01/04/2023 ROS as per HPI and unchanged from above.    Objective     Vitals:    01/04/23 0852   BP: 123/71   Pulse: 71   Resp: 18   Temp: 96.9 °F (36.1 °C)   TempSrc: Temporal   SpO2: 97%   Weight: 69.9 kg (154 lb 1.6 oz)   Height: 177.8 cm (70\")   PainSc: 0-No pain     Current Status 1/4/2023   ECOG score 0     Physical Exam  Vitals reviewed.   Constitutional:       General: He is not in acute distress.     Appearance: Normal appearance. He is well-developed.   HENT:      Head: Normocephalic and atraumatic.   Eyes:      Pupils: Pupils are equal, round, and reactive to light.   Cardiovascular:      Rate and Rhythm: Normal rate and regular rhythm.      Heart sounds: Normal heart sounds. No murmur heard.  Pulmonary:      Effort: Pulmonary effort is normal. No respiratory distress.      Breath sounds: Normal breath sounds. No wheezing, rhonchi or rales.   Abdominal:      General: Bowel sounds are normal. There is no distension.      Palpations: Abdomen is soft.   Musculoskeletal:         General: Normal range of motion.      Cervical back: Normal range of motion.   Skin:     General: Skin is warm and dry.      Findings: No rash.   Neurological:      Mental Status: He is alert and oriented to person, place, and time.         RECENT LABS:  Hematology WBC   Date Value Ref Range Status   01/04/2023 2.69 (L) 3.40 - 10.80 10*3/mm3 Final    05/09/2022 7.54 4.5 - 11.0 10*3/uL Final     RBC   Date Value Ref Range Status   01/04/2023 3.61 (L) 4.14 - 5.80 10*6/mm3 Final   05/09/2022 5.52 4.5 - 5.9 10*6/uL Final     Hemoglobin   Date Value Ref Range Status   01/04/2023 11.2 (L) 13.0 - 17.7 g/dL Final   05/09/2022 16.4 13.5 - 17.5 g/dL Final     Hematocrit   Date Value Ref Range Status   01/04/2023 34.3 (L) 37.5 - 51.0 % Final   05/09/2022 51.0 41.0 - 53.0 % Final     Platelets   Date Value Ref Range Status   01/04/2023 113 (L) 140 - 450 10*3/mm3 Final   05/09/2022 204 140 - 440 10*3/uL Final          Assessment & Plan     77 y.o. male  with medical issues including type 2 diabetes-uncertain control, COPD, hypertension, diastolic heart failure, chronic disease, peripheral vascular disease (follow-up with vascular surgery today), previous DVT on anticoagulation (home monitoring), previous IVC filter placement?,  Status post September 2021 partial small bowel obstruction secondary to sigmoid diverticulitis treated medically.     1.   T1b N1 M0-stage IIb lung cancer.  • right lung base nodule noted on scan at that point and in follow-up with primary care had developed a left inguinal hernia with repair planned through a different general surgeon then seen with his initial sigmoid diverticulitis.  • PET scan 7/13/2022 demonstrates a hypermetabolic spiculated lesion medial aspect of the left lobe adjacent to descending thoracic aorta measuring1.5 x 1.6 SUV 9.3 with an enlarged hypermetabolic left hilar lymph node measured 1.5 x 1.3 with SUV of 12.1, additional nodules in the lateral aspect right lower lobe and micronodule in the posterior/medial right lower lobe and also additional right lower lobe micronodule.  There is an infrarenal abdominal aortic aneurysm measuring 3.4 cm with a short segment of chronic dissection present.  • Clinical findings would be consistent with a possible T1b N1 M0-stage IIb lung cancer.  • discussed in thoracic conference  7/20/2022.  • Recommendations to proceed with pulmonary assessment with EBUS.  • The patient proceeded to undergo his hernia surgery 8/2/2022 by Dr. Dotson.   • Guardant 360 that T p53 splice site mutation is present and would certainly be consistent as well the most common mutations found in lung cancers particularly squamous cancers.  • The patient was admitted 9//2022 undergoing 9/23/2022  a bronchoscopy with left video-assisted thoracoscopy with robot-assisted total decortication of the left lung, left lower lobectomy, mediastinal lymphadenectomy and intercostal nerve block with Dr. Nicola Joe.  • Fortunately he did fairly well postoperatively though did develop atrial fibrillation with RVR treated with amiodarone converting back to sinus rhythm, treated with IV metoprolol subsequent chronic anticoagulation.  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  Notably there is invasive carcinoma present at the margin. only 2 positive lymph nodes adjacent to the bronchovesicular margin which appears to have been transected difficult to enumerate with extranodal extension present.  • Options could well be Carboplatin and Taxol considering the patient's comorbidity and worry of using cisplatin-based chemotherapy in this patient followed by atezolizumab with pathology having been notified to assess PD-L1 expression on the tumor as well also await review by Caris molecular profiling.  Finally radiation therapy consultation be requested.  •  Caris analysis indicates benefit from immunotherapy at relatively high levels.  This would allow radiation therapy given with Carboplatin Taxol weekly (better tolerated) and then subsequent atezolizumab adjuvantly.  • Weekly carboplatin Taxol initiated 11/28/2022 given concurrently with radiation therapy  • 12/5/2022 here for cycle 2 weekly  carboplatin/Taxol, overall tolerating treatment quite well so far.  • 12/12/2022: Proceed with cycle #3 weekly carboplatin/Taxol with concurrent radiation.  Continues to tolerate treatment well.  • He is next seen 12/19/2022 with H&H 11.9 and 38.5, white count 3460 and platelet count of 168,000.  He is feeling well without any significant complications of therapy except for right calf dermatitis that is been developing in the last several days.  • 12/28/2022 here for week 5 carbo/Taxol.  Overall tolerating well.  Today WBC 2.45, ANC 1.22, hemoglobin 10.7, platelet count 117,000.  I have reviewed with Dr. Bishop, and we will go ahead and continue with treatment.  • 1/4/2023: Received with cycle 6 carbo/taxol + XRT.  Continues to tolerate treatment well.  WBC improved to 2.69 with ANC 1.41.      PLAN:   1. Proceed with cycle 6 weekly carbo/Taxol today.  2. Continue radiation therapy under the care of radiation oncology.  3. Return in 1 week for follow-up with MD  with repeat labs reevaluation and consideration of cycle 7 weekly carbo/Taxol.  4. Call/ return sooner should he develop any new concerns or problems.  5. Continue hydrocortisone over-the-counter twice daily if needed for calf dermatitis.      Patient is on a high risk medication requiring close monitoring for toxicity.      Rachel Hummel, GARCÍA  01/04/2023

## 2023-01-01 ENCOUNTER — APPOINTMENT (OUTPATIENT)
Dept: RADIATION ONCOLOGY | Facility: HOSPITAL | Age: 78
End: 2023-01-01
Payer: MEDICARE

## 2023-01-03 ENCOUNTER — TREATMENT (OUTPATIENT)
Dept: RADIATION ONCOLOGY | Facility: HOSPITAL | Age: 78
End: 2023-01-03
Payer: MEDICARE

## 2023-01-03 LAB
RAD ONC ARIA COURSE ID: NORMAL
RAD ONC ARIA COURSE INTENT: NORMAL
RAD ONC ARIA COURSE LAST TREATMENT DATE: NORMAL
RAD ONC ARIA COURSE START DATE: NORMAL
RAD ONC ARIA COURSE TREATMENT ELAPSED DAYS: 36
RAD ONC ARIA FIRST TREATMENT DATE: NORMAL
RAD ONC ARIA PLAN FRACTIONS TREATED TO DATE: 24
RAD ONC ARIA PLAN ID: NORMAL
RAD ONC ARIA PLAN PRESCRIBED DOSE PER FRACTION: 2 GY
RAD ONC ARIA PLAN PRIMARY REFERENCE POINT: NORMAL
RAD ONC ARIA PLAN TOTAL FRACTIONS PRESCRIBED: 30
RAD ONC ARIA PLAN TOTAL PRESCRIBED DOSE: 6000 CGY
RAD ONC ARIA REFERENCE POINT DOSAGE GIVEN TO DATE: 48 GY
RAD ONC ARIA REFERENCE POINT DOSAGE GIVEN TO DATE: 49.89 GY
RAD ONC ARIA REFERENCE POINT ID: NORMAL
RAD ONC ARIA REFERENCE POINT ID: NORMAL
RAD ONC ARIA REFERENCE POINT SESSION DOSAGE GIVEN: 2 GY
RAD ONC ARIA REFERENCE POINT SESSION DOSAGE GIVEN: 2.08 GY

## 2023-01-03 PROCEDURE — 77386 CHG INTENSITY MODULATED RADIATION TX DLVR COMPLEX: CPT | Performed by: STUDENT IN AN ORGANIZED HEALTH CARE EDUCATION/TRAINING PROGRAM

## 2023-01-03 PROCEDURE — 77386: CPT | Performed by: STUDENT IN AN ORGANIZED HEALTH CARE EDUCATION/TRAINING PROGRAM

## 2023-01-04 ENCOUNTER — RADIATION ONCOLOGY WEEKLY ASSESSMENT (OUTPATIENT)
Dept: RADIATION ONCOLOGY | Facility: HOSPITAL | Age: 78
End: 2023-01-04
Payer: MEDICARE

## 2023-01-04 ENCOUNTER — INFUSION (OUTPATIENT)
Dept: ONCOLOGY | Facility: HOSPITAL | Age: 78
End: 2023-01-04
Payer: MEDICARE

## 2023-01-04 ENCOUNTER — OFFICE VISIT (OUTPATIENT)
Dept: ONCOLOGY | Facility: CLINIC | Age: 78
End: 2023-01-04
Payer: MEDICARE

## 2023-01-04 ENCOUNTER — TREATMENT (OUTPATIENT)
Dept: RADIATION ONCOLOGY | Facility: HOSPITAL | Age: 78
End: 2023-01-04
Payer: MEDICARE

## 2023-01-04 VITALS
OXYGEN SATURATION: 97 % | HEART RATE: 71 BPM | WEIGHT: 154.1 LBS | SYSTOLIC BLOOD PRESSURE: 123 MMHG | TEMPERATURE: 96.9 F | DIASTOLIC BLOOD PRESSURE: 71 MMHG | HEIGHT: 70 IN | RESPIRATION RATE: 18 BRPM | BODY MASS INDEX: 22.06 KG/M2

## 2023-01-04 VITALS
SYSTOLIC BLOOD PRESSURE: 133 MMHG | HEART RATE: 64 BPM | OXYGEN SATURATION: 96 % | DIASTOLIC BLOOD PRESSURE: 70 MMHG | WEIGHT: 155.4 LBS | BODY MASS INDEX: 22.3 KG/M2

## 2023-01-04 DIAGNOSIS — D70.1 CHEMOTHERAPY INDUCED NEUTROPENIA: ICD-10-CM

## 2023-01-04 DIAGNOSIS — T45.1X5A CHEMOTHERAPY INDUCED NEUTROPENIA: ICD-10-CM

## 2023-01-04 DIAGNOSIS — C7A.8 NEUROENDOCRINE CARCINOMA OF LUNG: Primary | ICD-10-CM

## 2023-01-04 DIAGNOSIS — C7A.8 NEUROENDOCRINE CARCINOMA OF LUNG: ICD-10-CM

## 2023-01-04 DIAGNOSIS — Z79.899 HIGH RISK MEDICATION USE: ICD-10-CM

## 2023-01-04 DIAGNOSIS — Z45.2 FITTING AND ADJUSTMENT OF VASCULAR CATHETER: ICD-10-CM

## 2023-01-04 LAB
ALBUMIN SERPL-MCNC: 3.9 G/DL (ref 3.5–5.2)
ALBUMIN/GLOB SERPL: 1.4 G/DL (ref 1.1–2.4)
ALP SERPL-CCNC: 61 U/L (ref 38–116)
ALT SERPL W P-5'-P-CCNC: 11 U/L (ref 0–41)
ANION GAP SERPL CALCULATED.3IONS-SCNC: 8.3 MMOL/L (ref 5–15)
AST SERPL-CCNC: 14 U/L (ref 0–40)
BASOPHILS # BLD AUTO: 0.01 10*3/MM3 (ref 0–0.2)
BASOPHILS NFR BLD AUTO: 0.4 % (ref 0–1.5)
BILIRUB SERPL-MCNC: 0.5 MG/DL (ref 0.2–1.2)
BUN SERPL-MCNC: 12 MG/DL (ref 6–20)
BUN/CREAT SERPL: 14.1 (ref 7.3–30)
CALCIUM SPEC-SCNC: 9.8 MG/DL (ref 8.5–10.2)
CHLORIDE SERPL-SCNC: 103 MMOL/L (ref 98–107)
CO2 SERPL-SCNC: 25.7 MMOL/L (ref 22–29)
CREAT SERPL-MCNC: 0.85 MG/DL (ref 0.7–1.3)
DEPRECATED RDW RBC AUTO: 54.1 FL (ref 37–54)
EGFRCR SERPLBLD CKD-EPI 2021: 90.6 ML/MIN/1.73
EOSINOPHIL # BLD AUTO: 0.04 10*3/MM3 (ref 0–0.4)
EOSINOPHIL NFR BLD AUTO: 1.5 % (ref 0.3–6.2)
ERYTHROCYTE [DISTWIDTH] IN BLOOD BY AUTOMATED COUNT: 16 % (ref 12.3–15.4)
GLOBULIN UR ELPH-MCNC: 2.8 GM/DL (ref 1.8–3.5)
GLUCOSE SERPL-MCNC: 175 MG/DL (ref 74–124)
HCT VFR BLD AUTO: 34.3 % (ref 37.5–51)
HGB BLD-MCNC: 11.2 G/DL (ref 13–17.7)
IMM GRANULOCYTES # BLD AUTO: 0.01 10*3/MM3 (ref 0–0.05)
IMM GRANULOCYTES NFR BLD AUTO: 0.4 % (ref 0–0.5)
LYMPHOCYTES # BLD AUTO: 0.94 10*3/MM3 (ref 0.7–3.1)
LYMPHOCYTES NFR BLD AUTO: 34.9 % (ref 19.6–45.3)
MCH RBC QN AUTO: 31 PG (ref 26.6–33)
MCHC RBC AUTO-ENTMCNC: 32.7 G/DL (ref 31.5–35.7)
MCV RBC AUTO: 95 FL (ref 79–97)
MONOCYTES # BLD AUTO: 0.28 10*3/MM3 (ref 0.1–0.9)
MONOCYTES NFR BLD AUTO: 10.4 % (ref 5–12)
NEUTROPHILS NFR BLD AUTO: 1.41 10*3/MM3 (ref 1.7–7)
NEUTROPHILS NFR BLD AUTO: 52.4 % (ref 42.7–76)
NRBC BLD AUTO-RTO: 0 /100 WBC (ref 0–0.2)
PLATELET # BLD AUTO: 113 10*3/MM3 (ref 140–450)
PMV BLD AUTO: 8.8 FL (ref 6–12)
POTASSIUM SERPL-SCNC: 4.7 MMOL/L (ref 3.5–4.7)
PROT SERPL-MCNC: 6.7 G/DL (ref 6.3–8)
RAD ONC ARIA COURSE ID: NORMAL
RAD ONC ARIA COURSE INTENT: NORMAL
RAD ONC ARIA COURSE LAST TREATMENT DATE: NORMAL
RAD ONC ARIA COURSE START DATE: NORMAL
RAD ONC ARIA COURSE TREATMENT ELAPSED DAYS: 37
RAD ONC ARIA FIRST TREATMENT DATE: NORMAL
RAD ONC ARIA PLAN FRACTIONS TREATED TO DATE: 25
RAD ONC ARIA PLAN ID: NORMAL
RAD ONC ARIA PLAN PRESCRIBED DOSE PER FRACTION: 2 GY
RAD ONC ARIA PLAN PRIMARY REFERENCE POINT: NORMAL
RAD ONC ARIA PLAN TOTAL FRACTIONS PRESCRIBED: 30
RAD ONC ARIA PLAN TOTAL PRESCRIBED DOSE: 6000 CGY
RAD ONC ARIA REFERENCE POINT DOSAGE GIVEN TO DATE: 50 GY
RAD ONC ARIA REFERENCE POINT DOSAGE GIVEN TO DATE: 51.97 GY
RAD ONC ARIA REFERENCE POINT ID: NORMAL
RAD ONC ARIA REFERENCE POINT ID: NORMAL
RAD ONC ARIA REFERENCE POINT SESSION DOSAGE GIVEN: 2 GY
RAD ONC ARIA REFERENCE POINT SESSION DOSAGE GIVEN: 2.08 GY
RBC # BLD AUTO: 3.61 10*6/MM3 (ref 4.14–5.8)
SODIUM SERPL-SCNC: 137 MMOL/L (ref 134–145)
WBC NRBC COR # BLD: 2.69 10*3/MM3 (ref 3.4–10.8)

## 2023-01-04 PROCEDURE — 80053 COMPREHEN METABOLIC PANEL: CPT

## 2023-01-04 PROCEDURE — 96375 TX/PRO/DX INJ NEW DRUG ADDON: CPT

## 2023-01-04 PROCEDURE — 77386: CPT | Performed by: STUDENT IN AN ORGANIZED HEALTH CARE EDUCATION/TRAINING PROGRAM

## 2023-01-04 PROCEDURE — 25010000002 HEPARIN LOCK FLUSH PER 10 UNITS: Performed by: INTERNAL MEDICINE

## 2023-01-04 PROCEDURE — 1126F AMNT PAIN NOTED NONE PRSNT: CPT | Performed by: NURSE PRACTITIONER

## 2023-01-04 PROCEDURE — 96417 CHEMO IV INFUS EACH ADDL SEQ: CPT

## 2023-01-04 PROCEDURE — 85025 COMPLETE CBC W/AUTO DIFF WBC: CPT

## 2023-01-04 PROCEDURE — 99214 OFFICE O/P EST MOD 30 MIN: CPT | Performed by: NURSE PRACTITIONER

## 2023-01-04 PROCEDURE — 77386 CHG INTENSITY MODULATED RADIATION TX DLVR COMPLEX: CPT | Performed by: STUDENT IN AN ORGANIZED HEALTH CARE EDUCATION/TRAINING PROGRAM

## 2023-01-04 PROCEDURE — 25010000002 PALONOSETRON PER 25 MCG: Performed by: NURSE PRACTITIONER

## 2023-01-04 PROCEDURE — 1159F MED LIST DOCD IN RCRD: CPT | Performed by: NURSE PRACTITIONER

## 2023-01-04 PROCEDURE — 96413 CHEMO IV INFUSION 1 HR: CPT

## 2023-01-04 PROCEDURE — 1160F RVW MEDS BY RX/DR IN RCRD: CPT | Performed by: NURSE PRACTITIONER

## 2023-01-04 PROCEDURE — 25010000002 DEXAMETHASONE SODIUM PHOSPHATE 100 MG/10ML SOLUTION 10 ML VIAL: Performed by: NURSE PRACTITIONER

## 2023-01-04 PROCEDURE — 25010000002 CARBOPLATIN PER 50 MG: Performed by: NURSE PRACTITIONER

## 2023-01-04 PROCEDURE — 25010000002 PACLITAXEL PER 1 MG: Performed by: NURSE PRACTITIONER

## 2023-01-04 RX ORDER — SODIUM CHLORIDE 0.9 % (FLUSH) 0.9 %
10 SYRINGE (ML) INJECTION AS NEEDED
Status: DISCONTINUED | OUTPATIENT
Start: 2023-01-04 | End: 2023-01-04 | Stop reason: HOSPADM

## 2023-01-04 RX ORDER — SODIUM CHLORIDE 0.9 % (FLUSH) 0.9 %
10 SYRINGE (ML) INJECTION AS NEEDED
Status: CANCELLED | OUTPATIENT
Start: 2023-01-04

## 2023-01-04 RX ORDER — FAMOTIDINE 10 MG/ML
20 INJECTION, SOLUTION INTRAVENOUS ONCE
Status: COMPLETED | OUTPATIENT
Start: 2023-01-04 | End: 2023-01-04

## 2023-01-04 RX ORDER — SODIUM CHLORIDE 9 MG/ML
250 INJECTION, SOLUTION INTRAVENOUS ONCE
Status: COMPLETED | OUTPATIENT
Start: 2023-01-04 | End: 2023-01-04

## 2023-01-04 RX ORDER — HEPARIN SODIUM (PORCINE) LOCK FLUSH IV SOLN 100 UNIT/ML 100 UNIT/ML
500 SOLUTION INTRAVENOUS AS NEEDED
Status: DISCONTINUED | OUTPATIENT
Start: 2023-01-04 | End: 2023-01-04 | Stop reason: HOSPADM

## 2023-01-04 RX ORDER — PALONOSETRON 0.05 MG/ML
0.25 INJECTION, SOLUTION INTRAVENOUS ONCE
Status: COMPLETED | OUTPATIENT
Start: 2023-01-04 | End: 2023-01-04

## 2023-01-04 RX ORDER — FAMOTIDINE 10 MG/ML
20 INJECTION, SOLUTION INTRAVENOUS AS NEEDED
Status: CANCELLED | OUTPATIENT
Start: 2023-01-04

## 2023-01-04 RX ORDER — SODIUM CHLORIDE 9 MG/ML
250 INJECTION, SOLUTION INTRAVENOUS ONCE
Status: CANCELLED | OUTPATIENT
Start: 2023-01-04

## 2023-01-04 RX ORDER — HEPARIN SODIUM (PORCINE) LOCK FLUSH IV SOLN 100 UNIT/ML 100 UNIT/ML
500 SOLUTION INTRAVENOUS AS NEEDED
Status: CANCELLED | OUTPATIENT
Start: 2023-01-04

## 2023-01-04 RX ORDER — PALONOSETRON 0.05 MG/ML
0.25 INJECTION, SOLUTION INTRAVENOUS ONCE
Status: CANCELLED | OUTPATIENT
Start: 2023-01-04

## 2023-01-04 RX ORDER — DIPHENHYDRAMINE HYDROCHLORIDE 50 MG/ML
50 INJECTION INTRAMUSCULAR; INTRAVENOUS AS NEEDED
Status: CANCELLED | OUTPATIENT
Start: 2023-01-04

## 2023-01-04 RX ORDER — FAMOTIDINE 10 MG/ML
20 INJECTION, SOLUTION INTRAVENOUS ONCE
Status: CANCELLED | OUTPATIENT
Start: 2023-01-04

## 2023-01-04 RX ADMIN — CARBOPLATIN 200 MG: 10 INJECTION INTRAVENOUS at 11:12

## 2023-01-04 RX ADMIN — SODIUM CHLORIDE 250 ML: 9 INJECTION, SOLUTION INTRAVENOUS at 09:24

## 2023-01-04 RX ADMIN — DEXAMETHASONE SODIUM PHOSPHATE 6 MG: 10 INJECTION, SOLUTION INTRAMUSCULAR; INTRAVENOUS at 09:26

## 2023-01-04 RX ADMIN — FAMOTIDINE 20 MG: 10 INJECTION INTRAVENOUS at 09:24

## 2023-01-04 RX ADMIN — Medication 500 UNITS: at 11:47

## 2023-01-04 RX ADMIN — PALONOSETRON 0.25 MG: 0.05 INJECTION, SOLUTION INTRAVENOUS at 09:25

## 2023-01-04 RX ADMIN — PACLITAXEL 95 MG: 6 INJECTION, SOLUTION INTRAVENOUS at 10:09

## 2023-01-04 RX ADMIN — Medication 10 ML: at 11:46

## 2023-01-04 NOTE — PROGRESS NOTES
Radiation Oncology  On-Treatment Note      Patient: Giovani España    MRN: [id number]    Attending Physician: Kaz Marrero MD     Diagnosis:     ICD-10-CM ICD-9-CM   1. Neuroendocrine carcinoma of lung (HCC)  C7A.8 209.21       Radiation Therapy Visit:  Continue radiation therapy, Dosimetry plan remains acceptable, Films reviewed and remains acceptable, Pain assessed, Pain management planned, Radiation dose schedule reviewed and remains acceptable, Radiation technique remains acceptable and Symptoms within expected range    Radiation Treatments     Active   Plans   Lt Lung   Most recent treatment: Dose planned: 200 cGy (fraction 25 on 1/4/2023)   Total: Dose planned: 6,000 cGy (30 fractions)   Elapsed Days: 37      Reference Points   Lt Lung Calc   Most recent treatment: Dose given: 208 cGy (on 1/4/2023)   Total: Dose given: 5,197 cGy   Elapsed Days: 37      RX L Lung   Most recent treatment: Dose given: 200 cGy (on 1/4/2023)   Total: Dose given: 5,000 cGy   Elapsed Days: 37                    Physical Examination:  Vitals: Blood pressure 133/70, pulse 64, weight 70.5 kg (155 lb 6.4 oz), SpO2 96 %.  Vitals:    01/04/23 1225   BP: 133/70   Pulse: 64   SpO2: 96%   Weight: 70.5 kg (155 lb 6.4 oz)     Pain Score    01/04/23 1225   PainSc: 0-No pain       Restricted in physically strenuous activity but ambulatory and able to carry out work of a light or sedentary nature, e.g., light house work, office work = 1    We examined the relevant areas: yes  Findings are within the expected range for this stage of treatment: yes  -------------------------------------------------------------------------------------------------------------------    ACTION ITEMS:  Patient tolerating treatment well and as expected for this stage in their treatment and Continue radiation therapy as planned    Estimated Completion Date: 1/10/2023    First follow up in ~6 weeks with next PET CT, which is going to be ordered by Trena Marrero,  MD  Radiation Oncology

## 2023-01-05 ENCOUNTER — TREATMENT (OUTPATIENT)
Dept: RADIATION ONCOLOGY | Facility: HOSPITAL | Age: 78
End: 2023-01-05
Payer: MEDICARE

## 2023-01-05 LAB
RAD ONC ARIA COURSE ID: NORMAL
RAD ONC ARIA COURSE INTENT: NORMAL
RAD ONC ARIA COURSE LAST TREATMENT DATE: NORMAL
RAD ONC ARIA COURSE START DATE: NORMAL
RAD ONC ARIA COURSE TREATMENT ELAPSED DAYS: 38
RAD ONC ARIA FIRST TREATMENT DATE: NORMAL
RAD ONC ARIA PLAN FRACTIONS TREATED TO DATE: 26
RAD ONC ARIA PLAN ID: NORMAL
RAD ONC ARIA PLAN PRESCRIBED DOSE PER FRACTION: 2 GY
RAD ONC ARIA PLAN PRIMARY REFERENCE POINT: NORMAL
RAD ONC ARIA PLAN TOTAL FRACTIONS PRESCRIBED: 30
RAD ONC ARIA PLAN TOTAL PRESCRIBED DOSE: 6000 CGY
RAD ONC ARIA REFERENCE POINT DOSAGE GIVEN TO DATE: 52 GY
RAD ONC ARIA REFERENCE POINT DOSAGE GIVEN TO DATE: 54.05 GY
RAD ONC ARIA REFERENCE POINT ID: NORMAL
RAD ONC ARIA REFERENCE POINT ID: NORMAL
RAD ONC ARIA REFERENCE POINT SESSION DOSAGE GIVEN: 2 GY
RAD ONC ARIA REFERENCE POINT SESSION DOSAGE GIVEN: 2.08 GY

## 2023-01-05 PROCEDURE — 77386: CPT | Performed by: STUDENT IN AN ORGANIZED HEALTH CARE EDUCATION/TRAINING PROGRAM

## 2023-01-06 ENCOUNTER — TREATMENT (OUTPATIENT)
Dept: RADIATION ONCOLOGY | Facility: HOSPITAL | Age: 78
End: 2023-01-06
Payer: MEDICARE

## 2023-01-06 LAB
RAD ONC ARIA COURSE ID: NORMAL
RAD ONC ARIA COURSE INTENT: NORMAL
RAD ONC ARIA COURSE LAST TREATMENT DATE: NORMAL
RAD ONC ARIA COURSE START DATE: NORMAL
RAD ONC ARIA COURSE TREATMENT ELAPSED DAYS: 39
RAD ONC ARIA FIRST TREATMENT DATE: NORMAL
RAD ONC ARIA PLAN FRACTIONS TREATED TO DATE: 27
RAD ONC ARIA PLAN ID: NORMAL
RAD ONC ARIA PLAN PRESCRIBED DOSE PER FRACTION: 2 GY
RAD ONC ARIA PLAN PRIMARY REFERENCE POINT: NORMAL
RAD ONC ARIA PLAN TOTAL FRACTIONS PRESCRIBED: 30
RAD ONC ARIA PLAN TOTAL PRESCRIBED DOSE: 6000 CGY
RAD ONC ARIA REFERENCE POINT DOSAGE GIVEN TO DATE: 54 GY
RAD ONC ARIA REFERENCE POINT DOSAGE GIVEN TO DATE: 56.13 GY
RAD ONC ARIA REFERENCE POINT ID: NORMAL
RAD ONC ARIA REFERENCE POINT ID: NORMAL
RAD ONC ARIA REFERENCE POINT SESSION DOSAGE GIVEN: 2 GY
RAD ONC ARIA REFERENCE POINT SESSION DOSAGE GIVEN: 2.08 GY

## 2023-01-06 PROCEDURE — 77386: CPT | Performed by: STUDENT IN AN ORGANIZED HEALTH CARE EDUCATION/TRAINING PROGRAM

## 2023-01-09 ENCOUNTER — TREATMENT (OUTPATIENT)
Dept: RADIATION ONCOLOGY | Facility: HOSPITAL | Age: 78
End: 2023-01-09
Payer: MEDICARE

## 2023-01-09 ENCOUNTER — RADIATION ONCOLOGY WEEKLY ASSESSMENT (OUTPATIENT)
Dept: RADIATION ONCOLOGY | Facility: HOSPITAL | Age: 78
End: 2023-01-09
Payer: MEDICARE

## 2023-01-09 VITALS
DIASTOLIC BLOOD PRESSURE: 68 MMHG | OXYGEN SATURATION: 98 % | HEART RATE: 68 BPM | SYSTOLIC BLOOD PRESSURE: 123 MMHG | WEIGHT: 156.2 LBS | BODY MASS INDEX: 22.41 KG/M2 | TEMPERATURE: 97.5 F

## 2023-01-09 DIAGNOSIS — C7A.8 NEUROENDOCRINE CARCINOMA OF LUNG: Primary | ICD-10-CM

## 2023-01-09 LAB
RAD ONC ARIA COURSE ID: NORMAL
RAD ONC ARIA COURSE INTENT: NORMAL
RAD ONC ARIA COURSE LAST TREATMENT DATE: NORMAL
RAD ONC ARIA COURSE START DATE: NORMAL
RAD ONC ARIA COURSE TREATMENT ELAPSED DAYS: 42
RAD ONC ARIA FIRST TREATMENT DATE: NORMAL
RAD ONC ARIA PLAN FRACTIONS TREATED TO DATE: 28
RAD ONC ARIA PLAN ID: NORMAL
RAD ONC ARIA PLAN PRESCRIBED DOSE PER FRACTION: 2 GY
RAD ONC ARIA PLAN PRIMARY REFERENCE POINT: NORMAL
RAD ONC ARIA PLAN TOTAL FRACTIONS PRESCRIBED: 30
RAD ONC ARIA PLAN TOTAL PRESCRIBED DOSE: 6000 CGY
RAD ONC ARIA REFERENCE POINT DOSAGE GIVEN TO DATE: 56 GY
RAD ONC ARIA REFERENCE POINT DOSAGE GIVEN TO DATE: 58.2 GY
RAD ONC ARIA REFERENCE POINT ID: NORMAL
RAD ONC ARIA REFERENCE POINT ID: NORMAL
RAD ONC ARIA REFERENCE POINT SESSION DOSAGE GIVEN: 2 GY
RAD ONC ARIA REFERENCE POINT SESSION DOSAGE GIVEN: 2.08 GY

## 2023-01-09 PROCEDURE — 77386: CPT | Performed by: STUDENT IN AN ORGANIZED HEALTH CARE EDUCATION/TRAINING PROGRAM

## 2023-01-09 PROCEDURE — 77336 RADIATION PHYSICS CONSULT: CPT | Performed by: STUDENT IN AN ORGANIZED HEALTH CARE EDUCATION/TRAINING PROGRAM

## 2023-01-09 PROCEDURE — 77427 RADIATION TX MANAGEMENT X5: CPT | Performed by: STUDENT IN AN ORGANIZED HEALTH CARE EDUCATION/TRAINING PROGRAM

## 2023-01-09 NOTE — PROGRESS NOTES
Radiation Oncology  On-Treatment Note      Patient: Giovani España    MRN: [id number]    Attending Physician: Kza Marrero MD     Diagnosis:     ICD-10-CM ICD-9-CM   1. Neuroendocrine carcinoma of lung (HCC)  C7A.8 209.21       Radiation Therapy Visit:  Continue radiation therapy, Dosimetry plan remains acceptable, Films reviewed and remains acceptable, Pain assessed, Pain management planned, Radiation dose schedule reviewed and remains acceptable, Radiation technique remains acceptable and Symptoms within expected range    Radiation Treatments     Active   Plans   Lt Lung   Most recent treatment: Dose planned: 200 cGy (fraction 28 on 1/9/2023)   Total: Dose planned: 6,000 cGy (30 fractions)   Elapsed Days: 42      Reference Points   Lt Lung Calc   Most recent treatment: Dose given: 208 cGy (on 1/9/2023)   Total: Dose given: 5,820 cGy   Elapsed Days: 42      RX L Lung   Most recent treatment: Dose given: 200 cGy (on 1/9/2023)   Total: Dose given: 5,600 cGy   Elapsed Days: 42                    Physical Examination:  Vitals: Blood pressure 123/68, pulse 68, temperature 97.5 °F (36.4 °C), weight 70.9 kg (156 lb 3.2 oz), SpO2 98 %.  Vitals:    01/09/23 0822   BP: 123/68   Pulse: 68   Temp: 97.5 °F (36.4 °C)   SpO2: 98%   Weight: 70.9 kg (156 lb 3.2 oz)     Pain Score    01/09/23 0822   PainSc: 0-No pain       Restricted in physically strenuous activity but ambulatory and able to carry out work of a light or sedentary nature, e.g., light house work, office work = 1    We examined the relevant areas: yes  Findings are within the expected range for this stage of treatment: yes  -------------------------------------------------------------------------------------------------------------------    ACTION ITEMS:  Patient tolerating treatment well and as expected for this stage in their treatment and Continue radiation therapy as planned    Estimated Completion Date: 1/11/2023     First follow up in ~6 weeks with next PET  CT, which is going to be ordered by Trena Marrero MD  Radiation Oncology

## 2023-01-10 LAB
RAD ONC ARIA COURSE ID: NORMAL
RAD ONC ARIA COURSE INTENT: NORMAL
RAD ONC ARIA COURSE LAST TREATMENT DATE: NORMAL
RAD ONC ARIA COURSE START DATE: NORMAL
RAD ONC ARIA COURSE TREATMENT ELAPSED DAYS: 43
RAD ONC ARIA FIRST TREATMENT DATE: NORMAL
RAD ONC ARIA PLAN FRACTIONS TREATED TO DATE: 29
RAD ONC ARIA PLAN ID: NORMAL
RAD ONC ARIA PLAN PRESCRIBED DOSE PER FRACTION: 2 GY
RAD ONC ARIA PLAN PRIMARY REFERENCE POINT: NORMAL
RAD ONC ARIA PLAN TOTAL FRACTIONS PRESCRIBED: 30
RAD ONC ARIA PLAN TOTAL PRESCRIBED DOSE: 6000 CGY
RAD ONC ARIA REFERENCE POINT DOSAGE GIVEN TO DATE: 58 GY
RAD ONC ARIA REFERENCE POINT DOSAGE GIVEN TO DATE: 60.28 GY
RAD ONC ARIA REFERENCE POINT ID: NORMAL
RAD ONC ARIA REFERENCE POINT ID: NORMAL
RAD ONC ARIA REFERENCE POINT SESSION DOSAGE GIVEN: 2 GY
RAD ONC ARIA REFERENCE POINT SESSION DOSAGE GIVEN: 2.08 GY

## 2023-01-10 PROCEDURE — 77386: CPT | Performed by: STUDENT IN AN ORGANIZED HEALTH CARE EDUCATION/TRAINING PROGRAM

## 2023-01-10 PROCEDURE — 77014 CHG CT GUIDANCE RADIATION THERAPY FLDS PLACEMENT: CPT | Performed by: STUDENT IN AN ORGANIZED HEALTH CARE EDUCATION/TRAINING PROGRAM

## 2023-01-10 NOTE — PROGRESS NOTES
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  Notably there is invasive carcinoma present at the margin, 2 positive lymph nodes adjacent to the bronchovesicular margin which appears to have been transected difficult to enumerate with extranodal extension present.      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  He had been assessed particularly in the fall 2021 when he presented with nausea and vomiting and abdominal discomfort leading to assessments that revealed sigmoid diverticulitis with contained rupture and partial small bowel obstruction.  Records from mid September 21 indicating that he was admitted with partial small bowel obstruction and treated medically with very slow recovery.  He has history of COPD with CT demonstrating a pulmonary nodule in the right lung base at 7 mm with follow-up planned.  He was seen by general surgery in later September with repeat scans planned and repeat CT abdomen 10/7/2021 did demonstrate interval improvement of sigmoid diverticulitis.      Record indicates an assessment in late June 2022 at different general surgeon for left inguinal hernia, history of heavy tobacco use with COPD and recommendation for surgical repair of his hernia      The patient underwent a CT scan of the chest 5/7/2022 demonstrating diffuse emphysematous changes, ill-defined density measuring 3 mm in the superior segment of the right lower lobe, multiple ill-defined 3 to 4 mm nodular density thought to be inflammatory involving the right lower lobe  and a new spiculated nodule involving the left lower lobe measuring 16 x 14 mm as well as left hilar adenopathy.       Follow-up PET/CT 7/13/2022 reveal a hypermetabolic spiculated lesion medial aspect the left lower lobe adjacent to descending thoracic aorta measuring 1.5 x 1.6 SUV 9.3 with an enlarged hypermetabolic left hilar lymph node measured 1.5 x 1.3 with SUV of 12.1, additional nodules in the lateral aspect right lower lobe and micronodule in the posterior/medial right lower lobe and also additional right lower lobe micronodule.  There is an infrarenal abdominal aortic aneurysm measuring 3.4 cm with a short segment of chronic dissection present.    These clinical findings would be consistent with a possible T1b N1 M0-stage IIb lung cancer.      Recognizing a diagnosis has not been made his case was discussed in thoracic conference 7/20/2022.  Recommendations include proceeding to pulmonary assessment with EBUS and the patient undergoing a general assessment per his clinical status-older adult assessment as well as assessment by thoracic surgery.  These issues are discussed with the patient and his wife in detail when they are seen 7/28/2022.    The patient proceeded to undergo his hernia surgery 8/2/2022 by Dr. Dotson.  Additionally the patient was unable to undergo assessment by pulmonary until October and, thereafter, was scheduled to see Dr. Joe 8/18/2022 undergoing older adult functional assessment with a JAGUAR score of 11 indicating a risk of functional decline related to cancer therapy.    The patient is seen with his significant other 8/15/2022 and doing very well with recent hernia surgery.  His performance status is reasonably good at present and we have learned now that he would not be able to see pulmonary medicine until October, thoracic surgery is scheduled this week with Dr. Joe.  Perhaps he could be a surgical candidate.  Particularly we know by van Mata that T p53 splice site  mutation is present and would certainly be consistent as well the most common mutations found in lung cancers particularly squamous cancers.  We have discussed obtaining pulmonary functions at this point, thoracic surgery assessment this week and also restarting Chantix as possible for the patient.    There was difficulty obtaining Chantix and while this was being assessed the patient was reviewed 8/18 by thoracic surgery.  He was felt to have a T1b N1 M0 stage IIb carcinoma left lower lobe along and not a candidate for biopsy.  PFTs were borderline, functional assessment was reasonably good and the patient was felt to be a candidate for robot-assisted biopsy of his nodes and if malignant proceed to left lower lobe lobectomy.  He was reviewed 9/8 and offered 21 mg nicotine transdermal patching.    He is next seen 9/10/2022.  He is seen with his wife and both are prepared for surgery 9/23/2022.  We discussed that additional therapy may be considered once we have more information about the type and stage.  We would hope to see him postoperatively.    The patient was admitted 9//2022 undergoing 9/23/2022  a bronchoscopy with left video-assisted thoracoscopy with robot-assisted total decortication of the left lung, left lower lobectomy, mediastinal lymphadenectomy and intercostal nerve block with Dr. Nicola Joe.  Fortunately he did fairly well postoperatively though did develop atrial fibrillation with RVR treated with amiodarone converting back to sinus rhythm, treated with IV metoprolol subsequent chronic anticoagulation.  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  Notably there is invasive carcinoma present at the margin.  Is a, and only 2 positive lymph nodes adjacent to the bronchovesicular margin which appears to have been transected difficult to enumerate with extranodal  extension present.       The patient is seen 10/27/2022.  He is recovering well from surgery, albeit slowly, and we have discussed his findings that are concerning as to residual disease with a positive margin described as above.  There is also the significance potentially of PD-L1 expression which has been described as producing a poor survival.     Nivolumab has been used as second line therapy to positive effect in the disease and this begs the question as to whether adjuvant therapy plus immunotherapy could be utilized though the patient would be difficult to treat with platinum based chemotherapy as well.       The patient is next assessed 11/7/2022 for significant assessments by supportive oncology at [facility] as well as with plans to see Dr. Marrero 11/9/2022.  Unfortunately he has been very slow to gradually recover from the effects of surgery with reduced appetite and only fair performance status.     At present it would be difficult to give him cisplatin based chemotherapy and we discussed whether we might be able to use Tecentriq (atezolizumab) as his primary adjuvant therapy.  We have contacted pathology about completing Caris testing and a PD-L1 analysis that has not yet completed.         The patient is seen, however, 11/16/2022 and his genomic analysis has returned as being significant for broad sensitivity to immunotherapy.  This would argue for the administration of weekly Carboplatin/Taxol as the patient proceeds to radiation therapy and upon completion, then using Tecentriq as further adjuvant therapy over a year.  This is discussed with the patient and his  in detail as well as discussed with Dr. Marrero of radiation therapy.  We have also discussed the patient require port placement.    The patient proceeded to treatment taking weekly carboplatin Taxol with concurrent radiation therapy last seen 12/12/2022 with third weekly treatment.    He is next seen 12/19/2022 with H&H 11.9 and 38.5, white  count 3460 and platelet count of 168,000.  He is feeling well without any significant complications of therapy except for right calf dermatitis that is been developing in the last several days.    The patient continued therapy taking CarboTaxol weekly including 12/28 and 1/4/2023.    His is next seen 1/11/2023 with completion date for radiation therapy 1/11/2023.  Repeat CBC now includes H&H of 11.0 and 33.9, white count 2090, platelet count 128,000, ANC is 800.  He, fortunately, is tolerating treatment well and we have again discussed with her adjuvant immunotherapy would be useful including Tecentriq versus pembrolizumab-keynote 091 study particularly in tumor programmed cell death PD-L1 greater than 50%.    The patient will not be given additional chemotherapy 1/11/2023 we will plan to reassess by follow-up scans in the next 6 weeks CT chest and pelvis making a decision thereafter about adjuvant immunotherapy.    Past Medical History:   Diagnosis Date   • Arthritis    • COPD (chronic obstructive pulmonary disease) (HCC)     O2 3L AT HS   • Diabetes mellitus (HCC)     DIET CONTROL   • Diverticulitis    • DVT, lower extremity (HCC)     RLE   • Elevated cholesterol    • H/O Cervical pain    • History of colitis    • Hyperlipidemia    • Hypertension    • Left shoulder pain    • Low back pain    • Lung cancer (HCC) 2022    LEFT LUNG   • On anticoagulant therapy    • Peripheral arterial disease (HCC)    • Right leg claudication (HCC)    • Skin cancer     HX        Past Surgical History:   Procedure Laterality Date   • BALLOON ANGIOPLASTY, ARTERY Right 2015    IR Percutaneious IVC filter placement   • INGUINAL HERNIA REPAIR Left    • KNEE ARTHROSCOPY Left     Torn meniscus   • LOBECTOMY Left 9/23/2022    Procedure: BRONCHOSCOPY, LEFT VIDEO ASSISTED THORACOSCOPY, ROBOT ASSISTED TOTAL DECORTICATION  LEFT LUNG, LEFT LOWER LOBE LOBECTOMY, MEDIASTINAL LYMPHECTOMY, INTERCOSTAL NERVE BLOCK;  Surgeon: Nicola Joe III, MD;   Location: [facility] MAIN OR;  Service: Robotics - DaVinci;  Laterality: Left;   • VENOUS ACCESS DEVICE (PORT) INSERTION Right 11/21/2022    Procedure: INSERTION VENOUS ACCESS DEVICE;  Surgeon: Nicola Joe III, MD;  Location: [facility] MAIN OR;  Service: Thoracic;  Laterality: Right;        Current Outpatient Medications on File Prior to Visit   Medication Sig Dispense Refill   • arformoterol (BROVANA) 15 MCG/2ML nebulizer solution Take 15 mcg by nebulization 2 (Two) Times a Day.     • budesonide (PULMICORT) 0.5 MG/2ML nebulizer solution Take 0.5 mg by nebulization 2 (Two) Times a Day.     • cetirizine (zyrTEC) 10 MG tablet Take 10 mg by mouth Daily.     • isosorbide mononitrate (IMDUR) 60 MG 24 hr tablet Take 60 mg by mouth Every Evening.     • simvastatin (ZOCOR) 20 MG tablet Take 20 mg by mouth Every Night.     • tamsulosin (FLOMAX) 0.4 MG capsule 24 hr capsule Take 1 capsule by mouth Daily. 30 capsule 5   • warfarin (COUMADIN) 5 MG tablet Take 1 tablet by mouth Daily. Take 10mg on 9/29/22 and then resume regular home schedule.     • metoprolol succinate XL (TOPROL-XL) 25 MG 24 hr tablet Take 1 tablet by mouth Daily for 30 days. (Patient taking differently: Take 25 mg by mouth Every Evening.) 30 tablet 0   • ondansetron (Zofran) 8 MG tablet Take 1 tablet by mouth Every 8 (Eight) Hours As Needed for Nausea or Vomiting. 30 tablet 1     No current facility-administered medications on file prior to visit.        ALLERGIES:    Allergies   Allergen Reactions   • Benadryl [Diphenhydramine] Other (See Comments)     Extreme restlessness and insomnia        Social History     Socioeconomic History   • Marital status:    • Years of education: 1 year college   Tobacco Use   • Smoking status: Every Day     Packs/day: 1.00     Years: 59.00     Pack years: 59.00     Types: Cigarettes     Start date: 1963   • Smokeless tobacco: Never   • Tobacco comments:     9/8/22: Down to Less than 1 PPD   Vaping Use   • Vaping Use:  "Never used   Substance and Sexual Activity   • Alcohol use: Not Currently   • Drug use: Never   • Sexual activity: Defer        Family History   Problem Relation Age of Onset   • No Known Problems Mother    • Cancer Father    • Lung cancer Father    • Diabetes Brother    • Other Daughter         S/P stent, age 42   • No Known Problems Son    • Malig Hyperthermia Neg Hx         Review of Systems   Constitutional: Positive for activity change, fatigue and unexpected weight change (Status post his diverticulitis episode, weight has not been regained).   HENT: Negative.    Eyes: Negative.    Respiratory: Positive for shortness of breath. Negative for cough.    Cardiovascular: Negative.    Gastrointestinal: Negative.    Genitourinary: Negative.    Musculoskeletal: Negative.    Skin: Positive for rash (Involving right calf).   Neurological: Negative.    Psychiatric/Behavioral: Negative.         Objective     Vitals:    01/11/23 0954   BP: 103/60   Pulse: 74   Resp: 18   Temp: 98.6 °F (37 °C)   TempSrc: Temporal   SpO2: 96%   Weight: 68.9 kg (151 lb 14.4 oz)   Height: 177.8 cm (70\")   PainSc: 0-No pain     Current Status 1/11/2023   ECOG score 0       Physical Exam  Constitutional:       Appearance: Normal appearance.   HENT:      Head: Normocephalic and atraumatic.      Nose: Nose normal.      Mouth/Throat:      Mouth: Mucous membranes are moist.      Pharynx: Oropharynx is clear.   Eyes:      Extraocular Movements: Extraocular movements intact.      Conjunctiva/sclera: Conjunctivae normal.      Pupils: Pupils are equal, round, and reactive to light.   Cardiovascular:      Rate and Rhythm: Normal rate and regular rhythm.      Pulses: Normal pulses.      Heart sounds: Normal heart sounds.   Pulmonary:      Comments: Prolonged expiratory phase bilaterally  Abdominal:      General: Bowel sounds are normal.      Palpations: Abdomen is soft.      Comments: Scaphoid   Musculoskeletal:         General: Normal range of motion.    "   Cervical back: Normal range of motion and neck supple.   Skin:     General: Skin is warm and dry.      Findings: Rash (Right calf, macular erythematous rash) present.   Neurological:      General: No focal deficit present.      Mental Status: He is alert and oriented to person, place, and time.   Psychiatric:         Mood and Affect: Mood normal.           RECENT LABS:  Hematology WBC   Date Value Ref Range Status   01/11/2023 2.09 (L) 3.40 - 10.80 10*3/mm3 Final   05/09/2022 7.54 4.5 - 11.0 10*3/uL Final     RBC   Date Value Ref Range Status   01/11/2023 3.58 (L) 4.14 - 5.80 10*6/mm3 Final   05/09/2022 5.52 4.5 - 5.9 10*6/uL Final     Hemoglobin   Date Value Ref Range Status   01/11/2023 11.0 (L) 13.0 - 17.7 g/dL Final   05/09/2022 16.4 13.5 - 17.5 g/dL Final     Hematocrit   Date Value Ref Range Status   01/11/2023 33.9 (L) 37.5 - 51.0 % Final   05/09/2022 51.0 41.0 - 53.0 % Final     Platelets   Date Value Ref Range Status   01/11/2023 128 (L) 140 - 450 10*3/mm3 Final   05/09/2022 204 140 - 440 10*3/uL Final          Assessment & Plan           77-year-old male with medical issues including type 2 diabetes-uncertain control, COPD, hypertension, diastolic heart failure, chronic disease, peripheral vascular disease (follow-up with vascular surgery today), previous DVT on anticoagulation (home monitoring), previous IVC filter placement?,  Status post September 2021 partial small bowel obstruction secondary to sigmoid diverticulitis treated medically.        He had had a right lung base nodule noted on scan at that point and in follow-up with primary care had developed a left inguinal hernia with repair planned through a different general surgeon then seen with his initial sigmoid diverticulitis.  An additional CT scan of the chest done in follow-up of his previously abnormal study now revealed not only the previously noted right lower lobe and additional nodules but a spiculated nodule in the left lower lobe  measuring 16 x 14 mm.                                 Follow-up PET/CT demonstrated a hypermetabolic spiculated lesion medial aspect the left lower lobe adjacent to descending thoracic aorta measuring 1.5 x 1.6 SUV 9.3 with an enlarged hypermetabolic left hilar lymph node measured 1.5 x 1.3 with SUV of 12.1, additional nodules in the lateral aspect right lower lobe and micronodule in the posterior/medial right lower lobe and also additional right lower lobe micronodule.  There is an infrarenal abdominal aortic aneurysm measuring 3.4 cm with a short segment of chronic dissection present.    These clinical findings would be consistent with a possible T1b N1 M0-stage IIb lung cancer.   Recognizing a diagnosis has not been made his case was discussed in thoracic conference 7/20/2022.  Recommendations include proceeding to pulmonary assessment with EBUS and the patient undergoing a general assessment per his clinical status-older adult assessment as well as assessment by thoracic surgery.  These issues are discussed with the patient and his wife in detail when they are seen 7/28/2022.    The patient proceeded to undergo his hernia surgery 8/2/2022 by Dr. Dotson.  Additionally the patient was unable to undergo assessment by pulmonary until October and, thereafter, was scheduled to see Dr. Joe 8/18/2022 undergoing older adult functional assessment with a JAGUAR score of 11 indicating a risk of functional decline related to cancer therapy.    The patient is seen with his wife 8/15/2022 and doing very well with recent hernia surgery.  His performance status is reasonably good at present and we have learned now that he would not be able to see pulmonary medicine until October, thoracic surgery is scheduled this week with Dr. Joe.  Perhaps he could be a surgical candidate.  Particularly we know by van Mata that T p53 splice site mutation is present and would certainly be consistent as well the most common mutations  found in lung cancers particularly squamous cancers.  We have discussed obtaining pulmonary functions at this point, thoracic surgery assessment this week and also restarting Chantix as possible for the patient.    There was difficulty obtaining Chantix and while this was being assessed the patient was reviewed 8/18 by thoracic surgery.  He was felt to have a T1b N1 M0 stage IIb carcinoma left lower lobe along and not a candidate for biopsy.  PFTs were borderline, functional assessment was reasonably good and the patient was felt to be a candidate for robot-assisted biopsy of his nodes and if malignant proceed to left lower lobe lobectomy.  He was reviewed 9/8 and offered 21 mg nicotine transdermal patching.    He is next seen 9/10/2022.  He is seen with his wife and both are prepared for surgery 9/23/2022.  We discussed that additional therapy may be considered once we have more information about the type and stage.  We would hope to see him postoperatively.    The patient was admitted 9//2022 undergoing 9/23/2022  a bronchoscopy with left video-assisted thoracoscopy with robot-assisted total decortication of the left lung, left lower lobectomy, mediastinal lymphadenectomy and intercostal nerve block with Dr. Nicola Joe.  Fortunately he did fairly well postoperatively though did develop atrial fibrillation with RVR treated with amiodarone converting back to sinus rhythm, treated with IV metoprolol subsequent chronic anticoagulation.  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  Notably there is invasive carcinoma present at the margin.  Is a, and only 2 positive lymph nodes adjacent to the bronchovesicular margin which appears to have been transected difficult to enumerate with extranodal extension present.       The patient is seen 10/27/2022.  He is recovering well from  surgery, albeit slowly, and we have discussed his findings that are concerning as to residual disease with a positive margin described as above.  There is also the significance potentially of PD-L1 expression which has been described as producing a poor survival.  Nivolumab has been used as second line therapy to positive effect in the disease and this begs the question as to whether adjuvant therapy plus immunotherapy could be utilized though the patient would be difficult to treat with platinum based chemotherapy as well.       Options could well be Carboplatin and Taxol considering the patient's comorbidity and worry of using cisplatin-based chemotherapy in this patient followed by atezolizumab with pathology having been notified to assess PD-L1 expression on the tumor as well also await review by Caris molecular profiling.  Finally radiation therapy consultation be requested.    The patient is next assessed 11/7/2022 for significant assessments by supportive oncology at [facility] as well as with plans to see Dr. Marrero 11/9/2022.  Unfortunately he has been very slow to gradually recover from the effects of surgery with reduced appetite and only fair performance status.  At present it would be difficult to give him cisplatin based chemotherapy and we discussed whether we might be able to use Tecentriq (atezolizumab) as his primary adjuvant therapy.  We have contacted pathology about completing Caris testing and a PD-L1 analysis that has not yet completed.    The patient was reviewed by radiation therapy seen 11/9/2022 and radiation therapy offered.  We asked the patient to return back for follow-up and when seen 11/15/2022 we discussed that the patient's Caris analysis indicates benefit from immunotherapy at relatively high levels.  This would allow radiation therapy given with Carboplatin Taxol weekly (better tolerated) and then subsequent atezolizumab adjuvantly.    The patient proceeded to treatment taking weekly  carboplatin Taxol with concurrent radiation therapy beginning 11/28/2022 and last seen 12/12/2022 with third weekly treatment.    He is next seen 12/19/2022 with H&H 11.9 and 38.5, white count 3460 and platelet count of 168,000.  He is feeling well without any significant complications of therapy except for right calf dermatitis that is been developing in the last several days.    The patient continued therapy taking CarboTaxol weekly including 12/28 and 1/4/2023.    His neck seen 1/11/2023 with completion date 1/11/2023.  Repeat CBC now includes H&H of 11.0 and 33.9, white count 2090, platelet count 128,000, ANC is 800.  He, fortunately, is tolerating treatment well and we have again discussed with her adjuvant immunotherapy would be useful including Tecentriq versus pembrolizumab-keynote 091 study particularly in tumor programmed cell death PD-L1 greater than 50%.      Plan:  *Additional chemotherapy today not necessary    *Request CT chest abdomen pelvis in approximately 6 weeks    *1 week follow-up MD    *Follow-up with radiation therapy already planned as well.

## 2023-01-11 ENCOUNTER — TREATMENT (OUTPATIENT)
Dept: RADIATION ONCOLOGY | Facility: HOSPITAL | Age: 78
End: 2023-01-11
Payer: MEDICARE

## 2023-01-11 ENCOUNTER — OFFICE VISIT (OUTPATIENT)
Dept: ONCOLOGY | Facility: CLINIC | Age: 78
End: 2023-01-11
Payer: MEDICARE

## 2023-01-11 ENCOUNTER — INFUSION (OUTPATIENT)
Dept: ONCOLOGY | Facility: HOSPITAL | Age: 78
End: 2023-01-11
Payer: MEDICARE

## 2023-01-11 VITALS
OXYGEN SATURATION: 96 % | TEMPERATURE: 98.6 F | HEIGHT: 70 IN | BODY MASS INDEX: 21.75 KG/M2 | HEART RATE: 74 BPM | SYSTOLIC BLOOD PRESSURE: 103 MMHG | WEIGHT: 151.9 LBS | RESPIRATION RATE: 18 BRPM | DIASTOLIC BLOOD PRESSURE: 60 MMHG

## 2023-01-11 DIAGNOSIS — C7A.8 NEUROENDOCRINE CARCINOMA OF LUNG: Primary | ICD-10-CM

## 2023-01-11 DIAGNOSIS — C7A.8 NEUROENDOCRINE CARCINOMA OF LUNG: ICD-10-CM

## 2023-01-11 LAB
ALBUMIN SERPL-MCNC: 3.9 G/DL (ref 3.5–5.2)
ALBUMIN/GLOB SERPL: 1.5 G/DL (ref 1.1–2.4)
ALP SERPL-CCNC: 57 U/L (ref 38–116)
ALT SERPL W P-5'-P-CCNC: 9 U/L (ref 0–41)
ANION GAP SERPL CALCULATED.3IONS-SCNC: 8.3 MMOL/L (ref 5–15)
AST SERPL-CCNC: 13 U/L (ref 0–40)
BASOPHILS # BLD AUTO: 0.01 10*3/MM3 (ref 0–0.2)
BASOPHILS NFR BLD AUTO: 0.5 % (ref 0–1.5)
BILIRUB SERPL-MCNC: 0.4 MG/DL (ref 0.2–1.2)
BUN SERPL-MCNC: 16 MG/DL (ref 6–20)
BUN/CREAT SERPL: 18 (ref 7.3–30)
CALCIUM SPEC-SCNC: 10 MG/DL (ref 8.5–10.2)
CHLORIDE SERPL-SCNC: 100 MMOL/L (ref 98–107)
CO2 SERPL-SCNC: 26.7 MMOL/L (ref 22–29)
CREAT SERPL-MCNC: 0.89 MG/DL (ref 0.7–1.3)
DEPRECATED RDW RBC AUTO: 54.8 FL (ref 37–54)
EGFRCR SERPLBLD CKD-EPI 2021: 89.4 ML/MIN/1.73
EOSINOPHIL # BLD AUTO: 0.04 10*3/MM3 (ref 0–0.4)
EOSINOPHIL NFR BLD AUTO: 1.9 % (ref 0.3–6.2)
ERYTHROCYTE [DISTWIDTH] IN BLOOD BY AUTOMATED COUNT: 16.1 % (ref 12.3–15.4)
GLOBULIN UR ELPH-MCNC: 2.6 GM/DL (ref 1.8–3.5)
GLUCOSE SERPL-MCNC: 160 MG/DL (ref 74–124)
HCT VFR BLD AUTO: 33.9 % (ref 37.5–51)
HGB BLD-MCNC: 11 G/DL (ref 13–17.7)
IMM GRANULOCYTES # BLD AUTO: 0.02 10*3/MM3 (ref 0–0.05)
IMM GRANULOCYTES NFR BLD AUTO: 1 % (ref 0–0.5)
LYMPHOCYTES # BLD AUTO: 1.02 10*3/MM3 (ref 0.7–3.1)
LYMPHOCYTES NFR BLD AUTO: 48.8 % (ref 19.6–45.3)
MCH RBC QN AUTO: 30.7 PG (ref 26.6–33)
MCHC RBC AUTO-ENTMCNC: 32.4 G/DL (ref 31.5–35.7)
MCV RBC AUTO: 94.7 FL (ref 79–97)
MONOCYTES # BLD AUTO: 0.2 10*3/MM3 (ref 0.1–0.9)
MONOCYTES NFR BLD AUTO: 9.6 % (ref 5–12)
NEUTROPHILS NFR BLD AUTO: 0.8 10*3/MM3 (ref 1.7–7)
NEUTROPHILS NFR BLD AUTO: 38.2 % (ref 42.7–76)
NRBC BLD AUTO-RTO: 0 /100 WBC (ref 0–0.2)
PLATELET # BLD AUTO: 128 10*3/MM3 (ref 140–450)
PMV BLD AUTO: 9.1 FL (ref 6–12)
POTASSIUM SERPL-SCNC: 4.8 MMOL/L (ref 3.5–4.7)
PROT SERPL-MCNC: 6.5 G/DL (ref 6.3–8)
RAD ONC ARIA COURSE END DATE: NORMAL
RAD ONC ARIA COURSE ID: NORMAL
RAD ONC ARIA COURSE ID: NORMAL
RAD ONC ARIA COURSE INTENT: NORMAL
RAD ONC ARIA COURSE INTENT: NORMAL
RAD ONC ARIA COURSE LAST TREATMENT DATE: NORMAL
RAD ONC ARIA COURSE LAST TREATMENT DATE: NORMAL
RAD ONC ARIA COURSE START DATE: NORMAL
RAD ONC ARIA COURSE START DATE: NORMAL
RAD ONC ARIA COURSE TREATMENT ELAPSED DAYS: 44
RAD ONC ARIA COURSE TREATMENT ELAPSED DAYS: 44
RAD ONC ARIA FIRST TREATMENT DATE: NORMAL
RAD ONC ARIA FIRST TREATMENT DATE: NORMAL
RAD ONC ARIA PLAN FRACTIONS TREATED TO DATE: 30
RAD ONC ARIA PLAN FRACTIONS TREATED TO DATE: 30
RAD ONC ARIA PLAN ID: NORMAL
RAD ONC ARIA PLAN ID: NORMAL
RAD ONC ARIA PLAN NAME: NORMAL
RAD ONC ARIA PLAN PRESCRIBED DOSE PER FRACTION: 2 GY
RAD ONC ARIA PLAN PRESCRIBED DOSE PER FRACTION: 2 GY
RAD ONC ARIA PLAN PRIMARY REFERENCE POINT: NORMAL
RAD ONC ARIA PLAN PRIMARY REFERENCE POINT: NORMAL
RAD ONC ARIA PLAN TOTAL FRACTIONS PRESCRIBED: 30
RAD ONC ARIA PLAN TOTAL FRACTIONS PRESCRIBED: 30
RAD ONC ARIA PLAN TOTAL PRESCRIBED DOSE: 6000 CGY
RAD ONC ARIA PLAN TOTAL PRESCRIBED DOSE: 6000 CGY
RAD ONC ARIA REFERENCE POINT DOSAGE GIVEN TO DATE: 60 GY
RAD ONC ARIA REFERENCE POINT DOSAGE GIVEN TO DATE: 60 GY
RAD ONC ARIA REFERENCE POINT DOSAGE GIVEN TO DATE: 62.36 GY
RAD ONC ARIA REFERENCE POINT DOSAGE GIVEN TO DATE: 62.36 GY
RAD ONC ARIA REFERENCE POINT ID: NORMAL
RAD ONC ARIA REFERENCE POINT SESSION DOSAGE GIVEN: 2 GY
RAD ONC ARIA REFERENCE POINT SESSION DOSAGE GIVEN: 2.08 GY
RBC # BLD AUTO: 3.58 10*6/MM3 (ref 4.14–5.8)
SODIUM SERPL-SCNC: 135 MMOL/L (ref 134–145)
WBC NRBC COR # BLD: 2.09 10*3/MM3 (ref 3.4–10.8)

## 2023-01-11 PROCEDURE — 99214 OFFICE O/P EST MOD 30 MIN: CPT | Performed by: INTERNAL MEDICINE

## 2023-01-11 PROCEDURE — 36591 DRAW BLOOD OFF VENOUS DEVICE: CPT

## 2023-01-11 PROCEDURE — 77014 CHG CT GUIDANCE RADIATION THERAPY FLDS PLACEMENT: CPT | Performed by: STUDENT IN AN ORGANIZED HEALTH CARE EDUCATION/TRAINING PROGRAM

## 2023-01-11 PROCEDURE — 77386: CPT | Performed by: STUDENT IN AN ORGANIZED HEALTH CARE EDUCATION/TRAINING PROGRAM

## 2023-01-11 PROCEDURE — 85025 COMPLETE CBC W/AUTO DIFF WBC: CPT

## 2023-01-11 PROCEDURE — 80053 COMPREHEN METABOLIC PANEL: CPT

## 2023-01-11 NOTE — LETTER
January 11, 2023     GARCÍA Lloyd  [address] [facility] [address]    Patient: Giovani España   YOB: 1945   Date of Visit: 1/11/2023       Dear GARCÍA Chester:    Thank you for referring Giovani España to me for evaluation. Below are the relevant portions of my assessment and plan of care.    If you have questions, please do not hesitate to call me. I look forward to following Giovani along with you.         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  Notably there is invasive carcinoma present at the margin, 2 positive lymph nodes adjacent to the bronchovesicular margin which appears to have been transected difficult to enumerate with extranodal extension present.      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  He had been assessed particularly in the fall 2021 when he presented with nausea and vomiting and abdominal discomfort leading to assessments that revealed sigmoid diverticulitis with contained rupture and partial small bowel obstruction.  Records from mid September 21 indicating that he was admitted with partial small bowel obstruction and treated medically with very slow recovery.  He has history of COPD with CT demonstrating a pulmonary nodule in the right lung base at 7 mm with follow-up planned.  He was seen by general surgery in later September with repeat scans planned and repeat CT abdomen 10/7/2021  did demonstrate interval improvement of sigmoid diverticulitis.      Record indicates an assessment in late June 2022 at different general surgeon for left inguinal hernia, history of heavy tobacco use with COPD and recommendation for surgical repair of his hernia      The patient underwent a CT scan of the chest 5/7/2022 demonstrating diffuse emphysematous changes, ill-defined density measuring 3 mm in the superior segment of the right lower lobe, multiple ill-defined 3 to 4 mm nodular density thought to be inflammatory involving the right lower lobe and a new spiculated nodule involving the left lower lobe measuring 16 x 14 mm as well as left hilar adenopathy.       Follow-up PET/CT 7/13/2022 reveal a hypermetabolic spiculated lesion medial aspect the left lower lobe adjacent to descending thoracic aorta measuring 1.5 x 1.6 SUV 9.3 with an enlarged hypermetabolic left hilar lymph node measured 1.5 x 1.3 with SUV of 12.1, additional nodules in the lateral aspect right lower lobe and micronodule in the posterior/medial right lower lobe and also additional right lower lobe micronodule.  There is an infrarenal abdominal aortic aneurysm measuring 3.4 cm with a short segment of chronic dissection present.    These clinical findings would be consistent with a possible T1b N1 M0-stage IIb lung cancer.      Recognizing a diagnosis has not been made his case was discussed in thoracic conference 7/20/2022.  Recommendations include proceeding to pulmonary assessment with EBUS and the patient undergoing a general assessment per his clinical status-older adult assessment as well as assessment by thoracic surgery.  These issues are discussed with the patient and his wife in detail when they are seen 7/28/2022.    The patient proceeded to undergo his hernia surgery 8/2/2022 by Dr. Dotson.  Additionally the patient was unable to undergo assessment by pulmonary until October and, thereafter, was scheduled to see Dr. Joe  8/18/2022 undergoing older adult functional assessment with a JAGUAR score of 11 indicating a risk of functional decline related to cancer therapy.    The patient is seen with his significant other 8/15/2022 and doing very well with recent hernia surgery.  His performance status is reasonably good at present and we have learned now that he would not be able to see pulmonary medicine until October, thoracic surgery is scheduled this week with Dr. Joe.  Perhaps he could be a surgical candidate.  Particularly we know by guardant 360 that T p53 splice site mutation is present and would certainly be consistent as well the most common mutations found in lung cancers particularly squamous cancers.  We have discussed obtaining pulmonary functions at this point, thoracic surgery assessment this week and also restarting Chantix as possible for the patient.    There was difficulty obtaining Chantix and while this was being assessed the patient was reviewed 8/18 by thoracic surgery.  He was felt to have a T1b N1 M0 stage IIb carcinoma left lower lobe along and not a candidate for biopsy.  PFTs were borderline, functional assessment was reasonably good and the patient was felt to be a candidate for robot-assisted biopsy of his nodes and if malignant proceed to left lower lobe lobectomy.  He was reviewed 9/8 and offered 21 mg nicotine transdermal patching.    He is next seen 9/10/2022.  He is seen with his wife and both are prepared for surgery 9/23/2022.  We discussed that additional therapy may be considered once we have more information about the type and stage.  We would hope to see him postoperatively.    The patient was admitted 9//2022 undergoing 9/23/2022  a bronchoscopy with left video-assisted thoracoscopy with robot-assisted total decortication of the left lung, left lower lobectomy, mediastinal lymphadenectomy and intercostal nerve block with Dr. Nicola Joe.  Fortunately he did fairly well postoperatively  though did develop atrial fibrillation with RVR treated with amiodarone converting back to sinus rhythm, treated with IV metoprolol subsequent chronic anticoagulation.  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  Notably there is invasive carcinoma present at the margin.  Is a, and only 2 positive lymph nodes adjacent to the bronchovesicular margin which appears to have been transected difficult to enumerate with extranodal extension present.       The patient is seen 10/27/2022.  He is recovering well from surgery, albeit slowly, and we have discussed his findings that are concerning as to residual disease with a positive margin described as above.  There is also the significance potentially of PD-L1 expression which has been described as producing a poor survival.     Nivolumab has been used as second line therapy to positive effect in the disease and this begs the question as to whether adjuvant therapy plus immunotherapy could be utilized though the patient would be difficult to treat with platinum based chemotherapy as well.       The patient is next assessed 11/7/2022 for significant assessments by supportive oncology at [facility] as well as with plans to see Dr. Marrero 11/9/2022.  Unfortunately he has been very slow to gradually recover from the effects of surgery with reduced appetite and only fair performance status.     At present it would be difficult to give him cisplatin based chemotherapy and we discussed whether we might be able to use Tecentriq (atezolizumab) as his primary adjuvant therapy.  We have contacted pathology about completing Caris testing and a PD-L1 analysis that has not yet completed.         The patient is seen, however, 11/16/2022 and his genomic analysis has returned as being significant for broad sensitivity to immunotherapy.  This would argue for the  administration of weekly Carboplatin/Taxol as the patient proceeds to radiation therapy and upon completion, then using Tecentriq as further adjuvant therapy over a year.  This is discussed with the patient and his  in detail as well as discussed with Dr. Marrero of radiation therapy.  We have also discussed the patient require port placement.    The patient proceeded to treatment taking weekly carboplatin Taxol with concurrent radiation therapy last seen 12/12/2022 with third weekly treatment.    He is next seen 12/19/2022 with H&H 11.9 and 38.5, white count 3460 and platelet count of 168,000.  He is feeling well without any significant complications of therapy except for right calf dermatitis that is been developing in the last several days.    The patient continued therapy taking CarboTaxol weekly including 12/28 and 1/4/2023.    His is next seen 1/11/2023 with completion date for radiation therapy 1/11/2023.  Repeat CBC now includes H&H of 11.0 and 33.9, white count 2090, platelet count 128,000, ANC is 800.  He, fortunately, is tolerating treatment well and we have again discussed with her adjuvant immunotherapy would be useful including Tecentriq versus pembrolizumab-keynote 091 study particularly in tumor programmed cell death PD-L1 greater than 50%.    The patient will not be given additional chemotherapy 1/11/2023 we will plan to reassess by follow-up scans in the next 6 weeks CT chest and pelvis making a decision thereafter about adjuvant immunotherapy.    Past Medical History:   Diagnosis Date   • Arthritis    • COPD (chronic obstructive pulmonary disease) (HCC)     O2 3L AT HS   • Diabetes mellitus (HCC)     DIET CONTROL   • Diverticulitis    • DVT, lower extremity (HCC)     RLE   • Elevated cholesterol    • H/O Cervical pain    • History of colitis    • Hyperlipidemia    • Hypertension    • Left shoulder pain    • Low back pain    • Lung cancer (HCC) 2022    LEFT LUNG   • On anticoagulant therapy     • Peripheral arterial disease (HCC)    • Right leg claudication (HCC)    • Skin cancer     HX        Past Surgical History:   Procedure Laterality Date   • BALLOON ANGIOPLASTY, ARTERY Right 2015    IR Percutaneious IVC filter placement   • INGUINAL HERNIA REPAIR Left    • KNEE ARTHROSCOPY Left     Torn meniscus   • LOBECTOMY Left 9/23/2022    Procedure: BRONCHOSCOPY, LEFT VIDEO ASSISTED THORACOSCOPY, ROBOT ASSISTED TOTAL DECORTICATION  LEFT LUNG, LEFT LOWER LOBE LOBECTOMY, MEDIASTINAL LYMPHECTOMY, INTERCOSTAL NERVE BLOCK;  Surgeon: Nicola Joe III, MD;  Location: [facility];  Service: Robotics - DaVinci;  Laterality: Left;   • VENOUS ACCESS DEVICE (PORT) INSERTION Right 11/21/2022    Procedure: INSERTION VENOUS ACCESS DEVICE;  Surgeon: Nicola Joe III, MD;  Location: [facility];  Service: Thoracic;  Laterality: Right;        Current Outpatient Medications on File Prior to Visit   Medication Sig Dispense Refill   • arformoterol (BROVANA) 15 MCG/2ML nebulizer solution Take 15 mcg by nebulization 2 (Two) Times a Day.     • budesonide (PULMICORT) 0.5 MG/2ML nebulizer solution Take 0.5 mg by nebulization 2 (Two) Times a Day.     • cetirizine (zyrTEC) 10 MG tablet Take 10 mg by mouth Daily.     • isosorbide mononitrate (IMDUR) 60 MG 24 hr tablet Take 60 mg by mouth Every Evening.     • simvastatin (ZOCOR) 20 MG tablet Take 20 mg by mouth Every Night.     • tamsulosin (FLOMAX) 0.4 MG capsule 24 hr capsule Take 1 capsule by mouth Daily. 30 capsule 5   • warfarin (COUMADIN) 5 MG tablet Take 1 tablet by mouth Daily. Take 10mg on 9/29/22 and then resume regular home schedule.     • metoprolol succinate XL (TOPROL-XL) 25 MG 24 hr tablet Take 1 tablet by mouth Daily for 30 days. (Patient taking differently: Take 25 mg by mouth Every Evening.) 30 tablet 0   • ondansetron (Zofran) 8 MG tablet Take 1 tablet by mouth Every 8 (Eight) Hours As Needed for Nausea or Vomiting. 30 tablet 1     No current  "facility-administered medications on file prior to visit.        ALLERGIES:    Allergies   Allergen Reactions   • Benadryl [Diphenhydramine] Other (See Comments)     Extreme restlessness and insomnia        Social History     Socioeconomic History   • Marital status:    • Years of education: 1 year college   Tobacco Use   • Smoking status: Every Day     Packs/day: 1.00     Years: 59.00     Pack years: 59.00     Types: Cigarettes     Start date: 1963   • Smokeless tobacco: Never   • Tobacco comments:     9/8/22: Down to Less than 1 PPD   Vaping Use   • Vaping Use: Never used   Substance and Sexual Activity   • Alcohol use: Not Currently   • Drug use: Never   • Sexual activity: Defer        Family History   Problem Relation Age of Onset   • No Known Problems Mother    • Cancer Father    • Lung cancer Father    • Diabetes Brother    • Other Daughter         S/P stent, age 42   • No Known Problems Son    • Malig Hyperthermia Neg Hx         Review of Systems   Constitutional: Positive for activity change, fatigue and unexpected weight change (Status post his diverticulitis episode, weight has not been regained).   HENT: Negative.    Eyes: Negative.    Respiratory: Positive for shortness of breath. Negative for cough.    Cardiovascular: Negative.    Gastrointestinal: Negative.    Genitourinary: Negative.    Musculoskeletal: Negative.    Skin: Positive for rash (Involving right calf).   Neurological: Negative.    Psychiatric/Behavioral: Negative.         Objective     Vitals:    01/11/23 0954   BP: 103/60   Pulse: 74   Resp: 18   Temp: 98.6 °F (37 °C)   TempSrc: Temporal   SpO2: 96%   Weight: 68.9 kg (151 lb 14.4 oz)   Height: 177.8 cm (70\")   PainSc: 0-No pain     Current Status 1/11/2023   ECOG score 0       Physical Exam  Constitutional:       Appearance: Normal appearance.   HENT:      Head: Normocephalic and atraumatic.      Nose: Nose normal.      Mouth/Throat:      Mouth: Mucous membranes are moist.      " Pharynx: Oropharynx is clear.   Eyes:      Extraocular Movements: Extraocular movements intact.      Conjunctiva/sclera: Conjunctivae normal.      Pupils: Pupils are equal, round, and reactive to light.   Cardiovascular:      Rate and Rhythm: Normal rate and regular rhythm.      Pulses: Normal pulses.      Heart sounds: Normal heart sounds.   Pulmonary:      Comments: Prolonged expiratory phase bilaterally  Abdominal:      General: Bowel sounds are normal.      Palpations: Abdomen is soft.      Comments: Scaphoid   Musculoskeletal:         General: Normal range of motion.      Cervical back: Normal range of motion and neck supple.   Skin:     General: Skin is warm and dry.      Findings: Rash (Right calf, macular erythematous rash) present.   Neurological:      General: No focal deficit present.      Mental Status: He is alert and oriented to person, place, and time.   Psychiatric:         Mood and Affect: Mood normal.           RECENT LABS:  Hematology WBC   Date Value Ref Range Status   01/11/2023 2.09 (L) 3.40 - 10.80 10*3/mm3 Final   05/09/2022 7.54 4.5 - 11.0 10*3/uL Final     RBC   Date Value Ref Range Status   01/11/2023 3.58 (L) 4.14 - 5.80 10*6/mm3 Final   05/09/2022 5.52 4.5 - 5.9 10*6/uL Final     Hemoglobin   Date Value Ref Range Status   01/11/2023 11.0 (L) 13.0 - 17.7 g/dL Final   05/09/2022 16.4 13.5 - 17.5 g/dL Final     Hematocrit   Date Value Ref Range Status   01/11/2023 33.9 (L) 37.5 - 51.0 % Final   05/09/2022 51.0 41.0 - 53.0 % Final     Platelets   Date Value Ref Range Status   01/11/2023 128 (L) 140 - 450 10*3/mm3 Final   05/09/2022 204 140 - 440 10*3/uL Final          Assessment & Plan           77-year-old male with medical issues including type 2 diabetes-uncertain control, COPD, hypertension, diastolic heart failure, chronic disease, peripheral vascular disease (follow-up with vascular surgery today), previous DVT on anticoagulation (home monitoring), previous IVC filter placement?,   Status post September 2021 partial small bowel obstruction secondary to sigmoid diverticulitis treated medically.        He had had a right lung base nodule noted on scan at that point and in follow-up with primary care had developed a left inguinal hernia with repair planned through a different general surgeon then seen with his initial sigmoid diverticulitis.  An additional CT scan of the chest done in follow-up of his previously abnormal study now revealed not only the previously noted right lower lobe and additional nodules but a spiculated nodule in the left lower lobe measuring 16 x 14 mm.                                 Follow-up PET/CT demonstrated a hypermetabolic spiculated lesion medial aspect the left lower lobe adjacent to descending thoracic aorta measuring 1.5 x 1.6 SUV 9.3 with an enlarged hypermetabolic left hilar lymph node measured 1.5 x 1.3 with SUV of 12.1, additional nodules in the lateral aspect right lower lobe and micronodule in the posterior/medial right lower lobe and also additional right lower lobe micronodule.  There is an infrarenal abdominal aortic aneurysm measuring 3.4 cm with a short segment of chronic dissection present.    These clinical findings would be consistent with a possible T1b N1 M0-stage IIb lung cancer.   Recognizing a diagnosis has not been made his case was discussed in thoracic conference 7/20/2022.  Recommendations include proceeding to pulmonary assessment with EBUS and the patient undergoing a general assessment per his clinical status-older adult assessment as well as assessment by thoracic surgery.  These issues are discussed with the patient and his wife in detail when they are seen 7/28/2022.    The patient proceeded to undergo his hernia surgery 8/2/2022 by Dr. Dotson.  Additionally the patient was unable to undergo assessment by pulmonary until October and, thereafter, was scheduled to see Dr. Joe 8/18/2022 undergoing older adult functional assessment  with a JAGUAR score of 11 indicating a risk of functional decline related to cancer therapy.    The patient is seen with his wife 8/15/2022 and doing very well with recent hernia surgery.  His performance status is reasonably good at present and we have learned now that he would not be able to see pulmonary medicine until October, thoracic surgery is scheduled this week with Dr. Joe.  Perhaps he could be a surgical candidate.  Particularly we know by guardant 360 that T p53 splice site mutation is present and would certainly be consistent as well the most common mutations found in lung cancers particularly squamous cancers.  We have discussed obtaining pulmonary functions at this point, thoracic surgery assessment this week and also restarting Chantix as possible for the patient.    There was difficulty obtaining Chantix and while this was being assessed the patient was reviewed 8/18 by thoracic surgery.  He was felt to have a T1b N1 M0 stage IIb carcinoma left lower lobe along and not a candidate for biopsy.  PFTs were borderline, functional assessment was reasonably good and the patient was felt to be a candidate for robot-assisted biopsy of his nodes and if malignant proceed to left lower lobe lobectomy.  He was reviewed 9/8 and offered 21 mg nicotine transdermal patching.    He is next seen 9/10/2022.  He is seen with his wife and both are prepared for surgery 9/23/2022.  We discussed that additional therapy may be considered once we have more information about the type and stage.  We would hope to see him postoperatively.    The patient was admitted 9//2022 undergoing 9/23/2022  a bronchoscopy with left video-assisted thoracoscopy with robot-assisted total decortication of the left lung, left lower lobectomy, mediastinal lymphadenectomy and intercostal nerve block with Dr. Nicola Joe.  Fortunately he did fairly well postoperatively though did develop atrial fibrillation with RVR treated with  amiodarone converting back to sinus rhythm, treated with IV metoprolol subsequent chronic anticoagulation.  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  Notably there is invasive carcinoma present at the margin.  Is a, and only 2 positive lymph nodes adjacent to the bronchovesicular margin which appears to have been transected difficult to enumerate with extranodal extension present.       The patient is seen 10/27/2022.  He is recovering well from surgery, albeit slowly, and we have discussed his findings that are concerning as to residual disease with a positive margin described as above.  There is also the significance potentially of PD-L1 expression which has been described as producing a poor survival.  Nivolumab has been used as second line therapy to positive effect in the disease and this begs the question as to whether adjuvant therapy plus immunotherapy could be utilized though the patient would be difficult to treat with platinum based chemotherapy as well.       Options could well be Carboplatin and Taxol considering the patient's comorbidity and worry of using cisplatin-based chemotherapy in this patient followed by atezolizumab with pathology having been notified to assess PD-L1 expression on the tumor as well also await review by [facility] molecular profiling.  Finally radiation therapy consultation be requested.    The patient is next assessed 11/7/2022 for significant assessments by supportive oncology at [facility] as well as with plans to see Dr. Marrero 11/9/2022.  Unfortunately he has been very slow to gradually recover from the effects of surgery with reduced appetite and only fair performance status.  At present it would be difficult to give him cisplatin based chemotherapy and we discussed whether we might be able to use Tecentriq (atezolizumab) as his primary adjuvant therapy.   We have contacted pathology about completing Caris testing and a PD-L1 analysis that has not yet completed.    The patient was reviewed by radiation therapy seen 11/9/2022 and radiation therapy offered.  We asked the patient to return back for follow-up and when seen 11/15/2022 we discussed that the patient's Caris analysis indicates benefit from immunotherapy at relatively high levels.  This would allow radiation therapy given with Carboplatin Taxol weekly (better tolerated) and then subsequent atezolizumab adjuvantly.    The patient proceeded to treatment taking weekly carboplatin Taxol with concurrent radiation therapy beginning 11/28/2022 and last seen 12/12/2022 with third weekly treatment.    He is next seen 12/19/2022 with H&H 11.9 and 38.5, white count 3460 and platelet count of 168,000.  He is feeling well without any significant complications of therapy except for right calf dermatitis that is been developing in the last several days.    The patient continued therapy taking CarboTaxol weekly including 12/28 and 1/4/2023.    His neck seen 1/11/2023 with completion date 1/11/2023.  Repeat CBC now includes H&H of 11.0 and 33.9, white count 2090, platelet count 128,000, ANC is 800.  He, fortunately, is tolerating treatment well and we have again discussed with her adjuvant immunotherapy would be useful including Tecentriq versus pembrolizumab-keynote 091 study particularly in tumor programmed cell death PD-L1 greater than 50%.      Plan:  *Additional chemotherapy today not necessary    *Request CT chest abdomen pelvis in approximately 6 weeks    *1 week follow-up MD    *Follow-up with radiation therapy already planned as well.

## 2023-01-13 NOTE — PROGRESS NOTES
Radiation Treatment Summary Note      Patient Name: Giovani España  : 1945    Attending Provider: Kaz Marrero MD    Diagnosis:     ICD-10-CM ICD-9-CM   1. Neuroendocrine carcinoma of lung (HCC)  C7A.8 209.21       Radiation Start Date: 2022    Radiation Completion Date: 2023      Prescription:     Site: Left Lung  Laterality: Left  Total Dose: 6000cGy  Dose per Fraction: 200cGy  Total Fractions: 30  Daily or BID: Daily  Modality: Photon  Technique: IMRT/VMAT/Rapid Arc  Bolus: No      Final Delivered Dose Deviated From Initially Prescribed Dose: No    Concurrent Chemotherapy: Yes    Patient Tolerated Treatment Without Unexpected Side Effects/Complications: No, describe:     ECOG: Restricted in physically strenuous activity but ambulatory and able to carry out work of a light or sedentary nature, e.g., light house work, office work = 1    Pain Management Plan: None Indicated/PRN OTC    Follow-Up Plan: 4-6 weeks    Imaging Ordered for Follow-Up: Yes, describe: Dr. Bishop ordering invterval imaging prior to immunotherapy, will follow up after next imaging        Kaz Marrero MD

## 2023-01-25 ENCOUNTER — PATIENT OUTREACH (OUTPATIENT)
Dept: OTHER | Facility: HOSPITAL | Age: 78
End: 2023-01-25
Payer: MEDICARE

## 2023-01-25 NOTE — PROGRESS NOTES
Called patient, left message requesting call back. Just checking in to see how he was doing and if he had any supportive care needs.    Per review of chart, has CT scan 2/22, sees Dr. Marrero 2/24 and Dr. Bishop 3/1.  Will contact him again after those appts.

## 2023-02-22 ENCOUNTER — HOSPITAL ENCOUNTER (OUTPATIENT)
Dept: CT IMAGING | Facility: HOSPITAL | Age: 78
Discharge: HOME | End: 2023-02-22
Admitting: INTERNAL MEDICINE
Payer: MEDICARE

## 2023-02-22 DIAGNOSIS — C7A.8 NEUROENDOCRINE CARCINOMA OF LUNG: Primary | ICD-10-CM

## 2023-02-22 DIAGNOSIS — Z45.2 FITTING AND ADJUSTMENT OF VASCULAR CATHETER: ICD-10-CM

## 2023-02-22 LAB
ALBUMIN SERPL-MCNC: 4.2 G/DL (ref 3.5–5.2)
ALBUMIN/GLOB SERPL: 1.6 G/DL
ALP SERPL-CCNC: 71 U/L (ref 39–117)
ALT SERPL W P-5'-P-CCNC: 13 U/L (ref 1–41)
ANION GAP SERPL CALCULATED.3IONS-SCNC: 5.7 MMOL/L (ref 5–15)
AST SERPL-CCNC: 12 U/L (ref 1–40)
BASOPHILS # BLD AUTO: 0.03 10*3/MM3 (ref 0–0.2)
BASOPHILS NFR BLD AUTO: 0.6 % (ref 0–1.5)
BILIRUB SERPL-MCNC: 0.3 MG/DL (ref 0–1.2)
BUN SERPL-MCNC: 15 MG/DL (ref 8–23)
BUN/CREAT SERPL: 15.5 (ref 7–25)
CALCIUM SPEC-SCNC: 9.6 MG/DL (ref 8.6–10.5)
CHLORIDE SERPL-SCNC: 100 MMOL/L (ref 98–107)
CO2 SERPL-SCNC: 27.3 MMOL/L (ref 22–29)
CREAT BLDA-MCNC: 1 MG/DL (ref 0.6–1.3)
CREAT SERPL-MCNC: 0.97 MG/DL (ref 0.76–1.27)
DEPRECATED RDW RBC AUTO: 61 FL (ref 37–54)
EGFRCR SERPLBLD CKD-EPI 2021: 81.4 ML/MIN/1.73
EOSINOPHIL # BLD AUTO: 0.08 10*3/MM3 (ref 0–0.4)
EOSINOPHIL NFR BLD AUTO: 1.6 % (ref 0.3–6.2)
ERYTHROCYTE [DISTWIDTH] IN BLOOD BY AUTOMATED COUNT: 17.1 % (ref 12.3–15.4)
GLOBULIN UR ELPH-MCNC: 2.7 GM/DL
GLUCOSE SERPL-MCNC: 122 MG/DL (ref 65–99)
HCT VFR BLD AUTO: 35.6 % (ref 37.5–51)
HGB BLD-MCNC: 11.8 G/DL (ref 13–17.7)
IMM GRANULOCYTES # BLD AUTO: 0.01 10*3/MM3 (ref 0–0.05)
IMM GRANULOCYTES NFR BLD AUTO: 0.2 % (ref 0–0.5)
LYMPHOCYTES # BLD AUTO: 1.43 10*3/MM3 (ref 0.7–3.1)
LYMPHOCYTES NFR BLD AUTO: 28.3 % (ref 19.6–45.3)
MCH RBC QN AUTO: 31.8 PG (ref 26.6–33)
MCHC RBC AUTO-ENTMCNC: 33.1 G/DL (ref 31.5–35.7)
MCV RBC AUTO: 96 FL (ref 79–97)
MONOCYTES # BLD AUTO: 0.72 10*3/MM3 (ref 0.1–0.9)
MONOCYTES NFR BLD AUTO: 14.3 % (ref 5–12)
NEUTROPHILS NFR BLD AUTO: 2.78 10*3/MM3 (ref 1.7–7)
NEUTROPHILS NFR BLD AUTO: 55 % (ref 42.7–76)
NRBC BLD AUTO-RTO: 0 /100 WBC (ref 0–0.2)
PLATELET # BLD AUTO: 182 10*3/MM3 (ref 140–450)
PMV BLD AUTO: 8.5 FL (ref 6–12)
POTASSIUM SERPL-SCNC: 4 MMOL/L (ref 3.5–5.2)
PROT SERPL-MCNC: 6.9 G/DL (ref 6–8.5)
RBC # BLD AUTO: 3.71 10*6/MM3 (ref 4.14–5.8)
SODIUM SERPL-SCNC: 133 MMOL/L (ref 136–145)
WBC NRBC COR # BLD: 5.05 10*3/MM3 (ref 3.4–10.8)

## 2023-02-22 PROCEDURE — 85025 COMPLETE CBC W/AUTO DIFF WBC: CPT | Performed by: INTERNAL MEDICINE

## 2023-02-22 PROCEDURE — 25010000002 IOPAMIDOL 61 % SOLUTION: Performed by: INTERNAL MEDICINE

## 2023-02-22 PROCEDURE — 82565 ASSAY OF CREATININE: CPT

## 2023-02-22 PROCEDURE — 80053 COMPREHEN METABOLIC PANEL: CPT | Performed by: INTERNAL MEDICINE

## 2023-02-22 PROCEDURE — 25010000002 HEPARIN LOCK FLUSH PER 10 UNITS: Performed by: INTERNAL MEDICINE

## 2023-02-22 PROCEDURE — 74177 CT ABD & PELVIS W/CONTRAST: CPT

## 2023-02-22 RX ORDER — HEPARIN SODIUM (PORCINE) LOCK FLUSH IV SOLN 100 UNIT/ML 100 UNIT/ML
500 SOLUTION INTRAVENOUS AS NEEDED
Status: DISCONTINUED | OUTPATIENT
Start: 2023-02-22 | End: 2023-02-23 | Stop reason: HOSPADM

## 2023-02-22 RX ORDER — SODIUM CHLORIDE 0.9 % (FLUSH) 0.9 %
10 SYRINGE (ML) INJECTION AS NEEDED
Status: DISCONTINUED | OUTPATIENT
Start: 2023-02-22 | End: 2023-02-23 | Stop reason: HOSPADM

## 2023-02-22 RX ORDER — HEPARIN SODIUM (PORCINE) LOCK FLUSH IV SOLN 100 UNIT/ML 100 UNIT/ML
500 SOLUTION INTRAVENOUS AS NEEDED
Status: CANCELLED | OUTPATIENT
Start: 2023-02-22

## 2023-02-22 RX ORDER — SODIUM CHLORIDE 0.9 % (FLUSH) 0.9 %
10 SYRINGE (ML) INJECTION AS NEEDED
Status: CANCELLED | OUTPATIENT
Start: 2023-02-22

## 2023-02-22 RX ADMIN — Medication 10 ML: at 12:02

## 2023-02-22 RX ADMIN — HEPARIN 500 UNITS: 100 SYRINGE at 13:09

## 2023-02-22 RX ADMIN — IOPAMIDOL 85 ML: 612 INJECTION, SOLUTION INTRAVENOUS at 13:00

## 2023-02-23 ENCOUNTER — TELEPHONE (OUTPATIENT)
Dept: RADIATION ONCOLOGY | Facility: HOSPITAL | Age: 78
End: 2023-02-23
Payer: MEDICARE

## 2023-02-23 NOTE — PROGRESS NOTES
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     Overall Stage IIB     pT1c: 2.7cm primary on final pathology  pN1: Station 10, 12, 13 positive ipsilaterally  cM0: per PET CT      Radiation Completion Date: 1/11/2023        Prescription:      Site: Left Lung  Laterality: Left  Total Dose: 6000cGy  Dose per Fraction: 200cGy  Total Fractions: 30  Daily or BID: Daily  Modality: Photon  Technique: IMRT/VMAT/Rapid Arc  Bolus: No      Interval History:    Giovani España presents for regularly scheduled follow-up following completion of chemotherapy and radiation for a T1cN1 large cell neuroendocrine tumor of the left lung and mediastinum, completing radiation on 1/11/2023.  Overall, he has improved and is close to his baseline in terms of his day-to-day function and his breathing.  He is still struggling somewhat with fatigue.  He underwent CT abdomen pelvis on 2/22/2023 in preparation for initiation of immunotherapy with Dr. Bishop.  That imaging study does not show evidence of progressive disease.  It appears he was supposed to also have a CT chest, it is unclear why this was not performed.  I have asked him to reach out to radiology and see whether this was an error.      Imaging:      CT AP 2/22/2023    IMPRESSION:  1.  Status post left lower lobectomy with small left pleural effusion  and atelectasis.  2.  Stable 1.1 cm right lower lobe pulmonary nodule. Recommend continued  follow-up with dedicated imaging of the chest.  3.  No convincing findings to suggest metastatic disease to the  abdomen/pelvis.  4.  Please see above for additional chronic findings/recommendations.       Pathology:      No new relevant pathology    Labs:    Lab Results   Component Value Date    CREATININE 1.00 02/22/2023               Problem List:  Patient Active Problem List   Diagnosis   • Diverticulitis of large intestine with  perforation and abscess without bleeding   • Type 2 diabetes mellitus (HCC)   • COPD (chronic obstructive pulmonary disease) (HCC)   • HTN (hypertension)   • MGUS (monoclonal gammopathy of unknown significance)   • History of DVT (deep vein thrombosis)   • Diastolic dysfunction   • Presence of IVC filter   • Chronic anticoagulation   • Pulmonary nodule   • At risk for malnutrition   • Counseling regarding advance care planning and goals of care   • Nodule of lower lobe of left lung   • Tobacco use disorder   • Atrial fibrillation with RVR (HCC)   • Neuroendocrine carcinoma of lung (HCC)   • Long-term use of high-risk medication   • Fitting and adjustment of vascular catheter          Medications:  Current Outpatient Medications on File Prior to Visit   Medication Sig Dispense Refill   • arformoterol (BROVANA) 15 MCG/2ML nebulizer solution Take 15 mcg by nebulization 2 (Two) Times a Day.     • budesonide (PULMICORT) 0.5 MG/2ML nebulizer solution Take 0.5 mg by nebulization 2 (Two) Times a Day.     • cetirizine (zyrTEC) 10 MG tablet Take 10 mg by mouth Daily.     • isosorbide mononitrate (IMDUR) 60 MG 24 hr tablet Take 60 mg by mouth Every Evening.     • metoprolol succinate XL (TOPROL-XL) 25 MG 24 hr tablet Take 1 tablet by mouth Daily for 30 days. (Patient taking differently: Take 25 mg by mouth Every Evening.) 30 tablet 0   • ondansetron (Zofran) 8 MG tablet Take 1 tablet by mouth Every 8 (Eight) Hours As Needed for Nausea or Vomiting. 30 tablet 1   • simvastatin (ZOCOR) 20 MG tablet Take 20 mg by mouth Every Night.     • tamsulosin (FLOMAX) 0.4 MG capsule 24 hr capsule Take 1 capsule by mouth Daily. 30 capsule 5   • warfarin (COUMADIN) 5 MG tablet Take 1 tablet by mouth Daily. Take 10mg on 9/29/22 and then resume regular home schedule.       No current facility-administered medications on file prior to visit.          Allergies:  Allergies   Allergen Reactions   • Benadryl [Diphenhydramine] Other (See Comments)     " Extreme restlessness and insomnia           Vital Signs:  /65   Pulse 66   Wt 73.6 kg (162 lb 3.2 oz)   SpO2 95%   BMI 23.27 kg/m²   Estimated body mass index is 23.27 kg/m² as calculated from the following:    Height as of 1/11/23: 177.8 cm (70\").    Weight as of this encounter: 73.6 kg (162 lb 3.2 oz).  Pain Score    02/24/23 0854   PainSc: 0-No pain         ECOG: Ambulatory and capable of all selfcare but unable to carry out any work activities; up and about more than 50% of waking hours = 2      Physical Exam  Vitals reviewed.   Constitutional:       General: He is not in acute distress.     Appearance: Normal appearance.   HENT:      Head: Normocephalic and atraumatic.   Eyes:      Extraocular Movements: Extraocular movements intact.      Pupils: Pupils are equal, round, and reactive to light.   Pulmonary:      Effort: Pulmonary effort is normal.   Abdominal:      General: Abdomen is flat.      Palpations: Abdomen is soft.   Musculoskeletal:      Cervical back: Normal range of motion and neck supple.   Skin:     General: Skin is warm and dry.   Neurological:      General: No focal deficit present.      Mental Status: He is alert and oriented to person, place, and time.   Psychiatric:         Mood and Affect: Mood normal.         Behavior: Behavior normal.            Result Review :  The following data was reviewed by: Kaz Marrero MD on 02/24/2023:  Labs: Last Creatinine   Data reviewed: Radiologic studies CT AP            Diagnoses and all orders for this visit:    1. Neuroendocrine carcinoma of lung (HCC) (Primary)        Assessment:    Giovani España presents for regularly scheduled follow-up following completion of chemotherapy and radiation for a T1cN1 large cell neuroendocrine tumor of the left lung and mediastinum, completing radiation on 1/11/2023.  Overall, he has improved and is close to his baseline in terms of his day-to-day function and his breathing.  He is still struggling somewhat " with fatigue.  He underwent CT abdomen pelvis on 2/22/2023 in preparation for initiation of immunotherapy with Dr. Bishop.  That imaging study does not show evidence of progressive disease.  It appears he was supposed to also have a CT chest, it is unclear why this was not performed.  I have asked him to reach out to radiology and see whether this was an error.    The patient is scheduled to start immunotherapy soon with Dr. Bishop.  Dr. Bishop will order subsequent imaging scans.  I discussed with the patient that I would be willing to see him ongoing and intermittent follow-up or as needed per his wishes.  Patient wished to pursue follow-up in 3 months.    Plan:    -Follow-up in 3 months  -Patient to discuss need for CT chest which was ordered by Dr. Bishop with radiology       I spent 30 minutes caring for Giovani on this date of service. This time includes time spent by me in the following activities:preparing for the visit, reviewing tests, obtaining and/or reviewing a separately obtained history, documenting information in the medical record, independently interpreting results and communicating that information with the patient/family/caregiver and care coordination  Follow Up   No follow-ups on file.  Patient was given instructions and counseling regarding his condition or for health maintenance advice. Please see specific information pulled into the AVS if appropriate.     Kaz Marrero MD

## 2023-02-24 ENCOUNTER — OFFICE VISIT (OUTPATIENT)
Dept: RADIATION ONCOLOGY | Facility: HOSPITAL | Age: 78
End: 2023-02-24
Payer: MEDICARE

## 2023-02-24 ENCOUNTER — HOSPITAL ENCOUNTER (OUTPATIENT)
Dept: CT IMAGING | Facility: HOSPITAL | Age: 78
Discharge: HOME | End: 2023-02-24
Admitting: INTERNAL MEDICINE
Payer: MEDICARE

## 2023-02-24 ENCOUNTER — APPOINTMENT (OUTPATIENT)
Dept: RADIATION ONCOLOGY | Facility: HOSPITAL | Age: 78
End: 2023-02-24
Payer: MEDICARE

## 2023-02-24 VITALS
OXYGEN SATURATION: 95 % | WEIGHT: 162.2 LBS | DIASTOLIC BLOOD PRESSURE: 65 MMHG | HEART RATE: 66 BPM | SYSTOLIC BLOOD PRESSURE: 120 MMHG | BODY MASS INDEX: 23.27 KG/M2

## 2023-02-24 DIAGNOSIS — C7A.8 NEUROENDOCRINE CARCINOMA OF LUNG: Primary | ICD-10-CM

## 2023-02-24 LAB — CREAT BLDA-MCNC: 1 MG/DL (ref 0.6–1.3)

## 2023-02-24 PROCEDURE — 25510000001 IOPAMIDOL 61 % SOLUTION: Performed by: INTERNAL MEDICINE

## 2023-02-24 PROCEDURE — 82565 ASSAY OF CREATININE: CPT

## 2023-02-24 PROCEDURE — 99214 OFFICE O/P EST MOD 30 MIN: CPT | Performed by: STUDENT IN AN ORGANIZED HEALTH CARE EDUCATION/TRAINING PROGRAM

## 2023-02-24 PROCEDURE — G0463 HOSPITAL OUTPT CLINIC VISIT: HCPCS | Performed by: STUDENT IN AN ORGANIZED HEALTH CARE EDUCATION/TRAINING PROGRAM

## 2023-02-24 PROCEDURE — 71260 CT THORAX DX C+: CPT

## 2023-02-24 RX ADMIN — IOPAMIDOL 75 ML: 612 INJECTION, SOLUTION INTRAVENOUS at 11:28

## 2023-03-01 ENCOUNTER — OFFICE VISIT (OUTPATIENT)
Dept: ONCOLOGY | Facility: CLINIC | Age: 78
End: 2023-03-01
Payer: MEDICARE

## 2023-03-01 VITALS
WEIGHT: 155.6 LBS | DIASTOLIC BLOOD PRESSURE: 76 MMHG | HEIGHT: 70 IN | TEMPERATURE: 97.3 F | RESPIRATION RATE: 20 BRPM | OXYGEN SATURATION: 96 % | HEART RATE: 51 BPM | BODY MASS INDEX: 22.28 KG/M2 | SYSTOLIC BLOOD PRESSURE: 141 MMHG

## 2023-03-01 DIAGNOSIS — Z79.899 LONG-TERM USE OF HIGH-RISK MEDICATION: ICD-10-CM

## 2023-03-01 DIAGNOSIS — C7A.8 NEUROENDOCRINE CARCINOMA OF LUNG: Primary | ICD-10-CM

## 2023-03-01 PROCEDURE — 99215 OFFICE O/P EST HI 40 MIN: CPT | Performed by: INTERNAL MEDICINE

## 2023-03-01 RX ORDER — FLUTICASONE PROPIONATE 50 MCG
1 SPRAY, SUSPENSION (ML) NASAL DAILY
COMMUNITY
Start: 2023-01-18

## 2023-03-01 RX ORDER — ERGOCALCIFEROL 1.25 MG/1
50000 CAPSULE ORAL
Qty: 4 CAPSULE | Refills: 2 | COMMUNITY
Start: 2023-02-07 | End: 2023-05-08

## 2023-03-03 ENCOUNTER — PATIENT OUTREACH (OUTPATIENT)
Dept: OTHER | Facility: HOSPITAL | Age: 78
End: 2023-03-03
Payer: MEDICARE

## 2023-03-03 NOTE — PROGRESS NOTES
Called patient. Left message requesting cb. I saw he met with Dr. Bishop this week and will be starting treatment. Trying to see if he has any resource or supportive care needs.   Per chart review, chemo teaching appt 3/10, will start Keytruda every 3 weeks x18 doses; sees Dr. Marrero 5/19

## 2023-03-10 ENCOUNTER — OFFICE VISIT (OUTPATIENT)
Dept: ONCOLOGY | Facility: CLINIC | Age: 78
End: 2023-03-10
Payer: MEDICARE

## 2023-03-10 ENCOUNTER — PATIENT OUTREACH (OUTPATIENT)
Dept: OTHER | Facility: HOSPITAL | Age: 78
End: 2023-03-10
Payer: MEDICARE

## 2023-03-10 VITALS
HEIGHT: 70 IN | BODY MASS INDEX: 23.11 KG/M2 | OXYGEN SATURATION: 98 % | WEIGHT: 161.4 LBS | DIASTOLIC BLOOD PRESSURE: 71 MMHG | TEMPERATURE: 97.7 F | SYSTOLIC BLOOD PRESSURE: 114 MMHG | HEART RATE: 65 BPM

## 2023-03-10 DIAGNOSIS — C7A.8 NEUROENDOCRINE CARCINOMA OF LUNG: Primary | ICD-10-CM

## 2023-03-10 DIAGNOSIS — Z79.899 LONG-TERM USE OF HIGH-RISK MEDICATION: ICD-10-CM

## 2023-03-10 DIAGNOSIS — B02.9 HERPES ZOSTER WITHOUT COMPLICATION: ICD-10-CM

## 2023-03-10 PROCEDURE — 99215 OFFICE O/P EST HI 40 MIN: CPT | Performed by: NURSE PRACTITIONER

## 2023-03-10 RX ORDER — VALACYCLOVIR HYDROCHLORIDE 1 G/1
1000 TABLET, FILM COATED ORAL 3 TIMES DAILY
Qty: 30 TABLET | Refills: 0 | Status: SHIPPED | OUTPATIENT
Start: 2023-03-10 | End: 2023-03-27

## 2023-03-10 NOTE — PROGRESS NOTES
TREATMENT  PREPARATION    Regina Locke  [id number]  1945    Chief Complaint: Treatment preparation and needs assessment    History of present illness:  Regina Locke is a 77 y.o. year old male who is here today for treatment preparation and needs assessment.  The patient has been diagnosed with   Encounter Diagnosis   Name Primary?   • Neuroendocrine carcinoma of lung (HCC) Yes    and is scheduled to begin treatment with:     Oncology History:    Oncology/Hematology History   Neuroendocrine carcinoma of lung (HCC)   2022 Imaging    FACILITY:  [facility]   UNIT/AGE/GENDER: [id number]  OP      AGE:77 Y          SEX:M   PATIENT NAME/:  REGINA LOCKE E    1945   UNIT NUMBER:  [id number]   ACCOUNT NUMBER:  [id number]   ACCESSION NUMBER:  [id number]     EXAM: PET W/CT TUMOR SKULL MID-THIGH     DATE OF EXAM: 2022     CLINICAL HISTORY: Lung cancer screening, >= 20 pk-yr smoking history (Age >= 50y)   17V99ED LEFT LUNG NODULE 2.5CM HILAR LEFT LYMPHADENOPATHY initial PET evaluation.     COMPARISON: None available.     TECHNIQUE: PET/CT imaging was performed from the skull base to the mid-thigh following the intravenous administration of 10.36 mCi of F-18 labeled FDG. CT imaging was performed for attenuation correction and lesion localization. The patient's blood   Glucose level was 106 mg/dl at the time of radiotracer injection.   Radiation dose reduction techniques were utilized per ALARA protocol.     FINDINGS:   HEAD & NECK:   No abnormal focus of radiotracer accumulation.   Visualized portions of the brain grossly unremarkable. Upper airway patent. No cervical adenopathy.   .   CHEST:   There is a hypermetabolic spiculated lesion in the medial aspect of the left lower lobe (adjacent to the descending thoracic aorta) that measures 1.5 x 1.6 cm (AP x TRV) , max SUV 9.3. This lung lesion is favored to represent primary malignancy.   There is an enlarged  hypermetabolic left hilar lymph node that measures 1.5 x 1.3 cm (AP x TRV)  (max SUV 12.1), favored to represent a site of alta metastatic disease.   Background changes of emphysema are noted with mild scarring and architectural distortion in the right lung apex.   No additional hypermetabolic lung lesions are present.   There is an 11 mm nodule in the lateral aspect of the right lower lobe (series 3 image 81). There is also a micronodule in the posterior/medial right lower lobe on series 3 image 76. There is also a micronodule in the right lower lobe on series 3 image   72.   No effusions. A small pleural calcification is noted in left posterior hemithorax.   Thoracic aortic and coronary artery calcifications are noted.   .   ABDOMEN/PELVIS:   No abnormal focus of radiotracer accumulation.   The liver is grossly unremarkable. No biliary ductal dilatation. Cholecystectomy.   Small scattered calcified splenic granulomas are noted. The spleen is otherwise unremarkable.   The adrenals and pancreas are grossly unremarkable.   The kidneys, ureters and urinary bladder are unremarkable.   Sigmoid diverticulosis is noted. No evidence of bowel obstruction or inflammation. Appendix normal.   No hypermetabolic lymphadenopathy, free fluid or well-formed fluid collections are evident in the abdomen/pelvis.   There is an infrarenal abdominal aortic aneurysm demonstrated which measures 3.4 x 3.2 cm (AP x TRV) . The abdominal aortic aneurysm demonstrates a short segment area of chronic dissection.   IVC filter noted.   .   MUSCULOSKELETAL:   No abnormal focus of radiotracer accumulation.        .   IMPRESSION:   1. There is a spiculated lung lesion in the medial aspect of the left lower lobe that measures 1.5 cm in long axis and demonstrates hypermetabolic uptake. This is favored to represent a primary malignancy.   2. There is an enlarged hypermetabolic left hilar lymph node. This is favored to represent a site of alta  metastatic disease.   3. There is an additional 11 mm nodule in the right lower lobe. There are also a couple of additional right lower lobe micronodules. These demonstrate no metabolic uptake but should be followed for monitoring.   4. An infrarenal abdominal aortic aneurysm measuring 3.4 cm is noted with a short segment area of chronic dissection present.   5. Secondary findings are discussed in the body of the report.      9/23/2022 Initial Diagnosis    Neuroendocrine carcinoma of lung (HCC)     9/23/2022 Surgery    Final Diagnosis   1. Lung, Left Lower Lobe, Lobectomy:               A. Large cell neuroendocrine carcinoma.               B. 8 of 11 lymph nodes positive for metastatic large cell neuroendocrine carcinoma (8/11).               C. See synoptic template for complete tumor details.     2. Lymph Node, L9, Excision:                A. Benign lymph node (0/1).     3. Lymph Node, L9 #2, Excision:               A. Benign lymph node (0/1).     4. Lymph Node, L7, Excision:               A. Benign lymph node (0/1).                 5. Lymph Node, L7 #2, Excision:               A. Benign lymph node (0/1).     6. Lymph Node, L7 #3, Excision:               A. Fragments of benign lymph node (0/1).                 7. Lymph Node, L10, Excision:               A. 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     Synoptic Checklist   LUNG  8th Edition - Protocol posted: 6/22/2022  LUNG: RESECTION - All Specimens  SPECIMEN   Procedure  Lobectomy    Specimen Laterality  Left    TUMOR   Tumor Focality  Single focus    Tumor Site  Lower lobe of lung    Tumor Size     Total Tumor Size (size of entire tumor)  Greatest Dimension (Centimeters): 2.5 cm   Histologic Type  Large cell neuroendocrine carcinoma    Histologic Grade  G4, undifferentiated    Spread Through Air Spaces (ADDY)  Present    Visceral Pleura Invasion  Not identified    Direct Invasion of Adjacent Structures  Not applicable (no adjacent  structures present)    Treatment Effect  No known presurgical therapy    Lymphovascular Invasion  Present    MARGINS   Margin Status for Invasive Carcinoma  Invasive carcinoma present at margin    Margin(s) Involved by Invasive Carcinoma  Bronchial    Margin Status for Non-Invasive Tumor  Not applicable    REGIONAL LYMPH NODES   Lymph Node(s) from Prior Procedures  Not included    Regional Lymph Node Status  Tumor present in regional lymph node(s)    Number of Lymph Nodes with Tumor  9    Jennifer Site(s) with Tumor  10L: Hilar      12L: Lobar      13L: Segmental    Extranodal Extension  Present    Number of Lymph Nodes Examined  17    Jennifer Site(s) Examined  7: Subcarinal      9L: Pulmonary ligament      10L: Hilar      12L: Lobar      13L: Segmental         *Guardant 360: Detected alteration in TP53 Splice Site SNV; several variants of uncertain significance       10/27/2022 Cancer Staged    Staging form: Lung, AJCC 8th Edition  - Pathologic stage from 10/27/2022: Stage IIB (pT1c, pN1, cM0) - Signed by Kayden Bishop MD on 10/27/2022     11/28/2022 - 1/4/2023 Chemotherapy    OP LUNG PACLitaxel / CARBOplatin AUC=2 (Weekly) + XRT      3/20/2023 -  Chemotherapy    OP LUNG Pembrolizumab 200 mg         The current medication list and allergy list were reviewed and reconciled.     Past Medical History, Past Surgical History, Social History, Family History have been reviewed and are without significant changes except as mentioned.    Physical Exam:    Vitals:    03/10/23 1023   BP: 114/71   Pulse: 65   Temp: 97.7 °F (36.5 °C)   SpO2: 98%     Vitals:    03/10/23 1023   PainSc:   4   PainLoc: Abdomen  Comment: Pt says he has stomach and its in the navel area     Pain being managed through medication therapy.  ECOG score: 0             Physical Exam  HENT:      Head: Normocephalic and atraumatic.   Eyes:      Extraocular Movements: Extraocular movements intact.      Conjunctiva/sclera: Conjunctivae normal.   Pulmonary:       Effort: Pulmonary effort is normal. No respiratory distress.   Skin:     Findings: Rash (right abdomen to spine rash with red raised patche, some with pinpoint scabbing noted.  See photo documentation.) present.   Neurological:      General: No focal deficit present.      Mental Status: He is alert and oriented to person, place, and time.   Psychiatric:         Mood and Affect: Mood normal.         Behavior: Behavior normal.           NEEDS ASSESSMENTS    Genetics  The patient's new diagnosis and family history have been reviewed for genetic counseling needs. A genetic referral is not recommended.     Psychosocial and Barriers to care  The patient has completed a PHQ-9 Depression Screening and the Distress Thermometer (DT) today.  PHQ-9 results show PHQ-2 Total Score:   PHQ-9 Total Score: PHQ-9 Total Score:       The patient scored their distress today as Distress Level: 2 on a scale of 0-10 with 0 being no distress and 10 being extreme distress. Problems marked by the patient as being an issue for them within the last week include Physical concerns  Fatigue: Yes  Pain: Yes (abdominal) .      Results were reviewed along with psychosocial resources offered by our cancer center.  Our Supportive Oncology team will be flagged for a score of 4 or above, and a same day call will be made for a score of 9 or 10.  A mental health referral is offered at that time. Patients who score less than 4 have been educated on our support services and can be referred to our  upon request.  The patient will not be referred to our .       Nutrition  The patient has completed the malnutrition screening today. They scored Malnutrition Screening Tool  Have you recently lost weight without trying?  If yes, how much weight have you lost?: 0--> No  Have you been eating poorly because of a decreased appetite?: 0--> No  MST score: 0   with a score of 0-1 meaning not at risk in a score of 2 or greater meaning at risk.   Patients with a score of 3 or higher will be referred to our oncology dietitian for support. Patients beginning at risk treatment regimens or who have dietary concerns will also be referred to our oncology dietitian. The patient will not be referred.    Functional Assessment  Persons who are age 70 or greater will be screened for qualification of a comprehensive geriatric assessment by our survivorship nurse practitioner.  Older adults with cancer face unique challenges. These may include an increased risk of drug reactions, financial burdens, and caregiver stress. The patient scored   . Patients scoring 14 or lower will referred for an older adult functional assessment with the survivorship advanced practice registered nurse to ensure all needed support is provided as patients plan for their treatments. The patient will not be referred.    Intravenous Access Assessment  The patient and I discussed planned intravenous chemo/biotherapy as well as other IV treatments that are often needed throughout the course of treatment. These may include, but are not limited to blood transfusions, antibiotics, and IV hydration. Discussed that depending on selected treatment and vein assessment, patient may require venous access device (VAD) which could include but not limited to a Mediport or PICC line. Risks and benefits of VADs reviewed. The patient will be treated via Port.    Reproductive/Sexual Activity   People should avoid becoming pregnant and should not get a partner pregnant while undergoing chemo/biotherapy.  People of childbearing age should use effective contraception during active therapy. The best recommendation for all people is to use a barrier method for a minimum of 1 week after the last infusion of chemo/biotherapy to prevent your partner being exposed to byproducts from treatment medications in bodily fluids. Effective contraception should be discussed with your oncology team to make sure it is safe to take  "based on your diagnosis. Possible options include oral contraceptives, barrier methods. Chemo/biotherapy can change your ability to reproduce children in the future.  There are options for fertility preservation. The patient will not be referred.    Advanced Care Planning  Advance Care Planning   The patient and I discussed advanced care planning, \"Conversations that Matter\".   This service is offered for development of advance directives with a certified ACP facilitator.  The patient does not have an up-to-date advanced directive. This document is not on file with our office. The patient is not interested in an appointment with one of our facilitators to create or update their advanced directives.     Smoking cessation  Tobacco Use: High Risk   • Smoking Tobacco Use: Every Day   • Smokeless Tobacco Use: Never   • Passive Exposure: Not on file       Patient and I discussed their tobacco use history. Referral will not be made for smoking cessation.      Palliative Care  When appropriate, the patient and I discussed the availability palliative care services and when appropriate Hospice care. Palliative care is not the same as Hospice care which was explained to the patient.The patient is not interested in additional information from our  on these services.    Survivorship   When appropriate, we discussed that we will refer the patient to survivorship clinic to discuss next steps following completion of planned treatment.  Reviewed this visit will include assessment of your physical, psychological, functional, and spiritual needs as a survivor and the need at attend this visit when scheduled.    TREATMENT EDUCATION    Today I met with the patient to discuss the chemo/biotherapy regimen recommended for treatment of Neuroendocrine carcinoma of lung (HCC)  .  The patient was given explanation of treatment premed side effects including office policy that prohibits patients to drive if sedating medications are " administered, MD explanation given regarding benefits, side effects, toxicities and goals of treatment.  The patient received a Chemotherapy/Biotherapy Plan Summary including diagnosis and explanation of specific treatment plan.    SIDE EFFECTS:  Common side effects were discussed with the patient and/or significant other.  Discussion included where applicable hair loss/discoloration, anemia/fatigue, infection/chills/fever, appetite, bleeding risk/precautions, constipation, diarrhea, mouth sores, taste alteration, loss of appetite, nausea/vomiting, peripheral neuropathy, skin/nail changes, rash, muscle aches/weakness, photosensitivity, weight gain/loss, hearing loss, dizziness, menopausal symptoms, menstrual irregularity, sterility, high blood pressure, heart damage, liver damage, lung damage, kidney damage, DVT/PE risk, fluid retention, pleural/pericardial effusion, somnolence, electrolyte/LFT imbalance, vein exercises and/or the possible need for vascular access/port placement.  The patient was advised that although uncommon, leakage of an infused medication from the vein or venous access device may lead to skin breakdown and/or other tissue damage.  The patient was advised that he/she may have pain, bleeding, and/or bruising from the insertion of a needle in their vein or venous access device (port).  The patient was further advised that, in spite of proper technique, infection with redness and irritation may rarely occur at the site where the needle was inserted.  The patient was advised that if complications occur, additional medical treatment is available.  Finally, where applicable we have reviewed rare but potential immune mediated side effects including shortness of breath, cough, chest pain (pneumonitis), abdominal pain, diarrhea (colitis), thyroiditis (hypothyroid or hyperthyroid), hepatitis and liver dysfunction, nephritis and renal dysfunction.    Discussion also included side effects specific to drugs  in the treatment plan, specifically:    Treatment Plans     Name Type Plan Dates Plan Provider         Active    OP LUNG Pembrolizumab 200 mg ONCOLOGY TREATMENT  3/8/2023 - Present Kayden Bishop MD                    Questions answered and additional information discussed on topics including:  Anemia, Neutropenia, Nutrition and appetite changes, Diarrhea, Nausea & vomiting, Mouth sores, Nervous system changes, Skin & nail changes and Organ toxicities       Assessment and Plan:    Diagnoses and all orders for this visit:    1. Neuroendocrine carcinoma of lung (HCC) (Primary)      No orders of the defined types were placed in this encounter.        1. The patient and I have reviewed their diagnosis and scheduled treatment plan. Needs assessment was completed where applicable including genetics, psychosocial needs, barriers to care, VAD evaluation, advanced care planning, survivorship, and palliative care services where indicated. Referrals have been ordered as appropriate based upon evaluation today and patient desires.   2. Chemo/biotherapy teaching was completed today and consent obtained. See separate documentation for further details.  3. Adequate time was given to answer questions.  Patient made aware of their care team members and contact information if they have questions or problems throughout the treatment course.  4. Discussion held and written information provided describing frequency of office visits and ongoing monitoring throughout the treatment plan.     5. Reviewed with patient any prescribed medication sent to pharmacy.  Education provided regarding proper storage, safe handling, and proper disposal of unused medication.  6. Proper handling of body fluids and waste discussed and written information provided.  7. If appropriate, patient had pretreatment labs drawn today.  8. Valtrex 1 g 3 times daily sent to pharmacy for new onset herpes zoster.  Discussed with Dr. Bishop today who did visualize  rash as well.  Photodocumentation in chart.  Patient will notify us for any increased pain.  9. May utilize Prilosec daily and can continue as needed Pepto-Bismol if feeling this is effective.    Learning assessment completed at initial patient encounter. See separate flowsheet. Chemo/biotherapy education comprehension assessed at today's visit.    I spent 45 minutes caring for Giovani on this date of service. This time includes time spent by me in the following activities: preparing for the visit, reviewing tests, obtaining and/or reviewing a separately obtained history, performing a medically appropriate examination and/or evaluation, counseling and educating the patient/family/caregiver, referring and communicating with other health care professionals, documenting information in the medical record and independently interpreting results and communicating that information with the patient/family/caregiver.     Traci Gutierres, APRN   03/10/23

## 2023-03-10 NOTE — PROGRESS NOTES
Wife called to update Anna on how Giovani is doing. She left a voicemail stating the were doing well and has no needs at this time. Called back and LVM letting wife know Anna is off but will be back Monday if they have any needs.

## 2023-03-19 NOTE — PROGRESS NOTES
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  Notably there is invasive carcinoma present at the margin, 2 positive lymph nodes adjacent to the bronchovesicular margin which appears to have been transected difficult to enumerate with extranodal extension present.    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  He had been assessed particularly in the fall 2021 when he presented with nausea and vomiting and abdominal discomfort leading to assessments that revealed sigmoid diverticulitis with contained rupture and partial small bowel obstruction.  Records from mid September 21 indicating that he was admitted with partial small bowel obstruction and treated medically with very slow recovery.  He has history of COPD with CT demonstrating a pulmonary nodule in the right lung base at 7 mm with follow-up planned.  He was seen by general surgery in later September with repeat scans planned and repeat CT abdomen 10/7/2021 did demonstrate interval improvement of sigmoid diverticulitis.      Record indicates an assessment in late June 2022 at different general surgeon for left inguinal hernia, history of heavy tobacco use with COPD and recommendation for surgical repair of his hernia      The patient underwent a CT scan of the chest 5/7/2022 demonstrating diffuse emphysematous changes, ill-defined density measuring 3 mm in the superior segment of the  right lower lobe, multiple ill-defined 3 to 4 mm nodular density thought to be inflammatory involving the right lower lobe and a new spiculated nodule involving the left lower lobe measuring 16 x 14 mm as well as left hilar adenopathy.       Follow-up PET/CT 7/13/2022 reveal a hypermetabolic spiculated lesion medial aspect the left lower lobe adjacent to descending thoracic aorta measuring 1.5 x 1.6 SUV 9.3 with an enlarged hypermetabolic left hilar lymph node measured 1.5 x 1.3 with SUV of 12.1, additional nodules in the lateral aspect right lower lobe and micronodule in the posterior/medial right lower lobe and also additional right lower lobe micronodule.  There is an infrarenal abdominal aortic aneurysm measuring 3.4 cm with a short segment of chronic dissection present.    These clinical findings would be consistent with a possible T1b N1 M0-stage IIb lung cancer.      Recognizing a diagnosis has not been made his case was discussed in thoracic conference 7/20/2022.  Recommendations include proceeding to pulmonary assessment with EBUS and the patient undergoing a general assessment per his clinical status-older adult assessment as well as assessment by thoracic surgery.  These issues are discussed with the patient and his wife in detail when they are seen 7/28/2022.    The patient proceeded to undergo his hernia surgery 8/2/2022 by Dr. Dotson.  Additionally the patient was unable to undergo assessment by pulmonary until October and, thereafter, was scheduled to see Dr. Joe 8/18/2022 undergoing older adult functional assessment with a JAGUAR score of 11 indicating a risk of functional decline related to cancer therapy.    The patient is seen with his significant other 8/15/2022 and doing very well with recent hernia surgery.  His performance status is reasonably good at present and we have learned now that he would not be able to see pulmonary medicine until October, thoracic surgery is scheduled this week with  Dr. Joe.  Perhaps he could be a surgical candidate.  Particularly we know by guardant 360 that T p53 splice site mutation is present and would certainly be consistent as well the most common mutations found in lung cancers particularly squamous cancers.  We have discussed obtaining pulmonary functions at this point, thoracic surgery assessment this week and also restarting Chantix as possible for the patient.    There was difficulty obtaining Chantix and while this was being assessed the patient was reviewed 8/18 by thoracic surgery.  He was felt to have a T1b N1 M0 stage IIb carcinoma left lower lobe along and not a candidate for biopsy.  PFTs were borderline, functional assessment was reasonably good and the patient was felt to be a candidate for robot-assisted biopsy of his nodes and if malignant proceed to left lower lobe lobectomy.  He was reviewed 9/8 and offered 21 mg nicotine transdermal patching.    He is next seen 9/10/2022.  He is seen with his wife and both are prepared for surgery 9/23/2022.  We discussed that additional therapy may be considered once we have more information about the type and stage.  We would hope to see him postoperatively.    The patient was admitted 9//2022 undergoing 9/23/2022  a bronchoscopy with left video-assisted thoracoscopy with robot-assisted total decortication of the left lung, left lower lobectomy, mediastinal lymphadenectomy and intercostal nerve block with Dr. Nicola Joe.  Fortunately he did fairly well postoperatively though did develop atrial fibrillation with RVR treated with amiodarone converting back to sinus rhythm, treated with IV metoprolol subsequent chronic anticoagulation.  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  Notably there is invasive carcinoma present at the margin.  Is a, and only 2 positive lymph nodes  adjacent to the bronchovesicular margin which appears to have been transected difficult to enumerate with extranodal extension present.       The patient is seen 10/27/2022.  He is recovering well from surgery, albeit slowly, and we have discussed his findings that are concerning as to residual disease with a positive margin described as above.  There is also the significance potentially of PD-L1 expression which has been described as producing a poor survival.     Nivolumab has been used as second line therapy to positive effect in the disease and this begs the question as to whether adjuvant therapy plus immunotherapy could be utilized though the patient would be difficult to treat with platinum based chemotherapy as well.       The patient is next assessed 11/7/2022 for significant assessments by supportive oncology at [facility] as well as with plans to see Dr. Marrero 11/9/2022.  Unfortunately he has been very slow to gradually recover from the effects of surgery with reduced appetite and only fair performance status.     At present it would be difficult to give him cisplatin based chemotherapy and we discussed whether we might be able to use Tecentriq (atezolizumab) as his primary adjuvant therapy.  We have contacted pathology about completing Caris testing and a PD-L1 analysis that has not yet completed.         The patient is seen, however, 11/16/2022 and his genomic analysis has returned as being significant for broad sensitivity to immunotherapy.  This would argue for the administration of weekly Carboplatin/Taxol as the patient proceeds to radiation therapy and upon completion, then using Tecentriq as further adjuvant therapy over a year.  This is discussed with the patient and his  in detail as well as discussed with Dr. Marrero of radiation therapy.  We have also discussed the patient require port placement.    The patient proceeded to treatment taking weekly carboplatin Taxol with concurrent radiation  therapy last seen 12/12/2022 with third weekly treatment.    He is next seen 12/19/2022 with H&H 11.9 and 38.5, white count 3460 and platelet count of 168,000.  He is feeling well without any significant complications of therapy except for right calf dermatitis that is been developing in the last several days.    The patient continued therapy taking CarboTaxol weekly including 12/28 and 1/4/2023.    His is next seen 1/11/2023 with completion date for radiation therapy 1/11/2023.  Repeat CBC now includes H&H of 11.0 and 33.9, white count 2090, platelet count 128,000, ANC is 800.  He, fortunately, is tolerating treatment well and we have again discussed with him adjuvant immunotherapy would be useful including Tecentriq versus pembrolizumab-keynote 091 study particularly in tumor programmed cell death PD-L1 greater than 50%.    The patient will not be given additional chemotherapy 1/11/2023 we will plan to reassess by follow-up scans in the next 6 weeks CT chest and pelvis making a decision thereafter about adjuvant immunotherapy.    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    We have discussed that considering studies like keynote  091 that the patient's high risk disease could be addressed with additional immunotherapy including the use of Atezolizumab and/or Keytruda.  Considering his findings overall Keytruda every 3 weeks x18 cycles is to be pursued.    The patient was seen 3/20/2023, discussed initiation of Keytruda.  He is agreeable to proceed.    Past Medical History:   Diagnosis Date   • Arthritis    • COPD (chronic obstructive pulmonary disease) (HCC)     O2 3L AT HS   • Diabetes mellitus (HCC)     DIET CONTROL   • Diverticulitis    •  DVT, lower extremity (HCC)     RLE   • Elevated cholesterol    • H/O Cervical pain    • History of colitis    • Hyperlipidemia    • Hypertension    • Left shoulder pain    • Low back pain    • Lung cancer (HCC) 2022    LEFT LUNG   • On anticoagulant therapy    • Peripheral arterial disease (HCC)    • Right leg claudication (HCC)    • Skin cancer     HX        Past Surgical History:   Procedure Laterality Date   • BALLOON ANGIOPLASTY, ARTERY Right 2015    IR Percutaneious IVC filter placement   • INGUINAL HERNIA REPAIR Left    • KNEE ARTHROSCOPY Left     Torn meniscus   • LOBECTOMY Left 9/23/2022    Procedure: BRONCHOSCOPY, LEFT VIDEO ASSISTED THORACOSCOPY, ROBOT ASSISTED TOTAL DECORTICATION  LEFT LUNG, LEFT LOWER LOBE LOBECTOMY, MEDIASTINAL LYMPHECTOMY, INTERCOSTAL NERVE BLOCK;  Surgeon: Nicola Joe III, MD;  Location: [facility] OR;  Service: Robotics - [facility];  Laterality: Left;   • VENOUS ACCESS DEVICE (PORT) INSERTION Right 11/21/2022    Procedure: INSERTION VENOUS ACCESS DEVICE;  Surgeon: Nicola Joe III, MD;  Location: [facility] OR;  Service: Thoracic;  Laterality: Right;        Current Outpatient Medications on File Prior to Visit   Medication Sig Dispense Refill   • arformoterol (BROVANA) 15 MCG/2ML nebulizer solution Take 2 mL by nebulization 2 (Two) Times a Day.     • budesonide (PULMICORT) 0.5 MG/2ML nebulizer solution Take 2 mL by nebulization 2 (Two) Times a Day.     • cetirizine (zyrTEC) 10 MG tablet Take 1 tablet by mouth Daily.     • ergocalciferol (ERGOCALCIFEROL) 1.25 MG (32457 UT) capsule Take 1 capsule by mouth Every 7 (Seven) Days. 4 capsule 2   • fluticasone (FLONASE) 50 MCG/ACT nasal spray 1 spray into the nostril(s) as directed by provider Daily.     • isosorbide mononitrate (IMDUR) 60 MG 24 hr tablet Take 1 tablet by mouth Every Evening.     • ondansetron (Zofran) 8 MG tablet Take 1 tablet by mouth Every 8 (Eight) Hours As Needed for Nausea or Vomiting. 30 tablet 1   •  simvastatin (ZOCOR) 20 MG tablet Take 1 tablet by mouth Every Night.     • tamsulosin (FLOMAX) 0.4 MG capsule 24 hr capsule Take 1 capsule by mouth Daily. 30 capsule 5   • valACYclovir (Valtrex) 1000 MG tablet Take 1 tablet by mouth 3 (Three) Times a Day. 30 tablet 0   • warfarin (COUMADIN) 5 MG tablet Take 1 tablet by mouth Daily. Take 10mg on 9/29/22 and then resume regular home schedule.     • metoprolol succinate XL (TOPROL-XL) 25 MG 24 hr tablet Take 1 tablet by mouth Daily for 30 days. (Patient taking differently: Take 25 mg by mouth Every Evening.) 30 tablet 0     No current facility-administered medications on file prior to visit.        ALLERGIES:    Allergies   Allergen Reactions   • Benadryl [Diphenhydramine] Other (See Comments)     Extreme restlessness and insomnia        Social History     Socioeconomic History   • Marital status:    • Years of education: 1 year college   Tobacco Use   • Smoking status: Every Day     Packs/day: 1.00     Years: 59.00     Pack years: 59.00     Types: Cigarettes     Start date: 1963   • Smokeless tobacco: Never   • Tobacco comments:     9/8/22: Down to Less than 1 PPD   Vaping Use   • Vaping Use: Never used   Substance and Sexual Activity   • Alcohol use: Not Currently   • Drug use: Never   • Sexual activity: Defer        Family History   Problem Relation Age of Onset   • No Known Problems Mother    • Cancer Father    • Lung cancer Father    • Diabetes Brother    • Other Daughter         S/P stent, age 42   • No Known Problems Son    • Malig Hyperthermia Neg Hx         Review of Systems   Constitutional: Positive for activity change, fatigue and unexpected weight change (Status post his diverticulitis episode, weight has not been regained).   HENT: Negative.    Eyes: Negative.    Respiratory: Positive for shortness of breath. Negative for cough.    Cardiovascular: Negative.    Gastrointestinal: Negative.    Genitourinary: Negative.    Musculoskeletal: Negative.   "  Skin: Positive for rash (Involving right calf).   Neurological: Negative.    Psychiatric/Behavioral: Negative.         Objective     Vitals:    03/20/23 0840   BP: 123/74   Pulse: 66   Resp: 22   Temp: 97.2 °F (36.2 °C)   TempSrc: Temporal   SpO2: 98%   Weight: 71.6 kg (157 lb 12.8 oz)   Height: 177.8 cm (70\")   PainSc: 0-No pain     Current Status 3/20/2023   ECOG score 0       Physical Exam  Constitutional:       Appearance: Normal appearance.   HENT:      Head: Normocephalic and atraumatic.      Nose: Nose normal.      Mouth/Throat:      Mouth: Mucous membranes are moist.      Pharynx: Oropharynx is clear.   Eyes:      Extraocular Movements: Extraocular movements intact.      Conjunctiva/sclera: Conjunctivae normal.      Pupils: Pupils are equal, round, and reactive to light.   Cardiovascular:      Rate and Rhythm: Normal rate and regular rhythm.      Pulses: Normal pulses.      Heart sounds: Normal heart sounds.   Pulmonary:      Comments: Prolonged expiratory phase bilaterally  Abdominal:      General: Bowel sounds are normal.      Palpations: Abdomen is soft.      Comments: Scaphoid   Musculoskeletal:         General: Normal range of motion.      Cervical back: Normal range of motion and neck supple.   Skin:     General: Skin is warm and dry.      Findings: Rash (Right calf, macular erythematous rash) present.   Neurological:      General: No focal deficit present.      Mental Status: He is alert and oriented to person, place, and time.   Psychiatric:         Mood and Affect: Mood normal.           RECENT LABS:  Hematology WBC   Date Value Ref Range Status   03/20/2023 5.22 3.40 - 10.80 10*3/mm3 Final   05/09/2022 7.54 4.5 - 11.0 10*3/uL Final     RBC   Date Value Ref Range Status   03/20/2023 3.55 (L) 4.14 - 5.80 10*6/mm3 Final   05/09/2022 5.52 4.5 - 5.9 10*6/uL Final     Hemoglobin   Date Value Ref Range Status   03/20/2023 11.4 (L) 13.0 - 17.7 g/dL Final   05/09/2022 16.4 13.5 - 17.5 g/dL Final "     Hematocrit   Date Value Ref Range Status   03/20/2023 35.4 (L) 37.5 - 51.0 % Final   05/09/2022 51.0 41.0 - 53.0 % Final     Platelets   Date Value Ref Range Status   03/20/2023 139 (L) 140 - 450 10*3/mm3 Final   05/09/2022 204 140 - 440 10*3/uL Final          Assessment & Plan           77-year-old male with medical issues including type 2 diabetes-uncertain control, COPD, hypertension, diastolic heart failure, chronic disease, peripheral vascular disease (follow-up with vascular surgery today), previous DVT on anticoagulation (home monitoring), previous IVC filter placement?,  Status post September 2021 partial small bowel obstruction secondary to sigmoid diverticulitis treated medically.        He had had a right lung base nodule noted on scan at that point and in follow-up with primary care had developed a left inguinal hernia with repair planned through a different general surgeon then seen with his initial sigmoid diverticulitis.  An additional CT scan of the chest done in follow-up of his previously abnormal study now revealed not only the previously noted right lower lobe and additional nodules but a spiculated nodule in the left lower lobe measuring 16 x 14 mm.                                 Follow-up PET/CT demonstrated a hypermetabolic spiculated lesion medial aspect the left lower lobe adjacent to descending thoracic aorta measuring 1.5 x 1.6 SUV 9.3 with an enlarged hypermetabolic left hilar lymph node measured 1.5 x 1.3 with SUV of 12.1, additional nodules in the lateral aspect right lower lobe and micronodule in the posterior/medial right lower lobe and also additional right lower lobe micronodule.  There is an infrarenal abdominal aortic aneurysm measuring 3.4 cm with a short segment of chronic dissection present.    These clinical findings would be consistent with a possible T1b N1 M0-stage IIb lung cancer.   Recognizing a diagnosis has not been made his case was discussed in thoracic  conference 7/20/2022.  Recommendations include proceeding to pulmonary assessment with EBUS and the patient undergoing a general assessment per his clinical status-older adult assessment as well as assessment by thoracic surgery.  These issues are discussed with the patient and his wife in detail when they are seen 7/28/2022.    The patient proceeded to undergo his hernia surgery 8/2/2022 by Dr. Dotson.  Additionally the patient was unable to undergo assessment by pulmonary until October and, thereafter, was scheduled to see Dr. Joe 8/18/2022 undergoing older adult functional assessment with a JAGUAR score of 11 indicating a risk of functional decline related to cancer therapy.    The patient is seen with his wife 8/15/2022 and doing very well with recent hernia surgery.  His performance status is reasonably good at present and we have learned now that he would not be able to see pulmonary medicine until October, thoracic surgery is scheduled this week with Dr. Joe.  Perhaps he could be a surgical candidate.  Particularly we know by guardant 360 that T p53 splice site mutation is present and would certainly be consistent as well the most common mutations found in lung cancers particularly squamous cancers.  We have discussed obtaining pulmonary functions at this point, thoracic surgery assessment this week and also restarting Chantix as possible for the patient.    There was difficulty obtaining Chantix and while this was being assessed the patient was reviewed 8/18 by thoracic surgery.  He was felt to have a T1b N1 M0 stage IIb carcinoma left lower lobe along and not a candidate for biopsy.  PFTs were borderline, functional assessment was reasonably good and the patient was felt to be a candidate for robot-assisted biopsy of his nodes and if malignant proceed to left lower lobe lobectomy.  He was reviewed 9/8 and offered 21 mg nicotine transdermal patching.    He is next seen 9/10/2022.  He is seen with his  wife and both are prepared for surgery 9/23/2022.  We discussed that additional therapy may be considered once we have more information about the type and stage.  We would hope to see him postoperatively.    The patient was admitted 9//2022 undergoing 9/23/2022  a bronchoscopy with left video-assisted thoracoscopy with robot-assisted total decortication of the left lung, left lower lobectomy, mediastinal lymphadenectomy and intercostal nerve block with Dr. Nicola Joe.  Fortunately he did fairly well postoperatively though did develop atrial fibrillation with RVR treated with amiodarone converting back to sinus rhythm, treated with IV metoprolol subsequent chronic anticoagulation.  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  Notably there is invasive carcinoma present at the margin.  Is a, and only 2 positive lymph nodes adjacent to the bronchovesicular margin which appears to have been transected difficult to enumerate with extranodal extension present.       The patient is seen 10/27/2022.  He is recovering well from surgery, albeit slowly, and we have discussed his findings that are concerning as to residual disease with a positive margin described as above.  There is also the significance potentially of PD-L1 expression which has been described as producing a poor survival.  Nivolumab has been used as second line therapy to positive effect in the disease and this begs the question as to whether adjuvant therapy plus immunotherapy could be utilized though the patient would be difficult to treat with platinum based chemotherapy as well.       Options could well be Carboplatin and Taxol considering the patient's comorbidity and worry of using cisplatin-based chemotherapy in this patient followed by atezolizumab with pathology having been notified to assess PD-L1 expression on  the tumor as well also await review by Caris molecular profiling.  Finally radiation therapy consultation be requested.    The patient is next assessed 11/7/2022 for significant assessments by supportive oncology at [facility] as well as with plans to see Dr. Marrero 11/9/2022.  Unfortunately he has been very slow to gradually recover from the effects of surgery with reduced appetite and only fair performance status.  At present it would be difficult to give him cisplatin based chemotherapy and we discussed whether we might be able to use Tecentriq (atezolizumab) as his primary adjuvant therapy.  We have contacted pathology about completing Caris testing and a PD-L1 analysis that has not yet completed.    The patient was reviewed by radiation therapy seen 11/9/2022 and radiation therapy offered.  We asked the patient to return back for follow-up and when seen 11/15/2022 we discussed that the patient's Caris analysis indicates benefit from immunotherapy at relatively high levels.  This would allow radiation therapy given with Carboplatin Taxol weekly (better tolerated) and then subsequent atezolizumab adjuvantly.  The patient proceeded to treatment taking weekly carboplatin Taxol with concurrent radiation therapy beginning 11/28/2022 and last seen 12/12/2022 with third weekly treatment.    He is next seen 12/19/2022 with H&H 11.9 and 38.5, white count 3460 and platelet count of 168,000.  He is feeling well without any significant complications of therapy except for right calf dermatitis that is been developing in the last several days.    The patient continued therapy taking CarboTaxol weekly including 12/28 and 1/4/2023.    His neck seen 1/11/2023 with completion date 1/11/2023.  Repeat CBC now includes H&H of 11.0 and 33.9, white count 2090, platelet count 128,000, ANC is 800.  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    We have discussed that considering studies like keynote  091 that the patient's high risk disease could be addressed with additional immunotherapy including the use of Atezolizumab and/or Keytruda.  Considering his findings overall Keytruda every 3 weeks x18 cycles is to be pursued.      Plan:    *Initiate Keytruda      *Return 2 weeks NP assessment      *3 weeks Jorge LE      *Consider repeat scans at 3 months.

## 2023-03-20 ENCOUNTER — TELEPHONE (OUTPATIENT)
Dept: ONCOLOGY | Facility: CLINIC | Age: 78
End: 2023-03-20
Payer: MEDICARE

## 2023-03-20 ENCOUNTER — INFUSION (OUTPATIENT)
Dept: ONCOLOGY | Facility: HOSPITAL | Age: 78
End: 2023-03-20
Payer: MEDICARE

## 2023-03-20 ENCOUNTER — OFFICE VISIT (OUTPATIENT)
Dept: ONCOLOGY | Facility: CLINIC | Age: 78
End: 2023-03-20
Payer: MEDICARE

## 2023-03-20 VITALS
TEMPERATURE: 97.2 F | WEIGHT: 157.8 LBS | SYSTOLIC BLOOD PRESSURE: 123 MMHG | BODY MASS INDEX: 22.59 KG/M2 | OXYGEN SATURATION: 98 % | DIASTOLIC BLOOD PRESSURE: 74 MMHG | HEIGHT: 70 IN | RESPIRATION RATE: 22 BRPM | HEART RATE: 66 BPM

## 2023-03-20 DIAGNOSIS — C7A.8 NEUROENDOCRINE CARCINOMA OF LUNG: ICD-10-CM

## 2023-03-20 DIAGNOSIS — Z45.2 FITTING AND ADJUSTMENT OF VASCULAR CATHETER: ICD-10-CM

## 2023-03-20 DIAGNOSIS — Z79.899 LONG-TERM USE OF HIGH-RISK MEDICATION: ICD-10-CM

## 2023-03-20 DIAGNOSIS — C7A.8 NEUROENDOCRINE CARCINOMA OF LUNG: Primary | ICD-10-CM

## 2023-03-20 DIAGNOSIS — Z79.899 LONG-TERM USE OF HIGH-RISK MEDICATION: Primary | ICD-10-CM

## 2023-03-20 LAB
ALBUMIN SERPL-MCNC: 4.1 G/DL (ref 3.5–5.2)
ALBUMIN/GLOB SERPL: 1.5 G/DL (ref 1.1–2.4)
ALP SERPL-CCNC: 66 U/L (ref 38–116)
ALT SERPL W P-5'-P-CCNC: 11 U/L (ref 0–41)
ANION GAP SERPL CALCULATED.3IONS-SCNC: 9.7 MMOL/L (ref 5–15)
AST SERPL-CCNC: 14 U/L (ref 0–40)
BASOPHILS # BLD AUTO: 0.02 10*3/MM3 (ref 0–0.2)
BASOPHILS NFR BLD AUTO: 0.4 % (ref 0–1.5)
BILIRUB SERPL-MCNC: 0.5 MG/DL (ref 0.2–1.2)
BUN SERPL-MCNC: 16 MG/DL (ref 6–20)
BUN/CREAT SERPL: 15.5 (ref 7.3–30)
CALCIUM SPEC-SCNC: 10.1 MG/DL (ref 8.5–10.2)
CHLORIDE SERPL-SCNC: 101 MMOL/L (ref 98–107)
CO2 SERPL-SCNC: 25.3 MMOL/L (ref 22–29)
CREAT SERPL-MCNC: 1.03 MG/DL (ref 0.7–1.3)
DEPRECATED RDW RBC AUTO: 57.5 FL (ref 37–54)
EGFRCR SERPLBLD CKD-EPI 2021: 75.8 ML/MIN/1.73
EOSINOPHIL # BLD AUTO: 0.07 10*3/MM3 (ref 0–0.4)
EOSINOPHIL NFR BLD AUTO: 1.3 % (ref 0.3–6.2)
ERYTHROCYTE [DISTWIDTH] IN BLOOD BY AUTOMATED COUNT: 15.7 % (ref 12.3–15.4)
GLOBULIN UR ELPH-MCNC: 2.8 GM/DL (ref 1.8–3.5)
GLUCOSE SERPL-MCNC: 183 MG/DL (ref 74–124)
HCT VFR BLD AUTO: 35.4 % (ref 37.5–51)
HGB BLD-MCNC: 11.4 G/DL (ref 13–17.7)
IMM GRANULOCYTES # BLD AUTO: 0.01 10*3/MM3 (ref 0–0.05)
IMM GRANULOCYTES NFR BLD AUTO: 0.2 % (ref 0–0.5)
LYMPHOCYTES # BLD AUTO: 1.19 10*3/MM3 (ref 0.7–3.1)
LYMPHOCYTES NFR BLD AUTO: 22.8 % (ref 19.6–45.3)
MCH RBC QN AUTO: 32.1 PG (ref 26.6–33)
MCHC RBC AUTO-ENTMCNC: 32.2 G/DL (ref 31.5–35.7)
MCV RBC AUTO: 99.7 FL (ref 79–97)
MONOCYTES # BLD AUTO: 0.56 10*3/MM3 (ref 0.1–0.9)
MONOCYTES NFR BLD AUTO: 10.7 % (ref 5–12)
NEUTROPHILS NFR BLD AUTO: 3.37 10*3/MM3 (ref 1.7–7)
NEUTROPHILS NFR BLD AUTO: 64.6 % (ref 42.7–76)
NRBC BLD AUTO-RTO: 0 /100 WBC (ref 0–0.2)
PLATELET # BLD AUTO: 139 10*3/MM3 (ref 140–450)
PMV BLD AUTO: 8.4 FL (ref 6–12)
POTASSIUM SERPL-SCNC: 4.3 MMOL/L (ref 3.5–4.7)
PROT SERPL-MCNC: 6.9 G/DL (ref 6.3–8)
RBC # BLD AUTO: 3.55 10*6/MM3 (ref 4.14–5.8)
SODIUM SERPL-SCNC: 136 MMOL/L (ref 134–145)
T4 FREE SERPL-MCNC: 1.08 NG/DL (ref 0.93–1.7)
TSH SERPL DL<=0.05 MIU/L-ACNC: 2.58 UIU/ML (ref 0.27–4.2)
WBC NRBC COR # BLD: 5.22 10*3/MM3 (ref 3.4–10.8)

## 2023-03-20 PROCEDURE — 3078F DIAST BP <80 MM HG: CPT | Performed by: INTERNAL MEDICINE

## 2023-03-20 PROCEDURE — 1126F AMNT PAIN NOTED NONE PRSNT: CPT | Performed by: INTERNAL MEDICINE

## 2023-03-20 PROCEDURE — 3074F SYST BP LT 130 MM HG: CPT | Performed by: INTERNAL MEDICINE

## 2023-03-20 PROCEDURE — 99213 OFFICE O/P EST LOW 20 MIN: CPT | Performed by: INTERNAL MEDICINE

## 2023-03-20 PROCEDURE — 25010000002 HEPARIN LOCK FLUSH PER 10 UNITS: Performed by: INTERNAL MEDICINE

## 2023-03-20 PROCEDURE — 25010000002 PEMBROLIZUMAB 100 MG/4ML SOLUTION 4 ML VIAL: Performed by: INTERNAL MEDICINE

## 2023-03-20 PROCEDURE — 80053 COMPREHEN METABOLIC PANEL: CPT

## 2023-03-20 PROCEDURE — 85025 COMPLETE CBC W/AUTO DIFF WBC: CPT

## 2023-03-20 PROCEDURE — 84443 ASSAY THYROID STIM HORMONE: CPT | Performed by: INTERNAL MEDICINE

## 2023-03-20 PROCEDURE — 96413 CHEMO IV INFUSION 1 HR: CPT

## 2023-03-20 PROCEDURE — 84439 ASSAY OF FREE THYROXINE: CPT | Performed by: INTERNAL MEDICINE

## 2023-03-20 RX ORDER — HEPARIN SODIUM (PORCINE) LOCK FLUSH IV SOLN 100 UNIT/ML 100 UNIT/ML
500 SOLUTION INTRAVENOUS AS NEEDED
OUTPATIENT
Start: 2023-03-20

## 2023-03-20 RX ORDER — HEPARIN SODIUM (PORCINE) LOCK FLUSH IV SOLN 100 UNIT/ML 100 UNIT/ML
500 SOLUTION INTRAVENOUS AS NEEDED
Status: DISCONTINUED | OUTPATIENT
Start: 2023-03-20 | End: 2023-03-20 | Stop reason: HOSPADM

## 2023-03-20 RX ORDER — SODIUM CHLORIDE 9 MG/ML
250 INJECTION, SOLUTION INTRAVENOUS ONCE
Status: COMPLETED | OUTPATIENT
Start: 2023-03-20 | End: 2023-03-20

## 2023-03-20 RX ORDER — SODIUM CHLORIDE 9 MG/ML
250 INJECTION, SOLUTION INTRAVENOUS ONCE
Status: CANCELLED | OUTPATIENT
Start: 2023-03-20

## 2023-03-20 RX ORDER — SODIUM CHLORIDE 0.9 % (FLUSH) 0.9 %
10 SYRINGE (ML) INJECTION AS NEEDED
OUTPATIENT
Start: 2023-03-20

## 2023-03-20 RX ORDER — SODIUM CHLORIDE 0.9 % (FLUSH) 0.9 %
10 SYRINGE (ML) INJECTION AS NEEDED
Status: DISCONTINUED | OUTPATIENT
Start: 2023-03-20 | End: 2023-03-20 | Stop reason: HOSPADM

## 2023-03-20 RX ADMIN — SODIUM CHLORIDE 200 MG: 9 INJECTION, SOLUTION INTRAVENOUS at 09:49

## 2023-03-20 RX ADMIN — SODIUM CHLORIDE 250 ML: 9 INJECTION, SOLUTION INTRAVENOUS at 09:47

## 2023-03-20 RX ADMIN — Medication 10 ML: at 10:21

## 2023-03-20 RX ADMIN — Medication 500 UNITS: at 10:21

## 2023-03-20 NOTE — TELEPHONE ENCOUNTER
Pt's wife called stating that when pt got home from his first infusion, he had pain in his abdomen under his right rib. He describes the pain as a constant ache. Pt did have shingles last week but has almost completed his Valtrex. D/W Dr. Bishop. Per Dr. Bishop, this could still be his shingles causing the pain so he would like for pt to try neurontin 300mg BID to see if this helps. Informed pt's wife. She v/u and said pt had 100mg capsules at home and will just take those for the pain. They will call if no improvement.

## 2023-03-27 ENCOUNTER — TELEPHONE (OUTPATIENT)
Dept: ONCOLOGY | Facility: CLINIC | Age: 78
End: 2023-03-27
Payer: MEDICARE

## 2023-03-27 RX ORDER — FAMCICLOVIR 500 MG/1
500 TABLET ORAL 3 TIMES DAILY
Qty: 21 TABLET | Refills: 0 | Status: SHIPPED | OUTPATIENT
Start: 2023-03-27 | End: 2023-04-03

## 2023-03-27 NOTE — TELEPHONE ENCOUNTER
Received call from Stephanie, pt's friend. She states pt still has red areas on his stomach on the right side (where his shingles was). They are still somewhat painful. He is taking gabapentin but it is not really helping and is making him drowsy. Wondering if he needs another round of Valtrex? D/W Dr. Bishop. Per Dr Bishop, we need to switch him to Famvir 500mg TID x 7days. Hopefully this will take care of the shingles completely. Informed Stephanie of this and she v/u. Escribed to pt's pharmacy.

## 2023-04-03 ENCOUNTER — PATIENT OUTREACH (OUTPATIENT)
Dept: OTHER | Facility: HOSPITAL | Age: 78
End: 2023-04-03
Payer: MEDICARE

## 2023-04-03 ENCOUNTER — LAB (OUTPATIENT)
Dept: LAB | Facility: HOSPITAL | Age: 78
End: 2023-04-03
Payer: MEDICARE

## 2023-04-03 ENCOUNTER — OFFICE VISIT (OUTPATIENT)
Dept: ONCOLOGY | Facility: CLINIC | Age: 78
End: 2023-04-03
Payer: MEDICARE

## 2023-04-03 VITALS
SYSTOLIC BLOOD PRESSURE: 113 MMHG | BODY MASS INDEX: 22.52 KG/M2 | DIASTOLIC BLOOD PRESSURE: 66 MMHG | HEIGHT: 70 IN | OXYGEN SATURATION: 95 % | RESPIRATION RATE: 20 BRPM | WEIGHT: 157.3 LBS | HEART RATE: 74 BPM | TEMPERATURE: 98.4 F

## 2023-04-03 DIAGNOSIS — C7A.8 NEUROENDOCRINE CARCINOMA OF LUNG: Primary | ICD-10-CM

## 2023-04-03 DIAGNOSIS — C7A.8 NEUROENDOCRINE CARCINOMA OF LUNG: ICD-10-CM

## 2023-04-03 LAB
BASOPHILS # BLD AUTO: 0.03 10*3/MM3 (ref 0–0.2)
BASOPHILS NFR BLD AUTO: 0.5 % (ref 0–1.5)
DEPRECATED RDW RBC AUTO: 53.1 FL (ref 37–54)
EOSINOPHIL # BLD AUTO: 0.1 10*3/MM3 (ref 0–0.4)
EOSINOPHIL NFR BLD AUTO: 1.7 % (ref 0.3–6.2)
ERYTHROCYTE [DISTWIDTH] IN BLOOD BY AUTOMATED COUNT: 14.6 % (ref 12.3–15.4)
HCT VFR BLD AUTO: 36.8 % (ref 37.5–51)
HGB BLD-MCNC: 12.4 G/DL (ref 13–17.7)
IMM GRANULOCYTES # BLD AUTO: 0.07 10*3/MM3 (ref 0–0.05)
IMM GRANULOCYTES NFR BLD AUTO: 1.2 % (ref 0–0.5)
LYMPHOCYTES # BLD AUTO: 1.77 10*3/MM3 (ref 0.7–3.1)
LYMPHOCYTES NFR BLD AUTO: 29.5 % (ref 19.6–45.3)
MCH RBC QN AUTO: 33.2 PG (ref 26.6–33)
MCHC RBC AUTO-ENTMCNC: 33.7 G/DL (ref 31.5–35.7)
MCV RBC AUTO: 98.4 FL (ref 79–97)
MONOCYTES # BLD AUTO: 0.87 10*3/MM3 (ref 0.1–0.9)
MONOCYTES NFR BLD AUTO: 14.5 % (ref 5–12)
NEUTROPHILS NFR BLD AUTO: 3.15 10*3/MM3 (ref 1.7–7)
NEUTROPHILS NFR BLD AUTO: 52.6 % (ref 42.7–76)
NRBC BLD AUTO-RTO: 0 /100 WBC (ref 0–0.2)
PLATELET # BLD AUTO: 164 10*3/MM3 (ref 140–450)
PMV BLD AUTO: 8.8 FL (ref 6–12)
RBC # BLD AUTO: 3.74 10*6/MM3 (ref 4.14–5.8)
WBC NRBC COR # BLD: 5.99 10*3/MM3 (ref 3.4–10.8)

## 2023-04-03 PROCEDURE — 3078F DIAST BP <80 MM HG: CPT | Performed by: INTERNAL MEDICINE

## 2023-04-03 PROCEDURE — 99213 OFFICE O/P EST LOW 20 MIN: CPT | Performed by: INTERNAL MEDICINE

## 2023-04-03 PROCEDURE — 3074F SYST BP LT 130 MM HG: CPT | Performed by: INTERNAL MEDICINE

## 2023-04-03 PROCEDURE — 85025 COMPLETE CBC W/AUTO DIFF WBC: CPT

## 2023-04-03 PROCEDURE — 36415 COLL VENOUS BLD VENIPUNCTURE: CPT

## 2023-04-03 PROCEDURE — 1126F AMNT PAIN NOTED NONE PRSNT: CPT | Performed by: INTERNAL MEDICINE

## 2023-04-03 NOTE — PROGRESS NOTES
"Called patient. He is doing ok. He just saw Dr. Bishop.  He started immunotherapy 3/20 then developed shingles.  He is on medicine for it now, which he took for 10 days. He still has a rash and the medication was extended for an additional 10 days.  The patient will resume Keytruda next week. He will have this every 3 weeks.     The patient reports \"some stomach problems\" which resolved. He denies any other symptoms at this time.     The patient states his appetite is good; he lost 5 pounds over the past few weeks.  Now 157 down from 162. He attributes his weight loss to his shingles. We discussed Nayeli; he states he doesn't need to speak with her now.     The patient states he has no supportive care or resource needs at this time.  I will f/u next month; he has my info and will call as needed             "

## 2023-04-03 NOTE — PROGRESS NOTES
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  Notably there is invasive carcinoma present at the margin, 2 positive lymph nodes adjacent to the bronchovesicular margin which appears to have been transected difficult to enumerate with extranodal extension present.    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  He had been assessed particularly in the fall 2021 when he presented with nausea and vomiting and abdominal discomfort leading to assessments that revealed sigmoid diverticulitis with contained rupture and partial small bowel obstruction.  Records from mid September 21 indicating that he was admitted with partial small bowel obstruction and treated medically with very slow recovery.  He has history of COPD with CT demonstrating a pulmonary nodule in the right lung base at 7 mm with follow-up planned.  He was seen by general surgery in later September with repeat scans planned and repeat CT abdomen 10/7/2021 did demonstrate interval improvement of sigmoid diverticulitis.      Record indicates an assessment in late June 2022 at different general surgeon for left inguinal hernia, history of heavy tobacco use with COPD and recommendation for surgical repair of his hernia      The patient underwent a CT scan of the chest 5/7/2022 demonstrating diffuse emphysematous changes, ill-defined density measuring 3 mm in the superior segment of the  right lower lobe, multiple ill-defined 3 to 4 mm nodular density thought to be inflammatory involving the right lower lobe and a new spiculated nodule involving the left lower lobe measuring 16 x 14 mm as well as left hilar adenopathy.       Follow-up PET/CT 7/13/2022 reveal a hypermetabolic spiculated lesion medial aspect the left lower lobe adjacent to descending thoracic aorta measuring 1.5 x 1.6 SUV 9.3 with an enlarged hypermetabolic left hilar lymph node measured 1.5 x 1.3 with SUV of 12.1, additional nodules in the lateral aspect right lower lobe and micronodule in the posterior/medial right lower lobe and also additional right lower lobe micronodule.  There is an infrarenal abdominal aortic aneurysm measuring 3.4 cm with a short segment of chronic dissection present.    These clinical findings would be consistent with a possible T1b N1 M0-stage IIb lung cancer.      Recognizing a diagnosis has not been made his case was discussed in thoracic conference 7/20/2022.  Recommendations include proceeding to pulmonary assessment with EBUS and the patient undergoing a general assessment per his clinical status-older adult assessment as well as assessment by thoracic surgery.  These issues are discussed with the patient and his wife in detail when they are seen 7/28/2022.    The patient proceeded to undergo his hernia surgery 8/2/2022 by Dr. Dotson.  Additionally the patient was unable to undergo assessment by pulmonary until October and, thereafter, was scheduled to see Dr. Joe 8/18/2022 undergoing older adult functional assessment with a JAGUAR score of 11 indicating a risk of functional decline related to cancer therapy.    The patient is seen with his significant other 8/15/2022 and doing very well with recent hernia surgery.  His performance status is reasonably good at present and we have learned now that he would not be able to see pulmonary medicine until October, thoracic surgery is scheduled this week with  Dr. Joe.  Perhaps he could be a surgical candidate.  Particularly we know by guardant 360 that T p53 splice site mutation is present and would certainly be consistent as well the most common mutations found in lung cancers particularly squamous cancers.  We have discussed obtaining pulmonary functions at this point, thoracic surgery assessment this week and also restarting Chantix as possible for the patient.    There was difficulty obtaining Chantix and while this was being assessed the patient was reviewed 8/18 by thoracic surgery.  He was felt to have a T1b N1 M0 stage IIb carcinoma left lower lobe along and not a candidate for biopsy.  PFTs were borderline, functional assessment was reasonably good and the patient was felt to be a candidate for robot-assisted biopsy of his nodes and if malignant proceed to left lower lobe lobectomy.  He was reviewed 9/8 and offered 21 mg nicotine transdermal patching.    He is next seen 9/10/2022.  He is seen with his wife and both are prepared for surgery 9/23/2022.  We discussed that additional therapy may be considered once we have more information about the type and stage.  We would hope to see him postoperatively.    The patient was admitted 9//2022 undergoing 9/23/2022  a bronchoscopy with left video-assisted thoracoscopy with robot-assisted total decortication of the left lung, left lower lobectomy, mediastinal lymphadenectomy and intercostal nerve block with Dr. Nicola Joe.  Fortunately he did fairly well postoperatively though did develop atrial fibrillation with RVR treated with amiodarone converting back to sinus rhythm, treated with IV metoprolol subsequent chronic anticoagulation.  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  Notably there is invasive carcinoma present at the margin.  Is a, and only 2 positive lymph nodes  adjacent to the bronchovesicular margin which appears to have been transected difficult to enumerate with extranodal extension present.       The patient is seen 10/27/2022.  He is recovering well from surgery, albeit slowly, and we have discussed his findings that are concerning as to residual disease with a positive margin described as above.  There is also the significance potentially of PD-L1 expression which has been described as producing a poor survival.     Nivolumab has been used as second line therapy to positive effect in the disease and this begs the question as to whether adjuvant therapy plus immunotherapy could be utilized though the patient would be difficult to treat with platinum based chemotherapy as well.       The patient is next assessed 11/7/2022 for significant assessments by supportive oncology at [facility] as well as with plans to see Dr. Marrero 11/9/2022.  Unfortunately he has been very slow to gradually recover from the effects of surgery with reduced appetite and only fair performance status.     At present it would be difficult to give him cisplatin based chemotherapy and we discussed whether we might be able to use Tecentriq (atezolizumab) as his primary adjuvant therapy.  We have contacted pathology about completing Caris testing and a PD-L1 analysis that has not yet completed.         The patient is seen, however, 11/16/2022 and his genomic analysis has returned as being significant for broad sensitivity to immunotherapy.  This would argue for the administration of weekly Carboplatin/Taxol as the patient proceeds to radiation therapy and upon completion, then using Tecentriq as further adjuvant therapy over a year.  This is discussed with the patient and his  in detail as well as discussed with Dr. Marrero of radiation therapy.  We have also discussed the patient require port placement.    The patient proceeded to treatment taking weekly carboplatin Taxol with concurrent radiation  therapy last seen 12/12/2022 with third weekly treatment.    He is next seen 12/19/2022 with H&H 11.9 and 38.5, white count 3460 and platelet count of 168,000.  He is feeling well without any significant complications of therapy except for right calf dermatitis that is been developing in the last several days.    The patient continued therapy taking CarboTaxol weekly including 12/28 and 1/4/2023.    His is next seen 1/11/2023 with completion date for radiation therapy 1/11/2023.  Repeat CBC now includes H&H of 11.0 and 33.9, white count 2090, platelet count 128,000, ANC is 800.  He, fortunately, is tolerating treatment well and we have again discussed with him adjuvant immunotherapy would be useful including Tecentriq versus pembrolizumab-keynote 091 study particularly in tumor programmed cell death PD-L1 greater than 50%.    The patient will not be given additional chemotherapy 1/11/2023 we will plan to reassess by follow-up scans in the next 6 weeks CT chest and pelvis making a decision thereafter about adjuvant immunotherapy.    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    We have discussed that considering studies like keynote  091 that the patient's high risk disease could be addressed with additional immunotherapy including the use of Atezolizumab and/or Keytruda.  Considering his findings overall Keytruda every 3 weeks x18 cycles is to be pursued.    The patient was seen 3/20/2023, discussed initiation of Keytruda.  He is agreeable to proceed.    The patient notified the office shortly after treatment that he had pain under his right rib cage and was placed back onto his Neurontin to try to manage this pain.  Approximately week later he  still had the pain and also had another rash we switched him at this point to Famvir to try to completely clear this problem.    He is next seen back 4/3/2023.  Fortunately his recent apparent shingles activation has nearly abated and he is feeling reasonably well.  In review of the literature there is no significant difference in activation with immunotherapy though this patient may require suppression.  I have asked him to complete his Famvir at this point.    Past Medical History:   Diagnosis Date   • Arthritis    • COPD (chronic obstructive pulmonary disease)     O2 3L AT HS   • Diabetes mellitus     DIET CONTROL   • Diverticulitis    • DVT, lower extremity     RLE   • Elevated cholesterol    • H/O Cervical pain    • History of colitis    • Hyperlipidemia    • Hypertension    • Left shoulder pain    • Low back pain    • Lung cancer 2022    LEFT LUNG   • On anticoagulant therapy    • Peripheral arterial disease    • Right leg claudication    • Skin cancer     HX        Past Surgical History:   Procedure Laterality Date   • BALLOON ANGIOPLASTY, ARTERY Right 2015    IR Percutaneious IVC filter placement   • INGUINAL HERNIA REPAIR Left    • KNEE ARTHROSCOPY Left     Torn meniscus   • LOBECTOMY Left 9/23/2022    Procedure: BRONCHOSCOPY, LEFT VIDEO ASSISTED THORACOSCOPY, ROBOT ASSISTED TOTAL DECORTICATION  LEFT LUNG, LEFT LOWER LOBE LOBECTOMY, MEDIASTINAL LYMPHECTOMY, INTERCOSTAL NERVE BLOCK;  Surgeon: Nicola Joe III, MD;  Location: [facility];  Service: Robotics - [facility];  Laterality: Left;   • VENOUS ACCESS DEVICE (PORT) INSERTION Right 11/21/2022    Procedure: INSERTION VENOUS ACCESS DEVICE;  Surgeon: Nicola Joe III, MD;  Location: [facility] OR;  Service: Thoracic;  Laterality: Right;        Current Outpatient Medications on File Prior to Visit   Medication Sig Dispense Refill   • arformoterol (BROVANA) 15 MCG/2ML nebulizer solution Take 2 mL by nebulization 2 (Two) Times a Day.     •  budesonide (PULMICORT) 0.5 MG/2ML nebulizer solution Take 2 mL by nebulization 2 (Two) Times a Day.     • cetirizine (zyrTEC) 10 MG tablet Take 1 tablet by mouth Daily.     • ergocalciferol (ERGOCALCIFEROL) 1.25 MG (29009 UT) capsule Take 1 capsule by mouth Every 7 (Seven) Days. 4 capsule 2   • famciclovir (FAMVIR) 500 MG tablet Take 1 tablet by mouth 3 (Three) Times a Day for 7 days. 21 tablet 0   • fluticasone (FLONASE) 50 MCG/ACT nasal spray 1 spray into the nostril(s) as directed by provider Daily.     • isosorbide mononitrate (IMDUR) 60 MG 24 hr tablet Take 1 tablet by mouth Every Evening.     • ondansetron (Zofran) 8 MG tablet Take 1 tablet by mouth Every 8 (Eight) Hours As Needed for Nausea or Vomiting. 30 tablet 1   • simvastatin (ZOCOR) 20 MG tablet Take 1 tablet by mouth Every Night.     • tamsulosin (FLOMAX) 0.4 MG capsule 24 hr capsule Take 1 capsule by mouth Daily. 30 capsule 5   • warfarin (COUMADIN) 5 MG tablet Take 1 tablet by mouth Daily. Take 10mg on 9/29/22 and then resume regular home schedule.     • metoprolol succinate XL (TOPROL-XL) 25 MG 24 hr tablet Take 1 tablet by mouth Daily for 30 days. (Patient taking differently: Take 25 mg by mouth Every Evening.) 30 tablet 0     No current facility-administered medications on file prior to visit.        ALLERGIES:    Allergies   Allergen Reactions   • Benadryl [Diphenhydramine] Other (See Comments)     Extreme restlessness and insomnia        Social History     Socioeconomic History   • Marital status:    • Years of education: 1 year college   Tobacco Use   • Smoking status: Every Day     Packs/day: 1.00     Years: 59.00     Pack years: 59.00     Types: Cigarettes     Start date: 1963   • Smokeless tobacco: Never   • Tobacco comments:     9/8/22: Down to Less than 1 PPD   Vaping Use   • Vaping Use: Never used   Substance and Sexual Activity   • Alcohol use: Not Currently   • Drug use: Never   • Sexual activity: Defer        Family History  "  Problem Relation Age of Onset   • No Known Problems Mother    • Cancer Father    • Lung cancer Father    • Diabetes Brother    • Other Daughter         S/P stent, age 42   • No Known Problems Son    • Malig Hyperthermia Neg Hx         Review of Systems   Constitutional: Positive for activity change, fatigue and unexpected weight change (Status post his diverticulitis episode, weight has not been regained).   HENT: Negative.    Eyes: Negative.    Respiratory: Positive for shortness of breath. Negative for cough.    Cardiovascular: Negative.    Gastrointestinal: Negative.    Genitourinary: Negative.    Musculoskeletal: Negative.    Skin: Positive for rash (Right lower abdominal wall, right mid back, clearing).   Neurological: Negative.    Psychiatric/Behavioral: Negative.         Objective     Vitals:    04/03/23 0804   BP: 113/66   Pulse: 74   Resp: 20   Temp: 98.4 °F (36.9 °C)   TempSrc: Temporal   SpO2: 95%   Weight: 71.4 kg (157 lb 4.8 oz)   Height: 177.8 cm (70\")   PainSc: 0-No pain     Current Status 4/3/2023   ECOG score 0       Physical Exam  Constitutional:       Appearance: Normal appearance.   HENT:      Head: Normocephalic and atraumatic.      Nose: Nose normal.      Mouth/Throat:      Mouth: Mucous membranes are moist.      Pharynx: Oropharynx is clear.   Eyes:      Extraocular Movements: Extraocular movements intact.      Conjunctiva/sclera: Conjunctivae normal.      Pupils: Pupils are equal, round, and reactive to light.   Cardiovascular:      Rate and Rhythm: Normal rate and regular rhythm.      Pulses: Normal pulses.      Heart sounds: Normal heart sounds.   Pulmonary:      Comments: Prolonged expiratory phase bilaterally  Abdominal:      General: Bowel sounds are normal.      Palpations: Abdomen is soft.      Comments: Scaphoid   Musculoskeletal:         General: Normal range of motion.      Cervical back: Normal range of motion and neck supple.   Skin:     General: Skin is warm and dry.      " Findings: Rash (Again the previously noted rash macular papular without ulceration now clearing involving right lower abdomen circling laterally to right mid back) present.   Neurological:      General: No focal deficit present.      Mental Status: He is alert and oriented to person, place, and time.   Psychiatric:         Mood and Affect: Mood normal.           RECENT LABS:  Hematology WBC   Date Value Ref Range Status   04/03/2023 5.99 3.40 - 10.80 10*3/mm3 Final   05/09/2022 7.54 4.5 - 11.0 10*3/uL Final     RBC   Date Value Ref Range Status   04/03/2023 3.74 (L) 4.14 - 5.80 10*6/mm3 Final   05/09/2022 5.52 4.5 - 5.9 10*6/uL Final     Hemoglobin   Date Value Ref Range Status   04/03/2023 12.4 (L) 13.0 - 17.7 g/dL Final   05/09/2022 16.4 13.5 - 17.5 g/dL Final     Hematocrit   Date Value Ref Range Status   04/03/2023 36.8 (L) 37.5 - 51.0 % Final   05/09/2022 51.0 41.0 - 53.0 % Final     Platelets   Date Value Ref Range Status   04/03/2023 164 140 - 450 10*3/mm3 Final   05/09/2022 204 140 - 440 10*3/uL Final          Assessment & Plan           78-year-old male with medical issues including type 2 diabetes-uncertain control, COPD, hypertension, diastolic heart failure, chronic disease, peripheral vascular disease (follow-up with vascular surgery today), previous DVT on anticoagulation (home monitoring), previous IVC filter placement?,  Status post September 2021 partial small bowel obstruction secondary to sigmoid diverticulitis treated medically.        He had had a right lung base nodule noted on scan at that point and in follow-up with primary care had developed a left inguinal hernia with repair planned through a different general surgeon then seen with his initial sigmoid diverticulitis.  An additional CT scan of the chest done in follow-up of his previously abnormal study now revealed not only the previously noted right lower lobe and additional nodules but a spiculated nodule in the left lower lobe measuring  16 x 14 mm.                                 Follow-up PET/CT demonstrated a hypermetabolic spiculated lesion medial aspect the left lower lobe adjacent to descending thoracic aorta measuring 1.5 x 1.6 SUV 9.3 with an enlarged hypermetabolic left hilar lymph node measured 1.5 x 1.3 with SUV of 12.1, additional nodules in the lateral aspect right lower lobe and micronodule in the posterior/medial right lower lobe and also additional right lower lobe micronodule.  There is an infrarenal abdominal aortic aneurysm measuring 3.4 cm with a short segment of chronic dissection present.    These clinical findings would be consistent with a possible T1b N1 M0-stage IIb lung cancer.   Recognizing a diagnosis has not been made his case was discussed in thoracic conference 7/20/2022.  Recommendations include proceeding to pulmonary assessment with EBUS and the patient undergoing a general assessment per his clinical status-older adult assessment as well as assessment by thoracic surgery.  These issues are discussed with the patient and his wife in detail when they are seen 7/28/2022.    The patient proceeded to undergo his hernia surgery 8/2/2022 by Dr. Dotson.  Additionally the patient was unable to undergo assessment by pulmonary until October and, thereafter, was scheduled to see Dr. Joe 8/18/2022 undergoing older adult functional assessment with a JAGUAR score of 11 indicating a risk of functional decline related to cancer therapy.    The patient is seen with his wife 8/15/2022 and doing very well with recent hernia surgery.  His performance status is reasonably good at present and we have learned now that he would not be able to see pulmonary medicine until October, thoracic surgery is scheduled this week with Dr. Joe.  Perhaps he could be a surgical candidate.  Particularly we know by van 360 that T p53 splice site mutation is present and would certainly be consistent as well the most common mutations found in lung  cancers particularly squamous cancers.  We have discussed obtaining pulmonary functions at this point, thoracic surgery assessment this week and also restarting Chantix as possible for the patient.    There was difficulty obtaining Chantix and while this was being assessed the patient was reviewed 8/18 by thoracic surgery.  He was felt to have a T1b N1 M0 stage IIb carcinoma left lower lobe along and not a candidate for biopsy.  PFTs were borderline, functional assessment was reasonably good and the patient was felt to be a candidate for robot-assisted biopsy of his nodes and if malignant proceed to left lower lobe lobectomy.  He was reviewed 9/8 and offered 21 mg nicotine transdermal patching.    He is next seen 9/10/2022.  He is seen with his wife and both are prepared for surgery 9/23/2022.  We discussed that additional therapy may be considered once we have more information about the type and stage.  We would hope to see him postoperatively.    The patient was admitted 9//2022 undergoing 9/23/2022  a bronchoscopy with left video-assisted thoracoscopy with robot-assisted total decortication of the left lung, left lower lobectomy, mediastinal lymphadenectomy and intercostal nerve block with Dr. Nicola Joe.  Fortunately he did fairly well postoperatively though did develop atrial fibrillation with RVR treated with amiodarone converting back to sinus rhythm, treated with IV metoprolol subsequent chronic anticoagulation.  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  Notably there is invasive carcinoma present at the margin.  Is a, and only 2 positive lymph nodes adjacent to the bronchovesicular margin which appears to have been transected difficult to enumerate with extranodal extension present.       The patient is seen 10/27/2022.  He is recovering well from surgery, albeit  slowly, and we have discussed his findings that are concerning as to residual disease with a positive margin described as above.  There is also the significance potentially of PD-L1 expression which has been described as producing a poor survival.  Nivolumab has been used as second line therapy to positive effect in the disease and this begs the question as to whether adjuvant therapy plus immunotherapy could be utilized though the patient would be difficult to treat with platinum based chemotherapy as well.       Options could well be Carboplatin and Taxol considering the patient's comorbidity and worry of using cisplatin-based chemotherapy in this patient followed by atezolizumab with pathology having been notified to assess PD-L1 expression on the tumor as well also await review by Caris molecular profiling.  Finally radiation therapy consultation be requested.    The patient is next assessed 11/7/2022 for significant assessments by supportive oncology at [facility] as well as with plans to see Dr. Marrero 11/9/2022.  Unfortunately he has been very slow to gradually recover from the effects of surgery with reduced appetite and only fair performance status.  At present it would be difficult to give him cisplatin based chemotherapy and we discussed whether we might be able to use Tecentriq (atezolizumab) as his primary adjuvant therapy.  We have contacted pathology about completing Caris testing and a PD-L1 analysis that has not yet completed.    The patient was reviewed by radiation therapy seen 11/9/2022 and radiation therapy offered.  We asked the patient to return back for follow-up and when seen 11/15/2022 we discussed that the patient's Caris analysis indicates benefit from immunotherapy at relatively high levels.  This would allow radiation therapy given with Carboplatin Taxol weekly (better tolerated) and then subsequent atezolizumab adjuvantly.  The patient proceeded to treatment taking weekly carboplatin Taxol with  concurrent radiation therapy beginning 11/28/2022 and last seen 12/12/2022 with third weekly treatment.    He is next seen 12/19/2022 with H&H 11.9 and 38.5, white count 3460 and platelet count of 168,000.  He is feeling well without any significant complications of therapy except for right calf dermatitis that is been developing in the last several days.    The patient continued therapy taking CarboTaxol weekly including 12/28 and 1/4/2023.    His neck seen 1/11/2023 with completion date 1/11/2023.  Repeat CBC now includes H&H of 11.0 and 33.9, white count 2090, platelet count 128,000, ANC is 800.  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    We have discussed that considering studies like keynote  091 that the patient's high risk disease could be addressed with additional immunotherapy including the use of Atezolizumab and/or Keytruda.  Considering his findings overall Keytruda every 3 weeks x18 cycles is to be pursued.    The patient began Keytruda as planned with his first treatment 3/20/2023.    The patient notified the office shortly after treatment that he had pain under his right rib cage and was placed back onto his Neurontin to try to manage this pain.  Approximately week later he still had the pain and also had another rash we switched him at this point to Famvir to try to completely clear this problem.    He is next seen back 4/3/2023.  Fortunately his recent apparent shingles  activation has nearly abated and he is feeling reasonably well.  In review of the literature there is no significant difference in activation with immunotherapy though this patient may require suppression.  He is asked to complete his Famvir.      Plan:    *Return 1 week for second Keytruda therapy    *Pending his skin review we may ask him to use acyclovir as prophylaxis though review of literature does not indicate that activation from immunotherapy is felt to be a significant risk.    *Consider repeat scans at 3 months.

## 2023-04-10 ENCOUNTER — INFUSION (OUTPATIENT)
Dept: ONCOLOGY | Facility: HOSPITAL | Age: 78
End: 2023-04-10
Payer: MEDICARE

## 2023-04-10 ENCOUNTER — OFFICE VISIT (OUTPATIENT)
Dept: ONCOLOGY | Facility: CLINIC | Age: 78
End: 2023-04-10
Payer: MEDICARE

## 2023-04-10 VITALS
WEIGHT: 159.1 LBS | HEART RATE: 51 BPM | HEIGHT: 70 IN | DIASTOLIC BLOOD PRESSURE: 87 MMHG | BODY MASS INDEX: 22.78 KG/M2 | TEMPERATURE: 97.7 F | SYSTOLIC BLOOD PRESSURE: 121 MMHG | OXYGEN SATURATION: 97 % | RESPIRATION RATE: 20 BRPM

## 2023-04-10 DIAGNOSIS — Z79.899 LONG-TERM USE OF HIGH-RISK MEDICATION: Primary | ICD-10-CM

## 2023-04-10 DIAGNOSIS — Z45.2 FITTING AND ADJUSTMENT OF VASCULAR CATHETER: ICD-10-CM

## 2023-04-10 DIAGNOSIS — C7A.8 NEUROENDOCRINE CARCINOMA OF LUNG: Primary | ICD-10-CM

## 2023-04-10 DIAGNOSIS — Z79.899 LONG-TERM USE OF HIGH-RISK MEDICATION: ICD-10-CM

## 2023-04-10 DIAGNOSIS — C7A.8 NEUROENDOCRINE CARCINOMA OF LUNG: ICD-10-CM

## 2023-04-10 LAB
ALBUMIN SERPL-MCNC: 4.1 G/DL (ref 3.5–5.2)
ALBUMIN/GLOB SERPL: 1.4 G/DL (ref 1.1–2.4)
ALP SERPL-CCNC: 63 U/L (ref 38–116)
ALT SERPL W P-5'-P-CCNC: 12 U/L (ref 0–41)
ANION GAP SERPL CALCULATED.3IONS-SCNC: 9.2 MMOL/L (ref 5–15)
AST SERPL-CCNC: 17 U/L (ref 0–40)
BASOPHILS # BLD AUTO: 0.03 10*3/MM3 (ref 0–0.2)
BASOPHILS NFR BLD AUTO: 0.6 % (ref 0–1.5)
BILIRUB SERPL-MCNC: 0.4 MG/DL (ref 0.2–1.2)
BUN SERPL-MCNC: 14 MG/DL (ref 6–20)
BUN/CREAT SERPL: 14.1 (ref 7.3–30)
CALCIUM SPEC-SCNC: 10.3 MG/DL (ref 8.5–10.2)
CHLORIDE SERPL-SCNC: 98 MMOL/L (ref 98–107)
CO2 SERPL-SCNC: 25.8 MMOL/L (ref 22–29)
CREAT SERPL-MCNC: 0.99 MG/DL (ref 0.7–1.3)
DEPRECATED RDW RBC AUTO: 52.9 FL (ref 37–54)
EGFRCR SERPLBLD CKD-EPI 2021: 78.9 ML/MIN/1.73
EOSINOPHIL # BLD AUTO: 0.06 10*3/MM3 (ref 0–0.4)
EOSINOPHIL NFR BLD AUTO: 1.1 % (ref 0.3–6.2)
ERYTHROCYTE [DISTWIDTH] IN BLOOD BY AUTOMATED COUNT: 14.4 % (ref 12.3–15.4)
GLOBULIN UR ELPH-MCNC: 2.9 GM/DL (ref 1.8–3.5)
GLUCOSE SERPL-MCNC: 149 MG/DL (ref 74–124)
HCT VFR BLD AUTO: 35.9 % (ref 37.5–51)
HGB BLD-MCNC: 11.8 G/DL (ref 13–17.7)
IMM GRANULOCYTES # BLD AUTO: 0.01 10*3/MM3 (ref 0–0.05)
IMM GRANULOCYTES NFR BLD AUTO: 0.2 % (ref 0–0.5)
LYMPHOCYTES # BLD AUTO: 1.32 10*3/MM3 (ref 0.7–3.1)
LYMPHOCYTES NFR BLD AUTO: 24.4 % (ref 19.6–45.3)
MCH RBC QN AUTO: 32.8 PG (ref 26.6–33)
MCHC RBC AUTO-ENTMCNC: 32.9 G/DL (ref 31.5–35.7)
MCV RBC AUTO: 99.7 FL (ref 79–97)
MONOCYTES # BLD AUTO: 0.58 10*3/MM3 (ref 0.1–0.9)
MONOCYTES NFR BLD AUTO: 10.7 % (ref 5–12)
NEUTROPHILS NFR BLD AUTO: 3.41 10*3/MM3 (ref 1.7–7)
NEUTROPHILS NFR BLD AUTO: 63 % (ref 42.7–76)
NRBC BLD AUTO-RTO: 0 /100 WBC (ref 0–0.2)
PLATELET # BLD AUTO: 149 10*3/MM3 (ref 140–450)
PMV BLD AUTO: 8.9 FL (ref 6–12)
POTASSIUM SERPL-SCNC: 4.5 MMOL/L (ref 3.5–4.7)
PROT SERPL-MCNC: 7 G/DL (ref 6.3–8)
RBC # BLD AUTO: 3.6 10*6/MM3 (ref 4.14–5.8)
SODIUM SERPL-SCNC: 133 MMOL/L (ref 134–145)
WBC NRBC COR # BLD: 5.41 10*3/MM3 (ref 3.4–10.8)

## 2023-04-10 PROCEDURE — 80053 COMPREHEN METABOLIC PANEL: CPT

## 2023-04-10 PROCEDURE — 3074F SYST BP LT 130 MM HG: CPT | Performed by: NURSE PRACTITIONER

## 2023-04-10 PROCEDURE — 99214 OFFICE O/P EST MOD 30 MIN: CPT | Performed by: NURSE PRACTITIONER

## 2023-04-10 PROCEDURE — 25010000002 HEPARIN LOCK FLUSH PER 10 UNITS: Performed by: INTERNAL MEDICINE

## 2023-04-10 PROCEDURE — 85025 COMPLETE CBC W/AUTO DIFF WBC: CPT

## 2023-04-10 PROCEDURE — 3079F DIAST BP 80-89 MM HG: CPT | Performed by: NURSE PRACTITIONER

## 2023-04-10 PROCEDURE — 1126F AMNT PAIN NOTED NONE PRSNT: CPT | Performed by: NURSE PRACTITIONER

## 2023-04-10 PROCEDURE — 96413 CHEMO IV INFUSION 1 HR: CPT

## 2023-04-10 PROCEDURE — 25010000002 PEMBROLIZUMAB 100 MG/4ML SOLUTION 4 ML VIAL: Performed by: NURSE PRACTITIONER

## 2023-04-10 RX ORDER — ACYCLOVIR 400 MG/1
400 TABLET ORAL 2 TIMES DAILY
Qty: 60 TABLET | Refills: 2 | Status: SHIPPED | OUTPATIENT
Start: 2023-04-10

## 2023-04-10 RX ORDER — SODIUM CHLORIDE 9 MG/ML
250 INJECTION, SOLUTION INTRAVENOUS ONCE
Status: COMPLETED | OUTPATIENT
Start: 2023-04-10 | End: 2023-04-10

## 2023-04-10 RX ORDER — SODIUM CHLORIDE 9 MG/ML
250 INJECTION, SOLUTION INTRAVENOUS ONCE
Status: CANCELLED | OUTPATIENT
Start: 2023-04-10

## 2023-04-10 RX ORDER — HEPARIN SODIUM (PORCINE) LOCK FLUSH IV SOLN 100 UNIT/ML 100 UNIT/ML
500 SOLUTION INTRAVENOUS AS NEEDED
OUTPATIENT
Start: 2023-04-10

## 2023-04-10 RX ORDER — SODIUM CHLORIDE 0.9 % (FLUSH) 0.9 %
10 SYRINGE (ML) INJECTION AS NEEDED
OUTPATIENT
Start: 2023-04-10

## 2023-04-10 RX ORDER — HEPARIN SODIUM (PORCINE) LOCK FLUSH IV SOLN 100 UNIT/ML 100 UNIT/ML
500 SOLUTION INTRAVENOUS AS NEEDED
Status: DISCONTINUED | OUTPATIENT
Start: 2023-04-10 | End: 2023-04-10 | Stop reason: HOSPADM

## 2023-04-10 RX ORDER — SODIUM CHLORIDE 0.9 % (FLUSH) 0.9 %
10 SYRINGE (ML) INJECTION AS NEEDED
Status: DISCONTINUED | OUTPATIENT
Start: 2023-04-10 | End: 2023-04-10 | Stop reason: HOSPADM

## 2023-04-10 RX ADMIN — SODIUM CHLORIDE 200 MG: 9 INJECTION, SOLUTION INTRAVENOUS at 12:12

## 2023-04-10 RX ADMIN — Medication 500 UNITS: at 12:40

## 2023-04-10 RX ADMIN — Medication 10 ML: at 12:40

## 2023-04-10 RX ADMIN — SODIUM CHLORIDE 250 ML: 9 INJECTION, SOLUTION INTRAVENOUS at 12:12

## 2023-04-10 NOTE — PROGRESS NOTES
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  Notably there is invasive carcinoma present at the margin, 2 positive lymph nodes adjacent to the bronchovesicular margin which appears to have been transected difficult to enumerate with extranodal extension present.    *Patient status post chemo RT-11/28/2022, radiation therapy completion date 1/11/2023    *Immunotherapy-Keytruda to be initiated 3/20/2023          History of Present Illness     Patient returns today 4/10/2023 for lab review and evaluation prior to cycle 2 pembrolizumab.  He reports that his shingles rash has almost completely disappeared.  He denies any postherpetic neuralgia issues.  He has completed Famvir.  Patient is questioning if he should get the shingles vaccine.  Otherwise he denies issues with increased shortness of breath or cough.  No problems with diarrhea, or other issues with skin rash.        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  He had been assessed particularly in the fall 2021 when he presented with nausea and vomiting and abdominal discomfort leading to assessments that revealed sigmoid diverticulitis with contained rupture and partial small bowel obstruction.  Records from mid September 21 indicating that he was admitted with partial small bowel obstruction and treated medically with very slow recovery.  He has history of COPD with CT demonstrating a pulmonary nodule in the right lung base at 7 mm with follow-up planned.  He was seen by general surgery in later September with repeat scans  planned and repeat CT abdomen 10/7/2021 did demonstrate interval improvement of sigmoid diverticulitis.      Record indicates an assessment in late June 2022 at different general surgeon for left inguinal hernia, history of heavy tobacco use with COPD and recommendation for surgical repair of his hernia      The patient underwent a CT scan of the chest 5/7/2022 demonstrating diffuse emphysematous changes, ill-defined density measuring 3 mm in the superior segment of the right lower lobe, multiple ill-defined 3 to 4 mm nodular density thought to be inflammatory involving the right lower lobe and a new spiculated nodule involving the left lower lobe measuring 16 x 14 mm as well as left hilar adenopathy.       Follow-up PET/CT 7/13/2022 reveal a hypermetabolic spiculated lesion medial aspect the left lower lobe adjacent to descending thoracic aorta measuring 1.5 x 1.6 SUV 9.3 with an enlarged hypermetabolic left hilar lymph node measured 1.5 x 1.3 with SUV of 12.1, additional nodules in the lateral aspect right lower lobe and micronodule in the posterior/medial right lower lobe and also additional right lower lobe micronodule.  There is an infrarenal abdominal aortic aneurysm measuring 3.4 cm with a short segment of chronic dissection present.    These clinical findings would be consistent with a possible T1b N1 M0-stage IIb lung cancer.      Recognizing a diagnosis has not been made his case was discussed in thoracic conference 7/20/2022.  Recommendations include proceeding to pulmonary assessment with EBUS and the patient undergoing a general assessment per his clinical status-older adult assessment as well as assessment by thoracic surgery.  These issues are discussed with the patient and his wife in detail when they are seen 7/28/2022.    The patient proceeded to undergo his hernia surgery 8/2/2022 by Dr. Dotson.  Additionally the patient was unable to undergo assessment by pulmonary until October and, thereafter,  was scheduled to see Dr. Joe 8/18/2022 undergoing older adult functional assessment with a JAGUAR score of 11 indicating a risk of functional decline related to cancer therapy.    The patient is seen with his significant other 8/15/2022 and doing very well with recent hernia surgery.  His performance status is reasonably good at present and we have learned now that he would not be able to see pulmonary medicine until October, thoracic surgery is scheduled this week with Dr. Joe.  Perhaps he could be a surgical candidate.  Particularly we know by guardant 360 that T p53 splice site mutation is present and would certainly be consistent as well the most common mutations found in lung cancers particularly squamous cancers.  We have discussed obtaining pulmonary functions at this point, thoracic surgery assessment this week and also restarting Chantix as possible for the patient.    There was difficulty obtaining Chantix and while this was being assessed the patient was reviewed 8/18 by thoracic surgery.  He was felt to have a T1b N1 M0 stage IIb carcinoma left lower lobe along and not a candidate for biopsy.  PFTs were borderline, functional assessment was reasonably good and the patient was felt to be a candidate for robot-assisted biopsy of his nodes and if malignant proceed to left lower lobe lobectomy.  He was reviewed 9/8 and offered 21 mg nicotine transdermal patching.    He is next seen 9/10/2022.  He is seen with his wife and both are prepared for surgery 9/23/2022.  We discussed that additional therapy may be considered once we have more information about the type and stage.  We would hope to see him postoperatively.    The patient was admitted 9//2022 undergoing 9/23/2022  a bronchoscopy with left video-assisted thoracoscopy with robot-assisted total decortication of the left lung, left lower lobectomy, mediastinal lymphadenectomy and intercostal nerve block with Dr. Nicola Joe.  Fortunately he  did fairly well postoperatively though did develop atrial fibrillation with RVR treated with amiodarone converting back to sinus rhythm, treated with IV metoprolol subsequent chronic anticoagulation.  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  Notably there is invasive carcinoma present at the margin.  Is a, and only 2 positive lymph nodes adjacent to the bronchovesicular margin which appears to have been transected difficult to enumerate with extranodal extension present.       The patient is seen 10/27/2022.  He is recovering well from surgery, albeit slowly, and we have discussed his findings that are concerning as to residual disease with a positive margin described as above.  There is also the significance potentially of PD-L1 expression which has been described as producing a poor survival.     Nivolumab has been used as second line therapy to positive effect in the disease and this begs the question as to whether adjuvant therapy plus immunotherapy could be utilized though the patient would be difficult to treat with platinum based chemotherapy as well.       The patient is next assessed 11/7/2022 for significant assessments by supportive oncology at [facility] as well as with plans to see Dr. Marrero 11/9/2022.  Unfortunately he has been very slow to gradually recover from the effects of surgery with reduced appetite and only fair performance status.     At present it would be difficult to give him cisplatin based chemotherapy and we discussed whether we might be able to use Tecentriq (atezolizumab) as his primary adjuvant therapy.  We have contacted pathology about completing Caris testing and a PD-L1 analysis that has not yet completed.         The patient is seen, however, 11/16/2022 and his genomic analysis has returned as being significant for broad sensitivity to immunotherapy.  This  would argue for the administration of weekly Carboplatin/Taxol as the patient proceeds to radiation therapy and upon completion, then using Tecentriq as further adjuvant therapy over a year.  This is discussed with the patient and his  in detail as well as discussed with Dr. Marrero of radiation therapy.  We have also discussed the patient require port placement.    The patient proceeded to treatment taking weekly carboplatin Taxol with concurrent radiation therapy last seen 12/12/2022 with third weekly treatment.    He is next seen 12/19/2022 with H&H 11.9 and 38.5, white count 3460 and platelet count of 168,000.  He is feeling well without any significant complications of therapy except for right calf dermatitis that is been developing in the last several days.    The patient continued therapy taking CarboTaxol weekly including 12/28 and 1/4/2023.    His is next seen 1/11/2023 with completion date for radiation therapy 1/11/2023.  Repeat CBC now includes H&H of 11.0 and 33.9, white count 2090, platelet count 128,000, ANC is 800.  He, fortunately, is tolerating treatment well and we have again discussed with him adjuvant immunotherapy would be useful including Tecentriq versus pembrolizumab-keynote 091 study particularly in tumor programmed cell death PD-L1 greater than 50%.    The patient will not be given additional chemotherapy 1/11/2023 we will plan to reassess by follow-up scans in the next 6 weeks CT chest and pelvis making a decision thereafter about adjuvant immunotherapy.    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    We have discussed that considering studies like keynote  091 that the patient's high risk disease could be  addressed with additional immunotherapy including the use of Atezolizumab and/or Keytruda.  Considering his findings overall Keytruda every 3 weeks x18 cycles is to be pursued.    The patient was seen 3/20/2023, discussed initiation of Keytruda.  He is agreeable to proceed.    The patient notified the office shortly after treatment that he had pain under his right rib cage and was placed back onto his Neurontin to try to manage this pain.  Approximately week later he still had the pain and also had another rash we switched him at this point to Famvir to try to completely clear this problem.    He is next seen back 4/3/2023.  Fortunately his recent apparent shingles activation has nearly abated and he is feeling reasonably well.  In review of the literature there is no significant difference in activation with immunotherapy though this patient may require suppression.  I have asked him to complete his Famvir at this point.    Past Medical History:   Diagnosis Date   • Arthritis    • COPD (chronic obstructive pulmonary disease)     O2 3L AT HS   • Diabetes mellitus     DIET CONTROL   • Diverticulitis    • DVT, lower extremity     RLE   • Elevated cholesterol    • H/O Cervical pain    • History of colitis    • Hyperlipidemia    • Hypertension    • Left shoulder pain    • Low back pain    • Lung cancer 2022    LEFT LUNG   • On anticoagulant therapy    • Peripheral arterial disease    • Right leg claudication    • Skin cancer     HX        Past Surgical History:   Procedure Laterality Date   • BALLOON ANGIOPLASTY, ARTERY Right 2015    IR Percutaneious IVC filter placement   • INGUINAL HERNIA REPAIR Left    • KNEE ARTHROSCOPY Left     Torn meniscus   • LOBECTOMY Left 9/23/2022    Procedure: BRONCHOSCOPY, LEFT VIDEO ASSISTED THORACOSCOPY, ROBOT ASSISTED TOTAL DECORTICATION  LEFT LUNG, LEFT LOWER LOBE LOBECTOMY, MEDIASTINAL LYMPHECTOMY, INTERCOSTAL NERVE BLOCK;  Surgeon: Nicola Joe III, MD;  Location: [facility];   Service: Robotics - DaVinci;  Laterality: Left;   • VENOUS ACCESS DEVICE (PORT) INSERTION Right 11/21/2022    Procedure: INSERTION VENOUS ACCESS DEVICE;  Surgeon: Nicola Joe III, MD;  Location: [facility] OR;  Service: Thoracic;  Laterality: Right;        Current Outpatient Medications on File Prior to Visit   Medication Sig Dispense Refill   • arformoterol (BROVANA) 15 MCG/2ML nebulizer solution Take 2 mL by nebulization 2 (Two) Times a Day.     • budesonide (PULMICORT) 0.5 MG/2ML nebulizer solution Take 2 mL by nebulization 2 (Two) Times a Day.     • cetirizine (zyrTEC) 10 MG tablet Take 1 tablet by mouth Daily.     • ergocalciferol (ERGOCALCIFEROL) 1.25 MG (40160 UT) capsule Take 1 capsule by mouth Every 7 (Seven) Days. 4 capsule 2   • fluticasone (FLONASE) 50 MCG/ACT nasal spray 1 spray into the nostril(s) as directed by provider Daily.     • isosorbide mononitrate (IMDUR) 60 MG 24 hr tablet Take 1 tablet by mouth Every Evening.     • ondansetron (Zofran) 8 MG tablet Take 1 tablet by mouth Every 8 (Eight) Hours As Needed for Nausea or Vomiting. 30 tablet 1   • simvastatin (ZOCOR) 20 MG tablet Take 1 tablet by mouth Every Night.     • tamsulosin (FLOMAX) 0.4 MG capsule 24 hr capsule Take 1 capsule by mouth Daily. 30 capsule 5   • warfarin (COUMADIN) 5 MG tablet Take 1 tablet by mouth Daily. Take 10mg on 9/29/22 and then resume regular home schedule.     • metoprolol succinate XL (TOPROL-XL) 25 MG 24 hr tablet Take 1 tablet by mouth Daily for 30 days. (Patient taking differently: Take 25 mg by mouth Every Evening.) 30 tablet 0     No current facility-administered medications on file prior to visit.        ALLERGIES:    Allergies   Allergen Reactions   • Benadryl [Diphenhydramine] Other (See Comments)     Extreme restlessness and insomnia        Social History     Socioeconomic History   • Marital status:    • Years of education: 1 year college   Tobacco Use   • Smoking status: Every Day      "Packs/day: 1.00     Years: 59.00     Pack years: 59.00     Types: Cigarettes     Start date: 1963   • Smokeless tobacco: Never   • Tobacco comments:     9/8/22: Down to Less than 1 PPD   Vaping Use   • Vaping Use: Never used   Substance and Sexual Activity   • Alcohol use: Not Currently   • Drug use: Never   • Sexual activity: Defer        Family History   Problem Relation Age of Onset   • No Known Problems Mother    • Cancer Father    • Lung cancer Father    • Diabetes Brother    • Other Daughter         S/P stent, age 42   • No Known Problems Son    • Malig Hyperthermia Neg Hx         Review of Systems   Constitutional: Positive for activity change, fatigue and unexpected weight change (Status post his diverticulitis episode, weight has not been regained).   HENT: Negative.    Eyes: Negative.    Respiratory: Positive for shortness of breath. Negative for cough.    Cardiovascular: Negative.    Gastrointestinal: Negative.    Genitourinary: Negative.    Musculoskeletal: Negative.    Skin: Positive for rash (Right lower abdominal wall, right mid back, clearing).   Neurological: Negative.    Psychiatric/Behavioral: Negative.         Objective     Vitals:    04/10/23 1108   BP: 121/87   Pulse: 51   Resp: 20   Temp: 97.7 °F (36.5 °C)   TempSrc: Temporal   SpO2: 97%   Weight: 72.2 kg (159 lb 1.6 oz)   Height: 177.8 cm (70\")   PainSc: 0-No pain     Current Status 4/10/2023   ECOG score 0       Physical Exam  Constitutional:       Appearance: Normal appearance.   HENT:      Head: Normocephalic and atraumatic.      Nose: Nose normal.      Mouth/Throat:      Mouth: Mucous membranes are moist.      Pharynx: Oropharynx is clear.   Eyes:      Extraocular Movements: Extraocular movements intact.      Conjunctiva/sclera: Conjunctivae normal.      Pupils: Pupils are equal, round, and reactive to light.   Cardiovascular:      Rate and Rhythm: Normal rate and regular rhythm.      Pulses: Normal pulses.      Heart sounds: Normal heart " sounds.   Pulmonary:      Comments: Prolonged expiratory phase bilaterally  Abdominal:      General: Bowel sounds are normal.      Palpations: Abdomen is soft.      Comments: Scaphoid   Musculoskeletal:         General: Normal range of motion.      Cervical back: Normal range of motion and neck supple.   Skin:     General: Skin is warm and dry.      Findings: Rash (Again the previously noted rash macular papular without ulceration now clearing involving right lower abdomen circling laterally to right mid back) present.   Neurological:      General: No focal deficit present.      Mental Status: He is alert and oriented to person, place, and time.   Psychiatric:         Mood and Affect: Mood normal.           RECENT LABS:  Hematology WBC   Date Value Ref Range Status   04/10/2023 5.41 3.40 - 10.80 10*3/mm3 Final   05/09/2022 7.54 4.5 - 11.0 10*3/uL Final     RBC   Date Value Ref Range Status   04/10/2023 3.60 (L) 4.14 - 5.80 10*6/mm3 Final   05/09/2022 5.52 4.5 - 5.9 10*6/uL Final     Hemoglobin   Date Value Ref Range Status   04/10/2023 11.8 (L) 13.0 - 17.7 g/dL Final   05/09/2022 16.4 13.5 - 17.5 g/dL Final     Hematocrit   Date Value Ref Range Status   04/10/2023 35.9 (L) 37.5 - 51.0 % Final   05/09/2022 51.0 41.0 - 53.0 % Final     Platelets   Date Value Ref Range Status   04/10/2023 149 140 - 450 10*3/mm3 Final   05/09/2022 204 140 - 440 10*3/uL Final          Assessment & Plan     77 y.o. male  with medical issues including type 2 diabetes-uncertain control, COPD, hypertension, diastolic heart failure, chronic disease, peripheral vascular disease (follow-up with vascular surgery today), previous DVT on anticoagulation (home monitoring), previous IVC filter placement?,  Status post September 2021 partial small bowel obstruction secondary to sigmoid diverticulitis treated medically.     1.   T1b N1 M0-stage IIb lung cancer.  • right lung base nodule noted on scan at that point and in follow-up with primary care  had developed a left inguinal hernia with repair planned through a different general surgeon then seen with his initial sigmoid diverticulitis.  • PET scan 7/13/2022 demonstrates a hypermetabolic spiculated lesion medial aspect of the left lobe adjacent to descending thoracic aorta measuring1.5 x 1.6 SUV 9.3 with an enlarged hypermetabolic left hilar lymph node measured 1.5 x 1.3 with SUV of 12.1, additional nodules in the lateral aspect right lower lobe and micronodule in the posterior/medial right lower lobe and also additional right lower lobe micronodule.  There is an infrarenal abdominal aortic aneurysm measuring 3.4 cm with a short segment of chronic dissection present.  • Clinical findings would be consistent with a possible T1b N1 M0-stage IIb lung cancer.  • discussed in thoracic conference 7/20/2022.  • Recommendations to proceed with pulmonary assessment with EBUS.  • The patient proceeded to undergo his hernia surgery 8/2/2022 by Dr. Dotson.   • Guardant 360 that T p53 splice site mutation is present and would certainly be consistent as well the most common mutations found in lung cancers particularly squamous cancers.  • The patient was admitted 9//2022 undergoing 9/23/2022  a bronchoscopy with left video-assisted thoracoscopy with robot-assisted total decortication of the left lung, left lower lobectomy, mediastinal lymphadenectomy and intercostal nerve block with Dr. Nicola Joe.  • Fortunately he did fairly well postoperatively though did develop atrial fibrillation with RVR treated with amiodarone converting back to sinus rhythm, treated with IV metoprolol subsequent chronic anticoagulation.  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  Notably there is invasive carcinoma present at the margin. only 2 positive lymph nodes adjacent to the bronchovesicular margin  which appears to have been transected difficult to enumerate with extranodal extension present.  • Options could well be Carboplatin and Taxol considering the patient's comorbidity and worry of using cisplatin-based chemotherapy in this patient followed by atezolizumab with pathology having been notified to assess PD-L1 expression on the tumor as well also await review by Caris molecular profiling.  Finally radiation therapy consultation be requested.  •  Caris analysis indicates benefit from immunotherapy at relatively high levels.  This would allow radiation therapy given with Carboplatin Taxol weekly (better tolerated) and then subsequent atezolizumab adjuvantly.  • Weekly carboplatin Taxol initiated 11/28/2022 given concurrently with radiation therapy  • 12/5/2022 here for cycle 2 weekly carboplatin/Taxol, overall tolerating treatment quite well so far.  • 12/12/2022: Proceed with cycle #3 weekly carboplatin/Taxol with concurrent radiation.  Continues to tolerate treatment well.  • He is next seen 12/19/2022 with H&H 11.9 and 38.5, white count 3460 and platelet count of 168,000.  He is feeling well without any significant complications of therapy except for right calf dermatitis that is been developing in the last several days.  • 12/28/2022 here for week 5 carbo/Taxol.  Overall tolerating well.  Today WBC 2.45, ANC 1.22, hemoglobin 10.7, platelet count 117,000.  I have reviewed with Dr. Bishop, and we will go ahead and continue with treatment.  • 1/4/2023: Received with cycle 6 carbo/taxol + XRT.  Continues to tolerate treatment well.  WBC improved to 2.69 with ANC 1.41.  • Next 1/11/2023 with completion date 1/11/2023.  Repeat CBC now includes H&H of 11.0 and 33.9, white count 2090, platelet count 128,000, ANC is 800.  He, fortunately, is tolerating treatment well and we have again discussed with himadjuvant immunotherapy would be useful including Tecentriq versus pembrolizumab-keynote 091 study particularly  in tumor programmed cell death PD-L1 greater than 50%.  • Follow-up scans 2/24/2023 showing no evidence of recurrent disease including his findings of left lower lobectomy, stable 11 m nodule opacity in the base of the right lower lobe in the right lung apex, small left pleural effusion mild to moderate stenosis of the proximal subclavian artery.  • We have discussed that considering studies like keynote  091 that the patient's high risk disease could be addressed with additional immunotherapy including the use of Atezolizumab and/or Keytruda.  Considering his findings overall Keytruda every 3 weeks x18 cycles is to be pursued.  • The patient began Keytruda as planned with his first treatment 3/20/2023.  • The patient notified the office shortly after treatment that he had pain under his right rib cage and was placed back onto his Neurontin to try to manage this pain.  Approximately week later he still had the pain and also had another rash we switched him at this point to Famvir to try to completely clear this problem.  • 4/3/2023.  Fortunately his recent apparent shingles activation has nearly abated and he is feeling reasonably well.  In review of the literature there is no significant difference in activation with immunotherapy though this patient may require suppression.  He is asked to complete his Famvir.  • 4/10/2023 cycle 2 Keytruda, overall tolerating well.       2.  Herpes zoster: Patient was treated with Famvir.  Rash has resolved, no issues with persistent herpetic neuralgia.  · He will be placed on suppression with acyclovir 400 mg twice daily.  We discussed he will hold off on vaccination for now, given recent infection.      PLAN:  · Proceed with Keytruda today.  · Start prophylactic acyclovir 400 mg twice daily.  · Return in 3 weeks for follow-up with Dr. Bishop with repeat labs reevaluation due for continued Keytruda.  · Consider repeat scans at 3 months of therapy.  · Call/ return sooner should he  develop any new concerns or problems.    Patient is on a high risk medication requiring close monitoring for toxicity.      Rama Mario, APRN  04/10/2023

## 2023-04-30 NOTE — PROGRESS NOTES
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  Notably there is invasive carcinoma present at the margin, 2 positive lymph nodes adjacent to the bronchovesicular margin which appears to have been transected difficult to enumerate with extranodal extension present.    *Patient status post chemo RT-11/28/2022, radiation therapy completion date 1/11/2023    *Immunotherapy-Keytruda to be initiated 3/20/2023          History of Present Illness       Patient returns 5/1/2023 for lab review and evaluation prior to cycle 3 pembrolizumab.  He reported 4/10/2023 that his shingles rash has almost completely disappeared.  He denies any postherpetic neuralgia issues.  He has completed Famvir.  We had him start acyclovir 400 mg p.o. twice daily as prophylaxis and held off his shingles vaccination until was clear his malignancy was stable or was was responding to immunotherapy.   Otherwise he denies issues with increased shortness of breath or cough.  No problems with diarrhea, or other issues with skin rash.  We have discussed when seen 5/1 that we will proceed through 3 months of Keytruda and have him undergo repeat scans.  If stable to improved we would proceed with shingles vaccination.        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  He had been assessed particularly in the fall 2021 when he presented with nausea and vomiting and abdominal discomfort leading to assessments that revealed sigmoid diverticulitis with contained rupture and partial small bowel obstruction.  Records from mid  September 21 indicating that he was admitted with partial small bowel obstruction and treated medically with very slow recovery.  He has history of COPD with CT demonstrating a pulmonary nodule in the right lung base at 7 mm with follow-up planned.  He was seen by general surgery in later September with repeat scans planned and repeat CT abdomen 10/7/2021 did demonstrate interval improvement of sigmoid diverticulitis.      Record indicates an assessment in late June 2022 at different general surgeon for left inguinal hernia, history of heavy tobacco use with COPD and recommendation for surgical repair of his hernia      The patient underwent a CT scan of the chest 5/7/2022 demonstrating diffuse emphysematous changes, ill-defined density measuring 3 mm in the superior segment of the right lower lobe, multiple ill-defined 3 to 4 mm nodular density thought to be inflammatory involving the right lower lobe and a new spiculated nodule involving the left lower lobe measuring 16 x 14 mm as well as left hilar adenopathy.       Follow-up PET/CT 7/13/2022 reveal a hypermetabolic spiculated lesion medial aspect the left lower lobe adjacent to descending thoracic aorta measuring 1.5 x 1.6 SUV 9.3 with an enlarged hypermetabolic left hilar lymph node measured 1.5 x 1.3 with SUV of 12.1, additional nodules in the lateral aspect right lower lobe and micronodule in the posterior/medial right lower lobe and also additional right lower lobe micronodule.  There is an infrarenal abdominal aortic aneurysm measuring 3.4 cm with a short segment of chronic dissection present.    These clinical findings would be consistent with a possible T1b N1 M0-stage IIb lung cancer.      Recognizing a diagnosis has not been made his case was discussed in thoracic conference 7/20/2022.  Recommendations include proceeding to pulmonary assessment with EBUS and the patient undergoing a general assessment per his clinical status-older adult assessment as  well as assessment by thoracic surgery.  These issues are discussed with the patient and his wife in detail when they are seen 7/28/2022.    The patient proceeded to undergo his hernia surgery 8/2/2022 by Dr. Dotson.  Additionally the patient was unable to undergo assessment by pulmonary until October and, thereafter, was scheduled to see Dr. Joe 8/18/2022 undergoing older adult functional assessment with a JAGUAR score of 11 indicating a risk of functional decline related to cancer therapy.    The patient is seen with his significant other 8/15/2022 and doing very well with recent hernia surgery.  His performance status is reasonably good at present and we have learned now that he would not be able to see pulmonary medicine until October, thoracic surgery is scheduled this week with Dr. Joe.  Perhaps he could be a surgical candidate.  Particularly we know by van Mata that T p53 splice site mutation is present and would certainly be consistent as well the most common mutations found in lung cancers particularly squamous cancers.  We have discussed obtaining pulmonary functions at this point, thoracic surgery assessment this week and also restarting Chantix as possible for the patient.    There was difficulty obtaining Chantix and while this was being assessed the patient was reviewed 8/18 by thoracic surgery.  He was felt to have a T1b N1 M0 stage IIb carcinoma left lower lobe along and not a candidate for biopsy.  PFTs were borderline, functional assessment was reasonably good and the patient was felt to be a candidate for robot-assisted biopsy of his nodes and if malignant proceed to left lower lobe lobectomy.  He was reviewed 9/8 and offered 21 mg nicotine transdermal patching.    He is next seen 9/10/2022.  He is seen with his wife and both are prepared for surgery 9/23/2022.  We discussed that additional therapy may be considered once we have more information about the type and stage.  We would hope to  see him postoperatively.    The patient was admitted 9//2022 undergoing 9/23/2022  a bronchoscopy with left video-assisted thoracoscopy with robot-assisted total decortication of the left lung, left lower lobectomy, mediastinal lymphadenectomy and intercostal nerve block with Dr. Nicola Joe.  Fortunately he did fairly well postoperatively though did develop atrial fibrillation with RVR treated with amiodarone converting back to sinus rhythm, treated with IV metoprolol subsequent chronic anticoagulation.  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  Notably there is invasive carcinoma present at the margin.  Is a, and only 2 positive lymph nodes adjacent to the bronchovesicular margin which appears to have been transected difficult to enumerate with extranodal extension present.       The patient is seen 10/27/2022.  He is recovering well from surgery, albeit slowly, and we have discussed his findings that are concerning as to residual disease with a positive margin described as above.  There is also the significance potentially of PD-L1 expression which has been described as producing a poor survival.     Nivolumab has been used as second line therapy to positive effect in the disease and this begs the question as to whether adjuvant therapy plus immunotherapy could be utilized though the patient would be difficult to treat with platinum based chemotherapy as well.       The patient is next assessed 11/7/2022 for significant assessments by supportive oncology at [facility] as well as with plans to see Dr. Marrero 11/9/2022.  Unfortunately he has been very slow to gradually recover from the effects of surgery with reduced appetite and only fair performance status.     At present it would be difficult to give him cisplatin based chemotherapy and we discussed whether we might be able to use  Tecentriq (atezolizumab) as his primary adjuvant therapy.  We have contacted pathology about completing Caris testing and a PD-L1 analysis that has not yet completed.         The patient is seen, however, 11/16/2022 and his genomic analysis has returned as being significant for broad sensitivity to immunotherapy.  This would argue for the administration of weekly Carboplatin/Taxol as the patient proceeds to radiation therapy and upon completion, then using Tecentriq as further adjuvant therapy over a year.  This is discussed with the patient and his  in detail as well as discussed with Dr. Marrero of radiation therapy.  We have also discussed the patient require port placement.    The patient proceeded to treatment taking weekly carboplatin Taxol with concurrent radiation therapy last seen 12/12/2022 with third weekly treatment.    He is next seen 12/19/2022 with H&H 11.9 and 38.5, white count 3460 and platelet count of 168,000.  He is feeling well without any significant complications of therapy except for right calf dermatitis that is been developing in the last several days.    The patient continued therapy taking CarboTaxol weekly including 12/28 and 1/4/2023.    His is next seen 1/11/2023 with completion date for radiation therapy 1/11/2023.  Repeat CBC now includes H&H of 11.0 and 33.9, white count 2090, platelet count 128,000, ANC is 800.  He, fortunately, is tolerating treatment well and we have again discussed with him adjuvant immunotherapy would be useful including Tecentriq versus pembrolizumab-keynote 091 study particularly in tumor programmed cell death PD-L1 greater than 50%.    The patient will not be given additional chemotherapy 1/11/2023 we will plan to reassess by follow-up scans in the next 6 weeks CT chest and pelvis making a decision thereafter about adjuvant immunotherapy.    Patient proceeded to undergo follow-up scans 2/24/2023 showing no evidence of recurrent disease including his  findings of left lower lobectomy, stable 11 m nodule opacity in the base of the right lower lobe in the right lung apex, small left pleural effusion mild to moderate stenosis of the proximal subclavian artery.    We have discussed that considering studies like keynote  091 that the patient's high risk disease could be addressed with additional immunotherapy including the use of Atezolizumab and/or Keytruda.  Considering his findings overall Keytruda every 3 weeks x18 cycles is to be pursued.    The patient was seen 3/20/2023, discussed initiation of Keytruda.  He is agreeable to proceed.    The patient notified the office shortly after treatment that he had pain under his right rib cage and was placed back onto his Neurontin to try to manage this pain.  Approximately week later he still had the pain and also had another rash we switched him at this point to Famvir to try to completely clear this problem.    He is next seen back 4/3/2023.  Fortunately his recent apparent shingles activation has nearly abated and he is feeling reasonably well.  In review of the literature there is no significant difference in activation with immunotherapy though this patient may require suppression.  I have asked him to complete his Famvir at this point.    Patient assessed 4/10/2023 with resolution of his shingles, subsequently placed on maintenance acyclovir, consideration of shingles vaccination post 3 months of Keytruda therapy is stable disease documented.    Past Medical History:   Diagnosis Date   • Arthritis    • COPD (chronic obstructive pulmonary disease)     O2 3L AT HS   • Diabetes mellitus     DIET CONTROL   • Diverticulitis    • DVT, lower extremity     RLE   • Elevated cholesterol    • H/O Cervical pain    • History of colitis    • Hyperlipidemia    • Hypertension    • Left shoulder pain    • Low back pain    • Lung cancer 2022    LEFT LUNG   • On anticoagulant therapy    • Peripheral arterial disease    • Right leg  claudication    • Skin cancer     HX        Past Surgical History:   Procedure Laterality Date   • BALLOON ANGIOPLASTY, ARTERY Right 2015    IR Percutaneious IVC filter placement   • INGUINAL HERNIA REPAIR Left    • KNEE ARTHROSCOPY Left     Torn meniscus   • LOBECTOMY Left 9/23/2022    Procedure: BRONCHOSCOPY, LEFT VIDEO ASSISTED THORACOSCOPY, ROBOT ASSISTED TOTAL DECORTICATION  LEFT LUNG, LEFT LOWER LOBE LOBECTOMY, MEDIASTINAL LYMPHECTOMY, INTERCOSTAL NERVE BLOCK;  Surgeon: Nicola Joe III, MD;  Location: [facility] OR;  Service: Robotics - DaVinci;  Laterality: Left;   • VENOUS ACCESS DEVICE (PORT) INSERTION Right 11/21/2022    Procedure: INSERTION VENOUS ACCESS DEVICE;  Surgeon: Nicola Joe III, MD;  Location: [facility] OR;  Service: Thoracic;  Laterality: Right;        Current Outpatient Medications on File Prior to Visit   Medication Sig Dispense Refill   • acyclovir (ZOVIRAX) 400 MG tablet Take 1 tablet by mouth 2 (Two) Times a Day. 60 tablet 2   • arformoterol (BROVANA) 15 MCG/2ML nebulizer solution Take 2 mL by nebulization 2 (Two) Times a Day.     • budesonide (PULMICORT) 0.5 MG/2ML nebulizer solution Take 2 mL by nebulization 2 (Two) Times a Day.     • cetirizine (zyrTEC) 10 MG tablet Take 1 tablet by mouth Daily.     • Cholecalciferol 50 MCG (2000 UT) capsule Take 1 capsule by mouth Daily.     • ergocalciferol (ERGOCALCIFEROL) 1.25 MG (60656 UT) capsule Take 1 capsule by mouth Every 7 (Seven) Days. 4 capsule 2   • fluticasone (FLONASE) 50 MCG/ACT nasal spray 1 spray into the nostril(s) as directed by provider Daily.     • isosorbide mononitrate (IMDUR) 60 MG 24 hr tablet Take 1 tablet by mouth Every Evening.     • ondansetron (Zofran) 8 MG tablet Take 1 tablet by mouth Every 8 (Eight) Hours As Needed for Nausea or Vomiting. 30 tablet 1   • simvastatin (ZOCOR) 20 MG tablet Take 1 tablet by mouth Every Night.     • tamsulosin (FLOMAX) 0.4 MG capsule 24 hr capsule Take 1 capsule by mouth  Daily. 30 capsule 5   • warfarin (COUMADIN) 5 MG tablet Take 1 tablet by mouth Daily. Take 10mg on 9/29/22 and then resume regular home schedule.     • metoprolol succinate XL (TOPROL-XL) 25 MG 24 hr tablet Take 1 tablet by mouth Daily for 30 days. (Patient taking differently: Take 25 mg by mouth Every Evening.) 30 tablet 0     No current facility-administered medications on file prior to visit.        ALLERGIES:    Allergies   Allergen Reactions   • Benadryl [Diphenhydramine] Other (See Comments)     Extreme restlessness and insomnia        Social History     Socioeconomic History   • Marital status:    • Years of education: 1 year college   Tobacco Use   • Smoking status: Every Day     Packs/day: 1.00     Years: 59.00     Pack years: 59.00     Types: Cigarettes     Start date: 1963   • Smokeless tobacco: Never   • Tobacco comments:     9/8/22: Down to Less than 1 PPD   Vaping Use   • Vaping Use: Never used   Substance and Sexual Activity   • Alcohol use: Not Currently   • Drug use: Never   • Sexual activity: Defer        Family History   Problem Relation Age of Onset   • No Known Problems Mother    • Cancer Father    • Lung cancer Father    • Diabetes Brother    • Other Daughter         S/P stent, age 42   • No Known Problems Son    • Malig Hyperthermia Neg Hx         Review of Systems   Constitutional: Positive for activity change, fatigue and unexpected weight change (Status post his diverticulitis episode, weight has not been regained).   HENT: Negative.    Eyes: Negative.    Respiratory: Positive for shortness of breath. Negative for cough.    Cardiovascular: Negative.    Gastrointestinal: Negative.    Genitourinary: Negative.    Musculoskeletal: Negative.    Skin: Positive for rash (Right lower abdominal wall, right mid back, clearing).   Neurological: Negative.    Psychiatric/Behavioral: Negative.         Objective     Vitals:    05/01/23 0927   BP: 116/73   Pulse: 51   Resp: 20   Temp: 97.5 °F (36.4  "°C)   TempSrc: Temporal   SpO2: 97%   Weight: 71.4 kg (157 lb 8 oz)   Height: 177.8 cm (70\")   PainSc: 0-No pain         5/1/2023     9:29 AM   Current Status   ECOG score 0       Physical Exam  Constitutional:       Appearance: Normal appearance.   HENT:      Head: Normocephalic and atraumatic.      Nose: Nose normal.      Mouth/Throat:      Mouth: Mucous membranes are moist.      Pharynx: Oropharynx is clear.   Eyes:      Extraocular Movements: Extraocular movements intact.      Conjunctiva/sclera: Conjunctivae normal.      Pupils: Pupils are equal, round, and reactive to light.   Cardiovascular:      Rate and Rhythm: Normal rate and regular rhythm.      Pulses: Normal pulses.      Heart sounds: Normal heart sounds.   Pulmonary:      Comments: Prolonged expiratory phase bilaterally  Abdominal:      General: Bowel sounds are normal.      Palpations: Abdomen is soft.      Comments: Scaphoid   Musculoskeletal:         General: Normal range of motion.      Cervical back: Normal range of motion and neck supple.   Skin:     General: Skin is warm and dry.      Findings: Rash (Resolved) present.   Neurological:      General: No focal deficit present.      Mental Status: He is alert and oriented to person, place, and time.   Psychiatric:         Mood and Affect: Mood normal.           RECENT LABS:  Hematology WBC   Date Value Ref Range Status   05/01/2023 5.05 3.40 - 10.80 10*3/mm3 Final   05/09/2022 7.54 4.5 - 11.0 10*3/uL Final     RBC   Date Value Ref Range Status   05/01/2023 3.76 (L) 4.14 - 5.80 10*6/mm3 Final   05/09/2022 5.52 4.5 - 5.9 10*6/uL Final     Hemoglobin   Date Value Ref Range Status   05/01/2023 12.4 (L) 13.0 - 17.7 g/dL Final   05/09/2022 16.4 13.5 - 17.5 g/dL Final     Hematocrit   Date Value Ref Range Status   05/01/2023 38.8 37.5 - 51.0 % Final   05/09/2022 51.0 41.0 - 53.0 % Final     Platelets   Date Value Ref Range Status   05/01/2023 147 140 - 450 10*3/mm3 Final   05/09/2022 204 140 - 440 10*3/uL " Final          Assessment & Plan     78 y.o. male  with medical issues including type 2 diabetes-uncertain control, COPD, hypertension, diastolic heart failure, chronic disease, peripheral vascular disease (follow-up with vascular surgery today), previous DVT on anticoagulation (home monitoring), previous IVC filter placement?,  Status post September 2021 partial small bowel obstruction secondary to sigmoid diverticulitis treated medically.     1.   T1b N1 M0-stage IIb lung cancer.  • right lung base nodule noted on scan at that point and in follow-up with primary care had developed a left inguinal hernia with repair planned through a different general surgeon then seen with his initial sigmoid diverticulitis.  • PET scan 7/13/2022 demonstrates a hypermetabolic spiculated lesion medial aspect of the left lobe adjacent to descending thoracic aorta measuring1.5 x 1.6 SUV 9.3 with an enlarged hypermetabolic left hilar lymph node measured 1.5 x 1.3 with SUV of 12.1, additional nodules in the lateral aspect right lower lobe and micronodule in the posterior/medial right lower lobe and also additional right lower lobe micronodule.  There is an infrarenal abdominal aortic aneurysm measuring 3.4 cm with a short segment of chronic dissection present.  • Clinical findings would be consistent with a possible T1b N1 M0-stage IIb lung cancer.  • discussed in thoracic conference 7/20/2022.  • Recommendations to proceed with pulmonary assessment with EBUS.  • The patient proceeded to undergo his hernia surgery 8/2/2022 by Dr. Dotson.   • Guardant 360 that T p53 splice site mutation is present and would certainly be consistent as well the most common mutations found in lung cancers particularly squamous cancers.  • The patient was admitted 9//2022 undergoing 9/23/2022  a bronchoscopy with left video-assisted thoracoscopy with robot-assisted total decortication of the left lung, left lower lobectomy, mediastinal  lymphadenectomy and intercostal nerve block with Dr. Nicola Joe.  • Fortunately he did fairly well postoperatively though did develop atrial fibrillation with RVR treated with amiodarone converting back to sinus rhythm, treated with IV metoprolol subsequent chronic anticoagulation.  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  Notably there is invasive carcinoma present at the margin. only 2 positive lymph nodes adjacent to the bronchovesicular margin which appears to have been transected difficult to enumerate with extranodal extension present.  • Options could well be Carboplatin and Taxol considering the patient's comorbidity and worry of using cisplatin-based chemotherapy in this patient followed by atezolizumab with pathology having been notified to assess PD-L1 expression on the tumor as well also await review by Caris molecular profiling.  Finally radiation therapy consultation be requested.  •  Caris analysis indicates benefit from immunotherapy at relatively high levels.  This would allow radiation therapy given with Carboplatin Taxol weekly (better tolerated) and then subsequent atezolizumab adjuvantly.  • Weekly carboplatin Taxol initiated 11/28/2022 given concurrently with radiation therapy  • 12/5/2022 here for cycle 2 weekly carboplatin/Taxol, overall tolerating treatment quite well so far.  • 12/12/2022: Proceed with cycle #3 weekly carboplatin/Taxol with concurrent radiation.  Continues to tolerate treatment well.  • He is next seen 12/19/2022 with H&H 11.9 and 38.5, white count 3460 and platelet count of 168,000.  He is feeling well without any significant complications of therapy except for right calf dermatitis that is been developing in the last several days.  • 12/28/2022 here for week 5 carbo/Taxol.  Overall tolerating well.  Today WBC 2.45, ANC 1.22, hemoglobin  10.7, platelet count 117,000.  I have reviewed with Dr. Bishop, and we will go ahead and continue with treatment.  • 1/4/2023: Received with cycle 6 carbo/taxol + XRT.  Continues to tolerate treatment well.  WBC improved to 2.69 with ANC 1.41.  • Next 1/11/2023 with completion date 1/11/2023.  Repeat CBC now includes H&H of 11.0 and 33.9, white count 2090, platelet count 128,000, ANC is 800.  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  • Follow-up scans 2/24/2023 showing no evidence of recurrent disease including his findings of left lower lobectomy, stable 11 m nodule opacity in the base of the right lower lobe in the right lung apex, small left pleural effusion mild to moderate stenosis of the proximal subclavian artery.  • We have discussed that considering studies like keynote  091 that the patient's high risk disease could be addressed with additional immunotherapy including the use of Atezolizumab and/or Keytruda.  Considering his findings overall Keytruda every 3 weeks x18 cycles is to be pursued.  • The patient began Keytruda as planned with his first treatment 3/20/2023.  • The patient notified the office shortly after treatment that he had pain under his right rib cage and was placed back onto his Neurontin to try to manage this pain.  Approximately week later he still had the pain and also had another rash we switched him at this point to Famvir to try to completely clear this problem.  • 4/3/2023.  Fortunately his recent apparent shingles activation has nearly abated and he is feeling reasonably well.  In review of the literature there is no significant difference in activation with immunotherapy though this patient may require suppression.  He is asked to complete his Famvir.  • 4/10/2023 cycle 2  Keytruda, overall tolerating well.   • Patient seen 5/1/2023, third cycle of Keytruda, status post fourth cycle of Keytruda, 3 months of therapy, subsequent scans will need to be scheduled.      2.  Herpes zoster: Patient was treated with Famvir.  Rash has resolved, no issues with persistent herpetic neuralgia.  · He will be placed on suppression with acyclovir 400 mg twice daily.  We discussed he will hold off on vaccination for now, given recent infection.  · Patient assessed 5/1/2023, continue Keytruda, status post fifth cycle of Keytruda or 3 months of therapy he would undergo repeat scans and, if stable or improved, proceed with Shingrix vaccinations.      PLAN:  · Proceed with Keytruda today.  · Continue prophylactic acyclovir 400 mg twice daily.  · Return in 3 weeks for follow-up with NP follow-up, Keytruda and subsequent CT chest abdomen pelvis  · Plan repeat scans at 3 months of therapy.  Again if stable or improved follow-up with Shingrix vaccinations.  · Call/ return sooner should he develop any new concerns or problems.    Patient is on a high risk medication requiring close monitoring for toxicity.      Kayden Bishop MD  05/01/2023

## 2023-05-01 ENCOUNTER — OFFICE VISIT (OUTPATIENT)
Dept: ONCOLOGY | Facility: CLINIC | Age: 78
End: 2023-05-01
Payer: MEDICARE

## 2023-05-01 ENCOUNTER — INFUSION (OUTPATIENT)
Dept: ONCOLOGY | Facility: HOSPITAL | Age: 78
End: 2023-05-01
Payer: MEDICARE

## 2023-05-01 VITALS
HEART RATE: 51 BPM | RESPIRATION RATE: 20 BRPM | SYSTOLIC BLOOD PRESSURE: 116 MMHG | HEIGHT: 70 IN | DIASTOLIC BLOOD PRESSURE: 73 MMHG | OXYGEN SATURATION: 97 % | WEIGHT: 157.5 LBS | BODY MASS INDEX: 22.55 KG/M2 | TEMPERATURE: 97.5 F

## 2023-05-01 DIAGNOSIS — Z45.2 FITTING AND ADJUSTMENT OF VASCULAR CATHETER: ICD-10-CM

## 2023-05-01 DIAGNOSIS — C7A.8 NEUROENDOCRINE CARCINOMA OF LUNG: ICD-10-CM

## 2023-05-01 DIAGNOSIS — Z79.899 LONG-TERM USE OF HIGH-RISK MEDICATION: ICD-10-CM

## 2023-05-01 DIAGNOSIS — C7A.8 NEUROENDOCRINE CARCINOMA OF LUNG: Primary | ICD-10-CM

## 2023-05-01 DIAGNOSIS — Z79.899 LONG-TERM USE OF HIGH-RISK MEDICATION: Primary | ICD-10-CM

## 2023-05-01 LAB
ALBUMIN SERPL-MCNC: 4.1 G/DL (ref 3.5–5.2)
ALBUMIN/GLOB SERPL: 1.5 G/DL (ref 1.1–2.4)
ALP SERPL-CCNC: 62 U/L (ref 38–116)
ALT SERPL W P-5'-P-CCNC: 11 U/L (ref 0–41)
ANION GAP SERPL CALCULATED.3IONS-SCNC: 9.7 MMOL/L (ref 5–15)
AST SERPL-CCNC: 15 U/L (ref 0–40)
BASOPHILS # BLD AUTO: 0.03 10*3/MM3 (ref 0–0.2)
BASOPHILS NFR BLD AUTO: 0.6 % (ref 0–1.5)
BILIRUB SERPL-MCNC: 0.4 MG/DL (ref 0.2–1.2)
BUN SERPL-MCNC: 18 MG/DL (ref 6–20)
BUN/CREAT SERPL: 17.3 (ref 7.3–30)
CALCIUM SPEC-SCNC: 10.3 MG/DL (ref 8.5–10.2)
CHLORIDE SERPL-SCNC: 101 MMOL/L (ref 98–107)
CO2 SERPL-SCNC: 25.3 MMOL/L (ref 22–29)
CREAT SERPL-MCNC: 1.04 MG/DL (ref 0.7–1.3)
DEPRECATED RDW RBC AUTO: 52.8 FL (ref 37–54)
EGFRCR SERPLBLD CKD-EPI 2021: 74.4 ML/MIN/1.73
EOSINOPHIL # BLD AUTO: 0.06 10*3/MM3 (ref 0–0.4)
EOSINOPHIL NFR BLD AUTO: 1.2 % (ref 0.3–6.2)
ERYTHROCYTE [DISTWIDTH] IN BLOOD BY AUTOMATED COUNT: 13.8 % (ref 12.3–15.4)
GLOBULIN UR ELPH-MCNC: 2.8 GM/DL (ref 1.8–3.5)
GLUCOSE SERPL-MCNC: 143 MG/DL (ref 74–124)
HCT VFR BLD AUTO: 38.8 % (ref 37.5–51)
HGB BLD-MCNC: 12.4 G/DL (ref 13–17.7)
IMM GRANULOCYTES # BLD AUTO: 0.02 10*3/MM3 (ref 0–0.05)
IMM GRANULOCYTES NFR BLD AUTO: 0.4 % (ref 0–0.5)
LYMPHOCYTES # BLD AUTO: 1.45 10*3/MM3 (ref 0.7–3.1)
LYMPHOCYTES NFR BLD AUTO: 28.7 % (ref 19.6–45.3)
MCH RBC QN AUTO: 33 PG (ref 26.6–33)
MCHC RBC AUTO-ENTMCNC: 32 G/DL (ref 31.5–35.7)
MCV RBC AUTO: 103.2 FL (ref 79–97)
MONOCYTES # BLD AUTO: 0.61 10*3/MM3 (ref 0.1–0.9)
MONOCYTES NFR BLD AUTO: 12.1 % (ref 5–12)
NEUTROPHILS NFR BLD AUTO: 2.88 10*3/MM3 (ref 1.7–7)
NEUTROPHILS NFR BLD AUTO: 57 % (ref 42.7–76)
NRBC BLD AUTO-RTO: 0 /100 WBC (ref 0–0.2)
PLATELET # BLD AUTO: 147 10*3/MM3 (ref 140–450)
PMV BLD AUTO: 8.9 FL (ref 6–12)
POTASSIUM SERPL-SCNC: 4.4 MMOL/L (ref 3.5–4.7)
PROT SERPL-MCNC: 6.9 G/DL (ref 6.3–8)
RBC # BLD AUTO: 3.76 10*6/MM3 (ref 4.14–5.8)
SODIUM SERPL-SCNC: 136 MMOL/L (ref 134–145)
T4 FREE SERPL-MCNC: 1.05 NG/DL (ref 0.93–1.7)
TSH SERPL DL<=0.05 MIU/L-ACNC: 2.38 UIU/ML (ref 0.27–4.2)
WBC NRBC COR # BLD: 5.05 10*3/MM3 (ref 3.4–10.8)

## 2023-05-01 PROCEDURE — 96413 CHEMO IV INFUSION 1 HR: CPT

## 2023-05-01 PROCEDURE — 3078F DIAST BP <80 MM HG: CPT | Performed by: INTERNAL MEDICINE

## 2023-05-01 PROCEDURE — 84439 ASSAY OF FREE THYROXINE: CPT | Performed by: INTERNAL MEDICINE

## 2023-05-01 PROCEDURE — 84443 ASSAY THYROID STIM HORMONE: CPT | Performed by: INTERNAL MEDICINE

## 2023-05-01 PROCEDURE — 3074F SYST BP LT 130 MM HG: CPT | Performed by: INTERNAL MEDICINE

## 2023-05-01 PROCEDURE — 25010000002 PEMBROLIZUMAB 100 MG/4ML SOLUTION 4 ML VIAL: Performed by: INTERNAL MEDICINE

## 2023-05-01 PROCEDURE — 85025 COMPLETE CBC W/AUTO DIFF WBC: CPT

## 2023-05-01 PROCEDURE — 25010000002 HEPARIN LOCK FLUSH PER 10 UNITS: Performed by: INTERNAL MEDICINE

## 2023-05-01 PROCEDURE — 80053 COMPREHEN METABOLIC PANEL: CPT

## 2023-05-01 PROCEDURE — 1126F AMNT PAIN NOTED NONE PRSNT: CPT | Performed by: INTERNAL MEDICINE

## 2023-05-01 RX ORDER — SODIUM CHLORIDE 9 MG/ML
250 INJECTION, SOLUTION INTRAVENOUS ONCE
Status: COMPLETED | OUTPATIENT
Start: 2023-05-01 | End: 2023-05-01

## 2023-05-01 RX ORDER — HEPARIN SODIUM (PORCINE) LOCK FLUSH IV SOLN 100 UNIT/ML 100 UNIT/ML
500 SOLUTION INTRAVENOUS AS NEEDED
OUTPATIENT
Start: 2023-05-01

## 2023-05-01 RX ORDER — SODIUM CHLORIDE 0.9 % (FLUSH) 0.9 %
10 SYRINGE (ML) INJECTION AS NEEDED
OUTPATIENT
Start: 2023-05-01

## 2023-05-01 RX ORDER — SODIUM CHLORIDE 0.9 % (FLUSH) 0.9 %
10 SYRINGE (ML) INJECTION AS NEEDED
Status: DISCONTINUED | OUTPATIENT
Start: 2023-05-01 | End: 2023-05-01 | Stop reason: HOSPADM

## 2023-05-01 RX ORDER — HEPARIN SODIUM (PORCINE) LOCK FLUSH IV SOLN 100 UNIT/ML 100 UNIT/ML
500 SOLUTION INTRAVENOUS AS NEEDED
Status: DISCONTINUED | OUTPATIENT
Start: 2023-05-01 | End: 2023-05-01 | Stop reason: HOSPADM

## 2023-05-01 RX ORDER — SODIUM CHLORIDE 9 MG/ML
250 INJECTION, SOLUTION INTRAVENOUS ONCE
Status: CANCELLED | OUTPATIENT
Start: 2023-05-01

## 2023-05-01 RX ADMIN — SODIUM CHLORIDE 250 ML: 9 INJECTION, SOLUTION INTRAVENOUS at 10:55

## 2023-05-01 RX ADMIN — SODIUM CHLORIDE 200 MG: 9 INJECTION, SOLUTION INTRAVENOUS at 10:55

## 2023-05-01 RX ADMIN — Medication 500 UNITS: at 11:29

## 2023-05-01 RX ADMIN — Medication 10 ML: at 11:28

## 2023-05-12 ENCOUNTER — PATIENT OUTREACH (OUTPATIENT)
Dept: OTHER | Facility: HOSPITAL | Age: 78
End: 2023-05-12
Payer: MEDICARE

## 2023-05-12 NOTE — PROGRESS NOTES
Called patient. Left message that I was just checking in to see how he was doing. Wanted to know if he has any questions/concers or resource needs. Requested cb

## 2023-05-21 NOTE — PROGRESS NOTES
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  Notably there is invasive carcinoma present at the margin, 2 positive lymph nodes adjacent to the bronchovesicular margin which appears to have been transected difficult to enumerate with extranodal extension present.    *Patient status post chemo RT-11/28/2022, radiation therapy completion date 1/11/2023    *Immunotherapy-Keytruda to be initiated 3/20/2023          History of Present Illness   The patient returns today for follow up and evaluation prior to cycle 4 Keytruda.  He is tolerating treatment well.  Eating and drinking well.  Bowels moving regularly.  Denies fever or chills.  Denies nausea or vomiting.  Denies new or worsening skin rash.  Denies new or worsening shortness of breath, cough, wheeze.  No new concerns today.    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  He had been assessed particularly in the fall 2021 when he presented with nausea and vomiting and abdominal discomfort leading to assessments that revealed sigmoid diverticulitis with contained rupture and partial small bowel obstruction.  Records from mid September 21 indicating that he was admitted with partial small bowel obstruction and treated medically with very slow recovery.  He has history of COPD with CT demonstrating a pulmonary nodule in the right lung base at 7 mm with follow-up planned.  He was seen by general surgery in later September with repeat scans planned and repeat CT abdomen 10/7/2021 did demonstrate interval improvement of sigmoid  diverticulitis.      Record indicates an assessment in late June 2022 at different general surgeon for left inguinal hernia, history of heavy tobacco use with COPD and recommendation for surgical repair of his hernia      The patient underwent a CT scan of the chest 5/7/2022 demonstrating diffuse emphysematous changes, ill-defined density measuring 3 mm in the superior segment of the right lower lobe, multiple ill-defined 3 to 4 mm nodular density thought to be inflammatory involving the right lower lobe and a new spiculated nodule involving the left lower lobe measuring 16 x 14 mm as well as left hilar adenopathy.       Follow-up PET/CT 7/13/2022 reveal a hypermetabolic spiculated lesion medial aspect the left lower lobe adjacent to descending thoracic aorta measuring 1.5 x 1.6 SUV 9.3 with an enlarged hypermetabolic left hilar lymph node measured 1.5 x 1.3 with SUV of 12.1, additional nodules in the lateral aspect right lower lobe and micronodule in the posterior/medial right lower lobe and also additional right lower lobe micronodule.  There is an infrarenal abdominal aortic aneurysm measuring 3.4 cm with a short segment of chronic dissection present.    These clinical findings would be consistent with a possible T1b N1 M0-stage IIb lung cancer.      Recognizing a diagnosis has not been made his case was discussed in thoracic conference 7/20/2022.  Recommendations include proceeding to pulmonary assessment with EBUS and the patient undergoing a general assessment per his clinical status-older adult assessment as well as assessment by thoracic surgery.  These issues are discussed with the patient and his wife in detail when they are seen 7/28/2022.    The patient proceeded to undergo his hernia surgery 8/2/2022 by Dr. Dotson.  Additionally the patient was unable to undergo assessment by pulmonary until October and, thereafter, was scheduled to see Dr. Joe 8/18/2022 undergoing older adult functional assessment  with a JAGUAR score of 11 indicating a risk of functional decline related to cancer therapy.    The patient is seen with his significant other 8/15/2022 and doing very well with recent hernia surgery.  His performance status is reasonably good at present and we have learned now that he would not be able to see pulmonary medicine until October, thoracic surgery is scheduled this week with Dr. Joe.  Perhaps he could be a surgical candidate.  Particularly we know by guardant 360 that T p53 splice site mutation is present and would certainly be consistent as well the most common mutations found in lung cancers particularly squamous cancers.  We have discussed obtaining pulmonary functions at this point, thoracic surgery assessment this week and also restarting Chantix as possible for the patient.    There was difficulty obtaining Chantix and while this was being assessed the patient was reviewed 8/18 by thoracic surgery.  He was felt to have a T1b N1 M0 stage IIb carcinoma left lower lobe along and not a candidate for biopsy.  PFTs were borderline, functional assessment was reasonably good and the patient was felt to be a candidate for robot-assisted biopsy of his nodes and if malignant proceed to left lower lobe lobectomy.  He was reviewed 9/8 and offered 21 mg nicotine transdermal patching.    He is next seen 9/10/2022.  He is seen with his wife and both are prepared for surgery 9/23/2022.  We discussed that additional therapy may be considered once we have more information about the type and stage.  We would hope to see him postoperatively.    The patient was admitted 9//2022 undergoing 9/23/2022  a bronchoscopy with left video-assisted thoracoscopy with robot-assisted total decortication of the left lung, left lower lobectomy, mediastinal lymphadenectomy and intercostal nerve block with Dr. Nicola Joe.  Fortunately he did fairly well postoperatively though did develop atrial fibrillation with RVR treated  with amiodarone converting back to sinus rhythm, treated with IV metoprolol subsequent chronic anticoagulation.  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  Notably there is invasive carcinoma present at the margin.  Is a, and only 2 positive lymph nodes adjacent to the bronchovesicular margin which appears to have been transected difficult to enumerate with extranodal extension present.       The patient is seen 10/27/2022.  He is recovering well from surgery, albeit slowly, and we have discussed his findings that are concerning as to residual disease with a positive margin described as above.  There is also the significance potentially of PD-L1 expression which has been described as producing a poor survival.     Nivolumab has been used as second line therapy to positive effect in the disease and this begs the question as to whether adjuvant therapy plus immunotherapy could be utilized though the patient would be difficult to treat with platinum based chemotherapy as well.       The patient is next assessed 11/7/2022 for significant assessments by supportive oncology at [facility] as well as with plans to see Dr. Marrero 11/9/2022.  Unfortunately he has been very slow to gradually recover from the effects of surgery with reduced appetite and only fair performance status.     At present it would be difficult to give him cisplatin based chemotherapy and we discussed whether we might be able to use Tecentriq (atezolizumab) as his primary adjuvant therapy.  We have contacted pathology about completing Caris testing and a PD-L1 analysis that has not yet completed.         The patient is seen, however, 11/16/2022 and his genomic analysis has returned as being significant for broad sensitivity to immunotherapy.  This would argue for the administration of weekly Carboplatin/Taxol as the patient proceeds  to radiation therapy and upon completion, then using Tecentriq as further adjuvant therapy over a year.  This is discussed with the patient and his  in detail as well as discussed with Dr. Marrero of radiation therapy.  We have also discussed the patient require port placement.    The patient proceeded to treatment taking weekly carboplatin Taxol with concurrent radiation therapy last seen 12/12/2022 with third weekly treatment.    He is next seen 12/19/2022 with H&H 11.9 and 38.5, white count 3460 and platelet count of 168,000.  He is feeling well without any significant complications of therapy except for right calf dermatitis that is been developing in the last several days.    The patient continued therapy taking CarboTaxol weekly including 12/28 and 1/4/2023.    His is next seen 1/11/2023 with completion date for radiation therapy 1/11/2023.  Repeat CBC now includes H&H of 11.0 and 33.9, white count 2090, platelet count 128,000, ANC is 800.  He, fortunately, is tolerating treatment well and we have again discussed with him adjuvant immunotherapy would be useful including Tecentriq versus pembrolizumab-keynote 091 study particularly in tumor programmed cell death PD-L1 greater than 50%.    The patient will not be given additional chemotherapy 1/11/2023 we will plan to reassess by follow-up scans in the next 6 weeks CT chest and pelvis making a decision thereafter about adjuvant immunotherapy.    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    We have discussed that considering studies like keynote  091 that the patient's high risk disease could be addressed with additional immunotherapy including the use of Atezolizumab and/or Keytruda.   Considering his findings overall Keytruda every 3 weeks x18 cycles is to be pursued.    The patient was seen 3/20/2023, discussed initiation of Keytruda.  He is agreeable to proceed.    The patient notified the office shortly after treatment that he had pain under his right rib cage and was placed back onto his Neurontin to try to manage this pain.  Approximately week later he still had the pain and also had another rash we switched him at this point to Famvir to try to completely clear this problem.    He is next seen back 4/3/2023.  Fortunately his recent apparent shingles activation has nearly abated and he is feeling reasonably well.  In review of the literature there is no significant difference in activation with immunotherapy though this patient may require suppression.  I have asked him to complete his Famvir at this point.    Patient assessed 4/10/2023 with resolution of his shingles, subsequently placed on maintenance acyclovir, consideration of shingles vaccination post 3 months of Keytruda therapy is stable disease documented.    Past Medical History:   Diagnosis Date   • Arthritis    • COPD (chronic obstructive pulmonary disease)     O2 3L AT HS   • Diabetes mellitus     DIET CONTROL   • Diverticulitis    • DVT, lower extremity     RLE   • Elevated cholesterol    • H/O Cervical pain    • History of colitis    • Hyperlipidemia    • Hypertension    • Left shoulder pain    • Low back pain    • Lung cancer 2022    LEFT LUNG   • On anticoagulant therapy    • Peripheral arterial disease    • Right leg claudication    • Skin cancer     HX        Past Surgical History:   Procedure Laterality Date   • BALLOON ANGIOPLASTY, ARTERY Right 2015    IR Percutaneious IVC filter placement   • INGUINAL HERNIA REPAIR Left    • KNEE ARTHROSCOPY Left     Torn meniscus   • LOBECTOMY Left 9/23/2022    Procedure: BRONCHOSCOPY, LEFT VIDEO ASSISTED THORACOSCOPY, ROBOT ASSISTED TOTAL DECORTICATION  LEFT LUNG, LEFT LOWER LOBE  LOBECTOMY, MEDIASTINAL LYMPHECTOMY, INTERCOSTAL NERVE BLOCK;  Surgeon: Nicola Joe III, MD;  Location: [facility] MAIN OR;  Service: Robotics - DaVinci;  Laterality: Left;   • VENOUS ACCESS DEVICE (PORT) INSERTION Right 11/21/2022    Procedure: INSERTION VENOUS ACCESS DEVICE;  Surgeon: Nicola Joe III, MD;  Location: [facility] MAIN OR;  Service: Thoracic;  Laterality: Right;        Current Outpatient Medications on File Prior to Visit   Medication Sig Dispense Refill   • acyclovir (ZOVIRAX) 400 MG tablet Take 1 tablet by mouth 2 (Two) Times a Day. 60 tablet 2   • arformoterol (BROVANA) 15 MCG/2ML nebulizer solution Take 2 mL by nebulization 2 (Two) Times a Day.     • Budeson-Glycopyrrol-Formoterol (BREZTRI) 160-9-4.8 MCG/ACT aerosol inhaler      • budesonide (PULMICORT) 0.5 MG/2ML nebulizer solution Take 2 mL by nebulization 2 (Two) Times a Day.     • cetirizine (zyrTEC) 10 MG tablet Take 1 tablet by mouth Daily.     • Cholecalciferol 50 MCG (2000 UT) capsule Take 1 capsule by mouth Daily.     • fluticasone (FLONASE) 50 MCG/ACT nasal spray 1 spray into the nostril(s) as directed by provider Daily.     • isosorbide mononitrate (IMDUR) 60 MG 24 hr tablet Take 1 tablet by mouth Every Evening.     • ondansetron (Zofran) 8 MG tablet Take 1 tablet by mouth Every 8 (Eight) Hours As Needed for Nausea or Vomiting. 30 tablet 1   • simvastatin (ZOCOR) 20 MG tablet Take 1 tablet by mouth Every Night.     • tamsulosin (FLOMAX) 0.4 MG capsule 24 hr capsule Take 1 capsule by mouth Daily. 30 capsule 5   • warfarin (COUMADIN) 5 MG tablet Take 1 tablet by mouth Daily. Take 10mg on 9/29/22 and then resume regular home schedule.     • metoprolol succinate XL (TOPROL-XL) 25 MG 24 hr tablet Take 1 tablet by mouth Daily for 30 days. (Patient taking differently: Take 25 mg by mouth Every Evening.) 30 tablet 0     No current facility-administered medications on file prior to visit.        ALLERGIES:    Allergies   Allergen Reactions  "  • Benadryl [Diphenhydramine] Other (See Comments)     Extreme restlessness and insomnia        Social History     Socioeconomic History   • Marital status:    • Years of education: 1 year college   Tobacco Use   • Smoking status: Every Day     Packs/day: 1.00     Years: 59.00     Pack years: 59.00     Types: Cigarettes     Start date: 1963   • Smokeless tobacco: Never   • Tobacco comments:     9/8/22: Down to Less than 1 PPD   Vaping Use   • Vaping Use: Never used   Substance and Sexual Activity   • Alcohol use: Not Currently   • Drug use: Never   • Sexual activity: Defer        Family History   Problem Relation Age of Onset   • No Known Problems Mother    • Cancer Father    • Lung cancer Father    • Diabetes Brother    • Other Daughter         S/P stent, age 42   • No Known Problems Son    • Malig Hyperthermia Neg Hx         Review of Systems   Constitutional: Positive for activity change, fatigue and unexpected weight change (Status post his diverticulitis episode, weight has not been regained).   HENT: Negative.    Eyes: Negative.    Respiratory: Positive for shortness of breath. Negative for cough.    Cardiovascular: Negative.    Gastrointestinal: Negative.    Genitourinary: Negative.    Musculoskeletal: Negative.    Skin: Positive for rash (Right lower abdominal wall, right mid back, clearing).   Neurological: Negative.    Psychiatric/Behavioral: Negative.         Objective     Vitals:    05/22/23 1044   BP: 133/69   Pulse: (!) 45   Resp: 18   Temp: 97.7 °F (36.5 °C)   TempSrc: Temporal   SpO2: 96%   Weight: 72.8 kg (160 lb 8 oz)   Height: 177.8 cm (70\")   PainSc: 0-No pain         5/22/2023    10:30 AM   Current Status   ECOG score 0     Physical Exam  Constitutional:       Appearance: Normal appearance.   HENT:      Head: Normocephalic and atraumatic.      Nose: Nose normal.      Mouth/Throat:      Mouth: Mucous membranes are moist.      Pharynx: Oropharynx is clear.   Eyes:      Extraocular " Movements: Extraocular movements intact.      Conjunctiva/sclera: Conjunctivae normal.      Pupils: Pupils are equal, round, and reactive to light.   Cardiovascular:      Rate and Rhythm: Normal rate and regular rhythm.      Pulses: Normal pulses.      Heart sounds: Normal heart sounds.   Pulmonary:      Comments: Prolonged expiratory phase bilaterally  Abdominal:      General: Bowel sounds are normal.      Palpations: Abdomen is soft.      Comments: Scaphoid   Musculoskeletal:         General: Normal range of motion.      Cervical back: Normal range of motion and neck supple.   Skin:     General: Skin is warm and dry.      Findings: Rash (Resolved) present.   Neurological:      General: No focal deficit present.      Mental Status: He is alert and oriented to person, place, and time.   Psychiatric:         Mood and Affect: Mood normal.           RECENT LABS:  Hematology WBC   Date Value Ref Range Status   05/22/2023 4.84 3.40 - 10.80 10*3/mm3 Final   05/09/2022 7.54 4.5 - 11.0 10*3/uL Final     RBC   Date Value Ref Range Status   05/22/2023 3.83 (L) 4.14 - 5.80 10*6/mm3 Final   05/09/2022 5.52 4.5 - 5.9 10*6/uL Final     Hemoglobin   Date Value Ref Range Status   05/22/2023 12.3 (L) 13.0 - 17.7 g/dL Final   05/09/2022 16.4 13.5 - 17.5 g/dL Final     Hematocrit   Date Value Ref Range Status   05/22/2023 39.0 37.5 - 51.0 % Final   05/09/2022 51.0 41.0 - 53.0 % Final     Platelets   Date Value Ref Range Status   05/22/2023 142 140 - 450 10*3/mm3 Final   05/09/2022 204 140 - 440 10*3/uL Final          Assessment & Plan     78 y.o. male  with medical issues including type 2 diabetes-uncertain control, COPD, hypertension, diastolic heart failure, chronic disease, peripheral vascular disease (follow-up with vascular surgery today), previous DVT on anticoagulation (home monitoring), previous IVC filter placement?,  Status post September 2021 partial small bowel obstruction secondary to sigmoid diverticulitis treated  medically.     1.   T1b N1 M0-stage IIb lung cancer.  • right lung base nodule noted on scan at that point and in follow-up with primary care had developed a left inguinal hernia with repair planned through a different general surgeon then seen with his initial sigmoid diverticulitis.  • PET scan 7/13/2022 demonstrates a hypermetabolic spiculated lesion medial aspect of the left lobe adjacent to descending thoracic aorta measuring1.5 x 1.6 SUV 9.3 with an enlarged hypermetabolic left hilar lymph node measured 1.5 x 1.3 with SUV of 12.1, additional nodules in the lateral aspect right lower lobe and micronodule in the posterior/medial right lower lobe and also additional right lower lobe micronodule.  There is an infrarenal abdominal aortic aneurysm measuring 3.4 cm with a short segment of chronic dissection present.  • Clinical findings would be consistent with a possible T1b N1 M0-stage IIb lung cancer.  • discussed in thoracic conference 7/20/2022.  • Recommendations to proceed with pulmonary assessment with EBUS.  • The patient proceeded to undergo his hernia surgery 8/2/2022 by Dr. Dotson.   • Guardant 360 that T p53 splice site mutation is present and would certainly be consistent as well the most common mutations found in lung cancers particularly squamous cancers.  • The patient was admitted 9//2022 undergoing 9/23/2022  a bronchoscopy with left video-assisted thoracoscopy with robot-assisted total decortication of the left lung, left lower lobectomy, mediastinal lymphadenectomy and intercostal nerve block with Dr. Nicola Joe.  • Fortunately he did fairly well postoperatively though did develop atrial fibrillation with RVR treated with amiodarone converting back to sinus rhythm, treated with IV metoprolol subsequent chronic anticoagulation.  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  Notably there is invasive carcinoma present at the margin. only 2 positive lymph nodes adjacent to the bronchovesicular margin which appears to have been transected difficult to enumerate with extranodal extension present.  • Options could well be Carboplatin and Taxol considering the patient's comorbidity and worry of using cisplatin-based chemotherapy in this patient followed by atezolizumab with pathology having been notified to assess PD-L1 expression on the tumor as well also await review by Caris molecular profiling.  Finally radiation therapy consultation be requested.  •  Caris analysis indicates benefit from immunotherapy at relatively high levels.  This would allow radiation therapy given with Carboplatin Taxol weekly (better tolerated) and then subsequent atezolizumab adjuvantly.  • Weekly carboplatin Taxol initiated 11/28/2022 given concurrently with radiation therapy  • 12/5/2022 here for cycle 2 weekly carboplatin/Taxol, overall tolerating treatment quite well so far.  • 12/12/2022: Proceed with cycle #3 weekly carboplatin/Taxol with concurrent radiation.  Continues to tolerate treatment well.  • He is next seen 12/19/2022 with H&H 11.9 and 38.5, white count 3460 and platelet count of 168,000.  He is feeling well without any significant complications of therapy except for right calf dermatitis that is been developing in the last several days.  • 12/28/2022 here for week 5 carbo/Taxol.  Overall tolerating well.  Today WBC 2.45, ANC 1.22, hemoglobin 10.7, platelet count 117,000.  I have reviewed with Dr. Bishop, and we will go ahead and continue with treatment.  • 1/4/2023: Received with cycle 6 carbo/taxol + XRT.  Continues to tolerate treatment well.  WBC improved to 2.69 with ANC 1.41.  • Next 1/11/2023 with completion date 1/11/2023.  Repeat CBC now includes H&H of 11.0 and 33.9, white count 2090, platelet count 128,000, ANC is 800.  He, fortunately, is tolerating  treatment well and we have again discussed with himadjuvant immunotherapy would be useful including Tecentriq versus pembrolizumab-keynote 091 study particularly in tumor programmed cell death PD-L1 greater than 50%.  • Follow-up scans 2/24/2023 showing no evidence of recurrent disease including his findings of left lower lobectomy, stable 11 m nodule opacity in the base of the right lower lobe in the right lung apex, small left pleural effusion mild to moderate stenosis of the proximal subclavian artery.  • We have discussed that considering studies like keynote  091 that the patient's high risk disease could be addressed with additional immunotherapy including the use of Atezolizumab and/or Keytruda.  Considering his findings overall Keytruda every 3 weeks x18 cycles is to be pursued.  • The patient began Keytruda as planned with his first treatment 3/20/2023.  • The patient notified the office shortly after treatment that he had pain under his right rib cage and was placed back onto his Neurontin to try to manage this pain.  Approximately week later he still had the pain and also had another rash we switched him at this point to Famvir to try to completely clear this problem.  • 4/3/2023.  Fortunately his recent apparent shingles activation has nearly abated and he is feeling reasonably well.  In review of the literature there is no significant difference in activation with immunotherapy though this patient may require suppression.  He is asked to complete his Famvir.  • 4/10/2023 cycle 2 Keytruda, overall tolerating well.   • Patient seen 5/1/2023, third cycle of Keytruda, status post fourth cycle of Keytruda, 3 months of therapy, subsequent scans will need to be scheduled.  • 5/22/2023: Proceed with cycle 4 Keytruda today.  Tolerating well.  Order placed for repeat CT scans in 2 weeks prior to next treatment.        2.  Herpes zoster: Patient was treated with Famvir.  Rash has resolved, no issues with  persistent herpetic neuralgia.  · He will be placed on suppression with acyclovir 400 mg twice daily.  We discussed he will hold off on vaccination for now, given recent infection.  · Patient assessed 5/1/2023, continue Keytruda, status post fifth cycle of Keytruda or 3 months of therapy he would undergo repeat scans and, if stable or improved, proceed with Shingrix vaccinations.      PLAN:   · Proceed with cycle 4 Keytruda today.  · Continue prophylactic acyclovir 400 mg twice daily.  · CT chest abdomen pelvis in 2 weeks, order placed today.  · Return in 3 weeks with MD to review scans and proceed with cycle 5 Keytruda.  · Plan repeat scans at 3 months of therapy.  Again if stable or improved follow-up with Shingrix vaccinations.  · Call/ return sooner should he develop any new concerns or problems.    Patient is on a high risk medication requiring close monitoring for toxicity.      Rachel Hummel, GARCÍA  05/22/2023

## 2023-05-22 ENCOUNTER — INFUSION (OUTPATIENT)
Dept: ONCOLOGY | Facility: HOSPITAL | Age: 78
End: 2023-05-22
Payer: MEDICARE

## 2023-05-22 ENCOUNTER — OFFICE VISIT (OUTPATIENT)
Dept: ONCOLOGY | Facility: CLINIC | Age: 78
End: 2023-05-22
Payer: MEDICARE

## 2023-05-22 VITALS
HEART RATE: 45 BPM | BODY MASS INDEX: 22.98 KG/M2 | OXYGEN SATURATION: 96 % | RESPIRATION RATE: 18 BRPM | WEIGHT: 160.5 LBS | HEIGHT: 70 IN | DIASTOLIC BLOOD PRESSURE: 69 MMHG | SYSTOLIC BLOOD PRESSURE: 133 MMHG | TEMPERATURE: 97.7 F

## 2023-05-22 DIAGNOSIS — Z79.899 LONG-TERM USE OF HIGH-RISK MEDICATION: Primary | ICD-10-CM

## 2023-05-22 DIAGNOSIS — Z79.899 HIGH RISK MEDICATION USE: ICD-10-CM

## 2023-05-22 DIAGNOSIS — C7A.8 NEUROENDOCRINE CARCINOMA OF LUNG: ICD-10-CM

## 2023-05-22 DIAGNOSIS — Z79.899 LONG-TERM USE OF HIGH-RISK MEDICATION: ICD-10-CM

## 2023-05-22 DIAGNOSIS — C7A.8 NEUROENDOCRINE CARCINOMA OF LUNG: Primary | ICD-10-CM

## 2023-05-22 LAB
ALBUMIN SERPL-MCNC: 4.2 G/DL (ref 3.5–5.2)
ALBUMIN/GLOB SERPL: 1.5 G/DL (ref 1.1–2.4)
ALP SERPL-CCNC: 60 U/L (ref 38–116)
ALT SERPL W P-5'-P-CCNC: 9 U/L (ref 0–41)
ANION GAP SERPL CALCULATED.3IONS-SCNC: 10.6 MMOL/L (ref 5–15)
AST SERPL-CCNC: 17 U/L (ref 0–40)
BASOPHILS # BLD AUTO: 0.02 10*3/MM3 (ref 0–0.2)
BASOPHILS NFR BLD AUTO: 0.4 % (ref 0–1.5)
BILIRUB SERPL-MCNC: 0.3 MG/DL (ref 0.2–1.2)
BUN SERPL-MCNC: 15 MG/DL (ref 6–20)
BUN/CREAT SERPL: 16.1 (ref 7.3–30)
CALCIUM SPEC-SCNC: 10.1 MG/DL (ref 8.5–10.2)
CHLORIDE SERPL-SCNC: 100 MMOL/L (ref 98–107)
CO2 SERPL-SCNC: 24.4 MMOL/L (ref 22–29)
CREAT SERPL-MCNC: 0.93 MG/DL (ref 0.7–1.3)
DEPRECATED RDW RBC AUTO: 52.6 FL (ref 37–54)
EGFRCR SERPLBLD CKD-EPI 2021: 85.1 ML/MIN/1.73
EOSINOPHIL # BLD AUTO: 0.05 10*3/MM3 (ref 0–0.4)
EOSINOPHIL NFR BLD AUTO: 1 % (ref 0.3–6.2)
ERYTHROCYTE [DISTWIDTH] IN BLOOD BY AUTOMATED COUNT: 14 % (ref 12.3–15.4)
GLOBULIN UR ELPH-MCNC: 2.8 GM/DL (ref 1.8–3.5)
GLUCOSE SERPL-MCNC: 144 MG/DL (ref 74–124)
HCT VFR BLD AUTO: 39 % (ref 37.5–51)
HGB BLD-MCNC: 12.3 G/DL (ref 13–17.7)
IMM GRANULOCYTES # BLD AUTO: 0.01 10*3/MM3 (ref 0–0.05)
IMM GRANULOCYTES NFR BLD AUTO: 0.2 % (ref 0–0.5)
LYMPHOCYTES # BLD AUTO: 1.27 10*3/MM3 (ref 0.7–3.1)
LYMPHOCYTES NFR BLD AUTO: 26.2 % (ref 19.6–45.3)
MCH RBC QN AUTO: 32.1 PG (ref 26.6–33)
MCHC RBC AUTO-ENTMCNC: 31.5 G/DL (ref 31.5–35.7)
MCV RBC AUTO: 101.8 FL (ref 79–97)
MONOCYTES # BLD AUTO: 0.62 10*3/MM3 (ref 0.1–0.9)
MONOCYTES NFR BLD AUTO: 12.8 % (ref 5–12)
NEUTROPHILS NFR BLD AUTO: 2.87 10*3/MM3 (ref 1.7–7)
NEUTROPHILS NFR BLD AUTO: 59.4 % (ref 42.7–76)
NRBC BLD AUTO-RTO: 0 /100 WBC (ref 0–0.2)
PLATELET # BLD AUTO: 142 10*3/MM3 (ref 140–450)
PMV BLD AUTO: 8.6 FL (ref 6–12)
POTASSIUM SERPL-SCNC: 4.4 MMOL/L (ref 3.5–4.7)
PROT SERPL-MCNC: 7 G/DL (ref 6.3–8)
RBC # BLD AUTO: 3.83 10*6/MM3 (ref 4.14–5.8)
SODIUM SERPL-SCNC: 135 MMOL/L (ref 134–145)
WBC NRBC COR # BLD: 4.84 10*3/MM3 (ref 3.4–10.8)

## 2023-05-22 PROCEDURE — 25010000002 PEMBROLIZUMAB 100 MG/4ML SOLUTION 4 ML VIAL: Performed by: NURSE PRACTITIONER

## 2023-05-22 PROCEDURE — 80053 COMPREHEN METABOLIC PANEL: CPT

## 2023-05-22 PROCEDURE — 85025 COMPLETE CBC W/AUTO DIFF WBC: CPT

## 2023-05-22 PROCEDURE — 96413 CHEMO IV INFUSION 1 HR: CPT

## 2023-05-22 RX ORDER — SODIUM CHLORIDE 9 MG/ML
250 INJECTION, SOLUTION INTRAVENOUS ONCE
Status: COMPLETED | OUTPATIENT
Start: 2023-05-22 | End: 2023-05-22

## 2023-05-22 RX ORDER — SODIUM CHLORIDE 9 MG/ML
250 INJECTION, SOLUTION INTRAVENOUS ONCE
Status: CANCELLED | OUTPATIENT
Start: 2023-05-22

## 2023-05-22 RX ADMIN — SODIUM CHLORIDE 200 MG: 9 INJECTION, SOLUTION INTRAVENOUS at 11:51

## 2023-05-22 RX ADMIN — SODIUM CHLORIDE 250 ML: 9 INJECTION, SOLUTION INTRAVENOUS at 11:51

## 2023-05-24 ENCOUNTER — PATIENT OUTREACH (OUTPATIENT)
Dept: OTHER | Facility: HOSPITAL | Age: 78
End: 2023-05-24
Payer: MEDICARE

## 2023-05-24 NOTE — PROGRESS NOTES
Left message that I was just touching base to see how he was doing with treatment. Wanted to know if he had any questions/concerns or resource/supportive care needs; requested cb

## 2023-05-25 ENCOUNTER — PATIENT OUTREACH (OUTPATIENT)
Dept: OTHER | Facility: HOSPITAL | Age: 78
End: 2023-05-25
Payer: MEDICARE

## 2023-05-25 NOTE — PROGRESS NOTES
"Call from Kate, significant other. Left message that Oumar is doing \"real well\". He just rec'd his 4th immunotherapy without any problems. He is still smoking, otherwise he is doing well.  "

## 2023-06-08 ENCOUNTER — HOSPITAL ENCOUNTER (OUTPATIENT)
Dept: CT IMAGING | Facility: HOSPITAL | Age: 78
Discharge: HOME | End: 2023-06-08
Payer: MEDICARE

## 2023-06-08 ENCOUNTER — INFUSION (OUTPATIENT)
Dept: ONCOLOGY | Facility: HOSPITAL | Age: 78
End: 2023-06-08
Payer: MEDICARE

## 2023-06-08 DIAGNOSIS — C7A.8 NEUROENDOCRINE CARCINOMA OF LUNG: ICD-10-CM

## 2023-06-08 PROCEDURE — 0 DIATRIZOATE MEGLUMINE & SODIUM PER 1 ML: Performed by: NURSE PRACTITIONER

## 2023-06-08 PROCEDURE — 74177 CT ABD & PELVIS W/CONTRAST: CPT

## 2023-06-08 PROCEDURE — 71260 CT THORAX DX C+: CPT

## 2023-06-08 PROCEDURE — 25510000001 IOPAMIDOL 61 % SOLUTION: Performed by: NURSE PRACTITIONER

## 2023-06-08 RX ADMIN — DIATRIZOATE MEGLUMINE AND DIATRIZOATE SODIUM 30 ML: 660; 100 LIQUID ORAL; RECTAL at 11:20

## 2023-06-08 RX ADMIN — IOPAMIDOL 100 ML: 612 INJECTION, SOLUTION INTRAVENOUS at 12:30

## 2023-06-09 NOTE — PROGRESS NOTES
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  Notably there is invasive carcinoma present at the margin, 2 positive lymph nodes adjacent to the bronchovesicular margin which appears to have been transected difficult to enumerate with extranodal extension present.    *Patient status post chemo RT-11/28/2022, radiation therapy completion date 1/11/2023    *Immunotherapy-Keytruda to be initiated 3/20/2023          History of Present Illness   The patient returns today for follow up and evaluation prior to cycle 5 Keytruda.  He is tolerating treatment well.  Eating and drinking well.  Bowels moving regularly.  Denies fever or chills.  Denies nausea or vomiting.  Denies new or worsening skin rash.  Denies new or worsening shortness of breath, cough, wheeze.  No new concerns today.    We have reviewed his scans, recently performed, including CT chest, abdomen and pelvis revealing postsurgical changes without evidence of recurrent disease.    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  He had been assessed particularly in the fall 2021 when he presented with nausea and vomiting and abdominal discomfort leading to assessments that revealed sigmoid diverticulitis with contained rupture and partial small bowel obstruction.  Records from mid September 21 indicating that he was admitted with partial small bowel obstruction and treated medically with very slow recovery.  He has history of COPD with CT demonstrating a pulmonary nodule in  the right lung base at 7 mm with follow-up planned.  He was seen by general surgery in later September with repeat scans planned and repeat CT abdomen 10/7/2021 did demonstrate interval improvement of sigmoid diverticulitis.      Record indicates an assessment in late June 2022 at different general surgeon for left inguinal hernia, history of heavy tobacco use with COPD and recommendation for surgical repair of his hernia      The patient underwent a CT scan of the chest 5/7/2022 demonstrating diffuse emphysematous changes, ill-defined density measuring 3 mm in the superior segment of the right lower lobe, multiple ill-defined 3 to 4 mm nodular density thought to be inflammatory involving the right lower lobe and a new spiculated nodule involving the left lower lobe measuring 16 x 14 mm as well as left hilar adenopathy.       Follow-up PET/CT 7/13/2022 reveal a hypermetabolic spiculated lesion medial aspect the left lower lobe adjacent to descending thoracic aorta measuring 1.5 x 1.6 SUV 9.3 with an enlarged hypermetabolic left hilar lymph node measured 1.5 x 1.3 with SUV of 12.1, additional nodules in the lateral aspect right lower lobe and micronodule in the posterior/medial right lower lobe and also additional right lower lobe micronodule.  There is an infrarenal abdominal aortic aneurysm measuring 3.4 cm with a short segment of chronic dissection present.    These clinical findings would be consistent with a possible T1b N1 M0-stage IIb lung cancer.      Recognizing a diagnosis has not been made his case was discussed in thoracic conference 7/20/2022.  Recommendations include proceeding to pulmonary assessment with EBUS and the patient undergoing a general assessment per his clinical status-older adult assessment as well as assessment by thoracic surgery.  These issues are discussed with the patient and his wife in detail when they are seen 7/28/2022.    The patient proceeded to undergo his hernia surgery  8/2/2022 by Dr. Dotson.  Additionally the patient was unable to undergo assessment by pulmonary until October and, thereafter, was scheduled to see Dr. Joe 8/18/2022 undergoing older adult functional assessment with a JAGUAR score of 11 indicating a risk of functional decline related to cancer therapy.    The patient is seen with his significant other 8/15/2022 and doing very well with recent hernia surgery.  His performance status is reasonably good at present and we have learned now that he would not be able to see pulmonary medicine until October, thoracic surgery is scheduled this week with Dr. Joe.  Perhaps he could be a surgical candidate.  Particularly we know by guardant 360 that T p53 splice site mutation is present and would certainly be consistent as well the most common mutations found in lung cancers particularly squamous cancers.  We have discussed obtaining pulmonary functions at this point, thoracic surgery assessment this week and also restarting Chantix as possible for the patient.    There was difficulty obtaining Chantix and while this was being assessed the patient was reviewed 8/18 by thoracic surgery.  He was felt to have a T1b N1 M0 stage IIb carcinoma left lower lobe along and not a candidate for biopsy.  PFTs were borderline, functional assessment was reasonably good and the patient was felt to be a candidate for robot-assisted biopsy of his nodes and if malignant proceed to left lower lobe lobectomy.  He was reviewed 9/8 and offered 21 mg nicotine transdermal patching.    He is next seen 9/10/2022.  He is seen with his wife and both are prepared for surgery 9/23/2022.  We discussed that additional therapy may be considered once we have more information about the type and stage.  We would hope to see him postoperatively.    The patient was admitted 9//2022 undergoing 9/23/2022  a bronchoscopy with left video-assisted thoracoscopy with robot-assisted total decortication of the  left lung, left lower lobectomy, mediastinal lymphadenectomy and intercostal nerve block with Dr. Nicola Joe.  Fortunately he did fairly well postoperatively though did develop atrial fibrillation with RVR treated with amiodarone converting back to sinus rhythm, treated with IV metoprolol subsequent chronic anticoagulation.  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  Notably there is invasive carcinoma present at the margin.  Is a, and only 2 positive lymph nodes adjacent to the bronchovesicular margin which appears to have been transected difficult to enumerate with extranodal extension present.       The patient is seen 10/27/2022.  He is recovering well from surgery, albeit slowly, and we have discussed his findings that are concerning as to residual disease with a positive margin described as above.  There is also the significance potentially of PD-L1 expression which has been described as producing a poor survival.     Nivolumab has been used as second line therapy to positive effect in the disease and this begs the question as to whether adjuvant therapy plus immunotherapy could be utilized though the patient would be difficult to treat with platinum based chemotherapy as well.       The patient is next assessed 11/7/2022 for significant assessments by supportive oncology at [facility] as well as with plans to see Dr. Marrero 11/9/2022.  Unfortunately he has been very slow to gradually recover from the effects of surgery with reduced appetite and only fair performance status.     At present it would be difficult to give him cisplatin based chemotherapy and we discussed whether we might be able to use Tecentriq (atezolizumab) as his primary adjuvant therapy.  We have contacted pathology about completing Caris testing and a PD-L1 analysis that has not yet completed.         The patient is  seen, however, 11/16/2022 and his genomic analysis has returned as being significant for broad sensitivity to immunotherapy.  This would argue for the administration of weekly Carboplatin/Taxol as the patient proceeds to radiation therapy and upon completion, then using Tecentriq as further adjuvant therapy over a year.  This is discussed with the patient and his  in detail as well as discussed with Dr. Marrero of radiation therapy.  We have also discussed the patient require port placement.    The patient proceeded to treatment taking weekly carboplatin Taxol with concurrent radiation therapy last seen 12/12/2022 with third weekly treatment.    He is next seen 12/19/2022 with H&H 11.9 and 38.5, white count 3460 and platelet count of 168,000.  He is feeling well without any significant complications of therapy except for right calf dermatitis that is been developing in the last several days.    The patient continued therapy taking CarboTaxol weekly including 12/28 and 1/4/2023.    His is next seen 1/11/2023 with completion date for radiation therapy 1/11/2023.  Repeat CBC now includes H&H of 11.0 and 33.9, white count 2090, platelet count 128,000, ANC is 800.  He, fortunately, is tolerating treatment well and we have again discussed with him adjuvant immunotherapy would be useful including Tecentriq versus pembrolizumab-keynote 091 study particularly in tumor programmed cell death PD-L1 greater than 50%.    The patient will not be given additional chemotherapy 1/11/2023 we will plan to reassess by follow-up scans in the next 6 weeks CT chest and pelvis making a decision thereafter about adjuvant immunotherapy.    Patient proceeded to undergo follow-up scans 2/24/2023 showing no evidence of recurrent disease including his findings of left lower lobectomy, stable 11 m nodule opacity in the base of the right lower lobe in the right lung apex, small left pleural effusion mild to moderate stenosis of the proximal  subclavian artery.    We have discussed that considering studies like keynote  091 that the patient's high risk disease could be addressed with additional immunotherapy including the use of Atezolizumab and/or Keytruda.  Considering his findings overall Keytruda every 3 weeks x18 cycles is to be pursued.    The patient was seen 3/20/2023, discussed initiation of Keytruda.  He is agreeable to proceed.    The patient notified the office shortly after treatment that he had pain under his right rib cage and was placed back onto his Neurontin to try to manage this pain.  Approximately week later he still had the pain and also had another rash we switched him at this point to Famvir to try to completely clear this problem.    He is next seen back 4/3/2023.  Fortunately his recent apparent shingles activation has nearly abated and he is feeling reasonably well.  In review of the literature there is no significant difference in activation with immunotherapy though this patient may require suppression.  I have asked him to complete his Famvir at this point.    Patient assessed 4/10/2023 with resolution of his shingles, subsequently placed on maintenance acyclovir, consideration of shingles vaccination post 3 months of Keytruda therapy is stable disease documented.    The patient underwent a CT chest abdomen pelvis 6/8/2023 that demonstrates postsurgical changes of the left hemithorax, stable pulmonary nodules, stable infrarenal abdominal aortic aneurysm.    He is next seen 6/12/2023.  We have discussed continue with his Keytruda with his tolerance being excellent.  He will also reduce his current metoprolol to 12.5 mg p.o. daily.    Past Medical History:   Diagnosis Date    Arthritis     COPD (chronic obstructive pulmonary disease)     O2 3L AT HS    Diabetes mellitus     DIET CONTROL    Diverticulitis     DVT, lower extremity     RLE    Elevated cholesterol     H/O Cervical pain     History of colitis     Hyperlipidemia      Hypertension     Left shoulder pain     Low back pain     Lung cancer 2022    LEFT LUNG    On anticoagulant therapy     Peripheral arterial disease     Right leg claudication     Skin cancer     HX        Past Surgical History:   Procedure Laterality Date    BALLOON ANGIOPLASTY, ARTERY Right 2015    IR Percutaneious IVC filter placement    INGUINAL HERNIA REPAIR Left     KNEE ARTHROSCOPY Left     Torn meniscus    LOBECTOMY Left 9/23/2022    Procedure: BRONCHOSCOPY, LEFT VIDEO ASSISTED THORACOSCOPY, ROBOT ASSISTED TOTAL DECORTICATION  LEFT LUNG, LEFT LOWER LOBE LOBECTOMY, MEDIASTINAL LYMPHECTOMY, INTERCOSTAL NERVE BLOCK;  Surgeon: Nicola Joe III, MD;  Location: [facility];  Service: Robotics - DaVinci;  Laterality: Left;    VENOUS ACCESS DEVICE (PORT) INSERTION Right 11/21/2022    Procedure: INSERTION VENOUS ACCESS DEVICE;  Surgeon: Nicola Joe III, MD;  Location: [facility];  Service: Thoracic;  Laterality: Right;        Current Outpatient Medications on File Prior to Visit   Medication Sig Dispense Refill    acyclovir (ZOVIRAX) 400 MG tablet Take 1 tablet by mouth 2 (Two) Times a Day. 60 tablet 2    arformoterol (BROVANA) 15 MCG/2ML nebulizer solution Take 2 mL by nebulization 2 (Two) Times a Day.      Budeson-Glycopyrrol-Formoterol (BREZTRI) 160-9-4.8 MCG/ACT aerosol inhaler       budesonide (PULMICORT) 0.5 MG/2ML nebulizer solution Take 2 mL by nebulization 2 (Two) Times a Day.      cetirizine (zyrTEC) 10 MG tablet Take 1 tablet by mouth Daily.      Cholecalciferol 50 MCG (2000 UT) capsule Take 1 capsule by mouth Daily.      fluticasone (FLONASE) 50 MCG/ACT nasal spray 1 spray into the nostril(s) as directed by provider Daily.      isosorbide mononitrate (IMDUR) 60 MG 24 hr tablet Take 1 tablet by mouth Every Evening.      metoprolol succinate XL (TOPROL-XL) 25 MG 24 hr tablet Take 1 tablet by mouth Daily for 30 days. (Patient taking differently: Take 25 mg by mouth Every Evening.) 30 tablet  0    ondansetron (Zofran) 8 MG tablet Take 1 tablet by mouth Every 8 (Eight) Hours As Needed for Nausea or Vomiting. 30 tablet 1    simvastatin (ZOCOR) 20 MG tablet Take 1 tablet by mouth Every Night.      tamsulosin (FLOMAX) 0.4 MG capsule 24 hr capsule Take 1 capsule by mouth Daily. 30 capsule 5    warfarin (COUMADIN) 5 MG tablet Take 1 tablet by mouth Daily. Take 10mg on 9/29/22 and then resume regular home schedule.       No current facility-administered medications on file prior to visit.        ALLERGIES:    Allergies   Allergen Reactions    Benadryl [Diphenhydramine] Other (See Comments)     Extreme restlessness and insomnia        Social History     Socioeconomic History    Marital status:     Years of education: 1 year college   Tobacco Use    Smoking status: Every Day     Packs/day: 1.00     Years: 59.00     Pack years: 59.00     Types: Cigarettes     Start date: 1963    Smokeless tobacco: Never    Tobacco comments:     9/8/22: Down to Less than 1 PPD   Vaping Use    Vaping Use: Never used   Substance and Sexual Activity    Alcohol use: Not Currently    Drug use: Never    Sexual activity: Defer        Family History   Problem Relation Age of Onset    No Known Problems Mother     Cancer Father     Lung cancer Father     Diabetes Brother     Other Daughter         S/P stent, age 42    No Known Problems Son     Malig Hyperthermia Neg Hx         Review of Systems   Constitutional:  Negative for activity change. Unexpected weight change: Status post his diverticulitis episode, weight has not been regained.  HENT: Negative.     Eyes: Negative.    Respiratory:  Negative for cough and shortness of breath.    Cardiovascular: Negative.    Gastrointestinal: Negative.    Genitourinary: Negative.    Musculoskeletal: Negative.    Skin:  Negative for rash.   Neurological: Negative.    Psychiatric/Behavioral: Negative.        Objective     There were no vitals filed for this visit.      5/22/2023    10:30 AM    Current Status   ECOG score 0     Physical Exam  Constitutional:       Appearance: Normal appearance.   HENT:      Head: Normocephalic and atraumatic.      Nose: Nose normal.      Mouth/Throat:      Mouth: Mucous membranes are moist.      Pharynx: Oropharynx is clear.   Eyes:      Extraocular Movements: Extraocular movements intact.      Conjunctiva/sclera: Conjunctivae normal.      Pupils: Pupils are equal, round, and reactive to light.   Cardiovascular:      Rate and Rhythm: Normal rate and regular rhythm.      Pulses: Normal pulses.      Heart sounds: Normal heart sounds.   Pulmonary:      Comments: Prolonged expiratory phase bilaterally  Abdominal:      General: Bowel sounds are normal.      Palpations: Abdomen is soft.      Comments: Scaphoid   Musculoskeletal:         General: Normal range of motion.      Cervical back: Normal range of motion and neck supple.   Skin:     General: Skin is warm and dry.      Findings: Rash (Resolved) present.   Neurological:      General: No focal deficit present.      Mental Status: He is alert and oriented to person, place, and time.   Psychiatric:         Mood and Affect: Mood normal.           RECENT LABS:  Hematology WBC   Date Value Ref Range Status   05/22/2023 4.84 3.40 - 10.80 10*3/mm3 Final   05/09/2022 7.54 4.5 - 11.0 10*3/uL Final     RBC   Date Value Ref Range Status   05/22/2023 3.83 (L) 4.14 - 5.80 10*6/mm3 Final   05/09/2022 5.52 4.5 - 5.9 10*6/uL Final     Hemoglobin   Date Value Ref Range Status   05/22/2023 12.3 (L) 13.0 - 17.7 g/dL Final   05/09/2022 16.4 13.5 - 17.5 g/dL Final     Hematocrit   Date Value Ref Range Status   05/22/2023 39.0 37.5 - 51.0 % Final   05/09/2022 51.0 41.0 - 53.0 % Final     Platelets   Date Value Ref Range Status   05/22/2023 142 140 - 450 10*3/mm3 Final   05/09/2022 204 140 - 440 10*3/uL Final          Assessment & Plan     78 y.o. male  with medical issues including type 2 diabetes-uncertain control, COPD, hypertension,  diastolic heart failure, chronic disease, peripheral vascular disease (follow-up with vascular surgery today), previous DVT on anticoagulation (home monitoring), previous IVC filter placement?,  Status post September 2021 partial small bowel obstruction secondary to sigmoid diverticulitis treated medically.     1.   T1b N1 M0-stage IIb lung cancer.  right lung base nodule noted on scan at that point and in follow-up with primary care had developed a left inguinal hernia with repair planned through a different general surgeon then seen with his initial sigmoid diverticulitis.  PET scan 7/13/2022 demonstrates a hypermetabolic spiculated lesion medial aspect of the left lobe adjacent to descending thoracic aorta measuring1.5 x 1.6 SUV 9.3 with an enlarged hypermetabolic left hilar lymph node measured 1.5 x 1.3 with SUV of 12.1, additional nodules in the lateral aspect right lower lobe and micronodule in the posterior/medial right lower lobe and also additional right lower lobe micronodule.  There is an infrarenal abdominal aortic aneurysm measuring 3.4 cm with a short segment of chronic dissection present.  Clinical findings would be consistent with a possible T1b N1 M0-stage IIb lung cancer.  discussed in thoracic conference 7/20/2022.  Recommendations to proceed with pulmonary assessment with EBUS.  The patient proceeded to undergo his hernia surgery 8/2/2022 by Dr. Dotson.   Guardant 360 that T p53 splice site mutation is present and would certainly be consistent as well the most common mutations found in lung cancers particularly squamous cancers.  The patient was admitted 9//2022 undergoing 9/23/2022  a bronchoscopy with left video-assisted thoracoscopy with robot-assisted total decortication of the left lung, left lower lobectomy, mediastinal lymphadenectomy and intercostal nerve block with Dr. Nicola Joe.  Fortunately he did fairly well postoperatively though did develop atrial fibrillation with RVR  treated with amiodarone converting back to sinus rhythm, treated with IV metoprolol subsequent chronic anticoagulation.  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  Notably there is invasive carcinoma present at the margin. only 2 positive lymph nodes adjacent to the bronchovesicular margin which appears to have been transected difficult to enumerate with extranodal extension present.  Options could well be Carboplatin and Taxol considering the patient's comorbidity and worry of using cisplatin-based chemotherapy in this patient followed by atezolizumab with pathology having been notified to assess PD-L1 expression on the tumor as well also await review by Caris molecular profiling.  Finally radiation therapy consultation be requested.   Caris analysis indicates benefit from immunotherapy at relatively high levels.  This would allow radiation therapy given with Carboplatin Taxol weekly (better tolerated) and then subsequent atezolizumab adjuvantly.  Weekly carboplatin Taxol initiated 11/28/2022 given concurrently with radiation therapy  12/5/2022 here for cycle 2 weekly carboplatin/Taxol, overall tolerating treatment quite well so far.  12/12/2022: Proceed with cycle #3 weekly carboplatin/Taxol with concurrent radiation.  Continues to tolerate treatment well.  He is next seen 12/19/2022 with H&H 11.9 and 38.5, white count 3460 and platelet count of 168,000.  He is feeling well without any significant complications of therapy except for right calf dermatitis that is been developing in the last several days.  12/28/2022 here for week 5 carbo/Taxol.  Overall tolerating well.  Today WBC 2.45, ANC 1.22, hemoglobin 10.7, platelet count 117,000.  I have reviewed with Dr. Bishop, and we will go ahead and continue with treatment.  1/4/2023: Received with cycle 6 carbo/taxol + XRT.  Continues to  tolerate treatment well.  WBC improved to 2.69 with ANC 1.41.  Next 1/11/2023 with completion date 1/11/2023.  Repeat CBC now includes H&H of 11.0 and 33.9, white count 2090, platelet count 128,000, ANC is 800.  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  We have discussed that considering studies like keynote  091 that the patient's high risk disease could be addressed with additional immunotherapy including the use of Atezolizumab and/or Keytruda.  Considering his findings overall Keytruda every 3 weeks x18 cycles is to be pursued.  The patient began Keytruda as planned with his first treatment 3/20/2023.  The patient notified the office shortly after treatment that he had pain under his right rib cage and was placed back onto his Neurontin to try to manage this pain.  Approximately week later he still had the pain and also had another rash we switched him at this point to Famvir to try to completely clear this problem.  4/3/2023.  Fortunately his recent apparent shingles activation has nearly abated and he is feeling reasonably well.  In review of the literature there is no significant difference in activation with immunotherapy though this patient may require suppression.  He is asked to complete his Famvir.  4/10/2023 cycle 2 Keytruda, overall tolerating well.   Patient seen 5/1/2023, third cycle of Keytruda, status post fourth cycle of Keytruda, 3 months of therapy, subsequent scans will need to be scheduled.  5/22/2023:  Proceed with cycle 4 Keytruda today.  Tolerating well.  Order placed for repeat CT scans in 2 weeks prior to next treatment.    Follow-up scans-CT of chest, abdomen and pelvis 6/8/2023 with postsurgical changes only.  Patient seen 6/12/2023 with Keytruda continued      2.  Herpes zoster: Patient was treated with Famvir.  Rash has resolved, no issues with persistent herpetic neuralgia.  He will be placed on suppression with acyclovir 400 mg twice daily.  We discussed he will hold off on vaccination for now, given recent infection.  Patient assessed 5/1/2023, continue Keytruda, status post fifth cycle of Keytruda or 3 months of therapy he would undergo repeat scans and, if stable or improved, proceed with Shingrix vaccinations.  Patient now requested to proceed with vaccinations.      PLAN:   Proceed with cycle 5 Keytruda today.  Continue prophylactic acyclovir 400 mg twice daily.  As result of his malignancy being stable stable he can now follow-up with Shingrix vaccinations.  3 weeks, NP, cycle #6 Keytruda  Call/ return sooner should he develop any new concerns or problems.    Patient is on a high risk medication requiring close monitoring for toxicity.      Kayden Bishop MD  06/12/2023

## 2023-06-12 ENCOUNTER — OFFICE VISIT (OUTPATIENT)
Dept: ONCOLOGY | Facility: CLINIC | Age: 78
End: 2023-06-12
Payer: MEDICARE

## 2023-06-12 ENCOUNTER — INFUSION (OUTPATIENT)
Dept: ONCOLOGY | Facility: HOSPITAL | Age: 78
End: 2023-06-12
Payer: MEDICARE

## 2023-06-12 VITALS
WEIGHT: 160.3 LBS | BODY MASS INDEX: 22.95 KG/M2 | TEMPERATURE: 98.6 F | OXYGEN SATURATION: 95 % | DIASTOLIC BLOOD PRESSURE: 68 MMHG | RESPIRATION RATE: 20 BRPM | HEIGHT: 70 IN | HEART RATE: 45 BPM | SYSTOLIC BLOOD PRESSURE: 137 MMHG

## 2023-06-12 DIAGNOSIS — C7A.8 NEUROENDOCRINE CARCINOMA OF LUNG: ICD-10-CM

## 2023-06-12 DIAGNOSIS — C7A.8 NEUROENDOCRINE CARCINOMA OF LUNG: Primary | ICD-10-CM

## 2023-06-12 DIAGNOSIS — Z45.2 FITTING AND ADJUSTMENT OF VASCULAR CATHETER: ICD-10-CM

## 2023-06-12 DIAGNOSIS — Z79.899 LONG-TERM USE OF HIGH-RISK MEDICATION: ICD-10-CM

## 2023-06-12 LAB
ALBUMIN SERPL-MCNC: 4 G/DL (ref 3.5–5.2)
ALBUMIN/GLOB SERPL: 1.3 G/DL (ref 1.1–2.4)
ALP SERPL-CCNC: 62 U/L (ref 38–116)
ALT SERPL W P-5'-P-CCNC: 11 U/L (ref 0–41)
ANION GAP SERPL CALCULATED.3IONS-SCNC: 9.8 MMOL/L (ref 5–15)
AST SERPL-CCNC: 16 U/L (ref 0–40)
BASOPHILS # BLD AUTO: 0.02 10*3/MM3 (ref 0–0.2)
BASOPHILS NFR BLD AUTO: 0.3 % (ref 0–1.5)
BILIRUB SERPL-MCNC: 0.4 MG/DL (ref 0.2–1.2)
BUN SERPL-MCNC: 17 MG/DL (ref 6–20)
BUN/CREAT SERPL: 17.7 (ref 7.3–30)
CALCIUM SPEC-SCNC: 10.1 MG/DL (ref 8.5–10.2)
CHLORIDE SERPL-SCNC: 98 MMOL/L (ref 98–107)
CO2 SERPL-SCNC: 26.2 MMOL/L (ref 22–29)
CREAT SERPL-MCNC: 0.96 MG/DL (ref 0.7–1.3)
DEPRECATED RDW RBC AUTO: 51 FL (ref 37–54)
EGFRCR SERPLBLD CKD-EPI 2021: 81.9 ML/MIN/1.73
EOSINOPHIL # BLD AUTO: 0.1 10*3/MM3 (ref 0–0.4)
EOSINOPHIL NFR BLD AUTO: 1.6 % (ref 0.3–6.2)
ERYTHROCYTE [DISTWIDTH] IN BLOOD BY AUTOMATED COUNT: 13.9 % (ref 12.3–15.4)
GLOBULIN UR ELPH-MCNC: 3.2 GM/DL (ref 1.8–3.5)
GLUCOSE SERPL-MCNC: 147 MG/DL (ref 74–124)
HCT VFR BLD AUTO: 40.2 % (ref 37.5–51)
HGB BLD-MCNC: 12.8 G/DL (ref 13–17.7)
IMM GRANULOCYTES # BLD AUTO: 0.02 10*3/MM3 (ref 0–0.05)
IMM GRANULOCYTES NFR BLD AUTO: 0.3 % (ref 0–0.5)
LYMPHOCYTES # BLD AUTO: 1.37 10*3/MM3 (ref 0.7–3.1)
LYMPHOCYTES NFR BLD AUTO: 21.8 % (ref 19.6–45.3)
MCH RBC QN AUTO: 31.6 PG (ref 26.6–33)
MCHC RBC AUTO-ENTMCNC: 31.8 G/DL (ref 31.5–35.7)
MCV RBC AUTO: 99.3 FL (ref 79–97)
MONOCYTES # BLD AUTO: 0.63 10*3/MM3 (ref 0.1–0.9)
MONOCYTES NFR BLD AUTO: 10 % (ref 5–12)
NEUTROPHILS NFR BLD AUTO: 4.14 10*3/MM3 (ref 1.7–7)
NEUTROPHILS NFR BLD AUTO: 66 % (ref 42.7–76)
NRBC BLD AUTO-RTO: 0 /100 WBC (ref 0–0.2)
PLATELET # BLD AUTO: 158 10*3/MM3 (ref 140–450)
PMV BLD AUTO: 9.1 FL (ref 6–12)
POTASSIUM SERPL-SCNC: 4.3 MMOL/L (ref 3.5–4.7)
PROT SERPL-MCNC: 7.2 G/DL (ref 6.3–8)
RBC # BLD AUTO: 4.05 10*6/MM3 (ref 4.14–5.8)
SODIUM SERPL-SCNC: 134 MMOL/L (ref 134–145)
T4 FREE SERPL-MCNC: 0.96 NG/DL (ref 0.93–1.7)
TSH SERPL DL<=0.05 MIU/L-ACNC: 2.85 UIU/ML (ref 0.27–4.2)
WBC NRBC COR # BLD: 6.28 10*3/MM3 (ref 3.4–10.8)

## 2023-06-12 PROCEDURE — 1126F AMNT PAIN NOTED NONE PRSNT: CPT | Performed by: INTERNAL MEDICINE

## 2023-06-12 PROCEDURE — 25010000002 PEMBROLIZUMAB 100 MG/4ML SOLUTION 4 ML VIAL: Performed by: INTERNAL MEDICINE

## 2023-06-12 PROCEDURE — 84443 ASSAY THYROID STIM HORMONE: CPT | Performed by: INTERNAL MEDICINE

## 2023-06-12 PROCEDURE — 85025 COMPLETE CBC W/AUTO DIFF WBC: CPT

## 2023-06-12 PROCEDURE — 3078F DIAST BP <80 MM HG: CPT | Performed by: INTERNAL MEDICINE

## 2023-06-12 PROCEDURE — 96413 CHEMO IV INFUSION 1 HR: CPT

## 2023-06-12 PROCEDURE — 3075F SYST BP GE 130 - 139MM HG: CPT | Performed by: INTERNAL MEDICINE

## 2023-06-12 PROCEDURE — 25010000002 HEPARIN LOCK FLUSH PER 10 UNITS: Performed by: INTERNAL MEDICINE

## 2023-06-12 PROCEDURE — 99214 OFFICE O/P EST MOD 30 MIN: CPT | Performed by: INTERNAL MEDICINE

## 2023-06-12 PROCEDURE — 80053 COMPREHEN METABOLIC PANEL: CPT

## 2023-06-12 PROCEDURE — 84439 ASSAY OF FREE THYROXINE: CPT | Performed by: INTERNAL MEDICINE

## 2023-06-12 RX ORDER — SODIUM CHLORIDE 0.9 % (FLUSH) 0.9 %
10 SYRINGE (ML) INJECTION AS NEEDED
OUTPATIENT
Start: 2023-06-12

## 2023-06-12 RX ORDER — HEPARIN SODIUM (PORCINE) LOCK FLUSH IV SOLN 100 UNIT/ML 100 UNIT/ML
500 SOLUTION INTRAVENOUS AS NEEDED
OUTPATIENT
Start: 2023-06-12

## 2023-06-12 RX ORDER — HEPARIN SODIUM (PORCINE) LOCK FLUSH IV SOLN 100 UNIT/ML 100 UNIT/ML
500 SOLUTION INTRAVENOUS AS NEEDED
Status: DISCONTINUED | OUTPATIENT
Start: 2023-06-12 | End: 2023-06-12 | Stop reason: HOSPADM

## 2023-06-12 RX ORDER — SODIUM CHLORIDE 0.9 % (FLUSH) 0.9 %
10 SYRINGE (ML) INJECTION AS NEEDED
Status: DISCONTINUED | OUTPATIENT
Start: 2023-06-12 | End: 2023-06-12 | Stop reason: HOSPADM

## 2023-06-12 RX ORDER — SODIUM CHLORIDE 9 MG/ML
250 INJECTION, SOLUTION INTRAVENOUS ONCE
Status: COMPLETED | OUTPATIENT
Start: 2023-06-12 | End: 2023-06-12

## 2023-06-12 RX ORDER — SODIUM CHLORIDE 9 MG/ML
250 INJECTION, SOLUTION INTRAVENOUS ONCE
Status: CANCELLED | OUTPATIENT
Start: 2023-06-12

## 2023-06-12 RX ADMIN — Medication 500 UNITS: at 11:04

## 2023-06-12 RX ADMIN — SODIUM CHLORIDE 250 ML: 9 INJECTION, SOLUTION INTRAVENOUS at 10:34

## 2023-06-12 RX ADMIN — SODIUM CHLORIDE 200 MG: 9 INJECTION, SOLUTION INTRAVENOUS at 10:34

## 2023-06-12 RX ADMIN — Medication 10 ML: at 11:04

## 2023-07-24 ENCOUNTER — INFUSION (OUTPATIENT)
Dept: ONCOLOGY | Facility: HOSPITAL | Age: 78
End: 2023-07-24
Payer: MEDICARE

## 2023-07-24 ENCOUNTER — OFFICE VISIT (OUTPATIENT)
Dept: ONCOLOGY | Facility: CLINIC | Age: 78
End: 2023-07-24
Payer: MEDICARE

## 2023-07-24 VITALS
TEMPERATURE: 97.9 F | OXYGEN SATURATION: 91 % | HEART RATE: 77 BPM | BODY MASS INDEX: 22.56 KG/M2 | DIASTOLIC BLOOD PRESSURE: 57 MMHG | WEIGHT: 157.6 LBS | RESPIRATION RATE: 18 BRPM | SYSTOLIC BLOOD PRESSURE: 117 MMHG | HEIGHT: 70 IN

## 2023-07-24 DIAGNOSIS — E87.1 HYPONATREMIA: ICD-10-CM

## 2023-07-24 DIAGNOSIS — Z79.899 LONG-TERM USE OF HIGH-RISK MEDICATION: ICD-10-CM

## 2023-07-24 DIAGNOSIS — M25.50 ARTHRALGIA, UNSPECIFIED JOINT: ICD-10-CM

## 2023-07-24 DIAGNOSIS — C7A.8 NEUROENDOCRINE CARCINOMA OF LUNG: Primary | ICD-10-CM

## 2023-07-24 DIAGNOSIS — C7A.8 NEUROENDOCRINE CARCINOMA OF LUNG: ICD-10-CM

## 2023-07-24 DIAGNOSIS — Z79.899 HIGH RISK MEDICATION USE: ICD-10-CM

## 2023-07-24 DIAGNOSIS — Z45.2 FITTING AND ADJUSTMENT OF VASCULAR CATHETER: ICD-10-CM

## 2023-07-24 LAB
ALBUMIN SERPL-MCNC: 3.9 G/DL (ref 3.5–5.2)
ALBUMIN/GLOB SERPL: 1.6 G/DL
ALP SERPL-CCNC: 63 U/L (ref 39–117)
ALT SERPL W P-5'-P-CCNC: 11 U/L (ref 1–41)
ANION GAP SERPL CALCULATED.3IONS-SCNC: 11.4 MMOL/L (ref 5–15)
AST SERPL-CCNC: 24 U/L (ref 1–40)
BASOPHILS # BLD AUTO: 0.03 10*3/MM3 (ref 0–0.2)
BASOPHILS NFR BLD AUTO: 0.6 % (ref 0–1.5)
BILIRUB SERPL-MCNC: 0.6 MG/DL (ref 0–1.2)
BUN SERPL-MCNC: 15 MG/DL (ref 8–23)
BUN/CREAT SERPL: 14.7 (ref 7–25)
CALCIUM SPEC-SCNC: 9.8 MG/DL (ref 8.6–10.5)
CHLORIDE SERPL-SCNC: 91 MMOL/L (ref 98–107)
CO2 SERPL-SCNC: 23.6 MMOL/L (ref 22–29)
CREAT SERPL-MCNC: 1.02 MG/DL (ref 0.7–1.3)
CRP SERPL-MCNC: 2.68 MG/DL (ref 0–0.5)
DEPRECATED RDW RBC AUTO: 44.9 FL (ref 37–54)
EGFRCR SERPLBLD CKD-EPI 2021: 76.2 ML/MIN/1.73
EOSINOPHIL # BLD AUTO: 0.17 10*3/MM3 (ref 0–0.4)
EOSINOPHIL NFR BLD AUTO: 3.1 % (ref 0.3–6.2)
ERYTHROCYTE [DISTWIDTH] IN BLOOD BY AUTOMATED COUNT: 12.9 % (ref 12.3–15.4)
ERYTHROCYTE [SEDIMENTATION RATE] IN BLOOD: 28 MM/HR (ref 0–20)
GLOBULIN UR ELPH-MCNC: 2.5 GM/DL
GLUCOSE SERPL-MCNC: 146 MG/DL (ref 65–99)
HCT VFR BLD AUTO: 38.8 % (ref 37.5–51)
HGB BLD-MCNC: 12.9 G/DL (ref 13–17.7)
IMM GRANULOCYTES # BLD AUTO: 0.01 10*3/MM3 (ref 0–0.05)
IMM GRANULOCYTES NFR BLD AUTO: 0.2 % (ref 0–0.5)
LYMPHOCYTES # BLD AUTO: 1.15 10*3/MM3 (ref 0.7–3.1)
LYMPHOCYTES NFR BLD AUTO: 21.3 % (ref 19.6–45.3)
MCH RBC QN AUTO: 31.5 PG (ref 26.6–33)
MCHC RBC AUTO-ENTMCNC: 33.2 G/DL (ref 31.5–35.7)
MCV RBC AUTO: 94.9 FL (ref 79–97)
MONOCYTES # BLD AUTO: 0.67 10*3/MM3 (ref 0.1–0.9)
MONOCYTES NFR BLD AUTO: 12.4 % (ref 5–12)
NEUTROPHILS NFR BLD AUTO: 3.37 10*3/MM3 (ref 1.7–7)
NEUTROPHILS NFR BLD AUTO: 62.4 % (ref 42.7–76)
NRBC BLD AUTO-RTO: 0 /100 WBC (ref 0–0.2)
PLATELET # BLD AUTO: 152 10*3/MM3 (ref 140–450)
PMV BLD AUTO: 8.8 FL (ref 6–12)
POTASSIUM SERPL-SCNC: 4.3 MMOL/L (ref 3.5–5.2)
PROT SERPL-MCNC: 6.4 G/DL (ref 6–8.5)
RBC # BLD AUTO: 4.09 10*6/MM3 (ref 4.14–5.8)
SODIUM SERPL-SCNC: 126 MMOL/L (ref 136–145)
T4 FREE SERPL-MCNC: 1.06 NG/DL (ref 0.93–1.7)
TSH SERPL DL<=0.05 MIU/L-ACNC: 2.53 UIU/ML (ref 0.27–4.2)
WBC NRBC COR # BLD: 5.4 10*3/MM3 (ref 3.4–10.8)

## 2023-07-24 PROCEDURE — 25010000002 PEMBROLIZUMAB 100 MG/4ML SOLUTION 4 ML VIAL: Performed by: NURSE PRACTITIONER

## 2023-07-24 PROCEDURE — 1160F RVW MEDS BY RX/DR IN RCRD: CPT | Performed by: NURSE PRACTITIONER

## 2023-07-24 PROCEDURE — 85652 RBC SED RATE AUTOMATED: CPT | Performed by: NURSE PRACTITIONER

## 2023-07-24 PROCEDURE — 3078F DIAST BP <80 MM HG: CPT | Performed by: NURSE PRACTITIONER

## 2023-07-24 PROCEDURE — 25010000002 HEPARIN LOCK FLUSH PER 10 UNITS: Performed by: INTERNAL MEDICINE

## 2023-07-24 PROCEDURE — 84439 ASSAY OF FREE THYROXINE: CPT | Performed by: INTERNAL MEDICINE

## 2023-07-24 PROCEDURE — 86140 C-REACTIVE PROTEIN: CPT | Performed by: NURSE PRACTITIONER

## 2023-07-24 PROCEDURE — 85025 COMPLETE CBC W/AUTO DIFF WBC: CPT

## 2023-07-24 PROCEDURE — 96413 CHEMO IV INFUSION 1 HR: CPT

## 2023-07-24 PROCEDURE — 36415 COLL VENOUS BLD VENIPUNCTURE: CPT

## 2023-07-24 PROCEDURE — 80053 COMPREHEN METABOLIC PANEL: CPT

## 2023-07-24 PROCEDURE — 1125F AMNT PAIN NOTED PAIN PRSNT: CPT | Performed by: NURSE PRACTITIONER

## 2023-07-24 PROCEDURE — 84443 ASSAY THYROID STIM HORMONE: CPT | Performed by: INTERNAL MEDICINE

## 2023-07-24 PROCEDURE — 1159F MED LIST DOCD IN RCRD: CPT | Performed by: NURSE PRACTITIONER

## 2023-07-24 PROCEDURE — 99214 OFFICE O/P EST MOD 30 MIN: CPT | Performed by: NURSE PRACTITIONER

## 2023-07-24 PROCEDURE — 3074F SYST BP LT 130 MM HG: CPT | Performed by: NURSE PRACTITIONER

## 2023-07-24 RX ORDER — HEPARIN SODIUM (PORCINE) LOCK FLUSH IV SOLN 100 UNIT/ML 100 UNIT/ML
500 SOLUTION INTRAVENOUS AS NEEDED
Status: DISCONTINUED | OUTPATIENT
Start: 2023-07-24 | End: 2023-07-24 | Stop reason: HOSPADM

## 2023-07-24 RX ORDER — HEPARIN SODIUM (PORCINE) LOCK FLUSH IV SOLN 100 UNIT/ML 100 UNIT/ML
500 SOLUTION INTRAVENOUS AS NEEDED
OUTPATIENT
Start: 2023-07-24

## 2023-07-24 RX ORDER — SODIUM CHLORIDE 0.9 % (FLUSH) 0.9 %
10 SYRINGE (ML) INJECTION AS NEEDED
Status: DISCONTINUED | OUTPATIENT
Start: 2023-07-24 | End: 2023-07-24 | Stop reason: HOSPADM

## 2023-07-24 RX ORDER — SODIUM CHLORIDE 9 MG/ML
250 INJECTION, SOLUTION INTRAVENOUS ONCE
Status: CANCELLED | OUTPATIENT
Start: 2023-07-24

## 2023-07-24 RX ORDER — SODIUM CHLORIDE 0.9 % (FLUSH) 0.9 %
10 SYRINGE (ML) INJECTION AS NEEDED
OUTPATIENT
Start: 2023-07-24

## 2023-07-24 RX ORDER — SODIUM CHLORIDE 9 MG/ML
250 INJECTION, SOLUTION INTRAVENOUS ONCE
Status: COMPLETED | OUTPATIENT
Start: 2023-07-24 | End: 2023-07-24

## 2023-07-24 RX ADMIN — Medication 10 ML: at 15:04

## 2023-07-24 RX ADMIN — SODIUM CHLORIDE 250 ML: 9 INJECTION, SOLUTION INTRAVENOUS at 14:33

## 2023-07-24 RX ADMIN — SODIUM CHLORIDE 200 MG: 9 INJECTION, SOLUTION INTRAVENOUS at 14:33

## 2023-07-24 RX ADMIN — Medication 500 UNITS: at 15:04

## 2023-07-24 NOTE — PROGRESS NOTES
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  Notably there is invasive carcinoma present at the margin, 2 positive lymph nodes adjacent to the bronchovesicular margin which appears to have been transected difficult to enumerate with extranodal extension present.  *Patient status post chemo RT-11/28/2022, radiation therapy completion date 1/11/2023  *Immunotherapy-Keytruda to be initiated 3/20/2023    History of Present Illness    The patient is a 78 y.o. male with the above history, who returns the office today in anticipation of his 7th dose of Keytruda.  He overall continues to tolerate immunotherapy well.  He does report last week he developed sudden onset nausea vomiting and diarrhea which lasted approximately 48 hours.  Thereafter his GI issues he developed joint pain particularly in bilateral knees and shoulders.  This has been ongoing.  He rates his pain today 8 out of 10.  He has been taking Tylenol and occasional hydrocodone as needed.  This does adequately control his pain.  He is eating and drinking without difficulty.  He reports his bowels are now moving normally.  He denies fevers or chills.  He has no skin toxicity.  His shortness of breath is overall unchanged from his baseline.    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  He had been assessed particularly in the fall 2021 when he presented with nausea and vomiting and abdominal discomfort leading to assessments that revealed sigmoid diverticulitis with  contained rupture and partial small bowel obstruction.  Records from mid September 21 indicating that he was admitted with partial small bowel obstruction and treated medically with very slow recovery.  He has history of COPD with CT demonstrating a pulmonary nodule in the right lung base at 7 mm with follow-up planned.  He was seen by general surgery in later September with repeat scans planned and repeat CT abdomen 10/7/2021 did demonstrate interval improvement of sigmoid diverticulitis.      Record indicates an assessment in late June 2022 at different general surgeon for left inguinal hernia, history of heavy tobacco use with COPD and recommendation for surgical repair of his hernia      The patient underwent a CT scan of the chest 5/7/2022 demonstrating diffuse emphysematous changes, ill-defined density measuring 3 mm in the superior segment of the right lower lobe, multiple ill-defined 3 to 4 mm nodular density thought to be inflammatory involving the right lower lobe and a new spiculated nodule involving the left lower lobe measuring 16 x 14 mm as well as left hilar adenopathy.       Follow-up PET/CT 7/13/2022 reveal a hypermetabolic spiculated lesion medial aspect the left lower lobe adjacent to descending thoracic aorta measuring 1.5 x 1.6 SUV 9.3 with an enlarged hypermetabolic left hilar lymph node measured 1.5 x 1.3 with SUV of 12.1, additional nodules in the lateral aspect right lower lobe and micronodule in the posterior/medial right lower lobe and also additional right lower lobe micronodule.  There is an infrarenal abdominal aortic aneurysm measuring 3.4 cm with a short segment of chronic dissection present.    These clinical findings would be consistent with a possible T1b N1 M0-stage IIb lung cancer.      Recognizing a diagnosis has not been made his case was discussed in thoracic conference 7/20/2022.  Recommendations include proceeding to pulmonary assessment with EBUS and the patient undergoing  a general assessment per his clinical status-older adult assessment as well as assessment by thoracic surgery.  These issues are discussed with the patient and his wife in detail when they are seen 7/28/2022.    The patient proceeded to undergo his hernia surgery 8/2/2022 by Dr. Dotson.  Additionally the patient was unable to undergo assessment by pulmonary until October and, thereafter, was scheduled to see Dr. Joe 8/18/2022 undergoing older adult functional assessment with a JAGUAR score of 11 indicating a risk of functional decline related to cancer therapy.    The patient is seen with his significant other 8/15/2022 and doing very well with recent hernia surgery.  His performance status is reasonably good at present and we have learned now that he would not be able to see pulmonary medicine until October, thoracic surgery is scheduled this week with Dr. Joe.  Perhaps he could be a surgical candidate.  Particularly we know by van Mata that T p53 splice site mutation is present and would certainly be consistent as well the most common mutations found in lung cancers particularly squamous cancers.  We have discussed obtaining pulmonary functions at this point, thoracic surgery assessment this week and also restarting Chantix as possible for the patient.    There was difficulty obtaining Chantix and while this was being assessed the patient was reviewed 8/18 by thoracic surgery.  He was felt to have a T1b N1 M0 stage IIb carcinoma left lower lobe along and not a candidate for biopsy.  PFTs were borderline, functional assessment was reasonably good and the patient was felt to be a candidate for robot-assisted biopsy of his nodes and if malignant proceed to left lower lobe lobectomy.  He was reviewed 9/8 and offered 21 mg nicotine transdermal patching.    He is next seen 9/10/2022.  He is seen with his wife and both are prepared for surgery 9/23/2022.  We discussed that additional therapy may be considered  once we have more information about the type and stage.  We would hope to see him postoperatively.    The patient was admitted 9//2022 undergoing 9/23/2022  a bronchoscopy with left video-assisted thoracoscopy with robot-assisted total decortication of the left lung, left lower lobectomy, mediastinal lymphadenectomy and intercostal nerve block with Dr. Nicola Joe.  Fortunately he did fairly well postoperatively though did develop atrial fibrillation with RVR treated with amiodarone converting back to sinus rhythm, treated with IV metoprolol subsequent chronic anticoagulation.  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  Notably there is invasive carcinoma present at the margin.  Is a, and only 2 positive lymph nodes adjacent to the bronchovesicular margin which appears to have been transected difficult to enumerate with extranodal extension present.       The patient is seen 10/27/2022.  He is recovering well from surgery, albeit slowly, and we have discussed his findings that are concerning as to residual disease with a positive margin described as above.  There is also the significance potentially of PD-L1 expression which has been described as producing a poor survival.     Nivolumab has been used as second line therapy to positive effect in the disease and this begs the question as to whether adjuvant therapy plus immunotherapy could be utilized though the patient would be difficult to treat with platinum based chemotherapy as well.       The patient is next assessed 11/7/2022 for significant assessments by supportive oncology at [facility] as well as with plans to see Dr. Marrero 11/9/2022.  Unfortunately he has been very slow to gradually recover from the effects of surgery with reduced appetite and only fair performance status.     At present it would be difficult to give him  cisplatin based chemotherapy and we discussed whether we might be able to use Tecentriq (atezolizumab) as his primary adjuvant therapy.  We have contacted pathology about completing Caris testing and a PD-L1 analysis that has not yet completed.         The patient is seen, however, 11/16/2022 and his genomic analysis has returned as being significant for broad sensitivity to immunotherapy.  This would argue for the administration of weekly Carboplatin/Taxol as the patient proceeds to radiation therapy and upon completion, then using Tecentriq as further adjuvant therapy over a year.  This is discussed with the patient and his  in detail as well as discussed with Dr. Marrero of radiation therapy.  We have also discussed the patient require port placement.    The patient proceeded to treatment taking weekly carboplatin Taxol with concurrent radiation therapy last seen 12/12/2022 with third weekly treatment.    He is next seen 12/19/2022 with H&H 11.9 and 38.5, white count 3460 and platelet count of 168,000.  He is feeling well without any significant complications of therapy except for right calf dermatitis that is been developing in the last several days.    The patient continued therapy taking CarboTaxol weekly including 12/28 and 1/4/2023.    His is next seen 1/11/2023 with completion date for radiation therapy 1/11/2023.  Repeat CBC now includes H&H of 11.0 and 33.9, white count 2090, platelet count 128,000, ANC is 800.  He, fortunately, is tolerating treatment well and we have again discussed with him adjuvant immunotherapy would be useful including Tecentriq versus pembrolizumab-keynote 091 study particularly in tumor programmed cell death PD-L1 greater than 50%.    The patient will not be given additional chemotherapy 1/11/2023 we will plan to reassess by follow-up scans in the next 6 weeks CT chest and pelvis making a decision thereafter about adjuvant immunotherapy.    Patient proceeded to undergo  follow-up scans 2/24/2023 showing no evidence of recurrent disease including his findings of left lower lobectomy, stable 11 m nodule opacity in the base of the right lower lobe in the right lung apex, small left pleural effusion mild to moderate stenosis of the proximal subclavian artery.    We have discussed that considering studies like keynote  091 that the patient's high risk disease could be addressed with additional immunotherapy including the use of Atezolizumab and/or Keytruda.  Considering his findings overall Keytruda every 3 weeks x18 cycles is to be pursued.    The patient was seen 3/20/2023, discussed initiation of Keytruda.  He is agreeable to proceed.    The patient notified the office shortly after treatment that he had pain under his right rib cage and was placed back onto his Neurontin to try to manage this pain.  Approximately week later he still had the pain and also had another rash we switched him at this point to Famvir to try to completely clear this problem.    He is next seen back 4/3/2023.  Fortunately his recent apparent shingles activation has nearly abated and he is feeling reasonably well.  In review of the literature there is no significant difference in activation with immunotherapy though this patient may require suppression.  I have asked him to complete his Famvir at this point.    Patient assessed 4/10/2023 with resolution of his shingles, subsequently placed on maintenance acyclovir, consideration of shingles vaccination post 3 months of Keytruda therapy is stable disease documented.    The patient underwent a CT chest abdomen pelvis 6/8/2023 that demonstrates postsurgical changes of the left hemithorax, stable pulmonary nodules, stable infrarenal abdominal aortic aneurysm.    He is next seen 6/12/2023.  We have discussed continue with his Keytruda with his tolerance being excellent.  He will also reduce his current metoprolol to 12.5 mg p.o. daily.    Past Medical History:    Diagnosis Date    Arthritis     COPD (chronic obstructive pulmonary disease)     O2 3L AT HS    Diabetes mellitus     DIET CONTROL    Diverticulitis     DVT, lower extremity     RLE    Elevated cholesterol     H/O Cervical pain     History of colitis     Hyperlipidemia     Hypertension     Left shoulder pain     Low back pain     Lung cancer 2022    LEFT LUNG    On anticoagulant therapy     Peripheral arterial disease     Right leg claudication     Skin cancer     HX        Past Surgical History:   Procedure Laterality Date    BALLOON ANGIOPLASTY, ARTERY Right 2015    IR Percutaneious IVC filter placement    INGUINAL HERNIA REPAIR Left     KNEE ARTHROSCOPY Left     Torn meniscus    LOBECTOMY Left 9/23/2022    Procedure: BRONCHOSCOPY, LEFT VIDEO ASSISTED THORACOSCOPY, ROBOT ASSISTED TOTAL DECORTICATION  LEFT LUNG, LEFT LOWER LOBE LOBECTOMY, MEDIASTINAL LYMPHECTOMY, INTERCOSTAL NERVE BLOCK;  Surgeon: Nicola Joe III, MD;  Location: [facility] MAIN OR;  Service: Robotics - DaVinci;  Laterality: Left;    VENOUS ACCESS DEVICE (PORT) INSERTION Right 11/21/2022    Procedure: INSERTION VENOUS ACCESS DEVICE;  Surgeon: Nicola Joe III, MD;  Location: [facility] OR;  Service: Thoracic;  Laterality: Right;        Current Outpatient Medications on File Prior to Visit   Medication Sig Dispense Refill    arformoterol (BROVANA) 15 MCG/2ML nebulizer solution Take 2 mL by nebulization 2 (Two) Times a Day.      Budeson-Glycopyrrol-Formoterol (BREZTRI) 160-9-4.8 MCG/ACT aerosol inhaler       budesonide (PULMICORT) 0.5 MG/2ML nebulizer solution Take 2 mL by nebulization 2 (Two) Times a Day.      cetirizine (zyrTEC) 10 MG tablet Take 1 tablet by mouth Daily.      Cholecalciferol 50 MCG (2000 UT) capsule Take 1 capsule by mouth Daily.      fluticasone (FLONASE) 50 MCG/ACT nasal spray 1 spray into the nostril(s) as directed by provider Daily.      isosorbide mononitrate (IMDUR) 60 MG 24 hr tablet Take 1 tablet by mouth Every  "Evening.      ondansetron (Zofran) 8 MG tablet Take 1 tablet by mouth Every 8 (Eight) Hours As Needed for Nausea or Vomiting. 30 tablet 1    simvastatin (ZOCOR) 20 MG tablet Take 1 tablet by mouth Every Night.      tamsulosin (FLOMAX) 0.4 MG capsule 24 hr capsule Take 1 capsule by mouth Daily. 30 capsule 5    warfarin (COUMADIN) 5 MG tablet Take 1 tablet by mouth Daily. Take 10mg on 9/29/22 and then resume regular home schedule.      acyclovir (ZOVIRAX) 400 MG tablet Take 1 tablet by mouth 2 (Two) Times a Day. 60 tablet 2    metoprolol succinate XL (TOPROL-XL) 25 MG 24 hr tablet Take 1 tablet by mouth Daily for 30 days. (Patient taking differently: Take 25 mg by mouth Every Evening.) 30 tablet 0     No current facility-administered medications on file prior to visit.        ALLERGIES:    Allergies   Allergen Reactions    Benadryl [Diphenhydramine] Other (See Comments)     Extreme restlessness and insomnia        Social History     Socioeconomic History    Marital status:     Years of education: 1 year college   Tobacco Use    Smoking status: Every Day     Packs/day: 1.00     Years: 59.00     Pack years: 59.00     Types: Cigarettes     Start date: 1963    Smokeless tobacco: Never    Tobacco comments:     9/8/22: Down to Less than 1 PPD   Vaping Use    Vaping Use: Never used   Substance and Sexual Activity    Alcohol use: Not Currently    Drug use: Never    Sexual activity: Defer        Family History   Problem Relation Age of Onset    No Known Problems Mother     Cancer Father     Lung cancer Father     Diabetes Brother     Other Daughter         S/P stent, age 42    No Known Problems Son     Malig Hyperthermia Neg Hx         Review of Systems   ROS as per HPI    Objective     Vitals:    07/24/23 1349   BP: 117/57   Pulse: 77   Resp: 18   Temp: 97.9 °F (36.6 °C)   TempSrc: Temporal   SpO2: 91%   Weight: 71.5 kg (157 lb 9.6 oz)   Height: 177.8 cm (70\")   PainSc:   8   PainLoc: Generalized           7/24/2023 "     1:50 PM   Current Status   ECOG score 1     Physical Exam  Constitutional:       Appearance: Normal appearance.   HENT:      Head: Normocephalic and atraumatic.      Nose: Nose normal.      Mouth/Throat:      Mouth: Mucous membranes are moist.   Eyes:      Extraocular Movements: Extraocular movements intact.      Conjunctiva/sclera: Conjunctivae normal.      Pupils: Pupils are equal, round, and reactive to light.   Cardiovascular:      Rate and Rhythm: Normal rate and regular rhythm.      Pulses: Normal pulses.      Heart sounds: Normal heart sounds.   Pulmonary:      Comments: Prolonged expiratory phase bilaterally  Abdominal:      General: Bowel sounds are normal.      Palpations: Abdomen is soft.      Comments: Scaphoid   Musculoskeletal:         General: Normal range of motion.      Cervical back: Normal range of motion and neck supple.   Skin:     General: Skin is warm and dry.      Findings: No rash.   Neurological:      General: No focal deficit present.      Mental Status: He is alert and oriented to person, place, and time.   Psychiatric:         Mood and Affect: Mood normal.       I have reexamined the patient and the results are consistent with the previously documented exam. GARCÍA Alan      RECENT LABS:  Results from last 7 days   Lab Units 07/24/23  1320   WBC 10*3/mm3 5.40   NEUTROS ABS 10*3/mm3 3.37   HEMOGLOBIN g/dL 12.9*   HEMATOCRIT % 38.8   PLATELETS 10*3/mm3 152     Results from last 7 days   Lab Units 07/24/23  1320   SODIUM mmol/L 126*   POTASSIUM mmol/L 4.3   CHLORIDE mmol/L 91*   CO2 mmol/L 23.6   BUN mg/dL 15   CREATININE mg/dL 1.02   CALCIUM mg/dL 9.8   ALBUMIN g/dL 3.9   BILIRUBIN mg/dL 0.6   ALK PHOS U/L 63   ALT (SGPT) U/L 11   AST (SGOT) U/L 24   GLUCOSE mg/dL 146*             Assessment & Plan     78 y.o. male  with medical issues including type 2 diabetes-uncertain control, COPD, hypertension, diastolic heart failure, chronic disease, peripheral vascular disease  (follow-up with vascular surgery today), previous DVT on anticoagulation (home monitoring), previous IVC filter placement?,  Status post September 2021 partial small bowel obstruction secondary to sigmoid diverticulitis treated medically.     1.   T1b N1 M0-stage IIb lung cancer.  right lung base nodule noted on scan at that point and in follow-up with primary care had developed a left inguinal hernia with repair planned through a different general surgeon then seen with his initial sigmoid diverticulitis.  PET scan 7/13/2022 demonstrates a hypermetabolic spiculated lesion medial aspect of the left lobe adjacent to descending thoracic aorta measuring1.5 x 1.6 SUV 9.3 with an enlarged hypermetabolic left hilar lymph node measured 1.5 x 1.3 with SUV of 12.1, additional nodules in the lateral aspect right lower lobe and micronodule in the posterior/medial right lower lobe and also additional right lower lobe micronodule.  There is an infrarenal abdominal aortic aneurysm measuring 3.4 cm with a short segment of chronic dissection present.  Clinical findings would be consistent with a possible T1b N1 M0-stage IIb lung cancer.  discussed in thoracic conference 7/20/2022.  Recommendations to proceed with pulmonary assessment with EBUS.  The patient proceeded to undergo his hernia surgery 8/2/2022 by Dr. Dotson.   Guardant 360 that T p53 splice site mutation is present and would certainly be consistent as well the most common mutations found in lung cancers particularly squamous cancers.  The patient was admitted 9//2022 undergoing 9/23/2022  a bronchoscopy with left video-assisted thoracoscopy with robot-assisted total decortication of the left lung, left lower lobectomy, mediastinal lymphadenectomy and intercostal nerve block with Dr. Nicola Joe.  Fortunately he did fairly well postoperatively though did develop atrial fibrillation with RVR treated with amiodarone converting back to sinus rhythm, treated with  IV metoprolol subsequent chronic anticoagulation.  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  Notably there is invasive carcinoma present at the margin. only 2 positive lymph nodes adjacent to the bronchovesicular margin which appears to have been transected difficult to enumerate with extranodal extension present.  Options could well be Carboplatin and Taxol considering the patient's comorbidity and worry of using cisplatin-based chemotherapy in this patient followed by atezolizumab with pathology having been notified to assess PD-L1 expression on the tumor as well also await review by Caris molecular profiling.  Finally radiation therapy consultation be requested.   Caris analysis indicates benefit from immunotherapy at relatively high levels.  This would allow radiation therapy given with Carboplatin Taxol weekly (better tolerated) and then subsequent atezolizumab adjuvantly.  Weekly carboplatin Taxol initiated 11/28/2022 given concurrently with radiation therapy  12/5/2022 here for cycle 2 weekly carboplatin/Taxol, overall tolerating treatment quite well so far.  12/12/2022: Proceed with cycle #3 weekly carboplatin/Taxol with concurrent radiation.  Continues to tolerate treatment well.  He is next seen 12/19/2022 with H&H 11.9 and 38.5, white count 3460 and platelet count of 168,000.  He is feeling well without any significant complications of therapy except for right calf dermatitis that is been developing in the last several days.  12/28/2022 here for week 5 carbo/Taxol.  Overall tolerating well.  Today WBC 2.45, ANC 1.22, hemoglobin 10.7, platelet count 117,000.  I have reviewed with Dr. Bishop, and we will go ahead and continue with treatment.  1/4/2023: Received with cycle 6 carbo/taxol + XRT.  Continues to tolerate treatment well.  WBC improved to 2.69 with ANC 1.41.  Next  1/11/2023 with completion date 1/11/2023.  Repeat CBC now includes H&H of 11.0 and 33.9, white count 2090, platelet count 128,000, ANC is 800.  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  We have discussed that considering studies like keynote  091 that the patient's high risk disease could be addressed with additional immunotherapy including the use of Atezolizumab and/or Keytruda.  Considering his findings overall Keytruda every 3 weeks x18 cycles is to be pursued.  The patient began Keytruda as planned with his first treatment 3/20/2023.  The patient notified the office shortly after treatment that he had pain under his right rib cage and was placed back onto his Neurontin to try to manage this pain.  Approximately week later he still had the pain and also had another rash we switched him at this point to Famvir to try to completely clear this problem.  4/3/2023.  Fortunately his recent apparent shingles activation has nearly abated and he is feeling reasonably well.  In review of the literature there is no significant difference in activation with immunotherapy though this patient may require suppression.  He is asked to complete his Famvir.  4/10/2023 cycle 2 Keytruda, overall tolerating well.   Patient seen 5/1/2023, third cycle of Keytruda, status post fourth cycle of Keytruda, 3 months of therapy, subsequent scans will need to be scheduled.  5/22/2023: Proceed with cycle 4 Keytruda.    Follow-up scans-CT of chest,  abdomen and pelvis 6/8/2023 with postsurgical changes only.  7/3/2023 cycle 6 Keytruda  Proceed today, 7/24/2023 with cycle 7 Keytruda which is continued every 3 weeks      2.  Herpes zoster: Patient was treated with Famvir.  Rash has resolved, no issues with persistent herpetic neuralgia.  He will be placed on suppression with acyclovir 400 mg twice daily.  We discussed he will hold off on vaccination for now, given recent infection.  Patient assessed 5/1/2023, continue Keytruda, status post fifth cycle of Keytruda or 3 months of therapy he would undergo repeat scans and, if stable or improved, proceed with Shingrix vaccinations.  Patient now requested to proceed with vaccinations.    3.  Hyponatremia  Likely exacerbated by recent GI toxicity with nausea vomiting and diarrhea.  Symptoms have now resolved with improvement in his appetite and intake  Reviewed with Dr. Bishop today, no additional interventions.    4. Joint pain.  Baseline knee pain prior to initiation of immunotherapy though he is now exacerbated with shoulder pain as well and requiring ambulation with a walker  Additional inflammatory labs including sed rate and C-reactive protein today to evaluate for inflammation secondary to immunotherapy  Continue Tylenol and occasional Norco for breakthrough pain    PLAN:   Proceed today with cycle 7 Keytruda which is continued every 3 weeks  Additional labs pending including sed rate and C-reactive protein  Continue prophylactic acyclovir 400 mg twice daily  Continue Tylenol and occasional Norco for breakthrough pain  As reviewed with Dr. Bishop, no additional intervention for hyponatremia at this time  Return in 3 weeks for CBC, CMP, MD and continued Keytruda  Imaging last obtained 6/8/2023, we will likely repeat imaging September 2023.    Patient is on a high risk medication requiring close monitoring for toxicity.    Anna Varma, APRN  07/24/2023

## 2023-07-25 ENCOUNTER — TELEPHONE (OUTPATIENT)
Dept: ONCOLOGY | Facility: CLINIC | Age: 78
End: 2023-07-25
Payer: MEDICARE

## 2023-07-25 DIAGNOSIS — M25.50 ARTHRALGIA, UNSPECIFIED JOINT: Primary | ICD-10-CM

## 2023-07-25 DIAGNOSIS — C34.32 MALIGNANT NEOPLASM OF LOWER LOBE, LEFT BRONCHUS OR LUNG: ICD-10-CM

## 2023-07-25 NOTE — TELEPHONE ENCOUNTER
Returned call to pt's friend regarding lab results. Per Dr. Bishop, elevated sed rate and c-reactive protein are signs of arthritis. We will draw a rheumatoid factor when he is here next month. For now, he should continue on Tylenol or Norco for pain. Pt's friend v/u.

## 2023-07-25 NOTE — TELEPHONE ENCOUNTER
Caller: Kate Torres    Relationship: Emergency Contact    Best call back number: [phone]    What is the best time to reach you: ANYTIME     Who are you requesting to speak with (clinical staff, provider,  specific staff member): NURSE         What was the call regarding: REGINA HAD SOME EXTRA LAB TEST DRAWN YESTERDAY ,WOULD LIKE FOR NURSE TO GO OVER THE LAB RESULTS.

## 2023-07-28 ENCOUNTER — HOSPITAL ENCOUNTER (INPATIENT)
Facility: HOSPITAL | Age: 78
LOS: 1 days | Discharge: HOME | DRG: 640 | End: 2023-07-30
Attending: EMERGENCY MEDICINE | Admitting: INTERNAL MEDICINE
Payer: MEDICARE

## 2023-07-28 DIAGNOSIS — R54 AGE-RELATED PHYSICAL DEBILITY: ICD-10-CM

## 2023-07-28 DIAGNOSIS — E27.40 ADRENAL INSUFFICIENCY: ICD-10-CM

## 2023-07-28 DIAGNOSIS — R53.1 GENERALIZED WEAKNESS: ICD-10-CM

## 2023-07-28 DIAGNOSIS — E87.1 HYPONATREMIA: Primary | ICD-10-CM

## 2023-07-28 LAB
ALBUMIN SERPL-MCNC: 3.6 G/DL (ref 3.5–5.2)
ALBUMIN/GLOB SERPL: 1.3 G/DL
ALP SERPL-CCNC: 65 U/L (ref 39–117)
ALT SERPL W P-5'-P-CCNC: 12 U/L (ref 1–41)
ANION GAP SERPL CALCULATED.3IONS-SCNC: 10.6 MMOL/L (ref 5–15)
AST SERPL-CCNC: 21 U/L (ref 1–40)
BASOPHILS # BLD AUTO: 0.01 10*3/MM3 (ref 0–0.2)
BASOPHILS NFR BLD AUTO: 0.2 % (ref 0–1.5)
BILIRUB SERPL-MCNC: 0.7 MG/DL (ref 0–1.2)
BUN SERPL-MCNC: 19 MG/DL (ref 8–23)
BUN/CREAT SERPL: 12.3 (ref 7–25)
CALCIUM SPEC-SCNC: 9.5 MG/DL (ref 8.6–10.5)
CHLORIDE SERPL-SCNC: 93 MMOL/L (ref 98–107)
CO2 SERPL-SCNC: 21.4 MMOL/L (ref 22–29)
CREAT SERPL-MCNC: 1.54 MG/DL (ref 0.76–1.27)
DEPRECATED RDW RBC AUTO: 45.1 FL (ref 37–54)
EGFRCR SERPLBLD CKD-EPI 2021: 46.5 ML/MIN/1.73
EOSINOPHIL # BLD AUTO: 0.09 10*3/MM3 (ref 0–0.4)
EOSINOPHIL NFR BLD AUTO: 1.9 % (ref 0.3–6.2)
ERYTHROCYTE [DISTWIDTH] IN BLOOD BY AUTOMATED COUNT: 13.1 % (ref 12.3–15.4)
GLOBULIN UR ELPH-MCNC: 2.7 GM/DL
GLUCOSE BLDC GLUCOMTR-MCNC: 107 MG/DL (ref 70–130)
GLUCOSE BLDC GLUCOMTR-MCNC: 109 MG/DL (ref 70–130)
GLUCOSE SERPL-MCNC: 68 MG/DL (ref 65–99)
HCT VFR BLD AUTO: 37.9 % (ref 37.5–51)
HGB BLD-MCNC: 12.4 G/DL (ref 13–17.7)
IMM GRANULOCYTES # BLD AUTO: 0.01 10*3/MM3 (ref 0–0.05)
IMM GRANULOCYTES NFR BLD AUTO: 0.2 % (ref 0–0.5)
INR PPP: 3.34 (ref 0.9–1.1)
LYMPHOCYTES # BLD AUTO: 0.92 10*3/MM3 (ref 0.7–3.1)
LYMPHOCYTES NFR BLD AUTO: 19.8 % (ref 19.6–45.3)
MCH RBC QN AUTO: 30.9 PG (ref 26.6–33)
MCHC RBC AUTO-ENTMCNC: 32.7 G/DL (ref 31.5–35.7)
MCV RBC AUTO: 94.5 FL (ref 79–97)
MONOCYTES # BLD AUTO: 0.79 10*3/MM3 (ref 0.1–0.9)
MONOCYTES NFR BLD AUTO: 17 % (ref 5–12)
NEUTROPHILS NFR BLD AUTO: 2.83 10*3/MM3 (ref 1.7–7)
NEUTROPHILS NFR BLD AUTO: 60.9 % (ref 42.7–76)
NRBC BLD AUTO-RTO: 0 /100 WBC (ref 0–0.2)
PLATELET # BLD AUTO: 128 10*3/MM3 (ref 140–450)
PMV BLD AUTO: 8.8 FL (ref 6–12)
POTASSIUM SERPL-SCNC: 4.7 MMOL/L (ref 3.5–5.2)
PROT SERPL-MCNC: 6.3 G/DL (ref 6–8.5)
PROTHROMBIN TIME: 34.6 SECONDS (ref 11.7–14.2)
RBC # BLD AUTO: 4.01 10*6/MM3 (ref 4.14–5.8)
SODIUM SERPL-SCNC: 125 MMOL/L (ref 136–145)
WBC NRBC COR # BLD: 4.65 10*3/MM3 (ref 3.4–10.8)

## 2023-07-28 PROCEDURE — 82948 REAGENT STRIP/BLOOD GLUCOSE: CPT

## 2023-07-28 PROCEDURE — 99285 EMERGENCY DEPT VISIT HI MDM: CPT

## 2023-07-28 PROCEDURE — 94640 AIRWAY INHALATION TREATMENT: CPT

## 2023-07-28 PROCEDURE — G0378 HOSPITAL OBSERVATION PER HR: HCPCS

## 2023-07-28 PROCEDURE — 85610 PROTHROMBIN TIME: CPT | Performed by: EMERGENCY MEDICINE

## 2023-07-28 PROCEDURE — 85025 COMPLETE CBC W/AUTO DIFF WBC: CPT | Performed by: EMERGENCY MEDICINE

## 2023-07-28 PROCEDURE — 94799 UNLISTED PULMONARY SVC/PX: CPT

## 2023-07-28 PROCEDURE — 80053 COMPREHEN METABOLIC PANEL: CPT | Performed by: EMERGENCY MEDICINE

## 2023-07-28 RX ORDER — ACETAMINOPHEN 325 MG/1
650 TABLET ORAL EVERY 4 HOURS PRN
Status: DISCONTINUED | OUTPATIENT
Start: 2023-07-28 | End: 2023-07-30 | Stop reason: HOSPADM

## 2023-07-28 RX ORDER — NICOTINE POLACRILEX 4 MG
15 LOZENGE BUCCAL
Status: DISCONTINUED | OUTPATIENT
Start: 2023-07-28 | End: 2023-07-30 | Stop reason: HOSPADM

## 2023-07-28 RX ORDER — ATORVASTATIN CALCIUM 20 MG/1
10 TABLET, FILM COATED ORAL DAILY
Status: DISCONTINUED | OUTPATIENT
Start: 2023-07-28 | End: 2023-07-30 | Stop reason: HOSPADM

## 2023-07-28 RX ORDER — METOPROLOL SUCCINATE 25 MG/1
25 TABLET, EXTENDED RELEASE ORAL EVERY EVENING
Status: DISCONTINUED | OUTPATIENT
Start: 2023-07-28 | End: 2023-07-30 | Stop reason: HOSPADM

## 2023-07-28 RX ORDER — ACETAMINOPHEN 160 MG/5ML
650 SOLUTION ORAL EVERY 4 HOURS PRN
Status: DISCONTINUED | OUTPATIENT
Start: 2023-07-28 | End: 2023-07-30 | Stop reason: HOSPADM

## 2023-07-28 RX ORDER — AMOXICILLIN 250 MG
2 CAPSULE ORAL 2 TIMES DAILY
Status: DISCONTINUED | OUTPATIENT
Start: 2023-07-28 | End: 2023-07-30 | Stop reason: HOSPADM

## 2023-07-28 RX ORDER — POLYETHYLENE GLYCOL 3350 17 G/17G
17 POWDER, FOR SOLUTION ORAL DAILY PRN
Status: DISCONTINUED | OUTPATIENT
Start: 2023-07-28 | End: 2023-07-30 | Stop reason: HOSPADM

## 2023-07-28 RX ORDER — ONDANSETRON 2 MG/ML
4 INJECTION INTRAMUSCULAR; INTRAVENOUS EVERY 6 HOURS PRN
Status: DISCONTINUED | OUTPATIENT
Start: 2023-07-28 | End: 2023-07-30 | Stop reason: HOSPADM

## 2023-07-28 RX ORDER — INSULIN LISPRO 100 [IU]/ML
2-7 INJECTION, SOLUTION INTRAVENOUS; SUBCUTANEOUS
Status: DISCONTINUED | OUTPATIENT
Start: 2023-07-28 | End: 2023-07-30 | Stop reason: HOSPADM

## 2023-07-28 RX ORDER — MELATONIN
2000 DAILY
Status: DISCONTINUED | OUTPATIENT
Start: 2023-07-28 | End: 2023-07-30 | Stop reason: HOSPADM

## 2023-07-28 RX ORDER — DEXTROSE MONOHYDRATE 25 G/50ML
25 INJECTION, SOLUTION INTRAVENOUS
Status: DISCONTINUED | OUTPATIENT
Start: 2023-07-28 | End: 2023-07-30 | Stop reason: HOSPADM

## 2023-07-28 RX ORDER — BISACODYL 5 MG/1
5 TABLET, DELAYED RELEASE ORAL DAILY PRN
Status: DISCONTINUED | OUTPATIENT
Start: 2023-07-28 | End: 2023-07-30 | Stop reason: HOSPADM

## 2023-07-28 RX ORDER — BISACODYL 10 MG
10 SUPPOSITORY, RECTAL RECTAL DAILY PRN
Status: DISCONTINUED | OUTPATIENT
Start: 2023-07-28 | End: 2023-07-30 | Stop reason: HOSPADM

## 2023-07-28 RX ORDER — BUDESONIDE 0.5 MG/2ML
0.5 INHALANT ORAL 2 TIMES DAILY
Status: DISCONTINUED | OUTPATIENT
Start: 2023-07-28 | End: 2023-07-30 | Stop reason: HOSPADM

## 2023-07-28 RX ORDER — TAMSULOSIN HYDROCHLORIDE 0.4 MG/1
0.4 CAPSULE ORAL DAILY
Status: DISCONTINUED | OUTPATIENT
Start: 2023-07-28 | End: 2023-07-30 | Stop reason: HOSPADM

## 2023-07-28 RX ORDER — SODIUM CHLORIDE 9 MG/ML
75 INJECTION, SOLUTION INTRAVENOUS CONTINUOUS
Status: DISCONTINUED | OUTPATIENT
Start: 2023-07-28 | End: 2023-07-30 | Stop reason: HOSPADM

## 2023-07-28 RX ORDER — ISOSORBIDE MONONITRATE 60 MG/1
60 TABLET, EXTENDED RELEASE ORAL EVERY EVENING
Status: DISCONTINUED | OUTPATIENT
Start: 2023-07-28 | End: 2023-07-30 | Stop reason: HOSPADM

## 2023-07-28 RX ORDER — SODIUM CHLORIDE 0.9 % (FLUSH) 0.9 %
10 SYRINGE (ML) INJECTION AS NEEDED
Status: DISCONTINUED | OUTPATIENT
Start: 2023-07-28 | End: 2023-07-30 | Stop reason: HOSPADM

## 2023-07-28 RX ORDER — ARFORMOTEROL TARTRATE 15 UG/2ML
15 SOLUTION RESPIRATORY (INHALATION)
Status: DISCONTINUED | OUTPATIENT
Start: 2023-07-28 | End: 2023-07-30 | Stop reason: HOSPADM

## 2023-07-28 RX ORDER — IBUPROFEN 600 MG/1
1 TABLET ORAL
Status: DISCONTINUED | OUTPATIENT
Start: 2023-07-28 | End: 2023-07-30 | Stop reason: HOSPADM

## 2023-07-28 RX ORDER — ACETAMINOPHEN 650 MG/1
650 SUPPOSITORY RECTAL EVERY 4 HOURS PRN
Status: DISCONTINUED | OUTPATIENT
Start: 2023-07-28 | End: 2023-07-30 | Stop reason: HOSPADM

## 2023-07-28 RX ADMIN — ISOSORBIDE MONONITRATE 60 MG: 60 TABLET, EXTENDED RELEASE ORAL at 18:58

## 2023-07-28 RX ADMIN — BUDESONIDE 0.5 MG: 0.5 INHALANT ORAL at 19:32

## 2023-07-28 RX ADMIN — Medication 2000 UNITS: at 18:58

## 2023-07-28 RX ADMIN — SODIUM CHLORIDE 1000 ML: 9 INJECTION, SOLUTION INTRAVENOUS at 11:36

## 2023-07-28 RX ADMIN — TAMSULOSIN HYDROCHLORIDE 0.4 MG: 0.4 CAPSULE ORAL at 18:58

## 2023-07-28 RX ADMIN — SODIUM CHLORIDE 75 ML/HR: 9 INJECTION, SOLUTION INTRAVENOUS at 18:23

## 2023-07-28 RX ADMIN — ARFORMOTEROL TARTRATE 15 MCG: 15 SOLUTION RESPIRATORY (INHALATION) at 19:27

## 2023-07-28 RX ADMIN — ATORVASTATIN CALCIUM 10 MG: 20 TABLET, FILM COATED ORAL at 19:01

## 2023-07-28 NOTE — H&P
HISTORY AND PHYSICAL   [facility]        Date of Admission: 2023  Patient Identification:  Name: Giovani España  Age: 78 y.o.  Sex: male  :  1945  MRN: [id number]                     Primary Care Physician: Tali Greene APRN    Chief Complaint:  78 year old gentleman who presented to the emergency room with weakness, malaise and poor intake; he has a history of lung cancer and recently completed keytruda; he has had diarrhea and has been more sleepy than usual per his wife who is not present at the moment; he is mildly confused and not able to give any additional history; it was obtained from the ed physician as well as review of the chart    History of Present Illness:   As above    Past Medical History:  Past Medical History:   Diagnosis Date    Arthritis     COPD (chronic obstructive pulmonary disease)     O2 3L AT HS    Diabetes mellitus     DIET CONTROL    Diverticulitis     DVT, lower extremity     RLE    Elevated cholesterol     H/O Cervical pain     History of colitis     Hyperlipidemia     Hypertension     Left shoulder pain     Low back pain     Lung cancer     LEFT LUNG    On anticoagulant therapy     Peripheral arterial disease     Right leg claudication     Skin cancer     HX     Past Surgical History:  Past Surgical History:   Procedure Laterality Date    BALLOON ANGIOPLASTY, ARTERY Right     IR Percutaneious IVC filter placement    INGUINAL HERNIA REPAIR Left     KNEE ARTHROSCOPY Left     Torn meniscus    LOBECTOMY Left 2022    Procedure: BRONCHOSCOPY, LEFT VIDEO ASSISTED THORACOSCOPY, ROBOT ASSISTED TOTAL DECORTICATION  LEFT LUNG, LEFT LOWER LOBE LOBECTOMY, MEDIASTINAL LYMPHECTOMY, INTERCOSTAL NERVE BLOCK;  Surgeon: Nicola Joe III, MD;  Location: [facility];  Service: [facility] - [facility];  Laterality: Left;    VENOUS ACCESS DEVICE (PORT) INSERTION Right 2022    Procedure: INSERTION VENOUS ACCESS DEVICE;  Surgeon: Nicola Joe III,  MD;  Location: [facility] MAIN OR;  Service: Thoracic;  Laterality: Right;      Home Meds:  Medications Prior to Admission   Medication Sig Dispense Refill Last Dose    arformoterol (BROVANA) 15 MCG/2ML nebulizer solution Take 2 mL by nebulization 2 (Two) Times a Day.   7/26/2023    cetirizine (zyrTEC) 10 MG tablet Take 1 tablet by mouth Daily.   7/27/2023 at 2100    Cholecalciferol 50 MCG (2000 UT) capsule Take 1 capsule by mouth Daily.   7/27/2023 at 2100    isosorbide mononitrate (IMDUR) 60 MG 24 hr tablet Take 1 tablet by mouth Every Evening.   7/27/2023 at 2100    simvastatin (ZOCOR) 20 MG tablet Take 1 tablet by mouth Every Night.   7/27/2023    tamsulosin (FLOMAX) 0.4 MG capsule 24 hr capsule Take 1 capsule by mouth Daily. 30 capsule 5 7/27/2023 at 2100    warfarin (COUMADIN) 5 MG tablet Take 1 tablet by mouth Daily. Take 10mg on 9/29/22 and then resume regular home schedule.   7/27/2023 at 2100    acyclovir (ZOVIRAX) 400 MG tablet Take 1 tablet by mouth 2 (Two) Times a Day. 60 tablet 2     Budeson-Glycopyrrol-Formoterol (BREZTRI) 160-9-4.8 MCG/ACT aerosol inhaler    7/26/2023    budesonide (PULMICORT) 0.5 MG/2ML nebulizer solution Take 2 mL by nebulization 2 (Two) Times a Day.   7/26/2023    fluticasone (FLONASE) 50 MCG/ACT nasal spray 1 spray into the nostril(s) as directed by provider Daily.   7/26/2023    metoprolol succinate XL (TOPROL-XL) 25 MG 24 hr tablet Take 1 tablet by mouth Daily for 30 days. (Patient taking differently: Take 1 tablet by mouth Every Evening. Patient taking half tab) 30 tablet 0     ondansetron (Zofran) 8 MG tablet Take 1 tablet by mouth Every 8 (Eight) Hours As Needed for Nausea or Vomiting. 30 tablet 1 7/26/2023       Allergies:  Allergies   Allergen Reactions    Benadryl [Diphenhydramine] Other (See Comments)     Extreme restlessness and insomnia     Immunizations:  Immunization History   Administered Date(s) Administered    COVID-19 (PFIZER) BIVALENT 12+YRS 11/18/2022    COVID-19  "(PFIZER) Purple Cap Monovalent 2021, 03/10/2021, 2021    FluLaval/Fluzone >6mos 2021    Fluzone High Dose =>65 Years ([employer] ONLY) 10/19/2017, 10/29/2018    Fluzone High-Dose 65+yrs 10/21/2022    Pneumococcal Conjugate 13-Valent (PCV13) 2018    Pneumococcal Conjugate 20-Valent (PCV20) 2023     Social History:   Social History     Social History Narrative    Not on file     Social History     Socioeconomic History    Marital status:     Years of education: 1 year college   Tobacco Use    Smoking status: Every Day     Packs/day: 1.00     Years: 59.00     Pack years: 59.00     Types: Cigarettes     Start date:     Smokeless tobacco: Never    Tobacco comments:     22: Down to Less than 1 PPD   Vaping Use    Vaping Use: Never used   Substance and Sexual Activity    Alcohol use: Not Currently    Drug use: Never    Sexual activity: Defer       Family History:  Family History   Problem Relation Age of Onset    No Known Problems Mother     Cancer Father     Lung cancer Father     Diabetes Brother     Other Daughter         S/P stent, age 42    No Known Problems Son     Malig Hyperthermia Neg Hx         Review of Systems  Not obtainable from the patient    Objective:  T Max 24 hrs: Temp (24hrs), Av.9 øF (36.6 øC), Min:97.7 øF (36.5 øC), Max:98.1 øF (36.7 øC)    Vitals Ranges:   Temp:  [97.7 øF (36.5 øC)-98.1 øF (36.7 øC)] 97.7 øF (36.5 øC)  Heart Rate:  [59-92] 59  Resp:  [16-20] 16  BP: ()/(50-72) 110/50      Exam:  /50 (BP Location: Right arm, Patient Position: Lying)   Pulse 59   Temp 97.7 øF (36.5 øC) (Oral)   Resp 16   Ht 182.9 cm (72\")   Wt 70.3 kg (155 lb)   SpO2 100%   BMI 21.02 kg/mý     General Appearance:    Alert, cooperative, no distress, appears stated age   Head:    Normocephalic, without obvious abnormality, atraumatic   Eyes:    PERRL, conjunctivae/corneas clear, EOM's intact, both eyes   Ears:    Normal external ear canals, both ears "   Nose:   Nares normal, septum midline, mucosa normal, no drainage    or sinus tenderness   Throat:   Lips, mucosa, and tongue normal   Neck:   Supple, symmetrical, trachea midline, no adenopathy;     thyroid:  no enlargement/tenderness/nodules; no carotid    bruit or JVD   Back:     Symmetric, no curvature, ROM normal, no CVA tenderness   Lungs:     Decreased breath sounds bilaterally, respirations unlabored   Chest Wall:    No tenderness or deformity    Heart:    Regular rate and rhythm, S1 and S2 normal, no murmur, rub   or gallop   Abdomen:     Soft, nontender, bowel sounds active all four quadrants,     no masses, no hepatomegaly, no splenomegaly   Extremities:   Extremities normal, atraumatic, no cyanosis or edema   Pulses:   2+ and symmetric all extremities   Skin:   Skin color, texture, turgor normal, no rashes or lesions                .    Data Review:  Labs in chart were reviewed.  WBC   Date Value Ref Range Status   07/28/2023 4.65 3.40 - 10.80 10*3/mm3 Final     Hemoglobin   Date Value Ref Range Status   07/28/2023 12.4 (L) 13.0 - 17.7 g/dL Final     Hematocrit   Date Value Ref Range Status   07/28/2023 37.9 37.5 - 51.0 % Final     Platelets   Date Value Ref Range Status   07/28/2023 128 (L) 140 - 450 10*3/mm3 Final     Sodium   Date Value Ref Range Status   07/28/2023 125 (L) 136 - 145 mmol/L Final     Potassium   Date Value Ref Range Status   07/28/2023 4.7 3.5 - 5.2 mmol/L Final     Chloride   Date Value Ref Range Status   07/28/2023 93 (L) 98 - 107 mmol/L Final     CO2   Date Value Ref Range Status   07/28/2023 21.4 (L) 22.0 - 29.0 mmol/L Final     BUN   Date Value Ref Range Status   07/28/2023 19 8 - 23 mg/dL Final     Creatinine   Date Value Ref Range Status   07/28/2023 1.54 (H) 0.76 - 1.27 mg/dL Final     Glucose   Date Value Ref Range Status   07/28/2023 68 65 - 99 mg/dL Final     Calcium   Date Value Ref Range Status   07/28/2023 9.5 8.6 - 10.5 mg/dL Final       Results from last 7 days   Lab  Units 07/24/23  1320   TSH uIU/mL 2.530   FREE T4 ng/dL 1.06          Imaging Results (All)       None              Assessment:  Active Hospital Problems    Diagnosis  POA    **Hyponatremia [E87.1]  Yes      Resolved Hospital Problems   No resolved problems to display.   Lung cancer  Copd  Diabetes  Hypertension  Pad  Tobacco use  karen    Plan:  Fluids  Ask oncology to see him  Trend sodium  Accu checks, sliding scale  Dw er provider and patient  Marcella Henry MD  7/28/2023  17:39 EDT

## 2023-07-28 NOTE — ED NOTES
"Nursing report ED to floor  Giovani España  78 y.o.  male    HPI :   Chief Complaint   Patient presents with    Altered Mental Status    Cough       Admitting doctor:   Marcella Henry MD    Admitting diagnosis:   The primary encounter diagnosis was Hyponatremia. A diagnosis of Generalized weakness was also pertinent to this visit.    Code status:   Current Code Status       Date Active Code Status Order ID Comments User Context       Prior            Allergies:   Benadryl [diphenhydramine]    Isolation:   No active isolations    Intake and Output  No intake or output data in the 24 hours ending 07/28/23 1337    Weight:       07/28/23  1035   Weight: 70.3 kg (155 lb)       Most recent vitals:   Vitals:    07/28/23 1017 07/28/23 1035 07/28/23 1323   BP: 112/55 130/72 92/69   BP Location: Right arm Left arm    Patient Position: Sitting Lying Lying   Pulse: 66 92 62   Resp: 18 20 18   Temp: 98.1 øF (36.7 øC)     TempSrc: Tympanic     SpO2: 94% 92% 94%   Weight:  70.3 kg (155 lb)    Height:  182.9 cm (72\")        Active LDAs/IV Access:   Lines, Drains & Airways       Active LDAs       Name Placement date Placement time Site Days    Peripheral IV 07/28/23 1014 Anterior;Left;Proximal Forearm 07/28/23  1014  Forearm  less than 1    Single Lumen Implantable Port Chest --  --  Chest  --                    Labs (abnormal labs have a star):   Labs Reviewed   COMPREHENSIVE METABOLIC PANEL - Abnormal; Notable for the following components:       Result Value    Creatinine 1.54 (*)     Sodium 125 (*)     Chloride 93 (*)     CO2 21.4 (*)     eGFR 46.5 (*)     All other components within normal limits    Narrative:     GFR Normal >60  Chronic Kidney Disease <60  Kidney Failure <15    The GFR formula is only valid for adults with stable renal function between ages 18 and 70.   PROTIME-INR - Abnormal; Notable for the following components:    Protime 34.6 (*)     INR 3.34 (*)     All other components within normal limits   CBC " WITH AUTO DIFFERENTIAL - Abnormal; Notable for the following components:    RBC 4.01 (*)     Hemoglobin 12.4 (*)     Platelets 128 (*)     Monocyte % 17.0 (*)     All other components within normal limits   CBC AND DIFFERENTIAL    Narrative:     The following orders were created for panel order CBC & Differential.  Procedure                               Abnormality         Status                     ---------                               -----------         ------                     CBC Auto Differential[[id number]]        Abnormal            Final result                 Please view results for these tests on the individual orders.       EKG:   SCANNED - TELEMETRY     Final Result          Meds given in ED:   Medications   sodium chloride 0.9 % flush 10 mL (has no administration in time range)   sodium chloride 0.9 % bolus 1,000 mL (1,000 mL Intravenous New Bag 7/28/23 1136)       Imaging results:  No radiology results for the last day    Ambulatory status:   - with assist    Social issues:   Social History     Socioeconomic History    Marital status:     Years of education: 1 year college   Tobacco Use    Smoking status: Every Day     Packs/day: 1.00     Years: 59.00     Pack years: 59.00     Types: Cigarettes     Start date: 1963    Smokeless tobacco: Never    Tobacco comments:     9/8/22: Down to Less than 1 PPD   Vaping Use    Vaping Use: Never used   Substance and Sexual Activity    Alcohol use: Not Currently    Drug use: Never    Sexual activity: Defer       NIH Stroke Scale:       Kayden Flowers RN  07/28/23 13:37 EDT

## 2023-07-28 NOTE — ED NOTES
Pt presents to facility via EMS from home. Pt spouse called today with reports of AMS over the last few days. Pt has lung cancer and was last seen Monday. Pt has cough, and wears oxygen at night. Pt is A&Ox2 with EMS at this time and able to stand and sit with EMS assistance.    Keshia Orona RN

## 2023-07-28 NOTE — ED PROVIDER NOTES
EMERGENCY DEPARTMENT ENCOUNTER    Room Number:  15/15  PCP: Tali Greene APRN  Historian: Patient, wife      HPI:  Chief Complaint: Weakness  A complete HPI/ROS/PMH/PSH/SH/FH are unobtainable due to: Nothing  Context: Giovani España is a 78 y.o. male who presents to the ED c/o generalized weakness, lack of appetite, increased sleepiness.  His wife provides most of the history.  She reports that he had his last round of immunotherapy, Keytruda, on Monday, and his symptoms have been gradually progressive since then.  He has had some associated diarrhea but no reported black or bloody stool.  No vomiting.  She reports that he just wants to sleep all the time.  Today he was too weak to stand.    He typically wears oxygen at night.  He denies fever or chills.  He denies increased cough.        PAST MEDICAL HISTORY  Active Ambulatory Problems     Diagnosis Date Noted    Diverticulitis of large intestine with perforation and abscess without bleeding 09/14/2021    Type 2 diabetes mellitus 09/14/2021    COPD (chronic obstructive pulmonary disease) 09/14/2021    HTN (hypertension) 09/14/2021    MGUS (monoclonal gammopathy of unknown significance) 09/14/2021    History of DVT (deep vein thrombosis) 09/14/2021    Diastolic dysfunction 09/14/2021    Presence of IVC filter 09/14/2021    Chronic anticoagulation 09/14/2021    Pulmonary nodule 09/15/2021    At risk for malnutrition 08/11/2022    Counseling regarding advance care planning and goals of care 08/11/2022    Nodule of lower lobe of left lung 08/18/2022    Tobacco use disorder 09/08/2022    Atrial fibrillation with RVR 09/28/2022    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    Long-term use of high-risk medication 11/08/2022    Fitting and adjustment of vascular catheter 12/05/2022     Resolved Ambulatory Problems     Diagnosis Date Noted    Abdominal pain 09/14/2021    Nausea and vomiting 09/14/2021    LEXIE (acute kidney injury) 09/14/2021    Partial small bowel  obstruction 09/15/2021     Past Medical History:   Diagnosis Date    Arthritis     Diabetes mellitus     Diverticulitis     DVT, lower extremity     Elevated cholesterol     H/O Cervical pain     History of colitis     Hyperlipidemia     Hypertension     Left shoulder pain     Low back pain     Lung cancer 2022    On anticoagulant therapy     Peripheral arterial disease     Right leg claudication     Skin cancer          PAST SURGICAL HISTORY  Past Surgical History:   Procedure Laterality Date    BALLOON ANGIOPLASTY, ARTERY Right 2015    IR Percutaneious IVC filter placement    INGUINAL HERNIA REPAIR Left     KNEE ARTHROSCOPY Left     Torn meniscus    LOBECTOMY Left 9/23/2022    Procedure: BRONCHOSCOPY, LEFT VIDEO ASSISTED THORACOSCOPY, ROBOT ASSISTED TOTAL DECORTICATION  LEFT LUNG, LEFT LOWER LOBE LOBECTOMY, MEDIASTINAL LYMPHECTOMY, INTERCOSTAL NERVE BLOCK;  Surgeon: Nicola Joe III, MD;  Location: [facility] OR;  Service: Robotics - [employer];  Laterality: Left;    VENOUS ACCESS DEVICE (PORT) INSERTION Right 11/21/2022    Procedure: INSERTION VENOUS ACCESS DEVICE;  Surgeon: Nicola Joe III, MD;  Location: [facility] OR;  Service: Thoracic;  Laterality: Right;         FAMILY HISTORY  Family History   Problem Relation Age of Onset    No Known Problems Mother     Cancer Father     Lung cancer Father     Diabetes Brother     Other Daughter         S/P stent, age 42    No Known Problems Son     [place] Hyperthermia Neg Hx          SOCIAL HISTORY  Social History     Socioeconomic History    Marital status:     Years of education: 1 year college   Tobacco Use    Smoking status: Every Day     Packs/day: 1.00     Years: 59.00     Pack years: 59.00     Types: Cigarettes     Start date: 1963    Smokeless tobacco: Never    Tobacco comments:     9/8/22: Down to Less than 1 PPD   Vaping Use    Vaping Use: Never used   Substance and Sexual Activity    Alcohol use: Not Currently    Drug use: Never    Sexual  activity: Defer         ALLERGIES  Benadryl [diphenhydramine]        REVIEW OF SYSTEMS  Review of Systems   Review of all 14 systems is negative other than stated in the HPI above.      PHYSICAL EXAM  ED Triage Vitals [07/28/23 1017]   Temp Heart Rate Resp BP SpO2   98.1 øF (36.7 øC) 66 18 112/55 94 %      Temp src Heart Rate Source Patient Position BP Location FiO2 (%)   Tympanic Monitor Sitting Right arm --       Physical Exam      GENERAL: Awake and alert, appears generally weak, no acute distress  HENT: nares patent  EYES: no scleral icterus  CV: regular rhythm, normal rate  RESPIRATORY: normal effort, nasal cannula oxygen in place, mild rhonchi throughout bilateral lung fields  ABDOMEN: soft, nondistended, nontender throughout  MUSCULOSKELETAL: no deformity, no peripheral edema  NEURO: alert, moves all extremities, follows commands, cranial nerves II through XII grossly intact, speech fluent and clear  PSYCH:  calm, cooperative  SKIN: warm, dry, chronic venous stasis changes particular on the right anterior lower leg with minimal erythema but no warmth    Vital signs and nursing notes reviewed.          LAB RESULTS  Recent Results (from the past 24 hour(s))   Comprehensive Metabolic Panel    Collection Time: 07/28/23 11:32 AM    Specimen: Blood   Result Value Ref Range    Glucose 68 65 - 99 mg/dL    BUN 19 8 - 23 mg/dL    Creatinine 1.54 (H) 0.76 - 1.27 mg/dL    Sodium 125 (L) 136 - 145 mmol/L    Potassium 4.7 3.5 - 5.2 mmol/L    Chloride 93 (L) 98 - 107 mmol/L    CO2 21.4 (L) 22.0 - 29.0 mmol/L    Calcium 9.5 8.6 - 10.5 mg/dL    Total Protein 6.3 6.0 - 8.5 g/dL    Albumin 3.6 3.5 - 5.2 g/dL    ALT (SGPT) 12 1 - 41 U/L    AST (SGOT) 21 1 - 40 U/L    Alkaline Phosphatase 65 39 - 117 U/L    Total Bilirubin 0.7 0.0 - 1.2 mg/dL    Globulin 2.7 gm/dL    A/G Ratio 1.3 g/dL    BUN/Creatinine Ratio 12.3 7.0 - 25.0    Anion Gap 10.6 5.0 - 15.0 mmol/L    eGFR 46.5 (L) >60.0 mL/min/1.73   Protime-INR    Collection Time:  07/28/23 11:32 AM    Specimen: Blood   Result Value Ref Range    Protime 34.6 (H) 11.7 - 14.2 Seconds    INR 3.34 (H) 0.90 - 1.10   CBC Auto Differential    Collection Time: 07/28/23 11:32 AM    Specimen: Blood   Result Value Ref Range    WBC 4.65 3.40 - 10.80 10*3/mm3    RBC 4.01 (L) 4.14 - 5.80 10*6/mm3    Hemoglobin 12.4 (L) 13.0 - 17.7 g/dL    Hematocrit 37.9 37.5 - 51.0 %    MCV 94.5 79.0 - 97.0 fL    MCH 30.9 26.6 - 33.0 pg    MCHC 32.7 31.5 - 35.7 g/dL    RDW 13.1 12.3 - 15.4 %    RDW-SD 45.1 37.0 - 54.0 fl    MPV 8.8 6.0 - 12.0 fL    Platelets 128 (L) 140 - 450 10*3/mm3    Neutrophil % 60.9 42.7 - 76.0 %    Lymphocyte % 19.8 19.6 - 45.3 %    Monocyte % 17.0 (H) 5.0 - 12.0 %    Eosinophil % 1.9 0.3 - 6.2 %    Basophil % 0.2 0.0 - 1.5 %    Immature Grans % 0.2 0.0 - 0.5 %    Neutrophils, Absolute 2.83 1.70 - 7.00 10*3/mm3    Lymphocytes, Absolute 0.92 0.70 - 3.10 10*3/mm3    Monocytes, Absolute 0.79 0.10 - 0.90 10*3/mm3    Eosinophils, Absolute 0.09 0.00 - 0.40 10*3/mm3    Basophils, Absolute 0.01 0.00 - 0.20 10*3/mm3    Immature Grans, Absolute 0.01 0.00 - 0.05 10*3/mm3    nRBC 0.0 0.0 - 0.2 /100 WBC       Ordered the above labs and reviewed the results.        RADIOLOGY  No Radiology Exams Resulted Within Past 24 Hours    Ordered the above noted radiological studies. Reviewed by me in PACS.            PROCEDURES  Procedures            MEDICATIONS GIVEN IN ER  Medications   sodium chloride 0.9 % flush 10 mL (has no administration in time range)   sodium chloride 0.9 % bolus 1,000 mL ( Intravenous Currently Infusing 7/28/23 1323)                   MEDICAL DECISION MAKING, PROGRESS, and CONSULTS    All labs have been independently reviewed by me.  All radiology studies have been reviewed by me and I have also reviewed the radiology report.   EKG's independently viewed and interpreted by me.  Discussion below represents my analysis of pertinent findings related to patient's condition, differential diagnosis,  treatment plan and final disposition.      Additional sources:  - Discussed/ obtained information from independent historians: Patient's wife at bedside    - External (non-ED) record review: Recent oncology notes reviewed and summarized below    - Chronic or social conditions impacting care: N/A    - Shared decision making: Patient and family informed of plan for admission due to low sodium level, generalized weakness      Orders placed during this visit:  Orders Placed This Encounter   Procedures    Comprehensive Metabolic Panel    Protime-INR    CBC Auto Differential    Pulse Oximetry, Continuous    Monitor Blood Pressure    [facility] (on-call MD unless specified) Details    Inpatient Palliative Care Team Consult    SCANNED - TELEMETRY      Insert Peripheral IV    Initiate Observation Status    CBC & Differential           Differential diagnosis includes but is not limited to:    Hyponatremia  Dehydration  Adverse effects of immunotherapy        Independent interpretation of labs, radiology studies, and discussions with consultants:  ED Course as of 07/28/23 1436   Fri Jul 28, 2023   1034 Recent labs reviewed.  Patient's sodium on 7/24/2023 was 126.  Hemoglobin 12.9. [JR]   1036 I reviewed oncology progress note from 7/24/2023.  Patient has a large cell neuroendocrine lung carcinoma.  He has been receiving Keytruda. [JR]   1205 INR(!): 3.34 [JR]   1206 WBC: 4.65 [JR]   1207 Sodium(!): 125 [JR]   1207 Glucose: 68 [JR]   1303 I suspect the patient's ongoing weakness is multifactorial, possibly related to recent Keytruda and also worsening hyponatremia.  He will be admitted for correction of hyponatremia and further evaluation. [JR]   1319 Discussed with Dr. Henry, [facility], who agrees to admit for further management. [JR]      ED Course User Index  [JR] Sukhdev Boyer MD           DIAGNOSIS  Final diagnoses:   Generalized weakness   Hyponatremia         DISPOSITION  Admit            Latest Documented Vital Signs:  As  of 14:36 EDT  BP- 92/69 HR- 62 Temp- 98.1 øF (36.7 øC) (Tympanic) O2 sat- 94%              --    Please note that portions of this were completed with a voice recognition program.       Note Disclaimer: At [facility], we believe that sharing information builds trust and better relationships. You are receiving this note because you are receiving care at [facility] or recently visited. It is possible you will see health information before a provider has talked with you about it. This kind of information can be easy to misunderstand. To help you fully understand what it means for your health, we urge you to discuss this note with your provider.             Sukhdev Boyer MD  07/28/23 1382

## 2023-07-28 NOTE — PLAN OF CARE
Goal Outcome Evaluation:  Plan of Care Reviewed With: patient, significant other        Progress: no change  Outcome Evaluation: Pt from ER this afternoon with AMS and hyponatremia. Ambulated with asst x1 from stretcher to bed. AOx2-3. On cardiac monitoring. Awaiting for MD to see at this time. VSS.

## 2023-07-29 ENCOUNTER — APPOINTMENT (OUTPATIENT)
Dept: GENERAL RADIOLOGY | Facility: HOSPITAL | Age: 78
DRG: 640 | End: 2023-07-29
Payer: MEDICARE

## 2023-07-29 PROBLEM — R41.0 CONFUSION: Status: ACTIVE | Noted: 2023-07-29

## 2023-07-29 PROBLEM — E43 SEVERE MALNUTRITION: Status: ACTIVE | Noted: 2023-07-29

## 2023-07-29 PROBLEM — R53.1 WEAKNESS: Status: ACTIVE | Noted: 2023-07-29

## 2023-07-29 LAB
ANION GAP SERPL CALCULATED.3IONS-SCNC: 13 MMOL/L (ref 5–15)
ANION GAP SERPL CALCULATED.3IONS-SCNC: 8.3 MMOL/L (ref 5–15)
BACTERIA UR QL AUTO: NORMAL /HPF
BASOPHILS # BLD AUTO: 0.03 10*3/MM3 (ref 0–0.2)
BASOPHILS NFR BLD AUTO: 0.7 % (ref 0–1.5)
BILIRUB UR QL STRIP: NEGATIVE
BUN SERPL-MCNC: 14 MG/DL (ref 8–23)
BUN SERPL-MCNC: 15 MG/DL (ref 8–23)
BUN/CREAT SERPL: 13.2 (ref 7–25)
BUN/CREAT SERPL: 15.9 (ref 7–25)
CALCIUM SPEC-SCNC: 9.4 MG/DL (ref 8.6–10.5)
CALCIUM SPEC-SCNC: 9.6 MG/DL (ref 8.6–10.5)
CHLORIDE SERPL-SCNC: 93 MMOL/L (ref 98–107)
CHLORIDE SERPL-SCNC: 94 MMOL/L (ref 98–107)
CLARITY UR: CLEAR
CO2 SERPL-SCNC: 21 MMOL/L (ref 22–29)
CO2 SERPL-SCNC: 23.7 MMOL/L (ref 22–29)
COLOR UR: YELLOW
CORTIS SERPL-MCNC: 0.62 MCG/DL
CORTIS SERPL-MCNC: 0.63 MCG/DL
CORTIS SERPL-MCNC: 5.28 MCG/DL
CORTIS SERPL-MCNC: 7.71 MCG/DL
CREAT SERPL-MCNC: 0.88 MG/DL (ref 0.76–1.27)
CREAT SERPL-MCNC: 1.14 MG/DL (ref 0.76–1.27)
CREAT UR-MCNC: 71.3 MG/DL
DEPRECATED RDW RBC AUTO: 44.3 FL (ref 37–54)
EGFRCR SERPLBLD CKD-EPI 2021: 66.7 ML/MIN/1.73
EGFRCR SERPLBLD CKD-EPI 2021: 89.1 ML/MIN/1.73
EOSINOPHIL # BLD AUTO: 0.1 10*3/MM3 (ref 0–0.4)
EOSINOPHIL NFR BLD AUTO: 2.3 % (ref 0.3–6.2)
ERYTHROCYTE [DISTWIDTH] IN BLOOD BY AUTOMATED COUNT: 13.1 % (ref 12.3–15.4)
GLUCOSE BLDC GLUCOMTR-MCNC: 103 MG/DL (ref 70–130)
GLUCOSE BLDC GLUCOMTR-MCNC: 123 MG/DL (ref 70–130)
GLUCOSE BLDC GLUCOMTR-MCNC: 164 MG/DL (ref 70–130)
GLUCOSE BLDC GLUCOMTR-MCNC: 74 MG/DL (ref 70–130)
GLUCOSE SERPL-MCNC: 123 MG/DL (ref 65–99)
GLUCOSE SERPL-MCNC: 79 MG/DL (ref 65–99)
GLUCOSE UR STRIP-MCNC: NEGATIVE MG/DL
HCT VFR BLD AUTO: 34.6 % (ref 37.5–51)
HGB BLD-MCNC: 11.5 G/DL (ref 13–17.7)
HGB UR QL STRIP.AUTO: ABNORMAL
HYALINE CASTS UR QL AUTO: NORMAL /LPF
IMM GRANULOCYTES # BLD AUTO: 0 10*3/MM3 (ref 0–0.05)
IMM GRANULOCYTES NFR BLD AUTO: 0 % (ref 0–0.5)
INR PPP: 2.63 (ref 0.9–1.1)
KETONES UR QL STRIP: ABNORMAL
LEUKOCYTE ESTERASE UR QL STRIP.AUTO: NEGATIVE
LYMPHOCYTES # BLD AUTO: 0.92 10*3/MM3 (ref 0.7–3.1)
LYMPHOCYTES NFR BLD AUTO: 21.1 % (ref 19.6–45.3)
MAGNESIUM SERPL-MCNC: 1.7 MG/DL (ref 1.6–2.4)
MCH RBC QN AUTO: 30.9 PG (ref 26.6–33)
MCHC RBC AUTO-ENTMCNC: 33.2 G/DL (ref 31.5–35.7)
MCV RBC AUTO: 93 FL (ref 79–97)
MONOCYTES # BLD AUTO: 0.66 10*3/MM3 (ref 0.1–0.9)
MONOCYTES NFR BLD AUTO: 15.2 % (ref 5–12)
NEUTROPHILS NFR BLD AUTO: 2.64 10*3/MM3 (ref 1.7–7)
NEUTROPHILS NFR BLD AUTO: 60.7 % (ref 42.7–76)
NITRITE UR QL STRIP: NEGATIVE
NRBC BLD AUTO-RTO: 0 /100 WBC (ref 0–0.2)
OSMOLALITY UR: 486 MOSM/KG
PH UR STRIP.AUTO: <=5 [PH] (ref 5–8)
PHOSPHATE SERPL-MCNC: 3.1 MG/DL (ref 2.5–4.5)
PLATELET # BLD AUTO: 147 10*3/MM3 (ref 140–450)
PMV BLD AUTO: 8.9 FL (ref 6–12)
POTASSIUM SERPL-SCNC: 4.5 MMOL/L (ref 3.5–5.2)
POTASSIUM SERPL-SCNC: 4.8 MMOL/L (ref 3.5–5.2)
PROT UR QL STRIP: NEGATIVE
PROTHROMBIN TIME: 28.7 SECONDS (ref 11.7–14.2)
RBC # BLD AUTO: 3.72 10*6/MM3 (ref 4.14–5.8)
RBC # UR STRIP: NORMAL /HPF
REF LAB TEST METHOD: NORMAL
SODIUM SERPL-SCNC: 126 MMOL/L (ref 136–145)
SODIUM SERPL-SCNC: 127 MMOL/L (ref 136–145)
SODIUM UR-SCNC: 114 MMOL/L
SP GR UR STRIP: 1.01 (ref 1–1.03)
SQUAMOUS #/AREA URNS HPF: NORMAL /HPF
TSH SERPL DL<=0.05 MIU/L-ACNC: 1.77 UIU/ML (ref 0.27–4.2)
URATE SERPL-MCNC: 4.3 MG/DL (ref 3.4–7)
UROBILINOGEN UR QL STRIP: ABNORMAL
WBC # UR STRIP: NORMAL /HPF
WBC NRBC COR # BLD: 4.35 10*3/MM3 (ref 3.4–10.8)

## 2023-07-29 PROCEDURE — 84100 ASSAY OF PHOSPHORUS: CPT | Performed by: STUDENT IN AN ORGANIZED HEALTH CARE EDUCATION/TRAINING PROGRAM

## 2023-07-29 PROCEDURE — 83735 ASSAY OF MAGNESIUM: CPT | Performed by: STUDENT IN AN ORGANIZED HEALTH CARE EDUCATION/TRAINING PROGRAM

## 2023-07-29 PROCEDURE — 82533 TOTAL CORTISOL: CPT | Performed by: STUDENT IN AN ORGANIZED HEALTH CARE EDUCATION/TRAINING PROGRAM

## 2023-07-29 PROCEDURE — 82570 ASSAY OF URINE CREATININE: CPT | Performed by: STUDENT IN AN ORGANIZED HEALTH CARE EDUCATION/TRAINING PROGRAM

## 2023-07-29 PROCEDURE — 94664 DEMO&/EVAL PT USE INHALER: CPT

## 2023-07-29 PROCEDURE — 82533 TOTAL CORTISOL: CPT | Performed by: INTERNAL MEDICINE

## 2023-07-29 PROCEDURE — 84443 ASSAY THYROID STIM HORMONE: CPT | Performed by: STUDENT IN AN ORGANIZED HEALTH CARE EDUCATION/TRAINING PROGRAM

## 2023-07-29 PROCEDURE — 83935 ASSAY OF URINE OSMOLALITY: CPT | Performed by: STUDENT IN AN ORGANIZED HEALTH CARE EDUCATION/TRAINING PROGRAM

## 2023-07-29 PROCEDURE — 85025 COMPLETE CBC W/AUTO DIFF WBC: CPT | Performed by: INTERNAL MEDICINE

## 2023-07-29 PROCEDURE — 97530 THERAPEUTIC ACTIVITIES: CPT

## 2023-07-29 PROCEDURE — 84550 ASSAY OF BLOOD/URIC ACID: CPT | Performed by: STUDENT IN AN ORGANIZED HEALTH CARE EDUCATION/TRAINING PROGRAM

## 2023-07-29 PROCEDURE — 81001 URINALYSIS AUTO W/SCOPE: CPT | Performed by: INTERNAL MEDICINE

## 2023-07-29 PROCEDURE — 94799 UNLISTED PULMONARY SVC/PX: CPT

## 2023-07-29 PROCEDURE — 85610 PROTHROMBIN TIME: CPT | Performed by: STUDENT IN AN ORGANIZED HEALTH CARE EDUCATION/TRAINING PROGRAM

## 2023-07-29 PROCEDURE — 84300 ASSAY OF URINE SODIUM: CPT | Performed by: STUDENT IN AN ORGANIZED HEALTH CARE EDUCATION/TRAINING PROGRAM

## 2023-07-29 PROCEDURE — 25010000002 COSYNTROPIN PER 0.25 MG: Performed by: STUDENT IN AN ORGANIZED HEALTH CARE EDUCATION/TRAINING PROGRAM

## 2023-07-29 PROCEDURE — 97162 PT EVAL MOD COMPLEX 30 MIN: CPT

## 2023-07-29 PROCEDURE — 94760 N-INVAS EAR/PLS OXIMETRY 1: CPT

## 2023-07-29 PROCEDURE — 71045 X-RAY EXAM CHEST 1 VIEW: CPT

## 2023-07-29 PROCEDURE — 99222 1ST HOSP IP/OBS MODERATE 55: CPT | Performed by: INTERNAL MEDICINE

## 2023-07-29 PROCEDURE — 82948 REAGENT STRIP/BLOOD GLUCOSE: CPT

## 2023-07-29 PROCEDURE — 80048 BASIC METABOLIC PNL TOTAL CA: CPT | Performed by: INTERNAL MEDICINE

## 2023-07-29 PROCEDURE — 94761 N-INVAS EAR/PLS OXIMETRY MLT: CPT

## 2023-07-29 PROCEDURE — 80048 BASIC METABOLIC PNL TOTAL CA: CPT | Performed by: STUDENT IN AN ORGANIZED HEALTH CARE EDUCATION/TRAINING PROGRAM

## 2023-07-29 RX ORDER — HYDROCORTISONE 10 MG/1
15 TABLET ORAL 2 TIMES DAILY WITH MEALS
Status: DISCONTINUED | OUTPATIENT
Start: 2023-07-29 | End: 2023-07-30

## 2023-07-29 RX ORDER — WARFARIN SODIUM 10 MG/1
10 TABLET ORAL
Status: DISCONTINUED | OUTPATIENT
Start: 2023-07-31 | End: 2023-07-30 | Stop reason: HOSPADM

## 2023-07-29 RX ORDER — WARFARIN SODIUM 5 MG/1
5 TABLET ORAL
Status: DISCONTINUED | OUTPATIENT
Start: 2023-07-29 | End: 2023-07-30 | Stop reason: HOSPADM

## 2023-07-29 RX ORDER — COSYNTROPIN 0.25 MG/ML
0.25 INJECTION, POWDER, FOR SOLUTION INTRAMUSCULAR; INTRAVENOUS ONCE
Status: COMPLETED | OUTPATIENT
Start: 2023-07-29 | End: 2023-07-29

## 2023-07-29 RX ADMIN — ARFORMOTEROL TARTRATE 15 MCG: 15 SOLUTION RESPIRATORY (INHALATION) at 20:02

## 2023-07-29 RX ADMIN — ATORVASTATIN CALCIUM 10 MG: 20 TABLET, FILM COATED ORAL at 09:22

## 2023-07-29 RX ADMIN — TAMSULOSIN HYDROCHLORIDE 0.4 MG: 0.4 CAPSULE ORAL at 09:22

## 2023-07-29 RX ADMIN — SODIUM CHLORIDE 75 ML/HR: 9 INJECTION, SOLUTION INTRAVENOUS at 09:27

## 2023-07-29 RX ADMIN — WARFARIN SODIUM 5 MG: 5 TABLET ORAL at 18:13

## 2023-07-29 RX ADMIN — ARFORMOTEROL TARTRATE 15 MCG: 15 SOLUTION RESPIRATORY (INHALATION) at 07:11

## 2023-07-29 RX ADMIN — ISOSORBIDE MONONITRATE 60 MG: 60 TABLET, EXTENDED RELEASE ORAL at 16:07

## 2023-07-29 RX ADMIN — Medication 2000 UNITS: at 09:22

## 2023-07-29 RX ADMIN — COSYNTROPIN 0.25 MG: 0.25 INJECTION, POWDER, LYOPHILIZED, FOR SOLUTION INTRAMUSCULAR; INTRAVENOUS at 15:09

## 2023-07-29 RX ADMIN — HYDROCORTISONE 15 MG: 10 TABLET ORAL at 18:13

## 2023-07-29 RX ADMIN — BUDESONIDE 0.5 MG: 0.5 INHALANT ORAL at 20:06

## 2023-07-29 RX ADMIN — METOPROLOL SUCCINATE 25 MG: 25 TABLET, EXTENDED RELEASE ORAL at 16:07

## 2023-07-29 RX ADMIN — BUDESONIDE 0.5 MG: 0.5 INHALANT ORAL at 07:05

## 2023-07-29 NOTE — CONSULTS
Subjective     REASON FOR CONSULTATION:  large cell neuroendocrine tumor of the lung, hyponatremia  Provide an opinion on any further workup or treatment                             REQUESTING PHYSICIAN:  Felisha    RECORDS OBTAINED:  Records of the patients history including those obtained from the referring provider were reviewed and summarized in detail.    History of Present Illness   This is a 78-year-old man followed in our practice for large cell neuroendocrine tumor of the lung.  He also has multiple comorbidities including type 2 diabetes, COPD, hypertension, diastolic heart failure, coronary artery disease, peripheral vascular disease, prior DVT with IVC filter, etc.  He was noted in summer 2022 to have a nodule in the right lung base for which a PET scan was done 7/13/2022 showing a hypermetabolic spiculated nodule 1.5 x 1.6 cm SUV 9.3 enlarged left hilar lymph nodes 1.5 x 1.3 cm.  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  He was treated with postoperative carboplatin/Taxol with radiation completed January 2023.  He then started Keytruda every 3 weeks anticipating 18 cycles of treatment.  He received cycle 7 on 7/24/2023.  Most recent CT chest abdomen pelvis 6/8/2023 showed postsurgical changes in the left hemothorax without evidence of recurrent disease.    The patient presented to the ER 7/28/2023 with generalized weakness lack of appetite and somnolence progressive over about 1 week.  He had associated diarrhea without melena or hematochezia no fever reported.  The wife reports 3 episodes of diarrhea without blood.  He has just become more lethargic and weak to the point he could not support his weight.  No fever, increased cough shortness of breath dysuria.  The patient's labs showed white blood cell count 4.65, hemoglobin 12.4,  platelets 128, normal white blood cell differential.  CMP pertinent for creatinine 1.54 from baseline 1.02 and sodium 125 from 126 5 days previous from 133 3 weeks previous.  He was provided IV fluids with repeat creatinine this morning 1.14 sodium 126.  Magnesium is 1.7 phosphorus 3.1 uric acid 4.3 potassium 4.8 TSH 1.77.    Vitals have shown no fever, blood pressure soft on admission 92/69 presently 115/66.  He is on room air.    Past Medical History:   Diagnosis Date    Arthritis     COPD (chronic obstructive pulmonary disease)     O2 3L AT HS    Diabetes mellitus     DIET CONTROL    Diverticulitis     DVT, lower extremity     RLE    Elevated cholesterol     H/O Cervical pain     History of colitis     Hyperlipidemia     Hypertension     Left shoulder pain     Low back pain     Lung cancer 2022    LEFT LUNG    On anticoagulant therapy     Peripheral arterial disease     Right leg claudication     Skin cancer     HX        Past Surgical History:   Procedure Laterality Date    BALLOON ANGIOPLASTY, ARTERY Right 2015    IR Percutaneious IVC filter placement    INGUINAL HERNIA REPAIR Left     KNEE ARTHROSCOPY Left     Torn meniscus    LOBECTOMY Left 9/23/2022    Procedure: BRONCHOSCOPY, LEFT VIDEO ASSISTED THORACOSCOPY, ROBOT ASSISTED TOTAL DECORTICATION  LEFT LUNG, LEFT LOWER LOBE LOBECTOMY, MEDIASTINAL LYMPHECTOMY, INTERCOSTAL NERVE BLOCK;  Surgeon: Nicola Joe III, MD;  Location: [facility];  Service: Robotics - [facility];  Laterality: Left;    VENOUS ACCESS DEVICE (PORT) INSERTION Right 11/21/2022    Procedure: INSERTION VENOUS ACCESS DEVICE;  Surgeon: Nicola Joe III, MD;  Location: [facility];  Service: Thoracic;  Laterality: Right;        No current facility-administered medications on file prior to encounter.     Current Outpatient Medications on File Prior to Encounter   Medication Sig Dispense Refill    arformoterol (BROVANA) 15 MCG/2ML nebulizer solution Take 2 mL by nebulization 2 (Two)  Times a Day.      cetirizine (zyrTEC) 10 MG tablet Take 1 tablet by mouth Daily.      Cholecalciferol 50 MCG (2000 UT) capsule Take 1 capsule by mouth Daily.      isosorbide mononitrate (IMDUR) 60 MG 24 hr tablet Take 1 tablet by mouth Every Evening.      simvastatin (ZOCOR) 20 MG tablet Take 1 tablet by mouth Every Night.      tamsulosin (FLOMAX) 0.4 MG capsule 24 hr capsule Take 1 capsule by mouth Daily. 30 capsule 5    warfarin (COUMADIN) 5 MG tablet Take 1 tablet by mouth Daily. Take 10mg on 9/29/22 and then resume regular home schedule.      acyclovir (ZOVIRAX) 400 MG tablet Take 1 tablet by mouth 2 (Two) Times a Day. 60 tablet 2    Budeson-Glycopyrrol-Formoterol (BREZTRI) 160-9-4.8 MCG/ACT aerosol inhaler       budesonide (PULMICORT) 0.5 MG/2ML nebulizer solution Take 2 mL by nebulization 2 (Two) Times a Day.      fluticasone (FLONASE) 50 MCG/ACT nasal spray 1 spray into the nostril(s) as directed by provider Daily.      metoprolol succinate XL (TOPROL-XL) 25 MG 24 hr tablet Take 1 tablet by mouth Daily for 30 days. (Patient taking differently: Take 1 tablet by mouth Every Evening. Patient taking half tab) 30 tablet 0    ondansetron (Zofran) 8 MG tablet Take 1 tablet by mouth Every 8 (Eight) Hours As Needed for Nausea or Vomiting. 30 tablet 1        ALLERGIES:    Allergies   Allergen Reactions    Benadryl [Diphenhydramine] Other (See Comments)     Extreme restlessness and insomnia        Social History     Socioeconomic History    Marital status:     Years of education: 1 year college   Tobacco Use    Smoking status: Every Day     Packs/day: 1.00     Years: 59.00     Pack years: 59.00     Types: Cigarettes     Start date: 1963    Smokeless tobacco: Never    Tobacco comments:     9/8/22: Down to Less than 1 PPD   Vaping Use    Vaping Use: Never used   Substance and Sexual Activity    Alcohol use: Not Currently    Drug use: Never    Sexual activity: Defer        Family History   Problem Relation Age of  Onset    No Known Problems Mother     Cancer Father     Lung cancer Father     Diabetes Brother     Other Daughter         S/P stent, age 42    No Known Problems Son     [place] Hyperthermia Neg Hx         Review of Systems   Constitutional:  Positive for activity change and fatigue. Negative for fever.   HENT:  Positive for hearing loss.    Respiratory:  Positive for cough (Chronic stable) and shortness of breath (Chronic stable).    Cardiovascular:  Negative for palpitations and leg swelling.   Gastrointestinal:  Positive for diarrhea (Fairly minimal). Negative for blood in stool, nausea and vomiting.   Musculoskeletal:  Positive for arthralgias and gait problem.   Skin:  Negative for rash.   Neurological:  Positive for weakness. Negative for dizziness and light-headedness.   Hematological:  Does not bruise/bleed easily.   Psychiatric/Behavioral:  Positive for confusion (Mild). Negative for dysphoric mood. The patient is not nervous/anxious.         Objective     Vitals:    07/28/23 1932 07/29/23 0439 07/29/23 0705 07/29/23 0711   BP:  115/66     BP Location:  Right arm     Patient Position:  Lying     Pulse: 72 71 87 79   Resp: 16 16 16    Temp:  97.9 øF (36.6 øC)     TempSrc:  Oral     SpO2: 97% 100% 100% 100%   Weight:       Height:             7/24/2023     1:50 PM   Current Status   ECOG score 1       Physical Exam    CONSTITUTIONAL: pleasant well-developed adult man  HEENT: no icterus, no thrush, moist membranes hard of hearing  LYMPH: no cervical or supraclavicular lad  CV: RRR, S1S2, no murmur  RESP: Diminished and coarse breath sounds bilaterally  GI: soft, non-tender, no splenomegaly, +bs  MUSC: no edema   NEURO: alert and oriented x3, mild global weakness  PSYCH: normal mood and affect    RECENT LABS:  Hematology WBC   Date Value Ref Range Status   07/29/2023 4.35 3.40 - 10.80 10*3/mm3 Final     RBC   Date Value Ref Range Status   07/29/2023 3.72 (L) 4.14 - 5.80 10*6/mm3 Final     Hemoglobin   Date Value  Ref Range Status   07/29/2023 11.5 (L) 13.0 - 17.7 g/dL Final     Hematocrit   Date Value Ref Range Status   07/29/2023 34.6 (L) 37.5 - 51.0 % Final     Platelets   Date Value Ref Range Status   07/29/2023 147 140 - 450 10*3/mm3 Final        Lab Results   Component Value Date    GLUCOSE 79 07/29/2023    BUN 15 07/29/2023    CREATININE 1.14 07/29/2023    EGFR 66.7 07/29/2023    BCR 13.2 07/29/2023    K 4.8 07/29/2023    CO2 23.7 07/29/2023    CALCIUM 9.6 07/29/2023    PROTENTOTREF 7.4 07/21/2022    ALBUMIN 3.6 07/28/2023    BILITOT 0.7 07/28/2023    AST 21 07/28/2023    ALT 12 07/28/2023       Assessment & Plan     *Large cell neuroendocrine tumor left lower lobe lung, [id number]  Surgically resected left lower lobectomy 9/23/2022 with positive margin  Status post adjuvant carboplatin/Taxol with concurrent radiation completed January 2023  Receiving Keytruda every 3 weeks-C7 7/24/2023  CT chest abdomen pelvis as of 6/8/2023 [facility]    *Weakness/lethargy/mild confusion  No fevers, chills, increased shortness of breath/cough or dysuria to suggest infection  No other recent medication changes  No headaches  Sodium recently declining from normal to 125 which could have contributed?    *Hyponatremia  The patient has had some decreased oral intake and mild diarrhea  TSH is normal 1.77 serum sodium and osmolality pending    *Acute kidney injury  Creatinine baseline around 1; 1.54 on admission  Creatinine improved to 1.14 after IV fluids overnight    *Arthralgias-rheumatoid factor pending 7/25/2023    *Comorbidities otherwise diabetes mellitus, COPD requiring oxygen at night, diabetes, hypertension, compensated diastolic heart failure      Oncology plan/recommendations:  Check chest x-ray rule out pneumonia given coarse breath sounds and hyponatremia  Check UA culture if indicated   check ACTH and cortisol to evaluate adrenal insufficiency from immunotherapy  Consider brain imaging if work-up otherwise negative  IV fluids per  hospitalist

## 2023-07-29 NOTE — PLAN OF CARE
Goal Outcome Evaluation:  Plan of Care Reviewed With: patient        Progress: no change       Pt on heart monitor. Pt forgetful and confused at times. Assist x 1 and the walker. Urinal at bed side.

## 2023-07-29 NOTE — PROGRESS NOTES
[facility] Clinical Pharmacy Services: Warfarin Dosing/Monitoring Consult    Giovani España is a 78 y.o. male, estimated creatinine clearance is 52.7 mL/min (by C-G formula based on SCr of 1.14 mg/dL). weighing 67.6 kg (149 lb 0.5 oz).    Results from last 7 days   Lab Units 07/29/23  1345 07/29/23  0604 07/28/23  1132 07/24/23  1320   INR  2.63*  --  3.34*  --    HEMOGLOBIN g/dL  --  11.5* 12.4* 12.9*   HEMATOCRIT %  --  34.6* 37.9 38.8   PLATELETS 10*3/mm3  --  147 128* 152     Prior to admission anticoagulation: RN verified with patient on 7/29: warfarin 5mg daily with additional 5mg on Monday and Friday.    Hospital Anticoagulation:  Consulting provider: Dr. Baeza  Start date: on PTA  Indication: history of DVT/PE  Target INR: 2 - 3  Expected duration:    Bridge Therapy: No      Potential food or drug interactions: no new interacting medications  Decreased oral intake and mild diarrhea at admission per MD notes    Education complete?/Date: No, on warfarin prior to admission    Assessment/Plan:  Dose: INR slightly above goal yesterday but back in range today. Last dose reported 7/27 by patient. Will resume home regimen for now: warfarin 5mg daily with additional 5mg on Monday and Friday.  Monitor for any signs or symptoms of bleeding  Follow up daily INRs and dose adjustments    Pharmacy will continue to follow until discharge or discontinuation of warfarin.     Beverley Short, PharmD  Clinical Pharmacist

## 2023-07-29 NOTE — PLAN OF CARE
Problem: Malnutrition  Goal: Improved Nutritional Intake  Outcome: Ongoing, Progressing  Intervention: Promote and Optimize Oral Intake  Flowsheets (Taken 7/29/2023 7503)  Oral Nutrition Promotion:   calorie-dense liquids provided   nutritional therapy counseling provided   Goal Outcome Evaluation:   Boost Plus BID (chocolate) ordered.

## 2023-07-29 NOTE — CONSULTS
Nutrition Services    Patient Name:  Giovani Esapña  YOB: 1945  MRN: [id number]  Admit Date:  7/28/2023    Assessment Date:  07/29/23    Comment: Consulted per RN Admit Screen for wt loss/GENEVIEVE:  Consulted per RN Admit Screen for MST 2 for wt loss and GENEVIEVE. Pt with 11 lb (7%) wt loss x 1 month. Pt admitted with weakness, malaise and poor po intake. Pt on Regular diet and tolerating better since in hospital with 50% po intake at breakfast and 75% po intake at lunch today. Spoke with pt's wife who stated pt occasionally drinks Ensure at home but not routinely. Agreed to chocolate Boost Plus, will send with meals.     Severe Malnutrition (Based on ASPEN/AND criteria, pt meets nutrition diagnosis of Severe Malnutrition of Chronic Disease due to inadequate po intake and 11 lb (7%) wt loss x 1 month.)    Recommend:  Boost Plus BID (chocolate), will order.    Will follow per protocol.    CLINICAL NUTRITION ASSESSMENT      Reason for Assessment Malnutrition Severity Assessment (MSA), MST score 2+, Nurse Admission Screen     Diagnosis/Problem   CC: weakness, malaise, poor po intake  Dx: Hyponatremia, lung cancer, COPD, DM, HTN, PAD, LEXIE   Medical/Surgical History Past Medical History:   Diagnosis Date    Arthritis     COPD (chronic obstructive pulmonary disease)     O2 3L AT HS    Diabetes mellitus     DIET CONTROL    Diverticulitis     DVT, lower extremity     RLE    Elevated cholesterol     H/O Cervical pain     History of colitis     Hyperlipidemia     Hypertension     Left shoulder pain     Low back pain     Lung cancer 2022    LEFT LUNG    On anticoagulant therapy     Peripheral arterial disease     Right leg claudication     Skin cancer     HX       Past Surgical History:   Procedure Laterality Date    BALLOON ANGIOPLASTY, ARTERY Right 2015    IR Percutaneious IVC filter placement    INGUINAL HERNIA REPAIR Left     KNEE ARTHROSCOPY Left     Torn meniscus    LOBECTOMY Left 9/23/2022    Procedure:  "BRONCHOSCOPY, LEFT VIDEO ASSISTED THORACOSCOPY, ROBOT ASSISTED TOTAL DECORTICATION  LEFT LUNG, LEFT LOWER LOBE LOBECTOMY, MEDIASTINAL LYMPHECTOMY, INTERCOSTAL NERVE BLOCK;  Surgeon: Nicola Joe III, MD;  Location: [facility];  Service: Robotics - [facility];  Laterality: Left;    VENOUS ACCESS DEVICE (PORT) INSERTION Right 11/21/2022    Procedure: INSERTION VENOUS ACCESS DEVICE;  Surgeon: Nicola Joe III, MD;  Location: [facility];  Service: Thoracic;  Laterality: Right;        Encounter Information        Nutrition History:  Consulted per RN Admit Screen for MST 2 for wt loss and GENEVIEVE. Pt with 11 lb (7%) wt loss x 1 month. Pt admitted with weakness, malaise and poor po intake. Pt on Regular diet and tolerating better since in hospital with 50% po intake at breakfast and 75% po intake at lunch today. Spoke with pt's wife who stated pt occasionally drinks Ensure at home but not routinely. Agreed to chocolate Boost Plus, will send with meals.    Food Preferences:    Supplements:    Factors Affecting Intake: decreased appetite, fatigue, weakness     Anthropometrics        Current Height  Current Weight  BMI kg/m2 Height: 182.9 cm (72\")  Weight: 67.6 kg (149 lb 0.5 oz) (07/29/23 1300)  Body mass index is 20.21 kg/mý.   Adjusted BMI (if applicable)    BMI Category Normal/Healthy (18.4 - 24.9)       Admission Weight 149 lb        Ideal Body Weight (IBW) 171 lb   Adjusted IBW (if applicable)        Usual Body Weight (UBW) 160 lb   Weight Change/Trend Loss, Amount/Timeframe: 11 lb (7%) wt loss x 1 month       Weight History Wt Readings from Last 30 Encounters:   07/29/23 1300 67.6 kg (149 lb 0.5 oz)   07/28/23 1035 70.3 kg (155 lb)   07/24/23 1349 71.5 kg (157 lb 9.6 oz)   07/03/23 0850 72.3 kg (159 lb 6.4 oz)   06/12/23 0928 72.7 kg (160 lb 4.8 oz)   05/22/23 1044 72.8 kg (160 lb 8 oz)   05/01/23 0927 71.4 kg (157 lb 8 oz)   04/10/23 1108 72.2 kg (159 lb 1.6 oz)   04/03/23 0804 71.4 kg (157 lb 4.8 oz) "   03/20/23 0840 71.6 kg (157 lb 12.8 oz)   03/10/23 1023 73.2 kg (161 lb 6.4 oz)   03/01/23 1535 70.6 kg (155 lb 9.6 oz)   02/24/23 0854 73.6 kg (162 lb 3.2 oz)   01/11/23 0954 68.9 kg (151 lb 14.4 oz)   01/09/23 0822 70.9 kg (156 lb 3.2 oz)   01/04/23 0852 69.9 kg (154 lb 1.6 oz)   01/04/23 1225 70.5 kg (155 lb 6.4 oz)   12/28/22 1036 70.2 kg (154 lb 11.2 oz)   12/27/22 0832 70.7 kg (155 lb 12.8 oz)   12/19/22 0837 70.2 kg (154 lb 11.2 oz)   12/19/22 1341 71.7 kg (158 lb)   12/12/22 0948 69.9 kg (154 lb 1.6 oz)   12/12/22 1353 70.4 kg (155 lb 3.2 oz)   12/05/22 1306 70.1 kg (154 lb 9.6 oz)   12/05/22 0855 70.1 kg (154 lb 9.6 oz)   11/28/22 0942 69.6 kg (153 lb 8 oz)   11/18/22 0832 70.4 kg (155 lb 1.6 oz)   11/17/22 0929 69.6 kg (153 lb 8 oz)   11/16/22 0810 70 kg (154 lb 4.8 oz)   11/09/22 1330 68.9 kg (151 lb 12.8 oz)   11/09/22 0823 69.3 kg (152 lb 12.8 oz)           --  Estimated/Assessed Needs        Current Weight  Weight: 67.6 kg (149 lb 0.5 oz) (07/29/23 1300)       Energy Requirements    Weight for Calculation 67.6 kg   Method for Estimation  25 kcal/kg, 30 kcal/kg   EST Needs (kcal/day) 0892-1652 kcals       Protein Requirements    Weight for Calculation 67.6 kg   EST Protein Needs (g/kg) 1.2 - 1.5 gm/kg   EST Daily Needs (g/day)  g       Fluid Requirements     Method for Estimation Defer to physician    EST Needs (mL/day)      Tests/Procedures        Tests/Procedures X-Ray     Labs       Pertinent Labs    Results from last 7 days   Lab Units 07/29/23  0604 07/28/23  1132 07/24/23  1320   SODIUM mmol/L 126* 125* 126*   POTASSIUM mmol/L 4.8 4.7 4.3   CHLORIDE mmol/L 94* 93* 91*   CO2 mmol/L 23.7 21.4* 23.6   BUN mg/dL 15 19 15   CREATININE mg/dL 1.14 1.54* 1.02   CALCIUM mg/dL 9.6 9.5 9.8   BILIRUBIN mg/dL  --  0.7 0.6   ALK PHOS U/L  --  65 63   ALT (SGPT) U/L  --  12 11   AST (SGOT) U/L  --  21 24   GLUCOSE mg/dL 79 68 146*     Results from last 7 days   Lab Units 07/29/23  0604 07/28/23  1132    MAGNESIUM mg/dL 1.7  --    PHOSPHORUS mg/dL 3.1  --    HEMOGLOBIN g/dL 11.5* 12.4*   HEMATOCRIT % 34.6* 37.9   WBC 10*3/mm3 4.35 4.65   ALBUMIN g/dL  --  3.6     Results from last 7 days   Lab Units 07/29/23  0604 07/28/23  1132 07/24/23  1320   INR   --  3.34*  --    PLATELETS 10*3/mm3 147 128* 152     COVID19   Date Value Ref Range Status   08/16/2022 Not Detected Not Detected - Ref. Range Final     Lab Results   Component Value Date    HGBA1C 6.2 (H) 04/19/2023          Medications           Scheduled Medications arformoterol, 15 mcg, Nebulization, BID - RT  atorvastatin, 10 mg, Oral, Daily  budesonide, 0.5 mg, Nebulization, BID  cholecalciferol, 2,000 Units, Oral, Daily  cosyntropin, 0.25 mg, Intravenous, Once  insulin lispro, 2-7 Units, Subcutaneous, 4x Daily AC & at Bedtime  isosorbide mononitrate, 60 mg, Oral, Q PM  metoprolol succinate XL, 25 mg, Oral, Q PM  senna-docusate sodium, 2 tablet, Oral, BID  tamsulosin, 0.4 mg, Oral, Daily       Infusions Pharmacy to dose warfarin,   Pharmacy to dose warfarin,   sodium chloride, 75 mL/hr, Last Rate: 75 mL/hr (07/29/23 1141)       PRN Medications   acetaminophen **OR** acetaminophen **OR** acetaminophen    senna-docusate sodium **AND** polyethylene glycol **AND** bisacodyl **AND** bisacodyl    dextrose    dextrose    glucagon (human recombinant)    ondansetron    Pharmacy to dose warfarin    Pharmacy to dose warfarin    [COMPLETED] Insert Peripheral IV **AND** sodium chloride     Physical Findings          Physical Appearance alert, disoriented, room air   Oral/Mouth Cavity WNL   Edema  no edema   Gastrointestinal diarrhea, last bowel movement: 7/29   Skin  skin intact   Tubes/Drains/Lines none   NFPE See Malnutrition Severity Assessment, Unable to perform due to: remote today, Date Completed: 7/29   --  Malnutrition Severity Assessment      Patient meets criteria for : Severe Malnutrition (Based on ASPEN/AND criteria, pt meets nutrition diagnosis of Severe  Malnutrition of Chronic Disease due to inadequate po intake and 11 lb (7%) wt loss x 1 month.)  Malnutrition Type (last 8 hours)       Malnutrition Severity Assessment       Row Name 07/29/23 1351       Malnutrition Severity Assessment    Malnutrition Type Chronic Disease - Related Malnutrition      Row Name 07/29/23 1351       Insufficient Energy Intake     Insufficient Energy Intake Findings Severe    Insufficient Energy Intake  <75% of est. energy requirement for > or equal to 1 month      Row Name 07/29/23 1351       Unintentional Weight Loss     Unintentional Weight Loss Findings Severe    Unintentional Weight Loss  Weight loss greater than 5% in one month  11 lb (7%) wt loss x 1 month      Row Name 07/29/23 1351       Criteria Met (Must meet criteria for severity in at least 2 of these categories: M Wasting, Fat Loss, Fluid, Secondary Signs, Wt. Status, Intake)    Patient meets criteria for  Severe Malnutrition  Based on ASPEN/AND criteria, pt meets nutrition diagnosis of Severe Malnutrition of Chronic Disease due to inadequate po intake and 11 lb (7%) wt loss x 1 month.                       Current Nutrition Orders & Evaluation of Intake       Oral Nutrition     Food Allergies NKFA   Current PO Diet Diet: Regular/House Diet; Texture: Regular Texture (IDDSI 7); Fluid Consistency: Thin (IDDSI 0)   Supplement n/a   PO Evaluation     % PO Intake 50% breakfast, 75% lunch    # of Days Evaluated 1   --  PES STATEMENT / NUTRITION DIAGNOSIS      Nutrition Dx Problem  Problem: Malnutrition  Etiology: Factors Affecting Nutrition GENEVIEVE  Signs/Symptoms: Report of Minimal PO Intake and Unintended Weight Change    Comment:    --  NUTRITION INTERVENTION / PLAN OF CARE      Intervention Goal(s) Maintain nutrition status, Reduce/improve symptoms, Meet estimated needs, Disease management/therapy, Establish PO intake, Appropriate weight gain, and PO intake goal %: 75%         RD Intervention/Action Follow Tx Progress, Care plan  reviewed, and Recommend/ordered:          Prescription/Orders:       PO Diet       Supplements Boost Plus BID      Snacks       Enteral Nutrition       Parenteral Nutrition    New Prescription Ordered? Yes   --      Monitor/Evaluation Per protocol   Discharge Plan/Needs Pending clinical course   Education Will instruct as appropriate   --    RD to follow per protocol.      Electronically signed by:  Cara Khalil RD  07/29/23 13:31 EDT

## 2023-07-29 NOTE — PROGRESS NOTES
Name: Giovani CARMONA España ADMIT: 2023   : 1945  PCP: Tali Greene APRN    MRN: [id number] LOS: 0 days   AGE/SEX: 78 y.o. male  ROOM: [facility]     Subjective   Subjective   Patient seen this morning, spouse present bedside.  Spouse states that after last dose of Keytruda he started having diarrhea, and has not been eating or drinking much.  It happened happened to him during the previous dose of Keytruda but this time it was worse.  She is  slightly confused, however not far off from baseline per spouse.  This morning he is alert, oriented x3.    Review of Systems  As above      Objective   Objective   Vital Signs  Temp:  [97 øF (36.1 øC)-97.9 øF (36.6 øC)] 97.9 øF (36.6 øC)  Heart Rate:  [59-87] 77  Resp:  [16-18] 16  BP: ()/(50-69) 120/65  SpO2:  [94 %-100 %] 94 %  on  Flow (L/min):  [2] 2;   Device (Oxygen Therapy): room air  Body mass index is 21.02 kg/mý.  Physical Exam    General: Alert, laying in bed, not in distress,  HEENT: Normocephalic, atraumatic  CV: Regular rate and rhythm, no murmurs rubs or gallops  Lungs: Clear to auscultation bilaterally, no crackles or wheezes  Abdomen: Soft, nontender, nondistended  Extremities: No significant peripheral edema , generalized weakness    Results Review     I reviewed the patient's new clinical results.  Results from last 7 days   Lab Units 23  0604 23  1132 23  1320   WBC 10*3/mm3 4.35 4.65 5.40   HEMOGLOBIN g/dL 11.5* 12.4* 12.9*   PLATELETS 10*3/mm3 147 128* 152     Results from last 7 days   Lab Units 23  0604 23  1132 23  1320   SODIUM mmol/L 126* 125* 126*   POTASSIUM mmol/L 4.8 4.7 4.3   CHLORIDE mmol/L 94* 93* 91*   CO2 mmol/L 23.7 21.4* 23.6   BUN mg/dL 15 19 15   CREATININE mg/dL 1.14 1.54* 1.02   GLUCOSE mg/dL 79 68 146*   Estimated Creatinine Clearance: 54.8 mL/min (by C-G formula based on SCr of 1.14 mg/dL).  Results from last 7 days   Lab Units 23  1132 23  1320   ALBUMIN g/dL 3.6  3.9   BILIRUBIN mg/dL 0.7 0.6   ALK PHOS U/L 65 63   AST (SGOT) U/L 21 24   ALT (SGPT) U/L 12 11     Results from last 7 days   Lab Units 07/29/23  0604 07/28/23  1132 07/24/23  1320   CALCIUM mg/dL 9.6 9.5 9.8   ALBUMIN g/dL  --  3.6 3.9   MAGNESIUM mg/dL 1.7  --   --    PHOSPHORUS mg/dL 3.1  --   --        COVID19   Date Value Ref Range Status   08/16/2022 Not Detected Not Detected - Ref. Range Final   09/14/2021 Not Detected Not Detected - Ref. Range Final     Glucose   Date/Time Value Ref Range Status   07/29/2023 1112 123 70 - 130 mg/dL Final     Comment:     Meter: [id number] : [id number] Nico KIM   07/29/2023 0718 74 70 - 130 mg/dL Final     Comment:     Meter: [id number] : [id number] Nico KIM   07/28/2023 2059 107 70 - 130 mg/dL Final     Comment:     Meter: [id number] : [id number] Jarod Guzmán JULIO   07/28/2023 1834 109 70 - 130 mg/dL Final     Comment:     Meter: [id number] : [id number] Nayeliana Traore JULIO           XR Chest 1 View  Narrative: CHEST SINGLE VIEW     HISTORY: Abnormal breathing sounds.      COMPARISON: CT chest 06/08/2023, portable chest 11/21/2022.     FINDINGS: There is volume loss on the left where there is pleural  thickening and there is a small left effusion. The right lung appears  clear. There are diffuse mildly increased pulmonary interstitial  markings due to pulmonary emphysema.  Right subclavian Mediport tip is  in the SVC.     Impression: Chronic changes in the chest with volume loss on the left  where there is pleural thickening and small left pleural effusion.  Diffuse pulmonary emphysema. No evidence for significant change.     This report was finalized on 7/29/2023 12:41 PM by Dr. Ricardo Dwyer M.D.       Scheduled Medications  arformoterol, 15 mcg, Nebulization, BID - RT  atorvastatin, 10 mg, Oral, Daily  budesonide, 0.5 mg, Nebulization, BID  cholecalciferol, 2,000 Units, Oral, Daily  cosyntropin, 0.25 mg, Intravenous, Once  insulin lispro,  2-7 Units, Subcutaneous, 4x Daily AC & at Bedtime  isosorbide mononitrate, 60 mg, Oral, Q PM  metoprolol succinate XL, 25 mg, Oral, Q PM  senna-docusate sodium, 2 tablet, Oral, BID  tamsulosin, 0.4 mg, Oral, Daily    Infusions  Pharmacy to dose warfarin,   Pharmacy to dose warfarin,   sodium chloride, 75 mL/hr, Last Rate: 75 mL/hr (07/29/23 1141)    Diet  Diet: Regular/House Diet; Texture: Regular Texture (IDDSI 7); Fluid Consistency: Thin (IDDSI 0)    I have personally reviewed     [x]  Laboratory   [x]  Microbiology   [x]  Radiology   []  EKG/Telemetry  []  Cardiology/Vascular   []  Pathology    []  Records       Assessment/Plan     Active Hospital Problems    Diagnosis  POA    **Hyponatremia [E87.1]  Yes    Weakness [R53.1]  Unknown    Confusion [R41.0]  Unknown    LEXIE (acute kidney injury) [N17.9]  Yes    COPD (chronic obstructive pulmonary disease) [J44.9]  Yes    History of DVT (deep vein thrombosis) [Z86.718]  Not Applicable    Type 2 diabetes mellitus [E11.9]  Yes      Resolved Hospital Problems   No resolved problems to display.       78 y.o. male admitted with Hyponatremia.      Hyponatremia: Sodium 125 on admission, TSH normal.  Urine lites pending.  Hyponatremia partly secondary to hypovolemic hyponatremia secondary to decreased p.o. intake, and diarrhea.  However I think hyponatremia is primarily secondary to adrenal insufficiency.  Sodium improved with IV fluids, 126 this morning.  Patient's cortisol is very low at 0.62 which essentially consistent with adrenal insufficiency especially in the setting of hyponatremia and potassium close to higher end of normal, as usually with adrenal sufficiency labs show hyperkalemia and hyponatremia.  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  Ordered cosyntropin test to confirm.      LEXIE.  Prerenal secondary to diarrhea p.o. intake.  Continue IV fluids, creatinine improving      Mild confusion/weakness.  Per spouse patient not  follow from baseline, likely secondary to hyponatremia as above.  Urinalysis with no signs of infection.  Monitor.        History of DVT: INR was slightly supratherapeutic on admission, likely secondary to decreased p.o. intake/t, diarrhea.  Warfarin on hold, repeat INR, pharmacy consulted for dosing.      COPD: Per spouse he is on oxygen at night.  Not in exacerbation, continue arformoterol, budesonide nebulizer.      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          SCDs for DVT prophylaxis.  Full code.  Discussed with patient and spouse.  Anticipate discharge TBD      Copied text in this note has been reviewed and is accurate as of 07/29/23.         Dictated utilizing Dragon dictation        Chiara Baeza MD  [facility]  07/29/23  13:04 EDT

## 2023-07-29 NOTE — PLAN OF CARE
Goal Outcome Evaluation:  Plan of Care Reviewed With: patient, friend           Outcome Evaluation: Pt is a 79 y/o M who presented to ED with c/p AM, generalized weakness and lack of appetite. Pts SO reports he had his last round of immunotherapy on 7/24 and his symptoms have progressively worsened since then. Pt reports being independent at baseline primarily without AD, but last week has needed to use a rollator due to progressing weakness. Pt is currently SBA for bed mobility, SBA with increased time for STS to RW and CG/SBA for ambulation of 60'. Pt required cueing for RW management and gait mechanics as he would push the RW too far ahead and would begin to shuffle his feet when fatigued around 50'. Educated pt/SO on LE ther-ex to perform throughout the day, to sit UIC for all meals for at least one hour and to ambulate with nsg/therapy to the bathroom and out into the hallway to maintain current mobility; they v/u. Pt presents with decreased strength, impaired balance and decreased endurance. Pt will benefit from skilled PT services to progress his functional mobility. Rec home w/ family assist and HH PT or OP PT following.      Anticipated Discharge Disposition (PT): home with home health, home with outpatient therapy services, home with assist

## 2023-07-29 NOTE — PROGRESS NOTES
[facility]     Progress Note    Patient Name: Giovani España  : 1945  MRN: [id number]  Primary Care Physician:  Tali Greene APRN  Date of admission: 2023    Subjective   Subjective     Chief Complaint: lexie and hyponatremia    History of Present Illness  Patient with history of large cell neuroendocrine lung ca admitted with weakness. Found to have lexie and hyponatremia. With poor po intake.  Currently awake, alert. No complaints.     Review of Systems    Objective   Objective     Vitals:   Temp:  [97 øF (36.1 øC)-97.9 øF (36.6 øC)] 97.9 øF (36.6 øC)  Heart Rate:  [59-87] 77  Resp:  [16-18] 16  BP: (110-121)/(50-66) 120/65  Flow (L/min):  [2] 2    Physical Exam   General Appearance:  In no acute distress.    HEENT: Normal HEENT exam.     Extremities: .  There is no deformity, effusion or dependent edema.    Neurological: Patient is alert     Result Review    Result Review:  I have personally reviewed the results from the time of this admission to 2023 15:15 EDT and agree with these findings:  []  Laboratory list / accordion  []  Microbiology  []  Radiology  []  EKG/Telemetry   []  Cardiology/Vascular   []  Pathology  []  Old records  []  Other:  Most notable findings include:       Assessment & Plan   Assessment / Plan     Brief Patient Summary:  Giovani España is a 78 y.o. male who has lexie and hyponatremia    Active Hospital Problems:  Active Hospital Problems    Diagnosis     **Hyponatremia     Weakness     Confusion     LEXIE (acute kidney injury)     COPD (chronic obstructive pulmonary disease)     History of DVT (deep vein thrombosis)     Type 2 diabetes mellitus    Neuroendocrine lung CA    Plan:   Patient seen and examined. Labs and chart reviewed. With lexie and hyponatremia likely secondary to volume depletion. Per wife, he had diarrhea for two days prior to admission. On ivf with improved labs. Urine osm and na still pending. Continue current ivf        Zenia Cook MD

## 2023-07-29 NOTE — THERAPY EVALUATION
Patient Name: Giovani España  : 1945    MRN: [id number]                              Today's Date: 2023       Admit Date: 2023    Visit Dx:     ICD-10-CM ICD-9-CM   1. Hyponatremia  E87.1 276.1   2. Generalized weakness  R53.1 780.79     Patient Active Problem List   Diagnosis    Diverticulitis of large intestine with perforation and abscess without bleeding    Type 2 diabetes mellitus    COPD (chronic obstructive pulmonary disease)    HTN (hypertension)    MGUS (monoclonal gammopathy of unknown significance)    History of DVT (deep vein thrombosis)    Diastolic dysfunction    Presence of IVC filter    Chronic anticoagulation    Pulmonary nodule    At risk for malnutrition    Counseling regarding advance care planning and goals of care    Nodule of lower lobe of left lung    Tobacco use disorder    Atrial fibrillation with RVR    Neuroendocrine carcinoma of lung    Long-term use of high-risk medication    Fitting and adjustment of vascular catheter    Hyponatremia     Past Medical History:   Diagnosis Date    Arthritis     COPD (chronic obstructive pulmonary disease)     O2 3L AT HS    Diabetes mellitus     DIET CONTROL    Diverticulitis     DVT, lower extremity     RLE    Elevated cholesterol     H/O Cervical pain     History of colitis     Hyperlipidemia     Hypertension     Left shoulder pain     Low back pain     Lung cancer     LEFT LUNG    On anticoagulant therapy     Peripheral arterial disease     Right leg claudication     Skin cancer     HX     Past Surgical History:   Procedure Laterality Date    BALLOON ANGIOPLASTY, ARTERY Right     IR Percutaneious IVC filter placement    INGUINAL HERNIA REPAIR Left     KNEE ARTHROSCOPY Left     Torn meniscus    LOBECTOMY Left 2022    Procedure: BRONCHOSCOPY, LEFT VIDEO ASSISTED THORACOSCOPY, ROBOT ASSISTED TOTAL DECORTICATION  LEFT LUNG, LEFT LOWER LOBE LOBECTOMY, MEDIASTINAL LYMPHECTOMY, INTERCOSTAL NERVE BLOCK;  Surgeon: Nicola Joe  CHANCE REYNOSO MD;  Location: [facility] OR;  Service: Robotics - DaVinci;  Laterality: Left;    VENOUS ACCESS DEVICE (PORT) INSERTION Right 11/21/2022    Procedure: INSERTION VENOUS ACCESS DEVICE;  Surgeon: Nicola Joe III, MD;  Location: [facility] MAIN OR;  Service: Thoracic;  Laterality: Right;      General Information       Row Name 07/29/23 1052          Physical Therapy Time and Intention    Document Type evaluation  -MG     Mode of Treatment individual therapy;physical therapy  -MG       Row Name 07/29/23 1052          General Information    Patient Profile Reviewed yes  -MG     Prior Level of Function independent:  Primarily no AD. Used rollator past week due to increased weakness. Owns a RW, STC, grab bars in bathroom, shower chair, BSC and WC.  -MG     Existing Precautions/Restrictions fall  -MG     Barriers to Rehab medically complex;hearing deficit  [place]  -MG       Row Name 07/29/23 1052          Living Environment    People in Home friend(s)  -MG       Row Name 07/29/23 1052          Home Main Entrance    Number of Stairs, Main Entrance two  Typically comes in through the garage that leads into the basement with full flight up. Back entrance has 5steps with HR.  -MG     Stair Railings, Main Entrance none  -MG       Row Name 07/29/23 1052          Stairs Within Home, Primary    Stairs, Within Home, Primary Basement  -MG     Number of Stairs, Within Home, Primary twelve  -MG     Stair Railings, Within Home, Primary railings safe and in good condition;railing on right side (ascending)  -MG       Row Name 07/29/23 1052          Cognition    Orientation Status (Cognition) oriented x 4  -MG       Row Name 07/29/23 1052          Safety Issues, Functional Mobility    Impairments Affecting Function (Mobility) endurance/activity tolerance;strength;balance  -MG     Comment, Safety Issues/Impairments (Mobility) Gait belt and non-skid socks donned.  -MG               User Key  (r) = Recorded By, (t) = Taken By, (c) =  Cosigned By      Initials Name Provider Type    MG Pau Walton, PT Physical Therapist                   Mobility       Row Name 07/29/23 1208          Bed Mobility    Bed Mobility supine-sit  -MG     Supine-Sit Bruno (Bed Mobility) standby assist  -MG     Assistive Device (Bed Mobility) head of bed elevated  -MG     Comment, (Bed Mobility) Increased time to complete.  -MG       Row Name 07/29/23 1208          Sit-Stand Transfer    Sit-Stand Bruno (Transfers) standby assist  -MG     Assistive Device (Sit-Stand Transfers) walker, front-wheeled  -MG     Comment, (Sit-Stand Transfer) Increased time to complete. Cued for hand placement.  -MG       Row Name 07/29/23 1208          Gait/Stairs (Locomotion)    Bruno Level (Gait) contact guard;standby assist  -MG     Assistive Device (Gait) walker, front-wheeled  -MG     Distance in Feet (Gait) 60  -MG     Deviations/Abnormal Patterns (Gait) gait speed decreased;dwayne decreased;stride length decreased;base of support, narrow  -MG     Bilateral Gait Deviations forward flexed posture;heel strike decreased  -MG     Comment, (Gait/Stairs) Pt ambulated in room/hallway with cueing to remain inside RW as he would push it too far ahead and for heel-toe mechanics as he would be shuffling his feet when fatigued around 50'. No overt LOB or knee buckling noted. No c/o dizziness, SOB or nausea.  -MG               User Key  (r) = Recorded By, (t) = Taken By, (c) = Cosigned By      Initials Name Provider Type    MG Pau Walton, PT Physical Therapist                   Obj/Interventions       Row Name 07/29/23 1102          Range of Motion Comprehensive    General Range of Motion bilateral lower extremity ROM WFL  -MG       Row Name 07/29/23 1102          Strength Comprehensive (MMT)    General Manual Muscle Testing (MMT) Assessment lower extremity strength deficits identified  -MG     Comment, General Manual Muscle Testing (MMT) Assessment Generalized weakness.  Grossly 4/5  -MG       Row Name 07/29/23 1102          Motor Skills    Functional Endurance Fair -  -MG       Row Name 07/29/23 1102          Balance    Static Sitting Balance standby assist  -MG     Dynamic Sitting Balance standby assist;contact guard  -MG     Position, Sitting Balance sitting edge of bed  -MG     Static Standing Balance standby assist;contact guard  -MG     Dynamic Standing Balance contact guard;standby assist  -MG     Position/Device Used, Standing Balance supported;walker, front-wheeled  -MG     Comment, Balance No overt LOB. Mildly unsteady at times.  -MG       Row Name 07/29/23 1102          Sensory Assessment (Somatosensory)    Sensory Assessment (Somatosensory) UE sensation intact;LE sensation intact  Denies N/T  -MG               User Key  (r) = Recorded By, (t) = Taken By, (c) = Cosigned By      Initials Name Provider Type    MG Pau Walton, PT Physical Therapist                   Goals/Plan       Row Name 07/29/23 1216          Bed Mobility Goal 1 (PT)    Activity/Assistive Device (Bed Mobility Goal 1, PT) bed mobility activities, all  -MG     [place] Level/Cues Needed (Bed Mobility Goal 1, PT) independent;modified independence  -MG     Time Frame (Bed Mobility Goal 1, PT) 1 week  -MG       Row Name 07/29/23 1216          Transfer Goal 1 (PT)    Activity/Assistive Device (Transfer Goal 1, PT) transfers, all  -MG     [place] Level/Cues Needed (Transfer Goal 1, PT) modified independence;supervision required  -MG     Time Frame (Transfer Goal 1, PT) 1 week  -MG       Row Name 07/29/23 1216          Gait Training Goal 1 (PT)    Activity/Assistive Device (Gait Training Goal 1, PT) gait (walking locomotion)  -MG     [place] Level (Gait Training Goal 1, PT) modified independence;supervision required  -MG     Distance (Gait Training Goal 1, PT) 120  -MG     Time Frame (Gait Training Goal 1, PT) 1 week  -MG       Row Name 07/29/23 1216          Stairs Goal 1 (PT)     Activity/Assistive Device (Stairs Goal 1, PT) stairs, all skills  -MG     [place] Level/Cues Needed (Stairs Goal 1, PT) standby assist;contact guard required  -MG     Number of Stairs (Stairs Goal 1, PT) 5  -MG     Time Frame (Stairs Goal 1, PT) 1 week  -MG       Row Name 07/29/23 1216          Therapy Assessment/Plan (PT)    Planned Therapy Interventions (PT) balance training;bed mobility training;gait training;home exercise program;ROM (range of motion);stair training;neuromuscular re-education;strengthening;stretching;transfer training;patient/family education  -MG               User Key  (r) = Recorded By, (t) = Taken By, (c) = Cosigned By      Initials Name Provider Type    MG Pau Walton, PT Physical Therapist                   Clinical Impression       Row Name 07/29/23 1212          Pain    Pretreatment Pain Rating 0/10 - no pain  -MG     Posttreatment Pain Rating 0/10 - no pain  -MG     Pain Intervention(s) Medication (See MAR);Ambulation/increased activity;Repositioned;Rest  -MG       Row Name 07/29/23 1212          Plan of Care Review    Plan of Care Reviewed With patient;friend  -MG     Outcome Evaluation Pt is a 77 y/o M who presented to ED with c/p AM, generalized weakness and lack of appetite. Pts SO reports he had his last round of immunotherapy on 7/24 and his symptoms have progressively worsened since then. Pt reports being independent at baseline primarily without AD, but last week has needed to use a rollator due to progressing weakness. Pt is currently SBA for bed mobility, SBA with increased time for STS to RW and CG/SBA for ambulation of 60'. Pt required cueing for RW management and gait mechanics as he would push the RW too far ahead and would begin to shuffle his feet when fatigued around 50'. Educated pt/SO on LE ther-ex to perform throughout the day, to sit UIC for all meals for at least one hour and to ambulate with nsg/therapy to the bathroom and out into the hallway to maintain  current mobility; they v/u. Pt presents with decreased strength, impaired balance and decreased endurance. Pt will benefit from skilled PT services to progress his functional mobility. Rec home w/ family assist and HH PT or OP PT following.  -MG       Row Name 07/29/23 1212          Therapy Assessment/Plan (PT)    Rehab Potential (PT) good, to achieve stated therapy goals  -MG     Criteria for Skilled Interventions Met (PT) yes;meets criteria  -MG     Therapy Frequency (PT) 5 times/wk  -MG       Row Name 07/29/23 1212          Vital Signs    Pretreatment Heart Rate (beats/min) 85  -MG     Posttreatment Heart Rate (beats/min) 86  -MG     Pre SpO2 (%) 98  -MG     O2 Delivery Pre Treatment room air  -MG     O2 Delivery Intra Treatment room air  -MG     Post SpO2 (%) 97  -MG     O2 Delivery Post Treatment room air  -MG     Pre Patient Position Supine  -MG     Intra Patient Position Standing  -MG     Post Patient Position Sitting  -MG       Row Name 07/29/23 1212          Positioning and Restraints    Pre-Treatment Position in bed  -MG     Post Treatment Position chair  -MG     In Chair notified nsg;sitting;call light within reach;encouraged to call for assist;exit alarm on;with family/caregiver  -MG               User Key  (r) = Recorded By, (t) = Taken By, (c) = Cosigned By      Initials Name Provider Type    MG Pau Walton, PT Physical Therapist                   Outcome Measures       Row Name 07/29/23 1217 07/29/23 0913       How much help from another person do you currently need...    Turning from your back to your side while in flat bed without using bedrails? 4  -MG 4  -JW    Moving from lying on back to sitting on the side of a flat bed without bedrails? 3  -MG 3  -JW    Moving to and from a bed to a chair (including a wheelchair)? 3  -MG 3  -JW    Standing up from a chair using your arms (e.g., wheelchair, bedside chair)? 3  -MG 2  -JW    Climbing 3-5 steps with a railing? 2  -MG 3  -JW    To walk in  hospital room? 3  -MG 3  -JW    AM-PAC 6 Clicks Score (PT) 18  -MG 18  -JW    Highest level of mobility 6 --> Walked 10 steps or more  -MG 6 --> Walked 10 steps or more  -              User Key  (r) = Recorded By, (t) = Taken By, (c) = Cosigned By      Initials Name Provider Type    MG Pau Walton, PT Physical Therapist    Rama Smith RN Registered Nurse                                 Physical Therapy Education       Title: PT OT SLP Therapies (Done)       Topic: Physical Therapy (Done)       Point: Mobility training (Done)       Learning Progress Summary             Patient Acceptance, E,D, VU,NR by MG at 7/29/2023 1217   Significant Other Acceptance, E,D, VU,NR by MG at 7/29/2023 1217                         Point: Home exercise program (Done)       Learning Progress Summary             Patient Acceptance, E,D, VU,NR by MG at 7/29/2023 1217   Significant Other Acceptance, E,D, VU,NR by MG at 7/29/2023 1217                         Point: Body mechanics (Done)       Learning Progress Summary             Patient Acceptance, E,D, VU,NR by MG at 7/29/2023 1217   Significant Other Acceptance, E,D, VU,NR by MG at 7/29/2023 1217                         Point: Precautions (Done)       Learning Progress Summary             Patient Acceptance, E,D, VU,NR by MG at 7/29/2023 1217   Significant Other Acceptance, E,D, VU,NR by MG at 7/29/2023 1217                                         User Key       Initials Effective Dates Name Provider Type Discipline     05/24/22 -  Pau Walton, PT Physical Therapist PT                  PT Recommendation and Plan  Planned Therapy Interventions (PT): balance training, bed mobility training, gait training, home exercise program, ROM (range of motion), stair training, neuromuscular re-education, strengthening, stretching, transfer training, patient/family education  Plan of Care Reviewed With: patient, friend  Outcome Evaluation: Pt is a 77 y/o M who presented to ED with  c/p AM, generalized weakness and lack of appetite. Pts SO reports he had his last round of immunotherapy on 7/24 and his symptoms have progressively worsened since then. Pt reports being independent at baseline primarily without AD, but last week has needed to use a rollator due to progressing weakness. Pt is currently SBA for bed mobility, SBA with increased time for STS to RW and CG/SBA for ambulation of 60'. Pt required cueing for RW management and gait mechanics as he would push the RW too far ahead and would begin to shuffle his feet when fatigued around 50'. Educated pt/SO on LE ther-ex to perform throughout the day, to sit UIC for all meals for at least one hour and to ambulate with nsg/therapy to the bathroom and out into the hallway to maintain current mobility; they v/u. Pt presents with decreased strength, impaired balance and decreased endurance. Pt will benefit from skilled PT services to progress his functional mobility. Rec home w/ family assist and HH PT or OP PT following.     Time Calculation:         PT Charges       Row Name 07/29/23 1217             Time Calculation    Start Time 1050  -MG      Stop Time 1125  -MG      Time Calculation (min) 35 min  -MG      PT Received On 07/29/23  -MG      PT - Next Appointment 07/31/23  -MG      PT Goal Re-Cert Due Date 08/05/23  -MG         Time Calculation- PT    Total Timed Code Minutes- PT 25 minute(s)  -MG                User Key  (r) = Recorded By, (t) = Taken By, (c) = Cosigned By      Initials Name Provider Type    MG Pau Walton, PT Physical Therapist                  Therapy Charges for Today       Code Description Service Date Service Provider Modifiers Qty    67217213682 HC PT EVAL MOD COMPLEXITY 3 7/29/2023 Pau Walton, PT GP 1    20380473382 HC PT THERAPEUTIC ACT EA 15 MIN 7/29/2023 Pau Walton, PT GP 2            PT G-Codes  AM-PAC 6 Clicks Score (PT): 18  PT Discharge Summary  Anticipated Discharge Disposition (PT): home with home  health, home with outpatient therapy services, home with assist    Pau Walton, PT  7/29/2023

## 2023-07-29 NOTE — PLAN OF CARE
Goal Outcome Evaluation:   Patient A&Ox3 confused to situation & forgetful at times.  No complaints of pain.  Urine specimen sent.  New order for hydrocortisone tablets BID with meals.  Blood sugar WNL all day - no coverage needed.  Port not accessed.  NS@75/hr.  Up with assist with walker.  VSS.

## 2023-07-30 ENCOUNTER — READMISSION MANAGEMENT (OUTPATIENT)
Dept: CALL CENTER | Facility: HOSPITAL | Age: 78
End: 2023-07-30
Payer: MEDICARE

## 2023-07-30 ENCOUNTER — HOME HEALTH ADMISSION (OUTPATIENT)
Dept: HOME HEALTH SERVICES | Facility: HOME HEALTHCARE | Age: 78
End: 2023-07-30
Payer: MEDICARE

## 2023-07-30 ENCOUNTER — DOCUMENTATION (OUTPATIENT)
Dept: HOME HEALTH SERVICES | Facility: HOME HEALTHCARE | Age: 78
End: 2023-07-30
Payer: MEDICARE

## 2023-07-30 VITALS
HEART RATE: 66 BPM | HEIGHT: 72 IN | OXYGEN SATURATION: 97 % | TEMPERATURE: 97.5 F | DIASTOLIC BLOOD PRESSURE: 73 MMHG | SYSTOLIC BLOOD PRESSURE: 130 MMHG | RESPIRATION RATE: 16 BRPM | BODY MASS INDEX: 20.19 KG/M2 | WEIGHT: 149.03 LBS

## 2023-07-30 PROBLEM — R41.0 CONFUSION: Status: RESOLVED | Noted: 2023-07-29 | Resolved: 2023-07-30

## 2023-07-30 PROBLEM — R53.1 WEAKNESS: Status: RESOLVED | Noted: 2023-07-29 | Resolved: 2023-07-30

## 2023-07-30 PROBLEM — E27.40 ADRENAL INSUFFICIENCY: Status: ACTIVE | Noted: 2023-07-30

## 2023-07-30 PROBLEM — N17.9 AKI (ACUTE KIDNEY INJURY): Status: RESOLVED | Noted: 2021-09-14 | Resolved: 2023-07-30

## 2023-07-30 LAB
ANION GAP SERPL CALCULATED.3IONS-SCNC: 8.9 MMOL/L (ref 5–15)
BASOPHILS # BLD AUTO: 0.01 10*3/MM3 (ref 0–0.2)
BASOPHILS NFR BLD AUTO: 0.2 % (ref 0–1.5)
BUN SERPL-MCNC: 9 MG/DL (ref 8–23)
BUN/CREAT SERPL: 9.3 (ref 7–25)
CALCIUM SPEC-SCNC: 10.1 MG/DL (ref 8.6–10.5)
CHLORIDE SERPL-SCNC: 95 MMOL/L (ref 98–107)
CO2 SERPL-SCNC: 26.1 MMOL/L (ref 22–29)
CREAT SERPL-MCNC: 0.97 MG/DL (ref 0.76–1.27)
DEPRECATED RDW RBC AUTO: 42.4 FL (ref 37–54)
EGFRCR SERPLBLD CKD-EPI 2021: 80.9 ML/MIN/1.73
EOSINOPHIL # BLD AUTO: 0.07 10*3/MM3 (ref 0–0.4)
EOSINOPHIL NFR BLD AUTO: 1.4 % (ref 0.3–6.2)
ERYTHROCYTE [DISTWIDTH] IN BLOOD BY AUTOMATED COUNT: 12.9 % (ref 12.3–15.4)
GLUCOSE BLDC GLUCOMTR-MCNC: 112 MG/DL (ref 70–130)
GLUCOSE BLDC GLUCOMTR-MCNC: 134 MG/DL (ref 70–130)
GLUCOSE SERPL-MCNC: 112 MG/DL (ref 65–99)
HCT VFR BLD AUTO: 34 % (ref 37.5–51)
HGB BLD-MCNC: 11.5 G/DL (ref 13–17.7)
IMM GRANULOCYTES # BLD AUTO: 0.02 10*3/MM3 (ref 0–0.05)
IMM GRANULOCYTES NFR BLD AUTO: 0.4 % (ref 0–0.5)
INR PPP: 2.39 (ref 0.9–1.1)
LYMPHOCYTES # BLD AUTO: 0.9 10*3/MM3 (ref 0.7–3.1)
LYMPHOCYTES NFR BLD AUTO: 18.3 % (ref 19.6–45.3)
MCH RBC QN AUTO: 31 PG (ref 26.6–33)
MCHC RBC AUTO-ENTMCNC: 33.8 G/DL (ref 31.5–35.7)
MCV RBC AUTO: 91.6 FL (ref 79–97)
MONOCYTES # BLD AUTO: 0.64 10*3/MM3 (ref 0.1–0.9)
MONOCYTES NFR BLD AUTO: 13 % (ref 5–12)
NEUTROPHILS NFR BLD AUTO: 3.28 10*3/MM3 (ref 1.7–7)
NEUTROPHILS NFR BLD AUTO: 66.7 % (ref 42.7–76)
NRBC BLD AUTO-RTO: 0 /100 WBC (ref 0–0.2)
OSMOLALITY SERPL: 268 MOSM/KG (ref 280–301)
PLATELET # BLD AUTO: 154 10*3/MM3 (ref 140–450)
PMV BLD AUTO: 8.6 FL (ref 6–12)
POTASSIUM SERPL-SCNC: 5.2 MMOL/L (ref 3.5–5.2)
PROTHROMBIN TIME: 26.5 SECONDS (ref 11.7–14.2)
RBC # BLD AUTO: 3.71 10*6/MM3 (ref 4.14–5.8)
SODIUM SERPL-SCNC: 130 MMOL/L (ref 136–145)
WBC NRBC COR # BLD: 4.92 10*3/MM3 (ref 3.4–10.8)

## 2023-07-30 PROCEDURE — 94664 DEMO&/EVAL PT USE INHALER: CPT

## 2023-07-30 PROCEDURE — 85025 COMPLETE CBC W/AUTO DIFF WBC: CPT | Performed by: INTERNAL MEDICINE

## 2023-07-30 PROCEDURE — 94761 N-INVAS EAR/PLS OXIMETRY MLT: CPT

## 2023-07-30 PROCEDURE — 85610 PROTHROMBIN TIME: CPT | Performed by: STUDENT IN AN ORGANIZED HEALTH CARE EDUCATION/TRAINING PROGRAM

## 2023-07-30 PROCEDURE — 80048 BASIC METABOLIC PNL TOTAL CA: CPT | Performed by: INTERNAL MEDICINE

## 2023-07-30 PROCEDURE — 94760 N-INVAS EAR/PLS OXIMETRY 1: CPT

## 2023-07-30 PROCEDURE — 99232 SBSQ HOSP IP/OBS MODERATE 35: CPT | Performed by: INTERNAL MEDICINE

## 2023-07-30 PROCEDURE — 82948 REAGENT STRIP/BLOOD GLUCOSE: CPT

## 2023-07-30 PROCEDURE — 94799 UNLISTED PULMONARY SVC/PX: CPT

## 2023-07-30 PROCEDURE — 83930 ASSAY OF BLOOD OSMOLALITY: CPT | Performed by: STUDENT IN AN ORGANIZED HEALTH CARE EDUCATION/TRAINING PROGRAM

## 2023-07-30 PROCEDURE — 82024 ASSAY OF ACTH: CPT | Performed by: STUDENT IN AN ORGANIZED HEALTH CARE EDUCATION/TRAINING PROGRAM

## 2023-07-30 RX ORDER — HYDROCORTISONE 10 MG/1
10 TABLET ORAL
Status: DISCONTINUED | OUTPATIENT
Start: 2023-07-31 | End: 2023-07-30 | Stop reason: HOSPADM

## 2023-07-30 RX ORDER — HYDROCORTISONE 5 MG/1
TABLET ORAL
Qty: 90 TABLET | Refills: 0 | Status: SHIPPED | OUTPATIENT
Start: 2023-07-30 | End: 2023-08-29

## 2023-07-30 RX ORDER — HYDROCORTISONE 10 MG/1
10 TABLET ORAL
Qty: 30 TABLET | Refills: 0 | Status: SHIPPED | OUTPATIENT
Start: 2023-07-30 | End: 2023-08-29

## 2023-07-30 RX ORDER — HYDROCORTISONE 10 MG/1
5 TABLET ORAL
Status: DISCONTINUED | OUTPATIENT
Start: 2023-07-31 | End: 2023-07-30 | Stop reason: HOSPADM

## 2023-07-30 RX ADMIN — HYDROCORTISONE 15 MG: 10 TABLET ORAL at 09:10

## 2023-07-30 RX ADMIN — ARFORMOTEROL TARTRATE 15 MCG: 15 SOLUTION RESPIRATORY (INHALATION) at 09:22

## 2023-07-30 RX ADMIN — BUDESONIDE 0.5 MG: 0.5 INHALANT ORAL at 09:18

## 2023-07-30 RX ADMIN — Medication 2000 UNITS: at 09:11

## 2023-07-30 RX ADMIN — ATORVASTATIN CALCIUM 10 MG: 20 TABLET, FILM COATED ORAL at 09:12

## 2023-07-30 RX ADMIN — TAMSULOSIN HYDROCHLORIDE 0.4 MG: 0.4 CAPSULE ORAL at 09:10

## 2023-07-30 NOTE — PLAN OF CARE
Goal Outcome Evaluation:  Plan of Care Reviewed With: patient        Progress: no change          Pt very forgetful over night. Pulling at pulse ox and IV. Up several times to bathroom. Bed alarm on for safety.

## 2023-07-30 NOTE — OUTREACH NOTE
Prep Survey      Flowsheet Row Responses   [facility] facility patient discharged from? [facility]   Is LACE score < 7 ? No   Eligibility Readm Mgmt   Discharge diagnosis hyponatremia, AMS   Does the patient have one of the following disease processes/diagnoses(primary or secondary)? Other   Does the patient have Home health ordered? Yes   What is the Home health agency?  [facility]   Is there a DME ordered? No   Prep survey completed? Yes            Joselyn ESPINOZA - Registered Nurse

## 2023-07-30 NOTE — PROGRESS NOTES
Patient is discharging today . Spoke to PORSHA Love who confirmed face sheet is correct and to please call her for visit scheduling- [phone]. Reports no current home health. Orders for home health SN and PT in [facility]. Thank you !

## 2023-07-30 NOTE — PROGRESS NOTES
[facility] Clinical Pharmacy Services: Warfarin Dosing/Monitoring Consult    Giovani España is a 78 y.o. male, estimated creatinine clearance is 61.9 mL/min (by C-G formula based on SCr of 0.97 mg/dL). weighing 67.6 kg (149 lb 0.5 oz).    Results from last 7 days   Lab Units 07/30/23  0815 07/29/23  1345 07/29/23  0604 07/28/23  1132 07/24/23  1320   INR  2.39* 2.63*  --  3.34*  --    HEMOGLOBIN g/dL 11.5*  --  11.5* 12.4* 12.9*   HEMATOCRIT % 34.0*  --  34.6* 37.9 38.8   PLATELETS 10*3/mm3 154  --  147 128* 152     Prior to admission anticoagulation: RN verified with patient on 7/29: warfarin 5mg daily with additional 5mg on Monday and Friday.    Hospital Anticoagulation:  Consulting provider: Dr. Baeza  Start date: on PTA  Indication: history of DVT/PE  Target INR: 2 - 3  Expected duration:    Bridge Therapy: No      Potential food or drug interactions: no new interacting medications  Decreased oral intake and mild diarrhea at admission per MD notes    Education complete?/Date: No, on warfarin prior to admission    Assessment/Plan:  Dose: INR remains in therapeutic range today. Continue home dose 5mg daily except 10mg on Mon and Fri.   Monitor for any signs or symptoms of bleeding  Follow up daily INRs and dose adjustments    Pharmacy will continue to follow until discharge or discontinuation of warfarin.     Beverley Short, PharmD  Clinical Pharmacist

## 2023-07-30 NOTE — DISCHARGE SUMMARY
Patient Name: Giovani España  : 1945  MRN: [id number]    Date of Admission: 2023  Date of Discharge:  2023  Primary Care Physician: Tali Greene APRN      Chief Complaint:   Altered Mental Status and Cough      Discharge Diagnoses     Active Hospital Problems    Diagnosis  POA    **Hyponatremia [E87.1]  Yes    Adrenal insufficiency [E27.40]  Yes    Severe malnutrition [E43]  Yes    COPD (chronic obstructive pulmonary disease) [J44.9]  Yes    History of DVT (deep vein thrombosis) [Z86.718]  Not Applicable    Type 2 diabetes mellitus [E11.9]  Yes      Resolved Hospital Problems    Diagnosis Date Resolved POA    Weakness [R53.1] 2023 Yes    Confusion [R41.0] 2023 Yes    LEXIE (acute kidney injury) [N17.9] 2023 Yes        Hospital Course     Mr. España is a 78 y.o. 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 intake.  Please see the admitting history and physical for further details.  He was found to have hyponatremia with sodium of 125, and an LEXIE and was admitted to the hospital for further evaluation and treatment.      He was seen in consultation by oncology and nephrology.  He was treated with IV fluids with resolution of his LEXIE, and improvement of his hyponatremia.  At the time of discharge, his sodium level is up to 130.  An a.m. cortisol was less than 1, and a subsequent stim test did not respond appropriately, and so he was also diagnosed with adrenal insufficiency, and started on oral hydrocortisone this admission.  An 8 a.m. ACTH was also checked and is  pending at the time of discharge, but it appears to have been checked this morning, and not before he received cosyntropin or steroid supplementation, so I am not certain how accurate this will be.  I placed a referral to the endocrinology clinic at discharge.  He will follow-up in the oncology clinic to determine what to do with his Keytruda.  It also appears that he was on 2 inhaled corticosteroids, and I have stopped his budesonide nebulizer.    Day of Discharge     Subjective:  No events overnight. He feels well. His friend is at bedside and we all discussed his new diagnosis of adrenal insufficiency    Physical Exam:  Temp:  [97.6 øF (36.4 øC)-97.9 øF (36.6 øC)] 97.9 øF (36.6 øC)  Heart Rate:  [67-78] 78  Resp:  [16-18] 18  BP: (116-135)/(56-72) 135/72  Body mass index is 20.21 kg/mý.  Physical Exam  Constitutional:       General: He is not in acute distress.     Appearance: He is ill-appearing (chronically). He is not toxic-appearing.   Cardiovascular:      Rate and Rhythm: Normal rate and regular rhythm.      Heart sounds: Normal heart sounds.   Pulmonary:      Effort: Pulmonary effort is normal.      Breath sounds: Normal breath sounds.   Abdominal:      General: Bowel sounds are normal.      Palpations: Abdomen is soft.   Musculoskeletal:         General: No tenderness.      Right lower leg: No edema.      Left lower leg: No edema.   Neurological:      Mental Status: He is alert.   Psychiatric:         Mood and Affect: Mood normal.         Behavior: Behavior normal.       Consultants     Consult Orders (all) (From admission, onward)       Start     Ordered    07/29/23 0654  Inpatient Nephrology Consult  Once        Specialty:  Nephrology  Provider:  Richardson Machado MD    07/29/23 0653    07/28/23 1720  Inpatient Hematology & Oncology Consult  Once        Specialty:  Hematology and Oncology  Provider:  Daniel Madden MD PhD    07/28/23 1719    07/28/23 1602  Inpatient Nutrition Consult  Once         Provider:  (Not yet assigned)    07/28/23 1601    07/28/23 1542  Inpatient Case Management  Consult  Once        Provider:  (Not yet assigned)    07/28/23 1542    07/28/23 1320  Inpatient Palliative Care Team Consult  Once        Provider:  (Not yet assigned)    07/28/23 1319    07/28/23 1252  [facility] (on-call MD unless specified) Details  Once        Specialty:  Hospitalist  Provider:  Marcella Henry MD    07/28/23 1251                  Procedures     Imaging Results (All)       Procedure Component Value Units Date/Time    XR Chest 1 View [[id number]] Collected: 07/29/23 1207     Updated: 07/29/23 1244    Narrative:      CHEST SINGLE VIEW     HISTORY: Abnormal breathing sounds.      COMPARISON: CT chest 06/08/2023, portable chest 11/21/2022.     FINDINGS: There is volume loss on the left where there is pleural  thickening and there is a small left effusion. The right lung appears  clear. There are diffuse mildly increased pulmonary interstitial  markings due to pulmonary emphysema.  Right subclavian Mediport tip is  in the SVC.       Impression:      Chronic changes in the chest with volume loss on the left  where there is pleural thickening and small left pleural effusion.  Diffuse pulmonary emphysema. No evidence for significant change.     This report was finalized on 7/29/2023 12:41 PM by Dr. Ricardo Dwyer M.D.               Pertinent Labs     Results from last 7 days   Lab Units 07/30/23  0815 07/29/23  0604 07/28/23  1132 07/24/23  1320   WBC 10*3/mm3 4.92 4.35 4.65 5.40   HEMOGLOBIN g/dL 11.5* 11.5* 12.4* 12.9*   PLATELETS 10*3/mm3 154 147 128* 152     Results from last 7 days   Lab Units 07/30/23  0815 07/29/23  1617 07/29/23  0604 07/28/23  1132   SODIUM mmol/L 130* 127* 126* 125*   POTASSIUM mmol/L 5.2 4.5 4.8 4.7   CHLORIDE mmol/L 95* 93* 94* 93*   CO2 mmol/L 26.1 21.0* 23.7 21.4*   BUN mg/dL 9 14 15 19   CREATININE mg/dL 0.97 0.88 1.14 1.54*   GLUCOSE mg/dL 112* 123* 79 68    Estimated Creatinine Clearance: 61.9 mL/min (by C-G formula based on SCr of 0.97 mg/dL).  Results from last 7 days   Lab Units 07/28/23  1132 07/24/23  1320   ALBUMIN g/dL 3.6 3.9   BILIRUBIN mg/dL 0.7 0.6   ALK PHOS U/L 65 63   AST (SGOT) U/L 21 24   ALT (SGPT) U/L 12 11     Results from last 7 days   Lab Units 07/30/23  0815 07/29/23  1617 07/29/23  0604 07/28/23  1132 07/24/23  1320   CALCIUM mg/dL 10.1 9.4 9.6 9.5 9.8   ALBUMIN g/dL  --   --   --  3.6 3.9   MAGNESIUM mg/dL  --   --  1.7  --   --    PHOSPHORUS mg/dL  --   --  3.1  --   --          Results from last 7 days   Lab Units 07/29/23  0924 07/29/23  0604   SODIUM UR mmol/L 114  --    CREATININE UR mg/dL 71.3  --    OSMOLALITY UR mOsm/kg 486  --    URIC ACID mg/dL  --  4.3         Invalid input(s): LDLCALC        Test Results Pending at Discharge     Pending Labs       Order Current Status    ACTH In process            Discharge Details        Discharge Medications        New Medications        Instructions Start Date   hydrocortisone 10 MG tablet  Commonly known as: CORTEF   10 mg, Oral, Every Early Morning      hydrocortisone 5 MG tablet  Commonly known as: CORTEF   5 mg, Oral, Daily With Lunch             Changes to Medications        Instructions Start Date   metoprolol succinate XL 25 MG 24 hr tablet  Commonly known as: TOPROL-XL  What changed:   when to take this  additional instructions   25 mg, Oral, Daily             Continue These Medications        Instructions Start Date   arformoterol 15 MCG/2ML nebulizer solution  Commonly known as: BROVANA   15 mcg, Nebulization, 2 Times Daily - RT      Budeson-Glycopyrrol-Formoterol 160-9-4.8 MCG/ACT aerosol inhaler  Commonly known as: BREZTRI   No dose, route, or frequency recorded.      cetirizine 10 MG tablet  Commonly known as: zyrTEC   10 mg, Oral, Daily      Cholecalciferol 50 MCG (2000 UT) capsule   2,000 Units, Oral, Daily      fluticasone 50 MCG/ACT nasal spray  Commonly known as: FLONASE   1  spray, Nasal, Daily      isosorbide mononitrate 60 MG 24 hr tablet  Commonly known as: IMDUR   60 mg, Oral, Every Evening      ondansetron 8 MG tablet  Commonly known as: Zofran   8 mg, Oral, Every 8 Hours PRN      simvastatin 20 MG tablet  Commonly known as: ZOCOR   20 mg, Oral, Nightly      tamsulosin 0.4 MG capsule 24 hr capsule  Commonly known as: FLOMAX   0.4 mg, Oral, Daily      warfarin 5 MG tablet  Commonly known as: COUMADIN   5 mg, Oral, Daily Warfarin, Take 10mg on 9/29/22 and then resume regular home schedule.             Stop These Medications      acyclovir 400 MG tablet  Commonly known as: ZOVIRAX     budesonide 0.5 MG/2ML nebulizer solution  Commonly known as: PULMICORT              Allergies   Allergen Reactions    Benadryl [Diphenhydramine] Other (See Comments)     Extreme restlessness and insomnia         Discharge Disposition:  Home or Self Care    Discharge Diet:  Diet Order   Procedures    Diet: Regular/House Diet; Texture: Regular Texture (IDDSI 7); Fluid Consistency: Thin (IDDSI 0)       Discharge Activity:   Activity Instructions       Activity as Tolerated              CODE STATUS:    Code Status and Medical Interventions:   Ordered at: 07/28/23 4220     Code Status (Patient has no pulse and is not breathing):    CPR (Attempt to Resuscitate)     Medical Interventions (Patient has pulse or is breathing):    Full Support     Release to patient:    Routine Release       Future Appointments   Date Time Provider Department Center   8/14/2023  9:15 AM INFU SHERLY NEIL [facility] CHAIR  INF KRE LAG   8/14/2023  9:40 AM Kayden Bishop MD [facility]   8/14/2023 10:00 AM CHAIR [facility] RASHAAD Diaz [facility] INFUS [facility]RE [facility]     Additional Instructions for the Follow-ups that You Need to Schedule       Ambulatory Referral to Home Health   As directed      Face to Face Visit Date: 7/30/2023   Follow-up provider for Plan of Care?: I treated the patient in an acute care facility and will not continue treatment  after discharge.   Follow-up provider: ARNOLD GONZALEZ [[id number]]   Reason/Clinical Findings: debility/hospitalization   Describe mobility limitations that make leaving home difficult: debility/hospitalization   Nursing/Therapeutic Services Requested: Skilled Nursing Physical Therapy   Skilled nursing orders: Medication education   PT orders: Transfer training Home safety assessment Therapeutic exercise Strengthening   Frequency: 1 Week 1        Call MD With Problems / Concerns   As directed      Instructions: return to the hospital if you experience chest pain, shortness of breath, abdominal pain, nausea, vomiting, fevers, sweats, chills, or worsening of your symptoms    Order Comments: Instructions: return to the hospital if you experience chest pain, shortness of breath, abdominal pain, nausea, vomiting, fevers, sweats, chills, or worsening of your symptoms         Discharge Follow-up with PCP   As directed       Currently Documented PCP:    Arnold Gonzalez APRN    PCP Phone Number:    [phone]     Follow Up Details: 2 weeks        Discharge Follow-up with Specialty: oncology, as directed   As directed      Specialty: oncology, as directed        Discharge Follow-up with Specified Provider: endocrinology, establish care for management of addrenal insufficiency   As directed      To: endocrinology, establish care for management of addrenal insufficiency               Follow-up Information       Rogelio Acosta MD .    Specialty: Endocrinology  Contact information:  [address] [place] [address]  [phone]               Arnold Gonzalez APRN .    Specialty: Family Medicine  Why: 2 weeks  Contact information:  [address] [place] [address] [phone]               Derek Grider MD .    Specialty: Family Medicine  Why: 2 weeks  Contact information:  [address] [facility] [address] [phone]                             Additional Instructions for the Follow-ups that  You Need to Schedule       Ambulatory Referral to Home Health   As directed      Face to Face Visit Date: 7/30/2023   Follow-up provider for Plan of Care?: I treated the patient in an acute care facility and will not continue treatment after discharge.   Follow-up provider: ARNOLD GONZALEZ [[id number]]   Reason/Clinical Findings: debility/hospitalization   Describe mobility limitations that make leaving home difficult: debility/hospitalization   Nursing/Therapeutic Services Requested: Skilled Nursing Physical Therapy   Skilled nursing orders: Medication education   PT orders: Transfer training Home safety assessment Therapeutic exercise Strengthening   Frequency: 1 Week 1        Call MD With Problems / Concerns   As directed      Instructions: return to the hospital if you experience chest pain, shortness of breath, abdominal pain, nausea, vomiting, fevers, sweats, chills, or worsening of your symptoms    Order Comments: Instructions: return to the hospital if you experience chest pain, shortness of breath, abdominal pain, nausea, vomiting, fevers, sweats, chills, or worsening of your symptoms         Discharge Follow-up with PCP   As directed       Currently Documented PCP:    Arnold Gonzalez APRN    PCP Phone Number:    [phone]     Follow Up Details: 2 weeks        Discharge Follow-up with Specialty: oncology, as directed   As directed      Specialty: oncology, as directed        Discharge Follow-up with Specified Provider: endocrinology, establish care for management of addrenal insufficiency   As directed      To: endocrinology, establish care for management of addrenal insufficiency            Time Spent on Discharge:  Greater than 30 minutes      Bhavik Ramos MD  [facility]  07/30/23  11:37 EDT              No

## 2023-07-30 NOTE — DISCHARGE PLACEMENT REQUEST
"Regina España (78 y.o. Male)       Date of Birth   1945    Social Security Number       Address   [address] [place] [address]    Home Phone   [phone]    MRN   [id number]       Oriental orthodox   None    Marital Status                               Admission Date   7/28/23    Admission Type   Emergency    Admitting Provider   Marcella Henry MD    Attending Provider   Bhavik Ramos MD    Department, Room/Bed   [address], [facility]/1       Discharge Date       Discharge Disposition   Home or Self Care    Discharge Destination                                 Attending Provider: Bhavik Ramos MD    Allergies: Benadryl [Diphenhydramine]    Isolation: None   Infection: None   Code Status: CPR    Ht: 182.9 cm (72\")   Wt: 67.6 kg (149 lb 0.5 oz)    Admission Cmt: None   Principal Problem: Hyponatremia [E87.1]                   Active Insurance as of 7/28/2023       Primary Coverage       Payor Plan Insurance Group Employer/Plan Group    MEDICARE MEDICARE A & B        Payor Plan Address Payor Plan Phone Number Payor Plan Fax Number Effective Dates    [address] [phone]  3/1/2012 - None Entered    formerly [facility] [id number]         Subscriber Name Subscriber Birth Date Member ID       REGINA ESPAÑA 1945 [id number]               Secondary Coverage       Payor Plan Insurance Group Employer/Plan Group    [facility] SUPP [id number]       Payor Plan Address Payor Plan Phone Number Payor Plan Fax Number Effective Dates    [address]   12/1/2016 - None Entered    [facility] [id number]         Subscriber Name Subscriber Birth Date Member ID       REGINA ESPAÑA 1945 [id number]                     Emergency Contacts        (Rel.) Home Phone Work Phone Mobile Phone    REGINA ESPAÑA (Son) -- -- [phone]    Kate Torres (Significant Other) -- -- [phone]                "

## 2023-07-30 NOTE — PROGRESS NOTES
CHIEF COMPLAINT: large cell NET; hyponatremia, weakness      INTERVAL HISTORY:  The patient's a.m. cortisol came back less than 1 yesterday and a ACTH stimulation test did not respond appropriately; he was started on hydrocortisone twice daily.  Patient wife states he feels little better today.  He is eating and drinking okay.  He walked some in the halls and set up in the chair with improved strength.  He has been a little confused overnight probably just from being in the hospital-no fever etc.      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  He also has multiple comorbidities including type 2 diabetes, COPD, hypertension, diastolic heart failure, coronary artery disease, peripheral vascular disease, prior DVT with IVC filter, etc.  He was noted in summer 2022 to have a nodule in the right lung base for which a PET scan was done 7/13/2022 showing a hypermetabolic spiculated nodule 1.5 x 1.6 cm SUV 9.3 enlarged left hilar lymph nodes 1.5 x 1.3 cm.  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  He was treated with postoperative carboplatin/Taxol with radiation completed January 2023.  He then started Keytruda every 3 weeks anticipating 18 cycles of treatment.  He received cycle 7 on 7/24/2023.  Most recent CT chest abdomen pelvis 6/8/2023 showed postsurgical changes in the left hemothorax without evidence of recurrent disease.     The patient presented to the ER 7/28/2023 with generalized weakness lack of appetite and somnolence progressive over about 1 week.  He had associated diarrhea without melena or hematochezia no fever reported.  The wife reports 3 episodes of diarrhea without blood.  He has just become more lethargic and weak to the point he could not  support his weight.  No fever, increased cough shortness of breath dysuria.  The patient's labs showed white blood cell count 4.65, hemoglobin 12.4, platelets 128, normal white blood cell differential.  CMP pertinent for creatinine 1.54 from baseline 1.02 and sodium 125 from 126 5 days previous from 133 3 weeks previous.  He was provided IV fluids with repeat creatinine this morning 1.14 sodium 126.  Magnesium is 1.7 phosphorus 3.1 uric acid 4.3 potassium 4.8 TSH 1.77.     Vitals have shown no fever, blood pressure soft on admission 92/69 presently 115/66.  He is on room air.      Past Medical History, Past Surgical History, Social History, Family History have been reviewed and are without significant changes except as mentioned.    Review of Systems   A comprehensive 14 point review of systems was performed and was negative except as mentioned.    Medications:  The current medication list was reviewed in the EMR    ALLERGIES:    Allergies   Allergen Reactions    Benadryl [Diphenhydramine] Other (See Comments)     Extreme restlessness and insomnia       Objective      Vitals:    07/30/23 0710   BP: 135/72   Pulse: 74   Resp: 16   Temp: 97.9 øF (36.6 øC)   SpO2: 96%          Physical Exam    CONSTITUTIONAL: pleasant well-developed adult man  HEENT: no icterus, no thrush, moist membranes hard of hearing  LYMPH: no cervical or supraclavicular lad  CV: RRR, S1S2, no murmur  RESP: Diminished and and scattered rhonchi present  GI: soft, non-tender, no splenomegaly, +bs  MUSC: no edema   NEURO: alert and oriented x3, little confused (on the phone asking about bills etc.) but answers all questions appropriately  PSYCH: normal mood and affect  Skin: Rash on the right shin present does not look infectious    RECENT LABS:  Hematology Results from last 7 days   Lab Units 07/29/23  0604 07/28/23  1132 07/24/23  1320   WBC 10*3/mm3 4.35 4.65 5.40   HEMOGLOBIN g/dL 11.5* 12.4* 12.9*   HEMATOCRIT % 34.6* 37.9 38.8   PLATELETS  10*3/mm3 147 128* 152     2  Lab Results   Component Value Date    GLUCOSE 123 (H) 07/29/2023    BUN 14 07/29/2023    CREATININE 0.88 07/29/2023    EGFRIFNONA 78 09/18/2021    BCR 15.9 07/29/2023    CO2 21.0 (L) 07/29/2023    CALCIUM 9.4 07/29/2023    PROTENTOTREF 7.4 07/21/2022    ALBUMIN 3.6 07/28/2023    LABIL2 1.1 07/21/2022    AST 21 07/28/2023    ALT 12 07/28/2023       No results found for: IRON, TIBC, FERRITIN    No results found for: [id number]    No results found for: FOLATE   CXR:  chronic changes/volume loss on left; emphysema      Assessment & Plan   *Large cell neuroendocrine tumor left lower lobe lung, [id number]  Surgically resected left lower lobectomy 9/23/2022 with positive margin  Status post adjuvant carboplatin/Taxol with concurrent radiation completed January 2023  Receiving Keytruda every 3 weeks-C7 7/24/2023  CT chest abdomen pelvis as of 6/8/2023 RAAD     *Weakness/lethargy/mild confusion  No fevers, chills, increased shortness of breath/cough or dysuria to suggest infection  No other recent medication changes  No headaches  Sodium recently declining from normal to 125 which could have contributed?     *Hyponatremia  The patient has had some decreased oral intake and mild diarrhea  TSH is normal 1.77 serum sodium and osmolality pending  A.m. cortisol very low 0.62; 7.7 after ACTH stim    *Primary adrenal insufficiency  A.m. cortisol was less than 1 and 7.7 after ACTH stim  Hydrocortisone 15 mg 2 times daily initiated     *Acute kidney injury  Creatinine baseline around 1; 1.54 on admission  Creatinine improved to 1.14 after IV fluids overnight     *Arthralgias-rheumatoid factor pending 7/25/2023     *Comorbidities otherwise diabetes mellitus, COPD requiring oxygen at night, diabetes, hypertension, compensated diastolic heart failure        Oncology plan/recommendations:  Agree with hydrocortisone replacement; may get by with last dose 10/5 but we can see how he does outpatient and adjust  accordingly  No opposition to discharge later today if his labs come back okay  3.  He has follow-up scheduled in our office 8/14/2023 7/30/2023      CC:

## 2023-07-30 NOTE — PLAN OF CARE
Goal Outcome Evaluation:    A&OX3, calm and cooperative. PT anxious about being in hospital and would like to go home per GF at bedside. Up with assistance and walker to bathroom. VSS on room air. No distress noted currently. Will cont to monitor throughout shift.

## 2023-07-30 NOTE — CASE MANAGEMENT/SOCIAL WORK
Continued Stay Note  [facility]     Patient Name: Giovani España  MRN: [id number]  Today's Date: 7/30/2023    Admit Date: 7/28/2023    Plan: Home with [facility]    Discharge Plan       Row Name 07/30/23 1422       Plan    Plan Home with [facility]     Patient/Family in Agreement with Plan yes    Plan Comments CCP noted pt orders dc home with home health. Called into pt room and spoke with Kate (s/o), introduced self and explained CCP role, they are agreeable to home health and agreeable to referral to [facility] . Referral placed and accepted by [facility]. Adriano DANG    Final Discharge Disposition Code 06 - home with home health care    Final Note home with [facility]                   Discharge Codes    No documentation.                 Expected Discharge Date and Time       Expected Discharge Date Expected Discharge Time    Jul 30, 2023               Adriano Martino, RN

## 2023-07-31 ENCOUNTER — HOME CARE VISIT (OUTPATIENT)
Dept: HOME HEALTH SERVICES | Facility: HOME HEALTHCARE | Age: 78
End: 2023-07-31
Payer: MEDICARE

## 2023-07-31 VITALS
HEART RATE: 65 BPM | OXYGEN SATURATION: 95 % | RESPIRATION RATE: 18 BRPM | TEMPERATURE: 96.6 F | SYSTOLIC BLOOD PRESSURE: 138 MMHG | DIASTOLIC BLOOD PRESSURE: 78 MMHG

## 2023-07-31 PROCEDURE — G0299 HHS/HOSPICE OF RN EA 15 MIN: HCPCS

## 2023-08-01 ENCOUNTER — HOME CARE VISIT (OUTPATIENT)
Dept: HOME HEALTH SERVICES | Facility: HOME HEALTHCARE | Age: 78
End: 2023-08-01
Payer: MEDICARE

## 2023-08-01 LAB — ACTH PLAS-MCNC: 5.7 PG/ML (ref 7.2–63.3)

## 2023-08-02 ENCOUNTER — HOME CARE VISIT (OUTPATIENT)
Dept: HOME HEALTH SERVICES | Facility: HOME HEALTHCARE | Age: 78
End: 2023-08-02
Payer: MEDICARE

## 2023-08-03 ENCOUNTER — READMISSION MANAGEMENT (OUTPATIENT)
Dept: CALL CENTER | Facility: HOSPITAL | Age: 78
End: 2023-08-03
Payer: MEDICARE

## 2023-08-07 ENCOUNTER — READMISSION MANAGEMENT (OUTPATIENT)
Dept: CALL CENTER | Facility: HOSPITAL | Age: 78
End: 2023-08-07
Payer: MEDICARE

## 2023-08-07 NOTE — OUTREACH NOTE
Medical Week 1 Survey      Flowsheet Row Responses   [facility] patient discharged from? [place]   Does the patient have one of the following disease processes/diagnoses(primary or secondary)? Other   Week 1 attempt successful? Yes   Call start time 1823   Call end time 1828   Discharge diagnosis hyponatremia, AMS   Person spoke with today (if not patient) and relationship patient   Meds reviewed with patient/caregiver? Yes   Does the patient have all medications ordered at discharge? Yes   Prescription comments taking hydrocortisone   Is the patient taking all medications as directed (includes completed medication regime)? Yes   Does the patient have a primary care provider?  Yes   Comments regarding PCP patient following oncology edis, pcp thursday @ 1p- 08/10   What is the Home health agency?  [facility]   Home health comments HH nurse came once however family declined HH services.   Psychosocial issues? No   Did the patient receive a copy of their discharge instructions? Yes   Nursing interventions Reviewed instructions with patient   What is the patient's perception of their health status since discharge? Improving   Is the patient/caregiver able to teach back signs and symptoms related to disease process for when to call PCP? Yes   Is the patient/caregiver able to teach back signs and symptoms related to disease process for when to call 911? Yes   Is the patient/caregiver able to teach back the hierarchy of who to call/visit for symptoms/problems? PCP, Specialist, Home health nurse, Urgent Care, ED, 911 Yes   Week 1 call completed? Yes   Graduated Yes   Would this patient benefit from a Referral to Amb Social Work? No   Is the patient interested in additional calls from an ambulatory ? No   Wrap up additional comments Patient overall doing better since DC, Declined the need for HH. Patient following oncology and pcp since DC. no other concerns or questions noted.   Call end time 1828             Jeanie FISHMAN - Registered Nurse

## 2023-08-08 ENCOUNTER — TELEPHONE (OUTPATIENT)
Dept: ONCOLOGY | Facility: CLINIC | Age: 78
End: 2023-08-08
Payer: MEDICARE

## 2023-08-08 NOTE — TELEPHONE ENCOUNTER
Called Kate to let her know that the patient was scheduled for a port flush and to have his careplan labs drawn at that time. Kate v/susan.

## 2023-08-11 ENCOUNTER — HOSPITAL ENCOUNTER (OUTPATIENT)
Dept: GENERAL RADIOLOGY | Facility: HOSPITAL | Age: 78
Discharge: HOME | End: 2023-08-11
Payer: MEDICARE

## 2023-08-11 ENCOUNTER — TRANSCRIBE ORDERS (OUTPATIENT)
Dept: ADMINISTRATIVE | Facility: HOSPITAL | Age: 78
End: 2023-08-11
Payer: MEDICARE

## 2023-08-11 DIAGNOSIS — M54.9 MID BACK PAIN: Primary | ICD-10-CM

## 2023-08-11 DIAGNOSIS — M54.40 LOW BACK PAIN WITH SCIATICA, SCIATICA LATERALITY UNSPECIFIED, UNSPECIFIED BACK PAIN LATERALITY, UNSPECIFIED CHRONICITY: ICD-10-CM

## 2023-08-11 DIAGNOSIS — C34.90: ICD-10-CM

## 2023-08-11 DIAGNOSIS — M54.9 MID BACK PAIN: ICD-10-CM

## 2023-08-11 PROCEDURE — 72110 X-RAY EXAM L-2 SPINE 4/>VWS: CPT

## 2023-08-11 PROCEDURE — 72072 X-RAY EXAM THORAC SPINE 3VWS: CPT

## 2023-08-11 NOTE — PROGRESS NOTES
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  Notably there is invasive carcinoma present at the margin, 2 positive lymph nodes adjacent to the bronchovesicular margin which appears to have been transected difficult to enumerate with extranodal extension present.    *Patient status post chemo RT-11/28/2022, radiation therapy completion date 1/11/2023    *Immunotherapy-Keytruda to be initiated 3/20/2023          History of Present Illness   The patient returns today for follow up and evaluation prior to cycle 5 Keytruda.  He is tolerating treatment well.  Eating and drinking well.  Bowels moving regularly.  Denies fever or chills.  Denies nausea or vomiting.  Denies new or worsening skin rash.  Denies new or worsening shortness of breath, cough, wheeze.  No new concerns today.    We have reviewed his scans, recently performed, including CT chest, abdomen and pelvis revealing postsurgical changes without evidence of recurrent disease.    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  He had been assessed particularly in the fall 2021 when he presented with nausea and vomiting and abdominal discomfort leading to assessments that revealed sigmoid diverticulitis with contained rupture and partial small bowel obstruction.  Records from mid September 21 indicating that he was admitted with partial small bowel obstruction and treated medically with very slow recovery.  He has history of COPD with CT demonstrating a pulmonary nodule in  the right lung base at 7 mm with follow-up planned.  He was seen by general surgery in later September with repeat scans planned and repeat CT abdomen 10/7/2021 did demonstrate interval improvement of sigmoid diverticulitis.      Record indicates an assessment in late June 2022 at different general surgeon for left inguinal hernia, history of heavy tobacco use with COPD and recommendation for surgical repair of his hernia      The patient underwent a CT scan of the chest 5/7/2022 demonstrating diffuse emphysematous changes, ill-defined density measuring 3 mm in the superior segment of the right lower lobe, multiple ill-defined 3 to 4 mm nodular density thought to be inflammatory involving the right lower lobe and a new spiculated nodule involving the left lower lobe measuring 16 x 14 mm as well as left hilar adenopathy.       Follow-up PET/CT 7/13/2022 reveal a hypermetabolic spiculated lesion medial aspect the left lower lobe adjacent to descending thoracic aorta measuring 1.5 x 1.6 SUV 9.3 with an enlarged hypermetabolic left hilar lymph node measured 1.5 x 1.3 with SUV of 12.1, additional nodules in the lateral aspect right lower lobe and micronodule in the posterior/medial right lower lobe and also additional right lower lobe micronodule.  There is an infrarenal abdominal aortic aneurysm measuring 3.4 cm with a short segment of chronic dissection present.    These clinical findings would be consistent with a possible T1b N1 M0-stage IIb lung cancer.      Recognizing a diagnosis has not been made his case was discussed in thoracic conference 7/20/2022.  Recommendations include proceeding to pulmonary assessment with EBUS and the patient undergoing a general assessment per his clinical status-older adult assessment as well as assessment by thoracic surgery.  These issues are discussed with the patient and his wife in detail when they are seen 7/28/2022.    The patient proceeded to undergo his hernia surgery  8/2/2022 by Dr. Dotson.  Additionally the patient was unable to undergo assessment by pulmonary until October and, thereafter, was scheduled to see Dr. Joe 8/18/2022 undergoing older adult functional assessment with a JAGUAR score of 11 indicating a risk of functional decline related to cancer therapy.    The patient is seen with his significant other 8/15/2022 and doing very well with recent hernia surgery.  His performance status is reasonably good at present and we have learned now that he would not be able to see pulmonary medicine until October, thoracic surgery is scheduled this week with Dr. Joe.  Perhaps he could be a surgical candidate.  Particularly we know by guardant 360 that T p53 splice site mutation is present and would certainly be consistent as well the most common mutations found in lung cancers particularly squamous cancers.  We have discussed obtaining pulmonary functions at this point, thoracic surgery assessment this week and also restarting Chantix as possible for the patient.    There was difficulty obtaining Chantix and while this was being assessed the patient was reviewed 8/18 by thoracic surgery.  He was felt to have a T1b N1 M0 stage IIb carcinoma left lower lobe along and not a candidate for biopsy.  PFTs were borderline, functional assessment was reasonably good and the patient was felt to be a candidate for robot-assisted biopsy of his nodes and if malignant proceed to left lower lobe lobectomy.  He was reviewed 9/8 and offered 21 mg nicotine transdermal patching.    He is next seen 9/10/2022.  He is seen with his wife and both are prepared for surgery 9/23/2022.  We discussed that additional therapy may be considered once we have more information about the type and stage.  We would hope to see him postoperatively.    The patient was admitted 9//2022 undergoing 9/23/2022  a bronchoscopy with left video-assisted thoracoscopy with robot-assisted total decortication of the  left lung, left lower lobectomy, mediastinal lymphadenectomy and intercostal nerve block with Dr. Nicola Joe.  Fortunately he did fairly well postoperatively though did develop atrial fibrillation with RVR treated with amiodarone converting back to sinus rhythm, treated with IV metoprolol subsequent chronic anticoagulation.  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  Notably there is invasive carcinoma present at the margin.  Is a, and only 2 positive lymph nodes adjacent to the bronchovesicular margin which appears to have been transected difficult to enumerate with extranodal extension present.       The patient is seen 10/27/2022.  He is recovering well from surgery, albeit slowly, and we have discussed his findings that are concerning as to residual disease with a positive margin described as above.  There is also the significance potentially of PD-L1 expression which has been described as producing a poor survival.     Nivolumab has been used as second line therapy to positive effect in the disease and this begs the question as to whether adjuvant therapy plus immunotherapy could be utilized though the patient would be difficult to treat with platinum based chemotherapy as well.       The patient is next assessed 11/7/2022 for significant assessments by supportive oncology at [facility] as well as with plans to see Dr. Marrero 11/9/2022.  Unfortunately he has been very slow to gradually recover from the effects of surgery with reduced appetite and only fair performance status.     At present it would be difficult to give him cisplatin based chemotherapy and we discussed whether we might be able to use Tecentriq (atezolizumab) as his primary adjuvant therapy.  We have contacted pathology about completing Caris testing and a PD-L1 analysis that has not yet completed.         The patient is  seen, however, 11/16/2022 and his genomic analysis has returned as being significant for broad sensitivity to immunotherapy.  This would argue for the administration of weekly Carboplatin/Taxol as the patient proceeds to radiation therapy and upon completion, then using Tecentriq as further adjuvant therapy over a year.  This is discussed with the patient and his  in detail as well as discussed with Dr. Marrero of radiation therapy.  We have also discussed the patient require port placement.    The patient proceeded to treatment taking weekly carboplatin Taxol with concurrent radiation therapy last seen 12/12/2022 with third weekly treatment.    He is next seen 12/19/2022 with H&H 11.9 and 38.5, white count 3460 and platelet count of 168,000.  He is feeling well without any significant complications of therapy except for right calf dermatitis that is been developing in the last several days.    The patient continued therapy taking CarboTaxol weekly including 12/28 and 1/4/2023.    His is next seen 1/11/2023 with completion date for radiation therapy 1/11/2023.  Repeat CBC now includes H&H of 11.0 and 33.9, white count 2090, platelet count 128,000, ANC is 800.  He, fortunately, is tolerating treatment well and we have again discussed with him adjuvant immunotherapy would be useful including Tecentriq versus pembrolizumab-keynote 091 study particularly in tumor programmed cell death PD-L1 greater than 50%.    The patient will not be given additional chemotherapy 1/11/2023 we will plan to reassess by follow-up scans in the next 6 weeks CT chest and pelvis making a decision thereafter about adjuvant immunotherapy.    Patient proceeded to undergo follow-up scans 2/24/2023 showing no evidence of recurrent disease including his findings of left lower lobectomy, stable 11 m nodule opacity in the base of the right lower lobe in the right lung apex, small left pleural effusion mild to moderate stenosis of the proximal  subclavian artery.    We have discussed that considering studies like keynote  091 that the patient's high risk disease could be addressed with additional immunotherapy including the use of Atezolizumab and/or Keytruda.  Considering his findings overall Keytruda every 3 weeks x18 cycles is to be pursued.    The patient was seen 3/20/2023, discussed initiation of Keytruda.  He is agreeable to proceed.    The patient notified the office shortly after treatment that he had pain under his right rib cage and was placed back onto his Neurontin to try to manage this pain.  Approximately week later he still had the pain and also had another rash we switched him at this point to Famvir to try to completely clear this problem.    He is next seen back 4/3/2023.  Fortunately his recent apparent shingles activation has nearly abated and he is feeling reasonably well.  In review of the literature there is no significant difference in activation with immunotherapy though this patient may require suppression.  I have asked him to complete his Famvir at this point.    Patient assessed 4/10/2023 with resolution of his shingles, subsequently placed on maintenance acyclovir, consideration of shingles vaccination post 3 months of Keytruda therapy is stable disease documented.    The patient underwent a CT chest abdomen pelvis 6/8/2023 that demonstrates postsurgical changes of the left hemithorax, stable pulmonary nodules, stable infrarenal abdominal aortic aneurysm.    He is next seen 6/12/2023.  We have discussed continue with his Keytruda with his tolerance being excellent.  He will also reduce his current metoprolol to 12.5 mg p.o. daily.    Past Medical History:   Diagnosis Date    Arthritis     COPD (chronic obstructive pulmonary disease)     O2 3L AT HS    Diabetes mellitus     DIET CONTROL    Diverticulitis     DVT, lower extremity     RLE    Elevated cholesterol     H/O Cervical pain     History of colitis     Hyperlipidemia      Hypertension     Left shoulder pain     Low back pain     Lung cancer 2022    LEFT LUNG    On anticoagulant therapy     Peripheral arterial disease     Right leg claudication     Skin cancer     HX        Past Surgical History:   Procedure Laterality Date    BALLOON ANGIOPLASTY, ARTERY Right 2015    IR Percutaneious IVC filter placement    INGUINAL HERNIA REPAIR Left     KNEE ARTHROSCOPY Left     Torn meniscus    LOBECTOMY Left 9/23/2022    Procedure: BRONCHOSCOPY, LEFT VIDEO ASSISTED THORACOSCOPY, ROBOT ASSISTED TOTAL DECORTICATION  LEFT LUNG, LEFT LOWER LOBE LOBECTOMY, MEDIASTINAL LYMPHECTOMY, INTERCOSTAL NERVE BLOCK;  Surgeon: Nicola Joe III, MD;  Location: [facility];  Service: Robotics - DaVinci;  Laterality: Left;    VENOUS ACCESS DEVICE (PORT) INSERTION Right 11/21/2022    Procedure: INSERTION VENOUS ACCESS DEVICE;  Surgeon: Nicola Joe III, MD;  Location: [facility];  Service: Thoracic;  Laterality: Right;        Current Outpatient Medications on File Prior to Visit   Medication Sig Dispense Refill    arformoterol (BROVANA) 15 MCG/2ML nebulizer solution Take 2 mL by nebulization 2 (Two) Times a Day. Indications: Chronic Obstructive Lung Disease      Budeson-Glycopyrrol-Formoterol (BREZTRI) 160-9-4.8 MCG/ACT aerosol inhaler Inhale 2 puffs 2 (Two) Times a Day. Indications: Chronic Obstructive Lung Disease      cetirizine (zyrTEC) 10 MG tablet Take 1 tablet by mouth Daily. Indications: Hayfever      Cholecalciferol 50 MCG (2000 UT) capsule Take 1 capsule by mouth Daily. Indications: Vitamin D Deficiency      fluticasone (FLONASE) 50 MCG/ACT nasal spray 1 spray into the nostril(s) as directed by provider Daily. Indications: Allergic Rhinitis      hydrocortisone (CORTEF) 10 MG tablet Take 1 tablet by mouth Every Morning for 30 days. 30 tablet 0    hydrocortisone (CORTEF) 5 MG tablet Take 2 tablets by mouth Every Morning AND 1 tablet Daily With Lunch for 30 days. 90 tablet 0    isosorbide  mononitrate (IMDUR) 60 MG 24 hr tablet Take 1 tablet by mouth Every Evening. Indications: hypertension      metoprolol succinate XL (TOPROL-XL) 25 MG 24 hr tablet Take 1 tablet by mouth Daily for 30 days. (Patient taking differently: Take 1 tablet by mouth Every Evening. Patient taking half tab) 30 tablet 0    metoprolol succinate XL (TOPROL-XL) 25 MG 24 hr tablet Take 25 mg by mouth Daily. Indications: High Blood Pressure Disorder      ondansetron (Zofran) 8 MG tablet Take 1 tablet by mouth Every 8 (Eight) Hours As Needed for Nausea or Vomiting. 30 tablet 1    simvastatin (ZOCOR) 20 MG tablet Take 1 tablet by mouth Every Night. Indications: High Amount of Fats in the Blood      tamsulosin (FLOMAX) 0.4 MG capsule 24 hr capsule Take 1 capsule by mouth Daily. 30 capsule 5    warfarin (COUMADIN) 5 MG tablet Take 1 tablet by mouth Daily. Take 10mg on 9/29/22 and then resume regular home schedule. (Patient taking differently: Take 1 tablet by mouth Daily. 5mg daily with additional 5mg on Monday and Friday)       No current facility-administered medications on file prior to visit.        ALLERGIES:    Allergies   Allergen Reactions    Benadryl [Diphenhydramine] Other (See Comments)     Extreme restlessness and insomnia        Social History     Socioeconomic History    Marital status:     Years of education: 1 year college   Tobacco Use    Smoking status: Every Day     Packs/day: 1.00     Years: 59.00     Pack years: 59.00     Types: Cigarettes     Start date: 1963    Smokeless tobacco: Never    Tobacco comments:     9/8/22: Down to Less than 1 PPD   Vaping Use    Vaping Use: Never used   Substance and Sexual Activity    Alcohol use: Not Currently    Drug use: Never    Sexual activity: Defer        Family History   Problem Relation Age of Onset    No Known Problems Mother     Cancer Father     Lung cancer Father     Diabetes Brother     Other Daughter         S/P stent, age 42    No Known Problems Son     Aisha  Hyperthermia Neg Hx         Review of Systems   Constitutional:  Negative for activity change. Unexpected weight change: Status post his diverticulitis episode, weight has not been regained.  HENT: Negative.     Eyes: Negative.    Respiratory:  Negative for cough and shortness of breath.    Cardiovascular: Negative.    Gastrointestinal: Negative.    Genitourinary: Negative.    Musculoskeletal: Negative.    Skin:  Negative for rash.   Neurological: Negative.    Psychiatric/Behavioral: Negative.        Objective     There were no vitals filed for this visit.      7/24/2023     1:50 PM   Current Status   ECOG score 1     Physical Exam  Constitutional:       Appearance: Normal appearance.   HENT:      Head: Normocephalic and atraumatic.      Nose: Nose normal.      Mouth/Throat:      Mouth: Mucous membranes are moist.      Pharynx: Oropharynx is clear.   Eyes:      Extraocular Movements: Extraocular movements intact.      Conjunctiva/sclera: Conjunctivae normal.      Pupils: Pupils are equal, round, and reactive to light.   Cardiovascular:      Rate and Rhythm: Normal rate and regular rhythm.      Pulses: Normal pulses.      Heart sounds: Normal heart sounds.   Pulmonary:      Comments: Prolonged expiratory phase bilaterally  Abdominal:      General: Bowel sounds are normal.      Palpations: Abdomen is soft.      Comments: Scaphoid   Musculoskeletal:         General: Normal range of motion.      Cervical back: Normal range of motion and neck supple.   Skin:     General: Skin is warm and dry.      Findings: Rash (Resolved) present.   Neurological:      General: No focal deficit present.      Mental Status: He is alert and oriented to person, place, and time.   Psychiatric:         Mood and Affect: Mood normal.         RECENT LABS:  Hematology WBC   Date Value Ref Range Status   07/30/2023 4.92 3.40 - 10.80 10*3/mm3 Final   05/09/2022 7.54 4.5 - 11.0 10*3/uL Final     RBC   Date Value Ref Range Status   07/30/2023 3.71  (L) 4.14 - 5.80 10*6/mm3 Final   05/09/2022 5.52 4.5 - 5.9 10*6/uL Final     Hemoglobin   Date Value Ref Range Status   07/30/2023 11.5 (L) 13.0 - 17.7 g/dL Final   05/09/2022 16.4 13.5 - 17.5 g/dL Final     Hematocrit   Date Value Ref Range Status   07/30/2023 34.0 (L) 37.5 - 51.0 % Final   05/09/2022 51.0 41.0 - 53.0 % Final     Platelets   Date Value Ref Range Status   07/30/2023 154 140 - 450 10*3/mm3 Final   05/09/2022 204 140 - 440 10*3/uL Final          Assessment & Plan     78 y.o. male  with medical issues including type 2 diabetes-uncertain control, COPD, hypertension, diastolic heart failure, chronic disease, peripheral vascular disease (follow-up with vascular surgery today), previous DVT on anticoagulation (home monitoring), previous IVC filter placement?,  Status post September 2021 partial small bowel obstruction secondary to sigmoid diverticulitis treated medically.     1.   T1b N1 M0-stage IIb lung cancer.  right lung base nodule noted on scan at that point and in follow-up with primary care had developed a left inguinal hernia with repair planned through a different general surgeon then seen with his initial sigmoid diverticulitis.  PET scan 7/13/2022 demonstrates a hypermetabolic spiculated lesion medial aspect of the left lobe adjacent to descending thoracic aorta measuring1.5 x 1.6 SUV 9.3 with an enlarged hypermetabolic left hilar lymph node measured 1.5 x 1.3 with SUV of 12.1, additional nodules in the lateral aspect right lower lobe and micronodule in the posterior/medial right lower lobe and also additional right lower lobe micronodule.  There is an infrarenal abdominal aortic aneurysm measuring 3.4 cm with a short segment of chronic dissection present.  Clinical findings would be consistent with a possible T1b N1 M0-stage IIb lung cancer.  discussed in thoracic conference 7/20/2022.  Recommendations to proceed with pulmonary assessment with EBUS.  The patient proceeded to undergo his hernia  surgery 8/2/2022 by Dr. Dotson.   Guardant 360 that T p53 splice site mutation is present and would certainly be consistent as well the most common mutations found in lung cancers particularly squamous cancers.  The patient was admitted 9//2022 undergoing 9/23/2022  a bronchoscopy with left video-assisted thoracoscopy with robot-assisted total decortication of the left lung, left lower lobectomy, mediastinal lymphadenectomy and intercostal nerve block with Dr. Nicola Jeo.  Fortunately he did fairly well postoperatively though did develop atrial fibrillation with RVR treated with amiodarone converting back to sinus rhythm, treated with IV metoprolol subsequent chronic anticoagulation.  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  Notably there is invasive carcinoma present at the margin. only 2 positive lymph nodes adjacent to the bronchovesicular margin which appears to have been transected difficult to enumerate with extranodal extension present.  Options could well be Carboplatin and Taxol considering the patient's comorbidity and worry of using cisplatin-based chemotherapy in this patient followed by atezolizumab with pathology having been notified to assess PD-L1 expression on the tumor as well also await review by Caris molecular profiling.  Finally radiation therapy consultation be requested.   Caris analysis indicates benefit from immunotherapy at relatively high levels.  This would allow radiation therapy given with Carboplatin Taxol weekly (better tolerated) and then subsequent atezolizumab adjuvantly.  Weekly carboplatin Taxol initiated 11/28/2022 given concurrently with radiation therapy  12/5/2022 here for cycle 2 weekly carboplatin/Taxol, overall tolerating treatment quite well so far.  12/12/2022: Proceed with cycle #3 weekly carboplatin/Taxol with concurrent  radiation.  Continues to tolerate treatment well.  He is next seen 12/19/2022 with H&H 11.9 and 38.5, white count 3460 and platelet count of 168,000.  He is feeling well without any significant complications of therapy except for right calf dermatitis that is been developing in the last several days.  12/28/2022 here for week 5 carbo/Taxol.  Overall tolerating well.  Today WBC 2.45, ANC 1.22, hemoglobin 10.7, platelet count 117,000.  I have reviewed with Dr. Bishop, and we will go ahead and continue with treatment.  1/4/2023: Received with cycle 6 carbo/taxol + XRT.  Continues to tolerate treatment well.  WBC improved to 2.69 with ANC 1.41.  Next 1/11/2023 with completion date 1/11/2023.  Repeat CBC now includes H&H of 11.0 and 33.9, white count 2090, platelet count 128,000, ANC is 800.  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  We have discussed that considering studies like keynote  091 that the patient's high risk disease could be addressed with additional immunotherapy including the use of Atezolizumab and/or Keytruda.  Considering his findings overall Keytruda every 3 weeks x18 cycles is to be pursued.  The patient began Keytruda as planned with his first treatment 3/20/2023.  The patient notified the office shortly after treatment that he had pain under his right rib cage and was placed back onto his Neurontin to try to manage this pain.  Approximately week later he still had the pain and also had another rash we  switched him at this point to Famvir to try to completely clear this problem.  4/3/2023.  Fortunately his recent apparent shingles activation has nearly abated and he is feeling reasonably well.  In review of the literature there is no significant difference in activation with immunotherapy though this patient may require suppression.  He is asked to complete his Famvir.  4/10/2023 cycle 2 Keytruda, overall tolerating well.   Patient seen 5/1/2023, third cycle of Keytruda, status post fourth cycle of Keytruda, 3 months of therapy, subsequent scans will need to be scheduled.  5/22/2023: Proceed with cycle 4 Keytruda today.  Tolerating well.  Order placed for repeat CT scans in 2 weeks prior to next treatment.    Follow-up scans-CT of chest, abdomen and pelvis 6/8/2023 with postsurgical changes only.  Patient seen 6/12/2023 with Keytruda continued      2.  Herpes zoster: Patient was treated with Famvir.  Rash has resolved, no issues with persistent herpetic neuralgia.  He will be placed on suppression with acyclovir 400 mg twice daily.  We discussed he will hold off on vaccination for now, given recent infection.  Patient assessed 5/1/2023, continue Keytruda, status post fifth cycle of Keytruda or 3 months of therapy he would undergo repeat scans and, if stable or improved, proceed with Shingrix vaccinations.  Patient now requested to proceed with vaccinations.      PLAN:   Proceed with cycle 5 Keytruda today.  Continue prophylactic acyclovir 400 mg twice daily.  As result of his malignancy being stable stable he can now follow-up with Shingrix vaccinations.  3 weeks, NP, cycle #6 Keytruda  Call/ return sooner should he develop any new concerns or problems.    Patient is on a high risk medication requiring close monitoring for toxicity.      Kayden Bishop MD  08/14/2023

## 2023-08-14 ENCOUNTER — INFUSION (OUTPATIENT)
Dept: ONCOLOGY | Facility: HOSPITAL | Age: 78
End: 2023-08-14
Payer: MEDICARE

## 2023-08-14 ENCOUNTER — OFFICE VISIT (OUTPATIENT)
Dept: ONCOLOGY | Facility: CLINIC | Age: 78
End: 2023-08-14
Payer: MEDICARE

## 2023-08-14 VITALS
BODY MASS INDEX: 20.72 KG/M2 | OXYGEN SATURATION: 98 % | SYSTOLIC BLOOD PRESSURE: 131 MMHG | DIASTOLIC BLOOD PRESSURE: 72 MMHG | HEIGHT: 72 IN | WEIGHT: 153 LBS | TEMPERATURE: 98.3 F | RESPIRATION RATE: 18 BRPM | HEART RATE: 53 BPM

## 2023-08-14 DIAGNOSIS — C34.32 MALIGNANT NEOPLASM OF LOWER LOBE, LEFT BRONCHUS OR LUNG: ICD-10-CM

## 2023-08-14 DIAGNOSIS — Z79.899 LONG-TERM USE OF HIGH-RISK MEDICATION: ICD-10-CM

## 2023-08-14 DIAGNOSIS — C7A.8 NEUROENDOCRINE CARCINOMA OF LUNG: ICD-10-CM

## 2023-08-14 DIAGNOSIS — M25.50 ARTHRALGIA, UNSPECIFIED JOINT: ICD-10-CM

## 2023-08-14 DIAGNOSIS — Z45.2 FITTING AND ADJUSTMENT OF VASCULAR CATHETER: ICD-10-CM

## 2023-08-14 DIAGNOSIS — D47.2 MGUS (MONOCLONAL GAMMOPATHY OF UNKNOWN SIGNIFICANCE): ICD-10-CM

## 2023-08-14 DIAGNOSIS — C7A.8 NEUROENDOCRINE CARCINOMA OF LUNG: Primary | ICD-10-CM

## 2023-08-14 DIAGNOSIS — E87.1 HYPONATREMIA: ICD-10-CM

## 2023-08-14 DIAGNOSIS — E27.40 ADRENAL INSUFFICIENCY: Primary | ICD-10-CM

## 2023-08-14 LAB
ALBUMIN SERPL-MCNC: 3.7 G/DL (ref 3.5–5.2)
ALBUMIN/GLOB SERPL: 1.7 G/DL
ALP SERPL-CCNC: 51 U/L (ref 39–117)
ALT SERPL W P-5'-P-CCNC: 8 U/L (ref 1–41)
ANION GAP SERPL CALCULATED.3IONS-SCNC: 12.2 MMOL/L (ref 5–15)
AST SERPL-CCNC: 17 U/L (ref 1–40)
BASOPHILS # BLD AUTO: 0.04 10*3/MM3 (ref 0–0.2)
BASOPHILS NFR BLD AUTO: 0.8 % (ref 0–1.5)
BILIRUB SERPL-MCNC: 0.3 MG/DL (ref 0–1.2)
BUN SERPL-MCNC: 12 MG/DL (ref 8–23)
BUN/CREAT SERPL: 13.5 (ref 7–25)
CALCIUM SPEC-SCNC: 9.6 MG/DL (ref 8.6–10.5)
CHLORIDE SERPL-SCNC: 99 MMOL/L (ref 98–107)
CHROMATIN AB SERPL-ACNC: 11 IU/ML (ref 0–14)
CO2 SERPL-SCNC: 22.8 MMOL/L (ref 22–29)
CREAT SERPL-MCNC: 0.89 MG/DL (ref 0.7–1.3)
DEPRECATED RDW RBC AUTO: 48.5 FL (ref 37–54)
EGFRCR SERPLBLD CKD-EPI 2021: 88.8 ML/MIN/1.73
EOSINOPHIL # BLD AUTO: 0.17 10*3/MM3 (ref 0–0.4)
EOSINOPHIL NFR BLD AUTO: 3.2 % (ref 0.3–6.2)
ERYTHROCYTE [DISTWIDTH] IN BLOOD BY AUTOMATED COUNT: 13.8 % (ref 12.3–15.4)
GLOBULIN UR ELPH-MCNC: 2.2 GM/DL
GLUCOSE SERPL-MCNC: 86 MG/DL (ref 65–99)
HCT VFR BLD AUTO: 36.6 % (ref 37.5–51)
HGB BLD-MCNC: 11.8 G/DL (ref 13–17.7)
IMM GRANULOCYTES # BLD AUTO: 0.02 10*3/MM3 (ref 0–0.05)
IMM GRANULOCYTES NFR BLD AUTO: 0.4 % (ref 0–0.5)
LYMPHOCYTES # BLD AUTO: 1.82 10*3/MM3 (ref 0.7–3.1)
LYMPHOCYTES NFR BLD AUTO: 34.5 % (ref 19.6–45.3)
MCH RBC QN AUTO: 30.9 PG (ref 26.6–33)
MCHC RBC AUTO-ENTMCNC: 32.2 G/DL (ref 31.5–35.7)
MCV RBC AUTO: 95.8 FL (ref 79–97)
MONOCYTES # BLD AUTO: 0.7 10*3/MM3 (ref 0.1–0.9)
MONOCYTES NFR BLD AUTO: 13.3 % (ref 5–12)
NEUTROPHILS NFR BLD AUTO: 2.53 10*3/MM3 (ref 1.7–7)
NEUTROPHILS NFR BLD AUTO: 47.8 % (ref 42.7–76)
NRBC BLD AUTO-RTO: 0 /100 WBC (ref 0–0.2)
PLATELET # BLD AUTO: 194 10*3/MM3 (ref 140–450)
PMV BLD AUTO: 8.2 FL (ref 6–12)
POTASSIUM SERPL-SCNC: 4.1 MMOL/L (ref 3.5–4.7)
PROT SERPL-MCNC: 5.9 G/DL (ref 6–8.5)
RBC # BLD AUTO: 3.82 10*6/MM3 (ref 4.14–5.8)
SODIUM SERPL-SCNC: 134 MMOL/L (ref 134–145)
WBC NRBC COR # BLD: 5.28 10*3/MM3 (ref 3.4–10.8)

## 2023-08-14 PROCEDURE — 25010000002 HEPARIN LOCK FLUSH PER 10 UNITS: Performed by: INTERNAL MEDICINE

## 2023-08-14 PROCEDURE — 96413 CHEMO IV INFUSION 1 HR: CPT

## 2023-08-14 PROCEDURE — 80053 COMPREHEN METABOLIC PANEL: CPT

## 2023-08-14 PROCEDURE — 25010000002 PEMBROLIZUMAB 100 MG/4ML SOLUTION 4 ML VIAL: Performed by: INTERNAL MEDICINE

## 2023-08-14 PROCEDURE — 85025 COMPLETE CBC W/AUTO DIFF WBC: CPT

## 2023-08-14 PROCEDURE — 86431 RHEUMATOID FACTOR QUANT: CPT | Performed by: INTERNAL MEDICINE

## 2023-08-14 RX ORDER — SODIUM CHLORIDE 9 MG/ML
250 INJECTION, SOLUTION INTRAVENOUS ONCE
Status: CANCELLED | OUTPATIENT
Start: 2023-08-14

## 2023-08-14 RX ORDER — SODIUM CHLORIDE 0.9 % (FLUSH) 0.9 %
10 SYRINGE (ML) INJECTION AS NEEDED
OUTPATIENT
Start: 2023-08-14

## 2023-08-14 RX ORDER — HEPARIN SODIUM (PORCINE) LOCK FLUSH IV SOLN 100 UNIT/ML 100 UNIT/ML
500 SOLUTION INTRAVENOUS AS NEEDED
Status: DISCONTINUED | OUTPATIENT
Start: 2023-08-14 | End: 2023-08-14 | Stop reason: HOSPADM

## 2023-08-14 RX ORDER — SODIUM CHLORIDE 0.9 % (FLUSH) 0.9 %
10 SYRINGE (ML) INJECTION AS NEEDED
Status: DISCONTINUED | OUTPATIENT
Start: 2023-08-14 | End: 2023-08-14 | Stop reason: HOSPADM

## 2023-08-14 RX ORDER — HEPARIN SODIUM (PORCINE) LOCK FLUSH IV SOLN 100 UNIT/ML 100 UNIT/ML
500 SOLUTION INTRAVENOUS AS NEEDED
OUTPATIENT
Start: 2023-08-14

## 2023-08-14 RX ORDER — SODIUM CHLORIDE 9 MG/ML
250 INJECTION, SOLUTION INTRAVENOUS ONCE
Status: COMPLETED | OUTPATIENT
Start: 2023-08-14 | End: 2023-08-14

## 2023-08-14 RX ADMIN — SODIUM CHLORIDE 200 MG: 9 INJECTION, SOLUTION INTRAVENOUS at 10:54

## 2023-08-14 RX ADMIN — Medication 10 ML: at 11:26

## 2023-08-14 RX ADMIN — SODIUM CHLORIDE 250 ML: 9 INJECTION, SOLUTION INTRAVENOUS at 10:54

## 2023-08-14 RX ADMIN — Medication 500 UNITS: at 11:27

## 2023-08-14 NOTE — PROGRESS NOTES
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  Notably there is invasive carcinoma present at the margin, 2 positive lymph nodes adjacent to the bronchovesicular margin which appears to have been transected difficult to enumerate with extranodal extension present.  *Patient status post chemo RT-11/28/2022, radiation therapy completion date 1/11/2023  *Immunotherapy-Keytruda to be initiated 3/20/2023    History of Present Illness    The patient is a 78 y.o. male with the above history, who had last been seen in  the office 7/24/2023 in anticipation of his 7th dose of Keytruda.  He was tolerating treatment without substantial side effects but did have sudden onset nausea and vomiting lasting 48 hours.  He then began to have joint pain bilateral knees and shoulders up to an 8 of 10 for severity.       He was demonstrating worsening hyponatremia at this point with a normal potassium.  Unfortunately his mental status began to worsen with increasing sleepiness, mild diarrhea.  7/28/2023 studies included an INR 3.34, sodium now 125 and he was admitted for his weakness and hyponatremia.    The patient was seen by several services including nephrology and oncology to the IV fluids.  He had been tested with ACT stimulation test and was diagnosed with renal sufficiency started on oral hydrocortisone.  7/29/2023 cortisol was 0.62, subsequent ACTH test was reduced at 5.7.  The patient studies 7/30 included BUN/creatinine of 9 and 0.97, sodium 130, chloride 95, calcium 5.2.      The patient is next seen 8/14/2023.  He remains somewhat weak and with joint discomfort.  We have discussed his findings that would be most consistent with central adrenal insufficiency and that dosing for his  hydrocortisone-currently 10 mg p.o. every morning and 5 mg p.o. every afternoon is likely to be too low.  In determining whether we should proceed with treatment replacement therapy could allow us to try to complete his therapy which, on balance, would be the preferred approach.                               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  He had been assessed particularly in the fall 2021 when he presented with nausea and vomiting and abdominal discomfort leading to assessments that revealed sigmoid diverticulitis with contained rupture and partial small bowel obstruction.  Records from mid September 21 indicating that he was admitted with partial small bowel obstruction and treated medically with very slow recovery.  He has history of COPD with CT demonstrating a pulmonary nodule in the right lung base at 7 mm with follow-up planned.  He was seen by general surgery in later September with repeat scans planned and repeat CT abdomen 10/7/2021 did demonstrate interval improvement of sigmoid diverticulitis.      Record indicates an assessment in late June 2022 at different general surgeon for left inguinal hernia, history of heavy tobacco use with COPD and recommendation for surgical repair of his hernia      The patient underwent a CT scan of the chest 5/7/2022 demonstrating diffuse emphysematous changes, ill-defined density measuring 3 mm in the superior segment of the right lower lobe, multiple ill-defined 3 to 4 mm nodular density thought to be inflammatory involving the right lower lobe and a new spiculated nodule involving the left lower lobe measuring 16 x 14 mm as well as left hilar adenopathy.       Follow-up PET/CT 7/13/2022 reveal a  hypermetabolic spiculated lesion medial aspect the left lower lobe adjacent to descending thoracic aorta measuring 1.5 x 1.6 SUV 9.3 with an enlarged hypermetabolic left hilar lymph node measured 1.5 x 1.3 with SUV of 12.1, additional nodules in the lateral aspect right lower lobe and micronodule in the posterior/medial right lower lobe and also additional right lower lobe micronodule.  There is an infrarenal abdominal aortic aneurysm measuring 3.4 cm with a short segment of chronic dissection present.    These clinical findings would be consistent with a possible T1b N1 M0-stage IIb lung cancer.      Recognizing a diagnosis has not been made his case was discussed in thoracic conference 7/20/2022.  Recommendations include proceeding to pulmonary assessment with EBUS and the patient undergoing a general assessment per his clinical status-older adult assessment as well as assessment by thoracic surgery.  These issues are discussed with the patient and his wife in detail when they are seen 7/28/2022.    The patient proceeded to undergo his hernia surgery 8/2/2022 by Dr. Dotson.  Additionally the patient was unable to undergo assessment by pulmonary until October and, thereafter, was scheduled to see Dr. Joe 8/18/2022 undergoing older adult functional assessment with a JAGUAR score of 11 indicating a risk of functional decline related to cancer therapy.    The patient is seen with his significant other 8/15/2022 and doing very well with recent hernia surgery.  His performance status is reasonably good at present and we have learned now that he would not be able to see pulmonary medicine until October, thoracic surgery is scheduled this week with Dr. Joe.  Perhaps he could be a surgical candidate.  Particularly we know by van 360 that T p53 splice site mutation is present and would certainly be consistent as well the most common mutations found in lung cancers particularly squamous cancers.  We have discussed  obtaining pulmonary functions at this point, thoracic surgery assessment this week and also restarting Chantix as possible for the patient.    There was difficulty obtaining Chantix and while this was being assessed the patient was reviewed 8/18 by thoracic surgery.  He was felt to have a T1b N1 M0 stage IIb carcinoma left lower lobe along and not a candidate for biopsy.  PFTs were borderline, functional assessment was reasonably good and the patient was felt to be a candidate for robot-assisted biopsy of his nodes and if malignant proceed to left lower lobe lobectomy.  He was reviewed 9/8 and offered 21 mg nicotine transdermal patching.    He is next seen 9/10/2022.  He is seen with his wife and both are prepared for surgery 9/23/2022.  We discussed that additional therapy may be considered once we have more information about the type and stage.  We would hope to see him postoperatively.    The patient was admitted 9//2022 undergoing 9/23/2022  a bronchoscopy with left video-assisted thoracoscopy with robot-assisted total decortication of the left lung, left lower lobectomy, mediastinal lymphadenectomy and intercostal nerve block with Dr. Nicola Joe.  Fortunately he did fairly well postoperatively though did develop atrial fibrillation with RVR treated with amiodarone converting back to sinus rhythm, treated with IV metoprolol subsequent chronic anticoagulation.  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  Notably there is invasive carcinoma present at the margin.  Is a, and only 2 positive lymph nodes adjacent to the bronchovesicular margin which appears to have been transected difficult to enumerate with extranodal extension present.       The patient is seen 10/27/2022.  He is recovering well from surgery, albeit slowly, and we have discussed his findings that are concerning  as to residual disease with a positive margin described as above.  There is also the significance potentially of PD-L1 expression which has been described as producing a poor survival.     Nivolumab has been used as second line therapy to positive effect in the disease and this begs the question as to whether adjuvant therapy plus immunotherapy could be utilized though the patient would be difficult to treat with platinum based chemotherapy as well.       The patient is next assessed 11/7/2022 for significant assessments by supportive oncology at [facility] as well as with plans to see Dr. Marrero 11/9/2022.  Unfortunately he has been very slow to gradually recover from the effects of surgery with reduced appetite and only fair performance status.     At present it would be difficult to give him cisplatin based chemotherapy and we discussed whether we might be able to use Tecentriq (atezolizumab) as his primary adjuvant therapy.  We have contacted pathology about completing Caris testing and a PD-L1 analysis that has not yet completed.         The patient is seen, however, 11/16/2022 and his genomic analysis has returned as being significant for broad sensitivity to immunotherapy.  This would argue for the administration of weekly Carboplatin/Taxol as the patient proceeds to radiation therapy and upon completion, then using Tecentriq as further adjuvant therapy over a year.  This is discussed with the patient and his  in detail as well as discussed with Dr. Marrero of radiation therapy.  We have also discussed the patient require port placement.    The patient proceeded to treatment taking weekly carboplatin Taxol with concurrent radiation therapy last seen 12/12/2022 with third weekly treatment.    He is next seen 12/19/2022 with H&H 11.9 and 38.5, white count 3460 and platelet count of 168,000.  He is feeling well without any significant complications of therapy except for right calf dermatitis that is been developing  in the last several days.    The patient continued therapy taking CarboTaxol weekly including 12/28 and 1/4/2023.    His is next seen 1/11/2023 with completion date for radiation therapy 1/11/2023.  Repeat CBC now includes H&H of 11.0 and 33.9, white count 2090, platelet count 128,000, ANC is 800.  He, fortunately, is tolerating treatment well and we have again discussed with him adjuvant immunotherapy would be useful including Tecentriq versus pembrolizumab-keynote 091 study particularly in tumor programmed cell death PD-L1 greater than 50%.    The patient will not be given additional chemotherapy 1/11/2023 we will plan to reassess by follow-up scans in the next 6 weeks CT chest and pelvis making a decision thereafter about adjuvant immunotherapy.    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    We have discussed that considering studies like keynote  091 that the patient's high risk disease could be addressed with additional immunotherapy including the use of Atezolizumab and/or Keytruda.  Considering his findings overall Keytruda every 3 weeks x18 cycles is to be pursued.    The patient was seen 3/20/2023, discussed initiation of Keytruda.  He is agreeable to proceed.    The patient notified the office shortly after treatment that he had pain under his right rib cage and was placed back onto his Neurontin to try to manage this pain.  Approximately week later he still had the pain and also had another rash we switched him at this point to Famvir to try to completely clear this problem.    He is next seen back 4/3/2023.  Fortunately his recent apparent shingles activation has nearly abated and he is feeling reasonably well.  In review of the  literature there is no significant difference in activation with immunotherapy though this patient may require suppression.  I have asked him to complete his Famvir at this point.    Patient assessed 4/10/2023 with resolution of his shingles, subsequently placed on maintenance acyclovir, consideration of shingles vaccination post 3 months of Keytruda therapy is stable disease documented.    The patient underwent a CT chest abdomen pelvis 6/8/2023 that demonstrates postsurgical changes of the left hemithorax, stable pulmonary nodules, stable infrarenal abdominal aortic aneurysm.    He is next seen 6/12/2023.  We have discussed continue with his Keytruda with his tolerance being excellent.  He will also reduce his current metoprolol to 12.5 mg p.o. daily.    He continued Keytruda and was seen in the office 7/24/2023 in anticipation of his 7th dose of Keytruda.  He was tolerating treatment without substantial side effects but did have sudden onset nausea and vomiting lasting 48 hours.  He then began to have joint pain bilateral knees and shoulders up to an 8 of 10 for severity.       He was demonstrating worsening hyponatremia at this point with a normal potassium.  Unfortunately his mental status began to worsen with increasing sleepiness, mild diarrhea.  7/28/2023 studies included an INR 3.34, sodium now 125 and he was admitted for his weakness and hyponatremia.    The patient was seen by several services including nephrology and oncology to the IV fluids.  He had been tested with ACT stimulation test and was diagnosed with renal sufficiency started on oral hydrocortisone.  7/29/2023 cortisol was 0.62, subsequent ACTH test was reduced at 5.7.  The patient studies 7/30 included BUN/creatinine of 9 and 0.97, sodium 130, chloride 95, calcium 5.2.      The patient is next seen 8/14/2023.  He remains somewhat weak and with joint discomfort.  We have discussed his findings that would be most consistent with central adrenal  insufficiency and that dosing for his hydrocortisone-currently 10 mg p.o. every morning and 5 mg p.o. every afternoon is likely to be too low.  In determining whether we should proceed with treatment replacement therapy could allow us to try to complete his therapy which, on balance, would be the preferred approach.          Past Medical History:   Diagnosis Date    Arthritis     COPD (chronic obstructive pulmonary disease)     O2 3L AT HS    Diabetes mellitus     DIET CONTROL    Diverticulitis     DVT, lower extremity     RLE    Elevated cholesterol     H/O Cervical pain     History of colitis     Hyperlipidemia     Hypertension     Left shoulder pain     Low back pain     Lung cancer 2022    LEFT LUNG    On anticoagulant therapy     Peripheral arterial disease     Right leg claudication     Skin cancer     HX        Past Surgical History:   Procedure Laterality Date    BALLOON ANGIOPLASTY, ARTERY Right 2015    IR Percutaneious IVC filter placement    INGUINAL HERNIA REPAIR Left     KNEE ARTHROSCOPY Left     Torn meniscus    LOBECTOMY Left 9/23/2022    Procedure: BRONCHOSCOPY, LEFT VIDEO ASSISTED THORACOSCOPY, ROBOT ASSISTED TOTAL DECORTICATION  LEFT LUNG, LEFT LOWER LOBE LOBECTOMY, MEDIASTINAL LYMPHECTOMY, INTERCOSTAL NERVE BLOCK;  Surgeon: Nicola Joe III, MD;  Location: [facility];  Service: Robotics - [facility];  Laterality: Left;    VENOUS ACCESS DEVICE (PORT) INSERTION Right 11/21/2022    Procedure: INSERTION VENOUS ACCESS DEVICE;  Surgeon: Nicola Joe III, MD;  Location: [facility] OR;  Service: Thoracic;  Laterality: Right;        Current Outpatient Medications on File Prior to Visit   Medication Sig Dispense Refill    arformoterol (BROVANA) 15 MCG/2ML nebulizer solution Take 2 mL by nebulization 2 (Two) Times a Day. Indications: Chronic Obstructive Lung Disease      Budeson-Glycopyrrol-Formoterol (BREZTRI) 160-9-4.8 MCG/ACT aerosol inhaler Inhale 2 puffs 2 (Two) Times a Day. Indications:  Chronic Obstructive Lung Disease      cetirizine (zyrTEC) 10 MG tablet Take 1 tablet by mouth Daily. Indications: Hayfever      Cholecalciferol 50 MCG (2000 UT) capsule Take 1 capsule by mouth Daily. Indications: Vitamin D Deficiency      fluticasone (FLONASE) 50 MCG/ACT nasal spray 1 spray into the nostril(s) as directed by provider Daily. Indications: Allergic Rhinitis      hydrocortisone (CORTEF) 5 MG tablet Take 2 tablets by mouth Every Morning AND 1 tablet Daily With Lunch for 30 days. 90 tablet 0    isosorbide mononitrate (IMDUR) 60 MG 24 hr tablet Take 1 tablet by mouth Every Evening. Indications: hypertension      metoprolol succinate XL (TOPROL-XL) 25 MG 24 hr tablet Take 1 tablet by mouth Daily. Indications: High Blood Pressure Disorder      ondansetron (Zofran) 8 MG tablet Take 1 tablet by mouth Every 8 (Eight) Hours As Needed for Nausea or Vomiting. 30 tablet 1    simvastatin (ZOCOR) 20 MG tablet Take 1 tablet by mouth Every Night. Indications: High Amount of Fats in the Blood      tamsulosin (FLOMAX) 0.4 MG capsule 24 hr capsule Take 1 capsule by mouth Daily. 30 capsule 5    warfarin (COUMADIN) 5 MG tablet Take 1 tablet by mouth Daily. Take 10mg on 9/29/22 and then resume regular home schedule. (Patient taking differently: Take 1 tablet by mouth Daily. 5mg daily with additional 5mg on Monday and Friday)      hydrocortisone (CORTEF) 10 MG tablet Take 1 tablet by mouth Every Morning for 30 days. 30 tablet 0    metoprolol succinate XL (TOPROL-XL) 25 MG 24 hr tablet Take 1 tablet by mouth Daily for 30 days. (Patient taking differently: Take 1 tablet by mouth Every Evening. Patient taking half tab) 30 tablet 0     No current facility-administered medications on file prior to visit.        ALLERGIES:    Allergies   Allergen Reactions    Benadryl [Diphenhydramine] Other (See Comments)     Extreme restlessness and insomnia        Social History     Socioeconomic History    Marital status:     Years of  "education: 1 year college   Tobacco Use    Smoking status: Every Day     Packs/day: 1.00     Years: 59.00     Pack years: 59.00     Types: Cigarettes     Start date: 1963    Smokeless tobacco: Never    Tobacco comments:     9/8/22: Down to Less than 1 PPD   Vaping Use    Vaping Use: Never used   Substance and Sexual Activity    Alcohol use: Not Currently    Drug use: Never    Sexual activity: Defer        Family History   Problem Relation Age of Onset    No Known Problems Mother     Cancer Father     Lung cancer Father     Diabetes Brother     Other Daughter         S/P stent, age 42    No Known Problems Son     [place] Hyperthermia Neg Hx         Review of Systems   ROS as per HPI    Objective     Vitals:    08/14/23 0953   BP: 131/72   Pulse: 53   Resp: 18   Temp: 98.3 øF (36.8 øC)   TempSrc: Temporal   SpO2: 98%   Weight: 69.4 kg (153 lb)   Height: 182.9 cm (72.01\")   PainSc:   4   PainLoc: Back           8/14/2023     9:58 AM   Current Status   ECOG score 0     Physical Exam  Constitutional:       Appearance: Normal appearance.   HENT:      Head: Normocephalic and atraumatic.      Nose: Nose normal.      Mouth/Throat:      Mouth: Mucous membranes are moist.   Eyes:      Extraocular Movements: Extraocular movements intact.      Conjunctiva/sclera: Conjunctivae normal.      Pupils: Pupils are equal, round, and reactive to light.   Cardiovascular:      Rate and Rhythm: Normal rate and regular rhythm.      Pulses: Normal pulses.      Heart sounds: Normal heart sounds.   Pulmonary:      Comments: Prolonged expiratory phase bilaterally  Abdominal:      General: Bowel sounds are normal.      Palpations: Abdomen is soft.      Comments: Scaphoid   Musculoskeletal:         General: Normal range of motion.      Cervical back: Normal range of motion and neck supple.   Skin:     General: Skin is warm and dry.      Findings: No rash.   Neurological:      General: No focal deficit present.      Mental Status: He is alert and " oriented to person, place, and time.   Psychiatric:         Mood and Affect: Mood normal.       I have reexamined the patient and the results are consistent with the previously documented exam. Kayden Bishop MD      RECENT LABS:  Results from last 7 days   Lab Units 08/14/23  0923   WBC 10*3/mm3 5.28   NEUTROS ABS 10*3/mm3 2.53   HEMOGLOBIN g/dL 11.8*   HEMATOCRIT % 36.6*   PLATELETS 10*3/mm3 194     Results from last 7 days   Lab Units 08/14/23  0923   SODIUM mmol/L 134   POTASSIUM mmol/L 4.1   CHLORIDE mmol/L 99   CO2 mmol/L 22.8   BUN mg/dL 12   CREATININE mg/dL 0.89   CALCIUM mg/dL 9.6   ALBUMIN g/dL 3.7   BILIRUBIN mg/dL 0.3   ALK PHOS U/L 51   ALT (SGPT) U/L 8   AST (SGOT) U/L 17   GLUCOSE mg/dL 86             Assessment & Plan     78 y.o. male  with medical issues including type 2 diabetes-uncertain control, COPD, hypertension, diastolic heart failure, chronic disease, peripheral vascular disease (follow-up with vascular surgery today), previous DVT on anticoagulation (home monitoring), previous IVC filter placement?,  Status post September 2021 partial small bowel obstruction secondary to sigmoid diverticulitis treated medically.     1.   T1b N1 M0-stage IIb lung cancer.  right lung base nodule noted on scan at that point and in follow-up with primary care had developed a left inguinal hernia with repair planned through a different general surgeon then seen with his initial sigmoid diverticulitis.  PET scan 7/13/2022 demonstrates a hypermetabolic spiculated lesion medial aspect of the left lobe adjacent to descending thoracic aorta measuring1.5 x 1.6 SUV 9.3 with an enlarged hypermetabolic left hilar lymph node measured 1.5 x 1.3 with SUV of 12.1, additional nodules in the lateral aspect right lower lobe and micronodule in the posterior/medial right lower lobe and also additional right lower lobe micronodule.  There is an infrarenal abdominal aortic aneurysm measuring 3.4 cm with a short segment of  chronic dissection present.  Clinical findings would be consistent with a possible T1b N1 M0-stage IIb lung cancer.  discussed in thoracic conference 7/20/2022.  Recommendations to proceed with pulmonary assessment with EBUS.  The patient proceeded to undergo his hernia surgery 8/2/2022 by Dr. Dotson.   Guardant 360 that T p53 splice site mutation is present and would certainly be consistent as well the most common mutations found in lung cancers particularly squamous cancers.  The patient was admitted 9//2022 undergoing 9/23/2022  a bronchoscopy with left video-assisted thoracoscopy with robot-assisted total decortication of the left lung, left lower lobectomy, mediastinal lymphadenectomy and intercostal nerve block with Dr. Nicola Joe.  Fortunately he did fairly well postoperatively though did develop atrial fibrillation with RVR treated with amiodarone converting back to sinus rhythm, treated with IV metoprolol subsequent chronic anticoagulation.  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  Notably there is invasive carcinoma present at the margin. only 2 positive lymph nodes adjacent to the bronchovesicular margin which appears to have been transected difficult to enumerate with extranodal extension present.  Options could well be Carboplatin and Taxol considering the patient's comorbidity and worry of using cisplatin-based chemotherapy in this patient followed by atezolizumab with pathology having been notified to assess PD-L1 expression on the tumor as well also await review by Caris molecular profiling.  Finally radiation therapy consultation be requested.   Caris analysis indicates benefit from immunotherapy at relatively high levels.  This would allow radiation therapy given with Carboplatin Taxol weekly (better tolerated) and then subsequent atezolizumab  adjuvantly.  Weekly carboplatin Taxol initiated 11/28/2022 given concurrently with radiation therapy  12/5/2022 here for cycle 2 weekly carboplatin/Taxol, overall tolerating treatment quite well so far.  12/12/2022: Proceed with cycle #3 weekly carboplatin/Taxol with concurrent radiation.  Continues to tolerate treatment well.  He is next seen 12/19/2022 with H&H 11.9 and 38.5, white count 3460 and platelet count of 168,000.  He is feeling well without any significant complications of therapy except for right calf dermatitis that is been developing in the last several days.  12/28/2022 here for week 5 carbo/Taxol.  Overall tolerating well.  Today WBC 2.45, ANC 1.22, hemoglobin 10.7, platelet count 117,000.  I have reviewed with Dr. Bishop, and we will go ahead and continue with treatment.  1/4/2023: Received with cycle 6 carbo/taxol + XRT.  Continues to tolerate treatment well.  WBC improved to 2.69 with ANC 1.41.  Next 1/11/2023 with completion date 1/11/2023.  Repeat CBC now includes H&H of 11.0 and 33.9, white count 2090, platelet count 128,000, ANC is 800.  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  We have discussed that considering studies like keynote  091 that the patient's high risk disease could be addressed with additional immunotherapy including the use of Atezolizumab and/or Keytruda.  Considering his findings overall Keytruda every 3 weeks x18 cycles is to be pursued.  The patient began Keytruda as planned with  his first treatment 3/20/2023.  The patient notified the office shortly after treatment that he had pain under his right rib cage and was placed back onto his Neurontin to try to manage this pain.  Approximately week later he still had the pain and also had another rash we switched him at this point to Famvir to try to completely clear this problem.  4/3/2023.  Fortunately his recent apparent shingles activation has nearly abated and he is feeling reasonably well.  In review of the literature there is no significant difference in activation with immunotherapy though this patient may require suppression.  He is asked to complete his Famvir.  4/10/2023 cycle 2 Keytruda, overall tolerating well.   Patient seen 5/1/2023, third cycle of Keytruda, status post fourth cycle of Keytruda, 3 months of therapy, subsequent scans will need to be scheduled.  5/22/2023: Proceed with cycle 4 Keytruda.    Follow-up scans-CT of chest, abdomen and pelvis 6/8/2023 with postsurgical changes only.  7/3/2023 cycle 6 Keytruda  Proceed today, 7/24/2023 with cycle 7 Keytruda which is continued every 3 weeks   He was tolerating treatment without substantial side effects but did have sudden onset nausea and vomiting lasting 48 hours.  He then began to have joint pain bilateral knees and shoulders up to an 8 of 10 for severity.     He was demonstrating worsening hyponatremia at this point with a normal potassium.  Unfortunately his mental status began to worsen with increasing sleepiness, mild diarrhea.  7/28/2023 studies included an INR 3.34, sodium now 125 and he was admitted for his weakness and hyponatremia.  The patient was seen by several services including nephrology and oncology to the IV fluids.  He had been tested with ACT stimulation test and was diagnosed with renal sufficiency started on oral hydrocortisone.  7/29/2023 cortisol was 0.62, subsequent ACTH test was reduced at 5.7.  The patient studies 7/30 included BUN/creatinine of 9  and 0.97, sodium 130, chloride 95, calcium 5.2.  The patient is next seen 8/14/2023.  He remains somewhat weak and with joint discomfort.  We have discussed his findings that would be most consistent with central adrenal insufficiency and that dosing for his hydrocortisone-currently 10 mg p.o. every morning and 5 mg p.o. every afternoon is likely to be too low.  In determining whether we should proceed with treatment replacement therapy could allow us to try to complete his therapy which, on balance, would be the preferred approach.  Hydrocortisone moved to 20 mg p.o. every morning and 10 mg p.o. every afternoon.  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  Replacement therapy ongoing.        2.  Herpes zoster: Patient was treated with Famvir.  Rash has resolved, no issues with persistent herpetic neuralgia.  He will be placed on suppression with acyclovir 400 mg twice daily.  We discussed he will hold off on vaccination for now, given recent infection.  Patient assessed 5/1/2023, continue Keytruda, status post fifth cycle of Keytruda or 3 months of therapy he would undergo repeat scans and, if stable or improved, proceed with Shingrix vaccinations.  Patient now requested to proceed with vaccinations including RSV    3.  Hyponatremia  Likely exacerbated by recent GI toxicity with nausea vomiting and diarrhea.  Symptoms have now resolved with improvement in his appetite and intake  Reviewed with Dr. Bishop today, no additional interventions.    4. Joint pain.  Baseline knee pain prior to initiation of immunotherapy though he is now exacerbated with shoulder pain as well and requiring ambulation with a walker  Additional inflammatory labs including sed rate and C-reactive protein today to evaluate for inflammation secondary to immunotherapy  Continue Tylenol and occasional Norco for breakthrough pain  Hydrocortisone replacement therapy  increased to 20 mg p.o. every morning and 10 mg p.o. every afternoon-patient is 14/23    PLAN:   Proceed today with cycle  8 Keytruda which is continued every 3 weeks  Notably chest x-ray, plain films late July, early August 2023 without evidence of recurrent disease.  Adjusted hydrocortisone to 20 mg p.o. every morning and 10 mg p.o. every afternoon  Continue prophylactic acyclovir 400 mg twice daily  Return in 3 weeks for CBC, CMP, MD and continued Keytruda  Imaging last obtained 6/8/2023, we will likely repeat imaging September 2023.    Patient is on a high risk medication requiring close monitoring for toxicity.    I spent 60 minutes caring for Giovani on this date of service. This time includes time spent by me in the following activities: preparing for the visit, reviewing tests, obtaining and/or reviewing a separately obtained history, performing a medically appropriate examination and/or evaluation, counseling and educating the patient/family/caregiver, ordering medications, tests, or procedures, referring and communicating with other health care professionals, documenting information in the medical record, independently interpreting results and communicating that information with the patient/family/caregiver, and care coordination.       Kayden Bishop MD  08/14/2023

## 2023-09-05 ENCOUNTER — INFUSION (OUTPATIENT)
Dept: ONCOLOGY | Facility: HOSPITAL | Age: 78
End: 2023-09-05
Payer: MEDICARE

## 2023-09-05 ENCOUNTER — OFFICE VISIT (OUTPATIENT)
Dept: ONCOLOGY | Facility: CLINIC | Age: 78
End: 2023-09-05
Payer: MEDICARE

## 2023-09-05 VITALS
TEMPERATURE: 97.8 F | OXYGEN SATURATION: 95 % | HEART RATE: 52 BPM | DIASTOLIC BLOOD PRESSURE: 72 MMHG | HEIGHT: 72 IN | WEIGHT: 157.6 LBS | BODY MASS INDEX: 21.35 KG/M2 | SYSTOLIC BLOOD PRESSURE: 138 MMHG | RESPIRATION RATE: 18 BRPM

## 2023-09-05 DIAGNOSIS — Z79.899 LONG-TERM USE OF HIGH-RISK MEDICATION: ICD-10-CM

## 2023-09-05 DIAGNOSIS — Z79.899 LONG-TERM USE OF HIGH-RISK MEDICATION: Primary | ICD-10-CM

## 2023-09-05 DIAGNOSIS — C7A.8 NEUROENDOCRINE CARCINOMA OF LUNG: Primary | ICD-10-CM

## 2023-09-05 DIAGNOSIS — C7A.8 NEUROENDOCRINE CARCINOMA OF LUNG: ICD-10-CM

## 2023-09-05 LAB
ALBUMIN SERPL-MCNC: 4.1 G/DL (ref 3.5–5.2)
ALBUMIN/GLOB SERPL: 1.6 G/DL
ALP SERPL-CCNC: 53 U/L (ref 39–117)
ALT SERPL W P-5'-P-CCNC: 7 U/L (ref 1–41)
ANION GAP SERPL CALCULATED.3IONS-SCNC: 10.9 MMOL/L (ref 5–15)
AST SERPL-CCNC: 19 U/L (ref 1–40)
BASOPHILS # BLD AUTO: 0.03 10*3/MM3 (ref 0–0.2)
BASOPHILS NFR BLD AUTO: 0.4 % (ref 0–1.5)
BILIRUB SERPL-MCNC: 0.5 MG/DL (ref 0–1.2)
BUN SERPL-MCNC: 13 MG/DL (ref 8–23)
BUN/CREAT SERPL: 14 (ref 7–25)
CALCIUM SPEC-SCNC: 9.9 MG/DL (ref 8.6–10.5)
CHLORIDE SERPL-SCNC: 98 MMOL/L (ref 98–107)
CO2 SERPL-SCNC: 25.1 MMOL/L (ref 22–29)
CREAT SERPL-MCNC: 0.93 MG/DL (ref 0.7–1.3)
DEPRECATED RDW RBC AUTO: 55.4 FL (ref 37–54)
EGFRCR SERPLBLD CKD-EPI 2021: 85.1 ML/MIN/1.73
EOSINOPHIL # BLD AUTO: 0.17 10*3/MM3 (ref 0–0.4)
EOSINOPHIL NFR BLD AUTO: 2.5 % (ref 0.3–6.2)
ERYTHROCYTE [DISTWIDTH] IN BLOOD BY AUTOMATED COUNT: 15.5 % (ref 12.3–15.4)
GLOBULIN UR ELPH-MCNC: 2.5 GM/DL
GLUCOSE SERPL-MCNC: 101 MG/DL (ref 65–99)
HCT VFR BLD AUTO: 39.9 % (ref 37.5–51)
HGB BLD-MCNC: 12.8 G/DL (ref 13–17.7)
IMM GRANULOCYTES # BLD AUTO: 0.03 10*3/MM3 (ref 0–0.05)
IMM GRANULOCYTES NFR BLD AUTO: 0.4 % (ref 0–0.5)
LYMPHOCYTES # BLD AUTO: 1.91 10*3/MM3 (ref 0.7–3.1)
LYMPHOCYTES NFR BLD AUTO: 28.2 % (ref 19.6–45.3)
MCH RBC QN AUTO: 31.2 PG (ref 26.6–33)
MCHC RBC AUTO-ENTMCNC: 32.1 G/DL (ref 31.5–35.7)
MCV RBC AUTO: 97.3 FL (ref 79–97)
MONOCYTES # BLD AUTO: 0.73 10*3/MM3 (ref 0.1–0.9)
MONOCYTES NFR BLD AUTO: 10.8 % (ref 5–12)
NEUTROPHILS NFR BLD AUTO: 3.91 10*3/MM3 (ref 1.7–7)
NEUTROPHILS NFR BLD AUTO: 57.7 % (ref 42.7–76)
NRBC BLD AUTO-RTO: 0 /100 WBC (ref 0–0.2)
PLATELET # BLD AUTO: 145 10*3/MM3 (ref 140–450)
PMV BLD AUTO: 8.3 FL (ref 6–12)
POTASSIUM SERPL-SCNC: 4.3 MMOL/L (ref 3.5–5.2)
PROT SERPL-MCNC: 6.6 G/DL (ref 6–8.5)
RBC # BLD AUTO: 4.1 10*6/MM3 (ref 4.14–5.8)
SODIUM SERPL-SCNC: 134 MMOL/L (ref 136–145)
T4 FREE SERPL-MCNC: 1.09 NG/DL (ref 0.93–1.7)
TSH SERPL DL<=0.05 MIU/L-ACNC: 3.17 UIU/ML (ref 0.27–4.2)
WBC NRBC COR # BLD: 6.78 10*3/MM3 (ref 3.4–10.8)

## 2023-09-05 PROCEDURE — 84439 ASSAY OF FREE THYROXINE: CPT | Performed by: INTERNAL MEDICINE

## 2023-09-05 PROCEDURE — 84443 ASSAY THYROID STIM HORMONE: CPT | Performed by: INTERNAL MEDICINE

## 2023-09-05 PROCEDURE — 25010000002 PEMBROLIZUMAB 100 MG/4ML SOLUTION 4 ML VIAL: Performed by: NURSE PRACTITIONER

## 2023-09-05 PROCEDURE — 85025 COMPLETE CBC W/AUTO DIFF WBC: CPT

## 2023-09-05 PROCEDURE — 96413 CHEMO IV INFUSION 1 HR: CPT

## 2023-09-05 PROCEDURE — 80053 COMPREHEN METABOLIC PANEL: CPT

## 2023-09-05 RX ORDER — TRIAMCINOLONE ACETONIDE 5 MG/G
CREAM TOPICAL
COMMUNITY
Start: 2023-08-10 | End: 2024-08-09

## 2023-09-05 RX ORDER — SILDENAFIL CITRATE 20 MG/1
20 TABLET ORAL
COMMUNITY

## 2023-09-05 RX ORDER — SODIUM CHLORIDE 9 MG/ML
250 INJECTION, SOLUTION INTRAVENOUS ONCE
Status: COMPLETED | OUTPATIENT
Start: 2023-09-05 | End: 2023-09-05

## 2023-09-05 RX ORDER — CLOTRIMAZOLE 1 %
CREAM (GRAM) TOPICAL
COMMUNITY
Start: 2023-08-10 | End: 2024-08-09

## 2023-09-05 RX ORDER — SODIUM CHLORIDE 9 MG/ML
250 INJECTION, SOLUTION INTRAVENOUS ONCE
Status: CANCELLED | OUTPATIENT
Start: 2023-09-05

## 2023-09-05 RX ADMIN — SODIUM CHLORIDE 250 ML: 9 INJECTION, SOLUTION INTRAVENOUS at 09:01

## 2023-09-05 RX ADMIN — SODIUM CHLORIDE 200 MG: 9 INJECTION, SOLUTION INTRAVENOUS at 09:01

## 2023-09-05 NOTE — PROGRESS NOTES
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  Notably there is invasive carcinoma present at the margin, 2 positive lymph nodes adjacent to the bronchovesicular margin which appears to have been transected difficult to enumerate with extranodal extension present.  *Patient status post chemo RT-11/28/2022, radiation therapy completion date 1/11/2023  *Immunotherapy-Keytruda to be initiated 3/20/2023    History of Present Illness   The patient is a 78 y.o. male with the above history who is seen today in follow-up due for his eighth dose of Keytruda.  When he was seen last by Dr. Bishop he was having more trouble with decreased performance status with weakness and increased joint pain, felt to be secondary to immunotherapy specifically in relation to adrenal insufficiency.  We did increase his hydrocortisone to 20 mg in the morning, 10 mg in the evening.  As he is reviewed back today companied by his wife and they both agree he is doing better.  He is eating better with some weight gain noted.    He denies any pain.  Reports normal bowel function.    We discussed plan of care, specifically for repeat imaging following this cycle of treatment.    They deny other concerns at this time.                             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  He had been assessed particularly in the fall 2021 when he presented with nausea and vomiting and abdominal discomfort leading to assessments that revealed sigmoid  diverticulitis with contained rupture and partial small bowel obstruction.  Records from mid September 21 indicating that he was admitted with partial small bowel obstruction and treated medically with very slow recovery.  He has history of COPD with CT demonstrating a pulmonary nodule in the right lung base at 7 mm with follow-up planned.  He was seen by general surgery in later September with repeat scans planned and repeat CT abdomen 10/7/2021 did demonstrate interval improvement of sigmoid diverticulitis.      Record indicates an assessment in late June 2022 at different general surgeon for left inguinal hernia, history of heavy tobacco use with COPD and recommendation for surgical repair of his hernia      The patient underwent a CT scan of the chest 5/7/2022 demonstrating diffuse emphysematous changes, ill-defined density measuring 3 mm in the superior segment of the right lower lobe, multiple ill-defined 3 to 4 mm nodular density thought to be inflammatory involving the right lower lobe and a new spiculated nodule involving the left lower lobe measuring 16 x 14 mm as well as left hilar adenopathy.       Follow-up PET/CT 7/13/2022 reveal a hypermetabolic spiculated lesion medial aspect the left lower lobe adjacent to descending thoracic aorta measuring 1.5 x 1.6 SUV 9.3 with an enlarged hypermetabolic left hilar lymph node measured 1.5 x 1.3 with SUV of 12.1, additional nodules in the lateral aspect right lower lobe and micronodule in the posterior/medial right lower lobe and also additional right lower lobe micronodule.  There is an infrarenal abdominal aortic aneurysm measuring 3.4 cm with a short segment of chronic dissection present.    These clinical findings would be consistent with a possible T1b N1 M0-stage IIb lung cancer.      Recognizing a diagnosis has not been made his case was discussed in thoracic conference 7/20/2022.  Recommendations include proceeding to pulmonary assessment with EBUS and the  patient undergoing a general assessment per his clinical status-older adult assessment as well as assessment by thoracic surgery.  These issues are discussed with the patient and his wife in detail when they are seen 7/28/2022.    The patient proceeded to undergo his hernia surgery 8/2/2022 by Dr. Dotson.  Additionally the patient was unable to undergo assessment by pulmonary until October and, thereafter, was scheduled to see Dr. Joe 8/18/2022 undergoing older adult functional assessment with a JAGUAR score of 11 indicating a risk of functional decline related to cancer therapy.    The patient is seen with his significant other 8/15/2022 and doing very well with recent hernia surgery.  His performance status is reasonably good at present and we have learned now that he would not be able to see pulmonary medicine until October, thoracic surgery is scheduled this week with Dr. Joe.  Perhaps he could be a surgical candidate.  Particularly we know by van Mata that T p53 splice site mutation is present and would certainly be consistent as well the most common mutations found in lung cancers particularly squamous cancers.  We have discussed obtaining pulmonary functions at this point, thoracic surgery assessment this week and also restarting Chantix as possible for the patient.    There was difficulty obtaining Chantix and while this was being assessed the patient was reviewed 8/18 by thoracic surgery.  He was felt to have a T1b N1 M0 stage IIb carcinoma left lower lobe along and not a candidate for biopsy.  PFTs were borderline, functional assessment was reasonably good and the patient was felt to be a candidate for robot-assisted biopsy of his nodes and if malignant proceed to left lower lobe lobectomy.  He was reviewed 9/8 and offered 21 mg nicotine transdermal patching.    He is next seen 9/10/2022.  He is seen with his wife and both are prepared for surgery 9/23/2022.  We discussed that additional therapy  may be considered once we have more information about the type and stage.  We would hope to see him postoperatively.    The patient was admitted 9//2022 undergoing 9/23/2022  a bronchoscopy with left video-assisted thoracoscopy with robot-assisted total decortication of the left lung, left lower lobectomy, mediastinal lymphadenectomy and intercostal nerve block with Dr. Nicola Joe.  Fortunately he did fairly well postoperatively though did develop atrial fibrillation with RVR treated with amiodarone converting back to sinus rhythm, treated with IV metoprolol subsequent chronic anticoagulation.  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  Notably there is invasive carcinoma present at the margin.  Is a, and only 2 positive lymph nodes adjacent to the bronchovesicular margin which appears to have been transected difficult to enumerate with extranodal extension present.       The patient is seen 10/27/2022.  He is recovering well from surgery, albeit slowly, and we have discussed his findings that are concerning as to residual disease with a positive margin described as above.  There is also the significance potentially of PD-L1 expression which has been described as producing a poor survival.     Nivolumab has been used as second line therapy to positive effect in the disease and this begs the question as to whether adjuvant therapy plus immunotherapy could be utilized though the patient would be difficult to treat with platinum based chemotherapy as well.       The patient is next assessed 11/7/2022 for significant assessments by supportive oncology at [facility] as well as with plans to see Dr. Marrero 11/9/2022.  Unfortunately he has been very slow to gradually recover from the effects of surgery with reduced appetite and only fair performance status.     At present it would be difficult  to give him cisplatin based chemotherapy and we discussed whether we might be able to use Tecentriq (atezolizumab) as his primary adjuvant therapy.  We have contacted pathology about completing Caris testing and a PD-L1 analysis that has not yet completed.         The patient is seen, however, 11/16/2022 and his genomic analysis has returned as being significant for broad sensitivity to immunotherapy.  This would argue for the administration of weekly Carboplatin/Taxol as the patient proceeds to radiation therapy and upon completion, then using Tecentriq as further adjuvant therapy over a year.  This is discussed with the patient and his  in detail as well as discussed with Dr. Marrero of radiation therapy.  We have also discussed the patient require port placement.    The patient proceeded to treatment taking weekly carboplatin Taxol with concurrent radiation therapy last seen 12/12/2022 with third weekly treatment.    He is next seen 12/19/2022 with H&H 11.9 and 38.5, white count 3460 and platelet count of 168,000.  He is feeling well without any significant complications of therapy except for right calf dermatitis that is been developing in the last several days.    The patient continued therapy taking CarboTaxol weekly including 12/28 and 1/4/2023.    His is next seen 1/11/2023 with completion date for radiation therapy 1/11/2023.  Repeat CBC now includes H&H of 11.0 and 33.9, white count 2090, platelet count 128,000, ANC is 800.  He, fortunately, is tolerating treatment well and we have again discussed with him adjuvant immunotherapy would be useful including Tecentriq versus pembrolizumab-keynote 091 study particularly in tumor programmed cell death PD-L1 greater than 50%.    The patient will not be given additional chemotherapy 1/11/2023 we will plan to reassess by follow-up scans in the next 6 weeks CT chest and pelvis making a decision thereafter about adjuvant immunotherapy.    Patient proceeded to  undergo follow-up scans 2/24/2023 showing no evidence of recurrent disease including his findings of left lower lobectomy, stable 11 m nodule opacity in the base of the right lower lobe in the right lung apex, small left pleural effusion mild to moderate stenosis of the proximal subclavian artery.    We have discussed that considering studies like keynote  091 that the patient's high risk disease could be addressed with additional immunotherapy including the use of Atezolizumab and/or Keytruda.  Considering his findings overall Keytruda every 3 weeks x18 cycles is to be pursued.    The patient was seen 3/20/2023, discussed initiation of Keytruda.  He is agreeable to proceed.    The patient notified the office shortly after treatment that he had pain under his right rib cage and was placed back onto his Neurontin to try to manage this pain.  Approximately week later he still had the pain and also had another rash we switched him at this point to Famvir to try to completely clear this problem.    He is next seen back 4/3/2023.  Fortunately his recent apparent shingles activation has nearly abated and he is feeling reasonably well.  In review of the literature there is no significant difference in activation with immunotherapy though this patient may require suppression.  I have asked him to complete his Famvir at this point.    Patient assessed 4/10/2023 with resolution of his shingles, subsequently placed on maintenance acyclovir, consideration of shingles vaccination post 3 months of Keytruda therapy is stable disease documented.    The patient underwent a CT chest abdomen pelvis 6/8/2023 that demonstrates postsurgical changes of the left hemithorax, stable pulmonary nodules, stable infrarenal abdominal aortic aneurysm.    He is next seen 6/12/2023.  We have discussed continue with his Keytruda with his tolerance being excellent.  He will also reduce his current metoprolol to 12.5 mg p.o. daily.    He continued  Keytruda and was seen in the office 7/24/2023 in anticipation of his 7th dose of Keytruda.  He was tolerating treatment without substantial side effects but did have sudden onset nausea and vomiting lasting 48 hours.  He then began to have joint pain bilateral knees and shoulders up to an 8 of 10 for severity.       He was demonstrating worsening hyponatremia at this point with a normal potassium.  Unfortunately his mental status began to worsen with increasing sleepiness, mild diarrhea.  7/28/2023 studies included an INR 3.34, sodium now 125 and he was admitted for his weakness and hyponatremia.    The patient was seen by several services including nephrology and oncology to the IV fluids.  He had been tested with ACT stimulation test and was diagnosed with renal sufficiency started on oral hydrocortisone.  7/29/2023 cortisol was 0.62, subsequent ACTH test was reduced at 5.7.  The patient studies 7/30 included BUN/creatinine of 9 and 0.97, sodium 130, chloride 95, calcium 5.2.      The patient is next seen 8/14/2023.  He remains somewhat weak and with joint discomfort.  We have discussed his findings that would be most consistent with central adrenal insufficiency and that dosing for his hydrocortisone-currently 10 mg p.o. every morning and 5 mg p.o. every afternoon is likely to be too low.  In determining whether we should proceed with treatment replacement therapy could allow us to try to complete his therapy which, on balance, would be the preferred approach.          Past Medical History:   Diagnosis Date    Arthritis     COPD (chronic obstructive pulmonary disease)     O2 3L AT HS    Diabetes mellitus     DIET CONTROL    Diverticulitis     DVT, lower extremity     RLE    Elevated cholesterol     H/O Cervical pain     History of colitis     Hyperlipidemia     Hypertension     Left shoulder pain     Low back pain     Lung cancer 2022    LEFT LUNG    On anticoagulant therapy     Peripheral arterial disease      Right leg claudication     Skin cancer     HX        Past Surgical History:   Procedure Laterality Date    BALLOON ANGIOPLASTY, ARTERY Right 2015    IR Percutaneious IVC filter placement    INGUINAL HERNIA REPAIR Left     KNEE ARTHROSCOPY Left     Torn meniscus    LOBECTOMY Left 9/23/2022    Procedure: BRONCHOSCOPY, LEFT VIDEO ASSISTED THORACOSCOPY, ROBOT ASSISTED TOTAL DECORTICATION  LEFT LUNG, LEFT LOWER LOBE LOBECTOMY, MEDIASTINAL LYMPHECTOMY, INTERCOSTAL NERVE BLOCK;  Surgeon: Nicola Joe III, MD;  Location: [facility] OR;  Service: Robotics - DaVinci;  Laterality: Left;    VENOUS ACCESS DEVICE (PORT) INSERTION Right 11/21/2022    Procedure: INSERTION VENOUS ACCESS DEVICE;  Surgeon: Nicola Joe III, MD;  Location: [facility] OR;  Service: Thoracic;  Laterality: Right;        Current Outpatient Medications on File Prior to Visit   Medication Sig Dispense Refill    arformoterol (BROVANA) 15 MCG/2ML nebulizer solution Take 2 mL by nebulization 2 (Two) Times a Day. Indications: Chronic Obstructive Lung Disease      Budeson-Glycopyrrol-Formoterol (BREZTRI) 160-9-4.8 MCG/ACT aerosol inhaler Inhale 2 puffs 2 (Two) Times a Day. Indications: Chronic Obstructive Lung Disease      cetirizine (zyrTEC) 10 MG tablet Take 1 tablet by mouth Daily. Indications: Hayfever      Cholecalciferol 50 MCG (2000 UT) capsule Take 1 capsule by mouth Daily. Indications: Vitamin D Deficiency      clotrimazole (LOTRIMIN) 1 % cream Apply  topically to the appropriate area as directed.      fluticasone (FLONASE) 50 MCG/ACT nasal spray 1 spray into the nostril(s) as directed by provider Daily. Indications: Allergic Rhinitis      isosorbide mononitrate (IMDUR) 60 MG 24 hr tablet Take 1 tablet by mouth Every Evening. Indications: hypertension      metoprolol succinate XL (TOPROL-XL) 25 MG 24 hr tablet Take 1 tablet by mouth Daily. Indications: High Blood Pressure Disorder      ondansetron (Zofran) 8 MG tablet Take 1 tablet by mouth  Every 8 (Eight) Hours As Needed for Nausea or Vomiting. 30 tablet 1    sildenafil (REVATIO) 20 MG tablet Take 1 tablet by mouth.      simvastatin (ZOCOR) 20 MG tablet Take 1 tablet by mouth Every Night. Indications: High Amount of Fats in the Blood      tamsulosin (FLOMAX) 0.4 MG capsule 24 hr capsule Take 1 capsule by mouth Daily. 30 capsule 5    triamcinolone (KENALOG) 0.5 % cream Apply  topically to the appropriate area as directed.      warfarin (COUMADIN) 5 MG tablet Take 1 tablet by mouth Daily. Take 10mg on 9/29/22 and then resume regular home schedule. (Patient taking differently: Take 1 tablet by mouth Daily. 5mg daily with additional 5mg on Monday and Friday)      [DISCONTINUED] metoprolol succinate XL (TOPROL-XL) 25 MG 24 hr tablet Take 1 tablet by mouth Daily for 30 days. (Patient taking differently: Take 1 tablet by mouth Every Evening. Patient taking half tab) 30 tablet 0     No current facility-administered medications on file prior to visit.        ALLERGIES:    Allergies   Allergen Reactions    Benadryl [Diphenhydramine] Other (See Comments)     Extreme restlessness and insomnia        Social History     Socioeconomic History    Marital status:     Years of education: 1 year college   Tobacco Use    Smoking status: Every Day     Packs/day: 1.00     Years: 59.00     Pack years: 59.00     Types: Cigarettes     Start date: 1963    Smokeless tobacco: Never    Tobacco comments:     9/8/22: Down to Less than 1 PPD   Vaping Use    Vaping Use: Never used   Substance and Sexual Activity    Alcohol use: Not Currently    Drug use: Never    Sexual activity: Defer        Family History   Problem Relation Age of Onset    No Known Problems Mother     Cancer Father     Lung cancer Father     Diabetes Brother     Other Daughter         S/P stent, age 42    No Known Problems Son     Malig Hyperthermia Neg Hx         Review of Systems   ROS as per HPI    Objective     Vitals:    09/05/23 0811   BP: 138/72  "  Pulse: 52   Resp: 18   Temp: 97.8 °F (36.6 °C)   TempSrc: Temporal   SpO2: 95%   Weight: 71.5 kg (157 lb 9.6 oz)   Height: 182.9 cm (72.01\")   PainSc: 0-No pain           9/5/2023     8:05 AM   Current Status   ECOG score 0     Physical Exam  Constitutional:       Appearance: Normal appearance.   HENT:      Head: Normocephalic and atraumatic.      Nose: Nose normal.      Mouth/Throat:      Mouth: Mucous membranes are moist.   Eyes:      Extraocular Movements: Extraocular movements intact.      Conjunctiva/sclera: Conjunctivae normal.      Pupils: Pupils are equal, round, and reactive to light.   Cardiovascular:      Rate and Rhythm: Normal rate and regular rhythm.      Pulses: Normal pulses.      Heart sounds: Normal heart sounds.   Pulmonary:      Comments: Prolonged expiratory phase bilaterally  Abdominal:      General: Bowel sounds are normal.      Palpations: Abdomen is soft.      Comments: Scaphoid   Musculoskeletal:         General: Normal range of motion.      Cervical back: Normal range of motion and neck supple.   Skin:     General: Skin is warm and dry.      Findings: No rash.   Neurological:      General: No focal deficit present.      Mental Status: He is alert and oriented to person, place, and time.   Psychiatric:         Mood and Affect: Mood normal.       I have reexamined the patient and the results are consistent with the previously documented exam. GARCÍA Pacheco      RECENT LABS:  Results from last 7 days   Lab Units 09/05/23  0756   WBC 10*3/mm3 6.78   NEUTROS ABS 10*3/mm3 3.91   HEMOGLOBIN g/dL 12.8*   HEMATOCRIT % 39.9   PLATELETS 10*3/mm3 145     Results from last 7 days   Lab Units 09/05/23  0756   SODIUM mmol/L 134*   POTASSIUM mmol/L 4.3   CHLORIDE mmol/L 98   CO2 mmol/L 25.1   BUN mg/dL 13   CREATININE mg/dL 0.93   CALCIUM mg/dL 9.9   ALBUMIN g/dL 4.1   BILIRUBIN mg/dL 0.5   ALK PHOS U/L 53   ALT (SGPT) U/L 7   AST (SGOT) U/L 19   GLUCOSE mg/dL 101*             Assessment & " Plan     78 y.o. male  with medical issues including type 2 diabetes-uncertain control, COPD, hypertension, diastolic heart failure, chronic disease, peripheral vascular disease (follow-up with vascular surgery today), previous DVT on anticoagulation (home monitoring), previous IVC filter placement?,  Status post September 2021 partial small bowel obstruction secondary to sigmoid diverticulitis treated medically.     1.   T1b N1 M0-stage IIb lung cancer.  right lung base nodule noted on scan at that point and in follow-up with primary care had developed a left inguinal hernia with repair planned through a different general surgeon then seen with his initial sigmoid diverticulitis.  PET scan 7/13/2022 demonstrates a hypermetabolic spiculated lesion medial aspect of the left lobe adjacent to descending thoracic aorta measuring1.5 x 1.6 SUV 9.3 with an enlarged hypermetabolic left hilar lymph node measured 1.5 x 1.3 with SUV of 12.1, additional nodules in the lateral aspect right lower lobe and micronodule in the posterior/medial right lower lobe and also additional right lower lobe micronodule.  There is an infrarenal abdominal aortic aneurysm measuring 3.4 cm with a short segment of chronic dissection present.  Clinical findings would be consistent with a possible T1b N1 M0-stage IIb lung cancer.  discussed in thoracic conference 7/20/2022.  Recommendations to proceed with pulmonary assessment with EBUS.  The patient proceeded to undergo his hernia surgery 8/2/2022 by Dr. Dotson.   Guardant 360 that T p53 splice site mutation is present and would certainly be consistent as well the most common mutations found in lung cancers particularly squamous cancers.  The patient was admitted 9//2022 undergoing 9/23/2022  a bronchoscopy with left video-assisted thoracoscopy with robot-assisted total decortication of the left lung, left lower lobectomy, mediastinal lymphadenectomy and intercostal nerve block with   Nicola Joe.  Fortunately he did fairly well postoperatively though did develop atrial fibrillation with RVR treated with amiodarone converting back to sinus rhythm, treated with IV metoprolol subsequent chronic anticoagulation.  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  Notably there is invasive carcinoma present at the margin. only 2 positive lymph nodes adjacent to the bronchovesicular margin which appears to have been transected difficult to enumerate with extranodal extension present.  Options could well be Carboplatin and Taxol considering the patient's comorbidity and worry of using cisplatin-based chemotherapy in this patient followed by atezolizumab with pathology having been notified to assess PD-L1 expression on the tumor as well also await review by Caris molecular profiling.  Finally radiation therapy consultation be requested.   Caris analysis indicates benefit from immunotherapy at relatively high levels.  This would allow radiation therapy given with Carboplatin Taxol weekly (better tolerated) and then subsequent atezolizumab adjuvantly.  Weekly carboplatin Taxol initiated 11/28/2022 given concurrently with radiation therapy  12/5/2022 here for cycle 2 weekly carboplatin/Taxol, overall tolerating treatment quite well so far.  12/12/2022: Proceed with cycle #3 weekly carboplatin/Taxol with concurrent radiation.  Continues to tolerate treatment well.  He is next seen 12/19/2022 with H&H 11.9 and 38.5, white count 3460 and platelet count of 168,000.  He is feeling well without any significant complications of therapy except for right calf dermatitis that is been developing in the last several days.  12/28/2022 here for week 5 carbo/Taxol.  Overall tolerating well.  Today WBC 2.45, ANC 1.22, hemoglobin 10.7, platelet count 117,000.  I have reviewed with Dr. Bishop, and we  will go ahead and continue with treatment.  1/4/2023: Received with cycle 6 carbo/taxol + XRT.  Continues to tolerate treatment well.  WBC improved to 2.69 with ANC 1.41.  Next 1/11/2023 with completion date 1/11/2023.  Repeat CBC now includes H&H of 11.0 and 33.9, white count 2090, platelet count 128,000, ANC is 800.  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  We have discussed that considering studies like keynote  091 that the patient's high risk disease could be addressed with additional immunotherapy including the use of Atezolizumab and/or Keytruda.  Considering his findings overall Keytruda every 3 weeks x18 cycles is to be pursued.  The patient began Keytruda as planned with his first treatment 3/20/2023.  The patient notified the office shortly after treatment that he had pain under his right rib cage and was placed back onto his Neurontin to try to manage this pain.  Approximately week later he still had the pain and also had another rash we switched him at this point to Famvir to try to completely clear this problem.  4/3/2023.  Fortunately his recent apparent shingles activation has nearly abated and he is feeling reasonably well.  In review of the literature there is no significant difference in activation with immunotherapy though this patient may require suppression.  He is asked to complete his Famvir.  4/10/2023 cycle 2 Keytruda, overall tolerating well.   Patient seen 5/1/2023, third cycle of Keytruda, status  post fourth cycle of Keytruda, 3 months of therapy, subsequent scans will need to be scheduled.  5/22/2023: Proceed with cycle 4 Keytruda.    Follow-up scans-CT of chest, abdomen and pelvis 6/8/2023 with postsurgical changes only.  7/3/2023 cycle 6 Keytruda  Proceed today, 7/24/2023 with cycle 7 Keytruda which is continued every 3 weeks   He was tolerating treatment without substantial side effects but did have sudden onset nausea and vomiting lasting 48 hours.  He then began to have joint pain bilateral knees and shoulders up to an 8 of 10 for severity.     He was demonstrating worsening hyponatremia at this point with a normal potassium.  Unfortunately his mental status began to worsen with increasing sleepiness, mild diarrhea.  7/28/2023 studies included an INR 3.34, sodium now 125 and he was admitted for his weakness and hyponatremia.  The patient was seen by several services including nephrology and oncology to the IV fluids.  He had been tested with ACT stimulation test and was diagnosed with renal sufficiency started on oral hydrocortisone.  7/29/2023 cortisol was 0.62, subsequent ACTH test was reduced at 5.7.  The patient studies 7/30 included BUN/creatinine of 9 and 0.97, sodium 130, chloride 95, calcium 5.2.  The patient is next seen 8/14/2023.  He remains somewhat weak and with joint discomfort.  We have discussed his findings that would be most consistent with central adrenal insufficiency and that dosing for his hydrocortisone-currently 10 mg p.o. every morning and 5 mg p.o. every afternoon is likely to be too low.  In determining whether we should proceed with treatment replacement therapy could allow us to try to complete his therapy which, on balance, would be the preferred approach.  Hydrocortisone moved to 20 mg p.o. every morning and 10 mg p.o. every afternoon.  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   Replacement therapy ongoing.  9/5/2023 reviewed back today for C9 Keytruda. Patient with improved performance status following increase of hydrocortisone per above.  Improved appetite and decreased joint pain.  Proceed with treatment today with repeat imaging planned thereafter.      2.  Herpes zoster: Patient was treated with Famvir.  Rash has resolved, no issues with persistent herpetic neuralgia.  He will be placed on suppression with acyclovir 400 mg twice daily.  We discussed he will hold off on vaccination for now, given recent infection.  Patient assessed 5/1/2023, continue Keytruda, status post fifth cycle of Keytruda or 3 months of therapy he would undergo repeat scans and, if stable or improved, proceed with Shingrix vaccinations.  Patient now requested to proceed with vaccinations including RSV    3.  Hyponatremia  Likely exacerbated by recent GI toxicity with nausea vomiting and diarrhea.  Symptoms have now resolved with improvement in his appetite and intake  Reviewed with Dr. Bishop today, no additional interventions.    4. Joint pain.  Baseline knee pain prior to initiation of immunotherapy though he is now exacerbated with shoulder pain as well and requiring ambulation with a walker  Additional inflammatory labs including sed rate and C-reactive protein today to evaluate for inflammation secondary to immunotherapy  Continue Tylenol and occasional Norco for breakthrough pain  Hydrocortisone replacement therapy increased to 20 mg p.o. every morning and 10 mg p.o. every afternoon-patient is 14/23    PLAN:   Proceed today with cycle 9 Keytruda which he continues every 3 weeks  Continue hydrocortisone to 20 mg p.o. every morning and 10 mg p.o. every afternoon  Continue prophylactic acyclovir 400 mg twice daily  In 2 weeks repeat CT C/A/P with contrast    Return in 3 weeks for follow-up with Dr. Bishop, review of imaging, and continuation of Keytruda pending scan review.    Patient is on a high risk  medication requiring close monitoring for toxicity.      Yessenia Donato, APRN  09/05/2023

## 2023-09-07 ENCOUNTER — PATIENT OUTREACH (OUTPATIENT)
Dept: OTHER | Facility: HOSPITAL | Age: 78
End: 2023-09-07
Payer: MEDICARE

## 2023-09-07 NOTE — PROGRESS NOTES
Reviewed chart    Called patient. Mailbox full, unable to leave a message; will try back in 1-2 weeks

## 2023-09-18 ENCOUNTER — INFUSION (OUTPATIENT)
Dept: ONCOLOGY | Facility: HOSPITAL | Age: 78
End: 2023-09-18
Payer: MEDICARE

## 2023-09-18 ENCOUNTER — HOSPITAL ENCOUNTER (OUTPATIENT)
Dept: CT IMAGING | Facility: HOSPITAL | Age: 78
Discharge: HOME | End: 2023-09-18
Admitting: NURSE PRACTITIONER
Payer: MEDICARE

## 2023-09-18 DIAGNOSIS — C7A.8 NEUROENDOCRINE CARCINOMA OF LUNG: ICD-10-CM

## 2023-09-18 DIAGNOSIS — Z79.899 LONG-TERM USE OF HIGH-RISK MEDICATION: ICD-10-CM

## 2023-09-18 PROCEDURE — 0 DIATRIZOATE MEGLUMINE & SODIUM PER 1 ML: Performed by: NURSE PRACTITIONER

## 2023-09-18 PROCEDURE — 71260 CT THORAX DX C+: CPT

## 2023-09-18 PROCEDURE — 74177 CT ABD & PELVIS W/CONTRAST: CPT

## 2023-09-18 PROCEDURE — 25510000001 IOPAMIDOL 61 % SOLUTION: Performed by: NURSE PRACTITIONER

## 2023-09-18 RX ADMIN — DIATRIZOATE MEGLUMINE AND DIATRIZOATE SODIUM 30 ML: 660; 100 LIQUID ORAL; RECTAL at 15:35

## 2023-09-18 RX ADMIN — IOPAMIDOL 90 ML: 612 INJECTION, SOLUTION INTRAVENOUS at 16:33

## 2023-09-20 DIAGNOSIS — C34.32 MALIGNANT NEOPLASM OF LOWER LOBE, LEFT BRONCHUS OR LUNG: Primary | ICD-10-CM

## 2023-09-20 NOTE — PROGRESS NOTES
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  Notably there is invasive carcinoma present at the margin, 2 positive lymph nodes adjacent to the bronchovesicular margin which appears to have been transected difficult to enumerate with extranodal extension present.  *Patient status post chemo RT-11/28/2022, radiation therapy completion date 1/11/2023  *Immunotherapy-Keytruda to be initiated 3/20/2023  *Follow-up repeat scans 9/18/2023 without evidence of recurrent disease      History of Present Illness   The patient is a 78 y.o. male with the above history who is seen today in follow-up due for his eighth dose of Keytruda.  When he was seen last by Dr. Bishop he was having more trouble with decreased performance status with weakness and increased joint pain, felt to be secondary to immunotherapy specifically in relation to adrenal insufficiency.  We did increase his hydrocortisone to 20 mg in the morning, 10 mg in the evening.  As he is reviewed back today companied by his wife and they both agree he is doing better.  He is eating better with some weight gain noted.    He denies any pain.  Reports normal bowel function.    We discussed plan of care, specifically for repeat imaging following this cycle of treatment.    They deny other concerns at this time.    The patient is reevaluated 9/21/2023 with CT of chest, abdomen, pelvis 9/18/2023 showing postoperative changes from the left lower lobectomy, scattered densities in the lungs that are stable from study 6/8/2023 and no new lung abnormalities, 3.4 cm infrarenal aortic aneurysm and stable and no evidence of metastatic disease.  The patient is seen with his significant other both indicating that he has actually feeling  considerably better particularly with cortisone replacement therapy.  His scans, as above, continue to demonstrate that he does not have evidence recurrent disease.                             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  He had been assessed particularly in the fall 2021 when he presented with nausea and vomiting and abdominal discomfort leading to assessments that revealed sigmoid diverticulitis with contained rupture and partial small bowel obstruction.  Records from mid September 21 indicating that he was admitted with partial small bowel obstruction and treated medically with very slow recovery.  He has history of COPD with CT demonstrating a pulmonary nodule in the right lung base at 7 mm with follow-up planned.  He was seen by general surgery in later September with repeat scans planned and repeat CT abdomen 10/7/2021 did demonstrate interval improvement of sigmoid diverticulitis.      Record indicates an assessment in late June 2022 at different general surgeon for left inguinal hernia, history of heavy tobacco use with COPD and recommendation for surgical repair of his hernia      The patient underwent a CT scan of the chest 5/7/2022 demonstrating diffuse emphysematous changes, ill-defined density measuring 3 mm in the superior segment of the right lower lobe, multiple ill-defined 3 to 4 mm nodular density thought to be inflammatory involving the right lower lobe and a new spiculated nodule involving the left lower lobe measuring 16 x 14 mm as well as left hilar adenopathy.       Follow-up PET/CT 7/13/2022 reveal a hypermetabolic spiculated lesion medial aspect the left lower lobe adjacent to descending thoracic aorta measuring 1.5 x  1.6 SUV 9.3 with an enlarged hypermetabolic left hilar lymph node measured 1.5 x 1.3 with SUV of 12.1, additional nodules in the lateral aspect right lower lobe and micronodule in the posterior/medial right lower lobe and also additional right lower lobe micronodule.  There is an infrarenal abdominal aortic aneurysm measuring 3.4 cm with a short segment of chronic dissection present.    These clinical findings would be consistent with a possible T1b N1 M0-stage IIb lung cancer.      Recognizing a diagnosis has not been made his case was discussed in thoracic conference 7/20/2022.  Recommendations include proceeding to pulmonary assessment with EBUS and the patient undergoing a general assessment per his clinical status-older adult assessment as well as assessment by thoracic surgery.  These issues are discussed with the patient and his wife in detail when they are seen 7/28/2022.    The patient proceeded to undergo his hernia surgery 8/2/2022 by Dr. Dotson.  Additionally the patient was unable to undergo assessment by pulmonary until October and, thereafter, was scheduled to see Dr. Joe 8/18/2022 undergoing older adult functional assessment with a JAGUAR score of 11 indicating a risk of functional decline related to cancer therapy.    The patient is seen with his significant other 8/15/2022 and doing very well with recent hernia surgery.  His performance status is reasonably good at present and we have learned now that he would not be able to see pulmonary medicine until October, thoracic surgery is scheduled this week with Dr. Joe.  Perhaps he could be a surgical candidate.  Particularly we know by van 360 that T p53 splice site mutation is present and would certainly be consistent as well the most common mutations found in lung cancers particularly squamous cancers.  We have discussed obtaining pulmonary functions at this point, thoracic surgery assessment this week and also restarting Chantix as possible  for the patient.    There was difficulty obtaining Chantix and while this was being assessed the patient was reviewed 8/18 by thoracic surgery.  He was felt to have a T1b N1 M0 stage IIb carcinoma left lower lobe along and not a candidate for biopsy.  PFTs were borderline, functional assessment was reasonably good and the patient was felt to be a candidate for robot-assisted biopsy of his nodes and if malignant proceed to left lower lobe lobectomy.  He was reviewed 9/8 and offered 21 mg nicotine transdermal patching.    He is next seen 9/10/2022.  He is seen with his wife and both are prepared for surgery 9/23/2022.  We discussed that additional therapy may be considered once we have more information about the type and stage.  We would hope to see him postoperatively.    The patient was admitted 9//2022 undergoing 9/23/2022  a bronchoscopy with left video-assisted thoracoscopy with robot-assisted total decortication of the left lung, left lower lobectomy, mediastinal lymphadenectomy and intercostal nerve block with Dr. Nicola Joe.  Fortunately he did fairly well postoperatively though did develop atrial fibrillation with RVR treated with amiodarone converting back to sinus rhythm, treated with IV metoprolol subsequent chronic anticoagulation.  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  Notably there is invasive carcinoma present at the margin.  Is a, and only 2 positive lymph nodes adjacent to the bronchovesicular margin which appears to have been transected difficult to enumerate with extranodal extension present.       The patient is seen 10/27/2022.  He is recovering well from surgery, albeit slowly, and we have discussed his findings that are concerning as to residual disease with a positive margin described as above.  There is also the significance potentially of PD-L1  expression which has been described as producing a poor survival.     Nivolumab has been used as second line therapy to positive effect in the disease and this begs the question as to whether adjuvant therapy plus immunotherapy could be utilized though the patient would be difficult to treat with platinum based chemotherapy as well.       The patient is next assessed 11/7/2022 for significant assessments by supportive oncology at [facility] as well as with plans to see Dr. Marrero 11/9/2022.  Unfortunately he has been very slow to gradually recover from the effects of surgery with reduced appetite and only fair performance status.     At present it would be difficult to give him cisplatin based chemotherapy and we discussed whether we might be able to use Tecentriq (atezolizumab) as his primary adjuvant therapy.  We have contacted pathology about completing Caris testing and a PD-L1 analysis that has not yet completed.         The patient is seen, however, 11/16/2022 and his genomic analysis has returned as being significant for broad sensitivity to immunotherapy.  This would argue for the administration of weekly Carboplatin/Taxol as the patient proceeds to radiation therapy and upon completion, then using Tecentriq as further adjuvant therapy over a year.  This is discussed with the patient and his  in detail as well as discussed with Dr. Marrero of radiation therapy.  We have also discussed the patient require port placement.    The patient proceeded to treatment taking weekly carboplatin Taxol with concurrent radiation therapy last seen 12/12/2022 with third weekly treatment.    He is next seen 12/19/2022 with H&H 11.9 and 38.5, white count 3460 and platelet count of 168,000.  He is feeling well without any significant complications of therapy except for right calf dermatitis that is been developing in the last several days.    The patient continued therapy taking CarboTaxol weekly including 12/28 and  1/4/2023.    His is next seen 1/11/2023 with completion date for radiation therapy 1/11/2023.  Repeat CBC now includes H&H of 11.0 and 33.9, white count 2090, platelet count 128,000, ANC is 800.  He, fortunately, is tolerating treatment well and we have again discussed with him adjuvant immunotherapy would be useful including Tecentriq versus pembrolizumab-keynote 091 study particularly in tumor programmed cell death PD-L1 greater than 50%.    The patient will not be given additional chemotherapy 1/11/2023 we will plan to reassess by follow-up scans in the next 6 weeks CT chest and pelvis making a decision thereafter about adjuvant immunotherapy.    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    We have discussed that considering studies like keynote  091 that the patient's high risk disease could be addressed with additional immunotherapy including the use of Atezolizumab and/or Keytruda.  Considering his findings overall Keytruda every 3 weeks x18 cycles is to be pursued.    The patient was seen 3/20/2023, discussed initiation of Keytruda.  He is agreeable to proceed.    The patient notified the office shortly after treatment that he had pain under his right rib cage and was placed back onto his Neurontin to try to manage this pain.  Approximately week later he still had the pain and also had another rash we switched him at this point to Famvir to try to completely clear this problem.    He is next seen back 4/3/2023.  Fortunately his recent apparent shingles activation has nearly abated and he is feeling reasonably well.  In review of the literature there is no significant difference in activation with immunotherapy though this patient may  require suppression.  I have asked him to complete his Famvir at this point.    Patient assessed 4/10/2023 with resolution of his shingles, subsequently placed on maintenance acyclovir, consideration of shingles vaccination post 3 months of Keytruda therapy is stable disease documented.    The patient underwent a CT chest abdomen pelvis 6/8/2023 that demonstrates postsurgical changes of the left hemithorax, stable pulmonary nodules, stable infrarenal abdominal aortic aneurysm.    He is next seen 6/12/2023.  We have discussed continue with his Keytruda with his tolerance being excellent.  He will also reduce his current metoprolol to 12.5 mg p.o. daily.    He continued Keytruda and was seen in the office 7/24/2023 in anticipation of his 7th dose of Keytruda.  He was tolerating treatment without substantial side effects but did have sudden onset nausea and vomiting lasting 48 hours.  He then began to have joint pain bilateral knees and shoulders up to an 8 of 10 for severity.       He was demonstrating worsening hyponatremia at this point with a normal potassium.  Unfortunately his mental status began to worsen with increasing sleepiness, mild diarrhea.  7/28/2023 studies included an INR 3.34, sodium now 125 and he was admitted for his weakness and hyponatremia.    The patient was seen by several services including nephrology and oncology to the IV fluids.  He had been tested with ACT stimulation test and was diagnosed with renal sufficiency started on oral hydrocortisone.  7/29/2023 cortisol was 0.62, subsequent ACTH test was reduced at 5.7.  The patient studies 7/30 included BUN/creatinine of 9 and 0.97, sodium 130, chloride 95, calcium 5.2.      The patient is next seen 8/14/2023.  He remains somewhat weak and with joint discomfort.  We have discussed his findings that would be most consistent with central adrenal insufficiency and that dosing for his hydrocortisone-currently 10 mg p.o. every morning and 5 mg p.o.  every afternoon is likely to be too low.  In determining whether we should proceed with treatment replacement therapy could allow us to try to complete his therapy which, on balance, would be the preferred approach.    The patient is seen 9/21/2023 improved per performance status with cortisone replacement therapy, subsequent CT of chest on pelvis 9/18/2023 without evidence of recurrent disease, pembrolizumab continued      Past Medical History:   Diagnosis Date    Arthritis     COPD (chronic obstructive pulmonary disease)     O2 3L AT HS    Diabetes mellitus     DIET CONTROL    Diverticulitis     DVT, lower extremity     RLE    Elevated cholesterol     H/O Cervical pain     History of colitis     Hyperlipidemia     Hypertension     Left shoulder pain     Low back pain     Lung cancer 2022    LEFT LUNG    On anticoagulant therapy     Peripheral arterial disease     Right leg claudication     Skin cancer     HX        Past Surgical History:   Procedure Laterality Date    BALLOON ANGIOPLASTY, ARTERY Right 2015    IR Percutaneious IVC filter placement    INGUINAL HERNIA REPAIR Left     KNEE ARTHROSCOPY Left     Torn meniscus    LOBECTOMY Left 9/23/2022    Procedure: BRONCHOSCOPY, LEFT VIDEO ASSISTED THORACOSCOPY, ROBOT ASSISTED TOTAL DECORTICATION  LEFT LUNG, LEFT LOWER LOBE LOBECTOMY, MEDIASTINAL LYMPHECTOMY, INTERCOSTAL NERVE BLOCK;  Surgeon: Nicola Joe III, MD;  Location: [facility];  Service: [facility] - [facility];  Laterality: Left;    VENOUS ACCESS DEVICE (PORT) INSERTION Right 11/21/2022    Procedure: INSERTION VENOUS ACCESS DEVICE;  Surgeon: Nicola Joe III, MD;  Location: [facility];  Service: Thoracic;  Laterality: Right;        Current Outpatient Medications on File Prior to Visit   Medication Sig Dispense Refill    arformoterol (BROVANA) 15 MCG/2ML nebulizer solution Take 2 mL by nebulization 2 (Two) Times a Day. Indications: Chronic Obstructive Lung Disease      cetirizine (zyrTEC) 10 MG  tablet Take 1 tablet by mouth Daily. Indications: Hayfever      Cholecalciferol 50 MCG (2000 UT) capsule Take 1 capsule by mouth Daily. Indications: Vitamin D Deficiency      clotrimazole (LOTRIMIN) 1 % cream Apply  topically to the appropriate area as directed.      fluticasone (FLONASE) 50 MCG/ACT nasal spray 1 spray into the nostril(s) as directed by provider Daily. Indications: Allergic Rhinitis      hydrocortisone (CORTEF) 5 MG tablet       isosorbide mononitrate (IMDUR) 60 MG 24 hr tablet Take 1 tablet by mouth Every Evening. Indications: hypertension      ondansetron (Zofran) 8 MG tablet Take 1 tablet by mouth Every 8 (Eight) Hours As Needed for Nausea or Vomiting. 30 tablet 1    Pembrolizumab (Keytruda) 100 MG/4ML solution Infuse  into a venous catheter.      sildenafil (REVATIO) 20 MG tablet Take 1 tablet by mouth.      simvastatin (ZOCOR) 20 MG tablet Take 1 tablet by mouth Every Night. Indications: High Amount of Fats in the Blood      tamsulosin (FLOMAX) 0.4 MG capsule 24 hr capsule Take 1 capsule by mouth Daily. 30 capsule 5    triamcinolone (KENALOG) 0.5 % cream Apply  topically to the appropriate area as directed.      warfarin (COUMADIN) 5 MG tablet Take 1 tablet by mouth Daily. Take 10mg on 9/29/22 and then resume regular home schedule. (Patient taking differently: Take 1 tablet by mouth Daily. 5mg daily with additional 5mg on Monday and Friday)      Budeson-Glycopyrrol-Formoterol (BREZTRI) 160-9-4.8 MCG/ACT aerosol inhaler Inhale 2 puffs 2 (Two) Times a Day. Indications: Chronic Obstructive Lung Disease      metoprolol succinate XL (TOPROL-XL) 25 MG 24 hr tablet Take 1 tablet by mouth Daily. Indications: High Blood Pressure Disorder       No current facility-administered medications on file prior to visit.        ALLERGIES:    Allergies   Allergen Reactions    Benadryl [Diphenhydramine] Other (See Comments)     Extreme restlessness and insomnia        Social History     Socioeconomic History     "Marital status:     Years of education: 1 year college   Tobacco Use    Smoking status: Every Day     Packs/day: 1.00     Years: 59.00     Pack years: 59.00     Types: Cigarettes     Start date: 1963    Smokeless tobacco: Never    Tobacco comments:     9/8/22: Down to Less than 1 PPD   Vaping Use    Vaping Use: Never used   Substance and Sexual Activity    Alcohol use: Not Currently    Drug use: Never    Sexual activity: Defer        Family History   Problem Relation Age of Onset    No Known Problems Mother     Cancer Father     Lung cancer Father     Diabetes Brother     Other Daughter         S/P stent, age 42    No Known Problems Son     [place] Hyperthermia Neg Hx         Review of Systems   ROS as per HPI    Objective     Vitals:    09/21/23 1359   BP: 139/69   Pulse: 61   Resp: 18   Temp: 98 °F (36.7 °C)   TempSrc: Temporal   SpO2: 97%   Weight: 71.8 kg (158 lb 4.8 oz)   Height: 182.9 cm (72.01\")   PainSc: 0-No pain           9/21/2023     2:03 PM   Current Status   ECOG score 0     Physical Exam  Constitutional:       Appearance: Normal appearance.   HENT:      Head: Normocephalic and atraumatic.      Nose: Nose normal.      Mouth/Throat:      Mouth: Mucous membranes are moist.   Eyes:      Extraocular Movements: Extraocular movements intact.      Conjunctiva/sclera: Conjunctivae normal.      Pupils: Pupils are equal, round, and reactive to light.   Cardiovascular:      Rate and Rhythm: Normal rate and regular rhythm.      Pulses: Normal pulses.      Heart sounds: Normal heart sounds.   Pulmonary:      Comments: Prolonged expiratory phase bilaterally  Abdominal:      General: Bowel sounds are normal.      Palpations: Abdomen is soft.      Comments: Scaphoid   Musculoskeletal:         General: Normal range of motion.      Cervical back: Normal range of motion and neck supple.   Skin:     General: Skin is warm and dry.      Findings: No rash.   Neurological:      General: No focal deficit present.      " Mental Status: He is alert and oriented to person, place, and time.   Psychiatric:         Mood and Affect: Mood normal.       I have reexamined the patient and the results are consistent with the previously documented exam. Kayden Bishop MD      RECENT LABS:  Results from last 7 days   Lab Units 09/21/23  1313   WBC 10*3/mm3 5.82   NEUTROS ABS 10*3/mm3 3.82   HEMOGLOBIN g/dL 13.2   HEMATOCRIT % 40.9   PLATELETS 10*3/mm3 169                   Assessment & Plan     78 y.o. male  with medical issues including type 2 diabetes-uncertain control, COPD, hypertension, diastolic heart failure, chronic disease, peripheral vascular disease (follow-up with vascular surgery today), previous DVT on anticoagulation (home monitoring), previous IVC filter placement?,  Status post September 2021 partial small bowel obstruction secondary to sigmoid diverticulitis treated medically.     1.   T1b N1 M0-stage IIb lung cancer.  right lung base nodule noted on scan at that point and in follow-up with primary care had developed a left inguinal hernia with repair planned through a different general surgeon then seen with his initial sigmoid diverticulitis.  PET scan 7/13/2022 demonstrates a hypermetabolic spiculated lesion medial aspect of the left lobe adjacent to descending thoracic aorta measuring1.5 x 1.6 SUV 9.3 with an enlarged hypermetabolic left hilar lymph node measured 1.5 x 1.3 with SUV of 12.1, additional nodules in the lateral aspect right lower lobe and micronodule in the posterior/medial right lower lobe and also additional right lower lobe micronodule.  There is an infrarenal abdominal aortic aneurysm measuring 3.4 cm with a short segment of chronic dissection present.  Clinical findings would be consistent with a possible T1b N1 M0-stage IIb lung cancer.  discussed in thoracic conference 7/20/2022.  Recommendations to proceed with pulmonary assessment with EBUS.  The patient proceeded to undergo his hernia surgery  8/2/2022 by Dr. Dotson.   Guardant 360 that T p53 splice site mutation is present and would certainly be consistent as well the most common mutations found in lung cancers particularly squamous cancers.  The patient was admitted 9//2022 undergoing 9/23/2022  a bronchoscopy with left video-assisted thoracoscopy with robot-assisted total decortication of the left lung, left lower lobectomy, mediastinal lymphadenectomy and intercostal nerve block with Dr. Nicola Joe.  Fortunately he did fairly well postoperatively though did develop atrial fibrillation with RVR treated with amiodarone converting back to sinus rhythm, treated with IV metoprolol subsequent chronic anticoagulation.  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  Notably there is invasive carcinoma present at the margin. only 2 positive lymph nodes adjacent to the bronchovesicular margin which appears to have been transected difficult to enumerate with extranodal extension present.  Options could well be Carboplatin and Taxol considering the patient's comorbidity and worry of using cisplatin-based chemotherapy in this patient followed by atezolizumab with pathology having been notified to assess PD-L1 expression on the tumor as well also await review by Caris molecular profiling.  Finally radiation therapy consultation be requested.   Caris analysis indicates benefit from immunotherapy at relatively high levels.  This would allow radiation therapy given with Carboplatin Taxol weekly (better tolerated) and then subsequent atezolizumab adjuvantly.  Weekly carboplatin Taxol initiated 11/28/2022 given concurrently with radiation therapy  12/5/2022 here for cycle 2 weekly carboplatin/Taxol, overall tolerating treatment quite well so far.  12/12/2022: Proceed with cycle #3 weekly carboplatin/Taxol with concurrent radiation.   Continues to tolerate treatment well.  He is next seen 12/19/2022 with H&H 11.9 and 38.5, white count 3460 and platelet count of 168,000.  He is feeling well without any significant complications of therapy except for right calf dermatitis that is been developing in the last several days.  12/28/2022 here for week 5 carbo/Taxol.  Overall tolerating well.  Today WBC 2.45, ANC 1.22, hemoglobin 10.7, platelet count 117,000.  I have reviewed with Dr. Bishop, and we will go ahead and continue with treatment.  1/4/2023: Received with cycle 6 carbo/taxol + XRT.  Continues to tolerate treatment well.  WBC improved to 2.69 with ANC 1.41.  Next 1/11/2023 with completion date 1/11/2023.  Repeat CBC now includes H&H of 11.0 and 33.9, white count 2090, platelet count 128,000, ANC is 800.  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  We have discussed that considering studies like keynote  091 that the patient's high risk disease could be addressed with additional immunotherapy including the use of Atezolizumab and/or Keytruda.  Considering his findings overall Keytruda every 3 weeks x18 cycles is to be pursued.  The patient began Keytruda as planned with his first treatment 3/20/2023.  The patient notified the office shortly after treatment that he had pain under his right rib cage and was placed back onto his Neurontin to try to manage this pain.  Approximately week later he still had the pain and also had another rash we switched him at  this point to Famvir to try to completely clear this problem.  4/3/2023.  Fortunately his recent apparent shingles activation has nearly abated and he is feeling reasonably well.  In review of the literature there is no significant difference in activation with immunotherapy though this patient may require suppression.  He is asked to complete his Famvir.  4/10/2023 cycle 2 Keytruda, overall tolerating well.   Patient seen 5/1/2023, third cycle of Keytruda, status post fourth cycle of Keytruda, 3 months of therapy, subsequent scans will need to be scheduled.  5/22/2023: Proceed with cycle 4 Keytruda.    Follow-up scans-CT of chest, abdomen and pelvis 6/8/2023 with postsurgical changes only.  7/3/2023 cycle 6 Keytruda  Proceed today, 7/24/2023 with cycle 7 Keytruda which is continued every 3 weeks   He was tolerating treatment without substantial side effects but did have sudden onset nausea and vomiting lasting 48 hours.  He then began to have joint pain bilateral knees and shoulders up to an 8 of 10 for severity.     He was demonstrating worsening hyponatremia at this point with a normal potassium.  Unfortunately his mental status began to worsen with increasing sleepiness, mild diarrhea.  7/28/2023 studies included an INR 3.34, sodium now 125 and he was admitted for his weakness and hyponatremia.  The patient was seen by several services including nephrology and oncology to the IV fluids.  He had been tested with ACT stimulation test and was diagnosed with renal sufficiency started on oral hydrocortisone.  7/29/2023 cortisol was 0.62, subsequent ACTH test was reduced at 5.7.  The patient studies 7/30 included BUN/creatinine of 9 and 0.97, sodium 130, chloride 95, calcium 5.2.  The patient is next seen 8/14/2023.  He remains somewhat weak and with joint discomfort.  We have discussed his findings that would be most consistent with central adrenal insufficiency and that dosing for his hydrocortisone-currently 10 mg  p.o. every morning and 5 mg p.o. every afternoon is likely to be too low.  In determining whether we should proceed with treatment replacement therapy could allow us to try to complete his therapy which, on balance, would be the preferred approach.  Hydrocortisone moved to 20 mg p.o. every morning and 10 mg p.o. every afternoon.  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  Replacement therapy ongoing.  9/5/2023 reviewed back today for C9 Keytruda. Patient with improved performance status following increase of hydrocortisone per above.  Improved appetite and decreased joint pain.  Proceed with treatment today with repeat imaging planned thereafter.  Repeat CT chest, abdomen, pelvis 9/18/2023 without evidence of progressive disease.  Plan to proceed with cycle #10 Keytruda.      2.  Herpes zoster: Patient was treated with Famvir.  Rash has resolved, no issues with persistent herpetic neuralgia.  He will be placed on suppression with acyclovir 400 mg twice daily.  We discussed he will hold off on vaccination for now, given recent infection.  Patient assessed 5/1/2023, continue Keytruda, status post fifth cycle of Keytruda or 3 months of therapy he would undergo repeat scans and, if stable or improved, proceed with Shingrix vaccinations.  Patient now requested to proceed with vaccinations including RSV    3.  Hyponatremia  Likely exacerbated by recent GI toxicity with nausea vomiting and diarrhea.  Symptoms have now resolved with improvement in his appetite and intake  Reviewed with Dr. Bishop today, no additional interventions.    4. Joint pain.  Baseline knee pain prior to initiation of immunotherapy though he is now exacerbated with shoulder pain as well and requiring ambulation with a walker  Additional inflammatory labs including sed rate and C-reactive protein today to evaluate for inflammation secondary to immunotherapy  Continue  Tylenol and occasional Norco for breakthrough pain  Hydrocortisone replacement therapy increased to 20 mg p.o. every morning and 10 mg p.o. every afternoon-patient is 14/23    PLAN:   Proceed today with cycle 10 Keytruda which he continues every 3 weeks  Continue hydrocortisone to 20 mg p.o. every morning and 10 mg p.o. every afternoon  Continue prophylactic acyclovir 400 mg twice daily  Return in 3 weeks for follow-up with NP, continue Keytruda, cycle #11    Patient is on a high risk medication requiring close monitoring for toxicity.      Kayden Bishop MD  09/21/2023

## 2023-09-21 ENCOUNTER — LAB (OUTPATIENT)
Dept: LAB | Facility: HOSPITAL | Age: 78
End: 2023-09-21

## 2023-09-21 ENCOUNTER — OFFICE VISIT (OUTPATIENT)
Dept: ONCOLOGY | Facility: CLINIC | Age: 78
End: 2023-09-21
Payer: MEDICARE

## 2023-09-21 VITALS
RESPIRATION RATE: 18 BRPM | TEMPERATURE: 98 F | OXYGEN SATURATION: 97 % | BODY MASS INDEX: 21.44 KG/M2 | HEIGHT: 72 IN | DIASTOLIC BLOOD PRESSURE: 69 MMHG | SYSTOLIC BLOOD PRESSURE: 139 MMHG | HEART RATE: 61 BPM | WEIGHT: 158.3 LBS

## 2023-09-21 DIAGNOSIS — D47.2 MGUS (MONOCLONAL GAMMOPATHY OF UNKNOWN SIGNIFICANCE): ICD-10-CM

## 2023-09-21 DIAGNOSIS — Z79.899 LONG-TERM USE OF HIGH-RISK MEDICATION: ICD-10-CM

## 2023-09-21 DIAGNOSIS — Z86.718 HISTORY OF DVT (DEEP VEIN THROMBOSIS): Primary | ICD-10-CM

## 2023-09-21 DIAGNOSIS — C7A.8 NEUROENDOCRINE CARCINOMA OF LUNG: ICD-10-CM

## 2023-09-21 DIAGNOSIS — C34.32 MALIGNANT NEOPLASM OF LOWER LOBE, LEFT BRONCHUS OR LUNG: ICD-10-CM

## 2023-09-21 LAB
BASOPHILS # BLD AUTO: 0.03 10*3/MM3 (ref 0–0.2)
BASOPHILS NFR BLD AUTO: 0.5 % (ref 0–1.5)
DEPRECATED RDW RBC AUTO: 57.5 FL (ref 37–54)
EOSINOPHIL # BLD AUTO: 0.14 10*3/MM3 (ref 0–0.4)
EOSINOPHIL NFR BLD AUTO: 2.4 % (ref 0.3–6.2)
ERYTHROCYTE [DISTWIDTH] IN BLOOD BY AUTOMATED COUNT: 15.9 % (ref 12.3–15.4)
HCT VFR BLD AUTO: 40.9 % (ref 37.5–51)
HGB BLD-MCNC: 13.2 G/DL (ref 13–17.7)
IMM GRANULOCYTES # BLD AUTO: 0.02 10*3/MM3 (ref 0–0.05)
IMM GRANULOCYTES NFR BLD AUTO: 0.3 % (ref 0–0.5)
LYMPHOCYTES # BLD AUTO: 1.2 10*3/MM3 (ref 0.7–3.1)
LYMPHOCYTES NFR BLD AUTO: 20.6 % (ref 19.6–45.3)
MCH RBC QN AUTO: 31.5 PG (ref 26.6–33)
MCHC RBC AUTO-ENTMCNC: 32.3 G/DL (ref 31.5–35.7)
MCV RBC AUTO: 97.6 FL (ref 79–97)
MONOCYTES # BLD AUTO: 0.61 10*3/MM3 (ref 0.1–0.9)
MONOCYTES NFR BLD AUTO: 10.5 % (ref 5–12)
NEUTROPHILS NFR BLD AUTO: 3.82 10*3/MM3 (ref 1.7–7)
NEUTROPHILS NFR BLD AUTO: 65.7 % (ref 42.7–76)
NRBC BLD AUTO-RTO: 0 /100 WBC (ref 0–0.2)
PLATELET # BLD AUTO: 169 10*3/MM3 (ref 140–450)
PMV BLD AUTO: 8.6 FL (ref 6–12)
RBC # BLD AUTO: 4.19 10*6/MM3 (ref 4.14–5.8)
WBC NRBC COR # BLD: 5.82 10*3/MM3 (ref 3.4–10.8)

## 2023-09-21 PROCEDURE — 36415 COLL VENOUS BLD VENIPUNCTURE: CPT

## 2023-09-21 PROCEDURE — 85025 COMPLETE CBC W/AUTO DIFF WBC: CPT

## 2023-09-21 RX ORDER — SODIUM CHLORIDE 9 MG/ML
250 INJECTION, SOLUTION INTRAVENOUS ONCE
Status: CANCELLED | OUTPATIENT
Start: 2023-09-25

## 2023-09-21 RX ORDER — HYDROCORTISONE 5 MG/1
TABLET ORAL
COMMUNITY
Start: 2023-09-05

## 2023-09-21 RX ORDER — PEMBROLIZUMAB 25 MG/ML
INJECTION, SOLUTION INTRAVENOUS
COMMUNITY

## 2023-09-25 ENCOUNTER — INFUSION (OUTPATIENT)
Dept: ONCOLOGY | Facility: HOSPITAL | Age: 78
End: 2023-09-25

## 2023-09-25 VITALS
RESPIRATION RATE: 16 BRPM | BODY MASS INDEX: 21.4 KG/M2 | DIASTOLIC BLOOD PRESSURE: 63 MMHG | WEIGHT: 157.8 LBS | SYSTOLIC BLOOD PRESSURE: 122 MMHG | HEART RATE: 73 BPM | TEMPERATURE: 97.8 F | OXYGEN SATURATION: 95 %

## 2023-09-25 DIAGNOSIS — C7A.8 NEUROENDOCRINE CARCINOMA OF LUNG: Primary | ICD-10-CM

## 2023-09-25 DIAGNOSIS — Z79.899 LONG-TERM USE OF HIGH-RISK MEDICATION: ICD-10-CM

## 2023-09-25 DIAGNOSIS — Z45.2 FITTING AND ADJUSTMENT OF VASCULAR CATHETER: ICD-10-CM

## 2023-09-25 LAB
ALBUMIN SERPL-MCNC: 4 G/DL (ref 3.5–5.2)
ALBUMIN/GLOB SERPL: 1.7 G/DL
ALP SERPL-CCNC: 50 U/L (ref 39–117)
ALT SERPL W P-5'-P-CCNC: 12 U/L (ref 1–41)
ANION GAP SERPL CALCULATED.3IONS-SCNC: 8.5 MMOL/L (ref 5–15)
AST SERPL-CCNC: 20 U/L (ref 1–40)
BASOPHILS # BLD AUTO: 0.03 10*3/MM3 (ref 0–0.2)
BASOPHILS NFR BLD AUTO: 0.5 % (ref 0–1.5)
BILIRUB SERPL-MCNC: 0.4 MG/DL (ref 0–1.2)
BUN SERPL-MCNC: 15 MG/DL (ref 8–23)
BUN/CREAT SERPL: 14.9 (ref 7–25)
CALCIUM SPEC-SCNC: 9.5 MG/DL (ref 8.6–10.5)
CHLORIDE SERPL-SCNC: 101 MMOL/L (ref 98–107)
CO2 SERPL-SCNC: 24.5 MMOL/L (ref 22–29)
CREAT SERPL-MCNC: 1.01 MG/DL (ref 0.7–1.3)
DEPRECATED RDW RBC AUTO: 57.8 FL (ref 37–54)
EGFRCR SERPLBLD CKD-EPI 2021: 77.1 ML/MIN/1.73
EOSINOPHIL # BLD AUTO: 0.16 10*3/MM3 (ref 0–0.4)
EOSINOPHIL NFR BLD AUTO: 2.9 % (ref 0.3–6.2)
ERYTHROCYTE [DISTWIDTH] IN BLOOD BY AUTOMATED COUNT: 15.9 % (ref 12.3–15.4)
GLOBULIN UR ELPH-MCNC: 2.4 GM/DL
GLUCOSE SERPL-MCNC: 99 MG/DL (ref 65–99)
HCT VFR BLD AUTO: 38.6 % (ref 37.5–51)
HGB BLD-MCNC: 12.5 G/DL (ref 13–17.7)
IMM GRANULOCYTES # BLD AUTO: 0.01 10*3/MM3 (ref 0–0.05)
IMM GRANULOCYTES NFR BLD AUTO: 0.2 % (ref 0–0.5)
LYMPHOCYTES # BLD AUTO: 1.69 10*3/MM3 (ref 0.7–3.1)
LYMPHOCYTES NFR BLD AUTO: 30.2 % (ref 19.6–45.3)
MCH RBC QN AUTO: 31.9 PG (ref 26.6–33)
MCHC RBC AUTO-ENTMCNC: 32.4 G/DL (ref 31.5–35.7)
MCV RBC AUTO: 98.5 FL (ref 79–97)
MONOCYTES # BLD AUTO: 0.68 10*3/MM3 (ref 0.1–0.9)
MONOCYTES NFR BLD AUTO: 12.1 % (ref 5–12)
NEUTROPHILS NFR BLD AUTO: 3.03 10*3/MM3 (ref 1.7–7)
NEUTROPHILS NFR BLD AUTO: 54.1 % (ref 42.7–76)
NRBC BLD AUTO-RTO: 0 /100 WBC (ref 0–0.2)
PLATELET # BLD AUTO: 169 10*3/MM3 (ref 140–450)
PMV BLD AUTO: 9 FL (ref 6–12)
POTASSIUM SERPL-SCNC: 3.9 MMOL/L (ref 3.5–5.2)
PROT SERPL-MCNC: 6.4 G/DL (ref 6–8.5)
RBC # BLD AUTO: 3.92 10*6/MM3 (ref 4.14–5.8)
SODIUM SERPL-SCNC: 134 MMOL/L (ref 136–145)
WBC NRBC COR # BLD: 5.6 10*3/MM3 (ref 3.4–10.8)

## 2023-09-25 PROCEDURE — 85025 COMPLETE CBC W/AUTO DIFF WBC: CPT

## 2023-09-25 PROCEDURE — 80053 COMPREHEN METABOLIC PANEL: CPT

## 2023-09-25 PROCEDURE — 96413 CHEMO IV INFUSION 1 HR: CPT

## 2023-09-25 PROCEDURE — 25010000002 HEPARIN LOCK FLUSH PER 10 UNITS: Performed by: INTERNAL MEDICINE

## 2023-09-25 PROCEDURE — 25010000002 PEMBROLIZUMAB 100 MG/4ML SOLUTION 4 ML VIAL: Performed by: INTERNAL MEDICINE

## 2023-09-25 RX ORDER — HEPARIN SODIUM (PORCINE) LOCK FLUSH IV SOLN 100 UNIT/ML 100 UNIT/ML
500 SOLUTION INTRAVENOUS AS NEEDED
OUTPATIENT
Start: 2023-09-25

## 2023-09-25 RX ORDER — HEPARIN SODIUM (PORCINE) LOCK FLUSH IV SOLN 100 UNIT/ML 100 UNIT/ML
500 SOLUTION INTRAVENOUS AS NEEDED
Status: DISCONTINUED | OUTPATIENT
Start: 2023-09-25 | End: 2023-09-25 | Stop reason: HOSPADM

## 2023-09-25 RX ORDER — SODIUM CHLORIDE 9 MG/ML
250 INJECTION, SOLUTION INTRAVENOUS ONCE
Status: COMPLETED | OUTPATIENT
Start: 2023-09-25 | End: 2023-09-25

## 2023-09-25 RX ORDER — SODIUM CHLORIDE 0.9 % (FLUSH) 0.9 %
10 SYRINGE (ML) INJECTION AS NEEDED
OUTPATIENT
Start: 2023-09-25

## 2023-09-25 RX ORDER — SODIUM CHLORIDE 0.9 % (FLUSH) 0.9 %
10 SYRINGE (ML) INJECTION AS NEEDED
Status: DISCONTINUED | OUTPATIENT
Start: 2023-09-25 | End: 2023-09-25 | Stop reason: HOSPADM

## 2023-09-25 RX ADMIN — Medication 500 UNITS: at 10:52

## 2023-09-25 RX ADMIN — SODIUM CHLORIDE 250 ML: 9 INJECTION, SOLUTION INTRAVENOUS at 10:17

## 2023-09-25 RX ADMIN — SODIUM CHLORIDE 200 MG: 9 INJECTION, SOLUTION INTRAVENOUS at 10:17

## 2023-09-25 RX ADMIN — Medication 10 ML: at 10:51

## 2023-09-25 NOTE — NURSING NOTE
Patient has red,raised, itchy rash of right leg. Rash is worse on front of leg and has been present for about 6 weeks. Was seen by PCP who prescribed Kenalog cream bid which helps with itching, but rash is no better. Picture of rash shown to BIRGIT Ontiveros. Per Rama, patient needs to see dermatologist. Patient notified and verbalizes understanding.

## 2023-09-25 NOTE — PATIENT INSTRUCTIONS
Call this office at [phone] for any fever of 100.4,chills, or other signs of infection. Be sure to follow up with your dermatologist for rash on right leg.

## 2023-09-26 ENCOUNTER — PATIENT OUTREACH (OUTPATIENT)
Dept: OTHER | Facility: HOSPITAL | Age: 78
End: 2023-09-26
Payer: MEDICARE

## 2023-09-26 NOTE — PROGRESS NOTES
Reviewed chart.    Called patient. He is doing well. We dicussed his visit with Dr. Bishop yesterday. He is tolerating Keytruda well; he receives it every 3 months. We discussed his recent scan results. The patient continues to wear O2 at night and as needed @ 3L.  He denies pain. The patient reports is appetite is ok; his weight is stable at 158 lbs. Offered dietician referral, which he declined.     We again discussed integrative therapies and other services at [facility]. Patient expressed gratitude for my support and denied any additional needs at this time. I will call in 1-2 months; encouraged patient to call as needed.

## 2023-10-02 ENCOUNTER — TELEPHONE (OUTPATIENT)
Dept: ONCOLOGY | Facility: CLINIC | Age: 78
End: 2023-10-02
Payer: MEDICARE

## 2023-10-02 RX ORDER — HYDROCORTISONE 10 MG/1
TABLET ORAL
Qty: 90 TABLET | Refills: 0 | Status: SHIPPED | OUTPATIENT
Start: 2023-10-02

## 2023-10-02 NOTE — TELEPHONE ENCOUNTER
Provider: Daroi  Caller:patient  Relationship to Patient: self  Call Back Phone Number: [phone]  Reason for Call: Pt checking to see if he can increase dosage on Hydrocortisone?

## 2023-10-02 NOTE — TELEPHONE ENCOUNTER
Kate called back to let me know patient needed  refill on hydrocortisone and to increase tablets strength from 5 to 10 so he did not have to take so many tablets. I let her know I would send this and she v/u.

## 2023-10-13 NOTE — PROGRESS NOTES
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  Notably there is invasive carcinoma present at the margin, 2 positive lymph nodes adjacent to the bronchovesicular margin which appears to have been transected difficult to enumerate with extranodal extension present.  *Patient status post chemo RT-11/28/2022, radiation therapy completion date 1/11/2023  *Immunotherapy-Keytruda to be initiated 3/20/2023  *Follow-up repeat scans 9/18/2023 without evidence of recurrent disease      History of Present Illness      The patient is a 78 y.o. male with the above history who is seen today in follow-up due for his 11th dose of Keytruda.  When he was previously seen he was having weakness and increased joint pain and it was felt that his immunotherapy had produced adrenal insufficiency requiring his hydrocortisone to 20 mg in the morning, 10 mg in the evening.  As he is reviewed back today companied by his wife and they both agree he is doing better.  He is eating better with some weight gain noted.     The patient was reevaluated 9/21/2023 with CT of chest, abdomen, pelvis 9/18/2023 showing postoperative changes from the left lower lobectomy, scattered densities in the lungs that are stable from study 6/8/2023 and no new lung abnormalities, 3.4 cm infrarenal aortic aneurysm and stable and no evidence of metastatic disease.  The patient is seen with his significant other both indicating that he has actually feeling considerably better particularly with cortisone replacement therapy.  His scans, as above, continue to demonstrate that he does not have evidence recurrent disease.     The patient is next seen 10/16/2023 having continued to do well.  He has been seen by dermatology and treated  topically for dry skin and erythema involving his left lateral lower extremity.  He does not have a rash in other sites and no additional symptoms as long as he is maintained on full dose hydrocortisone replacement therapy at 20 mg p.o. every morning and 10 mg p.o. every afternoon.                             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  He had been assessed particularly in the fall 2021 when he presented with nausea and vomiting and abdominal discomfort leading to assessments that revealed sigmoid diverticulitis with contained rupture and partial small bowel obstruction.  Records from mid September 21 indicating that he was admitted with partial small bowel obstruction and treated medically with very slow recovery.  He has history of COPD with CT demonstrating a pulmonary nodule in the right lung base at 7 mm with follow-up planned.  He was seen by general surgery in later September with repeat scans planned and repeat CT abdomen 10/7/2021 did demonstrate interval improvement of sigmoid diverticulitis.      Record indicates an assessment in late June 2022 at different general surgeon for left inguinal hernia, history of heavy tobacco use with COPD and recommendation for surgical repair of his hernia      The patient underwent a CT scan of the chest 5/7/2022 demonstrating diffuse emphysematous changes, ill-defined density measuring 3 mm in the superior segment of the right lower lobe, multiple ill-defined 3 to 4 mm nodular density thought to be inflammatory involving the right lower lobe and a new spiculated nodule involving the left lower lobe measuring 16 x 14 mm as well as left hilar adenopathy.       Follow-up PET/CT 7/13/2022 reveal a  hypermetabolic spiculated lesion medial aspect the left lower lobe adjacent to descending thoracic aorta measuring 1.5 x 1.6 SUV 9.3 with an enlarged hypermetabolic left hilar lymph node measured 1.5 x 1.3 with SUV of 12.1, additional nodules in the lateral aspect right lower lobe and micronodule in the posterior/medial right lower lobe and also additional right lower lobe micronodule.  There is an infrarenal abdominal aortic aneurysm measuring 3.4 cm with a short segment of chronic dissection present.    These clinical findings would be consistent with a possible T1b N1 M0-stage IIb lung cancer.      Recognizing a diagnosis has not been made his case was discussed in thoracic conference 7/20/2022.  Recommendations include proceeding to pulmonary assessment with EBUS and the patient undergoing a general assessment per his clinical status-older adult assessment as well as assessment by thoracic surgery.  These issues are discussed with the patient and his wife in detail when they are seen 7/28/2022.    The patient proceeded to undergo his hernia surgery 8/2/2022 by Dr. Dotson.  Additionally the patient was unable to undergo assessment by pulmonary until October and, thereafter, was scheduled to see Dr. Joe 8/18/2022 undergoing older adult functional assessment with a JAGUAR score of 11 indicating a risk of functional decline related to cancer therapy.    The patient is seen with his significant other 8/15/2022 and doing very well with recent hernia surgery.  His performance status is reasonably good at present and we have learned now that he would not be able to see pulmonary medicine until October, thoracic surgery is scheduled this week with Dr. Joe.  Perhaps he could be a surgical candidate.  Particularly we know by van 360 that T p53 splice site mutation is present and would certainly be consistent as well the most common mutations found in lung cancers particularly squamous cancers.  We have discussed  obtaining pulmonary functions at this point, thoracic surgery assessment this week and also restarting Chantix as possible for the patient.    There was difficulty obtaining Chantix and while this was being assessed the patient was reviewed 8/18 by thoracic surgery.  He was felt to have a T1b N1 M0 stage IIb carcinoma left lower lobe along and not a candidate for biopsy.  PFTs were borderline, functional assessment was reasonably good and the patient was felt to be a candidate for robot-assisted biopsy of his nodes and if malignant proceed to left lower lobe lobectomy.  He was reviewed 9/8 and offered 21 mg nicotine transdermal patching.    He is next seen 9/10/2022.  He is seen with his wife and both are prepared for surgery 9/23/2022.  We discussed that additional therapy may be considered once we have more information about the type and stage.  We would hope to see him postoperatively.    The patient was admitted 9//2022 undergoing 9/23/2022  a bronchoscopy with left video-assisted thoracoscopy with robot-assisted total decortication of the left lung, left lower lobectomy, mediastinal lymphadenectomy and intercostal nerve block with Dr. Nicola Joe.  Fortunately he did fairly well postoperatively though did develop atrial fibrillation with RVR treated with amiodarone converting back to sinus rhythm, treated with IV metoprolol subsequent chronic anticoagulation.  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  Notably there is invasive carcinoma present at the margin.  Is a, and only 2 positive lymph nodes adjacent to the bronchovesicular margin which appears to have been transected difficult to enumerate with extranodal extension present.       The patient is seen 10/27/2022.  He is recovering well from surgery, albeit slowly, and we have discussed his findings that are concerning  as to residual disease with a positive margin described as above.  There is also the significance potentially of PD-L1 expression which has been described as producing a poor survival.     Nivolumab has been used as second line therapy to positive effect in the disease and this begs the question as to whether adjuvant therapy plus immunotherapy could be utilized though the patient would be difficult to treat with platinum based chemotherapy as well.       The patient is next assessed 11/7/2022 for significant assessments by supportive oncology at [facility] as well as with plans to see Dr. Marrero 11/9/2022.  Unfortunately he has been very slow to gradually recover from the effects of surgery with reduced appetite and only fair performance status.     At present it would be difficult to give him cisplatin based chemotherapy and we discussed whether we might be able to use Tecentriq (atezolizumab) as his primary adjuvant therapy.  We have contacted pathology about completing Caris testing and a PD-L1 analysis that has not yet completed.         The patient is seen, however, 11/16/2022 and his genomic analysis has returned as being significant for broad sensitivity to immunotherapy.  This would argue for the administration of weekly Carboplatin/Taxol as the patient proceeds to radiation therapy and upon completion, then using Tecentriq as further adjuvant therapy over a year.  This is discussed with the patient and his  in detail as well as discussed with Dr. Marrero of radiation therapy.  We have also discussed the patient require port placement.    The patient proceeded to treatment taking weekly carboplatin Taxol with concurrent radiation therapy last seen 12/12/2022 with third weekly treatment.    He is next seen 12/19/2022 with H&H 11.9 and 38.5, white count 3460 and platelet count of 168,000.  He is feeling well without any significant complications of therapy except for right calf dermatitis that is been developing  in the last several days.    The patient continued therapy taking CarboTaxol weekly including 12/28 and 1/4/2023.    His is next seen 1/11/2023 with completion date for radiation therapy 1/11/2023.  Repeat CBC now includes H&H of 11.0 and 33.9, white count 2090, platelet count 128,000, ANC is 800.  He, fortunately, is tolerating treatment well and we have again discussed with him adjuvant immunotherapy would be useful including Tecentriq versus pembrolizumab-keynote 091 study particularly in tumor programmed cell death PD-L1 greater than 50%.    The patient will not be given additional chemotherapy 1/11/2023 we will plan to reassess by follow-up scans in the next 6 weeks CT chest and pelvis making a decision thereafter about adjuvant immunotherapy.    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    We have discussed that considering studies like keynote  091 that the patient's high risk disease could be addressed with additional immunotherapy including the use of Atezolizumab and/or Keytruda.  Considering his findings overall Keytruda every 3 weeks x18 cycles is to be pursued.    The patient was seen 3/20/2023, discussed initiation of Keytruda.  He is agreeable to proceed.    The patient notified the office shortly after treatment that he had pain under his right rib cage and was placed back onto his Neurontin to try to manage this pain.  Approximately week later he still had the pain and also had another rash we switched him at this point to Famvir to try to completely clear this problem.    He is next seen back 4/3/2023.  Fortunately his recent apparent shingles activation has nearly abated and he is feeling reasonably well.  In review of the  literature there is no significant difference in activation with immunotherapy though this patient may require suppression.  I have asked him to complete his Famvir at this point.    Patient assessed 4/10/2023 with resolution of his shingles, subsequently placed on maintenance acyclovir, consideration of shingles vaccination post 3 months of Keytruda therapy is stable disease documented.    The patient underwent a CT chest abdomen pelvis 6/8/2023 that demonstrates postsurgical changes of the left hemithorax, stable pulmonary nodules, stable infrarenal abdominal aortic aneurysm.    He is next seen 6/12/2023.  We have discussed continue with his Keytruda with his tolerance being excellent.  He will also reduce his current metoprolol to 12.5 mg p.o. daily.    He continued Keytruda and was seen in the office 7/24/2023 in anticipation of his 7th dose of Keytruda.  He was tolerating treatment without substantial side effects but did have sudden onset nausea and vomiting lasting 48 hours.  He then began to have joint pain bilateral knees and shoulders up to an 8 of 10 for severity.       He was demonstrating worsening hyponatremia at this point with a normal potassium.  Unfortunately his mental status began to worsen with increasing sleepiness, mild diarrhea.  7/28/2023 studies included an INR 3.34, sodium now 125 and he was admitted for his weakness and hyponatremia.    The patient was seen by several services including nephrology and oncology to the IV fluids.  He had been tested with ACT stimulation test and was diagnosed with renal sufficiency started on oral hydrocortisone.  7/29/2023 cortisol was 0.62, subsequent ACTH test was reduced at 5.7.  The patient studies 7/30 included BUN/creatinine of 9 and 0.97, sodium 130, chloride 95, calcium 5.2.      The patient is next seen 8/14/2023.  He remains somewhat weak and with joint discomfort.  We have discussed his findings that would be most consistent with central adrenal  insufficiency and that dosing for his hydrocortisone-currently 10 mg p.o. every morning and 5 mg p.o. every afternoon is likely to be too low.  In determining whether we should proceed with treatment replacement therapy could allow us to try to complete his therapy which, on balance, would be the preferred approach.    The patient is seen 9/21/2023 improved per performance status with cortisone replacement therapy, subsequent CT of chest on pelvis 9/18/2023 without evidence of recurrent disease, pembrolizumab continued with plans to reassess likely in December 2023.      Past Medical History:   Diagnosis Date    Arthritis     COPD (chronic obstructive pulmonary disease)     O2 3L AT HS    Diabetes mellitus     DIET CONTROL    Diverticulitis     DVT, lower extremity     RLE    Elevated cholesterol     H/O Cervical pain     History of colitis     Hyperlipidemia     Hypertension     Left shoulder pain     Low back pain     Lung cancer 2022    LEFT LUNG    On anticoagulant therapy     Peripheral arterial disease     Right leg claudication     Skin cancer     HX        Past Surgical History:   Procedure Laterality Date    BALLOON ANGIOPLASTY, ARTERY Right 2015    IR Percutaneious IVC filter placement    INGUINAL HERNIA REPAIR Left     KNEE ARTHROSCOPY Left     Torn meniscus    LOBECTOMY Left 9/23/2022    Procedure: BRONCHOSCOPY, LEFT VIDEO ASSISTED THORACOSCOPY, ROBOT ASSISTED TOTAL DECORTICATION  LEFT LUNG, LEFT LOWER LOBE LOBECTOMY, MEDIASTINAL LYMPHECTOMY, INTERCOSTAL NERVE BLOCK;  Surgeon: Nicola Joe III, MD;  Location: [facility];  Service: [facility];  Laterality: Left;    VENOUS ACCESS DEVICE (PORT) INSERTION Right 11/21/2022    Procedure: INSERTION VENOUS ACCESS DEVICE;  Surgeon: Nicola Joe III, MD;  Location: [facility];  Service: Thoracic;  Laterality: Right;        Current Outpatient Medications on File Prior to Visit   Medication Sig Dispense Refill    arformoterol (BROVANA) 15  MCG/2ML nebulizer solution Take 2 mL by nebulization 2 (Two) Times a Day. Indications: Chronic Obstructive Lung Disease      cetirizine (zyrTEC) 10 MG tablet Take 1 tablet by mouth Daily. Indications: Hayfever      Cholecalciferol 50 MCG (2000 UT) capsule Take 1 capsule by mouth Daily. Indications: Vitamin D Deficiency      clotrimazole (LOTRIMIN) 1 % cream Apply  topically to the appropriate area as directed.      fluticasone (FLONASE) 50 MCG/ACT nasal spray 1 spray into the nostril(s) as directed by provider Daily. Indications: Allergic Rhinitis      isosorbide mononitrate (IMDUR) 60 MG 24 hr tablet Take 1 tablet by mouth Every Evening. Indications: hypertension      ondansetron (Zofran) 8 MG tablet Take 1 tablet by mouth Every 8 (Eight) Hours As Needed for Nausea or Vomiting. 30 tablet 1    Pembrolizumab (Keytruda) 100 MG/4ML solution Infuse  into a venous catheter.      sildenafil (REVATIO) 20 MG tablet Take 1 tablet by mouth.      simvastatin (ZOCOR) 20 MG tablet Take 1 tablet by mouth Every Night. Indications: High Amount of Fats in the Blood      tamsulosin (FLOMAX) 0.4 MG capsule 24 hr capsule Take 1 capsule by mouth Daily. 30 capsule 5    triamcinolone (KENALOG) 0.5 % cream Apply  topically to the appropriate area as directed.      warfarin (COUMADIN) 5 MG tablet Take 1 tablet by mouth Daily. Take 10mg on 9/29/22 and then resume regular home schedule. (Patient taking differently: Take 1 tablet by mouth Daily. 5mg daily with additional 5mg on Monday and Friday)      [DISCONTINUED] hydrocortisone (CORTEF) 10 MG tablet Take 2 tablets in the morning and 1 tablet in the afternoon 90 tablet 0    Budeson-Glycopyrrol-Formoterol (BREZTRI) 160-9-4.8 MCG/ACT aerosol inhaler Inhale 2 puffs 2 (Two) Times a Day. Indications: Chronic Obstructive Lung Disease      metoprolol succinate XL (TOPROL-XL) 25 MG 24 hr tablet Take 1 tablet by mouth Daily. Indications: High Blood Pressure Disorder       Current  "Facility-Administered Medications on File Prior to Visit   Medication Dose Route Frequency Provider Last Rate Last Admin    Pembrolizumab (KEYTRUDA) 200 mg in sodium chloride 0.9 % 108 mL chemo IVPB  200 mg Intravenous Once Kayden Bishop MD        sodium chloride 0.9 % infusion 250 mL  250 mL Intravenous Once Kayden Bishop MD            ALLERGIES:    Allergies   Allergen Reactions    Benadryl [Diphenhydramine] Other (See Comments)     Extreme restlessness and insomnia        Social History     Socioeconomic History    Marital status:     Years of education: 1 year college   Tobacco Use    Smoking status: Every Day     Packs/day: 1.00     Years: 59.00     Additional pack years: 0.00     Total pack years: 59.00     Types: Cigarettes     Start date: 1963    Smokeless tobacco: Never    Tobacco comments:     9/8/22: Down to Less than 1 PPD   Vaping Use    Vaping Use: Never used   Substance and Sexual Activity    Alcohol use: Not Currently    Drug use: Never    Sexual activity: Defer        Family History   Problem Relation Age of Onset    No Known Problems Mother     Cancer Father     Lung cancer Father     Diabetes Brother     Other Daughter         S/P stent, age 42    No Known Problems Son     Malig Hyperthermia Neg Hx         Review of Systems   Skin:         Erythema left lateral lower extremity      ROS as per HPI    Objective     Vitals:    10/16/23 0836   BP: 138/75   Pulse: 63   Resp: 18   Temp: 98 °F (36.7 °C)   TempSrc: Temporal   SpO2: 97%   Weight: 72 kg (158 lb 12.8 oz)   Height: 182.9 cm (72.01\")   PainSc: 0-No pain             10/16/2023     8:37 AM   Current Status   ECOG score 0     Physical Exam  Constitutional:       Appearance: Normal appearance.   HENT:      Head: Normocephalic and atraumatic.      Nose: Nose normal.      Mouth/Throat:      Mouth: Mucous membranes are moist.   Eyes:      Extraocular Movements: Extraocular movements intact.      Conjunctiva/sclera: Conjunctivae " normal.      Pupils: Pupils are equal, round, and reactive to light.   Cardiovascular:      Rate and Rhythm: Normal rate and regular rhythm.      Pulses: Normal pulses.      Heart sounds: Normal heart sounds.   Pulmonary:      Comments: Prolonged expiratory phase bilaterally  Abdominal:      General: Bowel sounds are normal.      Palpations: Abdomen is soft.      Comments: Scaphoid   Musculoskeletal:         General: Normal range of motion.      Cervical back: Normal range of motion and neck supple.   Skin:     General: Skin is warm and dry.      Findings: Erythema (Left lateral lower extremity) present. No rash.   Neurological:      General: No focal deficit present.      Mental Status: He is alert and oriented to person, place, and time.   Psychiatric:         Mood and Affect: Mood normal.         I have reexamined the patient and the results are consistent with the previously documented exam. Kayden Bishop MD      RECENT LABS:  Results from last 7 days   Lab Units 10/16/23  0824   WBC 10*3/mm3 6.88   NEUTROS ABS 10*3/mm3 3.93   HEMOGLOBIN g/dL 13.0   HEMATOCRIT % 39.8   PLATELETS 10*3/mm3 171       Results from last 7 days   Lab Units 10/16/23  0824   SODIUM mmol/L 137   POTASSIUM mmol/L 4.0   CHLORIDE mmol/L 101   CO2 mmol/L 26.9   BUN mg/dL 11   CREATININE mg/dL 0.95   CALCIUM mg/dL 9.7   ALBUMIN g/dL 3.9   BILIRUBIN mg/dL 0.2   ALK PHOS U/L 57   ALT (SGPT) U/L 11   AST (SGOT) U/L 16   GLUCOSE mg/dL 117*               Assessment & Plan     78 y.o. male  with medical issues including type 2 diabetes-uncertain control, COPD, hypertension, diastolic heart failure, chronic disease, peripheral vascular disease (follow-up with vascular surgery today), previous DVT on anticoagulation (home monitoring), previous IVC filter placement?,  Status post September 2021 partial small bowel obstruction secondary to sigmoid diverticulitis treated medically.     1.   T1b N1 M0-stage IIb lung cancer.  right lung base nodule  noted on scan at that point and in follow-up with primary care had developed a left inguinal hernia with repair planned through a different general surgeon then seen with his initial sigmoid diverticulitis.  PET scan 7/13/2022 demonstrates a hypermetabolic spiculated lesion medial aspect of the left lobe adjacent to descending thoracic aorta measuring1.5 x 1.6 SUV 9.3 with an enlarged hypermetabolic left hilar lymph node measured 1.5 x 1.3 with SUV of 12.1, additional nodules in the lateral aspect right lower lobe and micronodule in the posterior/medial right lower lobe and also additional right lower lobe micronodule.  There is an infrarenal abdominal aortic aneurysm measuring 3.4 cm with a short segment of chronic dissection present.  Clinical findings would be consistent with a possible T1b N1 M0-stage IIb lung cancer.  discussed in thoracic conference 7/20/2022.  Recommendations to proceed with pulmonary assessment with EBUS.  The patient proceeded to undergo his hernia surgery 8/2/2022 by Dr. Dotson.   Guardant 360 that T p53 splice site mutation is present and would certainly be consistent as well the most common mutations found in lung cancers particularly squamous cancers.  The patient was admitted 9//2022 undergoing 9/23/2022  a bronchoscopy with left video-assisted thoracoscopy with robot-assisted total decortication of the left lung, left lower lobectomy, mediastinal lymphadenectomy and intercostal nerve block with Dr. Nicola Joe.  Fortunately he did fairly well postoperatively though did develop atrial fibrillation with RVR treated with amiodarone converting back to sinus rhythm, treated with IV metoprolol subsequent chronic anticoagulation.  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  Notably there is invasive carcinoma present at the margin. only 2 positive lymph  nodes adjacent to the bronchovesicular margin which appears to have been transected difficult to enumerate with extranodal extension present.  Options could well be Carboplatin and Taxol considering the patient's comorbidity and worry of using cisplatin-based chemotherapy in this patient followed by atezolizumab with pathology having been notified to assess PD-L1 expression on the tumor as well also await review by Caris molecular profiling.  Finally radiation therapy consultation be requested.   Caris analysis indicates benefit from immunotherapy at relatively high levels.  This would allow radiation therapy given with Carboplatin Taxol weekly (better tolerated) and then subsequent atezolizumab adjuvantly.  Weekly carboplatin Taxol initiated 11/28/2022 given concurrently with radiation therapy  12/5/2022 here for cycle 2 weekly carboplatin/Taxol, overall tolerating treatment quite well so far.  12/12/2022: Proceed with cycle #3 weekly carboplatin/Taxol with concurrent radiation.  Continues to tolerate treatment well.  He is next seen 12/19/2022 with H&H 11.9 and 38.5, white count 3460 and platelet count of 168,000.  He is feeling well without any significant complications of therapy except for right calf dermatitis that is been developing in the last several days.  12/28/2022 here for week 5 carbo/Taxol.  Overall tolerating well.  Today WBC 2.45, ANC 1.22, hemoglobin 10.7, platelet count 117,000.  I have reviewed with Dr. Bishop, and we will go ahead and continue with treatment.  1/4/2023: Received with cycle 6 carbo/taxol + XRT.  Continues to tolerate treatment well.  WBC improved to 2.69 with ANC 1.41.  Next 1/11/2023 with completion date 1/11/2023.  Repeat CBC now includes H&H of 11.0 and 33.9, white count 2090, platelet count 128,000, ANC is 800.  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  We have discussed that considering studies like keynote  091 that the patient's high risk disease could be addressed with additional immunotherapy including the use of Atezolizumab and/or Keytruda.  Considering his findings overall Keytruda every 3 weeks x18 cycles is to be pursued.  The patient began Keytruda as planned with his first treatment 3/20/2023.  The patient notified the office shortly after treatment that he had pain under his right rib cage and was placed back onto his Neurontin to try to manage this pain.  Approximately week later he still had the pain and also had another rash we switched him at this point to Famvir to try to completely clear this problem.  4/3/2023.  Fortunately his recent apparent shingles activation has nearly abated and he is feeling reasonably well.  In review of the literature there is no significant difference in activation with immunotherapy though this patient may require suppression.  He is asked to complete his Famvir.  4/10/2023 cycle 2 Keytruda, overall tolerating well.   Patient seen 5/1/2023, third cycle of Keytruda, status post fourth cycle of Keytruda, 3 months of therapy, subsequent scans will need to be scheduled.  5/22/2023: Proceed with cycle 4 Keytruda.    Follow-up scans-CT of chest, abdomen and pelvis 6/8/2023 with postsurgical changes only.  7/3/2023 cycle 6 Keytruda  Proceed today, 7/24/2023 with cycle 7 Keytruda which is continued every 3 weeks   He was tolerating treatment without substantial side effects but did have sudden onset nausea and vomiting lasting 48 hours.  He  then began to have joint pain bilateral knees and shoulders up to an 8 of 10 for severity.     He was demonstrating worsening hyponatremia at this point with a normal potassium.  Unfortunately his mental status began to worsen with increasing sleepiness, mild diarrhea.  7/28/2023 studies included an INR 3.34, sodium now 125 and he was admitted for his weakness and hyponatremia.  The patient was seen by several services including nephrology and oncology to the IV fluids.  He had been tested with ACT stimulation test and was diagnosed with renal sufficiency started on oral hydrocortisone.  7/29/2023 cortisol was 0.62, subsequent ACTH test was reduced at 5.7.  The patient studies 7/30 included BUN/creatinine of 9 and 0.97, sodium 130, chloride 95, calcium 5.2.  The patient is next seen 8/14/2023.  He remains somewhat weak and with joint discomfort.  We have discussed his findings that would be most consistent with central adrenal insufficiency and that dosing for his hydrocortisone-currently 10 mg p.o. every morning and 5 mg p.o. every afternoon is likely to be too low.  In determining whether we should proceed with treatment replacement therapy could allow us to try to complete his therapy which, on balance, would be the preferred approach.  Hydrocortisone moved to 20 mg p.o. every morning and 10 mg p.o. every afternoon.  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  Replacement therapy ongoing.  9/5/2023 reviewed back today for C9 Keytruda. Patient with improved performance status following increase of hydrocortisone per above.  Improved appetite and decreased joint pain.  Proceed with treatment today with repeat imaging planned thereafter.  Repeat CT chest, abdomen, pelvis 9/18/2023 without evidence of progressive disease.  Plan to proceed with cycle #10 Keytruda.  Patient seen 10/16/2023      2.  Herpes zoster: Patient was treated with  Famvir.  Rash has resolved, no issues with persistent herpetic neuralgia.  He will be placed on suppression with acyclovir 400 mg twice daily.  We discussed he will hold off on vaccination for now, given recent infection.  Patient assessed 5/1/2023, continue Keytruda, status post fifth cycle of Keytruda or 3 months of therapy he would undergo repeat scans and, if stable or improved, proceed with Shingrix vaccinations.  Patient now requested to proceed with vaccinations including RSV    3.  Hyponatremia  Likely exacerbated by recent GI toxicity with nausea vomiting and diarrhea.  Symptoms have now resolved with improvement in his appetite and intake  Reviewed with Dr. Bishop today, no additional interventions.    4. Joint pain.  Baseline knee pain prior to initiation of immunotherapy though he is now exacerbated with shoulder pain as well and requiring ambulation with a walker  Additional inflammatory labs including sed rate and C-reactive protein today to evaluate for inflammation secondary to immunotherapy  Continue Tylenol and occasional Norco for breakthrough pain  Hydrocortisone replacement therapy increased to 20 mg p.o. every morning and 10 mg p.o. every afternoon-patient is 14/23    PLAN:   Proceed today with cycle 11 Keytruda which he continues every 3 weeks for total of 18 cycles  Continue hydrocortisone to 20 mg p.o. every morning and 10 mg p.o. every afternoon  Continue prophylactic acyclovir 400 mg twice daily  Return in 3 weeks for follow-up with NP, continue Keytruda, cycle #12    Patient is on a high risk medication requiring close monitoring for toxicity.      Kayden Bishop MD  10/16/2023

## 2023-10-16 ENCOUNTER — OFFICE VISIT (OUTPATIENT)
Dept: ONCOLOGY | Facility: CLINIC | Age: 78
End: 2023-10-16
Payer: MEDICARE

## 2023-10-16 ENCOUNTER — INFUSION (OUTPATIENT)
Dept: ONCOLOGY | Facility: HOSPITAL | Age: 78
End: 2023-10-16
Payer: MEDICARE

## 2023-10-16 VITALS
BODY MASS INDEX: 21.51 KG/M2 | SYSTOLIC BLOOD PRESSURE: 138 MMHG | HEIGHT: 72 IN | HEART RATE: 63 BPM | WEIGHT: 158.8 LBS | OXYGEN SATURATION: 97 % | RESPIRATION RATE: 18 BRPM | TEMPERATURE: 98 F | DIASTOLIC BLOOD PRESSURE: 75 MMHG

## 2023-10-16 DIAGNOSIS — C7A.8 NEUROENDOCRINE CARCINOMA OF LUNG: ICD-10-CM

## 2023-10-16 DIAGNOSIS — C7A.8 NEUROENDOCRINE CARCINOMA OF LUNG: Primary | ICD-10-CM

## 2023-10-16 DIAGNOSIS — Z79.899 LONG-TERM USE OF HIGH-RISK MEDICATION: ICD-10-CM

## 2023-10-16 LAB
ALBUMIN SERPL-MCNC: 3.9 G/DL (ref 3.5–5.2)
ALBUMIN/GLOB SERPL: 1.4 G/DL
ALP SERPL-CCNC: 57 U/L (ref 39–117)
ALT SERPL W P-5'-P-CCNC: 11 U/L (ref 1–41)
ANION GAP SERPL CALCULATED.3IONS-SCNC: 9.1 MMOL/L (ref 5–15)
AST SERPL-CCNC: 16 U/L (ref 1–40)
BASOPHILS # BLD AUTO: 0.05 10*3/MM3 (ref 0–0.2)
BASOPHILS NFR BLD AUTO: 0.7 % (ref 0–1.5)
BILIRUB SERPL-MCNC: 0.2 MG/DL (ref 0–1.2)
BUN SERPL-MCNC: 11 MG/DL (ref 8–23)
BUN/CREAT SERPL: 11.6 (ref 7–25)
CALCIUM SPEC-SCNC: 9.7 MG/DL (ref 8.6–10.5)
CHLORIDE SERPL-SCNC: 101 MMOL/L (ref 98–107)
CO2 SERPL-SCNC: 26.9 MMOL/L (ref 22–29)
CREAT SERPL-MCNC: 0.95 MG/DL (ref 0.7–1.3)
DEPRECATED RDW RBC AUTO: 53.7 FL (ref 37–54)
EGFRCR SERPLBLD CKD-EPI 2021: 83 ML/MIN/1.73
EOSINOPHIL # BLD AUTO: 0.21 10*3/MM3 (ref 0–0.4)
EOSINOPHIL NFR BLD AUTO: 3.1 % (ref 0.3–6.2)
ERYTHROCYTE [DISTWIDTH] IN BLOOD BY AUTOMATED COUNT: 15.1 % (ref 12.3–15.4)
GLOBULIN UR ELPH-MCNC: 2.7 GM/DL
GLUCOSE SERPL-MCNC: 117 MG/DL (ref 65–99)
HCT VFR BLD AUTO: 39.8 % (ref 37.5–51)
HGB BLD-MCNC: 13 G/DL (ref 13–17.7)
IMM GRANULOCYTES # BLD AUTO: 0.02 10*3/MM3 (ref 0–0.05)
IMM GRANULOCYTES NFR BLD AUTO: 0.3 % (ref 0–0.5)
LYMPHOCYTES # BLD AUTO: 1.96 10*3/MM3 (ref 0.7–3.1)
LYMPHOCYTES NFR BLD AUTO: 28.5 % (ref 19.6–45.3)
MCH RBC QN AUTO: 31.7 PG (ref 26.6–33)
MCHC RBC AUTO-ENTMCNC: 32.7 G/DL (ref 31.5–35.7)
MCV RBC AUTO: 97.1 FL (ref 79–97)
MONOCYTES # BLD AUTO: 0.71 10*3/MM3 (ref 0.1–0.9)
MONOCYTES NFR BLD AUTO: 10.3 % (ref 5–12)
NEUTROPHILS NFR BLD AUTO: 3.93 10*3/MM3 (ref 1.7–7)
NEUTROPHILS NFR BLD AUTO: 57.1 % (ref 42.7–76)
NRBC BLD AUTO-RTO: 0 /100 WBC (ref 0–0.2)
PLATELET # BLD AUTO: 171 10*3/MM3 (ref 140–450)
PMV BLD AUTO: 8 FL (ref 6–12)
POTASSIUM SERPL-SCNC: 4 MMOL/L (ref 3.5–5.2)
PROT SERPL-MCNC: 6.6 G/DL (ref 6–8.5)
RBC # BLD AUTO: 4.1 10*6/MM3 (ref 4.14–5.8)
SODIUM SERPL-SCNC: 137 MMOL/L (ref 136–145)
T4 FREE SERPL-MCNC: 0.97 NG/DL (ref 0.93–1.7)
TSH SERPL DL<=0.05 MIU/L-ACNC: 2.61 UIU/ML (ref 0.27–4.2)
WBC NRBC COR # BLD: 6.88 10*3/MM3 (ref 3.4–10.8)

## 2023-10-16 PROCEDURE — 99214 OFFICE O/P EST MOD 30 MIN: CPT | Performed by: INTERNAL MEDICINE

## 2023-10-16 PROCEDURE — 25810000003 SODIUM CHLORIDE 0.9 % SOLUTION: Performed by: INTERNAL MEDICINE

## 2023-10-16 PROCEDURE — 1126F AMNT PAIN NOTED NONE PRSNT: CPT | Performed by: INTERNAL MEDICINE

## 2023-10-16 PROCEDURE — 80053 COMPREHEN METABOLIC PANEL: CPT

## 2023-10-16 PROCEDURE — 85025 COMPLETE CBC W/AUTO DIFF WBC: CPT

## 2023-10-16 PROCEDURE — 25010000002 PEMBROLIZUMAB 100 MG/4ML SOLUTION 4 ML VIAL: Performed by: INTERNAL MEDICINE

## 2023-10-16 PROCEDURE — 3075F SYST BP GE 130 - 139MM HG: CPT | Performed by: INTERNAL MEDICINE

## 2023-10-16 PROCEDURE — 96413 CHEMO IV INFUSION 1 HR: CPT

## 2023-10-16 PROCEDURE — 84439 ASSAY OF FREE THYROXINE: CPT | Performed by: INTERNAL MEDICINE

## 2023-10-16 PROCEDURE — 3078F DIAST BP <80 MM HG: CPT | Performed by: INTERNAL MEDICINE

## 2023-10-16 PROCEDURE — 84443 ASSAY THYROID STIM HORMONE: CPT | Performed by: INTERNAL MEDICINE

## 2023-10-16 RX ORDER — SODIUM CHLORIDE 9 MG/ML
250 INJECTION, SOLUTION INTRAVENOUS ONCE
Status: COMPLETED | OUTPATIENT
Start: 2023-10-16 | End: 2023-10-16

## 2023-10-16 RX ORDER — HYDROCORTISONE 10 MG/1
TABLET ORAL
Qty: 90 TABLET | Refills: 3 | Status: SHIPPED | OUTPATIENT
Start: 2023-10-16

## 2023-10-16 RX ORDER — SODIUM CHLORIDE 9 MG/ML
250 INJECTION, SOLUTION INTRAVENOUS ONCE
Status: CANCELLED | OUTPATIENT
Start: 2023-10-16

## 2023-10-16 RX ADMIN — SODIUM CHLORIDE 250 ML: 9 INJECTION, SOLUTION INTRAVENOUS at 09:44

## 2023-10-16 RX ADMIN — SODIUM CHLORIDE 200 MG: 9 INJECTION, SOLUTION INTRAVENOUS at 09:44

## 2023-10-17 ENCOUNTER — TELEPHONE (OUTPATIENT)
Dept: ONCOLOGY | Facility: CLINIC | Age: 78
End: 2023-10-17
Payer: MEDICARE

## 2023-10-17 NOTE — TELEPHONE ENCOUNTER
Called Stephanie back to let her know that I would pass the information along to Dr. Bishop. She v/u.   
Provider: Dario  Caller: Stephanie  Relationship to Patient: spouse  Call Back Phone Number: [phone]  Reason for Call: she is calling to let MD know that he was diagnosed with eczema        
Thank you, AMARA
1

## 2023-11-06 ENCOUNTER — INFUSION (OUTPATIENT)
Dept: ONCOLOGY | Facility: HOSPITAL | Age: 78
End: 2023-11-06
Payer: MEDICARE

## 2023-11-06 ENCOUNTER — TELEMEDICINE (OUTPATIENT)
Dept: ONCOLOGY | Facility: CLINIC | Age: 78
End: 2023-11-06
Payer: MEDICARE

## 2023-11-06 VITALS
DIASTOLIC BLOOD PRESSURE: 79 MMHG | WEIGHT: 160.6 LBS | TEMPERATURE: 97.8 F | OXYGEN SATURATION: 97 % | HEIGHT: 72 IN | RESPIRATION RATE: 16 BRPM | BODY MASS INDEX: 21.75 KG/M2 | SYSTOLIC BLOOD PRESSURE: 153 MMHG | HEART RATE: 78 BPM

## 2023-11-06 DIAGNOSIS — Z79.899 LONG-TERM USE OF HIGH-RISK MEDICATION: ICD-10-CM

## 2023-11-06 DIAGNOSIS — Z45.2 FITTING AND ADJUSTMENT OF VASCULAR CATHETER: ICD-10-CM

## 2023-11-06 DIAGNOSIS — C7A.8 NEUROENDOCRINE CARCINOMA OF LUNG: Primary | ICD-10-CM

## 2023-11-06 DIAGNOSIS — C7A.8 NEUROENDOCRINE CARCINOMA OF LUNG: ICD-10-CM

## 2023-11-06 LAB
ALBUMIN SERPL-MCNC: 4 G/DL (ref 3.5–5.2)
ALBUMIN/GLOB SERPL: 1.5 G/DL
ALP SERPL-CCNC: 58 U/L (ref 39–117)
ALT SERPL W P-5'-P-CCNC: 8 U/L (ref 1–41)
ANION GAP SERPL CALCULATED.3IONS-SCNC: 12.7 MMOL/L (ref 5–15)
AST SERPL-CCNC: 20 U/L (ref 1–40)
BASOPHILS # BLD AUTO: 0.03 10*3/MM3 (ref 0–0.2)
BASOPHILS NFR BLD AUTO: 0.4 % (ref 0–1.5)
BILIRUB SERPL-MCNC: 0.4 MG/DL (ref 0–1.2)
BUN SERPL-MCNC: 10 MG/DL (ref 8–23)
BUN/CREAT SERPL: 11.1 (ref 7–25)
CALCIUM SPEC-SCNC: 9.8 MG/DL (ref 8.6–10.5)
CHLORIDE SERPL-SCNC: 102 MMOL/L (ref 98–107)
CO2 SERPL-SCNC: 24.3 MMOL/L (ref 22–29)
CREAT SERPL-MCNC: 0.9 MG/DL (ref 0.7–1.3)
DEPRECATED RDW RBC AUTO: 53.7 FL (ref 37–54)
EGFRCR SERPLBLD CKD-EPI 2021: 88.5 ML/MIN/1.73
EOSINOPHIL # BLD AUTO: 0.09 10*3/MM3 (ref 0–0.4)
EOSINOPHIL NFR BLD AUTO: 1.1 % (ref 0.3–6.2)
ERYTHROCYTE [DISTWIDTH] IN BLOOD BY AUTOMATED COUNT: 14.7 % (ref 12.3–15.4)
GLOBULIN UR ELPH-MCNC: 2.6 GM/DL
GLUCOSE SERPL-MCNC: 114 MG/DL (ref 65–99)
HCT VFR BLD AUTO: 42.9 % (ref 37.5–51)
HGB BLD-MCNC: 13.9 G/DL (ref 13–17.7)
IMM GRANULOCYTES # BLD AUTO: 0.04 10*3/MM3 (ref 0–0.05)
IMM GRANULOCYTES NFR BLD AUTO: 0.5 % (ref 0–0.5)
LYMPHOCYTES # BLD AUTO: 0.85 10*3/MM3 (ref 0.7–3.1)
LYMPHOCYTES NFR BLD AUTO: 10.5 % (ref 19.6–45.3)
MCH RBC QN AUTO: 31.7 PG (ref 26.6–33)
MCHC RBC AUTO-ENTMCNC: 32.4 G/DL (ref 31.5–35.7)
MCV RBC AUTO: 97.9 FL (ref 79–97)
MONOCYTES # BLD AUTO: 0.48 10*3/MM3 (ref 0.1–0.9)
MONOCYTES NFR BLD AUTO: 5.9 % (ref 5–12)
NEUTROPHILS NFR BLD AUTO: 6.63 10*3/MM3 (ref 1.7–7)
NEUTROPHILS NFR BLD AUTO: 81.6 % (ref 42.7–76)
NRBC BLD AUTO-RTO: 0 /100 WBC (ref 0–0.2)
PLATELET # BLD AUTO: 176 10*3/MM3 (ref 140–450)
PMV BLD AUTO: 8.4 FL (ref 6–12)
POTASSIUM SERPL-SCNC: 4.4 MMOL/L (ref 3.5–5.2)
PROT SERPL-MCNC: 6.6 G/DL (ref 6–8.5)
RBC # BLD AUTO: 4.38 10*6/MM3 (ref 4.14–5.8)
SODIUM SERPL-SCNC: 139 MMOL/L (ref 136–145)
WBC NRBC COR # BLD: 8.12 10*3/MM3 (ref 3.4–10.8)

## 2023-11-06 PROCEDURE — 85025 COMPLETE CBC W/AUTO DIFF WBC: CPT

## 2023-11-06 PROCEDURE — 25010000002 HEPARIN LOCK FLUSH PER 10 UNITS: Performed by: INTERNAL MEDICINE

## 2023-11-06 PROCEDURE — 25810000003 SODIUM CHLORIDE 0.9 % SOLUTION: Performed by: NURSE PRACTITIONER

## 2023-11-06 PROCEDURE — 25010000002 PEMBROLIZUMAB 100 MG/4ML SOLUTION 4 ML VIAL: Performed by: NURSE PRACTITIONER

## 2023-11-06 PROCEDURE — 96413 CHEMO IV INFUSION 1 HR: CPT

## 2023-11-06 PROCEDURE — 80053 COMPREHEN METABOLIC PANEL: CPT

## 2023-11-06 RX ORDER — SODIUM CHLORIDE 0.9 % (FLUSH) 0.9 %
10 SYRINGE (ML) INJECTION AS NEEDED
Status: DISCONTINUED | OUTPATIENT
Start: 2023-11-06 | End: 2023-11-06 | Stop reason: HOSPADM

## 2023-11-06 RX ORDER — HEPARIN SODIUM (PORCINE) LOCK FLUSH IV SOLN 100 UNIT/ML 100 UNIT/ML
500 SOLUTION INTRAVENOUS AS NEEDED
OUTPATIENT
Start: 2023-11-06

## 2023-11-06 RX ORDER — SODIUM CHLORIDE 9 MG/ML
250 INJECTION, SOLUTION INTRAVENOUS ONCE
Status: COMPLETED | OUTPATIENT
Start: 2023-11-06 | End: 2023-11-06

## 2023-11-06 RX ORDER — HEPARIN SODIUM (PORCINE) LOCK FLUSH IV SOLN 100 UNIT/ML 100 UNIT/ML
500 SOLUTION INTRAVENOUS AS NEEDED
Status: DISCONTINUED | OUTPATIENT
Start: 2023-11-06 | End: 2023-11-06 | Stop reason: HOSPADM

## 2023-11-06 RX ORDER — SODIUM CHLORIDE 0.9 % (FLUSH) 0.9 %
10 SYRINGE (ML) INJECTION AS NEEDED
OUTPATIENT
Start: 2023-11-06

## 2023-11-06 RX ORDER — SODIUM CHLORIDE 9 MG/ML
250 INJECTION, SOLUTION INTRAVENOUS ONCE
Status: CANCELLED | OUTPATIENT
Start: 2023-11-06

## 2023-11-06 RX ADMIN — Medication 500 UNITS: at 15:20

## 2023-11-06 RX ADMIN — SODIUM CHLORIDE 200 MG: 9 INJECTION, SOLUTION INTRAVENOUS at 14:51

## 2023-11-06 RX ADMIN — SODIUM CHLORIDE 250 ML: 9 INJECTION, SOLUTION INTRAVENOUS at 14:50

## 2023-11-06 RX ADMIN — Medication 10 ML: at 15:20

## 2023-11-06 NOTE — PROGRESS NOTES
This was an audio and video enabled telemedicine encounter.      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  Notably there is invasive carcinoma present at the margin, 2 positive lymph nodes adjacent to the bronchovesicular margin which appears to have been transected difficult to enumerate with extranodal extension present.  *Patient status post chemo RT-11/28/2022, radiation therapy completion date 1/11/2023  *Immunotherapy-Keytruda to be initiated 3/20/2023  *Follow-up repeat scans 9/18/2023 without evidence of recurrent disease      History of Present Illness      The patient is a 78 y.o. male with the above-mentioned history who returns today for follow-up and lab review prior to cycle 12 Keytruda.  He continues to tolerate treatment quite well.  He is doing good with steroid replacement due to adrenal insufficiency with hydrocortisone 20 mg in the morning, and 10 mg in the evening.  The past few days, the patient has developed some head cold symptoms, including congestion, and sneezing.  He is taking Zyrtec as well as Flonase.  He denies any fevers, worsening shortness of breath, or cough.      He has had some intermittent issues with skin rash, currently being treated by dermatology and under good control.  Denies issues with diarrhea.                             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  He had been assessed particularly in the fall 2021 when he presented with nausea and vomiting and abdominal  discomfort leading to assessments that revealed sigmoid diverticulitis with contained rupture and partial small bowel obstruction.  Records from mid September 21 indicating that he was admitted with partial small bowel obstruction and treated medically with very slow recovery.  He has history of COPD with CT demonstrating a pulmonary nodule in the right lung base at 7 mm with follow-up planned.  He was seen by general surgery in later September with repeat scans planned and repeat CT abdomen 10/7/2021 did demonstrate interval improvement of sigmoid diverticulitis.      Record indicates an assessment in late June 2022 at different general surgeon for left inguinal hernia, history of heavy tobacco use with COPD and recommendation for surgical repair of his hernia      The patient underwent a CT scan of the chest 5/7/2022 demonstrating diffuse emphysematous changes, ill-defined density measuring 3 mm in the superior segment of the right lower lobe, multiple ill-defined 3 to 4 mm nodular density thought to be inflammatory involving the right lower lobe and a new spiculated nodule involving the left lower lobe measuring 16 x 14 mm as well as left hilar adenopathy.       Follow-up PET/CT 7/13/2022 reveal a hypermetabolic spiculated lesion medial aspect the left lower lobe adjacent to descending thoracic aorta measuring 1.5 x 1.6 SUV 9.3 with an enlarged hypermetabolic left hilar lymph node measured 1.5 x 1.3 with SUV of 12.1, additional nodules in the lateral aspect right lower lobe and micronodule in the posterior/medial right lower lobe and also additional right lower lobe micronodule.  There is an infrarenal abdominal aortic aneurysm measuring 3.4 cm with a short segment of chronic dissection present.    These clinical findings would be consistent with a possible T1b N1 M0-stage IIb lung cancer.      Recognizing a diagnosis has not been made his case was discussed in thoracic conference 7/20/2022.  Recommendations  include proceeding to pulmonary assessment with EBUS and the patient undergoing a general assessment per his clinical status-older adult assessment as well as assessment by thoracic surgery.  These issues are discussed with the patient and his wife in detail when they are seen 7/28/2022.    The patient proceeded to undergo his hernia surgery 8/2/2022 by Dr. Dotson.  Additionally the patient was unable to undergo assessment by pulmonary until October and, thereafter, was scheduled to see Dr. Joe 8/18/2022 undergoing older adult functional assessment with a JAGUAR score of 11 indicating a risk of functional decline related to cancer therapy.    The patient is seen with his significant other 8/15/2022 and doing very well with recent hernia surgery.  His performance status is reasonably good at present and we have learned now that he would not be able to see pulmonary medicine until October, thoracic surgery is scheduled this week with Dr. Joe.  Perhaps he could be a surgical candidate.  Particularly we know by van Mata that T p53 splice site mutation is present and would certainly be consistent as well the most common mutations found in lung cancers particularly squamous cancers.  We have discussed obtaining pulmonary functions at this point, thoracic surgery assessment this week and also restarting Chantix as possible for the patient.    There was difficulty obtaining Chantix and while this was being assessed the patient was reviewed 8/18 by thoracic surgery.  He was felt to have a T1b N1 M0 stage IIb carcinoma left lower lobe along and not a candidate for biopsy.  PFTs were borderline, functional assessment was reasonably good and the patient was felt to be a candidate for robot-assisted biopsy of his nodes and if malignant proceed to left lower lobe lobectomy.  He was reviewed 9/8 and offered 21 mg nicotine transdermal patching.    He is next seen 9/10/2022.  He is seen with his wife and both are prepared  for surgery 9/23/2022.  We discussed that additional therapy may be considered once we have more information about the type and stage.  We would hope to see him postoperatively.    The patient was admitted 9//2022 undergoing 9/23/2022  a bronchoscopy with left video-assisted thoracoscopy with robot-assisted total decortication of the left lung, left lower lobectomy, mediastinal lymphadenectomy and intercostal nerve block with Dr. Nicola Joe.  Fortunately he did fairly well postoperatively though did develop atrial fibrillation with RVR treated with amiodarone converting back to sinus rhythm, treated with IV metoprolol subsequent chronic anticoagulation.  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  Notably there is invasive carcinoma present at the margin.  Is a, and only 2 positive lymph nodes adjacent to the bronchovesicular margin which appears to have been transected difficult to enumerate with extranodal extension present.       The patient is seen 10/27/2022.  He is recovering well from surgery, albeit slowly, and we have discussed his findings that are concerning as to residual disease with a positive margin described as above.  There is also the significance potentially of PD-L1 expression which has been described as producing a poor survival.     Nivolumab has been used as second line therapy to positive effect in the disease and this begs the question as to whether adjuvant therapy plus immunotherapy could be utilized though the patient would be difficult to treat with platinum based chemotherapy as well.       The patient is next assessed 11/7/2022 for significant assessments by supportive oncology at [facility] as well as with plans to see Dr. Marrero 11/9/2022.  Unfortunately he has been very slow to gradually recover from the effects of surgery with reduced appetite and only  fair performance status.     At present it would be difficult to give him cisplatin based chemotherapy and we discussed whether we might be able to use Tecentriq (atezolizumab) as his primary adjuvant therapy.  We have contacted pathology about completing Caris testing and a PD-L1 analysis that has not yet completed.         The patient is seen, however, 11/16/2022 and his genomic analysis has returned as being significant for broad sensitivity to immunotherapy.  This would argue for the administration of weekly Carboplatin/Taxol as the patient proceeds to radiation therapy and upon completion, then using Tecentriq as further adjuvant therapy over a year.  This is discussed with the patient and his  in detail as well as discussed with Dr. Marrero of radiation therapy.  We have also discussed the patient require port placement.    The patient proceeded to treatment taking weekly carboplatin Taxol with concurrent radiation therapy last seen 12/12/2022 with third weekly treatment.    He is next seen 12/19/2022 with H&H 11.9 and 38.5, white count 3460 and platelet count of 168,000.  He is feeling well without any significant complications of therapy except for right calf dermatitis that is been developing in the last several days.    The patient continued therapy taking CarboTaxol weekly including 12/28 and 1/4/2023.    His is next seen 1/11/2023 with completion date for radiation therapy 1/11/2023.  Repeat CBC now includes H&H of 11.0 and 33.9, white count 2090, platelet count 128,000, ANC is 800.  He, fortunately, is tolerating treatment well and we have again discussed with him adjuvant immunotherapy would be useful including Tecentriq versus pembrolizumab-keynote 091 study particularly in tumor programmed cell death PD-L1 greater than 50%.    The patient will not be given additional chemotherapy 1/11/2023 we will plan to reassess by follow-up scans in the next 6 weeks CT chest and pelvis making a decision  thereafter about adjuvant immunotherapy.    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    We have discussed that considering studies like keynote  091 that the patient's high risk disease could be addressed with additional immunotherapy including the use of Atezolizumab and/or Keytruda.  Considering his findings overall Keytruda every 3 weeks x18 cycles is to be pursued.    The patient was seen 3/20/2023, discussed initiation of Keytruda.  He is agreeable to proceed.    The patient notified the office shortly after treatment that he had pain under his right rib cage and was placed back onto his Neurontin to try to manage this pain.  Approximately week later he still had the pain and also had another rash we switched him at this point to Famvir to try to completely clear this problem.    He is next seen back 4/3/2023.  Fortunately his recent apparent shingles activation has nearly abated and he is feeling reasonably well.  In review of the literature there is no significant difference in activation with immunotherapy though this patient may require suppression.  I have asked him to complete his Famvir at this point.    Patient assessed 4/10/2023 with resolution of his shingles, subsequently placed on maintenance acyclovir, consideration of shingles vaccination post 3 months of Keytruda therapy is stable disease documented.    The patient underwent a CT chest abdomen pelvis 6/8/2023 that demonstrates postsurgical changes of the left hemithorax, stable pulmonary nodules, stable infrarenal abdominal aortic aneurysm.    He is next seen 6/12/2023.  We have discussed continue with his Keytruda with his tolerance being excellent.  He will also reduce  his current metoprolol to 12.5 mg p.o. daily.    He continued Keytruda and was seen in the office 7/24/2023 in anticipation of his 7th dose of Keytruda.  He was tolerating treatment without substantial side effects but did have sudden onset nausea and vomiting lasting 48 hours.  He then began to have joint pain bilateral knees and shoulders up to an 8 of 10 for severity.       He was demonstrating worsening hyponatremia at this point with a normal potassium.  Unfortunately his mental status began to worsen with increasing sleepiness, mild diarrhea.  7/28/2023 studies included an INR 3.34, sodium now 125 and he was admitted for his weakness and hyponatremia.    The patient was seen by several services including nephrology and oncology to the IV fluids.  He had been tested with ACT stimulation test and was diagnosed with renal sufficiency started on oral hydrocortisone.  7/29/2023 cortisol was 0.62, subsequent ACTH test was reduced at 5.7.  The patient studies 7/30 included BUN/creatinine of 9 and 0.97, sodium 130, chloride 95, calcium 5.2.      The patient is next seen 8/14/2023.  He remains somewhat weak and with joint discomfort.  We have discussed his findings that would be most consistent with central adrenal insufficiency and that dosing for his hydrocortisone-currently 10 mg p.o. every morning and 5 mg p.o. every afternoon is likely to be too low.  In determining whether we should proceed with treatment replacement therapy could allow us to try to complete his therapy which, on balance, would be the preferred approach.    The patient is seen 9/21/2023 improved per performance status with cortisone replacement therapy, subsequent CT of chest on pelvis 9/18/2023 without evidence of recurrent disease, pembrolizumab continued with plans to reassess likely in December 2023.      Past Medical History:   Diagnosis Date    Arthritis     COPD (chronic obstructive pulmonary disease)     O2 3L AT HS    Diabetes mellitus      DIET CONTROL    Diverticulitis     DVT, lower extremity     RLE    Elevated cholesterol     H/O Cervical pain     History of colitis     Hyperlipidemia     Hypertension     Left shoulder pain     Low back pain     Lung cancer 2022    LEFT LUNG    On anticoagulant therapy     Peripheral arterial disease     Right leg claudication     Skin cancer     HX        Past Surgical History:   Procedure Laterality Date    BALLOON ANGIOPLASTY, ARTERY Right 2015    IR Percutaneious IVC filter placement    INGUINAL HERNIA REPAIR Left     KNEE ARTHROSCOPY Left     Torn meniscus    LOBECTOMY Left 9/23/2022    Procedure: BRONCHOSCOPY, LEFT VIDEO ASSISTED THORACOSCOPY, ROBOT ASSISTED TOTAL DECORTICATION  LEFT LUNG, LEFT LOWER LOBE LOBECTOMY, MEDIASTINAL LYMPHECTOMY, INTERCOSTAL NERVE BLOCK;  Surgeon: Nicola Joe III, MD;  Location: [facility];  Service: Robotics - DaVinci;  Laterality: Left;    VENOUS ACCESS DEVICE (PORT) INSERTION Right 11/21/2022    Procedure: INSERTION VENOUS ACCESS DEVICE;  Surgeon: Nicola Joe III, MD;  Location: [facility] OR;  Service: Thoracic;  Laterality: Right;        Current Outpatient Medications on File Prior to Visit   Medication Sig Dispense Refill    arformoterol (BROVANA) 15 MCG/2ML nebulizer solution Take 2 mL by nebulization 2 (Two) Times a Day. Indications: Chronic Obstructive Lung Disease      cetirizine (zyrTEC) 10 MG tablet Take 1 tablet by mouth Daily. Indications: Hayfever      Cholecalciferol 50 MCG (2000 UT) capsule Take 1 capsule by mouth Daily. Indications: Vitamin D Deficiency      clotrimazole (LOTRIMIN) 1 % cream Apply  topically to the appropriate area as directed.      fluticasone (FLONASE) 50 MCG/ACT nasal spray 1 spray into the nostril(s) as directed by provider Daily. Indications: Allergic Rhinitis      hydrocortisone (CORTEF) 10 MG tablet Take 2 tablets in the morning and 1 tablet in the afternoon 90 tablet 3    isosorbide mononitrate (IMDUR) 60 MG 24 hr  tablet Take 1 tablet by mouth Every Evening. Indications: hypertension      ondansetron (Zofran) 8 MG tablet Take 1 tablet by mouth Every 8 (Eight) Hours As Needed for Nausea or Vomiting. 30 tablet 1    sildenafil (REVATIO) 20 MG tablet Take 1 tablet by mouth.      simvastatin (ZOCOR) 20 MG tablet Take 1 tablet by mouth Every Night. Indications: High Amount of Fats in the Blood      tamsulosin (FLOMAX) 0.4 MG capsule 24 hr capsule Take 1 capsule by mouth Daily. 30 capsule 5    triamcinolone (KENALOG) 0.5 % cream Apply  topically to the appropriate area as directed.      warfarin (COUMADIN) 5 MG tablet Take 1 tablet by mouth Daily. Take 10mg on 9/29/22 and then resume regular home schedule. (Patient taking differently: Take 1 tablet by mouth Daily. 5mg daily with additional 5mg on Monday and Friday)      [DISCONTINUED] Budeson-Glycopyrrol-Formoterol (BREZTRI) 160-9-4.8 MCG/ACT aerosol inhaler Inhale 2 puffs 2 (Two) Times a Day. Indications: Chronic Obstructive Lung Disease      [DISCONTINUED] metoprolol succinate XL (TOPROL-XL) 25 MG 24 hr tablet Take 1 tablet by mouth Daily. Indications: High Blood Pressure Disorder      [DISCONTINUED] Pembrolizumab (Keytruda) 100 MG/4ML solution Infuse  into a venous catheter.       No current facility-administered medications on file prior to visit.        ALLERGIES:    Allergies   Allergen Reactions    Benadryl [Diphenhydramine] Other (See Comments)     Extreme restlessness and insomnia        Social History     Socioeconomic History    Marital status:     Years of education: 1 year college   Tobacco Use    Smoking status: Every Day     Packs/day: 1.00     Years: 59.00     Additional pack years: 0.00     Total pack years: 59.00     Types: Cigarettes     Start date: 1963    Smokeless tobacco: Never    Tobacco comments:     9/8/22: Down to Less than 1 PPD   Vaping Use    Vaping Use: Never used   Substance and Sexual Activity    Alcohol use: Not Currently    Drug use: Never  "   Sexual activity: Defer        Family History   Problem Relation Age of Onset    No Known Problems Mother     Cancer Father     Lung cancer Father     Diabetes Brother     Other Daughter         S/P stent, age 42    No Known Problems Son     Malig Hyperthermia Neg Hx         Review of Systems   ROS as per HPI    Objective     Vitals:    11/06/23 1401   BP: 153/79   Pulse: 78   Resp: 16   Temp: 97.8 °F (36.6 °C)   TempSrc: Temporal   SpO2: 97%   Weight: 72.8 kg (160 lb 9.6 oz)   Height: 182.9 cm (72.01\")   PainSc: 0-No pain               10/16/2023     8:37 AM   Current Status   ECOG score 0     Physical Exam  HENT:      Head: Normocephalic and atraumatic.   Eyes:      Extraocular Movements: Extraocular movements intact.      Conjunctiva/sclera: Conjunctivae normal.   Pulmonary:      Effort: Pulmonary effort is normal. No respiratory distress.   Neurological:      General: No focal deficit present.      Mental Status: He is alert and oriented to person, place, and time.   Psychiatric:         Mood and Affect: Mood normal.         Behavior: Behavior normal.     Physical exam is limited due to telehealth visit.    Results from last 7 days   Lab Units 11/06/23  1350   WBC 10*3/mm3 8.12   NEUTROS ABS 10*3/mm3 6.63   HEMOGLOBIN g/dL 13.9   HEMATOCRIT % 42.9   PLATELETS 10*3/mm3 176         Results from last 7 days   Lab Units 11/06/23  1350   SODIUM mmol/L 139   POTASSIUM mmol/L 4.4   CHLORIDE mmol/L 102   CO2 mmol/L 24.3   BUN mg/dL 10   CREATININE mg/dL 0.90   CALCIUM mg/dL 9.8   ALBUMIN g/dL 4.0   BILIRUBIN mg/dL 0.4   ALK PHOS U/L 58   ALT (SGPT) U/L 8   AST (SGOT) U/L 20   GLUCOSE mg/dL 114*                 Assessment & Plan     78 y.o. male  with medical issues including type 2 diabetes-uncertain control, COPD, hypertension, diastolic heart failure, chronic disease, peripheral vascular disease (follow-up with vascular surgery today), previous DVT on anticoagulation (home monitoring), previous IVC filter " placement?,  Status post September 2021 partial small bowel obstruction secondary to sigmoid diverticulitis treated medically.     1.   T1b N1 M0-stage IIb lung cancer.  right lung base nodule noted on scan at that point and in follow-up with primary care had developed a left inguinal hernia with repair planned through a different general surgeon then seen with his initial sigmoid diverticulitis.  PET scan 7/13/2022 demonstrates a hypermetabolic spiculated lesion medial aspect of the left lobe adjacent to descending thoracic aorta measuring1.5 x 1.6 SUV 9.3 with an enlarged hypermetabolic left hilar lymph node measured 1.5 x 1.3 with SUV of 12.1, additional nodules in the lateral aspect right lower lobe and micronodule in the posterior/medial right lower lobe and also additional right lower lobe micronodule.  There is an infrarenal abdominal aortic aneurysm measuring 3.4 cm with a short segment of chronic dissection present.  Clinical findings would be consistent with a possible T1b N1 M0-stage IIb lung cancer.  discussed in thoracic conference 7/20/2022.  Recommendations to proceed with pulmonary assessment with EBUS.  The patient proceeded to undergo his hernia surgery 8/2/2022 by Dr. Dotson.   Guardant 360 that T p53 splice site mutation is present and would certainly be consistent as well the most common mutations found in lung cancers particularly squamous cancers.  The patient was admitted 9//2022 undergoing 9/23/2022  a bronchoscopy with left video-assisted thoracoscopy with robot-assisted total decortication of the left lung, left lower lobectomy, mediastinal lymphadenectomy and intercostal nerve block with Dr. Nicola Joe.  Fortunately he did fairly well postoperatively though did develop atrial fibrillation with RVR treated with amiodarone converting back to sinus rhythm, treated with IV metoprolol subsequent chronic anticoagulation.  Final pathology revealed large cell neuroendocrine the 2.7 cm  primary, G4 carcinoma with 8 of 11 nodes positive, 10 L hilar 12 L of lobar 13 L segmental-pT1cpTN1-stage IIB.  Notably there is invasive carcinoma present at the margin. only 2 positive lymph nodes adjacent to the bronchovesicular margin which appears to have been transected difficult to enumerate with extranodal extension present.  Options could well be Carboplatin and Taxol considering the patient's comorbidity and worry of using cisplatin-based chemotherapy in this patient followed by atezolizumab with pathology having been notified to assess PD-L1 expression on the tumor as well also await review by Caris molecular profiling.  Finally radiation therapy consultation be requested.   Caris analysis indicates benefit from immunotherapy at relatively high levels.  This would allow radiation therapy given with Carboplatin Taxol weekly (better tolerated) and then subsequent atezolizumab adjuvantly.  Weekly carboplatin Taxol initiated 11/28/2022 given concurrently with radiation therapy  12/5/2022 here for cycle 2 weekly carboplatin/Taxol, overall tolerating treatment quite well so far.  12/12/2022: Proceed with cycle #3 weekly carboplatin/Taxol with concurrent radiation.  Continues to tolerate treatment well.  He is next seen 12/19/2022 with H&H 11.9 and 38.5, white count 3460 and platelet count of 168,000.  He is feeling well without any significant complications of therapy except for right calf dermatitis that is been developing in the last several days.  12/28/2022 here for week 5 carbo/Taxol.  Overall tolerating well.  Today WBC 2.45, ANC 1.22, hemoglobin 10.7, platelet count 117,000.  I have reviewed with Dr. Bishop, and we will go ahead and continue with treatment.  1/4/2023: Received with cycle 6 carbo/taxol + XRT.  Continues to tolerate treatment well.  WBC improved to 2.69 with ANC 1.41.  Next 1/11/2023 with completion date 1/11/2023.  Repeat CBC now includes H&H of 11.0 and 33.9, white count 2090, platelet  count 128,000, ANC is 800.  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  We have discussed that considering studies like keynote  091 that the patient's high risk disease could be addressed with additional immunotherapy including the use of Atezolizumab and/or Keytruda.  Considering his findings overall Keytruda every 3 weeks x18 cycles is to be pursued.  The patient began Keytruda as planned with his first treatment 3/20/2023.  The patient notified the office shortly after treatment that he had pain under his right rib cage and was placed back onto his Neurontin to try to manage this pain.  Approximately week later he still had the pain and also had another rash we switched him at this point to Famvir to try to completely clear this problem.  4/3/2023.  Fortunately his recent apparent shingles activation has nearly abated and he is feeling reasonably well.  In review of the literature there is no significant difference in activation with immunotherapy though this patient may require suppression.  He is asked to complete his Famvir.  4/10/2023 cycle 2 Keytruda, overall tolerating well.   Patient seen 5/1/2023, third cycle of Keytruda, status post fourth cycle of Keytruda, 3 months of therapy, subsequent scans will need to be scheduled.  5/22/2023: Proceed with cycle 4 Keytruda.    Follow-up scans-CT of chest, abdomen and pelvis 6/8/2023 with postsurgical changes only.  7/3/2023 cycle 6 Keytruda  Proceed today, 7/24/2023 with  cycle 7 Keytruda which is continued every 3 weeks   He was tolerating treatment without substantial side effects but did have sudden onset nausea and vomiting lasting 48 hours.  He then began to have joint pain bilateral knees and shoulders up to an 8 of 10 for severity.     He was demonstrating worsening hyponatremia at this point with a normal potassium.  Unfortunately his mental status began to worsen with increasing sleepiness, mild diarrhea.  7/28/2023 studies included an INR 3.34, sodium now 125 and he was admitted for his weakness and hyponatremia.  The patient was seen by several services including nephrology and oncology to the IV fluids.  He had been tested with ACT stimulation test and was diagnosed with renal sufficiency started on oral hydrocortisone.  7/29/2023 cortisol was 0.62, subsequent ACTH test was reduced at 5.7.  The patient studies 7/30 included BUN/creatinine of 9 and 0.97, sodium 130, chloride 95, calcium 5.2.  The patient is next seen 8/14/2023.  He remains somewhat weak and with joint discomfort.  We have discussed his findings that would be most consistent with central adrenal insufficiency and that dosing for his hydrocortisone-currently 10 mg p.o. every morning and 5 mg p.o. every afternoon is likely to be too low.  In determining whether we should proceed with treatment replacement therapy could allow us to try to complete his therapy which, on balance, would be the preferred approach.  Hydrocortisone moved to 20 mg p.o. every morning and 10 mg p.o. every afternoon.  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  Replacement therapy ongoing.  9/5/2023 reviewed back today for C9 Keytruda. Patient with improved performance status following increase of hydrocortisone per above.  Improved appetite and decreased joint pain.  Proceed with treatment today with repeat imaging planned thereafter.  Repeat CT chest,  abdomen, pelvis 9/18/2023 without evidence of progressive disease.  Plan to proceed with cycle #10 Keytruda.  11/6/2023 here for cycle 12 Keytruda, continuing to tolerate quite well.      2.  Herpes zoster: Patient was treated with Famvir.  Rash has resolved, no issues with persistent herpetic neuralgia.  He will be placed on suppression with acyclovir 400 mg twice daily.  We discussed he will hold off on vaccination for now, given recent infection.  Patient assessed 5/1/2023, continue Keytruda, status post fifth cycle of Keytruda or 3 months of therapy he would undergo repeat scans and, if stable or improved, proceed with Shingrix vaccinations.  Patient now requested to proceed with vaccinations including RSV    3.  Hyponatremia  Likely exacerbated by recent GI toxicity with nausea vomiting and diarrhea.  Symptoms have now resolved with improvement in his appetite and intake  Resolved    4. Joint pain.  Baseline knee pain prior to initiation of immunotherapy though he is now exacerbated with shoulder pain as well and requiring ambulation with a walker  Additional inflammatory labs including sed rate and C-reactive protein today to evaluate for inflammation secondary to immunotherapy  Continue Tylenol and occasional Norco for breakthrough pain  Hydrocortisone replacement therapy increased to 20 mg p.o. every morning and 10 mg p.o. every afternoon-patient is 14/23    5.  Congestion:  Symptoms present for only a few days, no fevers.  Also reports sneezing.  Patient is currently using Zyrtec and Flonase.  I recommend he add Astelin nose spray.  Monitor for worsening symptoms and notify the office.    PLAN:   Proceed with cycle 12 Keytruda today, which she continues every 3 weeks, with plans for a total of 18 cycles.  Continue hydrocortisone 20 mg in the morning, 10 mg in the afternoon.  Continue prophylactic acyclovir for 100 mg twice daily.  Continue Zyrtec and Flonase and add Astelin nasal spray as well.  Follow-up  in 3 weeks with Dr. Bishop with repeat labs reassessment due for continued Keytruda.  Call with any new problems or concerns or worsening URI symptoms or fevers.        Patient is on a high risk medication requiring close monitoring for toxicity.      Rama Mario, APRN  11/06/2023    Mode of Visit: Video  Location of patient: other: office  You have chosen to receive care through a telehealth visit.  Does the patient consent to use a video/audio connection for your medical care today? Yes  The visit included audio and video interaction. No technical issues occurred during this visit.

## 2023-11-26 NOTE — PROGRESS NOTES
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  Notably there is invasive carcinoma present at the margin, 2 positive lymph nodes adjacent to the bronchovesicular margin which appears to have been transected difficult to enumerate with extranodal extension present.  *Patient status post chemo RT-11/28/2022, radiation therapy completion date 1/11/2023  *Immunotherapy-Keytruda to be initiated 3/20/2023  *Follow-up repeat scans 9/18/2023 without evidence of recurrent disease      History of Present Illness      The patient is a 78 y.o. male with the above history who is seen today in follow-up due for his 11th dose of Keytruda.  When he was previously seen he was having weakness and increased joint pain and it was felt that his immunotherapy had produced adrenal insufficiency requiring his hydrocortisone to 20 mg in the morning, 10 mg in the evening.  As he is reviewed back today companied by his wife and they both agree he is doing better.  He is eating better with some weight gain noted.     The patient was reevaluated 9/21/2023 with CT of chest, abdomen, pelvis 9/18/2023 showing postoperative changes from the left lower lobectomy, scattered densities in the lungs that are stable from study 6/8/2023 and no new lung abnormalities, 3.4 cm infrarenal aortic aneurysm and stable and no evidence of metastatic disease.  The patient is seen with his significant other both indicating that he has actually feeling considerably better particularly with cortisone replacement therapy.  His scans, as above, continue to demonstrate that he does not have evidence recurrent disease.     The patient is next seen 10/16/2023 having continued to do well.  He has been seen by dermatology and treated  topically for dry skin and erythema involving his left lateral lower extremity.  He does not have a rash in other sites and no additional symptoms as long as he is maintained on full dose hydrocortisone replacement therapy at 20 mg p.o. every morning and 10 mg p.o. every afternoon.  He went on to take his 11 cycle of Keytruda.    He was next treated 11/6/2023 for his 12th cycle out of a planned 18 cycles.    He is next seen 11/27/2023 for his 13th treatment with Keytruda.  Again he is not having any significant side effect of treatment and continues to tolerate therapy well.                                   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  He had been assessed particularly in the fall 2021 when he presented with nausea and vomiting and abdominal discomfort leading to assessments that revealed sigmoid diverticulitis with contained rupture and partial small bowel obstruction.  Records from mid September 21 indicating that he was admitted with partial small bowel obstruction and treated medically with very slow recovery.  He has history of COPD with CT demonstrating a pulmonary nodule in the right lung base at 7 mm with follow-up planned.  He was seen by general surgery in later September with repeat scans planned and repeat CT abdomen 10/7/2021 did demonstrate interval improvement of sigmoid diverticulitis.      Record indicates an assessment in late June 2022 at different general surgeon for left inguinal hernia, history of heavy tobacco use with COPD and recommendation for surgical repair of his hernia      The patient underwent a CT scan of the chest 5/7/2022 demonstrating diffuse emphysematous changes, ill-defined density measuring 3 mm in the  superior segment of the right lower lobe, multiple ill-defined 3 to 4 mm nodular density thought to be inflammatory involving the right lower lobe and a new spiculated nodule involving the left lower lobe measuring 16 x 14 mm as well as left hilar adenopathy.       Follow-up PET/CT 7/13/2022 reveal a hypermetabolic spiculated lesion medial aspect the left lower lobe adjacent to descending thoracic aorta measuring 1.5 x 1.6 SUV 9.3 with an enlarged hypermetabolic left hilar lymph node measured 1.5 x 1.3 with SUV of 12.1, additional nodules in the lateral aspect right lower lobe and micronodule in the posterior/medial right lower lobe and also additional right lower lobe micronodule.  There is an infrarenal abdominal aortic aneurysm measuring 3.4 cm with a short segment of chronic dissection present.    These clinical findings would be consistent with a possible T1b N1 M0-stage IIb lung cancer.      Recognizing a diagnosis has not been made his case was discussed in thoracic conference 7/20/2022.  Recommendations include proceeding to pulmonary assessment with EBUS and the patient undergoing a general assessment per his clinical status-older adult assessment as well as assessment by thoracic surgery.  These issues are discussed with the patient and his wife in detail when they are seen 7/28/2022.    The patient proceeded to undergo his hernia surgery 8/2/2022 by Dr. Dotson.  Additionally the patient was unable to undergo assessment by pulmonary until October and, thereafter, was scheduled to see Dr. Joe 8/18/2022 undergoing older adult functional assessment with a JAGUAR score of 11 indicating a risk of functional decline related to cancer therapy.    The patient is seen with his significant other 8/15/2022 and doing very well with recent hernia surgery.  His performance status is reasonably good at present and we have learned now that he would not be able to see pulmonary medicine until October, thoracic surgery is  scheduled this week with Dr. Joe.  Perhaps he could be a surgical candidate.  Particularly we know by guardant 360 that T p53 splice site mutation is present and would certainly be consistent as well the most common mutations found in lung cancers particularly squamous cancers.  We have discussed obtaining pulmonary functions at this point, thoracic surgery assessment this week and also restarting Chantix as possible for the patient.    There was difficulty obtaining Chantix and while this was being assessed the patient was reviewed 8/18 by thoracic surgery.  He was felt to have a T1b N1 M0 stage IIb carcinoma left lower lobe along and not a candidate for biopsy.  PFTs were borderline, functional assessment was reasonably good and the patient was felt to be a candidate for robot-assisted biopsy of his nodes and if malignant proceed to left lower lobe lobectomy.  He was reviewed 9/8 and offered 21 mg nicotine transdermal patching.    He is next seen 9/10/2022.  He is seen with his wife and both are prepared for surgery 9/23/2022.  We discussed that additional therapy may be considered once we have more information about the type and stage.  We would hope to see him postoperatively.    The patient was admitted 9//2022 undergoing 9/23/2022  a bronchoscopy with left video-assisted thoracoscopy with robot-assisted total decortication of the left lung, left lower lobectomy, mediastinal lymphadenectomy and intercostal nerve block with Dr. Nicola Joe.  Fortunately he did fairly well postoperatively though did develop atrial fibrillation with RVR treated with amiodarone converting back to sinus rhythm, treated with IV metoprolol subsequent chronic anticoagulation.  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  Notably there is invasive carcinoma present at the margin.  Is a, and only 2  positive lymph nodes adjacent to the bronchovesicular margin which appears to have been transected difficult to enumerate with extranodal extension present.       The patient is seen 10/27/2022.  He is recovering well from surgery, albeit slowly, and we have discussed his findings that are concerning as to residual disease with a positive margin described as above.  There is also the significance potentially of PD-L1 expression which has been described as producing a poor survival.     Nivolumab has been used as second line therapy to positive effect in the disease and this begs the question as to whether adjuvant therapy plus immunotherapy could be utilized though the patient would be difficult to treat with platinum based chemotherapy as well.       The patient is next assessed 11/7/2022 for significant assessments by supportive oncology at [facility] as well as with plans to see Dr. Marrero 11/9/2022.  Unfortunately he has been very slow to gradually recover from the effects of surgery with reduced appetite and only fair performance status.     At present it would be difficult to give him cisplatin based chemotherapy and we discussed whether we might be able to use Tecentriq (atezolizumab) as his primary adjuvant therapy.  We have contacted pathology about completing Caris testing and a PD-L1 analysis that has not yet completed.         The patient is seen, however, 11/16/2022 and his genomic analysis has returned as being significant for broad sensitivity to immunotherapy.  This would argue for the administration of weekly Carboplatin/Taxol as the patient proceeds to radiation therapy and upon completion, then using Tecentriq as further adjuvant therapy over a year.  This is discussed with the patient and his  in detail as well as discussed with Dr. Marrero of radiation therapy.  We have also discussed the patient require port placement.    The patient proceeded to treatment taking weekly carboplatin Taxol with  concurrent radiation therapy last seen 12/12/2022 with third weekly treatment.    He is next seen 12/19/2022 with H&H 11.9 and 38.5, white count 3460 and platelet count of 168,000.  He is feeling well without any significant complications of therapy except for right calf dermatitis that is been developing in the last several days.    The patient continued therapy taking CarboTaxol weekly including 12/28 and 1/4/2023.    His is next seen 1/11/2023 with completion date for radiation therapy 1/11/2023.  Repeat CBC now includes H&H of 11.0 and 33.9, white count 2090, platelet count 128,000, ANC is 800.  He, fortunately, is tolerating treatment well and we have again discussed with him adjuvant immunotherapy would be useful including Tecentriq versus pembrolizumab-keynote 091 study particularly in tumor programmed cell death PD-L1 greater than 50%.    The patient will not be given additional chemotherapy 1/11/2023 we will plan to reassess by follow-up scans in the next 6 weeks CT chest and pelvis making a decision thereafter about adjuvant immunotherapy.    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    We have discussed that considering studies like keynote  091 that the patient's high risk disease could be addressed with additional immunotherapy including the use of Atezolizumab and/or Keytruda.  Considering his findings overall Keytruda every 3 weeks x18 cycles is to be pursued.    The patient was seen 3/20/2023, discussed initiation of Keytruda.  He is agreeable to proceed.    The patient notified the office shortly after treatment that he had pain under his right rib cage and was placed back onto his Neurontin to try to manage this pain.   Approximately week later he still had the pain and also had another rash we switched him at this point to Famvir to try to completely clear this problem.    He is next seen back 4/3/2023.  Fortunately his recent apparent shingles activation has nearly abated and he is feeling reasonably well.  In review of the literature there is no significant difference in activation with immunotherapy though this patient may require suppression.  I have asked him to complete his Famvir at this point.    Patient assessed 4/10/2023 with resolution of his shingles, subsequently placed on maintenance acyclovir, consideration of shingles vaccination post 3 months of Keytruda therapy is stable disease documented.    The patient underwent a CT chest abdomen pelvis 6/8/2023 that demonstrates postsurgical changes of the left hemithorax, stable pulmonary nodules, stable infrarenal abdominal aortic aneurysm.    He is next seen 6/12/2023.  We have discussed continue with his Keytruda with his tolerance being excellent.  He will also reduce his current metoprolol to 12.5 mg p.o. daily.    He continued Keytruda and was seen in the office 7/24/2023 in anticipation of his 7th dose of Keytruda.  He was tolerating treatment without substantial side effects but did have sudden onset nausea and vomiting lasting 48 hours.  He then began to have joint pain bilateral knees and shoulders up to an 8 of 10 for severity.       He was demonstrating worsening hyponatremia at this point with a normal potassium.  Unfortunately his mental status began to worsen with increasing sleepiness, mild diarrhea.  7/28/2023 studies included an INR 3.34, sodium now 125 and he was admitted for his weakness and hyponatremia.    The patient was seen by several services including nephrology and oncology to the IV fluids.  He had been tested with ACT stimulation test and was diagnosed with renal sufficiency started on oral hydrocortisone.  7/29/2023 cortisol was 0.62,  subsequent ACTH test was reduced at 5.7.  The patient studies 7/30 included BUN/creatinine of 9 and 0.97, sodium 130, chloride 95, calcium 5.2.      The patient is next seen 8/14/2023.  He remains somewhat weak and with joint discomfort.  We have discussed his findings that would be most consistent with central adrenal insufficiency and that dosing for his hydrocortisone-currently 10 mg p.o. every morning and 5 mg p.o. every afternoon is likely to be too low.  In determining whether we should proceed with treatment replacement therapy could allow us to try to complete his therapy which, on balance, would be the preferred approach.    The patient is seen 9/21/2023 improved per performance status with cortisone replacement therapy, subsequent CT of chest on pelvis 9/18/2023 without evidence of recurrent disease, pembrolizumab continued with plans to reassess likely in December 2023 or at the completion of therapy.      Past Medical History:   Diagnosis Date    Arthritis     COPD (chronic obstructive pulmonary disease)     O2 3L AT HS    Diabetes mellitus     DIET CONTROL    Diverticulitis     DVT, lower extremity     RLE    Elevated cholesterol     H/O Cervical pain     History of colitis     Hyperlipidemia     Hypertension     Left shoulder pain     Low back pain     Lung cancer 2022    LEFT LUNG    On anticoagulant therapy     Peripheral arterial disease     Right leg claudication     Skin cancer     HX        Past Surgical History:   Procedure Laterality Date    BALLOON ANGIOPLASTY, ARTERY Right 2015    IR Percutaneious IVC filter placement    INGUINAL HERNIA REPAIR Left     KNEE ARTHROSCOPY Left     Torn meniscus    LOBECTOMY Left 9/23/2022    Procedure: BRONCHOSCOPY, LEFT VIDEO ASSISTED THORACOSCOPY, ROBOT ASSISTED TOTAL DECORTICATION  LEFT LUNG, LEFT LOWER LOBE LOBECTOMY, MEDIASTINAL LYMPHECTOMY, INTERCOSTAL NERVE BLOCK;  Surgeon: Nicola Joe III, MD;  Location: [facility];  Service: Robotics -  [employer];  Laterality: Left;    VENOUS ACCESS DEVICE (PORT) INSERTION Right 11/21/2022    Procedure: INSERTION VENOUS ACCESS DEVICE;  Surgeon: Nicola Joe III, MD;  Location: [facility];  Service: Thoracic;  Laterality: Right;        Current Outpatient Medications on File Prior to Visit   Medication Sig Dispense Refill    arformoterol (BROVANA) 15 MCG/2ML nebulizer solution Take 2 mL by nebulization 2 (Two) Times a Day. Indications: Chronic Obstructive Lung Disease      Budeson-Glycopyrrol-Formoterol (Breztri Aerosphere) 160-9-4.8 MCG/ACT aerosol inhaler Inhale 2 puffs.      cetirizine (zyrTEC) 10 MG tablet Take 1 tablet by mouth Daily. Indications: Hayfever      Cholecalciferol 50 MCG (2000 UT) capsule Take 1 capsule by mouth Daily. Indications: Vitamin D Deficiency      clotrimazole (LOTRIMIN) 1 % cream Apply  topically to the appropriate area as directed.      fluticasone (FLONASE) 50 MCG/ACT nasal spray 1 spray into the nostril(s) as directed by provider Daily. Indications: Allergic Rhinitis      hydrocortisone (CORTEF) 10 MG tablet Take 2 tablets in the morning and 1 tablet in the afternoon 90 tablet 3    isosorbide mononitrate (IMDUR) 60 MG 24 hr tablet Take 1 tablet by mouth Every Evening. Indications: hypertension      ondansetron (Zofran) 8 MG tablet Take 1 tablet by mouth Every 8 (Eight) Hours As Needed for Nausea or Vomiting. 30 tablet 1    sildenafil (REVATIO) 20 MG tablet Take 1 tablet by mouth.      simvastatin (ZOCOR) 20 MG tablet Take 1 tablet by mouth Every Night. Indications: High Amount of Fats in the Blood      tamsulosin (FLOMAX) 0.4 MG capsule 24 hr capsule Take 1 capsule by mouth Daily. 30 capsule 5    triamcinolone (KENALOG) 0.5 % cream Apply  topically to the appropriate area as directed.      warfarin (COUMADIN) 5 MG tablet Take 1 tablet by mouth Daily. Take 10mg on 9/29/22 and then resume regular home schedule. (Patient taking differently: Take 1 tablet by mouth Daily. 5mg daily  "with additional 5mg on Monday and Friday)       No current facility-administered medications on file prior to visit.        ALLERGIES:    Allergies   Allergen Reactions    Benadryl [Diphenhydramine] Other (See Comments)     Extreme restlessness and insomnia        Social History     Socioeconomic History    Marital status:     Years of education: 1 year college   Tobacco Use    Smoking status: Every Day     Packs/day: 1.00     Years: 59.00     Additional pack years: 0.00     Total pack years: 59.00     Types: Cigarettes     Start date: 1963    Smokeless tobacco: Never    Tobacco comments:     9/8/22: Down to Less than 1 PPD   Vaping Use    Vaping Use: Never used   Substance and Sexual Activity    Alcohol use: Not Currently    Drug use: Never    Sexual activity: Defer        Family History   Problem Relation Age of Onset    No Known Problems Mother     Cancer Father     Lung cancer Father     Diabetes Brother     Other Daughter         S/P stent, age 42    No Known Problems Son     [place] Hyperthermia Neg Hx         Review of Systems   Skin:         Erythema left lateral lower extremity      ROS as per HPI    Objective     Vitals:    11/27/23 1013   BP: 149/76   Pulse: 54   Resp: 18   Temp: 98.4 °F (36.9 °C)   TempSrc: Temporal   SpO2: 95%   Weight: 73.4 kg (161 lb 12.8 oz)   Height: 182.9 cm (72.01\")   PainSc: 0-No pain               11/27/2023    10:15 AM   Current Status   ECOG score 0     Physical Exam  Constitutional:       Appearance: Normal appearance.   HENT:      Head: Normocephalic and atraumatic.      Nose: Nose normal.      Mouth/Throat:      Mouth: Mucous membranes are moist.   Eyes:      Extraocular Movements: Extraocular movements intact.      Conjunctiva/sclera: Conjunctivae normal.      Pupils: Pupils are equal, round, and reactive to light.   Cardiovascular:      Rate and Rhythm: Normal rate and regular rhythm.      Pulses: Normal pulses.      Heart sounds: Normal heart sounds.   Pulmonary: "      Comments: Prolonged expiratory phase bilaterally  Abdominal:      General: Bowel sounds are normal.      Palpations: Abdomen is soft.      Comments: Scaphoid   Musculoskeletal:         General: Normal range of motion.      Cervical back: Normal range of motion and neck supple.   Skin:     General: Skin is warm and dry.      Findings: Erythema (Left lateral lower extremity-reduced) present. No rash.   Neurological:      General: No focal deficit present.      Mental Status: He is alert and oriented to person, place, and time.   Psychiatric:         Mood and Affect: Mood normal.         I have reexamined the patient and the results are consistent with the previously documented exam. Kayden Bishop MD      RECENT LABS:  Results from last 7 days   Lab Units 11/27/23  0936   WBC 10*3/mm3 5.77   NEUTROS ABS 10*3/mm3 3.15   HEMOGLOBIN g/dL 13.8   HEMATOCRIT % 43.3   PLATELETS 10*3/mm3 176         Results from last 7 days   Lab Units 11/27/23  0936   SODIUM mmol/L 137   POTASSIUM mmol/L 4.1   CHLORIDE mmol/L 101   CO2 mmol/L 26.9   BUN mg/dL 12   CREATININE mg/dL 1.01   CALCIUM mg/dL 9.8   ALBUMIN g/dL 3.9   BILIRUBIN mg/dL 0.5   ALK PHOS U/L 54   ALT (SGPT) U/L 14   AST (SGOT) U/L 18   GLUCOSE mg/dL 98                 Assessment & Plan     78 y.o. male  with medical issues including type 2 diabetes-uncertain control, COPD, hypertension, diastolic heart failure, chronic disease, peripheral vascular disease (follow-up with vascular surgery today), previous DVT on anticoagulation (home monitoring), previous IVC filter placement?,  Status post September 2021 partial small bowel obstruction secondary to sigmoid diverticulitis treated medically.     1.   T1b N1 M0-stage IIb lung cancer.  right lung base nodule noted on scan at that point and in follow-up with primary care had developed a left inguinal hernia with repair planned through a different general surgeon then seen with his initial sigmoid diverticulitis.  PET  scan 7/13/2022 demonstrates a hypermetabolic spiculated lesion medial aspect of the left lobe adjacent to descending thoracic aorta measuring1.5 x 1.6 SUV 9.3 with an enlarged hypermetabolic left hilar lymph node measured 1.5 x 1.3 with SUV of 12.1, additional nodules in the lateral aspect right lower lobe and micronodule in the posterior/medial right lower lobe and also additional right lower lobe micronodule.  There is an infrarenal abdominal aortic aneurysm measuring 3.4 cm with a short segment of chronic dissection present.  Clinical findings would be consistent with a possible T1b N1 M0-stage IIb lung cancer.  discussed in thoracic conference 7/20/2022.  Recommendations to proceed with pulmonary assessment with EBUS.  The patient proceeded to undergo his hernia surgery 8/2/2022 by Dr. Dotson.   Guardant 360 that T p53 splice site mutation is present and would certainly be consistent as well the most common mutations found in lung cancers particularly squamous cancers.  The patient was admitted 9//2022 undergoing 9/23/2022  a bronchoscopy with left video-assisted thoracoscopy with robot-assisted total decortication of the left lung, left lower lobectomy, mediastinal lymphadenectomy and intercostal nerve block with Dr. Nicola Joe.  Fortunately he did fairly well postoperatively though did develop atrial fibrillation with RVR treated with amiodarone converting back to sinus rhythm, treated with IV metoprolol subsequent chronic anticoagulation.  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  Notably there is invasive carcinoma present at the margin. only 2 positive lymph nodes adjacent to the bronchovesicular margin which appears to have been transected difficult to enumerate with extranodal extension present.  Options could well be Carboplatin and Taxol considering the patient's  comorbidity and worry of using cisplatin-based chemotherapy in this patient followed by atezolizumab with pathology having been notified to assess PD-L1 expression on the tumor as well also await review by [facility] molecular profiling.  Finally radiation therapy consultation be requested.   Caris analysis indicates benefit from immunotherapy at relatively high levels.  This would allow radiation therapy given with Carboplatin Taxol weekly (better tolerated) and then subsequent atezolizumab adjuvantly.  Weekly carboplatin Taxol initiated 11/28/2022 given concurrently with radiation therapy  12/5/2022 here for cycle 2 weekly carboplatin/Taxol, overall tolerating treatment quite well so far.  12/12/2022: Proceed with cycle #3 weekly carboplatin/Taxol with concurrent radiation.  Continues to tolerate treatment well.  He is next seen 12/19/2022 with H&H 11.9 and 38.5, white count 3460 and platelet count of 168,000.  He is feeling well without any significant complications of therapy except for right calf dermatitis that is been developing in the last several days.  12/28/2022 here for week 5 carbo/Taxol.  Overall tolerating well.  Today WBC 2.45, ANC 1.22, hemoglobin 10.7, platelet count 117,000.  I have reviewed with Dr. Bishop, and we will go ahead and continue with treatment.  1/4/2023: Received with cycle 6 carbo/taxol + XRT.  Continues to tolerate treatment well.  WBC improved to 2.69 with ANC 1.41.  Next 1/11/2023 with completion date 1/11/2023.  Repeat CBC now includes H&H of 11.0 and 33.9, white count 2090, platelet count 128,000, ANC is 800.  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  We have discussed that considering studies like keynote  091 that the patient's high risk disease could be addressed with additional immunotherapy including the use of Atezolizumab and/or Keytruda.  Considering his findings overall Keytruda every 3 weeks x18 cycles is to be pursued.  The patient began Keytruda as planned with his first treatment 3/20/2023.  The patient notified the office shortly after treatment that he had pain under his right rib cage and was placed back onto his Neurontin to try to manage this pain.  Approximately week later he still had the pain and also had another rash we switched him at this point to Famvir to try to completely clear this problem.  4/3/2023.  Fortunately his recent apparent shingles activation has nearly abated and he is feeling reasonably well.  In review of the literature there is no significant difference in activation with immunotherapy though this patient may require suppression.  He is asked to complete his Famvir.  4/10/2023 cycle 2 Keytruda, overall tolerating well.   Patient seen 5/1/2023, third cycle of Keytruda, status post fourth cycle of Keytruda, 3 months of therapy, subsequent scans will need to be scheduled.  5/22/2023: Proceed with cycle 4 Keytruda.    Follow-up scans-CT of chest, abdomen and pelvis 6/8/2023 with postsurgical changes only.  7/3/2023 cycle 6 Keytruda  Proceed today, 7/24/2023 with cycle 7 Keytruda which is continued every 3 weeks   He was tolerating treatment without substantial side effects but did have sudden onset nausea and vomiting lasting 48 hours.  He then began to have joint pain bilateral knees and shoulders up to an 8 of 10 for severity.     He was demonstrating worsening hyponatremia at this point with a normal potassium.  Unfortunately his mental status began to worsen  with increasing sleepiness, mild diarrhea.  7/28/2023 studies included an INR 3.34, sodium now 125 and he was admitted for his weakness and hyponatremia.  The patient was seen by several services including nephrology and oncology to the IV fluids.  He had been tested with ACT stimulation test and was diagnosed with renal sufficiency started on oral hydrocortisone.  7/29/2023 cortisol was 0.62, subsequent ACTH test was reduced at 5.7.  The patient studies 7/30 included BUN/creatinine of 9 and 0.97, sodium 130, chloride 95, calcium 5.2.  The patient is next seen 8/14/2023.  He remains somewhat weak and with joint discomfort.  We have discussed his findings that would be most consistent with central adrenal insufficiency and that dosing for his hydrocortisone-currently 10 mg p.o. every morning and 5 mg p.o. every afternoon is likely to be too low.  In determining whether we should proceed with treatment replacement therapy could allow us to try to complete his therapy which, on balance, would be the preferred approach.  Hydrocortisone moved to 20 mg p.o. every morning and 10 mg p.o. every afternoon.  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  Replacement therapy ongoing.  9/5/2023 reviewed back today for C9 Keytruda. Patient with improved performance status following increase of hydrocortisone per above.  Improved appetite and decreased joint pain.  Proceed with treatment today with repeat imaging planned thereafter.  Repeat CT chest, abdomen, pelvis 9/18/2023 without evidence of progressive disease.  Plan to proceed with cycle #10 Keytruda.  Patient seen 10/16/2023 for cycle #11, 11/6 for cycle #12 and patient seen 11/27 for cycle #13 again out of 18 total.      2.  Herpes zoster: Patient was treated with Famvir.  Rash has resolved, no issues with persistent herpetic neuralgia.  He will be placed on suppression with acyclovir 400 mg  twice daily.  We discussed he will hold off on vaccination for now, given recent infection.  Patient assessed 5/1/2023, continue Keytruda, status post fifth cycle of Keytruda or 3 months of therapy he would undergo repeat scans and, if stable or improved, proceed with Shingrix vaccinations.  Patient now requested to proceed with vaccinations including RSV    3.  Hyponatremia  Likely exacerbated by recent GI toxicity with nausea vomiting and diarrhea.  Symptoms have now resolved with improvement in his appetite and intake  Reviewed 11/27-23 with sodium 137.    4. Joint pain.  Baseline knee pain prior to initiation of immunotherapy though he is now exacerbated with shoulder pain as well and requiring ambulation with a walker  Additional inflammatory labs including sed rate and C-reactive protein today to evaluate for inflammation secondary to immunotherapy  Continue Tylenol and occasional Norco for breakthrough pain  Hydrocortisone replacement therapy increased to 20 mg p.o. every morning and 10 mg p.o. every afternoon-patient is 14/23    PLAN:   Proceed today with cycle 13 Keytruda which he continues every 3 weeks for total of 18 cycles  Continue hydrocortisone to 20 mg p.o. every morning and 10 mg p.o. every afternoon  Continue prophylactic acyclovir 400 mg twice daily  Return in 3 weeks for follow-up with NP, continue Keytruda, cycle #14    Patient is on a high risk medication requiring close monitoring for toxicity.      Kayden Bishop MD  11/27/2023

## 2023-11-27 ENCOUNTER — INFUSION (OUTPATIENT)
Dept: ONCOLOGY | Facility: HOSPITAL | Age: 78
End: 2023-11-27
Payer: MEDICARE

## 2023-11-27 ENCOUNTER — OFFICE VISIT (OUTPATIENT)
Dept: ONCOLOGY | Facility: CLINIC | Age: 78
End: 2023-11-27
Payer: MEDICARE

## 2023-11-27 VITALS
RESPIRATION RATE: 18 BRPM | OXYGEN SATURATION: 95 % | HEIGHT: 72 IN | WEIGHT: 161.8 LBS | HEART RATE: 54 BPM | TEMPERATURE: 98.4 F | DIASTOLIC BLOOD PRESSURE: 76 MMHG | BODY MASS INDEX: 21.91 KG/M2 | SYSTOLIC BLOOD PRESSURE: 149 MMHG

## 2023-11-27 DIAGNOSIS — Z79.899 LONG-TERM USE OF HIGH-RISK MEDICATION: ICD-10-CM

## 2023-11-27 DIAGNOSIS — E27.40 ADRENAL INSUFFICIENCY: ICD-10-CM

## 2023-11-27 DIAGNOSIS — Z45.2 FITTING AND ADJUSTMENT OF VASCULAR CATHETER: ICD-10-CM

## 2023-11-27 DIAGNOSIS — C7A.8 NEUROENDOCRINE CARCINOMA OF LUNG: Primary | ICD-10-CM

## 2023-11-27 DIAGNOSIS — C7A.8 NEUROENDOCRINE CARCINOMA OF LUNG: ICD-10-CM

## 2023-11-27 LAB
ALBUMIN SERPL-MCNC: 3.9 G/DL (ref 3.5–5.2)
ALBUMIN/GLOB SERPL: 1.5 G/DL
ALP SERPL-CCNC: 54 U/L (ref 39–117)
ALT SERPL W P-5'-P-CCNC: 14 U/L (ref 1–41)
ANION GAP SERPL CALCULATED.3IONS-SCNC: 9.1 MMOL/L (ref 5–15)
AST SERPL-CCNC: 18 U/L (ref 1–40)
BASOPHILS # BLD AUTO: 0.04 10*3/MM3 (ref 0–0.2)
BASOPHILS NFR BLD AUTO: 0.7 % (ref 0–1.5)
BILIRUB SERPL-MCNC: 0.5 MG/DL (ref 0–1.2)
BUN SERPL-MCNC: 12 MG/DL (ref 8–23)
BUN/CREAT SERPL: 11.9 (ref 7–25)
CALCIUM SPEC-SCNC: 9.8 MG/DL (ref 8.6–10.5)
CHLORIDE SERPL-SCNC: 101 MMOL/L (ref 98–107)
CO2 SERPL-SCNC: 26.9 MMOL/L (ref 22–29)
CREAT SERPL-MCNC: 1.01 MG/DL (ref 0.7–1.3)
DEPRECATED RDW RBC AUTO: 51.5 FL (ref 37–54)
EGFRCR SERPLBLD CKD-EPI 2021: 77.1 ML/MIN/1.73
EOSINOPHIL # BLD AUTO: 0.19 10*3/MM3 (ref 0–0.4)
EOSINOPHIL NFR BLD AUTO: 3.3 % (ref 0.3–6.2)
ERYTHROCYTE [DISTWIDTH] IN BLOOD BY AUTOMATED COUNT: 14.1 % (ref 12.3–15.4)
GLOBULIN UR ELPH-MCNC: 2.6 GM/DL
GLUCOSE SERPL-MCNC: 98 MG/DL (ref 65–99)
HCT VFR BLD AUTO: 43.3 % (ref 37.5–51)
HGB BLD-MCNC: 13.8 G/DL (ref 13–17.7)
IMM GRANULOCYTES # BLD AUTO: 0.01 10*3/MM3 (ref 0–0.05)
IMM GRANULOCYTES NFR BLD AUTO: 0.2 % (ref 0–0.5)
LYMPHOCYTES # BLD AUTO: 1.68 10*3/MM3 (ref 0.7–3.1)
LYMPHOCYTES NFR BLD AUTO: 29.1 % (ref 19.6–45.3)
MCH RBC QN AUTO: 31.2 PG (ref 26.6–33)
MCHC RBC AUTO-ENTMCNC: 31.9 G/DL (ref 31.5–35.7)
MCV RBC AUTO: 97.7 FL (ref 79–97)
MONOCYTES # BLD AUTO: 0.7 10*3/MM3 (ref 0.1–0.9)
MONOCYTES NFR BLD AUTO: 12.1 % (ref 5–12)
NEUTROPHILS NFR BLD AUTO: 3.15 10*3/MM3 (ref 1.7–7)
NEUTROPHILS NFR BLD AUTO: 54.6 % (ref 42.7–76)
NRBC BLD AUTO-RTO: 0 /100 WBC (ref 0–0.2)
PLATELET # BLD AUTO: 176 10*3/MM3 (ref 140–450)
PMV BLD AUTO: 8.9 FL (ref 6–12)
POTASSIUM SERPL-SCNC: 4.1 MMOL/L (ref 3.5–5.2)
PROT SERPL-MCNC: 6.5 G/DL (ref 6–8.5)
RBC # BLD AUTO: 4.43 10*6/MM3 (ref 4.14–5.8)
SODIUM SERPL-SCNC: 137 MMOL/L (ref 136–145)
T4 FREE SERPL-MCNC: 1.14 NG/DL (ref 0.93–1.7)
TSH SERPL DL<=0.05 MIU/L-ACNC: 3.33 UIU/ML (ref 0.27–4.2)
WBC NRBC COR # BLD AUTO: 5.77 10*3/MM3 (ref 3.4–10.8)

## 2023-11-27 PROCEDURE — 99214 OFFICE O/P EST MOD 30 MIN: CPT | Performed by: INTERNAL MEDICINE

## 2023-11-27 PROCEDURE — 25010000002 PEMBROLIZUMAB 100 MG/4ML SOLUTION 4 ML VIAL: Performed by: INTERNAL MEDICINE

## 2023-11-27 PROCEDURE — 1126F AMNT PAIN NOTED NONE PRSNT: CPT | Performed by: INTERNAL MEDICINE

## 2023-11-27 PROCEDURE — 3077F SYST BP >= 140 MM HG: CPT | Performed by: INTERNAL MEDICINE

## 2023-11-27 PROCEDURE — 84443 ASSAY THYROID STIM HORMONE: CPT | Performed by: INTERNAL MEDICINE

## 2023-11-27 PROCEDURE — 96413 CHEMO IV INFUSION 1 HR: CPT

## 2023-11-27 PROCEDURE — 3078F DIAST BP <80 MM HG: CPT | Performed by: INTERNAL MEDICINE

## 2023-11-27 PROCEDURE — 80053 COMPREHEN METABOLIC PANEL: CPT

## 2023-11-27 PROCEDURE — 25010000002 HEPARIN LOCK FLUSH PER 10 UNITS: Performed by: INTERNAL MEDICINE

## 2023-11-27 PROCEDURE — 25810000003 SODIUM CHLORIDE 0.9 % SOLUTION: Performed by: INTERNAL MEDICINE

## 2023-11-27 PROCEDURE — 84439 ASSAY OF FREE THYROXINE: CPT | Performed by: INTERNAL MEDICINE

## 2023-11-27 PROCEDURE — 85025 COMPLETE CBC W/AUTO DIFF WBC: CPT

## 2023-11-27 RX ORDER — HEPARIN SODIUM (PORCINE) LOCK FLUSH IV SOLN 100 UNIT/ML 100 UNIT/ML
500 SOLUTION INTRAVENOUS AS NEEDED
Status: DISCONTINUED | OUTPATIENT
Start: 2023-11-27 | End: 2023-11-27 | Stop reason: HOSPADM

## 2023-11-27 RX ORDER — SODIUM CHLORIDE 9 MG/ML
250 INJECTION, SOLUTION INTRAVENOUS ONCE
Status: CANCELLED | OUTPATIENT
Start: 2023-11-27

## 2023-11-27 RX ORDER — BUDESONIDE, GLYCOPYRROLATE, AND FORMOTEROL FUMARATE 160; 9; 4.8 UG/1; UG/1; UG/1
2 AEROSOL, METERED RESPIRATORY (INHALATION)
COMMUNITY
Start: 2022-10-26

## 2023-11-27 RX ORDER — SODIUM CHLORIDE 0.9 % (FLUSH) 0.9 %
10 SYRINGE (ML) INJECTION AS NEEDED
OUTPATIENT
Start: 2023-11-27

## 2023-11-27 RX ORDER — HEPARIN SODIUM (PORCINE) LOCK FLUSH IV SOLN 100 UNIT/ML 100 UNIT/ML
500 SOLUTION INTRAVENOUS AS NEEDED
OUTPATIENT
Start: 2023-11-27

## 2023-11-27 RX ORDER — SODIUM CHLORIDE 9 MG/ML
250 INJECTION, SOLUTION INTRAVENOUS ONCE
Status: COMPLETED | OUTPATIENT
Start: 2023-11-27 | End: 2023-11-27

## 2023-11-27 RX ORDER — SODIUM CHLORIDE 0.9 % (FLUSH) 0.9 %
10 SYRINGE (ML) INJECTION AS NEEDED
Status: DISCONTINUED | OUTPATIENT
Start: 2023-11-27 | End: 2023-11-27 | Stop reason: HOSPADM

## 2023-11-27 RX ADMIN — SODIUM CHLORIDE 250 ML: 9 INJECTION, SOLUTION INTRAVENOUS at 10:54

## 2023-11-27 RX ADMIN — SODIUM CHLORIDE 200 MG: 9 INJECTION, SOLUTION INTRAVENOUS at 10:54

## 2023-11-27 RX ADMIN — Medication 500 UNITS: at 11:25

## 2023-11-27 RX ADMIN — Medication 10 ML: at 11:25

## 2023-11-28 ENCOUNTER — PATIENT OUTREACH (OUTPATIENT)
Dept: OTHER | Facility: HOSPITAL | Age: 78
End: 2023-11-28
Payer: MEDICARE

## 2023-11-28 NOTE — PROGRESS NOTES
Reviewed chart; seen 11/27/2023 for cycle 13 Keytruda which he continues every 3 weeks for total of 18 cycles     Called patient.  Left message that I was just touching base to see how he was doing with treatment. Wanted to know if he had any questions/concerns or resource/supportive care needs; requested cb    Call from patient. He is doing well with treatment, which he states he should complete in March.  The patient wears O2 at night, although denies pain or other symptoms.  His weight had dropped to 146 lbs, although states his weight has increased to 162lb.    The patient denies questions, concerns or ongoing resource needs.    I will call in a few months; encouraged him to call as needed.

## 2023-12-18 ENCOUNTER — INFUSION (OUTPATIENT)
Dept: ONCOLOGY | Facility: HOSPITAL | Age: 78
End: 2023-12-18
Payer: MEDICARE

## 2023-12-18 ENCOUNTER — OFFICE VISIT (OUTPATIENT)
Dept: ONCOLOGY | Facility: CLINIC | Age: 78
End: 2023-12-18
Payer: MEDICARE

## 2023-12-18 VITALS
WEIGHT: 164 LBS | DIASTOLIC BLOOD PRESSURE: 77 MMHG | SYSTOLIC BLOOD PRESSURE: 156 MMHG | BODY MASS INDEX: 22.21 KG/M2 | HEIGHT: 72 IN | OXYGEN SATURATION: 94 % | HEART RATE: 56 BPM | RESPIRATION RATE: 18 BRPM | TEMPERATURE: 97.9 F

## 2023-12-18 DIAGNOSIS — Z79.899 LONG-TERM USE OF HIGH-RISK MEDICATION: ICD-10-CM

## 2023-12-18 DIAGNOSIS — C7A.8 NEUROENDOCRINE CARCINOMA OF LUNG: Primary | ICD-10-CM

## 2023-12-18 DIAGNOSIS — C7A.8 NEUROENDOCRINE CARCINOMA OF LUNG: ICD-10-CM

## 2023-12-18 DIAGNOSIS — Z45.2 FITTING AND ADJUSTMENT OF VASCULAR CATHETER: ICD-10-CM

## 2023-12-18 LAB
ALBUMIN SERPL-MCNC: 3.9 G/DL (ref 3.5–5.2)
ALBUMIN/GLOB SERPL: 1.5 G/DL
ALP SERPL-CCNC: 55 U/L (ref 39–117)
ALT SERPL W P-5'-P-CCNC: 10 U/L (ref 1–41)
ANION GAP SERPL CALCULATED.3IONS-SCNC: 6.6 MMOL/L (ref 5–15)
AST SERPL-CCNC: 15 U/L (ref 1–40)
BASOPHILS # BLD AUTO: 0.04 10*3/MM3 (ref 0–0.2)
BASOPHILS NFR BLD AUTO: 0.7 % (ref 0–1.5)
BILIRUB SERPL-MCNC: 0.4 MG/DL (ref 0–1.2)
BUN SERPL-MCNC: 10 MG/DL (ref 8–23)
BUN/CREAT SERPL: 10.2 (ref 7–25)
CALCIUM SPEC-SCNC: 9.6 MG/DL (ref 8.6–10.5)
CHLORIDE SERPL-SCNC: 103 MMOL/L (ref 98–107)
CO2 SERPL-SCNC: 28.4 MMOL/L (ref 22–29)
CREAT SERPL-MCNC: 0.98 MG/DL (ref 0.76–1.27)
DEPRECATED RDW RBC AUTO: 49.6 FL (ref 37–54)
EGFRCR SERPLBLD CKD-EPI 2021: 79.9 ML/MIN/1.73
EOSINOPHIL # BLD AUTO: 0.15 10*3/MM3 (ref 0–0.4)
EOSINOPHIL NFR BLD AUTO: 2.5 % (ref 0.3–6.2)
ERYTHROCYTE [DISTWIDTH] IN BLOOD BY AUTOMATED COUNT: 13.9 % (ref 12.3–15.4)
GLOBULIN UR ELPH-MCNC: 2.6 GM/DL
GLUCOSE SERPL-MCNC: 99 MG/DL (ref 65–99)
HCT VFR BLD AUTO: 42 % (ref 37.5–51)
HGB BLD-MCNC: 13.7 G/DL (ref 13–17.7)
IMM GRANULOCYTES # BLD AUTO: 0.01 10*3/MM3 (ref 0–0.05)
IMM GRANULOCYTES NFR BLD AUTO: 0.2 % (ref 0–0.5)
LYMPHOCYTES # BLD AUTO: 1.65 10*3/MM3 (ref 0.7–3.1)
LYMPHOCYTES NFR BLD AUTO: 27.1 % (ref 19.6–45.3)
MCH RBC QN AUTO: 31.5 PG (ref 26.6–33)
MCHC RBC AUTO-ENTMCNC: 32.6 G/DL (ref 31.5–35.7)
MCV RBC AUTO: 96.6 FL (ref 79–97)
MONOCYTES # BLD AUTO: 0.68 10*3/MM3 (ref 0.1–0.9)
MONOCYTES NFR BLD AUTO: 11.2 % (ref 5–12)
NEUTROPHILS NFR BLD AUTO: 3.56 10*3/MM3 (ref 1.7–7)
NEUTROPHILS NFR BLD AUTO: 58.3 % (ref 42.7–76)
NRBC BLD AUTO-RTO: 0 /100 WBC (ref 0–0.2)
PLATELET # BLD AUTO: 150 10*3/MM3 (ref 140–450)
PMV BLD AUTO: 8.4 FL (ref 6–12)
POTASSIUM SERPL-SCNC: 4 MMOL/L (ref 3.5–5.2)
PROT SERPL-MCNC: 6.5 G/DL (ref 6–8.5)
RBC # BLD AUTO: 4.35 10*6/MM3 (ref 4.14–5.8)
SODIUM SERPL-SCNC: 138 MMOL/L (ref 136–145)
WBC NRBC COR # BLD AUTO: 6.09 10*3/MM3 (ref 3.4–10.8)

## 2023-12-18 PROCEDURE — 96413 CHEMO IV INFUSION 1 HR: CPT

## 2023-12-18 PROCEDURE — 85025 COMPLETE CBC W/AUTO DIFF WBC: CPT

## 2023-12-18 PROCEDURE — 96417 CHEMO IV INFUS EACH ADDL SEQ: CPT

## 2023-12-18 PROCEDURE — 25810000003 SODIUM CHLORIDE 0.9 % SOLUTION: Performed by: NURSE PRACTITIONER

## 2023-12-18 PROCEDURE — 25010000002 PEMBROLIZUMAB 100 MG/4ML SOLUTION 4 ML VIAL: Performed by: NURSE PRACTITIONER

## 2023-12-18 PROCEDURE — 80053 COMPREHEN METABOLIC PANEL: CPT

## 2023-12-18 PROCEDURE — 25010000002 HEPARIN LOCK FLUSH PER 10 UNITS: Performed by: INTERNAL MEDICINE

## 2023-12-18 RX ORDER — SODIUM CHLORIDE 0.9 % (FLUSH) 0.9 %
10 SYRINGE (ML) INJECTION AS NEEDED
Status: DISCONTINUED | OUTPATIENT
Start: 2023-12-18 | End: 2023-12-18 | Stop reason: HOSPADM

## 2023-12-18 RX ORDER — LOSARTAN POTASSIUM 25 MG/1
25 TABLET ORAL DAILY
COMMUNITY
Start: 2023-12-12 | End: 2024-01-11

## 2023-12-18 RX ORDER — HEPARIN SODIUM (PORCINE) LOCK FLUSH IV SOLN 100 UNIT/ML 100 UNIT/ML
500 SOLUTION INTRAVENOUS AS NEEDED
Status: DISCONTINUED | OUTPATIENT
Start: 2023-12-18 | End: 2023-12-18 | Stop reason: HOSPADM

## 2023-12-18 RX ORDER — SODIUM CHLORIDE 9 MG/ML
250 INJECTION, SOLUTION INTRAVENOUS ONCE
Status: CANCELLED | OUTPATIENT
Start: 2023-12-18

## 2023-12-18 RX ORDER — SODIUM CHLORIDE 9 MG/ML
250 INJECTION, SOLUTION INTRAVENOUS ONCE
Status: COMPLETED | OUTPATIENT
Start: 2023-12-18 | End: 2023-12-18

## 2023-12-18 RX ADMIN — SODIUM CHLORIDE 250 ML: 9 INJECTION, SOLUTION INTRAVENOUS at 11:03

## 2023-12-18 RX ADMIN — Medication 10 ML: at 11:35

## 2023-12-18 RX ADMIN — Medication 500 UNITS: at 11:35

## 2023-12-18 RX ADMIN — SODIUM CHLORIDE 200 MG: 9 INJECTION, SOLUTION INTRAVENOUS at 11:03

## 2023-12-18 NOTE — PROGRESS NOTES
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  Notably there is invasive carcinoma present at the margin, 2 positive lymph nodes adjacent to the bronchovesicular margin which appears to have been transected difficult to enumerate with extranodal extension present.  *Patient status post chemo RT-11/28/2022, radiation therapy completion date 1/11/2023  *Immunotherapy-Keytruda to be initiated 3/20/2023  *Follow-up repeat scans 9/18/2023 without evidence of recurrent disease      History of Present Illness      The patient is a 78 y.o. male with the above-mentioned street returns today for lab review and evaluation prior to his 14th dose of Keytruda.  He continues to tolerate it quite well.  He has had some issues with a rash on his lower extremities however that has improved.  He denies issues with increased shortness of breath.  No problems with diarrhea.  No new complaints of pain.  No other new problems or concerns today.      Patient was started recently on a blood pressure medication, losartan 25 mg, by his primary care provider, he is only been taking it for about 2 to 3 days.                                  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  He had been assessed particularly in the fall 2021 when he presented with nausea and vomiting and abdominal discomfort leading to assessments that revealed sigmoid diverticulitis with contained rupture and partial small bowel obstruction.  Records from mid September 21  indicating that he was admitted with partial small bowel obstruction and treated medically with very slow recovery.  He has history of COPD with CT demonstrating a pulmonary nodule in the right lung base at 7 mm with follow-up planned.  He was seen by general surgery in later September with repeat scans planned and repeat CT abdomen 10/7/2021 did demonstrate interval improvement of sigmoid diverticulitis.      Record indicates an assessment in late June 2022 at different general surgeon for left inguinal hernia, history of heavy tobacco use with COPD and recommendation for surgical repair of his hernia      The patient underwent a CT scan of the chest 5/7/2022 demonstrating diffuse emphysematous changes, ill-defined density measuring 3 mm in the superior segment of the right lower lobe, multiple ill-defined 3 to 4 mm nodular density thought to be inflammatory involving the right lower lobe and a new spiculated nodule involving the left lower lobe measuring 16 x 14 mm as well as left hilar adenopathy.       Follow-up PET/CT 7/13/2022 reveal a hypermetabolic spiculated lesion medial aspect the left lower lobe adjacent to descending thoracic aorta measuring 1.5 x 1.6 SUV 9.3 with an enlarged hypermetabolic left hilar lymph node measured 1.5 x 1.3 with SUV of 12.1, additional nodules in the lateral aspect right lower lobe and micronodule in the posterior/medial right lower lobe and also additional right lower lobe micronodule.  There is an infrarenal abdominal aortic aneurysm measuring 3.4 cm with a short segment of chronic dissection present.    These clinical findings would be consistent with a possible T1b N1 M0-stage IIb lung cancer.      Recognizing a diagnosis has not been made his case was discussed in thoracic conference 7/20/2022.  Recommendations include proceeding to pulmonary assessment with EBUS and the patient undergoing a general assessment per his clinical status-older adult assessment as well as  assessment by thoracic surgery.  These issues are discussed with the patient and his wife in detail when they are seen 7/28/2022.    The patient proceeded to undergo his hernia surgery 8/2/2022 by Dr. Dotson.  Additionally the patient was unable to undergo assessment by pulmonary until October and, thereafter, was scheduled to see Dr. Joe 8/18/2022 undergoing older adult functional assessment with a JAGUAR score of 11 indicating a risk of functional decline related to cancer therapy.    The patient is seen with his significant other 8/15/2022 and doing very well with recent hernia surgery.  His performance status is reasonably good at present and we have learned now that he would not be able to see pulmonary medicine until October, thoracic surgery is scheduled this week with Dr. Joe.  Perhaps he could be a surgical candidate.  Particularly we know by van Mata that T p53 splice site mutation is present and would certainly be consistent as well the most common mutations found in lung cancers particularly squamous cancers.  We have discussed obtaining pulmonary functions at this point, thoracic surgery assessment this week and also restarting Chantix as possible for the patient.    There was difficulty obtaining Chantix and while this was being assessed the patient was reviewed 8/18 by thoracic surgery.  He was felt to have a T1b N1 M0 stage IIb carcinoma left lower lobe along and not a candidate for biopsy.  PFTs were borderline, functional assessment was reasonably good and the patient was felt to be a candidate for robot-assisted biopsy of his nodes and if malignant proceed to left lower lobe lobectomy.  He was reviewed 9/8 and offered 21 mg nicotine transdermal patching.    He is next seen 9/10/2022.  He is seen with his wife and both are prepared for surgery 9/23/2022.  We discussed that additional therapy may be considered once we have more information about the type and stage.  We would hope to see him  postoperatively.    The patient was admitted 9//2022 undergoing 9/23/2022  a bronchoscopy with left video-assisted thoracoscopy with robot-assisted total decortication of the left lung, left lower lobectomy, mediastinal lymphadenectomy and intercostal nerve block with Dr. Nicola Joe.  Fortunately he did fairly well postoperatively though did develop atrial fibrillation with RVR treated with amiodarone converting back to sinus rhythm, treated with IV metoprolol subsequent chronic anticoagulation.  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  Notably there is invasive carcinoma present at the margin.  Is a, and only 2 positive lymph nodes adjacent to the bronchovesicular margin which appears to have been transected difficult to enumerate with extranodal extension present.       The patient is seen 10/27/2022.  He is recovering well from surgery, albeit slowly, and we have discussed his findings that are concerning as to residual disease with a positive margin described as above.  There is also the significance potentially of PD-L1 expression which has been described as producing a poor survival.     Nivolumab has been used as second line therapy to positive effect in the disease and this begs the question as to whether adjuvant therapy plus immunotherapy could be utilized though the patient would be difficult to treat with platinum based chemotherapy as well.       The patient is next assessed 11/7/2022 for significant assessments by supportive oncology at [facility] as well as with plans to see Dr. Marrero 11/9/2022.  Unfortunately he has been very slow to gradually recover from the effects of surgery with reduced appetite and only fair performance status.     At present it would be difficult to give him cisplatin based chemotherapy and we discussed whether we might be able to use Tecentriq  (atezolizumab) as his primary adjuvant therapy.  We have contacted pathology about completing Caris testing and a PD-L1 analysis that has not yet completed.         The patient is seen, however, 11/16/2022 and his genomic analysis has returned as being significant for broad sensitivity to immunotherapy.  This would argue for the administration of weekly Carboplatin/Taxol as the patient proceeds to radiation therapy and upon completion, then using Tecentriq as further adjuvant therapy over a year.  This is discussed with the patient and his  in detail as well as discussed with Dr. Marrero of radiation therapy.  We have also discussed the patient require port placement.    The patient proceeded to treatment taking weekly carboplatin Taxol with concurrent radiation therapy last seen 12/12/2022 with third weekly treatment.    He is next seen 12/19/2022 with H&H 11.9 and 38.5, white count 3460 and platelet count of 168,000.  He is feeling well without any significant complications of therapy except for right calf dermatitis that is been developing in the last several days.    The patient continued therapy taking CarboTaxol weekly including 12/28 and 1/4/2023.    His is next seen 1/11/2023 with completion date for radiation therapy 1/11/2023.  Repeat CBC now includes H&H of 11.0 and 33.9, white count 2090, platelet count 128,000, ANC is 800.  He, fortunately, is tolerating treatment well and we have again discussed with him adjuvant immunotherapy would be useful including Tecentriq versus pembrolizumab-keynote 091 study particularly in tumor programmed cell death PD-L1 greater than 50%.    The patient will not be given additional chemotherapy 1/11/2023 we will plan to reassess by follow-up scans in the next 6 weeks CT chest and pelvis making a decision thereafter about adjuvant immunotherapy.    Patient proceeded to undergo follow-up scans 2/24/2023 showing no evidence of recurrent disease including his findings of  left lower lobectomy, stable 11 m nodule opacity in the base of the right lower lobe in the right lung apex, small left pleural effusion mild to moderate stenosis of the proximal subclavian artery.    We have discussed that considering studies like keynote  091 that the patient's high risk disease could be addressed with additional immunotherapy including the use of Atezolizumab and/or Keytruda.  Considering his findings overall Keytruda every 3 weeks x18 cycles is to be pursued.    The patient was seen 3/20/2023, discussed initiation of Keytruda.  He is agreeable to proceed.    The patient notified the office shortly after treatment that he had pain under his right rib cage and was placed back onto his Neurontin to try to manage this pain.  Approximately week later he still had the pain and also had another rash we switched him at this point to Famvir to try to completely clear this problem.    He is next seen back 4/3/2023.  Fortunately his recent apparent shingles activation has nearly abated and he is feeling reasonably well.  In review of the literature there is no significant difference in activation with immunotherapy though this patient may require suppression.  I have asked him to complete his Famvir at this point.    Patient assessed 4/10/2023 with resolution of his shingles, subsequently placed on maintenance acyclovir, consideration of shingles vaccination post 3 months of Keytruda therapy is stable disease documented.    The patient underwent a CT chest abdomen pelvis 6/8/2023 that demonstrates postsurgical changes of the left hemithorax, stable pulmonary nodules, stable infrarenal abdominal aortic aneurysm.    He is next seen 6/12/2023.  We have discussed continue with his Keytruda with his tolerance being excellent.  He will also reduce his current metoprolol to 12.5 mg p.o. daily.    He continued Keytruda and was seen in the office 7/24/2023 in anticipation of his 7th dose of Keytruda.  He was  tolerating treatment without substantial side effects but did have sudden onset nausea and vomiting lasting 48 hours.  He then began to have joint pain bilateral knees and shoulders up to an 8 of 10 for severity.       He was demonstrating worsening hyponatremia at this point with a normal potassium.  Unfortunately his mental status began to worsen with increasing sleepiness, mild diarrhea.  7/28/2023 studies included an INR 3.34, sodium now 125 and he was admitted for his weakness and hyponatremia.    The patient was seen by several services including nephrology and oncology to the IV fluids.  He had been tested with ACT stimulation test and was diagnosed with renal sufficiency started on oral hydrocortisone.  7/29/2023 cortisol was 0.62, subsequent ACTH test was reduced at 5.7.  The patient studies 7/30 included BUN/creatinine of 9 and 0.97, sodium 130, chloride 95, calcium 5.2.      The patient is next seen 8/14/2023.  He remains somewhat weak and with joint discomfort.  We have discussed his findings that would be most consistent with central adrenal insufficiency and that dosing for his hydrocortisone-currently 10 mg p.o. every morning and 5 mg p.o. every afternoon is likely to be too low.  In determining whether we should proceed with treatment replacement therapy could allow us to try to complete his therapy which, on balance, would be the preferred approach.    The patient is seen 9/21/2023 improved per performance status with cortisone replacement therapy, subsequent CT of chest on pelvis 9/18/2023 without evidence of recurrent disease, pembrolizumab continued with plans to reassess likely in December 2023 or at the completion of therapy.      Past Medical History:   Diagnosis Date    Arthritis     COPD (chronic obstructive pulmonary disease)     O2 3L AT HS    Diabetes mellitus     DIET CONTROL    Diverticulitis     DVT, lower extremity     RLE    Elevated cholesterol     H/O Cervical pain     History of  colitis     Hyperlipidemia     Hypertension     Left shoulder pain     Low back pain     Lung cancer 2022    LEFT LUNG    On anticoagulant therapy     Peripheral arterial disease     Right leg claudication     Skin cancer     HX        Past Surgical History:   Procedure Laterality Date    BALLOON ANGIOPLASTY, ARTERY Right 2015    IR Percutaneious IVC filter placement    INGUINAL HERNIA REPAIR Left     KNEE ARTHROSCOPY Left     Torn meniscus    LOBECTOMY Left 9/23/2022    Procedure: BRONCHOSCOPY, LEFT VIDEO ASSISTED THORACOSCOPY, ROBOT ASSISTED TOTAL DECORTICATION  LEFT LUNG, LEFT LOWER LOBE LOBECTOMY, MEDIASTINAL LYMPHECTOMY, INTERCOSTAL NERVE BLOCK;  Surgeon: Nicola Joe III, MD;  Location: [facility] OR;  Service: Robotics - DaVinci;  Laterality: Left;    VENOUS ACCESS DEVICE (PORT) INSERTION Right 11/21/2022    Procedure: INSERTION VENOUS ACCESS DEVICE;  Surgeon: Nicola Joe III, MD;  Location: [facility] OR;  Service: Thoracic;  Laterality: Right;        Current Outpatient Medications on File Prior to Visit   Medication Sig Dispense Refill    arformoterol (BROVANA) 15 MCG/2ML nebulizer solution Take 2 mL by nebulization 2 (Two) Times a Day. Indications: Chronic Obstructive Lung Disease      Budeson-Glycopyrrol-Formoterol (Breztri Aerosphere) 160-9-4.8 MCG/ACT aerosol inhaler Inhale 2 puffs.      cetirizine (zyrTEC) 10 MG tablet Take 1 tablet by mouth Daily. Indications: Hayfever      Cholecalciferol 50 MCG (2000 UT) capsule Take 1 capsule by mouth Daily. Indications: Vitamin D Deficiency      clotrimazole (LOTRIMIN) 1 % cream Apply  topically to the appropriate area as directed.      fluticasone (FLONASE) 50 MCG/ACT nasal spray 1 spray into the nostril(s) as directed by provider Daily. Indications: Allergic Rhinitis      hydrocortisone (CORTEF) 10 MG tablet Take 2 tablets in the morning and 1 tablet in the afternoon 90 tablet 3    isosorbide mononitrate (IMDUR) 60 MG 24 hr tablet Take 1 tablet by  mouth Every Evening. Indications: hypertension      losartan (COZAAR) 25 MG tablet Take 1 tablet by mouth Daily.      ondansetron (Zofran) 8 MG tablet Take 1 tablet by mouth Every 8 (Eight) Hours As Needed for Nausea or Vomiting. 30 tablet 1    sildenafil (REVATIO) 20 MG tablet Take 1 tablet by mouth.      simvastatin (ZOCOR) 20 MG tablet Take 1 tablet by mouth Every Night. Indications: High Amount of Fats in the Blood      tamsulosin (FLOMAX) 0.4 MG capsule 24 hr capsule Take 1 capsule by mouth Daily. 30 capsule 5    triamcinolone (KENALOG) 0.5 % cream Apply  topically to the appropriate area as directed.      warfarin (COUMADIN) 5 MG tablet Take 1 tablet by mouth Daily. Take 10mg on 9/29/22 and then resume regular home schedule. (Patient taking differently: Take 1 tablet by mouth Daily. 5mg daily with additional 5mg on Monday and Friday)       No current facility-administered medications on file prior to visit.        ALLERGIES:    Allergies   Allergen Reactions    Benadryl [Diphenhydramine] Other (See Comments)     Extreme restlessness and insomnia        Social History     Socioeconomic History    Marital status:     Years of education: 1 year college   Tobacco Use    Smoking status: Every Day     Packs/day: 1.00     Years: 59.00     Additional pack years: 0.00     Total pack years: 59.00     Types: Cigarettes     Start date: 1963    Smokeless tobacco: Never    Tobacco comments:     9/8/22: Down to Less than 1 PPD   Vaping Use    Vaping Use: Never used   Substance and Sexual Activity    Alcohol use: Not Currently    Drug use: Never    Sexual activity: Defer        Family History   Problem Relation Age of Onset    No Known Problems Mother     Cancer Father     Lung cancer Father     Diabetes Brother     Other Daughter         S/P stent, age 42    No Known Problems Son     Malig Hyperthermia Neg Hx         Review of Systems   Skin:         Erythema left lateral lower extremity      ROS as per  "HPI    Objective     Vitals:    12/18/23 1015   BP: 156/77   Pulse: 56   Resp: 18   Temp: 97.9 °F (36.6 °C)   TempSrc: Temporal   SpO2: 94%   Weight: 74.4 kg (164 lb)   Height: 182.9 cm (72.01\")   PainSc: 0-No pain               12/18/2023     9:56 AM   Current Status   ECOG score 0     Physical Exam  Vitals reviewed.   Constitutional:       General: He is not in acute distress.     Appearance: Normal appearance. He is well-developed.   HENT:      Head: Normocephalic and atraumatic.   Eyes:      Pupils: Pupils are equal, round, and reactive to light.   Cardiovascular:      Rate and Rhythm: Normal rate and regular rhythm.      Heart sounds: Normal heart sounds. No murmur heard.  Pulmonary:      Effort: Pulmonary effort is normal. No respiratory distress.      Breath sounds: Normal breath sounds. No wheezing, rhonchi or rales.   Abdominal:      General: Bowel sounds are normal. There is no distension.      Palpations: Abdomen is soft.   Musculoskeletal:         General: Normal range of motion.      Cervical back: Normal range of motion.   Skin:     General: Skin is warm and dry.      Findings: Erythema (mild on lower extremities) present. No rash.   Neurological:      Mental Status: He is alert and oriented to person, place, and time.         I have reexamined the patient and the results are consistent with the previously documented exam. GARCÍA Moreau      RECENT LABS:  Results from last 7 days   Lab Units 12/18/23  0954   WBC 10*3/mm3 6.09   NEUTROS ABS 10*3/mm3 3.56   HEMOGLOBIN g/dL 13.7   HEMATOCRIT % 42.0   PLATELETS 10*3/mm3 150         Results from last 7 days   Lab Units 12/18/23  0954   SODIUM mmol/L 138   POTASSIUM mmol/L 4.0   CHLORIDE mmol/L 103   CO2 mmol/L 28.4   BUN mg/dL 10   CREATININE mg/dL 0.98   CALCIUM mg/dL 9.6   ALBUMIN g/dL 3.9   BILIRUBIN mg/dL 0.4   ALK PHOS U/L 55   ALT (SGPT) U/L 10   AST (SGOT) U/L 15   GLUCOSE mg/dL 99                   Assessment & Plan     78 y.o. male  " with medical issues including type 2 diabetes-uncertain control, COPD, hypertension, diastolic heart failure, chronic disease, peripheral vascular disease (follow-up with vascular surgery today), previous DVT on anticoagulation (home monitoring), previous IVC filter placement?,  Status post September 2021 partial small bowel obstruction secondary to sigmoid diverticulitis treated medically.     1.   T1b N1 M0-stage IIb lung cancer.  right lung base nodule noted on scan at that point and in follow-up with primary care had developed a left inguinal hernia with repair planned through a different general surgeon then seen with his initial sigmoid diverticulitis.  PET scan 7/13/2022 demonstrates a hypermetabolic spiculated lesion medial aspect of the left lobe adjacent to descending thoracic aorta measuring1.5 x 1.6 SUV 9.3 with an enlarged hypermetabolic left hilar lymph node measured 1.5 x 1.3 with SUV of 12.1, additional nodules in the lateral aspect right lower lobe and micronodule in the posterior/medial right lower lobe and also additional right lower lobe micronodule.  There is an infrarenal abdominal aortic aneurysm measuring 3.4 cm with a short segment of chronic dissection present.  Clinical findings would be consistent with a possible T1b N1 M0-stage IIb lung cancer.  discussed in thoracic conference 7/20/2022.  Recommendations to proceed with pulmonary assessment with EBUS.  The patient proceeded to undergo his hernia surgery 8/2/2022 by Dr. Dotson.   Guardant 360 that T p53 splice site mutation is present and would certainly be consistent as well the most common mutations found in lung cancers particularly squamous cancers.  The patient was admitted 9//2022 undergoing 9/23/2022  a bronchoscopy with left video-assisted thoracoscopy with robot-assisted total decortication of the left lung, left lower lobectomy, mediastinal lymphadenectomy and intercostal nerve block with Dr. Nicola Joe.  Fortunately  he did fairly well postoperatively though did develop atrial fibrillation with RVR treated with amiodarone converting back to sinus rhythm, treated with IV metoprolol subsequent chronic anticoagulation.  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  Notably there is invasive carcinoma present at the margin. only 2 positive lymph nodes adjacent to the bronchovesicular margin which appears to have been transected difficult to enumerate with extranodal extension present.  Options could well be Carboplatin and Taxol considering the patient's comorbidity and worry of using cisplatin-based chemotherapy in this patient followed by atezolizumab with pathology having been notified to assess PD-L1 expression on the tumor as well also await review by Caris molecular profiling.  Finally radiation therapy consultation be requested.   Caris analysis indicates benefit from immunotherapy at relatively high levels.  This would allow radiation therapy given with Carboplatin Taxol weekly (better tolerated) and then subsequent atezolizumab adjuvantly.  Weekly carboplatin Taxol initiated 11/28/2022 given concurrently with radiation therapy  12/5/2022 here for cycle 2 weekly carboplatin/Taxol, overall tolerating treatment quite well so far.  12/12/2022: Proceed with cycle #3 weekly carboplatin/Taxol with concurrent radiation.  Continues to tolerate treatment well.  He is next seen 12/19/2022 with H&H 11.9 and 38.5, white count 3460 and platelet count of 168,000.  He is feeling well without any significant complications of therapy except for right calf dermatitis that is been developing in the last several days.  12/28/2022 here for week 5 carbo/Taxol.  Overall tolerating well.  Today WBC 2.45, ANC 1.22, hemoglobin 10.7, platelet count 117,000.  I have reviewed with Dr. Bishop, and we will go ahead and continue  with treatment.  1/4/2023: Received with cycle 6 carbo/taxol + XRT.  Continues to tolerate treatment well.  WBC improved to 2.69 with ANC 1.41.  Next 1/11/2023 with completion date 1/11/2023.  Repeat CBC now includes H&H of 11.0 and 33.9, white count 2090, platelet count 128,000, ANC is 800.  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  We have discussed that considering studies like keynote  091 that the patient's high risk disease could be addressed with additional immunotherapy including the use of Atezolizumab and/or Keytruda.  Considering his findings overall Keytruda every 3 weeks x18 cycles is to be pursued.  The patient began Keytruda as planned with his first treatment 3/20/2023.  The patient notified the office shortly after treatment that he had pain under his right rib cage and was placed back onto his Neurontin to try to manage this pain.  Approximately week later he still had the pain and also had another rash we switched him at this point to Famvir to try to completely clear this problem.  4/3/2023.  Fortunately his recent apparent shingles activation has nearly abated and he is feeling reasonably well.  In review of the literature there is no significant difference in activation with immunotherapy though this patient may require suppression.  He is asked to complete his Famvir.  4/10/2023 cycle 2 Keytruda, overall tolerating well.   Patient seen 5/1/2023, third cycle of Keytruda, status post fourth cycle of  Keytruda, 3 months of therapy, subsequent scans will need to be scheduled.  5/22/2023: Proceed with cycle 4 Keytruda.    Follow-up scans-CT of chest, abdomen and pelvis 6/8/2023 with postsurgical changes only.  7/3/2023 cycle 6 Keytruda  Proceed today, 7/24/2023 with cycle 7 Keytruda which is continued every 3 weeks   He was tolerating treatment without substantial side effects but did have sudden onset nausea and vomiting lasting 48 hours.  He then began to have joint pain bilateral knees and shoulders up to an 8 of 10 for severity.     He was demonstrating worsening hyponatremia at this point with a normal potassium.  Unfortunately his mental status began to worsen with increasing sleepiness, mild diarrhea.  7/28/2023 studies included an INR 3.34, sodium now 125 and he was admitted for his weakness and hyponatremia.  The patient was seen by several services including nephrology and oncology to the IV fluids.  He had been tested with ACT stimulation test and was diagnosed with renal sufficiency started on oral hydrocortisone.  7/29/2023 cortisol was 0.62, subsequent ACTH test was reduced at 5.7.  The patient studies 7/30 included BUN/creatinine of 9 and 0.97, sodium 130, chloride 95, calcium 5.2.  The patient is next seen 8/14/2023.  He remains somewhat weak and with joint discomfort.  We have discussed his findings that would be most consistent with central adrenal insufficiency and that dosing for his hydrocortisone-currently 10 mg p.o. every morning and 5 mg p.o. every afternoon is likely to be too low.  In determining whether we should proceed with treatment replacement therapy could allow us to try to complete his therapy which, on balance, would be the preferred approach.  Hydrocortisone moved to 20 mg p.o. every morning and 10 mg p.o. every afternoon.  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  Replacement therapy  ongoing.  9/5/2023 reviewed back today for C9 Keytruda. Patient with improved performance status following increase of hydrocortisone per above.  Improved appetite and decreased joint pain.  Proceed with treatment today with repeat imaging planned thereafter.  Repeat CT chest, abdomen, pelvis 9/18/2023 without evidence of progressive disease.  Plan to proceed with cycle #10 Keytruda.  Patient seen 10/16/2023 for cycle #11, 11/6 for cycle #12 and patient seen 11/27 for cycle #13 again out of 18 total.  Patient seen 12/18/2023 here for cycle 14 Keytruda.  Patient is doing well.  Discussed with Dr. Bishop, and plans to hold off on follow-up scans until the completion of Keytruda (, planning a total of 18 cycles).      2.  Herpes zoster: Patient was treated with Famvir.  Rash has resolved, no issues with persistent herpetic neuralgia.  He will be placed on suppression with acyclovir 400 mg twice daily.  We discussed he will hold off on vaccination for now, given recent infection.  Patient assessed 5/1/2023, continue Keytruda, status post fifth cycle of Keytruda or 3 months of therapy he would undergo repeat scans and, if stable or improved, proceed with Shingrix vaccinations.  Patient now requested to proceed with vaccinations including RSV    3.  Hyponatremia  Likely exacerbated by recent GI toxicity with nausea vomiting and diarrhea.  Symptoms have now resolved with improvement in his appetite and intake  Reviewed 11/27-23 with sodium 137.  12/18/2023 sodium remains normal at 138.    4. Joint pain.  Baseline knee pain prior to initiation of immunotherapy though he is now exacerbated with shoulder pain as well and requiring ambulation with a walker  Additional inflammatory labs including sed rate and C-reactive protein today to evaluate for inflammation secondary to immunotherapy  Continue Tylenol and occasional Norco for breakthrough pain  Hydrocortisone replacement therapy increased to 20 mg p.o. every morning and 10  mg p.o. every afternoon-patient is 14/23    PLAN:   Proceed with cycle 14 Keytruda today, continuing every 3 weeks with plans for total of 18 cycles.  Continue hydrocortisone 20 mg in the morning, 10 mg in the afternoon.  Continue prophylactic acyclovir for 100 mg twice daily.  No plans for repeat scans until the completion of Keytruda unless patient has problems arise.  Return for follow-up in 3 weeks with Dr. Bishop with repeat labs reassessment due for cycle 15 Keytruda.  Call/ return sooner should the patient develop any new concerns or problems.    Patient is on a high risk medication requiring close monitoring for toxicity.        Rama Mario, APRN  12/18/2023

## 2024-01-07 NOTE — PROGRESS NOTES
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  Notably there is invasive carcinoma present at the margin, 2 positive lymph nodes adjacent to the bronchovesicular margin which appears to have been transected difficult to enumerate with extranodal extension present.  *Patient status post chemo RT-11/28/2022, radiation therapy completion date 1/11/2023  *Immunotherapy-Keytruda to be initiated 3/20/2023  *Follow-up repeat scans 9/18/2023 without evidence of recurrent disease      History of Present Illness      The patient is a 78 y.o. male with the above-mentioned street returns today for lab review and evaluation prior to his 15 dose of Keytruda.  He continues to tolerate it quite well.  He has had some issues with a rash on his lower extremities however that has improved.  He denies issues with increased shortness of breath.  No problems with diarrhea.  No new complaints of pain.  No other new problems or concerns today.      Patient was started recently on a blood pressure medication, losartan 25 mg, by his primary care provider, he is only been taking it for about 2 to 3 days.      This is again discussed with patient when seen 1/8/2024 with both the patient and his partner indicating that he is doing reasonably well.  No additional symptoms have developed on his current therapy and his skin rash involving his leg and left arm have responded to topical therapies.  He is willing to continue treatment.                                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  He had been assessed particularly in the fall 2021 when he presented with nausea and vomiting and abdominal discomfort leading to assessments that revealed sigmoid diverticulitis with contained rupture and partial small bowel obstruction.  Records from mid September 21 indicating that he was admitted with partial small bowel obstruction and treated medically with very slow recovery.  He has history of COPD with CT demonstrating a pulmonary nodule in the right lung base at 7 mm with follow-up planned.  He was seen by general surgery in later September with repeat scans planned and repeat CT abdomen 10/7/2021 did demonstrate interval improvement of sigmoid diverticulitis.      Record indicates an assessment in late June 2022 at different general surgeon for left inguinal hernia, history of heavy tobacco use with COPD and recommendation for surgical repair of his hernia      The patient underwent a CT scan of the chest 5/7/2022 demonstrating diffuse emphysematous changes, ill-defined density measuring 3 mm in the superior segment of the right lower lobe, multiple ill-defined 3 to 4 mm nodular density thought to be inflammatory involving the right lower lobe and a new spiculated nodule involving the left lower lobe measuring 16 x 14 mm as well as left hilar adenopathy.       Follow-up PET/CT 7/13/2022 reveal a hypermetabolic spiculated lesion medial aspect the left lower lobe adjacent to descending thoracic aorta measuring 1.5 x 1.6 SUV 9.3 with an enlarged hypermetabolic left hilar lymph node measured 1.5 x 1.3 with SUV of 12.1, additional nodules in the lateral aspect right lower lobe and micronodule in the posterior/medial right lower lobe and also additional right lower lobe micronodule.  There is an infrarenal abdominal aortic aneurysm measuring 3.4 cm with a short segment of chronic dissection present.    These clinical findings would be consistent with a  possible T1b N1 M0-stage IIb lung cancer.      Recognizing a diagnosis has not been made his case was discussed in thoracic conference 7/20/2022.  Recommendations include proceeding to pulmonary assessment with EBUS and the patient undergoing a general assessment per his clinical status-older adult assessment as well as assessment by thoracic surgery.  These issues are discussed with the patient and his wife in detail when they are seen 7/28/2022.    The patient proceeded to undergo his hernia surgery 8/2/2022 by Dr. Dotosn.  Additionally the patient was unable to undergo assessment by pulmonary until October and, thereafter, was scheduled to see Dr. Joe 8/18/2022 undergoing older adult functional assessment with a JAGUAR score of 11 indicating a risk of functional decline related to cancer therapy.    The patient is seen with his significant other 8/15/2022 and doing very well with recent hernia surgery.  His performance status is reasonably good at present and we have learned now that he would not be able to see pulmonary medicine until October, thoracic surgery is scheduled this week with Dr. Joe.  Perhaps he could be a surgical candidate.  Particularly we know by van 360 that T p53 splice site mutation is present and would certainly be consistent as well the most common mutations found in lung cancers particularly squamous cancers.  We have discussed obtaining pulmonary functions at this point, thoracic surgery assessment this week and also restarting Chantix as possible for the patient.    There was difficulty obtaining Chantix and while this was being assessed the patient was reviewed 8/18 by thoracic surgery.  He was felt to have a T1b N1 M0 stage IIb carcinoma left lower lobe along and not a candidate for biopsy.  PFTs were borderline, functional assessment was reasonably good and the patient was felt to be a candidate for robot-assisted biopsy of his nodes and if malignant proceed to left lower  lobe lobectomy.  He was reviewed 9/8 and offered 21 mg nicotine transdermal patching.    He is next seen 9/10/2022.  He is seen with his wife and both are prepared for surgery 9/23/2022.  We discussed that additional therapy may be considered once we have more information about the type and stage.  We would hope to see him postoperatively.    The patient was admitted 9//2022 undergoing 9/23/2022  a bronchoscopy with left video-assisted thoracoscopy with robot-assisted total decortication of the left lung, left lower lobectomy, mediastinal lymphadenectomy and intercostal nerve block with Dr. Nicola Joe.  Fortunately he did fairly well postoperatively though did develop atrial fibrillation with RVR treated with amiodarone converting back to sinus rhythm, treated with IV metoprolol subsequent chronic anticoagulation.  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  Notably there is invasive carcinoma present at the margin.  Is a, and only 2 positive lymph nodes adjacent to the bronchovesicular margin which appears to have been transected difficult to enumerate with extranodal extension present.       The patient is seen 10/27/2022.  He is recovering well from surgery, albeit slowly, and we have discussed his findings that are concerning as to residual disease with a positive margin described as above.  There is also the significance potentially of PD-L1 expression which has been described as producing a poor survival.     Nivolumab has been used as second line therapy to positive effect in the disease and this begs the question as to whether adjuvant therapy plus immunotherapy could be utilized though the patient would be difficult to treat with platinum based chemotherapy as well.       The patient is next assessed 11/7/2022 for significant assessments by supportive oncology at [facility] as  well as with plans to see Dr. Marrero 11/9/2022.  Unfortunately he has been very slow to gradually recover from the effects of surgery with reduced appetite and only fair performance status.     At present it would be difficult to give him cisplatin based chemotherapy and we discussed whether we might be able to use Tecentriq (atezolizumab) as his primary adjuvant therapy.  We have contacted pathology about completing Caris testing and a PD-L1 analysis that has not yet completed.         The patient is seen, however, 11/16/2022 and his genomic analysis has returned as being significant for broad sensitivity to immunotherapy.  This would argue for the administration of weekly Carboplatin/Taxol as the patient proceeds to radiation therapy and upon completion, then using Tecentriq as further adjuvant therapy over a year.  This is discussed with the patient and his  in detail as well as discussed with Dr. Marrero of radiation therapy.  We have also discussed the patient require port placement.    The patient proceeded to treatment taking weekly carboplatin Taxol with concurrent radiation therapy last seen 12/12/2022 with third weekly treatment.    He is next seen 12/19/2022 with H&H 11.9 and 38.5, white count 3460 and platelet count of 168,000.  He is feeling well without any significant complications of therapy except for right calf dermatitis that is been developing in the last several days.    The patient continued therapy taking CarboTaxol weekly including 12/28 and 1/4/2023.    His is next seen 1/11/2023 with completion date for radiation therapy 1/11/2023.  Repeat CBC now includes H&H of 11.0 and 33.9, white count 2090, platelet count 128,000, ANC is 800.  He, fortunately, is tolerating treatment well and we have again discussed with him adjuvant immunotherapy would be useful including Tecentriq versus pembrolizumab-keynote 091 study particularly in tumor programmed cell death PD-L1 greater than 50%.    The  patient will not be given additional chemotherapy 1/11/2023 we will plan to reassess by follow-up scans in the next 6 weeks CT chest and pelvis making a decision thereafter about adjuvant immunotherapy.    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    We have discussed that considering studies like keynote  091 that the patient's high risk disease could be addressed with additional immunotherapy including the use of Atezolizumab and/or Keytruda.  Considering his findings overall Keytruda every 3 weeks x18 cycles is to be pursued.    The patient was seen 3/20/2023, discussed initiation of Keytruda.  He is agreeable to proceed.    The patient notified the office shortly after treatment that he had pain under his right rib cage and was placed back onto his Neurontin to try to manage this pain.  Approximately week later he still had the pain and also had another rash we switched him at this point to Famvir to try to completely clear this problem.    He is next seen back 4/3/2023.  Fortunately his recent apparent shingles activation has nearly abated and he is feeling reasonably well.  In review of the literature there is no significant difference in activation with immunotherapy though this patient may require suppression.  I have asked him to complete his Famvir at this point.    Patient assessed 4/10/2023 with resolution of his shingles, subsequently placed on maintenance acyclovir, consideration of shingles vaccination post 3 months of Keytruda therapy is stable disease documented.    The patient underwent a CT chest abdomen pelvis 6/8/2023 that demonstrates postsurgical changes of the left hemithorax, stable pulmonary nodules, stable infrarenal  abdominal aortic aneurysm.    He is next seen 6/12/2023.  We have discussed continue with his Keytruda with his tolerance being excellent.  He will also reduce his current metoprolol to 12.5 mg p.o. daily.    He continued Keytruda and was seen in the office 7/24/2023 in anticipation of his 7th dose of Keytruda.  He was tolerating treatment without substantial side effects but did have sudden onset nausea and vomiting lasting 48 hours.  He then began to have joint pain bilateral knees and shoulders up to an 8 of 10 for severity.       He was demonstrating worsening hyponatremia at this point with a normal potassium.  Unfortunately his mental status began to worsen with increasing sleepiness, mild diarrhea.  7/28/2023 studies included an INR 3.34, sodium now 125 and he was admitted for his weakness and hyponatremia.    The patient was seen by several services including nephrology and oncology to the IV fluids.  He had been tested with ACT stimulation test and was diagnosed with renal sufficiency started on oral hydrocortisone.  7/29/2023 cortisol was 0.62, subsequent ACTH test was reduced at 5.7.  The patient studies 7/30 included BUN/creatinine of 9 and 0.97, sodium 130, chloride 95, calcium 5.2.      The patient is next seen 8/14/2023.  He remains somewhat weak and with joint discomfort.  We have discussed his findings that would be most consistent with central adrenal insufficiency and that dosing for his hydrocortisone-currently 10 mg p.o. every morning and 5 mg p.o. every afternoon is likely to be too low.  In determining whether we should proceed with treatment replacement therapy could allow us to try to complete his therapy which, on balance, would be the preferred approach.    The patient is seen 9/21/2023 improved per performance status with cortisone replacement therapy, subsequent CT of chest on pelvis 9/18/2023 without evidence of recurrent disease, pembrolizumab continued with plans to reassess likely in  December 2023 or at the completion of therapy.    Patient seen 1/8/2024 with plans to continue and complete treatment by February and follow-up with repeat PET scan.      Past Medical History:   Diagnosis Date    Arthritis     COPD (chronic obstructive pulmonary disease)     O2 3L AT HS    Diabetes mellitus     DIET CONTROL    Diverticulitis     DVT, lower extremity     RLE    Elevated cholesterol     H/O Cervical pain     History of colitis     Hyperlipidemia     Hypertension     Left shoulder pain     Low back pain     Lung cancer 2022    LEFT LUNG    On anticoagulant therapy     Peripheral arterial disease     Right leg claudication     Skin cancer     HX        Past Surgical History:   Procedure Laterality Date    BALLOON ANGIOPLASTY, ARTERY Right 2015    IR Percutaneious IVC filter placement    INGUINAL HERNIA REPAIR Left     KNEE ARTHROSCOPY Left     Torn meniscus    LOBECTOMY Left 9/23/2022    Procedure: BRONCHOSCOPY, LEFT VIDEO ASSISTED THORACOSCOPY, ROBOT ASSISTED TOTAL DECORTICATION  LEFT LUNG, LEFT LOWER LOBE LOBECTOMY, MEDIASTINAL LYMPHECTOMY, INTERCOSTAL NERVE BLOCK;  Surgeon: Nicola Joe III, MD;  Location: [facility];  Service: Robotics - DaVinci;  Laterality: Left;    VENOUS ACCESS DEVICE (PORT) INSERTION Right 11/21/2022    Procedure: INSERTION VENOUS ACCESS DEVICE;  Surgeon: Nicola Joe III, MD;  Location: [facility];  Service: Thoracic;  Laterality: Right;        Current Outpatient Medications on File Prior to Visit   Medication Sig Dispense Refill    arformoterol (BROVANA) 15 MCG/2ML nebulizer solution Take 2 mL by nebulization 2 (Two) Times a Day. Indications: Chronic Obstructive Lung Disease      Budeson-Glycopyrrol-Formoterol (Breztri Aerosphere) 160-9-4.8 MCG/ACT aerosol inhaler Inhale 2 puffs.      cetirizine (zyrTEC) 10 MG tablet Take 1 tablet by mouth Daily. Indications: Hayfever      Cholecalciferol 50 MCG (2000 UT) capsule Take 1 capsule by mouth Daily. Indications:  Vitamin D Deficiency      clotrimazole (LOTRIMIN) 1 % cream Apply  topically to the appropriate area as directed.      fluticasone (FLONASE) 50 MCG/ACT nasal spray 1 spray into the nostril(s) as directed by provider Daily. Indications: Allergic Rhinitis      hydrocortisone (CORTEF) 10 MG tablet Take 2 tablets in the morning and 1 tablet in the afternoon 90 tablet 3    isosorbide mononitrate (IMDUR) 60 MG 24 hr tablet Take 1 tablet by mouth Every Evening. Indications: hypertension      losartan (COZAAR) 25 MG tablet Take 1 tablet by mouth Daily.      ondansetron (Zofran) 8 MG tablet Take 1 tablet by mouth Every 8 (Eight) Hours As Needed for Nausea or Vomiting. 30 tablet 1    sildenafil (REVATIO) 20 MG tablet Take 1 tablet by mouth.      simvastatin (ZOCOR) 20 MG tablet Take 1 tablet by mouth Every Night. Indications: High Amount of Fats in the Blood      tamsulosin (FLOMAX) 0.4 MG capsule 24 hr capsule Take 1 capsule by mouth Daily. 30 capsule 5    triamcinolone (KENALOG) 0.5 % cream Apply  topically to the appropriate area as directed.      warfarin (COUMADIN) 5 MG tablet Take 1 tablet by mouth Daily. Take 10mg on 9/29/22 and then resume regular home schedule. (Patient taking differently: Take 1 tablet by mouth Daily. 5mg daily with additional 5mg on Monday and Friday)       No current facility-administered medications on file prior to visit.        ALLERGIES:    Allergies   Allergen Reactions    Benadryl [Diphenhydramine] Other (See Comments)     Extreme restlessness and insomnia        Social History     Socioeconomic History    Marital status:     Years of education: 1 year college   Tobacco Use    Smoking status: Every Day     Packs/day: 1.00     Years: 59.00     Additional pack years: 0.00     Total pack years: 59.00     Types: Cigarettes     Start date: 1963    Smokeless tobacco: Never    Tobacco comments:     9/8/22: Down to Less than 1 PPD   Vaping Use    Vaping Use: Never used   Substance and Sexual  "Activity    Alcohol use: Not Currently    Drug use: Never    Sexual activity: Defer        Family History   Problem Relation Age of Onset    No Known Problems Mother     Cancer Father     Lung cancer Father     Diabetes Brother     Other Daughter         S/P stent, age 42    No Known Problems Son     Malig Hyperthermia Neg Hx         Review of Systems   Skin:         Erythema left lateral lower extremity      ROS as per HPI    Objective     Vitals:    01/08/24 0841   BP: 143/77   Pulse: 52   Resp: 18   Temp: 98.4 °F (36.9 °C)   TempSrc: Temporal   SpO2: 95%   Weight: 74.9 kg (165 lb 1.6 oz)   Height: 182.9 cm (72.01\")   PainSc: 0-No pain                 1/8/2024     8:44 AM   Current Status   ECOG score 0     Physical Exam  Vitals reviewed.   Constitutional:       General: He is not in acute distress.     Appearance: Normal appearance. He is well-developed.   HENT:      Head: Normocephalic and atraumatic.   Eyes:      Pupils: Pupils are equal, round, and reactive to light.   Cardiovascular:      Rate and Rhythm: Normal rate and regular rhythm.      Heart sounds: Normal heart sounds. No murmur heard.  Pulmonary:      Effort: Pulmonary effort is normal. No respiratory distress.      Breath sounds: Normal breath sounds. No wheezing, rhonchi or rales.   Abdominal:      General: Bowel sounds are normal. There is no distension.      Palpations: Abdomen is soft.   Musculoskeletal:         General: Normal range of motion.      Cervical back: Normal range of motion.   Skin:     General: Skin is warm and dry.      Findings: Erythema (mild on lower extremities) present. No rash.   Neurological:      Mental Status: He is alert and oriented to person, place, and time.         I have reexamined the patient and the results are consistent with the previously documented exam. Kayden Bishop MD      RECENT LABS:  Results from last 7 days   Lab Units 01/08/24  0830   WBC 10*3/mm3 6.41   NEUTROS ABS 10*3/mm3 4.07   HEMOGLOBIN g/dL " 13.9   HEMATOCRIT % 43.2   PLATELETS 10*3/mm3 165           Results from last 7 days   Lab Units 01/08/24  0830   SODIUM mmol/L 138   POTASSIUM mmol/L 3.8   CHLORIDE mmol/L 102   CO2 mmol/L 28.0   BUN mg/dL 9   CREATININE mg/dL 0.99   CALCIUM mg/dL 9.8   ALBUMIN g/dL 3.9   BILIRUBIN mg/dL 0.4   ALK PHOS U/L 55   ALT (SGPT) U/L 11   AST (SGOT) U/L 17   GLUCOSE mg/dL 98                     Assessment & Plan     78 y.o. male  with medical issues including type 2 diabetes-uncertain control, COPD, hypertension, diastolic heart failure, chronic disease, peripheral vascular disease (follow-up with vascular surgery today), previous DVT on anticoagulation (home monitoring), previous IVC filter placement?,  Status post September 2021 partial small bowel obstruction secondary to sigmoid diverticulitis treated medically.     1.   T1b N1 M0-stage IIb lung cancer.  right lung base nodule noted on scan at that point and in follow-up with primary care had developed a left inguinal hernia with repair planned through a different general surgeon then seen with his initial sigmoid diverticulitis.  PET scan 7/13/2022 demonstrates a hypermetabolic spiculated lesion medial aspect of the left lobe adjacent to descending thoracic aorta measuring1.5 x 1.6 SUV 9.3 with an enlarged hypermetabolic left hilar lymph node measured 1.5 x 1.3 with SUV of 12.1, additional nodules in the lateral aspect right lower lobe and micronodule in the posterior/medial right lower lobe and also additional right lower lobe micronodule.  There is an infrarenal abdominal aortic aneurysm measuring 3.4 cm with a short segment of chronic dissection present.  Clinical findings would be consistent with a possible T1b N1 M0-stage IIb lung cancer.  discussed in thoracic conference 7/20/2022.  Recommendations to proceed with pulmonary assessment with EBUS.  The patient proceeded to undergo his hernia surgery 8/2/2022 by Dr. Dotson.   Guardant 360 that T p53 splice site  mutation is present and would certainly be consistent as well the most common mutations found in lung cancers particularly squamous cancers.  The patient was admitted 9//2022 undergoing 9/23/2022  a bronchoscopy with left video-assisted thoracoscopy with robot-assisted total decortication of the left lung, left lower lobectomy, mediastinal lymphadenectomy and intercostal nerve block with Dr. Nicola Joe.  Fortunately he did fairly well postoperatively though did develop atrial fibrillation with RVR treated with amiodarone converting back to sinus rhythm, treated with IV metoprolol subsequent chronic anticoagulation.  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  Notably there is invasive carcinoma present at the margin. only 2 positive lymph nodes adjacent to the bronchovesicular margin which appears to have been transected difficult to enumerate with extranodal extension present.  Options could well be Carboplatin and Taxol considering the patient's comorbidity and worry of using cisplatin-based chemotherapy in this patient followed by atezolizumab with pathology having been notified to assess PD-L1 expression on the tumor as well also await review by Caris molecular profiling.  Finally radiation therapy consultation be requested.   Caris analysis indicates benefit from immunotherapy at relatively high levels.  This would allow radiation therapy given with Carboplatin Taxol weekly (better tolerated) and then subsequent atezolizumab adjuvantly.  Weekly carboplatin Taxol initiated 11/28/2022 given concurrently with radiation therapy  12/5/2022 here for cycle 2 weekly carboplatin/Taxol, overall tolerating treatment quite well so far.  12/12/2022: Proceed with cycle #3 weekly carboplatin/Taxol with concurrent radiation.  Continues to tolerate treatment well.  He is next seen  12/19/2022 with H&H 11.9 and 38.5, white count 3460 and platelet count of 168,000.  He is feeling well without any significant complications of therapy except for right calf dermatitis that is been developing in the last several days.  12/28/2022 here for week 5 carbo/Taxol.  Overall tolerating well.  Today WBC 2.45, ANC 1.22, hemoglobin 10.7, platelet count 117,000.  I have reviewed with Dr. Bishop, and we will go ahead and continue with treatment.  1/4/2023: Received with cycle 6 carbo/taxol + XRT.  Continues to tolerate treatment well.  WBC improved to 2.69 with ANC 1.41.  Next 1/11/2023 with completion date 1/11/2023.  Repeat CBC now includes H&H of 11.0 and 33.9, white count 2090, platelet count 128,000, ANC is 800.  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  We have discussed that considering studies like keynote  091 that the patient's high risk disease could be addressed with additional immunotherapy including the use of Atezolizumab and/or Keytruda.  Considering his findings overall Keytruda every 3 weeks x18 cycles is to be pursued.  The patient began Keytruda as planned with his first treatment 3/20/2023.  The patient notified the office shortly after treatment that he had pain under his right rib cage and was placed back onto his Neurontin to try to manage this pain.  Approximately week later he still had the pain and also had another rash we switched him at this point to Famvir to try to completely clear this  problem.  4/3/2023.  Fortunately his recent apparent shingles activation has nearly abated and he is feeling reasonably well.  In review of the literature there is no significant difference in activation with immunotherapy though this patient may require suppression.  He is asked to complete his Famvir.  4/10/2023 cycle 2 Keytruda, overall tolerating well.   Patient seen 5/1/2023, third cycle of Keytruda, status post fourth cycle of Keytruda, 3 months of therapy, subsequent scans will need to be scheduled.  5/22/2023: Proceed with cycle 4 Keytruda.    Follow-up scans-CT of chest, abdomen and pelvis 6/8/2023 with postsurgical changes only.  7/3/2023 cycle 6 Keytruda  Proceed today, 7/24/2023 with cycle 7 Keytruda which is continued every 3 weeks   He was tolerating treatment without substantial side effects but did have sudden onset nausea and vomiting lasting 48 hours.  He then began to have joint pain bilateral knees and shoulders up to an 8 of 10 for severity.     He was demonstrating worsening hyponatremia at this point with a normal potassium.  Unfortunately his mental status began to worsen with increasing sleepiness, mild diarrhea.  7/28/2023 studies included an INR 3.34, sodium now 125 and he was admitted for his weakness and hyponatremia.  The patient was seen by several services including nephrology and oncology to the IV fluids.  He had been tested with ACT stimulation test and was diagnosed with renal sufficiency started on oral hydrocortisone.  7/29/2023 cortisol was 0.62, subsequent ACTH test was reduced at 5.7.  The patient studies 7/30 included BUN/creatinine of 9 and 0.97, sodium 130, chloride 95, calcium 5.2.  The patient is next seen 8/14/2023.  He remains somewhat weak and with joint discomfort.  We have discussed his findings that would be most consistent with central adrenal insufficiency and that dosing for his hydrocortisone-currently 10 mg p.o. every morning and 5 mg p.o. every afternoon is  likely to be too low.  In determining whether we should proceed with treatment replacement therapy could allow us to try to complete his therapy which, on balance, would be the preferred approach.  Hydrocortisone moved to 20 mg p.o. every morning and 10 mg p.o. every afternoon.  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  Replacement therapy ongoing.  9/5/2023 reviewed back today for C9 Keytruda. Patient with improved performance status following increase of hydrocortisone per above.  Improved appetite and decreased joint pain.  Proceed with treatment today with repeat imaging planned thereafter.  Repeat CT chest, abdomen, pelvis 9/18/2023 without evidence of progressive disease.  Plan to proceed with cycle #10 Keytruda.  Patient seen 10/16/2023 for cycle #11, 11/6 for cycle #12 and patient seen 11/27 for cycle #13 again out of 18 total.  Patient seen 12/18/2023 here for cycle 14 Keytruda.  Patient is doing well.  Discussed with Dr. Bishop, and plans to hold off on follow-up scans until the completion of Keytruda (, planning a total of 18 cycles).  Patient seen 1/8/2024 for cycle 15 Keytruda, plans to continue complete treatment and follow-up with PET/CT.        2.  Herpes zoster: Patient was treated with Famvir.  Rash has resolved, no issues with persistent herpetic neuralgia.  He will be placed on suppression with acyclovir 400 mg twice daily.  We discussed he will hold off on vaccination for now, given recent infection.  Patient assessed 5/1/2023, continue Keytruda, status post fifth cycle of Keytruda or 3 months of therapy he would undergo repeat scans and, if stable or improved, proceed with Shingrix vaccinations.  Patient now requested to proceed with vaccinations including RSV    3.  Hyponatremia  Likely exacerbated by recent GI toxicity with nausea vomiting and diarrhea.  Symptoms have now resolved with improvement in his  appetite and intake  Reviewed 11/27-23 with sodium 137.  1/8/2024 serum sodium 138    4. Joint pain.  Baseline knee pain prior to initiation of immunotherapy though he is now exacerbated with shoulder pain as well and requiring ambulation with a walker  Additional inflammatory labs including sed rate and C-reactive protein today to evaluate for inflammation secondary to immunotherapy  Continue Tylenol and occasional Norco for breakthrough pain  Hydrocortisone replacement therapy increased to 20 mg p.o. every morning and 10 mg p.o. every afternoon-patient is 14/23    5.  History of MGUS  Redraw [place], PE, free serum light chains at next visit    PLAN:   Proceed with cycle 15 Keytruda today, continuing every 3 weeks with plans for total of 18 cycles.  Redraw [place], PE, free serum light chains  Continue hydrocortisone 20 mg in the morning, 10 mg in the afternoon.  Continue prophylactic acyclovir for 100 mg twice daily.  No plans for repeat scans until the completion of Keytruda unless patient has problems arise.  Return for follow-up in NP, cycle 16 Keytruda with repeat labs reassessment  Call/ return sooner should the patient develop any new concerns or problems.    Patient is on a high risk medication requiring close monitoring for toxicity.        Kayden Bishop MD  01/08/2024

## 2024-01-08 ENCOUNTER — INFUSION (OUTPATIENT)
Dept: ONCOLOGY | Facility: HOSPITAL | Age: 79
End: 2024-01-08
Payer: MEDICARE

## 2024-01-08 ENCOUNTER — OFFICE VISIT (OUTPATIENT)
Dept: ONCOLOGY | Facility: CLINIC | Age: 79
End: 2024-01-08
Payer: MEDICARE

## 2024-01-08 VITALS
BODY MASS INDEX: 22.36 KG/M2 | OXYGEN SATURATION: 95 % | HEART RATE: 52 BPM | TEMPERATURE: 98.4 F | HEIGHT: 72 IN | SYSTOLIC BLOOD PRESSURE: 143 MMHG | RESPIRATION RATE: 18 BRPM | DIASTOLIC BLOOD PRESSURE: 77 MMHG | WEIGHT: 165.1 LBS

## 2024-01-08 DIAGNOSIS — Z79.899 LONG-TERM USE OF HIGH-RISK MEDICATION: ICD-10-CM

## 2024-01-08 DIAGNOSIS — D47.2 MGUS (MONOCLONAL GAMMOPATHY OF UNKNOWN SIGNIFICANCE): Primary | ICD-10-CM

## 2024-01-08 DIAGNOSIS — C7A.8 NEUROENDOCRINE CARCINOMA OF LUNG: Primary | ICD-10-CM

## 2024-01-08 DIAGNOSIS — C7A.8 NEUROENDOCRINE CARCINOMA OF LUNG: ICD-10-CM

## 2024-01-08 DIAGNOSIS — Z45.2 FITTING AND ADJUSTMENT OF VASCULAR CATHETER: ICD-10-CM

## 2024-01-08 LAB
ALBUMIN SERPL-MCNC: 3.9 G/DL (ref 3.5–5.2)
ALBUMIN/GLOB SERPL: 1.4 G/DL
ALP SERPL-CCNC: 55 U/L (ref 39–117)
ALT SERPL W P-5'-P-CCNC: 11 U/L (ref 1–41)
ANION GAP SERPL CALCULATED.3IONS-SCNC: 8 MMOL/L (ref 5–15)
AST SERPL-CCNC: 17 U/L (ref 1–40)
BASOPHILS # BLD AUTO: 0.02 10*3/MM3 (ref 0–0.2)
BASOPHILS NFR BLD AUTO: 0.3 % (ref 0–1.5)
BILIRUB SERPL-MCNC: 0.4 MG/DL (ref 0–1.2)
BUN SERPL-MCNC: 9 MG/DL (ref 8–23)
BUN/CREAT SERPL: 9.1 (ref 7–25)
CALCIUM SPEC-SCNC: 9.8 MG/DL (ref 8.6–10.5)
CHLORIDE SERPL-SCNC: 102 MMOL/L (ref 98–107)
CO2 SERPL-SCNC: 28 MMOL/L (ref 22–29)
CREAT SERPL-MCNC: 0.99 MG/DL (ref 0.76–1.27)
DEPRECATED RDW RBC AUTO: 50.3 FL (ref 37–54)
EGFRCR SERPLBLD CKD-EPI 2021: 78.9 ML/MIN/1.73
EOSINOPHIL # BLD AUTO: 0.12 10*3/MM3 (ref 0–0.4)
EOSINOPHIL NFR BLD AUTO: 1.9 % (ref 0.3–6.2)
ERYTHROCYTE [DISTWIDTH] IN BLOOD BY AUTOMATED COUNT: 14 % (ref 12.3–15.4)
GLOBULIN UR ELPH-MCNC: 2.7 GM/DL
GLUCOSE SERPL-MCNC: 98 MG/DL (ref 65–99)
HCT VFR BLD AUTO: 43.2 % (ref 37.5–51)
HGB BLD-MCNC: 13.9 G/DL (ref 13–17.7)
IMM GRANULOCYTES # BLD AUTO: 0.02 10*3/MM3 (ref 0–0.05)
IMM GRANULOCYTES NFR BLD AUTO: 0.3 % (ref 0–0.5)
LYMPHOCYTES # BLD AUTO: 1.41 10*3/MM3 (ref 0.7–3.1)
LYMPHOCYTES NFR BLD AUTO: 22 % (ref 19.6–45.3)
MCH RBC QN AUTO: 31.3 PG (ref 26.6–33)
MCHC RBC AUTO-ENTMCNC: 32.2 G/DL (ref 31.5–35.7)
MCV RBC AUTO: 97.3 FL (ref 79–97)
MONOCYTES # BLD AUTO: 0.77 10*3/MM3 (ref 0.1–0.9)
MONOCYTES NFR BLD AUTO: 12 % (ref 5–12)
NEUTROPHILS NFR BLD AUTO: 4.07 10*3/MM3 (ref 1.7–7)
NEUTROPHILS NFR BLD AUTO: 63.5 % (ref 42.7–76)
NRBC BLD AUTO-RTO: 0 /100 WBC (ref 0–0.2)
PLATELET # BLD AUTO: 165 10*3/MM3 (ref 140–450)
PMV BLD AUTO: 8.6 FL (ref 6–12)
POTASSIUM SERPL-SCNC: 3.8 MMOL/L (ref 3.5–5.2)
PROT SERPL-MCNC: 6.6 G/DL (ref 6–8.5)
RBC # BLD AUTO: 4.44 10*6/MM3 (ref 4.14–5.8)
SODIUM SERPL-SCNC: 138 MMOL/L (ref 136–145)
T4 FREE SERPL-MCNC: 0.96 NG/DL (ref 0.93–1.7)
TSH SERPL DL<=0.05 MIU/L-ACNC: 3.88 UIU/ML (ref 0.27–4.2)
WBC NRBC COR # BLD AUTO: 6.41 10*3/MM3 (ref 3.4–10.8)

## 2024-01-08 PROCEDURE — 25010000002 HEPARIN LOCK FLUSH PER 10 UNITS: Performed by: INTERNAL MEDICINE

## 2024-01-08 PROCEDURE — 3077F SYST BP >= 140 MM HG: CPT | Performed by: INTERNAL MEDICINE

## 2024-01-08 PROCEDURE — 1126F AMNT PAIN NOTED NONE PRSNT: CPT | Performed by: INTERNAL MEDICINE

## 2024-01-08 PROCEDURE — 3078F DIAST BP <80 MM HG: CPT | Performed by: INTERNAL MEDICINE

## 2024-01-08 PROCEDURE — 99214 OFFICE O/P EST MOD 30 MIN: CPT | Performed by: INTERNAL MEDICINE

## 2024-01-08 PROCEDURE — 25810000003 SODIUM CHLORIDE 0.9 % SOLUTION: Performed by: INTERNAL MEDICINE

## 2024-01-08 PROCEDURE — 84443 ASSAY THYROID STIM HORMONE: CPT | Performed by: INTERNAL MEDICINE

## 2024-01-08 PROCEDURE — 96413 CHEMO IV INFUSION 1 HR: CPT

## 2024-01-08 PROCEDURE — 85025 COMPLETE CBC W/AUTO DIFF WBC: CPT

## 2024-01-08 PROCEDURE — 80053 COMPREHEN METABOLIC PANEL: CPT

## 2024-01-08 PROCEDURE — 84439 ASSAY OF FREE THYROXINE: CPT | Performed by: INTERNAL MEDICINE

## 2024-01-08 PROCEDURE — 25010000002 PEMBROLIZUMAB 100 MG/4ML SOLUTION 4 ML VIAL: Performed by: INTERNAL MEDICINE

## 2024-01-08 RX ORDER — SODIUM CHLORIDE 0.9 % (FLUSH) 0.9 %
10 SYRINGE (ML) INJECTION AS NEEDED
Status: DISCONTINUED | OUTPATIENT
Start: 2024-01-08 | End: 2024-01-08 | Stop reason: HOSPADM

## 2024-01-08 RX ORDER — SODIUM CHLORIDE 9 MG/ML
250 INJECTION, SOLUTION INTRAVENOUS ONCE
Status: COMPLETED | OUTPATIENT
Start: 2024-01-08 | End: 2024-01-08

## 2024-01-08 RX ORDER — SODIUM CHLORIDE 9 MG/ML
250 INJECTION, SOLUTION INTRAVENOUS ONCE
Status: CANCELLED | OUTPATIENT
Start: 2024-01-08

## 2024-01-08 RX ORDER — HEPARIN SODIUM (PORCINE) LOCK FLUSH IV SOLN 100 UNIT/ML 100 UNIT/ML
500 SOLUTION INTRAVENOUS AS NEEDED
Status: DISCONTINUED | OUTPATIENT
Start: 2024-01-08 | End: 2024-01-08 | Stop reason: HOSPADM

## 2024-01-08 RX ADMIN — SODIUM CHLORIDE 200 MG: 9 INJECTION, SOLUTION INTRAVENOUS at 09:45

## 2024-01-08 RX ADMIN — SODIUM CHLORIDE 250 ML: 9 INJECTION, SOLUTION INTRAVENOUS at 09:45

## 2024-01-08 RX ADMIN — Medication 10 ML: at 10:16

## 2024-01-08 RX ADMIN — Medication 500 UNITS: at 10:17

## 2024-01-26 ENCOUNTER — PATIENT OUTREACH (OUTPATIENT)
Dept: OTHER | Facility: HOSPITAL | Age: 79
End: 2024-01-26
Payer: MEDICARE

## 2024-01-26 NOTE — PROGRESS NOTES
Reviewed chart. Patient with lung carcinoma, large cell neuroendocrine. Patient status post chemo RT-11/28/2022, radiation therapy completion date 1/11/2023, Keytruda initiated 3/20/2023; Follow-up repeat scans 9/18/2023 without evidence of recurrent disease. Rec'd cycle 15 of 18 planned on 1/8. Due again 1/29    Called patient, unable to leave message, vm is full.  Will try back next week

## 2024-01-29 ENCOUNTER — INFUSION (OUTPATIENT)
Dept: ONCOLOGY | Facility: HOSPITAL | Age: 79
End: 2024-01-29
Payer: MEDICARE

## 2024-01-29 ENCOUNTER — OFFICE VISIT (OUTPATIENT)
Dept: ONCOLOGY | Facility: CLINIC | Age: 79
End: 2024-01-29
Payer: MEDICARE

## 2024-01-29 VITALS
SYSTOLIC BLOOD PRESSURE: 161 MMHG | WEIGHT: 165.2 LBS | TEMPERATURE: 97.6 F | RESPIRATION RATE: 16 BRPM | HEART RATE: 64 BPM | OXYGEN SATURATION: 97 % | BODY MASS INDEX: 22.37 KG/M2 | HEIGHT: 72 IN | DIASTOLIC BLOOD PRESSURE: 77 MMHG

## 2024-01-29 DIAGNOSIS — Z45.2 FITTING AND ADJUSTMENT OF VASCULAR CATHETER: ICD-10-CM

## 2024-01-29 DIAGNOSIS — D47.2 MGUS (MONOCLONAL GAMMOPATHY OF UNKNOWN SIGNIFICANCE): ICD-10-CM

## 2024-01-29 DIAGNOSIS — C7A.8 NEUROENDOCRINE CARCINOMA OF LUNG: ICD-10-CM

## 2024-01-29 DIAGNOSIS — Z79.899 LONG-TERM USE OF HIGH-RISK MEDICATION: ICD-10-CM

## 2024-01-29 DIAGNOSIS — C7A.8 NEUROENDOCRINE CARCINOMA OF LUNG: Primary | ICD-10-CM

## 2024-01-29 DIAGNOSIS — Z79.899 HIGH RISK MEDICATION USE: ICD-10-CM

## 2024-01-29 DIAGNOSIS — E87.1 HYPONATREMIA: ICD-10-CM

## 2024-01-29 LAB
ALBUMIN SERPL-MCNC: 4 G/DL (ref 3.5–5.2)
ALBUMIN/GLOB SERPL: 1.4 G/DL
ALP SERPL-CCNC: 58 U/L (ref 39–117)
ALT SERPL W P-5'-P-CCNC: 8 U/L (ref 1–41)
ANION GAP SERPL CALCULATED.3IONS-SCNC: 8.4 MMOL/L (ref 5–15)
AST SERPL-CCNC: 17 U/L (ref 1–40)
BASOPHILS # BLD AUTO: 0.03 10*3/MM3 (ref 0–0.2)
BASOPHILS NFR BLD AUTO: 0.5 % (ref 0–1.5)
BILIRUB SERPL-MCNC: 0.4 MG/DL (ref 0–1.2)
BUN SERPL-MCNC: 13 MG/DL (ref 8–23)
BUN/CREAT SERPL: 12.9 (ref 7–25)
CALCIUM SPEC-SCNC: 9.9 MG/DL (ref 8.6–10.5)
CHLORIDE SERPL-SCNC: 101 MMOL/L (ref 98–107)
CO2 SERPL-SCNC: 26.6 MMOL/L (ref 22–29)
CREAT SERPL-MCNC: 1.01 MG/DL (ref 0.76–1.27)
DEPRECATED RDW RBC AUTO: 50.5 FL (ref 37–54)
EGFRCR SERPLBLD CKD-EPI 2021: 77.1 ML/MIN/1.73
EOSINOPHIL # BLD AUTO: 0.12 10*3/MM3 (ref 0–0.4)
EOSINOPHIL NFR BLD AUTO: 1.9 % (ref 0.3–6.2)
ERYTHROCYTE [DISTWIDTH] IN BLOOD BY AUTOMATED COUNT: 14.1 % (ref 12.3–15.4)
GLOBULIN UR ELPH-MCNC: 2.8 GM/DL
GLUCOSE SERPL-MCNC: 172 MG/DL (ref 65–99)
HCT VFR BLD AUTO: 43.8 % (ref 37.5–51)
HGB BLD-MCNC: 14.1 G/DL (ref 13–17.7)
IMM GRANULOCYTES # BLD AUTO: 0.02 10*3/MM3 (ref 0–0.05)
IMM GRANULOCYTES NFR BLD AUTO: 0.3 % (ref 0–0.5)
LYMPHOCYTES # BLD AUTO: 1.11 10*3/MM3 (ref 0.7–3.1)
LYMPHOCYTES NFR BLD AUTO: 17.6 % (ref 19.6–45.3)
MCH RBC QN AUTO: 31.3 PG (ref 26.6–33)
MCHC RBC AUTO-ENTMCNC: 32.2 G/DL (ref 31.5–35.7)
MCV RBC AUTO: 97.3 FL (ref 79–97)
MONOCYTES # BLD AUTO: 0.58 10*3/MM3 (ref 0.1–0.9)
MONOCYTES NFR BLD AUTO: 9.2 % (ref 5–12)
NEUTROPHILS NFR BLD AUTO: 4.43 10*3/MM3 (ref 1.7–7)
NEUTROPHILS NFR BLD AUTO: 70.5 % (ref 42.7–76)
NRBC BLD AUTO-RTO: 0 /100 WBC (ref 0–0.2)
PLATELET # BLD AUTO: 171 10*3/MM3 (ref 140–450)
PMV BLD AUTO: 8.5 FL (ref 6–12)
POTASSIUM SERPL-SCNC: 3.7 MMOL/L (ref 3.5–5.2)
PROT SERPL-MCNC: 6.8 G/DL (ref 6–8.5)
RBC # BLD AUTO: 4.5 10*6/MM3 (ref 4.14–5.8)
SODIUM SERPL-SCNC: 136 MMOL/L (ref 136–145)
WBC NRBC COR # BLD AUTO: 6.29 10*3/MM3 (ref 3.4–10.8)

## 2024-01-29 PROCEDURE — 25010000002 PEMBROLIZUMAB 100 MG/4ML SOLUTION 4 ML VIAL: Performed by: NURSE PRACTITIONER

## 2024-01-29 PROCEDURE — 96413 CHEMO IV INFUSION 1 HR: CPT

## 2024-01-29 PROCEDURE — 85025 COMPLETE CBC W/AUTO DIFF WBC: CPT

## 2024-01-29 PROCEDURE — 25010000002 HEPARIN LOCK FLUSH PER 10 UNITS: Performed by: NURSE PRACTITIONER

## 2024-01-29 PROCEDURE — 36415 COLL VENOUS BLD VENIPUNCTURE: CPT

## 2024-01-29 PROCEDURE — 80053 COMPREHEN METABOLIC PANEL: CPT

## 2024-01-29 PROCEDURE — 25810000003 SODIUM CHLORIDE 0.9 % SOLUTION: Performed by: NURSE PRACTITIONER

## 2024-01-29 RX ORDER — SODIUM CHLORIDE 0.9 % (FLUSH) 0.9 %
10 SYRINGE (ML) INJECTION AS NEEDED
Status: DISCONTINUED | OUTPATIENT
Start: 2024-01-29 | End: 2024-01-29 | Stop reason: HOSPADM

## 2024-01-29 RX ORDER — SODIUM CHLORIDE 0.9 % (FLUSH) 0.9 %
10 SYRINGE (ML) INJECTION AS NEEDED
OUTPATIENT
Start: 2024-01-29

## 2024-01-29 RX ORDER — SODIUM CHLORIDE 9 MG/ML
250 INJECTION, SOLUTION INTRAVENOUS ONCE
Status: COMPLETED | OUTPATIENT
Start: 2024-01-29 | End: 2024-01-29

## 2024-01-29 RX ORDER — HEPARIN SODIUM (PORCINE) LOCK FLUSH IV SOLN 100 UNIT/ML 100 UNIT/ML
500 SOLUTION INTRAVENOUS AS NEEDED
OUTPATIENT
Start: 2024-01-29

## 2024-01-29 RX ORDER — HEPARIN SODIUM (PORCINE) LOCK FLUSH IV SOLN 100 UNIT/ML 100 UNIT/ML
500 SOLUTION INTRAVENOUS AS NEEDED
Status: DISCONTINUED | OUTPATIENT
Start: 2024-01-29 | End: 2024-01-29 | Stop reason: HOSPADM

## 2024-01-29 RX ORDER — SODIUM CHLORIDE 9 MG/ML
250 INJECTION, SOLUTION INTRAVENOUS ONCE
Status: CANCELLED | OUTPATIENT
Start: 2024-01-29

## 2024-01-29 RX ADMIN — SODIUM CHLORIDE 200 MG: 9 INJECTION, SOLUTION INTRAVENOUS at 13:19

## 2024-01-29 RX ADMIN — SODIUM CHLORIDE 250 ML: 9 INJECTION, SOLUTION INTRAVENOUS at 13:12

## 2024-01-29 RX ADMIN — Medication 500 UNITS: at 13:49

## 2024-01-29 RX ADMIN — Medication 10 ML: at 13:49

## 2024-01-29 NOTE — PROGRESS NOTES
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  Notably there is invasive carcinoma present at the margin, 2 positive lymph nodes adjacent to the bronchovesicular margin which appears to have been transected difficult to enumerate with extranodal extension present.  *Patient status post chemo RT-11/28/2022, radiation therapy completion date 1/11/2023  *Immunotherapy-Keytruda to be initiated 3/20/2023  *Follow-up repeat scans 9/18/2023 without evidence of recurrent disease      History of Present Illness      The patient is a 78 y.o. male with the above-mentioned street returns today for lab review and evaluation prior to his 16th dose of Keytruda.  He continues to have excellent tolerance to ongoing immunotherapy.  He has no skin changes.  He has no shortness of breath.  He is eating and drinking adequately.  His bowel and bladder function are normal.  He has no new pain.  He denies any new concerns today and is eager to complete adjuvant therapy.                              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  He had been assessed particularly in the fall 2021 when he presented with nausea and vomiting and abdominal discomfort leading to assessments that revealed sigmoid diverticulitis with contained rupture and partial small bowel obstruction.  Records from mid September 21 indicating that he was admitted with partial small bowel obstruction and treated medically with very slow recovery.  He has history of COPD  with CT demonstrating a pulmonary nodule in the right lung base at 7 mm with follow-up planned.  He was seen by general surgery in later September with repeat scans planned and repeat CT abdomen 10/7/2021 did demonstrate interval improvement of sigmoid diverticulitis.      Record indicates an assessment in late June 2022 at different general surgeon for left inguinal hernia, history of heavy tobacco use with COPD and recommendation for surgical repair of his hernia      The patient underwent a CT scan of the chest 5/7/2022 demonstrating diffuse emphysematous changes, ill-defined density measuring 3 mm in the superior segment of the right lower lobe, multiple ill-defined 3 to 4 mm nodular density thought to be inflammatory involving the right lower lobe and a new spiculated nodule involving the left lower lobe measuring 16 x 14 mm as well as left hilar adenopathy.       Follow-up PET/CT 7/13/2022 reveal a hypermetabolic spiculated lesion medial aspect the left lower lobe adjacent to descending thoracic aorta measuring 1.5 x 1.6 SUV 9.3 with an enlarged hypermetabolic left hilar lymph node measured 1.5 x 1.3 with SUV of 12.1, additional nodules in the lateral aspect right lower lobe and micronodule in the posterior/medial right lower lobe and also additional right lower lobe micronodule.  There is an infrarenal abdominal aortic aneurysm measuring 3.4 cm with a short segment of chronic dissection present.    These clinical findings would be consistent with a possible T1b N1 M0-stage IIb lung cancer.      Recognizing a diagnosis has not been made his case was discussed in thoracic conference 7/20/2022.  Recommendations include proceeding to pulmonary assessment with EBUS and the patient undergoing a general assessment per his clinical status-older adult assessment as well as assessment by thoracic surgery.  These issues are discussed with the patient and his wife in detail when they are seen 7/28/2022.    The patient  proceeded to undergo his hernia surgery 8/2/2022 by Dr. Dotson.  Additionally the patient was unable to undergo assessment by pulmonary until October and, thereafter, was scheduled to see Dr. Joe 8/18/2022 undergoing older adult functional assessment with a JAGUAR score of 11 indicating a risk of functional decline related to cancer therapy.    The patient is seen with his significant other 8/15/2022 and doing very well with recent hernia surgery.  His performance status is reasonably good at present and we have learned now that he would not be able to see pulmonary medicine until October, thoracic surgery is scheduled this week with Dr. Joe.  Perhaps he could be a surgical candidate.  Particularly we know by guardant 360 that T p53 splice site mutation is present and would certainly be consistent as well the most common mutations found in lung cancers particularly squamous cancers.  We have discussed obtaining pulmonary functions at this point, thoracic surgery assessment this week and also restarting Chantix as possible for the patient.    There was difficulty obtaining Chantix and while this was being assessed the patient was reviewed 8/18 by thoracic surgery.  He was felt to have a T1b N1 M0 stage IIb carcinoma left lower lobe along and not a candidate for biopsy.  PFTs were borderline, functional assessment was reasonably good and the patient was felt to be a candidate for robot-assisted biopsy of his nodes and if malignant proceed to left lower lobe lobectomy.  He was reviewed 9/8 and offered 21 mg nicotine transdermal patching.    He is next seen 9/10/2022.  He is seen with his wife and both are prepared for surgery 9/23/2022.  We discussed that additional therapy may be considered once we have more information about the type and stage.  We would hope to see him postoperatively.    The patient was admitted 9//2022 undergoing 9/23/2022  a bronchoscopy with left video-assisted thoracoscopy with  robot-assisted total decortication of the left lung, left lower lobectomy, mediastinal lymphadenectomy and intercostal nerve block with Dr. Nicola Joe.  Fortunately he did fairly well postoperatively though did develop atrial fibrillation with RVR treated with amiodarone converting back to sinus rhythm, treated with IV metoprolol subsequent chronic anticoagulation.  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  Notably there is invasive carcinoma present at the margin.  Is a, and only 2 positive lymph nodes adjacent to the bronchovesicular margin which appears to have been transected difficult to enumerate with extranodal extension present.       The patient is seen 10/27/2022.  He is recovering well from surgery, albeit slowly, and we have discussed his findings that are concerning as to residual disease with a positive margin described as above.  There is also the significance potentially of PD-L1 expression which has been described as producing a poor survival.     Nivolumab has been used as second line therapy to positive effect in the disease and this begs the question as to whether adjuvant therapy plus immunotherapy could be utilized though the patient would be difficult to treat with platinum based chemotherapy as well.       The patient is next assessed 11/7/2022 for significant assessments by supportive oncology at [facility] as well as with plans to see Dr. Marrero 11/9/2022.  Unfortunately he has been very slow to gradually recover from the effects of surgery with reduced appetite and only fair performance status.     At present it would be difficult to give him cisplatin based chemotherapy and we discussed whether we might be able to use Tecentriq (atezolizumab) as his primary adjuvant therapy.  We have contacted pathology about completing Caris testing and a PD-L1 analysis that has  not yet completed.         The patient is seen, however, 11/16/2022 and his genomic analysis has returned as being significant for broad sensitivity to immunotherapy.  This would argue for the administration of weekly Carboplatin/Taxol as the patient proceeds to radiation therapy and upon completion, then using Tecentriq as further adjuvant therapy over a year.  This is discussed with the patient and his  in detail as well as discussed with Dr. Marrero of radiation therapy.  We have also discussed the patient require port placement.    The patient proceeded to treatment taking weekly carboplatin Taxol with concurrent radiation therapy last seen 12/12/2022 with third weekly treatment.    He is next seen 12/19/2022 with H&H 11.9 and 38.5, white count 3460 and platelet count of 168,000.  He is feeling well without any significant complications of therapy except for right calf dermatitis that is been developing in the last several days.    The patient continued therapy taking CarboTaxol weekly including 12/28 and 1/4/2023.    His is next seen 1/11/2023 with completion date for radiation therapy 1/11/2023.  Repeat CBC now includes H&H of 11.0 and 33.9, white count 2090, platelet count 128,000, ANC is 800.  He, fortunately, is tolerating treatment well and we have again discussed with him adjuvant immunotherapy would be useful including Tecentriq versus pembrolizumab-keynote 091 study particularly in tumor programmed cell death PD-L1 greater than 50%.    The patient will not be given additional chemotherapy 1/11/2023 we will plan to reassess by follow-up scans in the next 6 weeks CT chest and pelvis making a decision thereafter about adjuvant immunotherapy.    Patient proceeded to undergo follow-up scans 2/24/2023 showing no evidence of recurrent disease including his findings of left lower lobectomy, stable 11 m nodule opacity in the base of the right lower lobe in the right lung apex, small left pleural effusion  mild to moderate stenosis of the proximal subclavian artery.    We have discussed that considering studies like keynote  091 that the patient's high risk disease could be addressed with additional immunotherapy including the use of Atezolizumab and/or Keytruda.  Considering his findings overall Keytruda every 3 weeks x18 cycles is to be pursued.    The patient was seen 3/20/2023, discussed initiation of Keytruda.  He is agreeable to proceed.    The patient notified the office shortly after treatment that he had pain under his right rib cage and was placed back onto his Neurontin to try to manage this pain.  Approximately week later he still had the pain and also had another rash we switched him at this point to Famvir to try to completely clear this problem.    He is next seen back 4/3/2023.  Fortunately his recent apparent shingles activation has nearly abated and he is feeling reasonably well.  In review of the literature there is no significant difference in activation with immunotherapy though this patient may require suppression.  I have asked him to complete his Famvir at this point.    Patient assessed 4/10/2023 with resolution of his shingles, subsequently placed on maintenance acyclovir, consideration of shingles vaccination post 3 months of Keytruda therapy is stable disease documented.    The patient underwent a CT chest abdomen pelvis 6/8/2023 that demonstrates postsurgical changes of the left hemithorax, stable pulmonary nodules, stable infrarenal abdominal aortic aneurysm.    He is next seen 6/12/2023.  We have discussed continue with his Keytruda with his tolerance being excellent.  He will also reduce his current metoprolol to 12.5 mg p.o. daily.    He continued Keytruda and was seen in the office 7/24/2023 in anticipation of his 7th dose of Keytruda.  He was tolerating treatment without substantial side effects but did have sudden onset nausea and vomiting lasting 48 hours.  He then began to have  joint pain bilateral knees and shoulders up to an 8 of 10 for severity.       He was demonstrating worsening hyponatremia at this point with a normal potassium.  Unfortunately his mental status began to worsen with increasing sleepiness, mild diarrhea.  7/28/2023 studies included an INR 3.34, sodium now 125 and he was admitted for his weakness and hyponatremia.    The patient was seen by several services including nephrology and oncology to the IV fluids.  He had been tested with ACT stimulation test and was diagnosed with renal sufficiency started on oral hydrocortisone.  7/29/2023 cortisol was 0.62, subsequent ACTH test was reduced at 5.7.  The patient studies 7/30 included BUN/creatinine of 9 and 0.97, sodium 130, chloride 95, calcium 5.2.      The patient is next seen 8/14/2023.  He remains somewhat weak and with joint discomfort.  We have discussed his findings that would be most consistent with central adrenal insufficiency and that dosing for his hydrocortisone-currently 10 mg p.o. every morning and 5 mg p.o. every afternoon is likely to be too low.  In determining whether we should proceed with treatment replacement therapy could allow us to try to complete his therapy which, on balance, would be the preferred approach.    The patient is seen 9/21/2023 improved per performance status with cortisone replacement therapy, subsequent CT of chest on pelvis 9/18/2023 without evidence of recurrent disease, pembrolizumab continued with plans to reassess likely in December 2023 or at the completion of therapy.      Past Medical History:   Diagnosis Date    Arthritis     COPD (chronic obstructive pulmonary disease)     O2 3L AT HS    Diabetes mellitus     DIET CONTROL    Diverticulitis     DVT, lower extremity     RLE    Elevated cholesterol     H/O Cervical pain     History of colitis     Hyperlipidemia     Hypertension     Left shoulder pain     Low back pain     Lung cancer 2022    LEFT LUNG    On anticoagulant  therapy     Peripheral arterial disease     Right leg claudication     Skin cancer     HX        Past Surgical History:   Procedure Laterality Date    BALLOON ANGIOPLASTY, ARTERY Right 2015    IR Percutaneious IVC filter placement    INGUINAL HERNIA REPAIR Left     KNEE ARTHROSCOPY Left     Torn meniscus    LOBECTOMY Left 9/23/2022    Procedure: BRONCHOSCOPY, LEFT VIDEO ASSISTED THORACOSCOPY, ROBOT ASSISTED TOTAL DECORTICATION  LEFT LUNG, LEFT LOWER LOBE LOBECTOMY, MEDIASTINAL LYMPHECTOMY, INTERCOSTAL NERVE BLOCK;  Surgeon: Nicola Joe III, MD;  Location: [facility] MAIN OR;  Service: Robotics - DaVinci;  Laterality: Left;    VENOUS ACCESS DEVICE (PORT) INSERTION Right 11/21/2022    Procedure: INSERTION VENOUS ACCESS DEVICE;  Surgeon: Nicola Joe III, MD;  Location: [facility] MAIN OR;  Service: Thoracic;  Laterality: Right;        Current Outpatient Medications on File Prior to Visit   Medication Sig Dispense Refill    arformoterol (BROVANA) 15 MCG/2ML nebulizer solution Take 2 mL by nebulization 2 (Two) Times a Day. Indications: Chronic Obstructive Lung Disease      Budeson-Glycopyrrol-Formoterol (Breztri Aerosphere) 160-9-4.8 MCG/ACT aerosol inhaler Inhale 2 puffs.      cetirizine (zyrTEC) 10 MG tablet Take 1 tablet by mouth Daily. Indications: Hayfever      Cholecalciferol 50 MCG (2000 UT) capsule Take 1 capsule by mouth Daily. Indications: Vitamin D Deficiency      clotrimazole (LOTRIMIN) 1 % cream Apply  topically to the appropriate area as directed.      fluticasone (FLONASE) 50 MCG/ACT nasal spray 1 spray into the nostril(s) as directed by provider Daily. Indications: Allergic Rhinitis      hydrocortisone (CORTEF) 10 MG tablet Take 2 tablets in the morning and 1 tablet in the afternoon 90 tablet 3    isosorbide mononitrate (IMDUR) 60 MG 24 hr tablet Take 1 tablet by mouth Every Evening. Indications: hypertension      ondansetron (Zofran) 8 MG tablet Take 1 tablet by mouth Every 8 (Eight) Hours As Needed  "for Nausea or Vomiting. 30 tablet 1    sildenafil (REVATIO) 20 MG tablet Take 1 tablet by mouth.      simvastatin (ZOCOR) 20 MG tablet Take 1 tablet by mouth Every Night. Indications: High Amount of Fats in the Blood      tamsulosin (FLOMAX) 0.4 MG capsule 24 hr capsule Take 1 capsule by mouth Daily. 30 capsule 5    triamcinolone (KENALOG) 0.5 % cream Apply  topically to the appropriate area as directed.      warfarin (COUMADIN) 5 MG tablet Take 1 tablet by mouth Daily. Take 10mg on 9/29/22 and then resume regular home schedule. (Patient taking differently: Take 1 tablet by mouth Daily. 5mg daily with additional 5mg on Monday and Friday)      losartan (COZAAR) 25 MG tablet Take 1 tablet by mouth Daily.       No current facility-administered medications on file prior to visit.        ALLERGIES:    Allergies   Allergen Reactions    Benadryl [Diphenhydramine] Other (See Comments)     Extreme restlessness and insomnia        Social History     Socioeconomic History    Marital status:     Years of education: 1 year college   Tobacco Use    Smoking status: Every Day     Packs/day: 1.00     Years: 59.00     Additional pack years: 0.00     Total pack years: 59.00     Types: Cigarettes     Start date: 1963    Smokeless tobacco: Never    Tobacco comments:     9/8/22: Down to Less than 1 PPD   Vaping Use    Vaping Use: Never used   Substance and Sexual Activity    Alcohol use: Not Currently    Drug use: Never    Sexual activity: Defer        Family History   Problem Relation Age of Onset    No Known Problems Mother     Cancer Father     Lung cancer Father     Diabetes Brother     Other Daughter         S/P stent, age 42    No Known Problems Son     Malig Hyperthermia Neg Hx         Review of Systems   ROS as per HPI    Objective     Vitals:    01/29/24 1249   BP: 161/77   Pulse: 64   Resp: 16   Temp: 97.6 °F (36.4 °C)   TempSrc: Temporal   SpO2: 97%   Weight: 74.9 kg (165 lb 3.2 oz)   Height: 182.9 cm (72.01\") "   PainSc: 0-No pain                 1/8/2024     8:44 AM   Current Status   ECOG score 0     Physical Exam  Vitals reviewed.   Constitutional:       General: He is not in acute distress.     Appearance: Normal appearance. He is well-developed.   HENT:      Head: Normocephalic and atraumatic.   Eyes:      Pupils: Pupils are equal, round, and reactive to light.   Cardiovascular:      Rate and Rhythm: Normal rate and regular rhythm.      Heart sounds: Normal heart sounds. No murmur heard.  Pulmonary:      Effort: Pulmonary effort is normal. No respiratory distress.      Breath sounds: Normal breath sounds. No wheezing, rhonchi or rales.   Abdominal:      General: Bowel sounds are normal. There is no distension.      Palpations: Abdomen is soft.   Musculoskeletal:         General: Normal range of motion.      Cervical back: Normal range of motion.   Skin:     General: Skin is warm and dry.      Findings: No erythema or rash.   Neurological:      Mental Status: He is alert and oriented to person, place, and time.         I have reexamined the patient and the results are consistent with the previously documented exam. GARCÍA Alan      RECENT LABS:  Results from last 7 days   Lab Units 01/29/24  1208   WBC 10*3/mm3 6.29   NEUTROS ABS 10*3/mm3 4.43   HEMOGLOBIN g/dL 14.1   HEMATOCRIT % 43.8   PLATELETS 10*3/mm3 171         Results from last 7 days   Lab Units 01/29/24  1208   SODIUM mmol/L 136   POTASSIUM mmol/L 3.7   CHLORIDE mmol/L 101   CO2 mmol/L 26.6   BUN mg/dL 13   CREATININE mg/dL 1.01   CALCIUM mg/dL 9.9   ALBUMIN g/dL 4.0   BILIRUBIN mg/dL 0.4   ALK PHOS U/L 58   ALT (SGPT) U/L 8   AST (SGOT) U/L 17   GLUCOSE mg/dL 172*                     Assessment & Plan     78 y.o. male  with medical issues including type 2 diabetes-uncertain control, COPD, hypertension, diastolic heart failure, chronic disease, peripheral vascular disease (follow-up with vascular surgery today), previous DVT on  anticoagulation (home monitoring), previous IVC filter placement?,  Status post September 2021 partial small bowel obstruction secondary to sigmoid diverticulitis treated medically.     1.   T1b N1 M0-stage IIb lung cancer.  right lung base nodule noted on scan at that point and in follow-up with primary care had developed a left inguinal hernia with repair planned through a different general surgeon then seen with his initial sigmoid diverticulitis.  PET scan 7/13/2022 demonstrates a hypermetabolic spiculated lesion medial aspect of the left lobe adjacent to descending thoracic aorta measuring1.5 x 1.6 SUV 9.3 with an enlarged hypermetabolic left hilar lymph node measured 1.5 x 1.3 with SUV of 12.1, additional nodules in the lateral aspect right lower lobe and micronodule in the posterior/medial right lower lobe and also additional right lower lobe micronodule.  There is an infrarenal abdominal aortic aneurysm measuring 3.4 cm with a short segment of chronic dissection present.  Clinical findings would be consistent with a possible T1b N1 M0-stage IIb lung cancer.  discussed in thoracic conference 7/20/2022.  Recommendations to proceed with pulmonary assessment with EBUS.  The patient proceeded to undergo his hernia surgery 8/2/2022 by Dr. Dotson.   Guardant 360 that T p53 splice site mutation is present and would certainly be consistent as well the most common mutations found in lung cancers particularly squamous cancers.  The patient was admitted 9//2022 undergoing 9/23/2022  a bronchoscopy with left video-assisted thoracoscopy with robot-assisted total decortication of the left lung, left lower lobectomy, mediastinal lymphadenectomy and intercostal nerve block with Dr. Nicola Joe.  Fortunately he did fairly well postoperatively though did develop atrial fibrillation with RVR treated with amiodarone converting back to sinus rhythm, treated with IV metoprolol subsequent chronic anticoagulation.  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  Notably there is invasive carcinoma present at the margin. only 2 positive lymph nodes adjacent to the bronchovesicular margin which appears to have been transected difficult to enumerate with extranodal extension present.  Options could well be Carboplatin and Taxol considering the patient's comorbidity and worry of using cisplatin-based chemotherapy in this patient followed by atezolizumab with pathology having been notified to assess PD-L1 expression on the tumor as well also await review by Caris molecular profiling.  Finally radiation therapy consultation be requested.   Caris analysis indicates benefit from immunotherapy at relatively high levels.  This would allow radiation therapy given with Carboplatin Taxol weekly (better tolerated) and then subsequent atezolizumab adjuvantly.  Weekly carboplatin Taxol initiated 11/28/2022 given concurrently with radiation therapy  12/5/2022 here for cycle 2 weekly carboplatin/Taxol, overall tolerating treatment quite well so far.  12/12/2022: Proceed with cycle #3 weekly carboplatin/Taxol with concurrent radiation.  Continues to tolerate treatment well.  He is next seen 12/19/2022 with H&H 11.9 and 38.5, white count 3460 and platelet count of 168,000.  He is feeling well without any significant complications of therapy except for right calf dermatitis that is been developing in the last several days.  12/28/2022 here for week 5 carbo/Taxol.  Overall tolerating well.  Today WBC 2.45, ANC 1.22, hemoglobin 10.7, platelet count 117,000.  I have reviewed with Dr. Bishop, and we will go ahead and continue with treatment.  1/4/2023: Received with cycle 6 carbo/taxol + XRT.  Continues to tolerate treatment well.  WBC improved to 2.69 with ANC 1.41.  Next 1/11/2023 with completion date 1/11/2023.  Repeat CBC now  includes H&H of 11.0 and 33.9, white count 2090, platelet count 128,000, ANC is 800.  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  We have discussed that considering studies like keynote  091 that the patient's high risk disease could be addressed with additional immunotherapy including the use of Atezolizumab and/or Keytruda.  Considering his findings overall Keytruda every 3 weeks x18 cycles is to be pursued.  The patient began Keytruda as planned with his first treatment 3/20/2023.  The patient notified the office shortly after treatment that he had pain under his right rib cage and was placed back onto his Neurontin to try to manage this pain.  Approximately week later he still had the pain and also had another rash we switched him at this point to Famvir to try to completely clear this problem.  4/3/2023.  Fortunately his recent apparent shingles activation has nearly abated and he is feeling reasonably well.  In review of the literature there is no significant difference in activation with immunotherapy though this patient may require suppression.  He is asked to complete his Famvir.  4/10/2023 cycle 2 Keytruda, overall tolerating well.   Patient seen 5/1/2023, third cycle of Keytruda, status post fourth cycle of Keytruda, 3 months of therapy, subsequent scans will need to be scheduled.  5/22/2023: Proceed with cycle 4 Keytruda.    Follow-up scans-CT of chest, abdomen and pelvis 6/8/2023 with postsurgical changes  only.  7/3/2023 cycle 6 Keytruda  Proceed today, 7/24/2023 with cycle 7 Keytruda which is continued every 3 weeks   He was tolerating treatment without substantial side effects but did have sudden onset nausea and vomiting lasting 48 hours.  He then began to have joint pain bilateral knees and shoulders up to an 8 of 10 for severity.     He was demonstrating worsening hyponatremia at this point with a normal potassium.  Unfortunately his mental status began to worsen with increasing sleepiness, mild diarrhea.  7/28/2023 studies included an INR 3.34, sodium now 125 and he was admitted for his weakness and hyponatremia.  The patient was seen by several services including nephrology and oncology to the IV fluids.  He had been tested with ACT stimulation test and was diagnosed with renal sufficiency started on oral hydrocortisone.  7/29/2023 cortisol was 0.62, subsequent ACTH test was reduced at 5.7.  The patient studies 7/30 included BUN/creatinine of 9 and 0.97, sodium 130, chloride 95, calcium 5.2.  The patient is next seen 8/14/2023.  He remains somewhat weak and with joint discomfort.  We have discussed his findings that would be most consistent with central adrenal insufficiency and that dosing for his hydrocortisone-currently 10 mg p.o. every morning and 5 mg p.o. every afternoon is likely to be too low.  In determining whether we should proceed with treatment replacement therapy could allow us to try to complete his therapy which, on balance, would be the preferred approach.  Hydrocortisone moved to 20 mg p.o. every morning and 10 mg p.o. every afternoon.  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  Replacement therapy ongoing.  9/5/2023 reviewed back today for C9 Keytruda. Patient with improved performance status following increase of hydrocortisone per above.  Improved appetite and decreased joint pain.  Proceed with treatment  today with repeat imaging planned thereafter.  Repeat CT chest, abdomen, pelvis 9/18/2023 without evidence of progressive disease.  Plan to proceed with cycle #10 Keytruda.  Patient seen 10/16/2023 for cycle #11, 11/6 for cycle #12 and patient seen 11/27 for cycle #13 again out of 18 total.  Patient seen 12/18/2023 here for cycle 14 Keytruda.  Patient is doing well.  Discussed with Dr. Bishop, and plans to hold off on follow-up scans until the completion of Keytruda (, planning a total of 18 cycles).  Proceed today, 1/29/2024 with cycle 16 Keytruda      2.  Herpes zoster: Patient was treated with Famvir.  Rash has resolved, no issues with persistent herpetic neuralgia.  He will be placed on suppression with acyclovir 400 mg twice daily.  We discussed he will hold off on vaccination for now, given recent infection.  Patient assessed 5/1/2023, continue Keytruda, status post fifth cycle of Keytruda or 3 months of therapy he would undergo repeat scans and, if stable or improved, proceed with Shingrix vaccinations.  Patient now requested to proceed with vaccinations including RSV    3.  Hyponatremia  Likely exacerbated by recent GI toxicity with nausea vomiting and diarrhea.  Symptoms have now resolved with improvement in his appetite and intake  Reviewed 11/27-23 with sodium 137.  Sodium is normal today, 1/29/2024    4. Joint pain.  Baseline knee pain prior to initiation of immunotherapy though he is now exacerbated with shoulder pain as well and requiring ambulation with a walker  Additional inflammatory labs including sed rate and C-reactive protein today to evaluate for inflammation secondary to immunotherapy  Continue Tylenol and occasional Norco for breakthrough pain  Hydrocortisone replacement therapy increased to 20 mg p.o. every morning and 10 mg p.o. every afternoon  The patient denies pain today, 1/29/2024    5.  History of MGUS  Redraw ISRAEL, PE, free serum light chains currently pending    PLAN:   Proceed with  cycle 16 Keytruda today, continuing every 3 weeks with plans for total of 18 cycles.  ISRAEL, PE, FLC currently pending  Continue hydrocortisone 20 mg in the morning, 10 mg in the afternoon.  Continue prophylactic acyclovir 400 mg twice daily.  Return for follow-up in 3 weeks with Dr. Bisohp CBC, CMP, review of protein studies and cycle 17 Keytruda.  Call/ return sooner should the patient develop any new concerns or problems.  We are not planning to repeat imaging until the patient completes 18 cycles of adjuvant therapy    Patient is on a high risk medication requiring close monitoring for toxicity.    Anna Varma, APRN  01/29/2024

## 2024-01-31 ENCOUNTER — OFFICE VISIT (OUTPATIENT)
Dept: ENDOCRINOLOGY | Age: 79
End: 2024-01-31
Payer: MEDICARE

## 2024-01-31 ENCOUNTER — PATIENT OUTREACH (OUTPATIENT)
Dept: OTHER | Facility: HOSPITAL | Age: 79
End: 2024-01-31
Payer: MEDICARE

## 2024-01-31 VITALS
SYSTOLIC BLOOD PRESSURE: 138 MMHG | HEART RATE: 63 BPM | DIASTOLIC BLOOD PRESSURE: 74 MMHG | HEIGHT: 72 IN | BODY MASS INDEX: 22.54 KG/M2 | OXYGEN SATURATION: 94 % | WEIGHT: 166.4 LBS

## 2024-01-31 DIAGNOSIS — E27.40 ADRENAL INSUFFICIENCY: Primary | ICD-10-CM

## 2024-01-31 PROCEDURE — 99203 OFFICE O/P NEW LOW 30 MIN: CPT | Performed by: INTERNAL MEDICINE

## 2024-01-31 PROCEDURE — 1159F MED LIST DOCD IN RCRD: CPT | Performed by: INTERNAL MEDICINE

## 2024-01-31 PROCEDURE — 1160F RVW MEDS BY RX/DR IN RCRD: CPT | Performed by: INTERNAL MEDICINE

## 2024-01-31 PROCEDURE — 3078F DIAST BP <80 MM HG: CPT | Performed by: INTERNAL MEDICINE

## 2024-01-31 PROCEDURE — 3075F SYST BP GE 130 - 139MM HG: CPT | Performed by: INTERNAL MEDICINE

## 2024-01-31 RX ORDER — HYDROCORTISONE 10 MG/1
TABLET ORAL
Qty: 360 TABLET | Refills: 3 | Status: SHIPPED | OUTPATIENT
Start: 2024-01-31

## 2024-01-31 NOTE — PROGRESS NOTES
Reviewed chart. Rec'd cycle 16 Keytruda 1/29/24. Sees Dr. Bishop 2/19    Called patient. Left message that I was just touching base to see how he was doing with treatment. Wanted to know if he had any questions/concerns or resource/supportive care needs; requested cb

## 2024-01-31 NOTE — PROGRESS NOTES
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    Objective     Vitals:    01/31/24 0842   BP: 138/74   Pulse: 63   SpO2: 94%        Physical Exam  Vitals reviewed.   Constitutional:       Appearance: Normal appearance.   HENT:      Head: Normocephalic and atraumatic.   Eyes:      General: No scleral icterus.  Pulmonary:      Effort: Pulmonary effort is normal. No respiratory distress.   Neurological:      Mental Status: He is alert.      Gait: Gait normal.   Psychiatric:         Mood and Affect: Mood normal.         Behavior: Behavior normal.         Thought Content: Thought content normal.         Judgment: Judgment normal.       Labs/Imaging:  Reviewed his ACTH stimulation test and ACTH results, his TSH, free T4, and electrolytes were also normal recently    Assessment & Plan   Secondary AI  - Secondary to Keytruda  - Cont. hydrocortisone 20 mg in the am and 10 mg in the pm (refill sent)  - Educated on stress dosing as well    - Return to clinic in 6 months

## 2024-02-09 LAB
ALBUMIN SERPL ELPH-MCNC: ABNORMAL G/DL
ALBUMIN/GLOB SERPL: ABNORMAL {RATIO}
ALPHA1 GLOB SERPL ELPH-MCNC: ABNORMAL G/DL
ALPHA2 GLOB SERPL ELPH-MCNC: ABNORMAL G/DL
B-GLOBULIN SERPL ELPH-MCNC: ABNORMAL G/DL
GAMMA GLOB SERPL ELPH-MCNC: ABNORMAL G/DL
GLOBULIN SER-MCNC: ABNORMAL G/DL
IGA SERPL-MCNC: 166 MG/DL (ref 61–437)
IGG SERPL-MCNC: ABNORMAL MG/DL
IGM SERPL-MCNC: 218 MG/DL (ref 15–143)
INTERPRETATION SERPL IEP-IMP: ABNORMAL
KAPPA LC FREE SER-MCNC: 155.1 MG/L (ref 3.3–19.4)
KAPPA LC FREE/LAMBDA FREE SER: 11.57 {RATIO} (ref 0.26–1.65)
LABORATORY COMMENT REPORT: ABNORMAL
LAMBDA LC FREE SERPL-MCNC: 13.4 MG/L (ref 5.7–26.3)
M PROTEIN SERPL ELPH-MCNC: ABNORMAL G/DL
PROT SERPL-MCNC: 6.5 G/DL (ref 6–8.5)

## 2024-02-19 ENCOUNTER — OFFICE VISIT (OUTPATIENT)
Dept: ONCOLOGY | Facility: CLINIC | Age: 79
End: 2024-02-19
Payer: MEDICARE

## 2024-02-19 ENCOUNTER — INFUSION (OUTPATIENT)
Dept: ONCOLOGY | Facility: HOSPITAL | Age: 79
End: 2024-02-19
Payer: MEDICARE

## 2024-02-19 ENCOUNTER — PATIENT OUTREACH (OUTPATIENT)
Dept: OTHER | Facility: HOSPITAL | Age: 79
End: 2024-02-19
Payer: MEDICARE

## 2024-02-19 VITALS
HEART RATE: 55 BPM | TEMPERATURE: 98 F | HEIGHT: 72 IN | RESPIRATION RATE: 18 BRPM | WEIGHT: 166.5 LBS | DIASTOLIC BLOOD PRESSURE: 77 MMHG | BODY MASS INDEX: 22.55 KG/M2 | SYSTOLIC BLOOD PRESSURE: 152 MMHG | OXYGEN SATURATION: 97 %

## 2024-02-19 DIAGNOSIS — C7A.8 NEUROENDOCRINE CARCINOMA OF LUNG: ICD-10-CM

## 2024-02-19 DIAGNOSIS — Z79.899 LONG-TERM USE OF HIGH-RISK MEDICATION: ICD-10-CM

## 2024-02-19 DIAGNOSIS — C7A.8 NEUROENDOCRINE CARCINOMA OF LUNG: Primary | ICD-10-CM

## 2024-02-19 DIAGNOSIS — E27.40 ADRENAL INSUFFICIENCY: Primary | ICD-10-CM

## 2024-02-19 DIAGNOSIS — E87.1 HYPONATREMIA: ICD-10-CM

## 2024-02-19 LAB
ALBUMIN SERPL-MCNC: 4 G/DL (ref 3.5–5.2)
ALBUMIN/GLOB SERPL: 1.5 G/DL
ALP SERPL-CCNC: 55 U/L (ref 39–117)
ALT SERPL W P-5'-P-CCNC: 9 U/L (ref 1–41)
ANION GAP SERPL CALCULATED.3IONS-SCNC: 7.3 MMOL/L (ref 5–15)
AST SERPL-CCNC: 18 U/L (ref 1–40)
BASOPHILS # BLD AUTO: 0.03 10*3/MM3 (ref 0–0.2)
BASOPHILS NFR BLD AUTO: 0.5 % (ref 0–1.5)
BILIRUB SERPL-MCNC: 0.5 MG/DL (ref 0–1.2)
BUN SERPL-MCNC: 12 MG/DL (ref 8–23)
BUN/CREAT SERPL: 11.1 (ref 7–25)
CALCIUM SPEC-SCNC: 9.9 MG/DL (ref 8.6–10.5)
CHLORIDE SERPL-SCNC: 104 MMOL/L (ref 98–107)
CO2 SERPL-SCNC: 28.7 MMOL/L (ref 22–29)
CREAT SERPL-MCNC: 1.08 MG/DL (ref 0.76–1.27)
DEPRECATED RDW RBC AUTO: 50.4 FL (ref 37–54)
EGFRCR SERPLBLD CKD-EPI 2021: 71.1 ML/MIN/1.73
EOSINOPHIL # BLD AUTO: 0.22 10*3/MM3 (ref 0–0.4)
EOSINOPHIL NFR BLD AUTO: 3.5 % (ref 0.3–6.2)
ERYTHROCYTE [DISTWIDTH] IN BLOOD BY AUTOMATED COUNT: 14.3 % (ref 12.3–15.4)
GLOBULIN UR ELPH-MCNC: 2.6 GM/DL
GLUCOSE SERPL-MCNC: 103 MG/DL (ref 65–99)
HCT VFR BLD AUTO: 43.2 % (ref 37.5–51)
HGB BLD-MCNC: 14 G/DL (ref 13–17.7)
IMM GRANULOCYTES # BLD AUTO: 0.03 10*3/MM3 (ref 0–0.05)
IMM GRANULOCYTES NFR BLD AUTO: 0.5 % (ref 0–0.5)
LYMPHOCYTES # BLD AUTO: 1.46 10*3/MM3 (ref 0.7–3.1)
LYMPHOCYTES NFR BLD AUTO: 23 % (ref 19.6–45.3)
MCH RBC QN AUTO: 31.1 PG (ref 26.6–33)
MCHC RBC AUTO-ENTMCNC: 32.4 G/DL (ref 31.5–35.7)
MCV RBC AUTO: 96 FL (ref 79–97)
MONOCYTES # BLD AUTO: 0.71 10*3/MM3 (ref 0.1–0.9)
MONOCYTES NFR BLD AUTO: 11.2 % (ref 5–12)
NEUTROPHILS NFR BLD AUTO: 3.9 10*3/MM3 (ref 1.7–7)
NEUTROPHILS NFR BLD AUTO: 61.3 % (ref 42.7–76)
NRBC BLD AUTO-RTO: 0 /100 WBC (ref 0–0.2)
PLATELET # BLD AUTO: 163 10*3/MM3 (ref 140–450)
PMV BLD AUTO: 8.8 FL (ref 6–12)
POTASSIUM SERPL-SCNC: 3.8 MMOL/L (ref 3.5–5.2)
PROT SERPL-MCNC: 6.6 G/DL (ref 6–8.5)
RBC # BLD AUTO: 4.5 10*6/MM3 (ref 4.14–5.8)
SODIUM SERPL-SCNC: 140 MMOL/L (ref 136–145)
T4 FREE SERPL-MCNC: 1.1 NG/DL (ref 0.93–1.7)
TSH SERPL DL<=0.05 MIU/L-ACNC: 2.91 UIU/ML (ref 0.27–4.2)
WBC NRBC COR # BLD AUTO: 6.35 10*3/MM3 (ref 3.4–10.8)

## 2024-02-19 PROCEDURE — 84439 ASSAY OF FREE THYROXINE: CPT | Performed by: INTERNAL MEDICINE

## 2024-02-19 PROCEDURE — 99214 OFFICE O/P EST MOD 30 MIN: CPT | Performed by: INTERNAL MEDICINE

## 2024-02-19 PROCEDURE — 96409 CHEMO IV PUSH SNGL DRUG: CPT

## 2024-02-19 PROCEDURE — 3077F SYST BP >= 140 MM HG: CPT | Performed by: INTERNAL MEDICINE

## 2024-02-19 PROCEDURE — 80053 COMPREHEN METABOLIC PANEL: CPT

## 2024-02-19 PROCEDURE — 84443 ASSAY THYROID STIM HORMONE: CPT | Performed by: INTERNAL MEDICINE

## 2024-02-19 PROCEDURE — 85025 COMPLETE CBC W/AUTO DIFF WBC: CPT

## 2024-02-19 PROCEDURE — 25810000003 SODIUM CHLORIDE 0.9 % SOLUTION: Performed by: INTERNAL MEDICINE

## 2024-02-19 PROCEDURE — 3078F DIAST BP <80 MM HG: CPT | Performed by: INTERNAL MEDICINE

## 2024-02-19 PROCEDURE — 25010000002 PEMBROLIZUMAB 100 MG/4ML SOLUTION 4 ML VIAL: Performed by: INTERNAL MEDICINE

## 2024-02-19 PROCEDURE — 1126F AMNT PAIN NOTED NONE PRSNT: CPT | Performed by: INTERNAL MEDICINE

## 2024-02-19 RX ORDER — SODIUM CHLORIDE 9 MG/ML
250 INJECTION, SOLUTION INTRAVENOUS ONCE
Status: COMPLETED | OUTPATIENT
Start: 2024-02-19 | End: 2024-02-19

## 2024-02-19 RX ORDER — SODIUM CHLORIDE 9 MG/ML
250 INJECTION, SOLUTION INTRAVENOUS ONCE
Status: CANCELLED | OUTPATIENT
Start: 2024-02-19

## 2024-02-19 RX ADMIN — SODIUM CHLORIDE 200 MG: 9 INJECTION, SOLUTION INTRAVENOUS at 12:30

## 2024-02-19 RX ADMIN — SODIUM CHLORIDE 250 ML: 9 INJECTION, SOLUTION INTRAVENOUS at 12:30

## 2024-02-19 NOTE — PROGRESS NOTES
Met patient and friend in infusion center today.  He met with Dr. Bishop earlier today. Receiving cycle 16 Keytruda today, continuing every 3 weeks with plans for total of 18 cycles.    The patient states he is tolerating treatment fairly well. He is eating well and denies weight loss.     We again discussed integrative therapies and other services at [facility] as well as [employer] and [facility]. Patient expressed gratitude for my support and denied any additional needs at this time. I will call in 2-3 months after he completes Keytruda and has his f/u scan; encouraged patient to call as needed.   
[FreeTextEntry1] : \par 54AAM with sickle cell disease  recent diagnosis of gout, also with osteonecrosis right knee. Diagnostic arthrocentesis of right knee monoarthritis demonstrated MSU crystals consistent with gout.\par s/p 2 x IACI to right knee\par [id number] neg\par on allopurinol 300mg and colchicine 0.3mg daily\par no gout flares\par last uric acid in 4/11/2023 6.7\par \par still c/o bilateral knee pain, mostly with kneeling and climbing stairs. He has not seen orthopedics for this.

## 2024-02-19 NOTE — PROGRESS NOTES
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  Notably there is invasive carcinoma present at the margin, 2 positive lymph nodes adjacent to the bronchovesicular margin which appears to have been transected difficult to enumerate with extranodal extension present.  *Patient status post chemo RT-11/28/2022, radiation therapy completion date 1/11/2023  *Immunotherapy-Keytruda to be initiated 3/20/2023  *Follow-up repeat scans 9/18/2023 without evidence of recurrent disease      History of Present Illness      The patient is a 78 y.o. male with the above-mentioned street returns today for lab review and evaluation prior to his 17th dose of Keytruda.  He continues to have excellent tolerance to ongoing immunotherapy.  He has no skin changes.  He has no shortness of breath.  He is eating and drinking adequately.  His bowel and bladder function are normal.  He has no new pain.  He denies any new concerns today and is eager to complete adjuvant therapy.  Both he and his significant other indicate that he is not having any additional issues and understand that he will complete his treatment in 3 weeks with his 18 cycle.  This will be followed by follow-up CT chest abdomen pelvis his baseline and periodic scans thereafter.                              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  He had been assessed particularly in the fall 2021 when he presented with nausea and vomiting and abdominal discomfort leading to  assessments that revealed sigmoid diverticulitis with contained rupture and partial small bowel obstruction.  Records from mid September 21 indicating that he was admitted with partial small bowel obstruction and treated medically with very slow recovery.  He has history of COPD with CT demonstrating a pulmonary nodule in the right lung base at 7 mm with follow-up planned.  He was seen by general surgery in later September with repeat scans planned and repeat CT abdomen 10/7/2021 did demonstrate interval improvement of sigmoid diverticulitis.      Record indicates an assessment in late June 2022 at different general surgeon for left inguinal hernia, history of heavy tobacco use with COPD and recommendation for surgical repair of his hernia      The patient underwent a CT scan of the chest 5/7/2022 demonstrating diffuse emphysematous changes, ill-defined density measuring 3 mm in the superior segment of the right lower lobe, multiple ill-defined 3 to 4 mm nodular density thought to be inflammatory involving the right lower lobe and a new spiculated nodule involving the left lower lobe measuring 16 x 14 mm as well as left hilar adenopathy.       Follow-up PET/CT 7/13/2022 reveal a hypermetabolic spiculated lesion medial aspect the left lower lobe adjacent to descending thoracic aorta measuring 1.5 x 1.6 SUV 9.3 with an enlarged hypermetabolic left hilar lymph node measured 1.5 x 1.3 with SUV of 12.1, additional nodules in the lateral aspect right lower lobe and micronodule in the posterior/medial right lower lobe and also additional right lower lobe micronodule.  There is an infrarenal abdominal aortic aneurysm measuring 3.4 cm with a short segment of chronic dissection present.    These clinical findings would be consistent with a possible T1b N1 M0-stage IIb lung cancer.      Recognizing a diagnosis has not been made his case was discussed in thoracic conference 7/20/2022.  Recommendations include proceeding to  pulmonary assessment with EBUS and the patient undergoing a general assessment per his clinical status-older adult assessment as well as assessment by thoracic surgery.  These issues are discussed with the patient and his wife in detail when they are seen 7/28/2022.    The patient proceeded to undergo his hernia surgery 8/2/2022 by Dr. Dotson.  Additionally the patient was unable to undergo assessment by pulmonary until October and, thereafter, was scheduled to see Dr. Joe 8/18/2022 undergoing older adult functional assessment with a JAGUAR score of 11 indicating a risk of functional decline related to cancer therapy.    The patient is seen with his significant other 8/15/2022 and doing very well with recent hernia surgery.  His performance status is reasonably good at present and we have learned now that he would not be able to see pulmonary medicine until October, thoracic surgery is scheduled this week with Dr. Joe.  Perhaps he could be a surgical candidate.  Particularly we know by van Mata that T p53 splice site mutation is present and would certainly be consistent as well the most common mutations found in lung cancers particularly squamous cancers.  We have discussed obtaining pulmonary functions at this point, thoracic surgery assessment this week and also restarting Chantix as possible for the patient.    There was difficulty obtaining Chantix and while this was being assessed the patient was reviewed 8/18 by thoracic surgery.  He was felt to have a T1b N1 M0 stage IIb carcinoma left lower lobe along and not a candidate for biopsy.  PFTs were borderline, functional assessment was reasonably good and the patient was felt to be a candidate for robot-assisted biopsy of his nodes and if malignant proceed to left lower lobe lobectomy.  He was reviewed 9/8 and offered 21 mg nicotine transdermal patching.    He is next seen 9/10/2022.  He is seen with his wife and both are prepared for surgery 9/23/2022.   We discussed that additional therapy may be considered once we have more information about the type and stage.  We would hope to see him postoperatively.    The patient was admitted 9//2022 undergoing 9/23/2022  a bronchoscopy with left video-assisted thoracoscopy with robot-assisted total decortication of the left lung, left lower lobectomy, mediastinal lymphadenectomy and intercostal nerve block with Dr. Nicola Joe.  Fortunately he did fairly well postoperatively though did develop atrial fibrillation with RVR treated with amiodarone converting back to sinus rhythm, treated with IV metoprolol subsequent chronic anticoagulation.  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  Notably there is invasive carcinoma present at the margin.  Is a, and only 2 positive lymph nodes adjacent to the bronchovesicular margin which appears to have been transected difficult to enumerate with extranodal extension present.       The patient is seen 10/27/2022.  He is recovering well from surgery, albeit slowly, and we have discussed his findings that are concerning as to residual disease with a positive margin described as above.  There is also the significance potentially of PD-L1 expression which has been described as producing a poor survival.     Nivolumab has been used as second line therapy to positive effect in the disease and this begs the question as to whether adjuvant therapy plus immunotherapy could be utilized though the patient would be difficult to treat with platinum based chemotherapy as well.       The patient is next assessed 11/7/2022 for significant assessments by supportive oncology at [facility] as well as with plans to see Dr. Marrero 11/9/2022.  Unfortunately he has been very slow to gradually recover from the effects of surgery with reduced appetite and only fair performance status.      At present it would be difficult to give him cisplatin based chemotherapy and we discussed whether we might be able to use Tecentriq (atezolizumab) as his primary adjuvant therapy.  We have contacted pathology about completing Caris testing and a PD-L1 analysis that has not yet completed.         The patient is seen, however, 11/16/2022 and his genomic analysis has returned as being significant for broad sensitivity to immunotherapy.  This would argue for the administration of weekly Carboplatin/Taxol as the patient proceeds to radiation therapy and upon completion, then using Tecentriq as further adjuvant therapy over a year.  This is discussed with the patient and his  in detail as well as discussed with Dr. Marrero of radiation therapy.  We have also discussed the patient require port placement.    The patient proceeded to treatment taking weekly carboplatin Taxol with concurrent radiation therapy last seen 12/12/2022 with third weekly treatment.    He is next seen 12/19/2022 with H&H 11.9 and 38.5, white count 3460 and platelet count of 168,000.  He is feeling well without any significant complications of therapy except for right calf dermatitis that is been developing in the last several days.    The patient continued therapy taking CarboTaxol weekly including 12/28 and 1/4/2023.    His is next seen 1/11/2023 with completion date for radiation therapy 1/11/2023.  Repeat CBC now includes H&H of 11.0 and 33.9, white count 2090, platelet count 128,000, ANC is 800.  He, fortunately, is tolerating treatment well and we have again discussed with him adjuvant immunotherapy would be useful including Tecentriq versus pembrolizumab-keynote 091 study particularly in tumor programmed cell death PD-L1 greater than 50%.    The patient will not be given additional chemotherapy 1/11/2023 we will plan to reassess by follow-up scans in the next 6 weeks CT chest and pelvis making a decision thereafter about adjuvant  immunotherapy.    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    We have discussed that considering studies like keynote  091 that the patient's high risk disease could be addressed with additional immunotherapy including the use of Atezolizumab and/or Keytruda.  Considering his findings overall Keytruda every 3 weeks x18 cycles is to be pursued.    The patient was seen 3/20/2023, discussed initiation of Keytruda.  He is agreeable to proceed.    The patient notified the office shortly after treatment that he had pain under his right rib cage and was placed back onto his Neurontin to try to manage this pain.  Approximately week later he still had the pain and also had another rash we switched him at this point to Famvir to try to completely clear this problem.    He is next seen back 4/3/2023.  Fortunately his recent apparent shingles activation has nearly abated and he is feeling reasonably well.  In review of the literature there is no significant difference in activation with immunotherapy though this patient may require suppression.  I have asked him to complete his Famvir at this point.    Patient assessed 4/10/2023 with resolution of his shingles, subsequently placed on maintenance acyclovir, consideration of shingles vaccination post 3 months of Keytruda therapy is stable disease documented.    The patient underwent a CT chest abdomen pelvis 6/8/2023 that demonstrates postsurgical changes of the left hemithorax, stable pulmonary nodules, stable infrarenal abdominal aortic aneurysm.    He is next seen 6/12/2023.  We have discussed continue with his Keytruda with his tolerance being excellent.  He will also reduce his current metoprolol to  12.5 mg p.o. daily.    He continued Keytruda and was seen in the office 7/24/2023 in anticipation of his 7th dose of Keytruda.  He was tolerating treatment without substantial side effects but did have sudden onset nausea and vomiting lasting 48 hours.  He then began to have joint pain bilateral knees and shoulders up to an 8 of 10 for severity.       He was demonstrating worsening hyponatremia at this point with a normal potassium.  Unfortunately his mental status began to worsen with increasing sleepiness, mild diarrhea.  7/28/2023 studies included an INR 3.34, sodium now 125 and he was admitted for his weakness and hyponatremia.    The patient was seen by several services including nephrology and oncology to the IV fluids.  He had been tested with ACT stimulation test and was diagnosed with renal sufficiency started on oral hydrocortisone.  7/29/2023 cortisol was 0.62, subsequent ACTH test was reduced at 5.7.  The patient studies 7/30 included BUN/creatinine of 9 and 0.97, sodium 130, chloride 95, calcium 5.2.      The patient is next seen 8/14/2023.  He remains somewhat weak and with joint discomfort.  We have discussed his findings that would be most consistent with central adrenal insufficiency and that dosing for his hydrocortisone-currently 10 mg p.o. every morning and 5 mg p.o. every afternoon is likely to be too low.  In determining whether we should proceed with treatment replacement therapy could allow us to try to complete his therapy which, on balance, would be the preferred approach.    The patient is seen 9/21/2023 improved per performance status with cortisone replacement therapy, subsequent CT of chest on pelvis 9/18/2023 without evidence of recurrent disease, pembrolizumab continued with plans to reassess likely in December 2023 or at the completion of therapy.    Patient seen 2/19/2024 for his 17 cycle of Keytruda as patient approaches his completion of immunotherapy in 3 weeks.  Baseline scans  will be requested after follow-up visit in 3 weeks.      Past Medical History:   Diagnosis Date    Arthritis     COPD (chronic obstructive pulmonary disease)     O2 3L AT HS    Diabetes mellitus     DIET CONTROL    Diverticulitis     DVT, lower extremity     RLE    Elevated cholesterol     H/O Cervical pain     History of colitis     Hyperlipidemia     Hypertension     Left shoulder pain     Low back pain     Lung cancer 2022    LEFT LUNG    On anticoagulant therapy     Peripheral arterial disease     Right leg claudication     Skin cancer     HX    Type 2 diabetes mellitus     Vitamin D deficiency         Past Surgical History:   Procedure Laterality Date    BALLOON ANGIOPLASTY, ARTERY Right 2015    IR Percutaneious IVC filter placement    INGUINAL HERNIA REPAIR Left     KNEE ARTHROSCOPY Left     Torn meniscus    LOBECTOMY Left 09/23/2022    Procedure: BRONCHOSCOPY, LEFT VIDEO ASSISTED THORACOSCOPY, ROBOT ASSISTED TOTAL DECORTICATION  LEFT LUNG, LEFT LOWER LOBE LOBECTOMY, MEDIASTINAL LYMPHECTOMY, INTERCOSTAL NERVE BLOCK;  Surgeon: Nicola Joe III, MD;  Location: [facility];  Service: Robotics - DaVinci;  Laterality: Left;    VENOUS ACCESS DEVICE (PORT) INSERTION Right 11/21/2022    Procedure: INSERTION VENOUS ACCESS DEVICE;  Surgeon: Nicola Joe III, MD;  Location: [facility] OR;  Service: Thoracic;  Laterality: Right;        Current Outpatient Medications on File Prior to Visit   Medication Sig Dispense Refill    arformoterol (BROVANA) 15 MCG/2ML nebulizer solution Take 2 mL by nebulization 2 (Two) Times a Day. Indications: Chronic Obstructive Lung Disease      Budeson-Glycopyrrol-Formoterol (Breztri Aerosphere) 160-9-4.8 MCG/ACT aerosol inhaler Inhale 2 puffs.      cetirizine (zyrTEC) 10 MG tablet Take 1 tablet by mouth Daily. Indications: Hayfever      Cholecalciferol 50 MCG (2000 UT) capsule Take 1 capsule by mouth Daily. Indications: Vitamin D Deficiency      clotrimazole (LOTRIMIN) 1 % cream  Apply  topically to the appropriate area as directed.      fluticasone (FLONASE) 50 MCG/ACT nasal spray 1 spray into the nostril(s) as directed by provider Daily. Indications: Allergic Rhinitis      hydrocortisone (CORTEF) 10 MG tablet Take 2 tablets in the morning and 1 tablet in the afternoon plus additional medication for acute illness up to 60 mg daily 360 tablet 3    isosorbide mononitrate (IMDUR) 60 MG 24 hr tablet Take 1 tablet by mouth Every Evening. Indications: hypertension      ondansetron (Zofran) 8 MG tablet Take 1 tablet by mouth Every 8 (Eight) Hours As Needed for Nausea or Vomiting. 30 tablet 1    sildenafil (REVATIO) 20 MG tablet Take 1 tablet by mouth.      simvastatin (ZOCOR) 20 MG tablet Take 1 tablet by mouth Every Night. Indications: High Amount of Fats in the Blood      tamsulosin (FLOMAX) 0.4 MG capsule 24 hr capsule Take 1 capsule by mouth Daily. 30 capsule 5    triamcinolone (KENALOG) 0.5 % cream Apply  topically to the appropriate area as directed.      warfarin (COUMADIN) 5 MG tablet Take 1 tablet by mouth Daily. Take 10mg on 9/29/22 and then resume regular home schedule. (Patient taking differently: Take 1 tablet by mouth Daily. 5mg daily with additional 5mg on Monday and Friday)      losartan (COZAAR) 25 MG tablet Take 1 tablet by mouth Daily.       No current facility-administered medications on file prior to visit.        ALLERGIES:    Allergies   Allergen Reactions    Benadryl [Diphenhydramine] Other (See Comments)     Extreme restlessness and insomnia        Social History     Socioeconomic History    Marital status:     Years of education: 1 year college   Tobacco Use    Smoking status: Every Day     Packs/day: 0.50     Years: 50.00     Additional pack years: 0.00     Total pack years: 25.00     Types: Cigarettes     Start date: 1963    Smokeless tobacco: Never    Tobacco comments:     9/8/22: Down to Less than 1 PPD   Vaping Use    Vaping Use: Never used   Substance and  "Sexual Activity    Alcohol use: Not Currently    Drug use: Never    Sexual activity: Not Currently     Partners: Female        Family History   Problem Relation Age of Onset    No Known Problems Mother     Cancer Father     Lung cancer Father     Diabetes Brother     Other Daughter         S/P stent, age 42    No Known Problems Son     Diabetes Brother     Kidney disease Brother     Malig Hyperthermia Neg Hx         Review of Systems   ROS as per HPI    Objective     Vitals:    02/19/24 1135   BP: 152/77   Pulse: 55   Resp: 18   Temp: 98 °F (36.7 °C)   TempSrc: Temporal   SpO2: 97%   Weight: 75.5 kg (166 lb 8 oz)   Height: 182.9 cm (72.01\")   PainSc: 0-No pain                   2/19/2024    11:49 AM   Current Status   ECOG score 0     Physical Exam  Vitals reviewed.   Constitutional:       General: He is not in acute distress.     Appearance: Normal appearance. He is well-developed.   HENT:      Head: Normocephalic and atraumatic.   Eyes:      Pupils: Pupils are equal, round, and reactive to light.   Cardiovascular:      Rate and Rhythm: Normal rate and regular rhythm.      Heart sounds: Normal heart sounds. No murmur heard.  Pulmonary:      Effort: Pulmonary effort is normal. No respiratory distress.      Breath sounds: Normal breath sounds. No wheezing, rhonchi or rales.   Abdominal:      General: Bowel sounds are normal. There is no distension.      Palpations: Abdomen is soft.   Musculoskeletal:         General: Normal range of motion.      Cervical back: Normal range of motion.   Skin:     General: Skin is warm and dry.      Findings: No erythema or rash.   Neurological:      Mental Status: He is alert and oriented to person, place, and time.         I have reexamined the patient and the results are consistent with the previously documented exam. Kayden Bishop MD      RECENT LABS:  Results from last 7 days   Lab Units 02/19/24  1110   WBC 10*3/mm3 6.35   NEUTROS ABS 10*3/mm3 3.90   HEMOGLOBIN g/dL 14.0 "   HEMATOCRIT % 43.2   PLATELETS 10*3/mm3 163           Results from last 7 days   Lab Units 02/19/24  1110   SODIUM mmol/L 140   POTASSIUM mmol/L 3.8   CHLORIDE mmol/L 104   CO2 mmol/L 28.7   BUN mg/dL 12   CREATININE mg/dL 1.08   CALCIUM mg/dL 9.9   ALBUMIN g/dL 4.0   BILIRUBIN mg/dL 0.5   ALK PHOS U/L 55   ALT (SGPT) U/L 9   AST (SGOT) U/L 18   GLUCOSE mg/dL 103*                       Assessment & Plan     78 y.o. male  with medical issues including type 2 diabetes-uncertain control, COPD, hypertension, diastolic heart failure, chronic disease, peripheral vascular disease (follow-up with vascular surgery today), previous DVT on anticoagulation (home monitoring), previous IVC filter placement?,  Status post September 2021 partial small bowel obstruction secondary to sigmoid diverticulitis treated medically.     1.   T1b N1 M0-stage IIb lung cancer.  right lung base nodule noted on scan at that point and in follow-up with primary care had developed a left inguinal hernia with repair planned through a different general surgeon then seen with his initial sigmoid diverticulitis.  PET scan 7/13/2022 demonstrates a hypermetabolic spiculated lesion medial aspect of the left lobe adjacent to descending thoracic aorta measuring1.5 x 1.6 SUV 9.3 with an enlarged hypermetabolic left hilar lymph node measured 1.5 x 1.3 with SUV of 12.1, additional nodules in the lateral aspect right lower lobe and micronodule in the posterior/medial right lower lobe and also additional right lower lobe micronodule.  There is an infrarenal abdominal aortic aneurysm measuring 3.4 cm with a short segment of chronic dissection present.  Clinical findings would be consistent with a possible T1b N1 M0-stage IIb lung cancer.  discussed in thoracic conference 7/20/2022.  Recommendations to proceed with pulmonary assessment with EBUS.  The patient proceeded to undergo his hernia surgery 8/2/2022 by Dr. Dotson.   Guardant 360 that T p53 splice site  mutation is present and would certainly be consistent as well the most common mutations found in lung cancers particularly squamous cancers.  The patient was admitted 9//2022 undergoing 9/23/2022  a bronchoscopy with left video-assisted thoracoscopy with robot-assisted total decortication of the left lung, left lower lobectomy, mediastinal lymphadenectomy and intercostal nerve block with Dr. Nicola Joe.  Fortunately he did fairly well postoperatively though did develop atrial fibrillation with RVR treated with amiodarone converting back to sinus rhythm, treated with IV metoprolol subsequent chronic anticoagulation.  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  Notably there is invasive carcinoma present at the margin. only 2 positive lymph nodes adjacent to the bronchovesicular margin which appears to have been transected difficult to enumerate with extranodal extension present.  Options could well be Carboplatin and Taxol considering the patient's comorbidity and worry of using cisplatin-based chemotherapy in this patient followed by atezolizumab with pathology having been notified to assess PD-L1 expression on the tumor as well also await review by Caris molecular profiling.  Finally radiation therapy consultation be requested.   Caris analysis indicates benefit from immunotherapy at relatively high levels.  This would allow radiation therapy given with Carboplatin Taxol weekly (better tolerated) and then subsequent atezolizumab adjuvantly.  Weekly carboplatin Taxol initiated 11/28/2022 given concurrently with radiation therapy  12/5/2022 here for cycle 2 weekly carboplatin/Taxol, overall tolerating treatment quite well so far.  12/12/2022: Proceed with cycle #3 weekly carboplatin/Taxol with concurrent radiation.  Continues to tolerate treatment well.  He is next seen  12/19/2022 with H&H 11.9 and 38.5, white count 3460 and platelet count of 168,000.  He is feeling well without any significant complications of therapy except for right calf dermatitis that is been developing in the last several days.  12/28/2022 here for week 5 carbo/Taxol.  Overall tolerating well.  Today WBC 2.45, ANC 1.22, hemoglobin 10.7, platelet count 117,000.  I have reviewed with Dr. Bishop, and we will go ahead and continue with treatment.  1/4/2023: Received with cycle 6 carbo/taxol + XRT.  Continues to tolerate treatment well.  WBC improved to 2.69 with ANC 1.41.  Next 1/11/2023 with completion date 1/11/2023.  Repeat CBC now includes H&H of 11.0 and 33.9, white count 2090, platelet count 128,000, ANC is 800.  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  We have discussed that considering studies like keynote  091 that the patient's high risk disease could be addressed with additional immunotherapy including the use of Atezolizumab and/or Keytruda.  Considering his findings overall Keytruda every 3 weeks x18 cycles is to be pursued.  The patient began Keytruda as planned with his first treatment 3/20/2023.  The patient notified the office shortly after treatment that he had pain under his right rib cage and was placed back onto his Neurontin to try to manage this pain.  Approximately week later he still had the pain and also had another rash we switched him at this point to Famvir to try to completely clear this  problem.  4/3/2023.  Fortunately his recent apparent shingles activation has nearly abated and he is feeling reasonably well.  In review of the literature there is no significant difference in activation with immunotherapy though this patient may require suppression.  He is asked to complete his Famvir.  4/10/2023 cycle 2 Keytruda, overall tolerating well.   Patient seen 5/1/2023, third cycle of Keytruda, status post fourth cycle of Keytruda, 3 months of therapy, subsequent scans will need to be scheduled.  5/22/2023: Proceed with cycle 4 Keytruda.    Follow-up scans-CT of chest, abdomen and pelvis 6/8/2023 with postsurgical changes only.  7/3/2023 cycle 6 Keytruda  Proceed today, 7/24/2023 with cycle 7 Keytruda which is continued every 3 weeks   He was tolerating treatment without substantial side effects but did have sudden onset nausea and vomiting lasting 48 hours.  He then began to have joint pain bilateral knees and shoulders up to an 8 of 10 for severity.     He was demonstrating worsening hyponatremia at this point with a normal potassium.  Unfortunately his mental status began to worsen with increasing sleepiness, mild diarrhea.  7/28/2023 studies included an INR 3.34, sodium now 125 and he was admitted for his weakness and hyponatremia.  The patient was seen by several services including nephrology and oncology to the IV fluids.  He had been tested with ACT stimulation test and was diagnosed with renal sufficiency started on oral hydrocortisone.  7/29/2023 cortisol was 0.62, subsequent ACTH test was reduced at 5.7.  The patient studies 7/30 included BUN/creatinine of 9 and 0.97, sodium 130, chloride 95, calcium 5.2.  The patient is next seen 8/14/2023.  He remains somewhat weak and with joint discomfort.  We have discussed his findings that would be most consistent with central adrenal insufficiency and that dosing for his hydrocortisone-currently 10 mg p.o. every morning and 5 mg p.o. every afternoon is  likely to be too low.  In determining whether we should proceed with treatment replacement therapy could allow us to try to complete his therapy which, on balance, would be the preferred approach.  Hydrocortisone moved to 20 mg p.o. every morning and 10 mg p.o. every afternoon.  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  Replacement therapy ongoing.  9/5/2023 reviewed back today for C9 Keytruda. Patient with improved performance status following increase of hydrocortisone per above.  Improved appetite and decreased joint pain.  Proceed with treatment today with repeat imaging planned thereafter.  Repeat CT chest, abdomen, pelvis 9/18/2023 without evidence of progressive disease.  Plan to proceed with cycle #10 Keytruda.  Patient seen 10/16/2023 for cycle #11, 11/6 for cycle #12 and patient seen 11/27 for cycle #13 again out of 18 total.  Patient seen 12/18/2023 here for cycle 14 Keytruda.  Patient is doing well.  Discussed with Dr. Bishop, and plans to hold off on follow-up scans until the completion of Keytruda (, planning a total of 18 cycles).  Proceed today, 1/29/2024 with cycle 16 Keytruda  Patient seen 2/19/2024 for cycle #17 Keytruda      2.  Herpes zoster: Patient was treated with Famvir.  Rash has resolved, no issues with persistent herpetic neuralgia.  He will be placed on suppression with acyclovir 400 mg twice daily.  We discussed he will hold off on vaccination for now, given recent infection.  Patient assessed 5/1/2023, continue Keytruda, status post fifth cycle of Keytruda or 3 months of therapy he would undergo repeat scans and, if stable or improved, proceed with Shingrix vaccinations.  Patient now requested to proceed with vaccinations including RSV    3.  Hyponatremia  Likely exacerbated by recent GI toxicity with nausea vomiting and diarrhea.  Symptoms have now resolved with improvement in his appetite and  intake  Reviewed 11/27-23 with sodium 137.  Sodium is normal today, 1/29/2024  Reassessed 2/19/2024 at 140    4. Joint pain.  Baseline knee pain prior to initiation of immunotherapy though he is now exacerbated with shoulder pain as well and requiring ambulation with a walker  Additional inflammatory labs including sed rate and C-reactive protein today to evaluate for inflammation secondary to immunotherapy  Continue Tylenol and occasional Norco for breakthrough pain  Hydrocortisone replacement therapy increased to 20 mg p.o. every morning and 10 mg p.o. every afternoon  The patient denies pain today, 1/29/2024    5.  History of MGUS  Redraw [place], PE, free serum light chains-redraw will be necessary with insufficient specimen    PLAN:   Proceed with cycle 17 Keytruda today, continuing every 3 weeks with plans for total of 18 cycles.  Continue hydrocortisone 20 mg in the morning, 10 mg in the afternoon.  Continue prophylactic acyclovir 400 mg twice daily.  Return for follow-up in 3 weeks with NP follow-up, 18 cycle of Keytruda, repeat labs including IS[place]EL, PE, free serum light chains  Plan to request baseline CT chest abdomen, pelvis 2 to 3 weeks after his final therapy with MD follow-up 1 week after studies completed  Call/ return sooner should the patient develop any new concerns or problems.    Patient is on a high risk medication requiring close monitoring for toxicity.    Kayden Bishop MD  02/19/2024

## 2024-03-11 ENCOUNTER — TELEPHONE (OUTPATIENT)
Dept: ONCOLOGY | Facility: CLINIC | Age: 79
End: 2024-03-11
Payer: MEDICARE

## 2024-03-11 ENCOUNTER — INFUSION (OUTPATIENT)
Dept: ONCOLOGY | Facility: HOSPITAL | Age: 79
End: 2024-03-11
Payer: MEDICARE

## 2024-03-11 ENCOUNTER — OFFICE VISIT (OUTPATIENT)
Dept: ONCOLOGY | Facility: CLINIC | Age: 79
End: 2024-03-11
Payer: MEDICARE

## 2024-03-11 VITALS
WEIGHT: 166.8 LBS | HEIGHT: 72 IN | BODY MASS INDEX: 22.59 KG/M2 | HEART RATE: 72 BPM | SYSTOLIC BLOOD PRESSURE: 150 MMHG | OXYGEN SATURATION: 95 % | TEMPERATURE: 98.7 F | DIASTOLIC BLOOD PRESSURE: 79 MMHG | RESPIRATION RATE: 18 BRPM

## 2024-03-11 DIAGNOSIS — Z79.899 LONG-TERM USE OF HIGH-RISK MEDICATION: ICD-10-CM

## 2024-03-11 DIAGNOSIS — C7A.8 NEUROENDOCRINE CARCINOMA OF LUNG: Primary | ICD-10-CM

## 2024-03-11 DIAGNOSIS — C7A.8 NEUROENDOCRINE CARCINOMA OF LUNG: ICD-10-CM

## 2024-03-11 DIAGNOSIS — Z45.2 FITTING AND ADJUSTMENT OF VASCULAR CATHETER: ICD-10-CM

## 2024-03-11 LAB
ALBUMIN SERPL-MCNC: 3.9 G/DL (ref 3.5–5.2)
ALBUMIN/GLOB SERPL: 1.4 G/DL
ALP SERPL-CCNC: 58 U/L (ref 39–117)
ALT SERPL W P-5'-P-CCNC: 10 U/L (ref 1–41)
ANION GAP SERPL CALCULATED.3IONS-SCNC: 9.9 MMOL/L (ref 5–15)
AST SERPL-CCNC: 16 U/L (ref 1–40)
BASOPHILS # BLD AUTO: 0.04 10*3/MM3 (ref 0–0.2)
BASOPHILS NFR BLD AUTO: 0.6 % (ref 0–1.5)
BILIRUB SERPL-MCNC: 0.7 MG/DL (ref 0–1.2)
BUN SERPL-MCNC: 12 MG/DL (ref 8–23)
BUN/CREAT SERPL: 11.9 (ref 7–25)
CALCIUM SPEC-SCNC: 10 MG/DL (ref 8.6–10.5)
CHLORIDE SERPL-SCNC: 101 MMOL/L (ref 98–107)
CO2 SERPL-SCNC: 27.1 MMOL/L (ref 22–29)
CREAT SERPL-MCNC: 1.01 MG/DL (ref 0.76–1.27)
DEPRECATED RDW RBC AUTO: 50.7 FL (ref 37–54)
EGFRCR SERPLBLD CKD-EPI 2021: 77.1 ML/MIN/1.73
EOSINOPHIL # BLD AUTO: 0.2 10*3/MM3 (ref 0–0.4)
EOSINOPHIL NFR BLD AUTO: 2.8 % (ref 0.3–6.2)
ERYTHROCYTE [DISTWIDTH] IN BLOOD BY AUTOMATED COUNT: 14.5 % (ref 12.3–15.4)
GLOBULIN UR ELPH-MCNC: 2.7 GM/DL
GLUCOSE SERPL-MCNC: 180 MG/DL (ref 65–99)
HCT VFR BLD AUTO: 43.4 % (ref 37.5–51)
HGB BLD-MCNC: 14 G/DL (ref 13–17.7)
IMM GRANULOCYTES # BLD AUTO: 0.02 10*3/MM3 (ref 0–0.05)
IMM GRANULOCYTES NFR BLD AUTO: 0.3 % (ref 0–0.5)
LYMPHOCYTES # BLD AUTO: 1.11 10*3/MM3 (ref 0.7–3.1)
LYMPHOCYTES NFR BLD AUTO: 15.4 % (ref 19.6–45.3)
MAGNESIUM SERPL-MCNC: 1.7 MG/DL (ref 1.6–2.4)
MCH RBC QN AUTO: 30.8 PG (ref 26.6–33)
MCHC RBC AUTO-ENTMCNC: 32.3 G/DL (ref 31.5–35.7)
MCV RBC AUTO: 95.6 FL (ref 79–97)
MONOCYTES # BLD AUTO: 0.71 10*3/MM3 (ref 0.1–0.9)
MONOCYTES NFR BLD AUTO: 9.8 % (ref 5–12)
NEUTROPHILS NFR BLD AUTO: 5.15 10*3/MM3 (ref 1.7–7)
NEUTROPHILS NFR BLD AUTO: 71.1 % (ref 42.7–76)
NRBC BLD AUTO-RTO: 0 /100 WBC (ref 0–0.2)
PLATELET # BLD AUTO: 163 10*3/MM3 (ref 140–450)
PMV BLD AUTO: 9 FL (ref 6–12)
POTASSIUM SERPL-SCNC: 4 MMOL/L (ref 3.5–5.2)
PROT SERPL-MCNC: 6.6 G/DL (ref 6–8.5)
RBC # BLD AUTO: 4.54 10*6/MM3 (ref 4.14–5.8)
SODIUM SERPL-SCNC: 138 MMOL/L (ref 136–145)
WBC NRBC COR # BLD AUTO: 7.23 10*3/MM3 (ref 3.4–10.8)

## 2024-03-11 PROCEDURE — 85025 COMPLETE CBC W/AUTO DIFF WBC: CPT

## 2024-03-11 PROCEDURE — 96413 CHEMO IV INFUSION 1 HR: CPT

## 2024-03-11 PROCEDURE — 25810000003 SODIUM CHLORIDE 0.9 % SOLUTION: Performed by: NURSE PRACTITIONER

## 2024-03-11 PROCEDURE — 25010000002 PEMBROLIZUMAB 100 MG/4ML SOLUTION 4 ML VIAL: Performed by: NURSE PRACTITIONER

## 2024-03-11 PROCEDURE — 80053 COMPREHEN METABOLIC PANEL: CPT

## 2024-03-11 PROCEDURE — 83735 ASSAY OF MAGNESIUM: CPT | Performed by: NURSE PRACTITIONER

## 2024-03-11 PROCEDURE — 25010000002 HEPARIN LOCK FLUSH PER 10 UNITS: Performed by: NURSE PRACTITIONER

## 2024-03-11 RX ORDER — SODIUM CHLORIDE 0.9 % (FLUSH) 0.9 %
10 SYRINGE (ML) INJECTION AS NEEDED
Status: DISCONTINUED | OUTPATIENT
Start: 2024-03-11 | End: 2024-03-11 | Stop reason: HOSPADM

## 2024-03-11 RX ORDER — SODIUM CHLORIDE 9 MG/ML
250 INJECTION, SOLUTION INTRAVENOUS ONCE
Status: CANCELLED | OUTPATIENT
Start: 2024-03-11

## 2024-03-11 RX ORDER — SODIUM CHLORIDE 9 MG/ML
250 INJECTION, SOLUTION INTRAVENOUS ONCE
Status: COMPLETED | OUTPATIENT
Start: 2024-03-11 | End: 2024-03-11

## 2024-03-11 RX ORDER — SODIUM CHLORIDE 0.9 % (FLUSH) 0.9 %
10 SYRINGE (ML) INJECTION AS NEEDED
OUTPATIENT
Start: 2024-03-11

## 2024-03-11 RX ORDER — HEPARIN SODIUM (PORCINE) LOCK FLUSH IV SOLN 100 UNIT/ML 100 UNIT/ML
500 SOLUTION INTRAVENOUS AS NEEDED
Status: DISCONTINUED | OUTPATIENT
Start: 2024-03-11 | End: 2024-03-11 | Stop reason: HOSPADM

## 2024-03-11 RX ORDER — HEPARIN SODIUM (PORCINE) LOCK FLUSH IV SOLN 100 UNIT/ML 100 UNIT/ML
500 SOLUTION INTRAVENOUS AS NEEDED
OUTPATIENT
Start: 2024-03-11

## 2024-03-11 RX ADMIN — SODIUM CHLORIDE 250 ML: 9 INJECTION, SOLUTION INTRAVENOUS at 11:14

## 2024-03-11 RX ADMIN — SODIUM CHLORIDE 200 MG: 9 INJECTION, SOLUTION INTRAVENOUS at 11:23

## 2024-03-11 RX ADMIN — Medication 10 ML: at 11:53

## 2024-03-11 RX ADMIN — Medication 500 UNITS: at 11:53

## 2024-03-11 NOTE — TELEPHONE ENCOUNTER
Caller: Kate Torres    Relationship: Emergency Contact    Best call back number: [phone]      What was the call regarding: PT WAS ADVISED THAT HIS LAST INFUSION WAS TODAY AND THEY HAVE SCHEDULED ANOTHER ONE FOR HIM, PATIENT WANTING CONFIRMATION

## 2024-03-11 NOTE — PROGRESS NOTES
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  Notably there is invasive carcinoma present at the margin, 2 positive lymph nodes adjacent to the bronchovesicular margin which appears to have been transected difficult to enumerate with extranodal extension present.  *Patient status post chemo RT-11/28/2022, radiation therapy completion date 1/11/2023  *Immunotherapy-Keytruda to be initiated 3/20/2023  *Follow-up repeat scans 9/18/2023 without evidence of recurrent disease      History of Present Illness      The patient is a 78 y.o. male with the above-mentioned history who returns today 3/11/2024 for lab review and evaluation due for cycle 18 pembrolizumab.  Overall he has tolerated Keytruda quite well.  He does complain of some occasional issues with cramps in his legs.  He denies problems with diarrhea.  No issues with increased shortness of breath, and no problems with skin rash.                             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  He had been assessed particularly in the fall 2021 when he presented with nausea and vomiting and abdominal discomfort leading to assessments that revealed sigmoid diverticulitis with contained rupture and partial small bowel obstruction.  Records from mid September 21 indicating that he was admitted with partial small bowel obstruction and treated medically with very slow recovery.  He has history of COPD with CT demonstrating a pulmonary nodule in the right lung base at 7  mm with follow-up planned.  He was seen by general surgery in later September with repeat scans planned and repeat CT abdomen 10/7/2021 did demonstrate interval improvement of sigmoid diverticulitis.      Record indicates an assessment in late June 2022 at different general surgeon for left inguinal hernia, history of heavy tobacco use with COPD and recommendation for surgical repair of his hernia      The patient underwent a CT scan of the chest 5/7/2022 demonstrating diffuse emphysematous changes, ill-defined density measuring 3 mm in the superior segment of the right lower lobe, multiple ill-defined 3 to 4 mm nodular density thought to be inflammatory involving the right lower lobe and a new spiculated nodule involving the left lower lobe measuring 16 x 14 mm as well as left hilar adenopathy.       Follow-up PET/CT 7/13/2022 reveal a hypermetabolic spiculated lesion medial aspect the left lower lobe adjacent to descending thoracic aorta measuring 1.5 x 1.6 SUV 9.3 with an enlarged hypermetabolic left hilar lymph node measured 1.5 x 1.3 with SUV of 12.1, additional nodules in the lateral aspect right lower lobe and micronodule in the posterior/medial right lower lobe and also additional right lower lobe micronodule.  There is an infrarenal abdominal aortic aneurysm measuring 3.4 cm with a short segment of chronic dissection present.    These clinical findings would be consistent with a possible T1b N1 M0-stage IIb lung cancer.      Recognizing a diagnosis has not been made his case was discussed in thoracic conference 7/20/2022.  Recommendations include proceeding to pulmonary assessment with EBUS and the patient undergoing a general assessment per his clinical status-older adult assessment as well as assessment by thoracic surgery.  These issues are discussed with the patient and his wife in detail when they are seen 7/28/2022.    The patient proceeded to undergo his hernia surgery 8/2/2022 by Dr. Dotson.   Additionally the patient was unable to undergo assessment by pulmonary until October and, thereafter, was scheduled to see Dr. Joe 8/18/2022 undergoing older adult functional assessment with a JAGUAR score of 11 indicating a risk of functional decline related to cancer therapy.    The patient is seen with his significant other 8/15/2022 and doing very well with recent hernia surgery.  His performance status is reasonably good at present and we have learned now that he would not be able to see pulmonary medicine until October, thoracic surgery is scheduled this week with Dr. Joe.  Perhaps he could be a surgical candidate.  Particularly we know by guardant 360 that T p53 splice site mutation is present and would certainly be consistent as well the most common mutations found in lung cancers particularly squamous cancers.  We have discussed obtaining pulmonary functions at this point, thoracic surgery assessment this week and also restarting Chantix as possible for the patient.    There was difficulty obtaining Chantix and while this was being assessed the patient was reviewed 8/18 by thoracic surgery.  He was felt to have a T1b N1 M0 stage IIb carcinoma left lower lobe along and not a candidate for biopsy.  PFTs were borderline, functional assessment was reasonably good and the patient was felt to be a candidate for robot-assisted biopsy of his nodes and if malignant proceed to left lower lobe lobectomy.  He was reviewed 9/8 and offered 21 mg nicotine transdermal patching.    He is next seen 9/10/2022.  He is seen with his wife and both are prepared for surgery 9/23/2022.  We discussed that additional therapy may be considered once we have more information about the type and stage.  We would hope to see him postoperatively.    The patient was admitted 9//2022 undergoing 9/23/2022  a bronchoscopy with left video-assisted thoracoscopy with robot-assisted total decortication of the left lung, left lower  lobectomy, mediastinal lymphadenectomy and intercostal nerve block with Dr. Nicola Joe.  Fortunately he did fairly well postoperatively though did develop atrial fibrillation with RVR treated with amiodarone converting back to sinus rhythm, treated with IV metoprolol subsequent chronic anticoagulation.  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  Notably there is invasive carcinoma present at the margin.  Is a, and only 2 positive lymph nodes adjacent to the bronchovesicular margin which appears to have been transected difficult to enumerate with extranodal extension present.       The patient is seen 10/27/2022.  He is recovering well from surgery, albeit slowly, and we have discussed his findings that are concerning as to residual disease with a positive margin described as above.  There is also the significance potentially of PD-L1 expression which has been described as producing a poor survival.     Nivolumab has been used as second line therapy to positive effect in the disease and this begs the question as to whether adjuvant therapy plus immunotherapy could be utilized though the patient would be difficult to treat with platinum based chemotherapy as well.       The patient is next assessed 11/7/2022 for significant assessments by supportive oncology at [facility] as well as with plans to see Dr. Marrero 11/9/2022.  Unfortunately he has been very slow to gradually recover from the effects of surgery with reduced appetite and only fair performance status.     At present it would be difficult to give him cisplatin based chemotherapy and we discussed whether we might be able to use Tecentriq (atezolizumab) as his primary adjuvant therapy.  We have contacted pathology about completing Caris testing and a PD-L1 analysis that has not yet completed.         The patient is seen, however,  11/16/2022 and his genomic analysis has returned as being significant for broad sensitivity to immunotherapy.  This would argue for the administration of weekly Carboplatin/Taxol as the patient proceeds to radiation therapy and upon completion, then using Tecentriq as further adjuvant therapy over a year.  This is discussed with the patient and his  in detail as well as discussed with Dr. Marrero of radiation therapy.  We have also discussed the patient require port placement.    The patient proceeded to treatment taking weekly carboplatin Taxol with concurrent radiation therapy last seen 12/12/2022 with third weekly treatment.    He is next seen 12/19/2022 with H&H 11.9 and 38.5, white count 3460 and platelet count of 168,000.  He is feeling well without any significant complications of therapy except for right calf dermatitis that is been developing in the last several days.    The patient continued therapy taking CarboTaxol weekly including 12/28 and 1/4/2023.    His is next seen 1/11/2023 with completion date for radiation therapy 1/11/2023.  Repeat CBC now includes H&H of 11.0 and 33.9, white count 2090, platelet count 128,000, ANC is 800.  He, fortunately, is tolerating treatment well and we have again discussed with him adjuvant immunotherapy would be useful including Tecentriq versus pembrolizumab-keynote 091 study particularly in tumor programmed cell death PD-L1 greater than 50%.    The patient will not be given additional chemotherapy 1/11/2023 we will plan to reassess by follow-up scans in the next 6 weeks CT chest and pelvis making a decision thereafter about adjuvant immunotherapy.    Patient proceeded to undergo follow-up scans 2/24/2023 showing no evidence of recurrent disease including his findings of left lower lobectomy, stable 11 m nodule opacity in the base of the right lower lobe in the right lung apex, small left pleural effusion mild to moderate stenosis of the proximal subclavian  artery.    We have discussed that considering studies like keynote  091 that the patient's high risk disease could be addressed with additional immunotherapy including the use of Atezolizumab and/or Keytruda.  Considering his findings overall Keytruda every 3 weeks x18 cycles is to be pursued.    The patient was seen 3/20/2023, discussed initiation of Keytruda.  He is agreeable to proceed.    The patient notified the office shortly after treatment that he had pain under his right rib cage and was placed back onto his Neurontin to try to manage this pain.  Approximately week later he still had the pain and also had another rash we switched him at this point to Famvir to try to completely clear this problem.    He is next seen back 4/3/2023.  Fortunately his recent apparent shingles activation has nearly abated and he is feeling reasonably well.  In review of the literature there is no significant difference in activation with immunotherapy though this patient may require suppression.  I have asked him to complete his Famvir at this point.    Patient assessed 4/10/2023 with resolution of his shingles, subsequently placed on maintenance acyclovir, consideration of shingles vaccination post 3 months of Keytruda therapy is stable disease documented.    The patient underwent a CT chest abdomen pelvis 6/8/2023 that demonstrates postsurgical changes of the left hemithorax, stable pulmonary nodules, stable infrarenal abdominal aortic aneurysm.    He is next seen 6/12/2023.  We have discussed continue with his Keytruda with his tolerance being excellent.  He will also reduce his current metoprolol to 12.5 mg p.o. daily.    He continued Keytruda and was seen in the office 7/24/2023 in anticipation of his 7th dose of Keytruda.  He was tolerating treatment without substantial side effects but did have sudden onset nausea and vomiting lasting 48 hours.  He then began to have joint pain bilateral knees and shoulders up to an 8 of  10 for severity.       He was demonstrating worsening hyponatremia at this point with a normal potassium.  Unfortunately his mental status began to worsen with increasing sleepiness, mild diarrhea.  7/28/2023 studies included an INR 3.34, sodium now 125 and he was admitted for his weakness and hyponatremia.    The patient was seen by several services including nephrology and oncology to the IV fluids.  He had been tested with ACT stimulation test and was diagnosed with renal sufficiency started on oral hydrocortisone.  7/29/2023 cortisol was 0.62, subsequent ACTH test was reduced at 5.7.  The patient studies 7/30 included BUN/creatinine of 9 and 0.97, sodium 130, chloride 95, calcium 5.2.      The patient is next seen 8/14/2023.  He remains somewhat weak and with joint discomfort.  We have discussed his findings that would be most consistent with central adrenal insufficiency and that dosing for his hydrocortisone-currently 10 mg p.o. every morning and 5 mg p.o. every afternoon is likely to be too low.  In determining whether we should proceed with treatment replacement therapy could allow us to try to complete his therapy which, on balance, would be the preferred approach.    The patient is seen 9/21/2023 improved per performance status with cortisone replacement therapy, subsequent CT of chest on pelvis 9/18/2023 without evidence of recurrent disease, pembrolizumab continued with plans to reassess likely in December 2023 or at the completion of therapy.    Patient seen 2/19/2024 for his 17 cycle of Keytruda as patient approaches his completion of immunotherapy in 3 weeks.  Baseline scans will be requested after follow-up visit in 3 weeks.      Past Medical History:   Diagnosis Date    Arthritis     COPD (chronic obstructive pulmonary disease)     O2 3L AT HS    Diabetes mellitus     DIET CONTROL    Diverticulitis     DVT, lower extremity     RLE    Elevated cholesterol     H/O Cervical pain     History of colitis      Hyperlipidemia     Hypertension     Left shoulder pain     Low back pain     Lung cancer 2022    LEFT LUNG    On anticoagulant therapy     Peripheral arterial disease     Right leg claudication     Skin cancer     HX    Type 2 diabetes mellitus     Vitamin D deficiency         Past Surgical History:   Procedure Laterality Date    BALLOON ANGIOPLASTY, ARTERY Right 2015    IR Percutaneious IVC filter placement    INGUINAL HERNIA REPAIR Left     KNEE ARTHROSCOPY Left     Torn meniscus    LOBECTOMY Left 09/23/2022    Procedure: BRONCHOSCOPY, LEFT VIDEO ASSISTED THORACOSCOPY, ROBOT ASSISTED TOTAL DECORTICATION  LEFT LUNG, LEFT LOWER LOBE LOBECTOMY, MEDIASTINAL LYMPHECTOMY, INTERCOSTAL NERVE BLOCK;  Surgeon: Nicola Joe III, MD;  Location: [facility];  Service: Robotics - DaVinci;  Laterality: Left;    VENOUS ACCESS DEVICE (PORT) INSERTION Right 11/21/2022    Procedure: INSERTION VENOUS ACCESS DEVICE;  Surgeon: Nicola Joe III, MD;  Location: [facility];  Service: Thoracic;  Laterality: Right;        Current Outpatient Medications on File Prior to Visit   Medication Sig Dispense Refill    arformoterol (BROVANA) 15 MCG/2ML nebulizer solution Take 2 mL by nebulization 2 (Two) Times a Day. Indications: Chronic Obstructive Lung Disease      Budeson-Glycopyrrol-Formoterol (Breztri Aerosphere) 160-9-4.8 MCG/ACT aerosol inhaler Inhale 2 puffs.      cetirizine (zyrTEC) 10 MG tablet Take 1 tablet by mouth Daily. Indications: Hayfever      Cholecalciferol 50 MCG (2000 UT) capsule Take 1 capsule by mouth Daily. Indications: Vitamin D Deficiency      clotrimazole (LOTRIMIN) 1 % cream Apply  topically to the appropriate area as directed.      fluticasone (FLONASE) 50 MCG/ACT nasal spray 1 spray into the nostril(s) as directed by provider Daily. Indications: Allergic Rhinitis      hydrocortisone (CORTEF) 10 MG tablet Take 2 tablets in the morning and 1 tablet in the afternoon plus additional medication for acute  illness up to 60 mg daily 360 tablet 3    isosorbide mononitrate (IMDUR) 60 MG 24 hr tablet Take 1 tablet by mouth Every Evening. Indications: hypertension      ondansetron (Zofran) 8 MG tablet Take 1 tablet by mouth Every 8 (Eight) Hours As Needed for Nausea or Vomiting. 30 tablet 1    sildenafil (REVATIO) 20 MG tablet Take 1 tablet by mouth.      simvastatin (ZOCOR) 20 MG tablet Take 1 tablet by mouth Every Night. Indications: High Amount of Fats in the Blood      tamsulosin (FLOMAX) 0.4 MG capsule 24 hr capsule Take 1 capsule by mouth Daily. 30 capsule 5    triamcinolone (KENALOG) 0.5 % cream Apply  topically to the appropriate area as directed.      warfarin (COUMADIN) 5 MG tablet Take 1 tablet by mouth Daily. Take 10mg on 9/29/22 and then resume regular home schedule. (Patient taking differently: Take 1 tablet by mouth Daily. 5mg daily with additional 5mg on Monday and Friday)      losartan (COZAAR) 25 MG tablet Take 1 tablet by mouth Daily.       No current facility-administered medications on file prior to visit.        ALLERGIES:    Allergies   Allergen Reactions    Benadryl [Diphenhydramine] Other (See Comments)     Extreme restlessness and insomnia        Social History     Socioeconomic History    Marital status:     Years of education: 1 year college   Tobacco Use    Smoking status: Every Day     Current packs/day: 0.50     Average packs/day: 0.5 packs/day for 61.2 years (30.6 ttl pk-yrs)     Types: Cigarettes     Start date: 1963    Smokeless tobacco: Never    Tobacco comments:     9/8/22: Down to Less than 1 PPD   Vaping Use    Vaping status: Never Used   Substance and Sexual Activity    Alcohol use: Not Currently    Drug use: Never    Sexual activity: Not Currently     Partners: Female        Family History   Problem Relation Age of Onset    No Known Problems Mother     Cancer Father     Lung cancer Father     Diabetes Brother     Other Daughter         S/P stent, age 42    No Known Problems  "Son     Diabetes Brother     Kidney disease Brother     Malig Hyperthermia Neg Hx         Review of Systems   ROS as per HPI    Objective     Vitals:    03/11/24 1103   BP: 150/79   Pulse: 72   Resp: 18   Temp: 98.7 °F (37.1 °C)   TempSrc: Temporal   SpO2: 95%   Weight: 75.7 kg (166 lb 12.8 oz)   Height: 182.9 cm (72.01\")   PainSc: 0-No pain           3/11/2024    10:20 AM   Current Status   ECOG score 0     Physical Exam  Vitals reviewed.   Constitutional:       General: He is not in acute distress.     Appearance: Normal appearance. He is well-developed.   HENT:      Head: Normocephalic and atraumatic.   Eyes:      Pupils: Pupils are equal, round, and reactive to light.   Cardiovascular:      Rate and Rhythm: Normal rate and regular rhythm.      Heart sounds: Normal heart sounds. No murmur heard.  Pulmonary:      Effort: Pulmonary effort is normal. No respiratory distress.      Breath sounds: Normal breath sounds. No wheezing, rhonchi or rales.   Abdominal:      General: Bowel sounds are normal. There is no distension.      Palpations: Abdomen is soft.   Musculoskeletal:         General: Normal range of motion.      Cervical back: Normal range of motion.   Skin:     General: Skin is warm and dry.      Findings: No rash.   Neurological:      Mental Status: He is alert and oriented to person, place, and time.         I have reexamined the patient and the results are consistent with the previously documented exam. GARCÍA Moreau      RECENT LABS:  Results from last 7 days   Lab Units 03/11/24  1006   WBC 10*3/mm3 7.23   NEUTROS ABS 10*3/mm3 5.15   HEMOGLOBIN g/dL 14.0   HEMATOCRIT % 43.4   PLATELETS 10*3/mm3 163           Results from last 7 days   Lab Units 03/11/24  1006   SODIUM mmol/L 138   POTASSIUM mmol/L 4.0   CHLORIDE mmol/L 101   CO2 mmol/L 27.1   BUN mg/dL 12   CREATININE mg/dL 1.01   CALCIUM mg/dL 10.0   ALBUMIN g/dL 3.9   BILIRUBIN mg/dL 0.7   ALK PHOS U/L 58   ALT (SGPT) U/L 10   AST (SGOT) " U/L 16   GLUCOSE mg/dL 180*                       Assessment & Plan     78 y.o. male  with medical issues including type 2 diabetes-uncertain control, COPD, hypertension, diastolic heart failure, chronic disease, peripheral vascular disease (follow-up with vascular surgery today), previous DVT on anticoagulation (home monitoring), previous IVC filter placement?,  Status post September 2021 partial small bowel obstruction secondary to sigmoid diverticulitis treated medically.     1.   T1b N1 M0-stage IIb lung cancer.  right lung base nodule noted on scan at that point and in follow-up with primary care had developed a left inguinal hernia with repair planned through a different general surgeon then seen with his initial sigmoid diverticulitis.  PET scan 7/13/2022 demonstrates a hypermetabolic spiculated lesion medial aspect of the left lobe adjacent to descending thoracic aorta measuring1.5 x 1.6 SUV 9.3 with an enlarged hypermetabolic left hilar lymph node measured 1.5 x 1.3 with SUV of 12.1, additional nodules in the lateral aspect right lower lobe and micronodule in the posterior/medial right lower lobe and also additional right lower lobe micronodule.  There is an infrarenal abdominal aortic aneurysm measuring 3.4 cm with a short segment of chronic dissection present.  Clinical findings would be consistent with a possible T1b N1 M0-stage IIb lung cancer.  discussed in thoracic conference 7/20/2022.  Recommendations to proceed with pulmonary assessment with EBUS.  The patient proceeded to undergo his hernia surgery 8/2/2022 by Dr. Dotson.   Guardant 360 that T p53 splice site mutation is present and would certainly be consistent as well the most common mutations found in lung cancers particularly squamous cancers.  The patient was admitted 9//2022 undergoing 9/23/2022  a bronchoscopy with left video-assisted thoracoscopy with robot-assisted total decortication of the left lung, left lower lobectomy,  mediastinal lymphadenectomy and intercostal nerve block with Dr. Nicola Joe.  Fortunately he did fairly well postoperatively though did develop atrial fibrillation with RVR treated with amiodarone converting back to sinus rhythm, treated with IV metoprolol subsequent chronic anticoagulation.  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  Notably there is invasive carcinoma present at the margin. only 2 positive lymph nodes adjacent to the bronchovesicular margin which appears to have been transected difficult to enumerate with extranodal extension present.  Options could well be Carboplatin and Taxol considering the patient's comorbidity and worry of using cisplatin-based chemotherapy in this patient followed by atezolizumab with pathology having been notified to assess PD-L1 expression on the tumor as well also await review by Caris molecular profiling.  Finally radiation therapy consultation be requested.   Caris analysis indicates benefit from immunotherapy at relatively high levels.  This would allow radiation therapy given with Carboplatin Taxol weekly (better tolerated) and then subsequent atezolizumab adjuvantly.  Weekly carboplatin Taxol initiated 11/28/2022 given concurrently with radiation therapy  12/5/2022 here for cycle 2 weekly carboplatin/Taxol, overall tolerating treatment quite well so far.  12/12/2022: Proceed with cycle #3 weekly carboplatin/Taxol with concurrent radiation.  Continues to tolerate treatment well.  He is next seen 12/19/2022 with H&H 11.9 and 38.5, white count 3460 and platelet count of 168,000.  He is feeling well without any significant complications of therapy except for right calf dermatitis that is been developing in the last several days.  12/28/2022 here for week 5 carbo/Taxol.  Overall tolerating well.  Today WBC 2.45, ANC 1.22, hemoglobin 10.7,  platelet count 117,000.  I have reviewed with Dr. Bishop, and we will go ahead and continue with treatment.  1/4/2023: Received with cycle 6 carbo/taxol + XRT.  Continues to tolerate treatment well.  WBC improved to 2.69 with ANC 1.41.  Next 1/11/2023 with completion date 1/11/2023.  Repeat CBC now includes H&H of 11.0 and 33.9, white count 2090, platelet count 128,000, ANC is 800.  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  We have discussed that considering studies like keynote  091 that the patient's high risk disease could be addressed with additional immunotherapy including the use of Atezolizumab and/or Keytruda.  Considering his findings overall Keytruda every 3 weeks x18 cycles is to be pursued.  The patient began Keytruda as planned with his first treatment 3/20/2023.  The patient notified the office shortly after treatment that he had pain under his right rib cage and was placed back onto his Neurontin to try to manage this pain.  Approximately week later he still had the pain and also had another rash we switched him at this point to Famvir to try to completely clear this problem.  4/3/2023.  Fortunately his recent apparent shingles activation has nearly abated and he is feeling reasonably well.  In review of the literature there is no significant difference in activation with immunotherapy though this patient may require suppression.  He is asked to complete his Famvir.  4/10/2023 cycle 2 Keytruda, overall  tolerating well.   Patient seen 5/1/2023, third cycle of Keytruda, status post fourth cycle of Keytruda, 3 months of therapy, subsequent scans will need to be scheduled.  5/22/2023: Proceed with cycle 4 Keytruda.    Follow-up scans-CT of chest, abdomen and pelvis 6/8/2023 with postsurgical changes only.  7/3/2023 cycle 6 Keytruda  Proceed today, 7/24/2023 with cycle 7 Keytruda which is continued every 3 weeks   He was tolerating treatment without substantial side effects but did have sudden onset nausea and vomiting lasting 48 hours.  He then began to have joint pain bilateral knees and shoulders up to an 8 of 10 for severity.     He was demonstrating worsening hyponatremia at this point with a normal potassium.  Unfortunately his mental status began to worsen with increasing sleepiness, mild diarrhea.  7/28/2023 studies included an INR 3.34, sodium now 125 and he was admitted for his weakness and hyponatremia.  The patient was seen by several services including nephrology and oncology to the IV fluids.  He had been tested with ACT stimulation test and was diagnosed with renal sufficiency started on oral hydrocortisone.  7/29/2023 cortisol was 0.62, subsequent ACTH test was reduced at 5.7.  The patient studies 7/30 included BUN/creatinine of 9 and 0.97, sodium 130, chloride 95, calcium 5.2.  The patient is next seen 8/14/2023.  He remains somewhat weak and with joint discomfort.  We have discussed his findings that would be most consistent with central adrenal insufficiency and that dosing for his hydrocortisone-currently 10 mg p.o. every morning and 5 mg p.o. every afternoon is likely to be too low.  In determining whether we should proceed with treatment replacement therapy could allow us to try to complete his therapy which, on balance, would be the preferred approach.  Hydrocortisone moved to 20 mg p.o. every morning and 10 mg p.o. every afternoon.  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  Replacement therapy ongoing.  9/5/2023 reviewed back today for C9 Keytruda. Patient with improved performance status following increase of hydrocortisone per above.  Improved appetite and decreased joint pain.  Proceed with treatment today with repeat imaging planned thereafter.  Repeat CT chest, abdomen, pelvis 9/18/2023 without evidence of progressive disease.  Plan to proceed with cycle #10 Keytruda.  Patient seen 10/16/2023 for cycle #11, 11/6 for cycle #12 and patient seen 11/27 for cycle #13 again out of 18 total.  Patient seen 12/18/2023 here for cycle 14 Keytruda.  Patient is doing well.  Discussed with Dr. Bishop, and plans to hold off on follow-up scans until the completion of Keytruda (, planning a total of 18 cycles).  Proceed today, 1/29/2024 with cycle 16 Keytruda  Patient seen 2/19/2024 for cycle #17 Keytruda  3/11/2020 for cycle 18 Keytruda.  Will schedule patient for follow-up scans after today's treatment.      2.  Herpes zoster: Patient was treated with Famvir.  Rash has resolved, no issues with persistent herpetic neuralgia.  He will be placed on suppression with acyclovir 400 mg twice daily.  We discussed he will hold off on vaccination for now, given recent infection.  Patient assessed 5/1/2023, continue Keytruda, status post fifth cycle of Keytruda or 3 months of therapy he would undergo repeat scans and, if stable or improved, proceed with Shingrix vaccinations.  Patient now requested to proceed with vaccinations including RSV    3.  Hyponatremia  Likely exacerbated by recent GI toxicity with nausea vomiting and diarrhea.  Symptoms have now resolved with improvement in his appetite and intake  Reviewed 11/27-23 with sodium 137.  Sodium is normal today, 1/29/2024  Reassessed 2/19/2024 at 140  3/11/2024 sodium 138    4. Joint pain.  Baseline knee pain prior to initiation of immunotherapy though he is now exacerbated with shoulder  pain as well and requiring ambulation with a walker  Additional inflammatory labs including sed rate and C-reactive protein today to evaluate for inflammation secondary to immunotherapy  Continue Tylenol and occasional Norco for breakthrough pain  Hydrocortisone replacement therapy increased to 20 mg p.o. every morning and 10 mg p.o. every afternoon  The patient denies pain today, 1/29/2024    5.  History of MGUS  Redraw [place], PE, free serum light chains-redraw will be necessary with insufficient specimen    PLAN:   Proceed with cycle 18 Keytruda today.  Continue hydrocortisone 20 mg in the morning, 10 mg in the afternoon.  Check magnesium level today.  Continue prophylactic acyclovir 4 mg twice daily.  In 2 weeks schedule patient for CT scan of the chest, abdomen, and pelvis.  Follow-up in 3 weeks with Dr. Bishop to review scan results with repeat CBC and CMP.    Call/ return sooner should the patient develop any new concerns or problems.    Patient is on a high risk medication requiring close monitoring for toxicity.    Rama Mario, APRN  03/11/2024

## 2024-03-13 LAB
ALBUMIN SERPL ELPH-MCNC: 3.6 G/DL (ref 2.9–4.4)
ALBUMIN/GLOB SERPL: 1.3 {RATIO} (ref 0.7–1.7)
ALPHA1 GLOB SERPL ELPH-MCNC: 0.2 G/DL (ref 0–0.4)
ALPHA2 GLOB SERPL ELPH-MCNC: 0.7 G/DL (ref 0.4–1)
B-GLOBULIN SERPL ELPH-MCNC: 0.8 G/DL (ref 0.7–1.3)
GAMMA GLOB SERPL ELPH-MCNC: 1 G/DL (ref 0.4–1.8)
GLOBULIN SER-MCNC: 2.8 G/DL (ref 2.2–3.9)
IGA SERPL-MCNC: 159 MG/DL (ref 61–437)
IGG SERPL-MCNC: 908 MG/DL (ref 603–1613)
IGM SERPL-MCNC: 228 MG/DL (ref 15–143)
INTERPRETATION SERPL IEP-IMP: ABNORMAL
KAPPA LC FREE SER-MCNC: 192 MG/L (ref 3.3–19.4)
KAPPA LC FREE/LAMBDA FREE SER: 13.81 {RATIO} (ref 0.26–1.65)
LABORATORY COMMENT REPORT: ABNORMAL
LAMBDA LC FREE SERPL-MCNC: 13.9 MG/L (ref 5.7–26.3)
M PROTEIN SERPL ELPH-MCNC: 0.3 G/DL
PROT SERPL-MCNC: 6.4 G/DL (ref 6–8.5)

## 2024-03-27 ENCOUNTER — HOSPITAL ENCOUNTER (OUTPATIENT)
Dept: CT IMAGING | Facility: HOSPITAL | Age: 79
Discharge: HOME | End: 2024-03-27
Admitting: INTERNAL MEDICINE
Payer: MEDICARE

## 2024-03-27 DIAGNOSIS — C7A.8 NEUROENDOCRINE CARCINOMA OF LUNG: ICD-10-CM

## 2024-03-27 PROCEDURE — 0 DIATRIZOATE MEGLUMINE & SODIUM PER 1 ML: Performed by: INTERNAL MEDICINE

## 2024-03-27 PROCEDURE — 74177 CT ABD & PELVIS W/CONTRAST: CPT

## 2024-03-27 PROCEDURE — 71260 CT THORAX DX C+: CPT

## 2024-03-27 PROCEDURE — 25510000001 IOPAMIDOL 61 % SOLUTION: Performed by: INTERNAL MEDICINE

## 2024-03-27 RX ADMIN — DIATRIZOATE MEGLUMINE AND DIATRIZOATE SODIUM 30 ML: 660; 100 LIQUID ORAL; RECTAL at 09:37

## 2024-03-27 RX ADMIN — IOPAMIDOL 85 ML: 612 INJECTION, SOLUTION INTRAVENOUS at 09:38

## 2024-03-30 NOTE — PROGRESS NOTES
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  Notably there is invasive carcinoma present at the margin, 2 positive lymph nodes adjacent to the bronchovesicular margin which appears to have been transected difficult to enumerate with extranodal extension present.  *Patient status post chemo RT-11/28/2022, radiation therapy completion date 1/11/2023  *Immunotherapy-Keytruda to be initiated 3/20/2023  *Follow-up repeat scans 9/18/2023 without evidence of recurrent disease      History of Present Illness      The patient is a 79 y.o. male with the above-mentioned history who returned 3/11/2024 for lab review and evaluation due for cycle 18 pembrolizumab.  Overall he has tolerated Keytruda quite well.  He does complain of some occasional issues with cramps in his legs.  He denies problems with diarrhea.  No issues with increased shortness of breath, and no problems with skin rash.  We proceeded with therapy thus completing this treatment (18 cycles) and follow-up ET chest, abdomen, pelvis was obtained 3/27/2024.  This demonstrated postoperative changes from previous left lower lobectomy, scattered areas of density in the lungs compared to 6/8/2023 were stable with no new lung nodules, 3.4 cm infrarenal abdominal arctic aneurysm stable and no evidence of metastatic disease otherwise in chest, abdomen, pelvis    He is seen formally 4/1/2024.  He is doing quite well with no additional issues except for skin rash on the medial portions of his lower legs.  This is managed by dermatology treated with triamcinolone.  We discussed surveillance schedule including management of his port every 6 weeks and initial assessment via Guardant Reveal in approximately 9 weeks seeing him in 12  weeks.                             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  He had been assessed particularly in the fall 2021 when he presented with nausea and vomiting and abdominal discomfort leading to assessments that revealed sigmoid diverticulitis with contained rupture and partial small bowel obstruction.  Records from mid September 21 indicating that he was admitted with partial small bowel obstruction and treated medically with very slow recovery.  He has history of COPD with CT demonstrating a pulmonary nodule in the right lung base at 7 mm with follow-up planned.  He was seen by general surgery in later September with repeat scans planned and repeat CT abdomen 10/7/2021 did demonstrate interval improvement of sigmoid diverticulitis.      Record indicates an assessment in late June 2022 at different general surgeon for left inguinal hernia, history of heavy tobacco use with COPD and recommendation for surgical repair of his hernia      The patient underwent a CT scan of the chest 5/7/2022 demonstrating diffuse emphysematous changes, ill-defined density measuring 3 mm in the superior segment of the right lower lobe, multiple ill-defined 3 to 4 mm nodular density thought to be inflammatory involving the right lower lobe and a new spiculated nodule involving the left lower lobe measuring 16 x 14 mm as well as left hilar adenopathy.       Follow-up PET/CT 7/13/2022 reveal a hypermetabolic spiculated lesion medial aspect the left lower lobe adjacent to descending thoracic aorta measuring 1.5 x 1.6 SUV 9.3 with an enlarged hypermetabolic left hilar lymph node measured 1.5 x 1.3 with SUV of 12.1, additional nodules in the lateral aspect right lower  lobe and micronodule in the posterior/medial right lower lobe and also additional right lower lobe micronodule.  There is an infrarenal abdominal aortic aneurysm measuring 3.4 cm with a short segment of chronic dissection present.    These clinical findings would be consistent with a possible T1b N1 M0-stage IIb lung cancer.      Recognizing a diagnosis has not been made his case was discussed in thoracic conference 7/20/2022.  Recommendations include proceeding to pulmonary assessment with EBUS and the patient undergoing a general assessment per his clinical status-older adult assessment as well as assessment by thoracic surgery.  These issues are discussed with the patient and his wife in detail when they are seen 7/28/2022.    The patient proceeded to undergo his hernia surgery 8/2/2022 by Dr. Dotson.  Additionally the patient was unable to undergo assessment by pulmonary until October and, thereafter, was scheduled to see Dr. Joe 8/18/2022 undergoing older adult functional assessment with a JAGUAR score of 11 indicating a risk of functional decline related to cancer therapy.    The patient is seen with his significant other 8/15/2022 and doing very well with recent hernia surgery.  His performance status is reasonably good at present and we have learned now that he would not be able to see pulmonary medicine until October, thoracic surgery is scheduled this week with Dr. Joe.  Perhaps he could be a surgical candidate.  Particularly we know by van 360 that T p53 splice site mutation is present and would certainly be consistent as well the most common mutations found in lung cancers particularly squamous cancers.  We have discussed obtaining pulmonary functions at this point, thoracic surgery assessment this week and also restarting Chantix as possible for the patient.    There was difficulty obtaining Chantix and while this was being assessed the patient was reviewed 8/18 by thoracic surgery.  He was  felt to have a T1b N1 M0 stage IIb carcinoma left lower lobe along and not a candidate for biopsy.  PFTs were borderline, functional assessment was reasonably good and the patient was felt to be a candidate for robot-assisted biopsy of his nodes and if malignant proceed to left lower lobe lobectomy.  He was reviewed 9/8 and offered 21 mg nicotine transdermal patching.    He is next seen 9/10/2022.  He is seen with his wife and both are prepared for surgery 9/23/2022.  We discussed that additional therapy may be considered once we have more information about the type and stage.  We would hope to see him postoperatively.    The patient was admitted 9//2022 undergoing 9/23/2022  a bronchoscopy with left video-assisted thoracoscopy with robot-assisted total decortication of the left lung, left lower lobectomy, mediastinal lymphadenectomy and intercostal nerve block with Dr. Nicola Joe.  Fortunately he did fairly well postoperatively though did develop atrial fibrillation with RVR treated with amiodarone converting back to sinus rhythm, treated with IV metoprolol subsequent chronic anticoagulation.  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  Notably there is invasive carcinoma present at the margin.  Is a, and only 2 positive lymph nodes adjacent to the bronchovesicular margin which appears to have been transected difficult to enumerate with extranodal extension present.       The patient is seen 10/27/2022.  He is recovering well from surgery, albeit slowly, and we have discussed his findings that are concerning as to residual disease with a positive margin described as above.  There is also the significance potentially of PD-L1 expression which has been described as producing a poor survival.     Nivolumab has been used as second line therapy to positive effect in the disease and  this begs the question as to whether adjuvant therapy plus immunotherapy could be utilized though the patient would be difficult to treat with platinum based chemotherapy as well.       The patient is next assessed 11/7/2022 for significant assessments by supportive oncology at [facility] as well as with plans to see Dr. Marrero 11/9/2022.  Unfortunately he has been very slow to gradually recover from the effects of surgery with reduced appetite and only fair performance status.     At present it would be difficult to give him cisplatin based chemotherapy and we discussed whether we might be able to use Tecentriq (atezolizumab) as his primary adjuvant therapy.  We have contacted pathology about completing Caris testing and a PD-L1 analysis that has not yet completed.         The patient is seen, however, 11/16/2022 and his genomic analysis has returned as being significant for broad sensitivity to immunotherapy.  This would argue for the administration of weekly Carboplatin/Taxol as the patient proceeds to radiation therapy and upon completion, then using Tecentriq as further adjuvant therapy over a year.  This is discussed with the patient and his  in detail as well as discussed with Dr. Marrero of radiation therapy.  We have also discussed the patient require port placement.    The patient proceeded to treatment taking weekly carboplatin Taxol with concurrent radiation therapy last seen 12/12/2022 with third weekly treatment.    He is next seen 12/19/2022 with H&H 11.9 and 38.5, white count 3460 and platelet count of 168,000.  He is feeling well without any significant complications of therapy except for right calf dermatitis that is been developing in the last several days.    The patient continued therapy taking CarboTaxol weekly including 12/28 and 1/4/2023.    His is next seen 1/11/2023 with completion date for radiation therapy 1/11/2023.  Repeat CBC now includes H&H of 11.0 and 33.9, white count 2090, platelet  count 128,000, ANC is 800.  He, fortunately, is tolerating treatment well and we have again discussed with him adjuvant immunotherapy would be useful including Tecentriq versus pembrolizumab-keynote 091 study particularly in tumor programmed cell death PD-L1 greater than 50%.    The patient will not be given additional chemotherapy 1/11/2023 we will plan to reassess by follow-up scans in the next 6 weeks CT chest and pelvis making a decision thereafter about adjuvant immunotherapy.    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    We have discussed that considering studies like keynote  091 that the patient's high risk disease could be addressed with additional immunotherapy including the use of Atezolizumab and/or Keytruda.  Considering his findings overall Keytruda every 3 weeks x18 cycles is to be pursued.    The patient was seen 3/20/2023, discussed initiation of Keytruda.  He is agreeable to proceed.    The patient notified the office shortly after treatment that he had pain under his right rib cage and was placed back onto his Neurontin to try to manage this pain.  Approximately week later he still had the pain and also had another rash we switched him at this point to Famvir to try to completely clear this problem.    He is next seen back 4/3/2023.  Fortunately his recent apparent shingles activation has nearly abated and he is feeling reasonably well.  In review of the literature there is no significant difference in activation with immunotherapy though this patient may require suppression.  I have asked him to complete his Famvir at this point.    Patient assessed 4/10/2023 with resolution of his shingles, subsequently placed on  maintenance acyclovir, consideration of shingles vaccination post 3 months of Keytruda therapy is stable disease documented.    The patient underwent a CT chest abdomen pelvis 6/8/2023 that demonstrates postsurgical changes of the left hemithorax, stable pulmonary nodules, stable infrarenal abdominal aortic aneurysm.    He is next seen 6/12/2023.  We have discussed continue with his Keytruda with his tolerance being excellent.  He will also reduce his current metoprolol to 12.5 mg p.o. daily.    He continued Keytruda and was seen in the office 7/24/2023 in anticipation of his 7th dose of Keytruda.  He was tolerating treatment without substantial side effects but did have sudden onset nausea and vomiting lasting 48 hours.  He then began to have joint pain bilateral knees and shoulders up to an 8 of 10 for severity.       He was demonstrating worsening hyponatremia at this point with a normal potassium.  Unfortunately his mental status began to worsen with increasing sleepiness, mild diarrhea.  7/28/2023 studies included an INR 3.34, sodium now 125 and he was admitted for his weakness and hyponatremia.    The patient was seen by several services including nephrology and oncology to the IV fluids.  He had been tested with ACT stimulation test and was diagnosed with renal sufficiency started on oral hydrocortisone.  7/29/2023 cortisol was 0.62, subsequent ACTH test was reduced at 5.7.  The patient studies 7/30 included BUN/creatinine of 9 and 0.97, sodium 130, chloride 95, calcium 5.2.      The patient is next seen 8/14/2023.  He remains somewhat weak and with joint discomfort.  We have discussed his findings that would be most consistent with central adrenal insufficiency and that dosing for his hydrocortisone-currently 10 mg p.o. every morning and 5 mg p.o. every afternoon is likely to be too low.  In determining whether we should proceed with treatment replacement therapy could allow us to try to complete his therapy  which, on balance, would be the preferred approach.    The patient is seen 9/21/2023 improved per performance status with cortisone replacement therapy, subsequent CT of chest on pelvis 9/18/2023 without evidence of recurrent disease, pembrolizumab continued with plans to reassess likely in December 2023 or at the completion of therapy.    Patient seen 2/19/2024 for his 17 cycle of Keytruda as patient approaches his completion of immunotherapy in 3 weeks.  Baseline scans will be requested after follow-up visit in 3 week    We proceeded with therapy thus completing this treatment (18 cycles) and follow-up ET chest, abdomen, pelvis was obtained 3/27/2024.  This demonstrated postoperative changes from previous left lower lobectomy, scattered areas of density in the lungs compared to 6/8/2023 were stable with no new lung nodules, 3.4 cm infrarenal abdominal arctic aneurysm stable and no evidence of metastatic disease otherwise in chest, abdomen, pelvis    He is seen formally 4/1/2024.  He is doing quite well with no additional issues except for skin rash on the medial portions of his lower legs.  This is managed by dermatology treated with triamcinolone.  We discussed surveillance schedule including management of his port every 6 weeks and initial assessment via Guardant Reveal in approximately 9 weeks seeing him in 12 weeks.        Past Medical History:   Diagnosis Date    Arthritis     COPD (chronic obstructive pulmonary disease)     O2 3L AT HS    Diabetes mellitus     DIET CONTROL    Diverticulitis     DVT, lower extremity     RLE    Elevated cholesterol     H/O Cervical pain     History of colitis     Hyperlipidemia     Hypertension     Left shoulder pain     Low back pain     Lung cancer 2022    LEFT LUNG    On anticoagulant therapy     Peripheral arterial disease     Right leg claudication     Skin cancer     HX    Type 2 diabetes mellitus     Vitamin D deficiency         Past Surgical History:   Procedure  Laterality Date    BALLOON ANGIOPLASTY, ARTERY Right 2015    IR Percutaneious IVC filter placement    INGUINAL HERNIA REPAIR Left     KNEE ARTHROSCOPY Left     Torn meniscus    LOBECTOMY Left 09/23/2022    Procedure: BRONCHOSCOPY, LEFT VIDEO ASSISTED THORACOSCOPY, ROBOT ASSISTED TOTAL DECORTICATION  LEFT LUNG, LEFT LOWER LOBE LOBECTOMY, MEDIASTINAL LYMPHECTOMY, INTERCOSTAL NERVE BLOCK;  Surgeon: Nicola Joe III, MD;  Location: [facility] OR;  Service: Robotics - DaVinci;  Laterality: Left;    VENOUS ACCESS DEVICE (PORT) INSERTION Right 11/21/2022    Procedure: INSERTION VENOUS ACCESS DEVICE;  Surgeon: Nicola Joe III, MD;  Location: [facility] OR;  Service: Thoracic;  Laterality: Right;        Current Outpatient Medications on File Prior to Visit   Medication Sig Dispense Refill    albuterol (PROVENTIL) (2.5 MG/3ML) 0.083% nebulizer solution Inhale 2.5 mg.      Budeson-Glycopyrrol-Formoterol (Breztri Aerosphere) 160-9-4.8 MCG/ACT aerosol inhaler Inhale 2 puffs.      cetirizine (zyrTEC) 10 MG tablet Take 1 tablet by mouth Daily. Indications: Hayfever      Cholecalciferol 50 MCG (2000 UT) capsule Take 1 capsule by mouth Daily. Indications: Vitamin D Deficiency      fluticasone (FLONASE) 50 MCG/ACT nasal spray 1 spray into the nostril(s) as directed by provider Daily. Indications: Allergic Rhinitis      hydrocortisone (CORTEF) 10 MG tablet Take 2 tablets in the morning and 1 tablet in the afternoon plus additional medication for acute illness up to 60 mg daily 360 tablet 3    isosorbide mononitrate (IMDUR) 60 MG 24 hr tablet Take 1 tablet by mouth Every Evening. Indications: hypertension      ondansetron (Zofran) 8 MG tablet Take 1 tablet by mouth Every 8 (Eight) Hours As Needed for Nausea or Vomiting. 30 tablet 1    sildenafil (REVATIO) 20 MG tablet Take 1 tablet by mouth.      simvastatin (ZOCOR) 20 MG tablet Take 1 tablet by mouth Every Night. Indications: High Amount of Fats in the Blood       tamsulosin (FLOMAX) 0.4 MG capsule 24 hr capsule Take 1 capsule by mouth Daily. 30 capsule 5    triamcinolone (KENALOG) 0.5 % cream Apply  topically to the appropriate area as directed.      warfarin (COUMADIN) 5 MG tablet Take 1 tablet by mouth Daily. Take 10mg on 9/29/22 and then resume regular home schedule. (Patient taking differently: Take 1 tablet by mouth Daily. 5mg daily with additional 5mg on Monday and Friday)      losartan (COZAAR) 25 MG tablet Take 1 tablet by mouth Daily.      [DISCONTINUED] arformoterol (BROVANA) 15 MCG/2ML nebulizer solution Take 2 mL by nebulization 2 (Two) Times a Day. Indications: Chronic Obstructive Lung Disease      [DISCONTINUED] clotrimazole (LOTRIMIN) 1 % cream Apply  topically to the appropriate area as directed.       No current facility-administered medications on file prior to visit.        ALLERGIES:    Allergies   Allergen Reactions    Benadryl [Diphenhydramine] Other (See Comments)     Extreme restlessness and insomnia        Social History     Socioeconomic History    Marital status:     Years of education: 1 year college   Tobacco Use    Smoking status: Every Day     Current packs/day: 0.50     Average packs/day: 0.5 packs/day for 61.2 years (30.6 ttl pk-yrs)     Types: Cigarettes     Start date: 1963    Smokeless tobacco: Never    Tobacco comments:     9/8/22: Down to Less than 1 PPD   Vaping Use    Vaping status: Never Used   Substance and Sexual Activity    Alcohol use: Not Currently    Drug use: Never    Sexual activity: Not Currently     Partners: Female        Family History   Problem Relation Age of Onset    No Known Problems Mother     Cancer Father     Lung cancer Father     Diabetes Brother     Other Daughter         S/P stent, age 42    No Known Problems Son     Diabetes Brother     Kidney disease Brother     Malig Hyperthermia Neg Hx         Review of Systems   ROS as per HPI    Objective     Vitals:    04/01/24 0855   BP: 143/75   Pulse: 53  "  Resp: 18   Temp: 98.6 °F (37 °C)   TempSrc: Temporal   SpO2: 96%   Weight: 76.4 kg (168 lb 8 oz)   Height: 182.9 cm (72.01\")   PainSc: 0-No pain             4/1/2024     8:39 AM   Current Status   ECOG score 0     Physical Exam  Vitals reviewed.   Constitutional:       General: He is not in acute distress.     Appearance: Normal appearance. He is well-developed.   HENT:      Head: Normocephalic and atraumatic.   Eyes:      Pupils: Pupils are equal, round, and reactive to light.   Cardiovascular:      Rate and Rhythm: Normal rate and regular rhythm.      Heart sounds: Normal heart sounds. No murmur heard.  Pulmonary:      Effort: Pulmonary effort is normal. No respiratory distress.      Breath sounds: Normal breath sounds. No wheezing, rhonchi or rales.   Abdominal:      General: Bowel sounds are normal. There is no distension.      Palpations: Abdomen is soft.   Musculoskeletal:         General: Normal range of motion.      Cervical back: Normal range of motion.   Skin:     General: Skin is warm and dry.      Findings: No rash.   Neurological:      Mental Status: He is alert and oriented to person, place, and time.         I have reexamined the patient and the results are consistent with the previously documented exam. Kayden Bishop MD      RECENT LABS:  Results from last 7 days   Lab Units 04/01/24  0831   WBC 10*3/mm3 5.41   NEUTROS ABS 10*3/mm3 2.86   HEMOGLOBIN g/dL 14.5   HEMATOCRIT % 45.2   PLATELETS 10*3/mm3 168                                     Assessment & Plan     79 y.o. male  with medical issues including type 2 diabetes-uncertain control, COPD, hypertension, diastolic heart failure, chronic disease, peripheral vascular disease (follow-up with vascular surgery today), previous DVT on anticoagulation (home monitoring), previous IVC filter placement?,  Status post September 2021 partial small bowel obstruction secondary to sigmoid diverticulitis treated medically.     1.   T1b N1 M0-stage IIb lung " cancer.  right lung base nodule noted on scan at that point and in follow-up with primary care had developed a left inguinal hernia with repair planned through a different general surgeon then seen with his initial sigmoid diverticulitis.  PET scan 7/13/2022 demonstrates a hypermetabolic spiculated lesion medial aspect of the left lobe adjacent to descending thoracic aorta measuring1.5 x 1.6 SUV 9.3 with an enlarged hypermetabolic left hilar lymph node measured 1.5 x 1.3 with SUV of 12.1, additional nodules in the lateral aspect right lower lobe and micronodule in the posterior/medial right lower lobe and also additional right lower lobe micronodule.  There is an infrarenal abdominal aortic aneurysm measuring 3.4 cm with a short segment of chronic dissection present.  Clinical findings would be consistent with a possible T1b N1 M0-stage IIb lung cancer.  discussed in thoracic conference 7/20/2022.  Recommendations to proceed with pulmonary assessment with EBUS.  The patient proceeded to undergo his hernia surgery 8/2/2022 by Dr. Dotson.   Guardant 360 that T p53 splice site mutation is present and would certainly be consistent as well the most common mutations found in lung cancers particularly squamous cancers.  The patient was admitted 9//2022 undergoing 9/23/2022  a bronchoscopy with left video-assisted thoracoscopy with robot-assisted total decortication of the left lung, left lower lobectomy, mediastinal lymphadenectomy and intercostal nerve block with Dr. Nicola Joe.  Fortunately he did fairly well postoperatively though did develop atrial fibrillation with RVR treated with amiodarone converting back to sinus rhythm, treated with IV metoprolol subsequent chronic anticoagulation.  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  Notably there is invasive carcinoma present at  the margin. only 2 positive lymph nodes adjacent to the bronchovesicular margin which appears to have been transected difficult to enumerate with extranodal extension present.  Options could well be Carboplatin and Taxol considering the patient's comorbidity and worry of using cisplatin-based chemotherapy in this patient followed by atezolizumab with pathology having been notified to assess PD-L1 expression on the tumor as well also await review by Caris molecular profiling.  Finally radiation therapy consultation be requested.   Caris analysis indicates benefit from immunotherapy at relatively high levels.  This would allow radiation therapy given with Carboplatin Taxol weekly (better tolerated) and then subsequent atezolizumab adjuvantly.  Weekly carboplatin Taxol initiated 11/28/2022 given concurrently with radiation therapy  12/5/2022 here for cycle 2 weekly carboplatin/Taxol, overall tolerating treatment quite well so far.  12/12/2022: Proceed with cycle #3 weekly carboplatin/Taxol with concurrent radiation.  Continues to tolerate treatment well.  He is next seen 12/19/2022 with H&H 11.9 and 38.5, white count 3460 and platelet count of 168,000.  He is feeling well without any significant complications of therapy except for right calf dermatitis that is been developing in the last several days.  12/28/2022 here for week 5 carbo/Taxol.  Overall tolerating well.  Today WBC 2.45, ANC 1.22, hemoglobin 10.7, platelet count 117,000.  I have reviewed with Dr. Bishop, and we will go ahead and continue with treatment.  1/4/2023: Received with cycle 6 carbo/taxol + XRT.  Continues to tolerate treatment well.  WBC improved to 2.69 with ANC 1.41.  Next 1/11/2023 with completion date 1/11/2023.  Repeat CBC now includes H&H of 11.0 and 33.9, white count 2090, platelet count 128,000, ANC is 800.  He, fortunately, is tolerating treatment well and we have again discussed with himadjuvant immunotherapy would be useful including  Tecentriq versus pembrolizumab-keynote 091 study particularly in tumor programmed cell death PD-L1 greater than 50%.  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  We have discussed that considering studies like keynote  091 that the patient's high risk disease could be addressed with additional immunotherapy including the use of Atezolizumab and/or Keytruda.  Considering his findings overall Keytruda every 3 weeks x18 cycles is to be pursued.  The patient began Keytruda as planned with his first treatment 3/20/2023.  The patient notified the office shortly after treatment that he had pain under his right rib cage and was placed back onto his Neurontin to try to manage this pain.  Approximately week later he still had the pain and also had another rash we switched him at this point to Famvir to try to completely clear this problem.  4/3/2023.  Fortunately his recent apparent shingles activation has nearly abated and he is feeling reasonably well.  In review of the literature there is no significant difference in activation with immunotherapy though this patient may require suppression.  He is asked to complete his Famvir.  4/10/2023 cycle 2 Keytruda, overall tolerating well.   Patient seen 5/1/2023, third cycle of Keytruda, status post fourth cycle of Keytruda, 3 months of therapy, subsequent scans will need to be scheduled.  5/22/2023: Proceed with cycle 4 Keytruda.    Follow-up scans-CT of chest, abdomen and pelvis 6/8/2023 with postsurgical changes only.  7/3/2023 cycle 6 Keytruda  Proceed today, 7/24/2023 with cycle 7 Keytruda which is continued every 3 weeks   He was tolerating treatment without substantial side effects but did have sudden onset nausea and vomiting  lasting 48 hours.  He then began to have joint pain bilateral knees and shoulders up to an 8 of 10 for severity.     He was demonstrating worsening hyponatremia at this point with a normal potassium.  Unfortunately his mental status began to worsen with increasing sleepiness, mild diarrhea.  7/28/2023 studies included an INR 3.34, sodium now 125 and he was admitted for his weakness and hyponatremia.  The patient was seen by several services including nephrology and oncology to the IV fluids.  He had been tested with ACT stimulation test and was diagnosed with renal sufficiency started on oral hydrocortisone.  7/29/2023 cortisol was 0.62, subsequent ACTH test was reduced at 5.7.  The patient studies 7/30 included BUN/creatinine of 9 and 0.97, sodium 130, chloride 95, calcium 5.2.  The patient is next seen 8/14/2023.  He remains somewhat weak and with joint discomfort.  We have discussed his findings that would be most consistent with central adrenal insufficiency and that dosing for his hydrocortisone-currently 10 mg p.o. every morning and 5 mg p.o. every afternoon is likely to be too low.  In determining whether we should proceed with treatment replacement therapy could allow us to try to complete his therapy which, on balance, would be the preferred approach.  Hydrocortisone moved to 20 mg p.o. every morning and 10 mg p.o. every afternoon.  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  Replacement therapy ongoing.  9/5/2023 reviewed back today for C9 Keytruda. Patient with improved performance status following increase of hydrocortisone per above.  Improved appetite and decreased joint pain.  Proceed with treatment today with repeat imaging planned thereafter.  Repeat CT chest, abdomen, pelvis 9/18/2023 without evidence of progressive disease.  Plan to proceed with cycle #10 Keytruda.  Patient seen 10/16/2023 for cycle #11, 11/6 for  cycle #12 and patient seen 11/27 for cycle #13 again out of 18 total.  Patient seen 12/18/2023 here for cycle 14 Keytruda.  Patient is doing well.  Discussed with Dr. Bishop, and plans to hold off on follow-up scans until the completion of Keytruda (, planning a total of 18 cycles).  Proceed today, 1/29/2024 with cycle 16 Keytruda  Patient seen 2/19/2024 for cycle #17 Keytruda  3/11/2020 for cycle 18 Keytruda.  Will schedule patient for follow-up scans after today's treatment.  We proceeded with therapy thus completing this treatment (18 cycles) and follow-up ET chest, abdomen, pelvis was obtained 3/27/2024.  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  He is doing quite well with no additional issues except for skin rash on the medial portions of his lower legs.  This is managed by dermatology treated with triamcinolone.  We discussed surveillance schedule including management of his port every 6 weeks and initial assessment via Guardant Reveal in approximately 9 weeks seeing him in 12 weeks.  As we proceed we could reassess his status in terms of adrenal insufficiency and possibly taper him from his steroids.        2.  Herpes zoster: Patient was treated with Famvir.  Rash has resolved, no issues with persistent herpetic neuralgia.  He will be placed on suppression with acyclovir 400 mg twice daily.  We discussed he will hold off on vaccination for now, given recent infection.  Patient assessed 5/1/2023, continue Keytruda, status post fifth cycle of Keytruda or 3 months of therapy he would undergo repeat scans and, if stable or improved, proceed with Shingrix vaccinations.  Patient now requested to proceed with vaccinations  including RSV    3.  Hyponatremia  Likely exacerbated by recent GI toxicity with nausea vomiting and diarrhea.  Symptoms have now resolved with improvement in his appetite and intake  Reviewed 11/27-23 with sodium 137.  Sodium is normal today, 1/29/2024  Reassessed 2/19/2024 at 140  3/11/2024 sodium 138    4. Joint pain.  Baseline knee pain prior to initiation of immunotherapy though he is now exacerbated with shoulder pain as well and requiring ambulation with a walker  Additional inflammatory labs including sed rate and C-reactive protein today to evaluate for inflammation secondary to immunotherapy  Continue Tylenol and occasional Norco for breakthrough pain  Hydrocortisone replacement therapy increased to 20 mg p.o. every morning and 10 mg p.o. every afternoon  The patient denies pain today, 1/29/2024    5.  History of MGUS  Redraw [place], PE, free serum light chains-redraw will be necessary with insufficient specimen    PLAN:   No additional immunotherapy  Continue hydrocortisone 20 mg in the morning, 10 mg in the afternoon.  Continue prophylactic acyclovir 400 mg twice daily.  6 weeks port flush  9 weeks-CBC, CMP, Guardant Reveal, cortisol level  12 weeks MD    Call/ return sooner should the patient develop any new concerns or problems.    Patient is on a high risk medication requiring close monitoring for toxicity.    Kayden Bishop MD  04/01/2024

## 2024-04-01 ENCOUNTER — INFUSION (OUTPATIENT)
Dept: ONCOLOGY | Facility: HOSPITAL | Age: 79
End: 2024-04-01
Payer: MEDICARE

## 2024-04-01 ENCOUNTER — OFFICE VISIT (OUTPATIENT)
Dept: ONCOLOGY | Facility: CLINIC | Age: 79
End: 2024-04-01
Payer: MEDICARE

## 2024-04-01 VITALS
WEIGHT: 168.5 LBS | HEART RATE: 53 BPM | BODY MASS INDEX: 22.82 KG/M2 | DIASTOLIC BLOOD PRESSURE: 75 MMHG | TEMPERATURE: 98.6 F | HEIGHT: 72 IN | OXYGEN SATURATION: 96 % | RESPIRATION RATE: 18 BRPM | SYSTOLIC BLOOD PRESSURE: 143 MMHG

## 2024-04-01 DIAGNOSIS — C7A.8 NEUROENDOCRINE CARCINOMA OF LUNG: Primary | ICD-10-CM

## 2024-04-01 DIAGNOSIS — D47.2 MGUS (MONOCLONAL GAMMOPATHY OF UNKNOWN SIGNIFICANCE): ICD-10-CM

## 2024-04-01 DIAGNOSIS — Z45.2 FITTING AND ADJUSTMENT OF VASCULAR CATHETER: Primary | ICD-10-CM

## 2024-04-01 DIAGNOSIS — C7A.8 NEUROENDOCRINE CARCINOMA OF LUNG: ICD-10-CM

## 2024-04-01 DIAGNOSIS — Z79.899 LONG-TERM USE OF HIGH-RISK MEDICATION: ICD-10-CM

## 2024-04-01 LAB
ALBUMIN SERPL-MCNC: 4.1 G/DL (ref 3.5–5.2)
ALBUMIN/GLOB SERPL: 1.6 G/DL
ALP SERPL-CCNC: 62 U/L (ref 39–117)
ALT SERPL W P-5'-P-CCNC: 10 U/L (ref 1–41)
ANION GAP SERPL CALCULATED.3IONS-SCNC: 7.4 MMOL/L (ref 5–15)
AST SERPL-CCNC: 18 U/L (ref 1–40)
BASOPHILS # BLD AUTO: 0.03 10*3/MM3 (ref 0–0.2)
BASOPHILS NFR BLD AUTO: 0.6 % (ref 0–1.5)
BILIRUB SERPL-MCNC: 0.5 MG/DL (ref 0–1.2)
BUN SERPL-MCNC: 14 MG/DL (ref 8–23)
BUN/CREAT SERPL: 12.6 (ref 7–25)
CALCIUM SPEC-SCNC: 10.3 MG/DL (ref 8.6–10.5)
CHLORIDE SERPL-SCNC: 103 MMOL/L (ref 98–107)
CO2 SERPL-SCNC: 27.6 MMOL/L (ref 22–29)
CREAT SERPL-MCNC: 1.11 MG/DL (ref 0.76–1.27)
DEPRECATED RDW RBC AUTO: 52.6 FL (ref 37–54)
EGFRCR SERPLBLD CKD-EPI 2021: 68.4 ML/MIN/1.73
EOSINOPHIL # BLD AUTO: 0.26 10*3/MM3 (ref 0–0.4)
EOSINOPHIL NFR BLD AUTO: 4.8 % (ref 0.3–6.2)
ERYTHROCYTE [DISTWIDTH] IN BLOOD BY AUTOMATED COUNT: 14.6 % (ref 12.3–15.4)
GLOBULIN UR ELPH-MCNC: 2.6 GM/DL
GLUCOSE SERPL-MCNC: 127 MG/DL (ref 65–99)
HCT VFR BLD AUTO: 45.2 % (ref 37.5–51)
HGB BLD-MCNC: 14.5 G/DL (ref 13–17.7)
IMM GRANULOCYTES # BLD AUTO: 0.03 10*3/MM3 (ref 0–0.05)
IMM GRANULOCYTES NFR BLD AUTO: 0.6 % (ref 0–0.5)
LYMPHOCYTES # BLD AUTO: 1.56 10*3/MM3 (ref 0.7–3.1)
LYMPHOCYTES NFR BLD AUTO: 28.8 % (ref 19.6–45.3)
MCH RBC QN AUTO: 30.9 PG (ref 26.6–33)
MCHC RBC AUTO-ENTMCNC: 32.1 G/DL (ref 31.5–35.7)
MCV RBC AUTO: 96.2 FL (ref 79–97)
MONOCYTES # BLD AUTO: 0.67 10*3/MM3 (ref 0.1–0.9)
MONOCYTES NFR BLD AUTO: 12.4 % (ref 5–12)
NEUTROPHILS NFR BLD AUTO: 2.86 10*3/MM3 (ref 1.7–7)
NEUTROPHILS NFR BLD AUTO: 52.8 % (ref 42.7–76)
NRBC BLD AUTO-RTO: 0 /100 WBC (ref 0–0.2)
PLATELET # BLD AUTO: 168 10*3/MM3 (ref 140–450)
PMV BLD AUTO: 9.2 FL (ref 6–12)
POTASSIUM SERPL-SCNC: 4.2 MMOL/L (ref 3.5–5.2)
PROT SERPL-MCNC: 6.7 G/DL (ref 6–8.5)
RBC # BLD AUTO: 4.7 10*6/MM3 (ref 4.14–5.8)
SODIUM SERPL-SCNC: 138 MMOL/L (ref 136–145)
WBC NRBC COR # BLD AUTO: 5.41 10*3/MM3 (ref 3.4–10.8)

## 2024-04-01 PROCEDURE — 25010000002 HEPARIN LOCK FLUSH PER 10 UNITS: Performed by: NURSE PRACTITIONER

## 2024-04-01 PROCEDURE — 1126F AMNT PAIN NOTED NONE PRSNT: CPT | Performed by: INTERNAL MEDICINE

## 2024-04-01 PROCEDURE — 99214 OFFICE O/P EST MOD 30 MIN: CPT | Performed by: INTERNAL MEDICINE

## 2024-04-01 PROCEDURE — 85025 COMPLETE CBC W/AUTO DIFF WBC: CPT

## 2024-04-01 PROCEDURE — 3077F SYST BP >= 140 MM HG: CPT | Performed by: INTERNAL MEDICINE

## 2024-04-01 PROCEDURE — 3078F DIAST BP <80 MM HG: CPT | Performed by: INTERNAL MEDICINE

## 2024-04-01 PROCEDURE — 80053 COMPREHEN METABOLIC PANEL: CPT

## 2024-04-01 PROCEDURE — 36591 DRAW BLOOD OFF VENOUS DEVICE: CPT

## 2024-04-01 RX ORDER — HEPARIN SODIUM (PORCINE) LOCK FLUSH IV SOLN 100 UNIT/ML 100 UNIT/ML
500 SOLUTION INTRAVENOUS AS NEEDED
OUTPATIENT
Start: 2024-04-01

## 2024-04-01 RX ORDER — SODIUM CHLORIDE 0.9 % (FLUSH) 0.9 %
10 SYRINGE (ML) INJECTION AS NEEDED
OUTPATIENT
Start: 2024-04-01

## 2024-04-01 RX ORDER — ALBUTEROL SULFATE 2.5 MG/3ML
2.5 SOLUTION RESPIRATORY (INHALATION)
COMMUNITY
Start: 2024-03-21 | End: 2024-04-20

## 2024-04-01 RX ORDER — HEPARIN SODIUM (PORCINE) LOCK FLUSH IV SOLN 100 UNIT/ML 100 UNIT/ML
500 SOLUTION INTRAVENOUS AS NEEDED
Status: DISCONTINUED | OUTPATIENT
Start: 2024-04-01 | End: 2024-04-01 | Stop reason: HOSPADM

## 2024-04-01 RX ORDER — SODIUM CHLORIDE 0.9 % (FLUSH) 0.9 %
10 SYRINGE (ML) INJECTION AS NEEDED
Status: DISCONTINUED | OUTPATIENT
Start: 2024-04-01 | End: 2024-04-01 | Stop reason: HOSPADM

## 2024-04-01 RX ADMIN — Medication 10 ML: at 08:42

## 2024-04-01 RX ADMIN — Medication 500 UNITS: at 08:42

## 2024-04-15 ENCOUNTER — TELEPHONE (OUTPATIENT)
Dept: ONCOLOGY | Facility: CLINIC | Age: 79
End: 2024-04-15

## 2024-04-15 NOTE — TELEPHONE ENCOUNTER
Caller: Kate Torres    Relationship: Emergency Contact    Best call back number: [phone]     What is the best time to reach you: ANYTIME    Who are you requesting to speak with (clinical staff, provider,  specific staff member): CLINICAL      What was the call regarding: CALLER SAYS THE PATIENT HAS APPT WITH DR CHU- PULMONARY, ON MAY 6, AND THEY NEED A COPY OF THE PATIENT'S LAST CT SCAN FAXED [phone]

## 2024-05-07 ENCOUNTER — TRANSCRIBE ORDERS (OUTPATIENT)
Dept: ADMINISTRATIVE | Facility: HOSPITAL | Age: 79
End: 2024-05-07
Payer: MEDICARE

## 2024-05-07 DIAGNOSIS — I27.20 PULMONARY HTN: Primary | ICD-10-CM

## 2024-05-13 ENCOUNTER — PATIENT OUTREACH (OUTPATIENT)
Dept: OTHER | Facility: HOSPITAL | Age: 79
End: 2024-05-13
Payer: MEDICARE

## 2024-05-13 ENCOUNTER — INFUSION (OUTPATIENT)
Dept: ONCOLOGY | Facility: HOSPITAL | Age: 79
End: 2024-05-13
Payer: MEDICARE

## 2024-05-13 DIAGNOSIS — Z45.2 FITTING AND ADJUSTMENT OF VASCULAR CATHETER: Primary | ICD-10-CM

## 2024-05-13 PROCEDURE — 96523 IRRIG DRUG DELIVERY DEVICE: CPT

## 2024-05-13 PROCEDURE — 25010000002 HEPARIN LOCK FLUSH PER 10 UNITS: Performed by: NURSE PRACTITIONER

## 2024-05-13 RX ORDER — HEPARIN SODIUM (PORCINE) LOCK FLUSH IV SOLN 100 UNIT/ML 100 UNIT/ML
500 SOLUTION INTRAVENOUS AS NEEDED
Status: DISCONTINUED | OUTPATIENT
Start: 2024-05-13 | End: 2024-05-13 | Stop reason: HOSPADM

## 2024-05-13 RX ORDER — SODIUM CHLORIDE 0.9 % (FLUSH) 0.9 %
10 SYRINGE (ML) INJECTION AS NEEDED
Status: DISCONTINUED | OUTPATIENT
Start: 2024-05-13 | End: 2024-05-13 | Stop reason: HOSPADM

## 2024-05-13 RX ORDER — HEPARIN SODIUM (PORCINE) LOCK FLUSH IV SOLN 100 UNIT/ML 100 UNIT/ML
500 SOLUTION INTRAVENOUS AS NEEDED
OUTPATIENT
Start: 2024-05-13

## 2024-05-13 RX ORDER — SODIUM CHLORIDE 0.9 % (FLUSH) 0.9 %
10 SYRINGE (ML) INJECTION AS NEEDED
OUTPATIENT
Start: 2024-05-13

## 2024-05-13 RX ADMIN — Medication 10 ML: at 09:54

## 2024-05-13 RX ADMIN — Medication 500 UNITS: at 09:54

## 2024-05-13 NOTE — PROGRESS NOTES
Reviewed chart.  Patient with lung carcinoma, large cell neuroendocrine. Patient status post chemo RT-11/28/2022, radiation therapy completion date 1/11/2023, Keytruda x18 3/20/2023-3/11/24; Follow-up repeat scans 3/27/24 without evidence of recurrent disease. Saw Dr. Newsome with [facility] 5/6; sees Dr. Bishop 6/24    Called patient. Left message that I was just touching base to see how he was doing after completing treatment. Wanted to know if he had any questions/concerns or resource/supportive care needs; requested cb.

## 2024-05-29 NOTE — PROGRESS NOTES
"General Surgery  Progress Note    CC: Follow-up diverticulitis with perforation and abscess    S: He denies any abdominal pain, fevers, or chills and is tolerating a regular diet.  He says he is ready to go home.    ROS: Denies nausea, vomiting, abdominal pain, fever, or chills    O:/90 (BP Location: Right arm, Patient Position: Lying)   Pulse 63   Temp 97.9 °F (36.6 °C) (Oral)   Resp 18   Ht 180.3 cm (71\")   Wt 70.8 kg (156 lb)   SpO2 96%   BMI 21.76 kg/m²     GENERAL: alert, well appearing, and in no distress  HEENT: normocephalic, atraumatic, moist mucous membranes, clear sclerae   CHEST: clear to auscultation, no wheezes, rales or rhonchi, symmetric air entry  CARDIAC: regular rate and rhythm    ABDOMEN: Soft, nontender, nondistended  EXTREMITIES: no cyanosis, clubbing, or edema   SKIN: Warm and moist, no rashes    LABS  Results from last 7 days   Lab Units 09/18/21  0642 09/17/21  0712 09/16/21  0615   WBC 10*3/mm3 9.20 8.38 9.72   HEMOGLOBIN g/dL 12.5* 13.0 14.1   HEMATOCRIT % 38.5 38.6 42.2   PLATELETS 10*3/mm3 235 221 221     Results from last 7 days   Lab Units 09/18/21  0642 09/17/21  0712 09/16/21  0615 09/15/21  0333 09/15/21  0333   SODIUM mmol/L 137 139 135*   < > 135*   POTASSIUM mmol/L 4.5 4.2 3.9   < > 4.5   CHLORIDE mmol/L 105 105 102   < > 99   CO2 mmol/L 26.2 26.2 25.5   < > 24.9   BUN mg/dL 7* 10 21   < > 45*   CREATININE mg/dL 0.94 0.90 1.06   < > 1.36*   CALCIUM mg/dL 8.8 8.5* 8.9   < > 9.7   BILIRUBIN mg/dL 0.8  --  0.8  --  1.0   ALK PHOS U/L 58  --  62  --  79   ALT (SGPT) U/L 25  --  28  --  42*   AST (SGOT) U/L 17  --  22  --  27   GLUCOSE mg/dL 87 90 79   < > 97    < > = values in this interval not displayed.     Results from last 7 days   Lab Units 09/18/21  0642 09/17/21  0712 09/16/21  0615   INR  2.52* 3.63* 3.64*         A/P: 76 y.o. male with acute sigmoid diverticulitis, contained perforation, and small pericolonic abscess    Clinically he has made a full recovery and " Provider: DR GOMEZ    Caller: MARY BETH SOLIS    Relationship to Patient: SELF    Pharmacy: [address] [facility] [address]    Phone Number: [phone]    Reason for Call: PT HAS REQUESTED FOR PAIN MEDICATION TO BE CALLED IN.  SHE WAS IN PAIN OVERNIGHT AND UNABLE TO SLEEP    When was the patient last seen: 05/28/24    PLS CALL PT CONFIRM MED CALLED IN   can be discharged home today on oral Augmentin.  He can resume all home medications including his warfarin.  I have sent a prescription for Augmentin to his pharmacy in [place].  He should follow up with Dr. Estrada in 2 weeks and knows to call our office to schedule that appointment.    Elaine Bullard MD  General, Robotic, and Endoscopic Surgery  [facility]    [address], [address]  [place] [address]  P: [phone]  F: [phone]

## 2024-05-31 ENCOUNTER — PATIENT OUTREACH (OUTPATIENT)
Dept: OTHER | Facility: HOSPITAL | Age: 79
End: 2024-05-31
Payer: MEDICARE

## 2024-05-31 NOTE — PROGRESS NOTES
Reviewed chart. 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  Patient status post chemo RT-11/28/2022, radiation therapy completion date 1/11/2023. Immunotherapy-Keytruda initiated 3/20/2023, Completed 18 cycles Keytruda 3/11/24. Met with Dr. Bishop 4/1; scheduled again 6/24. Had appt with Dr. Newsome 5/7-he ordered transthoracic echo for pulmonary HTN scheduled 6/12.    Called patient. Left message that I was just touching base to see how he was doing since he completed treatment. Wanted to know if he had any questions/concerns or resource/supportive care needs. I will likely close case since treatment completed, although wanted to make sure he has no current needs.  Requested cb    Call from Kate. The patient is doing well after finishing treatment. Is followed by pulmonology and Dr. Bishop.  He has no needs. Since he is stable with no active cancer treatment and no ongoing resource needs, I will close his case to navigation. Encouraged patient to call in the future if the need arises.

## 2024-06-03 ENCOUNTER — INFUSION (OUTPATIENT)
Dept: ONCOLOGY | Facility: HOSPITAL | Age: 79
End: 2024-06-03
Payer: MEDICARE

## 2024-06-03 DIAGNOSIS — D47.2 MGUS (MONOCLONAL GAMMOPATHY OF UNKNOWN SIGNIFICANCE): ICD-10-CM

## 2024-06-03 DIAGNOSIS — C7A.8 NEUROENDOCRINE CARCINOMA OF LUNG: ICD-10-CM

## 2024-06-03 DIAGNOSIS — Z45.2 FITTING AND ADJUSTMENT OF VASCULAR CATHETER: Primary | ICD-10-CM

## 2024-06-03 LAB
ALBUMIN SERPL-MCNC: 3.9 G/DL (ref 3.5–5.2)
ALBUMIN/GLOB SERPL: 1.4 G/DL
ALP SERPL-CCNC: 56 U/L (ref 39–117)
ALT SERPL W P-5'-P-CCNC: 9 U/L (ref 1–41)
ANION GAP SERPL CALCULATED.3IONS-SCNC: 9.9 MMOL/L (ref 5–15)
AST SERPL-CCNC: 16 U/L (ref 1–40)
BASOPHILS # BLD AUTO: 0.04 10*3/MM3 (ref 0–0.2)
BASOPHILS NFR BLD AUTO: 0.7 % (ref 0–1.5)
BILIRUB SERPL-MCNC: 0.6 MG/DL (ref 0–1.2)
BUN SERPL-MCNC: 12 MG/DL (ref 8–23)
BUN/CREAT SERPL: 10.9 (ref 7–25)
CALCIUM SPEC-SCNC: 10.1 MG/DL (ref 8.6–10.5)
CHLORIDE SERPL-SCNC: 102 MMOL/L (ref 98–107)
CO2 SERPL-SCNC: 26.1 MMOL/L (ref 22–29)
CREAT SERPL-MCNC: 1.1 MG/DL (ref 0.76–1.27)
DEPRECATED RDW RBC AUTO: 48.5 FL (ref 37–54)
EGFRCR SERPLBLD CKD-EPI 2021: 69.1 ML/MIN/1.73
EOSINOPHIL # BLD AUTO: 0.47 10*3/MM3 (ref 0–0.4)
EOSINOPHIL NFR BLD AUTO: 8.3 % (ref 0.3–6.2)
ERYTHROCYTE [DISTWIDTH] IN BLOOD BY AUTOMATED COUNT: 13.8 % (ref 12.3–15.4)
GLOBULIN UR ELPH-MCNC: 2.8 GM/DL
GLUCOSE SERPL-MCNC: 106 MG/DL (ref 65–99)
HCT VFR BLD AUTO: 43.8 % (ref 37.5–51)
HGB BLD-MCNC: 14 G/DL (ref 13–17.7)
IMM GRANULOCYTES # BLD AUTO: 0.01 10*3/MM3 (ref 0–0.05)
IMM GRANULOCYTES NFR BLD AUTO: 0.2 % (ref 0–0.5)
LYMPHOCYTES # BLD AUTO: 1.15 10*3/MM3 (ref 0.7–3.1)
LYMPHOCYTES NFR BLD AUTO: 20.4 % (ref 19.6–45.3)
MCH RBC QN AUTO: 30.4 PG (ref 26.6–33)
MCHC RBC AUTO-ENTMCNC: 32 G/DL (ref 31.5–35.7)
MCV RBC AUTO: 95 FL (ref 79–97)
MONOCYTES # BLD AUTO: 0.67 10*3/MM3 (ref 0.1–0.9)
MONOCYTES NFR BLD AUTO: 11.9 % (ref 5–12)
NEUTROPHILS NFR BLD AUTO: 3.3 10*3/MM3 (ref 1.7–7)
NEUTROPHILS NFR BLD AUTO: 58.5 % (ref 42.7–76)
NRBC BLD AUTO-RTO: 0 /100 WBC (ref 0–0.2)
PLATELET # BLD AUTO: 178 10*3/MM3 (ref 140–450)
PMV BLD AUTO: 8.8 FL (ref 6–12)
POTASSIUM SERPL-SCNC: 4 MMOL/L (ref 3.5–5.2)
PROT SERPL-MCNC: 6.7 G/DL (ref 6–8.5)
RBC # BLD AUTO: 4.61 10*6/MM3 (ref 4.14–5.8)
SODIUM SERPL-SCNC: 138 MMOL/L (ref 136–145)
WBC NRBC COR # BLD AUTO: 5.64 10*3/MM3 (ref 3.4–10.8)

## 2024-06-03 PROCEDURE — 85025 COMPLETE CBC W/AUTO DIFF WBC: CPT

## 2024-06-03 PROCEDURE — 80053 COMPREHEN METABOLIC PANEL: CPT

## 2024-06-03 PROCEDURE — 36591 DRAW BLOOD OFF VENOUS DEVICE: CPT

## 2024-06-03 PROCEDURE — 25010000002 HEPARIN LOCK FLUSH PER 10 UNITS: Performed by: NURSE PRACTITIONER

## 2024-06-03 RX ORDER — SODIUM CHLORIDE 0.9 % (FLUSH) 0.9 %
10 SYRINGE (ML) INJECTION AS NEEDED
Status: DISCONTINUED | OUTPATIENT
Start: 2024-06-03 | End: 2024-06-03 | Stop reason: HOSPADM

## 2024-06-03 RX ORDER — HEPARIN SODIUM (PORCINE) LOCK FLUSH IV SOLN 100 UNIT/ML 100 UNIT/ML
500 SOLUTION INTRAVENOUS AS NEEDED
Status: DISCONTINUED | OUTPATIENT
Start: 2024-06-03 | End: 2024-06-03 | Stop reason: HOSPADM

## 2024-06-03 RX ORDER — HEPARIN SODIUM (PORCINE) LOCK FLUSH IV SOLN 100 UNIT/ML 100 UNIT/ML
500 SOLUTION INTRAVENOUS AS NEEDED
OUTPATIENT
Start: 2024-06-03

## 2024-06-03 RX ORDER — SODIUM CHLORIDE 0.9 % (FLUSH) 0.9 %
10 SYRINGE (ML) INJECTION AS NEEDED
OUTPATIENT
Start: 2024-06-03

## 2024-06-03 RX ADMIN — Medication 500 UNITS: at 11:14

## 2024-06-03 RX ADMIN — Medication 10 ML: at 11:14

## 2024-06-11 ENCOUNTER — TELEPHONE (OUTPATIENT)
Dept: ONCOLOGY | Facility: CLINIC | Age: 79
End: 2024-06-11
Payer: MEDICARE

## 2024-06-11 DIAGNOSIS — C7A.8 NEUROENDOCRINE CARCINOMA OF LUNG: Primary | ICD-10-CM

## 2024-06-11 NOTE — TELEPHONE ENCOUNTER
Per Dr. Bishop, pt's Guardant Reveal showed some ctDNA in report. He would like for pt to have a CT CAP prior to his visit with Dr. Bishop on 6/24. Called and informed pt's significant other, Kate. She v/u. Orders placed and message sent to scheduling.

## 2024-06-12 ENCOUNTER — HOSPITAL ENCOUNTER (OUTPATIENT)
Dept: CARDIOLOGY | Facility: HOSPITAL | Age: 79
Discharge: HOME | End: 2024-06-12
Admitting: INTERNAL MEDICINE
Payer: MEDICARE

## 2024-06-12 VITALS
SYSTOLIC BLOOD PRESSURE: 130 MMHG | WEIGHT: 168 LBS | HEIGHT: 72 IN | BODY MASS INDEX: 22.75 KG/M2 | DIASTOLIC BLOOD PRESSURE: 73 MMHG

## 2024-06-12 DIAGNOSIS — I27.20 PULMONARY HTN: ICD-10-CM

## 2024-06-12 LAB
BH CV ECHO MEAS - ACS: 1.72 CM
BH CV ECHO MEAS - AO MAX PG: 5.2 MMHG
BH CV ECHO MEAS - AO MEAN PG: 2.6 MMHG
BH CV ECHO MEAS - AO ROOT DIAM: 3.4 CM
BH CV ECHO MEAS - AO V2 MAX: 113.8 CM/SEC
BH CV ECHO MEAS - AO V2 VTI: 24.6 CM
BH CV ECHO MEAS - AVA(I,D): 2.8 CM2
BH CV ECHO MEAS - EDV(CUBED): 71.6 ML
BH CV ECHO MEAS - EDV(MOD-SP2): 61 ML
BH CV ECHO MEAS - EDV(MOD-SP4): 55 ML
BH CV ECHO MEAS - EF(MOD-BP): 61.9 %
BH CV ECHO MEAS - EF(MOD-SP2): 67.2 %
BH CV ECHO MEAS - EF(MOD-SP4): 60 %
BH CV ECHO MEAS - ESV(CUBED): 17.1 ML
BH CV ECHO MEAS - ESV(MOD-SP2): 20 ML
BH CV ECHO MEAS - ESV(MOD-SP4): 22 ML
BH CV ECHO MEAS - FS: 38 %
BH CV ECHO MEAS - IVS/LVPW: 0.98 CM
BH CV ECHO MEAS - IVSD: 0.77 CM
BH CV ECHO MEAS - LAT PEAK E' VEL: 11.3 CM/SEC
BH CV ECHO MEAS - LV DIASTOLIC VOL/BSA (35-75): 27.8 CM2
BH CV ECHO MEAS - LV MASS(C)D: 96.6 GRAMS
BH CV ECHO MEAS - LV MAX PG: 5.4 MMHG
BH CV ECHO MEAS - LV MEAN PG: 2.2 MMHG
BH CV ECHO MEAS - LV SYSTOLIC VOL/BSA (12-30): 11.1 CM2
BH CV ECHO MEAS - LV V1 MAX: 115.7 CM/SEC
BH CV ECHO MEAS - LV V1 VTI: 23.3 CM
BH CV ECHO MEAS - LVIDD: 4.2 CM
BH CV ECHO MEAS - LVIDS: 2.6 CM
BH CV ECHO MEAS - LVOT AREA: 3 CM2
BH CV ECHO MEAS - LVOT DIAM: 1.95 CM
BH CV ECHO MEAS - LVPWD: 0.79 CM
BH CV ECHO MEAS - MED PEAK E' VEL: 12 CM/SEC
BH CV ECHO MEAS - MV A DUR: 0.13 SEC
BH CV ECHO MEAS - MV A MAX VEL: 74.1 CM/SEC
BH CV ECHO MEAS - MV DEC SLOPE: 300 CM/SEC2
BH CV ECHO MEAS - MV DEC TIME: 0.25 SEC
BH CV ECHO MEAS - MV E MAX VEL: 65.1 CM/SEC
BH CV ECHO MEAS - MV E/A: 0.88
BH CV ECHO MEAS - MV MAX PG: 3.3 MMHG
BH CV ECHO MEAS - MV MEAN PG: 1.13 MMHG
BH CV ECHO MEAS - MV P1/2T: 69.5 MSEC
BH CV ECHO MEAS - MV V2 VTI: 26.9 CM
BH CV ECHO MEAS - MVA(P1/2T): 3.2 CM2
BH CV ECHO MEAS - MVA(VTI): 2.6 CM2
BH CV ECHO MEAS - PA ACC TIME: 0.15 SEC
BH CV ECHO MEAS - PA V2 MAX: 82.8 CM/SEC
BH CV ECHO MEAS - PULM A REVS DUR: 0.13 SEC
BH CV ECHO MEAS - PULM A REVS VEL: 28.3 CM/SEC
BH CV ECHO MEAS - PULM DIAS VEL: 36.4 CM/SEC
BH CV ECHO MEAS - PULM S/D: 1.62
BH CV ECHO MEAS - PULM SYS VEL: 59.1 CM/SEC
BH CV ECHO MEAS - RV MAX PG: 1.81 MMHG
BH CV ECHO MEAS - RV V1 MAX: 67.3 CM/SEC
BH CV ECHO MEAS - RV V1 VTI: 13.8 CM
BH CV ECHO MEAS - SV(LVOT): 69.6 ML
BH CV ECHO MEAS - SV(MOD-SP2): 41 ML
BH CV ECHO MEAS - SV(MOD-SP4): 33 ML
BH CV ECHO MEAS - SVI(LVOT): 35.2 ML/M2
BH CV ECHO MEAS - SVI(MOD-SP2): 20.7 ML/M2
BH CV ECHO MEAS - SVI(MOD-SP4): 16.7 ML/M2
BH CV ECHO MEASUREMENTS AVERAGE E/E' RATIO: 5.59
BH CV XLRA - RV BASE: 3.3 CM
BH CV XLRA - RV LENGTH: 6.1 CM
BH CV XLRA - RV MID: 2.6 CM
BH CV XLRA - TDI S': 10.3 CM/SEC
SINUS: 3.2 CM
STJ: 2.7 CM

## 2024-06-12 PROCEDURE — 93306 TTE W/DOPPLER COMPLETE: CPT

## 2024-06-13 LAB — REF LAB TEST METHOD: NORMAL

## 2024-06-21 ENCOUNTER — HOSPITAL ENCOUNTER (OUTPATIENT)
Dept: CT IMAGING | Facility: HOSPITAL | Age: 79
Discharge: HOME | End: 2024-06-21
Payer: MEDICARE

## 2024-06-21 DIAGNOSIS — C7A.8 NEUROENDOCRINE CARCINOMA OF LUNG: ICD-10-CM

## 2024-06-21 PROCEDURE — 71260 CT THORAX DX C+: CPT

## 2024-06-21 PROCEDURE — 25510000001 IOPAMIDOL 61 % SOLUTION: Performed by: INTERNAL MEDICINE

## 2024-06-21 PROCEDURE — 74177 CT ABD & PELVIS W/CONTRAST: CPT

## 2024-06-21 PROCEDURE — 0 DIATRIZOATE MEGLUMINE & SODIUM PER 1 ML: Performed by: INTERNAL MEDICINE

## 2024-06-21 RX ADMIN — DIATRIZOATE MEGLUMINE AND DIATRIZOATE SODIUM 30 ML: 660; 100 LIQUID ORAL; RECTAL at 10:54

## 2024-06-21 RX ADMIN — IOPAMIDOL 85 ML: 612 INJECTION, SOLUTION INTRAVENOUS at 12:01

## 2024-06-23 NOTE — PROGRESS NOTES
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  Notably there is invasive carcinoma present at the margin, 2 positive lymph nodes adjacent to the bronchovesicular margin which appears to have been transected difficult to enumerate with extranodal extension present.  *Patient status post chemo RT-11/28/2022, radiation therapy completion date 1/11/2023  *Immunotherapy-Keytruda to be initiated 3/20/2023  *Follow-up repeat scans 9/18/2023 without evidence of recurrent disease      History of Present Illness      The patient is a 79 y.o. male with the above-mentioned history who returned 3/11/2024 for lab review and evaluation due for cycle 18 pembrolizumab.  Overall he has tolerated Keytruda quite well.  He does complain of some occasional issues with cramps in his legs.  He denies problems with diarrhea.  No issues with increased shortness of breath, and no problems with skin rash.  We proceeded with therapy thus completing this treatment (18 cycles) and follow-up ET chest, abdomen, pelvis was obtained 3/27/2024.  This demonstrated postoperative changes from previous left lower lobectomy, scattered areas of density in the lungs compared to 6/8/2023 were stable with no new lung nodules, 3.4 cm infrarenal abdominal arctic aneurysm stable and no evidence of metastatic disease otherwise in chest, abdomen, pelvis    He is seen formally 4/1/2024.  He is doing quite well with no additional issues except for skin rash on the medial portions of his lower legs.  This is managed by dermatology treated with triamcinolone.  We discussed surveillance schedule including management of his port every 6 weeks and initial assessment via Guardant Reveal in approximately 9 weeks seeing him in 12  weeks.    The patient's subsequent testing included a Guardant Reveal obtained 6/3/2024 that was positive for ctDNA and 0.083%.  This led to a follow-up CT series 6/21/2024 that was essentially stable.  There is no evidence of distant metastasis or local recurrence.  He is seen with his significant other 6/24 and advised that the exam may have determined an earlier relapse that might be seen on scans and thus we will have to retest him in the near future to determine whether this is continued to be the case.  Symptomatically is unchanged from previous.                             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  He had been assessed particularly in the fall 2021 when he presented with nausea and vomiting and abdominal discomfort leading to assessments that revealed sigmoid diverticulitis with contained rupture and partial small bowel obstruction.  Records from mid September 21 indicating that he was admitted with partial small bowel obstruction and treated medically with very slow recovery.  He has history of COPD with CT demonstrating a pulmonary nodule in the right lung base at 7 mm with follow-up planned.  He was seen by general surgery in later September with repeat scans planned and repeat CT abdomen 10/7/2021 did demonstrate interval improvement of sigmoid diverticulitis.      Record indicates an assessment in late June 2022 at different general surgeon for left inguinal hernia, history of heavy tobacco use with COPD and recommendation for surgical repair of his hernia      The patient underwent a CT scan of the chest 5/7/2022 demonstrating diffuse emphysematous changes, ill-defined density measuring 3 mm in the superior segment of the right  lower lobe, multiple ill-defined 3 to 4 mm nodular density thought to be inflammatory involving the right lower lobe and a new spiculated nodule involving the left lower lobe measuring 16 x 14 mm as well as left hilar adenopathy.       Follow-up PET/CT 7/13/2022 reveal a hypermetabolic spiculated lesion medial aspect the left lower lobe adjacent to descending thoracic aorta measuring 1.5 x 1.6 SUV 9.3 with an enlarged hypermetabolic left hilar lymph node measured 1.5 x 1.3 with SUV of 12.1, additional nodules in the lateral aspect right lower lobe and micronodule in the posterior/medial right lower lobe and also additional right lower lobe micronodule.  There is an infrarenal abdominal aortic aneurysm measuring 3.4 cm with a short segment of chronic dissection present.    These clinical findings would be consistent with a possible T1b N1 M0-stage IIb lung cancer.      Recognizing a diagnosis has not been made his case was discussed in thoracic conference 7/20/2022.  Recommendations include proceeding to pulmonary assessment with EBUS and the patient undergoing a general assessment per his clinical status-older adult assessment as well as assessment by thoracic surgery.  These issues are discussed with the patient and his wife in detail when they are seen 7/28/2022.    The patient proceeded to undergo his hernia surgery 8/2/2022 by Dr. Dotson.  Additionally the patient was unable to undergo assessment by pulmonary until October and, thereafter, was scheduled to see Dr. Joe 8/18/2022 undergoing older adult functional assessment with a JAGUAR score of 11 indicating a risk of functional decline related to cancer therapy.    The patient is seen with his significant other 8/15/2022 and doing very well with recent hernia surgery.  His performance status is reasonably good at present and we have learned now that he would not be able to see pulmonary medicine until October, thoracic surgery is scheduled this week with   Tatyana.  Perhaps he could be a surgical candidate.  Particularly we know by guardant 360 that T p53 splice site mutation is present and would certainly be consistent as well the most common mutations found in lung cancers particularly squamous cancers.  We have discussed obtaining pulmonary functions at this point, thoracic surgery assessment this week and also restarting Chantix as possible for the patient.    There was difficulty obtaining Chantix and while this was being assessed the patient was reviewed 8/18 by thoracic surgery.  He was felt to have a T1b N1 M0 stage IIb carcinoma left lower lobe along and not a candidate for biopsy.  PFTs were borderline, functional assessment was reasonably good and the patient was felt to be a candidate for robot-assisted biopsy of his nodes and if malignant proceed to left lower lobe lobectomy.  He was reviewed 9/8 and offered 21 mg nicotine transdermal patching.    He is next seen 9/10/2022.  He is seen with his wife and both are prepared for surgery 9/23/2022.  We discussed that additional therapy may be considered once we have more information about the type and stage.  We would hope to see him postoperatively.    The patient was admitted 9//2022 undergoing 9/23/2022  a bronchoscopy with left video-assisted thoracoscopy with robot-assisted total decortication of the left lung, left lower lobectomy, mediastinal lymphadenectomy and intercostal nerve block with Dr. Nicola Joe.  Fortunately he did fairly well postoperatively though did develop atrial fibrillation with RVR treated with amiodarone converting back to sinus rhythm, treated with IV metoprolol subsequent chronic anticoagulation.  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  Notably there is invasive carcinoma present at the margin.  Is a, and only 2 positive lymph nodes adjacent  to the bronchovesicular margin which appears to have been transected difficult to enumerate with extranodal extension present.       The patient is seen 10/27/2022.  He is recovering well from surgery, albeit slowly, and we have discussed his findings that are concerning as to residual disease with a positive margin described as above.  There is also the significance potentially of PD-L1 expression which has been described as producing a poor survival.     Nivolumab has been used as second line therapy to positive effect in the disease and this begs the question as to whether adjuvant therapy plus immunotherapy could be utilized though the patient would be difficult to treat with platinum based chemotherapy as well.       The patient is next assessed 11/7/2022 for significant assessments by supportive oncology at [facility] as well as with plans to see Dr. Marrero 11/9/2022.  Unfortunately he has been very slow to gradually recover from the effects of surgery with reduced appetite and only fair performance status.     At present it would be difficult to give him cisplatin based chemotherapy and we discussed whether we might be able to use Tecentriq (atezolizumab) as his primary adjuvant therapy.  We have contacted pathology about completing Caris testing and a PD-L1 analysis that has not yet completed.         The patient is seen, however, 11/16/2022 and his genomic analysis has returned as being significant for broad sensitivity to immunotherapy.  This would argue for the administration of weekly Carboplatin/Taxol as the patient proceeds to radiation therapy and upon completion, then using Tecentriq as further adjuvant therapy over a year.  This is discussed with the patient and his  in detail as well as discussed with Dr. Marrero of radiation therapy.  We have also discussed the patient require port placement.    The patient proceeded to treatment taking weekly carboplatin Taxol with concurrent radiation therapy last  seen 12/12/2022 with third weekly treatment.    He is next seen 12/19/2022 with H&H 11.9 and 38.5, white count 3460 and platelet count of 168,000.  He is feeling well without any significant complications of therapy except for right calf dermatitis that is been developing in the last several days.    The patient continued therapy taking CarboTaxol weekly including 12/28 and 1/4/2023.    His is next seen 1/11/2023 with completion date for radiation therapy 1/11/2023.  Repeat CBC now includes H&H of 11.0 and 33.9, white count 2090, platelet count 128,000, ANC is 800.  He, fortunately, is tolerating treatment well and we have again discussed with him adjuvant immunotherapy would be useful including Tecentriq versus pembrolizumab-keynote 091 study particularly in tumor programmed cell death PD-L1 greater than 50%.    The patient will not be given additional chemotherapy 1/11/2023 we will plan to reassess by follow-up scans in the next 6 weeks CT chest and pelvis making a decision thereafter about adjuvant immunotherapy.    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    We have discussed that considering studies like keynote  091 that the patient's high risk disease could be addressed with additional immunotherapy including the use of Atezolizumab and/or Keytruda.  Considering his findings overall Keytruda every 3 weeks x18 cycles is to be pursued.    The patient was seen 3/20/2023, discussed initiation of Keytruda.  He is agreeable to proceed.    The patient notified the office shortly after treatment that he had pain under his right rib cage and was placed back onto his Neurontin to try to manage this pain.  Approximately week later he still had  the pain and also had another rash we switched him at this point to Famvir to try to completely clear this problem.    He is next seen back 4/3/2023.  Fortunately his recent apparent shingles activation has nearly abated and he is feeling reasonably well.  In review of the literature there is no significant difference in activation with immunotherapy though this patient may require suppression.  I have asked him to complete his Famvir at this point.    Patient assessed 4/10/2023 with resolution of his shingles, subsequently placed on maintenance acyclovir, consideration of shingles vaccination post 3 months of Keytruda therapy is stable disease documented.    The patient underwent a CT chest abdomen pelvis 6/8/2023 that demonstrates postsurgical changes of the left hemithorax, stable pulmonary nodules, stable infrarenal abdominal aortic aneurysm.    He is next seen 6/12/2023.  We have discussed continue with his Keytruda with his tolerance being excellent.  He will also reduce his current metoprolol to 12.5 mg p.o. daily.    He continued Keytruda and was seen in the office 7/24/2023 in anticipation of his 7th dose of Keytruda.  He was tolerating treatment without substantial side effects but did have sudden onset nausea and vomiting lasting 48 hours.  He then began to have joint pain bilateral knees and shoulders up to an 8 of 10 for severity.       He was demonstrating worsening hyponatremia at this point with a normal potassium.  Unfortunately his mental status began to worsen with increasing sleepiness, mild diarrhea.  7/28/2023 studies included an INR 3.34, sodium now 125 and he was admitted for his weakness and hyponatremia.    The patient was seen by several services including nephrology and oncology to the IV fluids.  He had been tested with ACT stimulation test and was diagnosed with renal sufficiency started on oral hydrocortisone.  7/29/2023 cortisol was 0.62, subsequent ACTH test was reduced at 5.7.  The  patient studies 7/30 included BUN/creatinine of 9 and 0.97, sodium 130, chloride 95, calcium 5.2.      The patient is next seen 8/14/2023.  He remains somewhat weak and with joint discomfort.  We have discussed his findings that would be most consistent with central adrenal insufficiency and that dosing for his hydrocortisone-currently 10 mg p.o. every morning and 5 mg p.o. every afternoon is likely to be too low.  In determining whether we should proceed with treatment replacement therapy could allow us to try to complete his therapy which, on balance, would be the preferred approach.    The patient is seen 9/21/2023 improved per performance status with cortisone replacement therapy, subsequent CT of chest on pelvis 9/18/2023 without evidence of recurrent disease, pembrolizumab continued with plans to reassess likely in December 2023 or at the completion of therapy.    Patient seen 2/19/2024 for his 17 cycle of Keytruda as patient approaches his completion of immunotherapy in 3 weeks.  Baseline scans will be requested after follow-up visit in 3 week    We proceeded with therapy thus completing this treatment (18 cycles) and follow-up ET chest, abdomen, pelvis was obtained 3/27/2024.  This demonstrated postoperative changes from previous left lower lobectomy, scattered areas of density in the lungs compared to 6/8/2023 were stable with no new lung nodules, 3.4 cm infrarenal abdominal arctic aneurysm stable and no evidence of metastatic disease otherwise in chest, abdomen, pelvis    He is seen formally 4/1/2024.  He is doing quite well with no additional issues except for skin rash on the medial portions of his lower legs.  This is managed by dermatology treated with triamcinolone.  We discussed surveillance schedule including management of his port every 6 weeks and initial assessment via Guardant Reveal in approximately 9 weeks seeing him in 12 weeks.    The patient return for guardant reveal testing 6/3/2024 that,  unfortunately, was positive with a tumor fraction of 0.083%.  Attempting to determine the significance of this and CT of chest abdomen pelvis was obtained 6/11/2024 that showed stable subcentimeter nodular density in the right lung, cardiac size without pericardial abnormality, liver and pancreas spleen and adrenal glands right kidney unchanged, atherosclerotic calcification and mural thrombus within the abdominal aorta measuring 3.8 cm, IVC filter in position.  These were essentially stable examinations.    He is next seen 6/24/2024.The patient's subsequent testing included a Guardant Reveal obtained 6/3/2024 that was positive for ctDNA and 0.083%.  This led to a follow-up CT series 6/21/2024 that was essentially stable.  There is no evidence of distant metastasis or local recurrence.  He is seen with his significant other 6/24 and advised that the exam may have determined an earlier relapse that might be seen on scans and thus we will have to retest him in the near future to determine whether this is continued to be the case.  Symptomatically is unchanged from previous.        Past Medical History:   Diagnosis Date    Arthritis     COPD (chronic obstructive pulmonary disease)     O2 3L AT HS    Diabetes mellitus     DIET CONTROL    Diverticulitis     DVT, lower extremity     RLE    Elevated cholesterol     H/O Cervical pain     History of colitis     Hyperlipidemia     Hypertension     Left shoulder pain     Low back pain     Lung cancer 2022    LEFT LUNG    On anticoagulant therapy     Peripheral arterial disease     Right leg claudication     Skin cancer     HX    Type 2 diabetes mellitus     Vitamin D deficiency         Past Surgical History:   Procedure Laterality Date    BALLOON ANGIOPLASTY, ARTERY Right 2015    IR Percutaneious IVC filter placement    INGUINAL HERNIA REPAIR Left     KNEE ARTHROSCOPY Left     Torn meniscus    LOBECTOMY Left 09/23/2022    Procedure: BRONCHOSCOPY, LEFT VIDEO ASSISTED  THORACOSCOPY, ROBOT ASSISTED TOTAL DECORTICATION  LEFT LUNG, LEFT LOWER LOBE LOBECTOMY, MEDIASTINAL LYMPHECTOMY, INTERCOSTAL NERVE BLOCK;  Surgeon: Nicola Joe III, MD;  Location: [facility] OR;  Service: Robotics - [facility];  Laterality: Left;    VENOUS ACCESS DEVICE (PORT) INSERTION Right 11/21/2022    Procedure: INSERTION VENOUS ACCESS DEVICE;  Surgeon: Nicola Joe III, MD;  Location: [facility] MAIN OR;  Service: Thoracic;  Laterality: Right;        Current Outpatient Medications on File Prior to Visit   Medication Sig Dispense Refill    albuterol (PROVENTIL) (2.5 MG/3ML) 0.083% nebulizer solution Inhale 2.5 mg.      Budeson-Glycopyrrol-Formoterol (Breztri Aerosphere) 160-9-4.8 MCG/ACT aerosol inhaler Inhale 2 puffs.      Cholecalciferol 50 MCG (2000 UT) capsule Take 1 capsule by mouth Daily. Indications: Vitamin D Deficiency      fexofenadine (Allegra Allergy) 180 MG tablet       fluticasone (FLONASE) 50 MCG/ACT nasal spray 1 spray into the nostril(s) as directed by provider Daily. Indications: Allergic Rhinitis      hydrocortisone (CORTEF) 10 MG tablet Take 2 tablets in the morning and 1 tablet in the afternoon plus additional medication for acute illness up to 60 mg daily 360 tablet 3    isosorbide mononitrate (IMDUR) 60 MG 24 hr tablet Take 1 tablet by mouth Every Evening. Indications: hypertension      losartan (COZAAR) 50 MG tablet Take 1 tablet by mouth Daily.      NON FORMULARY Oxygen 2 LPM as needed during the day.      ondansetron (Zofran) 8 MG tablet Take 1 tablet by mouth Every 8 (Eight) Hours As Needed for Nausea or Vomiting. 30 tablet 1    sildenafil (REVATIO) 20 MG tablet Take 1 tablet by mouth.      simvastatin (ZOCOR) 20 MG tablet Take 1 tablet by mouth Every Night. Indications: High Amount of Fats in the Blood      tamsulosin (FLOMAX) 0.4 MG capsule 24 hr capsule Take 1 capsule by mouth Daily. 30 capsule 5    triamcinolone (KENALOG) 0.5 % cream Apply  topically to the appropriate area  "as directed.      warfarin (COUMADIN) 5 MG tablet Take 1 tablet by mouth Daily. Take 10mg on 9/29/22 and then resume regular home schedule. (Patient taking differently: Take 1 tablet by mouth Daily. 5mg daily with additional 5mg on Monday and Friday)      cetirizine (zyrTEC) 10 MG tablet Take 1 tablet by mouth Daily. Indications: Hayfever      [DISCONTINUED] losartan (COZAAR) 25 MG tablet Take 1 tablet by mouth Daily.       No current facility-administered medications on file prior to visit.        ALLERGIES:    Allergies   Allergen Reactions    Benadryl [Diphenhydramine] Other (See Comments)     Extreme restlessness and insomnia        Social History     Socioeconomic History    Marital status:     Years of education: 1 year college   Tobacco Use    Smoking status: Every Day     Current packs/day: 0.50     Average packs/day: 0.5 packs/day for 61.5 years (30.7 ttl pk-yrs)     Types: Cigarettes     Start date: 1963    Smokeless tobacco: Never    Tobacco comments:     9/8/22: Down to Less than 1 PPD   Vaping Use    Vaping status: Never Used   Substance and Sexual Activity    Alcohol use: Not Currently    Drug use: Never    Sexual activity: Not Currently     Partners: Female        Family History   Problem Relation Age of Onset    No Known Problems Mother     Cancer Father     Lung cancer Father     Diabetes Brother     Other Daughter         S/P stent, age 42    No Known Problems Son     Diabetes Brother     Kidney disease Brother     Malig Hyperthermia Neg Hx         Review of Systems   ROS as per HPI    Objective     Vitals:    06/24/24 1117   BP: 150/71   Pulse: 56   Resp: 16   Temp: 97.4 °F (36.3 °C)   TempSrc: Oral   SpO2: 92%   Weight: 73.8 kg (162 lb 9.6 oz)   Height: 182.9 cm (72.01\")   PainSc: 0-No pain               6/24/2024    11:22 AM   Current Status   ECOG score 0     Physical Exam  Vitals reviewed.   Constitutional:       General: He is not in acute distress.     Appearance: Normal appearance. " He is well-developed.   HENT:      Head: Normocephalic and atraumatic.   Eyes:      Pupils: Pupils are equal, round, and reactive to light.   Cardiovascular:      Rate and Rhythm: Normal rate and regular rhythm.      Heart sounds: Normal heart sounds. No murmur heard.  Pulmonary:      Effort: Pulmonary effort is normal. No respiratory distress.      Breath sounds: Normal breath sounds. No wheezing, rhonchi or rales.   Abdominal:      General: Bowel sounds are normal. There is no distension.      Palpations: Abdomen is soft.   Musculoskeletal:         General: Normal range of motion.      Cervical back: Normal range of motion.   Skin:     General: Skin is warm and dry.      Findings: No rash.   Neurological:      Mental Status: He is alert and oriented to person, place, and time.         I have reexamined the patient and the results are consistent with the previously documented exam. Kayden Bishop MD      RECENT LABS:  WBC   Date Value Ref Range Status   06/03/2024 5.64 3.40 - 10.80 10*3/mm3 Final   05/09/2022 7.54 4.5 - 11.0 10*3/uL Final     RBC   Date Value Ref Range Status   06/03/2024 4.61 4.14 - 5.80 10*6/mm3 Final   05/09/2022 5.52 4.5 - 5.9 10*6/uL Final     Hemoglobin   Date Value Ref Range Status   06/03/2024 14.0 13.0 - 17.7 g/dL Final   05/09/2022 16.4 13.5 - 17.5 g/dL Final     Hematocrit   Date Value Ref Range Status   06/03/2024 43.8 37.5 - 51.0 % Final   05/09/2022 51.0 41.0 - 53.0 % Final     MCV   Date Value Ref Range Status   06/03/2024 95.0 79.0 - 97.0 fL Final   05/09/2022 92.4 80.0 - 100.0 fL Final     MCH   Date Value Ref Range Status   06/03/2024 30.4 26.6 - 33.0 pg Final   05/09/2022 29.7 26.0 - 34.0 pg Final     MCHC   Date Value Ref Range Status   06/03/2024 32.0 31.5 - 35.7 g/dL Final   05/09/2022 32.2 31.0 - 37.0 g/dL Final     RDW   Date Value Ref Range Status   06/03/2024 13.8 12.3 - 15.4 % Final   05/09/2022 15.6 12.0 - 16.8 % Final     RDW-SD   Date Value Ref Range Status    06/03/2024 48.5 37.0 - 54.0 fl Final     MPV   Date Value Ref Range Status   06/03/2024 8.8 6.0 - 12.0 fL Final   05/09/2022 9.3 8.4 - 12.4 fL Final     Platelets   Date Value Ref Range Status   06/03/2024 178 140 - 450 10*3/mm3 Final   05/09/2022 204 140 - 440 10*3/uL Final     Neutrophil Rel %   Date Value Ref Range Status   05/09/2022 68.4 45 - 80 % Final     Neutrophil %   Date Value Ref Range Status   06/03/2024 58.5 42.7 - 76.0 % Final     Lymphocyte Rel %   Date Value Ref Range Status   05/09/2022 21.2 15 - 50 % Final     Lymphocyte %   Date Value Ref Range Status   06/03/2024 20.4 19.6 - 45.3 % Final     Monocyte Rel %   Date Value Ref Range Status   05/09/2022 9.2 0 - 15 % Final     Monocyte %   Date Value Ref Range Status   06/03/2024 11.9 5.0 - 12.0 % Final     Eosinophil %   Date Value Ref Range Status   06/03/2024 8.3 (H) 0.3 - 6.2 % Final   05/09/2022 0.4 0 - 7 % Final     Basophil Rel %   Date Value Ref Range Status   05/09/2022 0.5 0 - 2 % Final     Basophil %   Date Value Ref Range Status   06/03/2024 0.7 0.0 - 1.5 % Final     Immature Grans %   Date Value Ref Range Status   06/03/2024 0.2 0.0 - 0.5 % Final   05/09/2022 0.3 0.0 - 1.0 % Final     Neutrophils Absolute   Date Value Ref Range Status   05/09/2022 5.16 2.0 - 8.8 10*3/uL Final     Neutrophils, Absolute   Date Value Ref Range Status   06/03/2024 3.30 1.70 - 7.00 10*3/mm3 Final     Lymphocytes Absolute   Date Value Ref Range Status   05/09/2022 1.60 0.7 - 5.5 10*3/uL Final     Lymphocytes, Absolute   Date Value Ref Range Status   06/03/2024 1.15 0.70 - 3.10 10*3/mm3 Final     Monocytes Absolute   Date Value Ref Range Status   05/09/2022 0.69 0.0 - 1.7 10*3/uL Final     Monocytes, Absolute   Date Value Ref Range Status   06/03/2024 0.67 0.10 - 0.90 10*3/mm3 Final     Eosinophils Absolute   Date Value Ref Range Status   05/09/2022 0.03 0.0 - 0.8 10*3/uL Final     Eosinophils, Absolute   Date Value Ref Range Status   06/03/2024 0.47 (H) 0.00  - 0.40 10*3/mm3 Final     Basophils Absolute   Date Value Ref Range Status   05/09/2022 0.04 0.0 - 0.2 10*3/uL Final     Basophils, Absolute   Date Value Ref Range Status   06/03/2024 0.04 0.00 - 0.20 10*3/mm3 Final     Immature Grans, Absolute   Date Value Ref Range Status   06/03/2024 0.01 0.00 - 0.05 10*3/mm3 Final   05/09/2022 0.02 0.00 - 0.10 10*3/uL Final     nRBC   Date Value Ref Range Status   06/03/2024 0.0 0.0 - 0.2 /100 WBC Final           Assessment & Plan     79 y.o. male  with medical issues including type 2 diabetes-uncertain control, COPD, hypertension, diastolic heart failure, chronic disease, peripheral vascular disease (follow-up with vascular surgery today), previous DVT on anticoagulation (home monitoring), previous IVC filter placement?,  Status post September 2021 partial small bowel obstruction secondary to sigmoid diverticulitis treated medically.     1.   T1b N1 M0-stage IIb lung cancer.  right lung base nodule noted on scan at that point and in follow-up with primary care had developed a left inguinal hernia with repair planned through a different general surgeon then seen with his initial sigmoid diverticulitis.  PET scan 7/13/2022 demonstrates a hypermetabolic spiculated lesion medial aspect of the left lobe adjacent to descending thoracic aorta measuring1.5 x 1.6 SUV 9.3 with an enlarged hypermetabolic left hilar lymph node measured 1.5 x 1.3 with SUV of 12.1, additional nodules in the lateral aspect right lower lobe and micronodule in the posterior/medial right lower lobe and also additional right lower lobe micronodule.  There is an infrarenal abdominal aortic aneurysm measuring 3.4 cm with a short segment of chronic dissection present.  Clinical findings would be consistent with a possible T1b N1 M0-stage IIb lung cancer.  discussed in thoracic conference 7/20/2022.  Recommendations to proceed with pulmonary assessment with EBUS.  The patient proceeded to undergo his hernia surgery  8/2/2022 by Dr. Dotson.   Guardant 360 that T p53 splice site mutation is present and would certainly be consistent as well the most common mutations found in lung cancers particularly squamous cancers.  The patient was admitted 9//2022 undergoing 9/23/2022  a bronchoscopy with left video-assisted thoracoscopy with robot-assisted total decortication of the left lung, left lower lobectomy, mediastinal lymphadenectomy and intercostal nerve block with Dr. Nicola Joe.  Fortunately he did fairly well postoperatively though did develop atrial fibrillation with RVR treated with amiodarone converting back to sinus rhythm, treated with IV metoprolol subsequent chronic anticoagulation.  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  Notably there is invasive carcinoma present at the margin. only 2 positive lymph nodes adjacent to the bronchovesicular margin which appears to have been transected difficult to enumerate with extranodal extension present.  Options could well be Carboplatin and Taxol considering the patient's comorbidity and worry of using cisplatin-based chemotherapy in this patient followed by atezolizumab with pathology having been notified to assess PD-L1 expression on the tumor as well also await review by [facility] molecular profiling.  Finally radiation therapy consultation be requested.   [facility] analysis indicates benefit from immunotherapy at relatively high levels.  This would allow radiation therapy given with Carboplatin Taxol weekly (better tolerated) and then subsequent atezolizumab adjuvantly.  Weekly carboplatin Taxol initiated 11/28/2022 given concurrently with radiation therapy  12/5/2022 here for cycle 2 weekly carboplatin/Taxol, overall tolerating treatment quite well so far.  12/12/2022: Proceed with cycle #3 weekly carboplatin/Taxol with concurrent radiation.   Continues to tolerate treatment well.  He is next seen 12/19/2022 with H&H 11.9 and 38.5, white count 3460 and platelet count of 168,000.  He is feeling well without any significant complications of therapy except for right calf dermatitis that is been developing in the last several days.  12/28/2022 here for week 5 carbo/Taxol.  Overall tolerating well.  Today WBC 2.45, ANC 1.22, hemoglobin 10.7, platelet count 117,000.  I have reviewed with Dr. Bishop, and we will go ahead and continue with treatment.  1/4/2023: Received with cycle 6 carbo/taxol + XRT.  Continues to tolerate treatment well.  WBC improved to 2.69 with ANC 1.41.  Next 1/11/2023 with completion date 1/11/2023.  Repeat CBC now includes H&H of 11.0 and 33.9, white count 2090, platelet count 128,000, ANC is 800.  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  We have discussed that considering studies like keynote  091 that the patient's high risk disease could be addressed with additional immunotherapy including the use of Atezolizumab and/or Keytruda.  Considering his findings overall Keytruda every 3 weeks x18 cycles is to be pursued.  The patient began Keytruda as planned with his first treatment 3/20/2023.  The patient notified the office shortly after treatment that he had pain under his right rib cage and was placed back onto his Neurontin to try to manage this pain.  Approximately week later he still had the pain and also had another rash we switched him at  this point to Famvir to try to completely clear this problem.  4/3/2023.  Fortunately his recent apparent shingles activation has nearly abated and he is feeling reasonably well.  In review of the literature there is no significant difference in activation with immunotherapy though this patient may require suppression.  He is asked to complete his Famvir.  4/10/2023 cycle 2 Keytruda, overall tolerating well.   Patient seen 5/1/2023, third cycle of Keytruda, status post fourth cycle of Keytruda, 3 months of therapy, subsequent scans will need to be scheduled.  5/22/2023: Proceed with cycle 4 Keytruda.    Follow-up scans-CT of chest, abdomen and pelvis 6/8/2023 with postsurgical changes only.  7/3/2023 cycle 6 Keytruda  Proceed today, 7/24/2023 with cycle 7 Keytruda which is continued every 3 weeks   He was tolerating treatment without substantial side effects but did have sudden onset nausea and vomiting lasting 48 hours.  He then began to have joint pain bilateral knees and shoulders up to an 8 of 10 for severity.     He was demonstrating worsening hyponatremia at this point with a normal potassium.  Unfortunately his mental status began to worsen with increasing sleepiness, mild diarrhea.  7/28/2023 studies included an INR 3.34, sodium now 125 and he was admitted for his weakness and hyponatremia.  The patient was seen by several services including nephrology and oncology to the IV fluids.  He had been tested with ACT stimulation test and was diagnosed with renal sufficiency started on oral hydrocortisone.  7/29/2023 cortisol was 0.62, subsequent ACTH test was reduced at 5.7.  The patient studies 7/30 included BUN/creatinine of 9 and 0.97, sodium 130, chloride 95, calcium 5.2.  The patient is next seen 8/14/2023.  He remains somewhat weak and with joint discomfort.  We have discussed his findings that would be most consistent with central adrenal insufficiency and that dosing for his hydrocortisone-currently 10 mg  p.o. every morning and 5 mg p.o. every afternoon is likely to be too low.  In determining whether we should proceed with treatment replacement therapy could allow us to try to complete his therapy which, on balance, would be the preferred approach.  Hydrocortisone moved to 20 mg p.o. every morning and 10 mg p.o. every afternoon.  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  Replacement therapy ongoing.  9/5/2023 reviewed back today for C9 Keytruda. Patient with improved performance status following increase of hydrocortisone per above.  Improved appetite and decreased joint pain.  Proceed with treatment today with repeat imaging planned thereafter.  Repeat CT chest, abdomen, pelvis 9/18/2023 without evidence of progressive disease.  Plan to proceed with cycle #10 Keytruda.  Patient seen 10/16/2023 for cycle #11, 11/6 for cycle #12 and patient seen 11/27 for cycle #13 again out of 18 total.  Patient seen 12/18/2023 here for cycle 14 Keytruda.  Patient is doing well.  Discussed with Dr. Bishop, and plans to hold off on follow-up scans until the completion of Keytruda (, planning a total of 18 cycles).  Proceed today, 1/29/2024 with cycle 16 Keytruda  Patient seen 2/19/2024 for cycle #17 Keytruda  3/11/2020 for cycle 18 Keytruda.  Will schedule patient for follow-up scans after today's treatment.  We proceeded with therapy thus completing this treatment (18 cycles) and follow-up ET chest, abdomen, pelvis was obtained 3/27/2024.  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  He is doing quite well with no additional issues except for skin rash on the medial  portions of his lower legs.  This is managed by dermatology treated with triamcinolone.  We discussed surveillance schedule including management of his port every 6 weeks and initial assessment via Guardant Reveal in approximately 9 weeks seeing him in 12 weeks.  As we proceed we could reassess his status in terms of adrenal insufficiency and possibly taper him from his steroids.  The patient's subsequent testing included a Guardant Reveal obtained 6/3/2024 that was positive for ctDNA and 0.083%.  This led to a follow-up CT series 6/21/2024 that was essentially stable.  There is no evidence of distant metastasis or local recurrence.  He is seen with his significant other 6/24 and advised that the exam may have determined an earlier relapse that might be seen on scans and thus we will have to retest him in the near future to determine whether this is continued to be the case.  Symptomatically is unchanged from previous.        2.  Herpes zoster: Patient was treated with Famvir.  Rash has resolved, no issues with persistent herpetic neuralgia.  He will be placed on suppression with acyclovir 400 mg twice daily.  We discussed he will hold off on vaccination for now, given recent infection.  Patient assessed 5/1/2023, continue Keytruda, status post fifth cycle of Keytruda or 3 months of therapy he would undergo repeat scans and, if stable or improved, proceed with Shingrix vaccinations.  Patient now requested to proceed with vaccinations including RSV    3.  Hyponatremia  Likely exacerbated by recent GI toxicity with nausea vomiting and diarrhea.  Symptoms have now resolved with improvement in his appetite and intake  Reviewed 11/27-23 with sodium 137.  Sodium is normal today, 1/29/2024  Reassessed 2/19/2024 at 140  3/11/2024 sodium 138    4. Joint pain.  Baseline knee pain prior to initiation of immunotherapy though he is now exacerbated with shoulder pain as well and requiring ambulation with a walker  Additional  inflammatory labs including sed rate and C-reactive protein today to evaluate for inflammation secondary to immunotherapy  Continue Tylenol and occasional Norco for breakthrough pain  Hydrocortisone replacement therapy increased to 20 mg p.o. every morning and 10 mg p.o. every afternoon  The patient denies pain today, 1/29/2024    5.  History of MGUS  Redraw [place], PE, free serum light chains-redraw will be necessary with insufficient specimen    PLAN:   No additional immunotherapy  Continue hydrocortisone 20 mg in the morning, 10 mg in the afternoon.  Continue prophylactic acyclovir 400 mg twice daily.  3 weeks port flush  9 weeks-CBC, CMP, Guardant Reveal, cortisol level  12 weeks MD  Call/ return sooner should the patient develop any new concerns or problems.    Patient is on a high risk medication requiring close monitoring for toxicity.    Kayden Bishop MD  06/24/2024

## 2024-06-24 ENCOUNTER — OFFICE VISIT (OUTPATIENT)
Dept: ONCOLOGY | Facility: CLINIC | Age: 79
End: 2024-06-24
Payer: MEDICARE

## 2024-06-24 VITALS
DIASTOLIC BLOOD PRESSURE: 71 MMHG | RESPIRATION RATE: 16 BRPM | HEART RATE: 56 BPM | WEIGHT: 162.6 LBS | OXYGEN SATURATION: 92 % | TEMPERATURE: 97.4 F | BODY MASS INDEX: 22.02 KG/M2 | SYSTOLIC BLOOD PRESSURE: 150 MMHG | HEIGHT: 72 IN

## 2024-06-24 DIAGNOSIS — D47.2 MGUS (MONOCLONAL GAMMOPATHY OF UNKNOWN SIGNIFICANCE): ICD-10-CM

## 2024-06-24 DIAGNOSIS — C7A.8 NEUROENDOCRINE CARCINOMA OF LUNG: Primary | ICD-10-CM

## 2024-06-24 PROCEDURE — 99214 OFFICE O/P EST MOD 30 MIN: CPT | Performed by: INTERNAL MEDICINE

## 2024-06-24 PROCEDURE — 1126F AMNT PAIN NOTED NONE PRSNT: CPT | Performed by: INTERNAL MEDICINE

## 2024-06-24 PROCEDURE — 3078F DIAST BP <80 MM HG: CPT | Performed by: INTERNAL MEDICINE

## 2024-06-24 PROCEDURE — 3077F SYST BP >= 140 MM HG: CPT | Performed by: INTERNAL MEDICINE

## 2024-06-24 RX ORDER — FEXOFENADINE HCL 180 MG/1
TABLET ORAL
COMMUNITY
Start: 2024-05-18

## 2024-06-24 RX ORDER — ALBUTEROL SULFATE 2.5 MG/3ML
2.5 SOLUTION RESPIRATORY (INHALATION)
COMMUNITY
Start: 2024-04-03

## 2024-06-24 RX ORDER — LOSARTAN POTASSIUM 50 MG/1
50 TABLET ORAL DAILY
COMMUNITY
Start: 2024-05-24

## 2024-07-15 ENCOUNTER — INFUSION (OUTPATIENT)
Dept: ONCOLOGY | Facility: HOSPITAL | Age: 79
End: 2024-07-15
Payer: MEDICARE

## 2024-07-15 DIAGNOSIS — Z45.2 FITTING AND ADJUSTMENT OF VASCULAR CATHETER: Primary | ICD-10-CM

## 2024-07-15 PROCEDURE — 25010000002 HEPARIN LOCK FLUSH PER 10 UNITS: Performed by: NURSE PRACTITIONER

## 2024-07-15 PROCEDURE — 96523 IRRIG DRUG DELIVERY DEVICE: CPT

## 2024-07-15 RX ORDER — HEPARIN SODIUM (PORCINE) LOCK FLUSH IV SOLN 100 UNIT/ML 100 UNIT/ML
500 SOLUTION INTRAVENOUS AS NEEDED
OUTPATIENT
Start: 2024-07-15

## 2024-07-15 RX ORDER — SODIUM CHLORIDE 0.9 % (FLUSH) 0.9 %
10 SYRINGE (ML) INJECTION AS NEEDED
Status: DISCONTINUED | OUTPATIENT
Start: 2024-07-15 | End: 2024-07-15 | Stop reason: HOSPADM

## 2024-07-15 RX ORDER — SODIUM CHLORIDE 0.9 % (FLUSH) 0.9 %
10 SYRINGE (ML) INJECTION AS NEEDED
OUTPATIENT
Start: 2024-07-15

## 2024-07-15 RX ORDER — HEPARIN SODIUM (PORCINE) LOCK FLUSH IV SOLN 100 UNIT/ML 100 UNIT/ML
500 SOLUTION INTRAVENOUS AS NEEDED
Status: DISCONTINUED | OUTPATIENT
Start: 2024-07-15 | End: 2024-07-15 | Stop reason: HOSPADM

## 2024-07-15 RX ADMIN — Medication 500 UNITS: at 14:10

## 2024-07-15 RX ADMIN — Medication 10 ML: at 14:10

## 2024-07-31 ENCOUNTER — OFFICE VISIT (OUTPATIENT)
Dept: ENDOCRINOLOGY | Age: 79
End: 2024-07-31
Payer: MEDICARE

## 2024-07-31 VITALS
HEART RATE: 61 BPM | DIASTOLIC BLOOD PRESSURE: 78 MMHG | HEIGHT: 72 IN | BODY MASS INDEX: 22.16 KG/M2 | WEIGHT: 163.6 LBS | SYSTOLIC BLOOD PRESSURE: 138 MMHG | OXYGEN SATURATION: 95 %

## 2024-07-31 DIAGNOSIS — E27.40 ADRENAL INSUFFICIENCY: ICD-10-CM

## 2024-07-31 RX ORDER — GUAIFENESIN AND PSEUDOEPHEDRINE HCL 1200; 120 MG/1; MG/1
TABLET, EXTENDED RELEASE ORAL
COMMUNITY
Start: 2024-06-19

## 2024-08-15 ENCOUNTER — TELEPHONE (OUTPATIENT)
Dept: ONCOLOGY | Facility: CLINIC | Age: 79
End: 2024-08-15
Payer: MEDICARE

## 2024-08-15 NOTE — TELEPHONE ENCOUNTER
Provider: Dario   Caller: Kate  Relationship to Patient: Significant other  Call Back Phone Number: [phone]  Reason for Call: Pt does not want to do upcoming lab test until last one is covered.

## 2024-08-15 NOTE — TELEPHONE ENCOUNTER
Patient's wife called and said that they have 3500$ bill for a guardant lab test. They sent in a request form for financial assistance but haven't heard back.  Dr. Bishop wants to have this test redrawn but they patient doesn't want to do another one until they get assistance for the first one. I reached out to Beverley Hansen for assistance.

## 2024-08-23 ENCOUNTER — TELEPHONE (OUTPATIENT)
Dept: ONCOLOGY | Facility: CLINIC | Age: 79
End: 2024-08-23
Payer: MEDICARE

## 2024-08-23 NOTE — TELEPHONE ENCOUNTER
Called the patients friend back and she verified the patient would like to get the guardant reveal drawn on 8/26 as scheduled. Pau TUCKER made aware and Kate pollock

## 2024-08-26 ENCOUNTER — INFUSION (OUTPATIENT)
Dept: ONCOLOGY | Facility: HOSPITAL | Age: 79
End: 2024-08-26
Payer: MEDICARE

## 2024-08-26 DIAGNOSIS — Z45.2 FITTING AND ADJUSTMENT OF VASCULAR CATHETER: Primary | ICD-10-CM

## 2024-08-26 DIAGNOSIS — C7A.8 NEUROENDOCRINE CARCINOMA OF LUNG: ICD-10-CM

## 2024-08-26 DIAGNOSIS — D47.2 MGUS (MONOCLONAL GAMMOPATHY OF UNKNOWN SIGNIFICANCE): ICD-10-CM

## 2024-08-26 LAB
BASOPHILS # BLD AUTO: 0.04 10*3/MM3 (ref 0–0.2)
BASOPHILS NFR BLD AUTO: 0.5 % (ref 0–1.5)
CORTIS SERPL-MCNC: 7.67 MCG/DL
DEPRECATED RDW RBC AUTO: 50.7 FL (ref 37–54)
EOSINOPHIL # BLD AUTO: 0.14 10*3/MM3 (ref 0–0.4)
EOSINOPHIL NFR BLD AUTO: 1.9 % (ref 0.3–6.2)
ERYTHROCYTE [DISTWIDTH] IN BLOOD BY AUTOMATED COUNT: 14.6 % (ref 12.3–15.4)
HCT VFR BLD AUTO: 43.2 % (ref 37.5–51)
HGB BLD-MCNC: 14.2 G/DL (ref 13–17.7)
IMM GRANULOCYTES # BLD AUTO: 0.03 10*3/MM3 (ref 0–0.05)
IMM GRANULOCYTES NFR BLD AUTO: 0.4 % (ref 0–0.5)
LYMPHOCYTES # BLD AUTO: 1.13 10*3/MM3 (ref 0.7–3.1)
LYMPHOCYTES NFR BLD AUTO: 15.4 % (ref 19.6–45.3)
MCH RBC QN AUTO: 31 PG (ref 26.6–33)
MCHC RBC AUTO-ENTMCNC: 32.9 G/DL (ref 31.5–35.7)
MCV RBC AUTO: 94.3 FL (ref 79–97)
MONOCYTES # BLD AUTO: 0.64 10*3/MM3 (ref 0.1–0.9)
MONOCYTES NFR BLD AUTO: 8.7 % (ref 5–12)
NEUTROPHILS NFR BLD AUTO: 5.36 10*3/MM3 (ref 1.7–7)
NEUTROPHILS NFR BLD AUTO: 73.1 % (ref 42.7–76)
NRBC BLD AUTO-RTO: 0 /100 WBC (ref 0–0.2)
PLATELET # BLD AUTO: 166 10*3/MM3 (ref 140–450)
PMV BLD AUTO: 8.5 FL (ref 6–12)
RBC # BLD AUTO: 4.58 10*6/MM3 (ref 4.14–5.8)
WBC NRBC COR # BLD AUTO: 7.34 10*3/MM3 (ref 3.4–10.8)

## 2024-08-26 PROCEDURE — 85025 COMPLETE CBC W/AUTO DIFF WBC: CPT

## 2024-08-26 PROCEDURE — 36591 DRAW BLOOD OFF VENOUS DEVICE: CPT

## 2024-08-26 PROCEDURE — 25010000002 HEPARIN LOCK FLUSH PER 10 UNITS: Performed by: NURSE PRACTITIONER

## 2024-08-26 PROCEDURE — 82533 TOTAL CORTISOL: CPT | Performed by: INTERNAL MEDICINE

## 2024-08-26 RX ORDER — SODIUM CHLORIDE 0.9 % (FLUSH) 0.9 %
10 SYRINGE (ML) INJECTION AS NEEDED
Status: DISCONTINUED | OUTPATIENT
Start: 2024-08-26 | End: 2024-08-26 | Stop reason: HOSPADM

## 2024-08-26 RX ORDER — SODIUM CHLORIDE 0.9 % (FLUSH) 0.9 %
10 SYRINGE (ML) INJECTION AS NEEDED
OUTPATIENT
Start: 2024-08-26

## 2024-08-26 RX ORDER — HEPARIN SODIUM (PORCINE) LOCK FLUSH IV SOLN 100 UNIT/ML 100 UNIT/ML
500 SOLUTION INTRAVENOUS AS NEEDED
Status: DISCONTINUED | OUTPATIENT
Start: 2024-08-26 | End: 2024-08-26 | Stop reason: HOSPADM

## 2024-08-26 RX ORDER — HEPARIN SODIUM (PORCINE) LOCK FLUSH IV SOLN 100 UNIT/ML 100 UNIT/ML
500 SOLUTION INTRAVENOUS AS NEEDED
OUTPATIENT
Start: 2024-08-26

## 2024-08-26 RX ADMIN — Medication 10 ML: at 13:59

## 2024-08-26 RX ADMIN — Medication 500 UNITS: at 13:58

## 2024-09-03 LAB — REF LAB TEST METHOD: NORMAL

## 2024-09-20 ENCOUNTER — INFUSION (OUTPATIENT)
Dept: ONCOLOGY | Facility: HOSPITAL | Age: 79
End: 2024-09-20
Payer: MEDICARE

## 2024-09-20 ENCOUNTER — OFFICE VISIT (OUTPATIENT)
Dept: ONCOLOGY | Facility: CLINIC | Age: 79
End: 2024-09-20
Payer: MEDICARE

## 2024-09-20 VITALS
DIASTOLIC BLOOD PRESSURE: 66 MMHG | TEMPERATURE: 98.2 F | HEIGHT: 71 IN | HEART RATE: 64 BPM | WEIGHT: 163.5 LBS | BODY MASS INDEX: 22.89 KG/M2 | OXYGEN SATURATION: 94 % | SYSTOLIC BLOOD PRESSURE: 155 MMHG | RESPIRATION RATE: 18 BRPM

## 2024-09-20 DIAGNOSIS — Z45.2 FITTING AND ADJUSTMENT OF VASCULAR CATHETER: Primary | ICD-10-CM

## 2024-09-20 DIAGNOSIS — C7A.8 NEUROENDOCRINE CARCINOMA OF LUNG: Primary | ICD-10-CM

## 2024-09-20 LAB
BASOPHILS # BLD AUTO: 0.05 10*3/MM3 (ref 0–0.2)
BASOPHILS NFR BLD AUTO: 0.7 % (ref 0–1.5)
DEPRECATED RDW RBC AUTO: 50.1 FL (ref 37–54)
EOSINOPHIL # BLD AUTO: 0.34 10*3/MM3 (ref 0–0.4)
EOSINOPHIL NFR BLD AUTO: 5.1 % (ref 0.3–6.2)
ERYTHROCYTE [DISTWIDTH] IN BLOOD BY AUTOMATED COUNT: 14.5 % (ref 12.3–15.4)
HCT VFR BLD AUTO: 42.6 % (ref 37.5–51)
HGB BLD-MCNC: 13.5 G/DL (ref 13–17.7)
IMM GRANULOCYTES # BLD AUTO: 0.03 10*3/MM3 (ref 0–0.05)
IMM GRANULOCYTES NFR BLD AUTO: 0.4 % (ref 0–0.5)
LYMPHOCYTES # BLD AUTO: 1.63 10*3/MM3 (ref 0.7–3.1)
LYMPHOCYTES NFR BLD AUTO: 24.3 % (ref 19.6–45.3)
MCH RBC QN AUTO: 29.9 PG (ref 26.6–33)
MCHC RBC AUTO-ENTMCNC: 31.7 G/DL (ref 31.5–35.7)
MCV RBC AUTO: 94.2 FL (ref 79–97)
MONOCYTES # BLD AUTO: 0.83 10*3/MM3 (ref 0.1–0.9)
MONOCYTES NFR BLD AUTO: 12.4 % (ref 5–12)
NEUTROPHILS NFR BLD AUTO: 3.83 10*3/MM3 (ref 1.7–7)
NEUTROPHILS NFR BLD AUTO: 57.1 % (ref 42.7–76)
NRBC BLD AUTO-RTO: 0 /100 WBC (ref 0–0.2)
PLATELET # BLD AUTO: 182 10*3/MM3 (ref 140–450)
PMV BLD AUTO: 8.6 FL (ref 6–12)
RBC # BLD AUTO: 4.52 10*6/MM3 (ref 4.14–5.8)
WBC NRBC COR # BLD AUTO: 6.71 10*3/MM3 (ref 3.4–10.8)

## 2024-09-20 PROCEDURE — 3077F SYST BP >= 140 MM HG: CPT | Performed by: INTERNAL MEDICINE

## 2024-09-20 PROCEDURE — 36591 DRAW BLOOD OFF VENOUS DEVICE: CPT

## 2024-09-20 PROCEDURE — 25010000002 HEPARIN LOCK FLUSH PER 10 UNITS: Performed by: NURSE PRACTITIONER

## 2024-09-20 PROCEDURE — 85025 COMPLETE CBC W/AUTO DIFF WBC: CPT | Performed by: INTERNAL MEDICINE

## 2024-09-20 PROCEDURE — 3078F DIAST BP <80 MM HG: CPT | Performed by: INTERNAL MEDICINE

## 2024-09-20 PROCEDURE — 1126F AMNT PAIN NOTED NONE PRSNT: CPT | Performed by: INTERNAL MEDICINE

## 2024-09-20 PROCEDURE — 99214 OFFICE O/P EST MOD 30 MIN: CPT | Performed by: INTERNAL MEDICINE

## 2024-09-20 RX ORDER — SODIUM CHLORIDE 0.9 % (FLUSH) 0.9 %
10 SYRINGE (ML) INJECTION AS NEEDED
OUTPATIENT
Start: 2024-09-20

## 2024-09-20 RX ORDER — HEPARIN SODIUM (PORCINE) LOCK FLUSH IV SOLN 100 UNIT/ML 100 UNIT/ML
500 SOLUTION INTRAVENOUS AS NEEDED
Status: DISCONTINUED | OUTPATIENT
Start: 2024-09-20 | End: 2024-09-20 | Stop reason: HOSPADM

## 2024-09-20 RX ORDER — VARENICLINE TARTRATE 1 MG/1
TABLET, FILM COATED ORAL
COMMUNITY
Start: 2024-09-14

## 2024-09-20 RX ORDER — HEPARIN SODIUM (PORCINE) LOCK FLUSH IV SOLN 100 UNIT/ML 100 UNIT/ML
500 SOLUTION INTRAVENOUS AS NEEDED
OUTPATIENT
Start: 2024-09-20

## 2024-09-20 RX ORDER — SODIUM CHLORIDE 0.9 % (FLUSH) 0.9 %
10 SYRINGE (ML) INJECTION AS NEEDED
Status: DISCONTINUED | OUTPATIENT
Start: 2024-09-20 | End: 2024-09-20 | Stop reason: HOSPADM

## 2024-09-20 RX ADMIN — Medication 10 ML: at 11:10

## 2024-09-20 RX ADMIN — Medication 500 UNITS: at 11:10

## 2024-11-01 ENCOUNTER — INFUSION (OUTPATIENT)
Dept: ONCOLOGY | Facility: HOSPITAL | Age: 79
End: 2024-11-01
Payer: MEDICARE

## 2024-11-01 DIAGNOSIS — Z45.2 FITTING AND ADJUSTMENT OF VASCULAR CATHETER: Primary | ICD-10-CM

## 2024-11-01 PROCEDURE — 25010000002 HEPARIN LOCK FLUSH PER 10 UNITS: Performed by: NURSE PRACTITIONER

## 2024-11-01 PROCEDURE — 96523 IRRIG DRUG DELIVERY DEVICE: CPT

## 2024-11-01 RX ORDER — SODIUM CHLORIDE 0.9 % (FLUSH) 0.9 %
10 SYRINGE (ML) INJECTION AS NEEDED
OUTPATIENT
Start: 2024-11-01

## 2024-11-01 RX ORDER — SODIUM CHLORIDE 0.9 % (FLUSH) 0.9 %
10 SYRINGE (ML) INJECTION AS NEEDED
Status: DISCONTINUED | OUTPATIENT
Start: 2024-11-01 | End: 2024-11-01 | Stop reason: HOSPADM

## 2024-11-01 RX ORDER — HEPARIN SODIUM (PORCINE) LOCK FLUSH IV SOLN 100 UNIT/ML 100 UNIT/ML
500 SOLUTION INTRAVENOUS AS NEEDED
OUTPATIENT
Start: 2024-11-01

## 2024-11-01 RX ORDER — HEPARIN SODIUM (PORCINE) LOCK FLUSH IV SOLN 100 UNIT/ML 100 UNIT/ML
500 SOLUTION INTRAVENOUS AS NEEDED
Status: DISCONTINUED | OUTPATIENT
Start: 2024-11-01 | End: 2024-11-01 | Stop reason: HOSPADM

## 2024-11-01 RX ADMIN — Medication 500 UNITS: at 13:01

## 2024-11-01 RX ADMIN — Medication 10 ML: at 13:01

## 2024-11-04 ENCOUNTER — OFFICE VISIT (OUTPATIENT)
Dept: FAMILY MEDICINE CLINIC | Facility: CLINIC | Age: 79
End: 2024-11-04
Payer: MEDICARE

## 2024-11-04 VITALS
BODY MASS INDEX: 23.13 KG/M2 | TEMPERATURE: 98.7 F | HEIGHT: 71 IN | HEART RATE: 67 BPM | WEIGHT: 165.2 LBS | SYSTOLIC BLOOD PRESSURE: 136 MMHG | OXYGEN SATURATION: 95 % | DIASTOLIC BLOOD PRESSURE: 76 MMHG | RESPIRATION RATE: 17 BRPM

## 2024-11-04 DIAGNOSIS — E78.2 MIXED HYPERLIPIDEMIA: ICD-10-CM

## 2024-11-04 DIAGNOSIS — Z79.899 LONG-TERM USE OF HIGH-RISK MEDICATION: ICD-10-CM

## 2024-11-04 DIAGNOSIS — M62.838 MUSCLE SPASMS OF LOWER EXTREMITY: ICD-10-CM

## 2024-11-04 DIAGNOSIS — I48.0 PAROXYSMAL ATRIAL FIBRILLATION: ICD-10-CM

## 2024-11-04 DIAGNOSIS — Z79.01 CHRONIC ANTICOAGULATION: ICD-10-CM

## 2024-11-04 DIAGNOSIS — I10 PRIMARY HYPERTENSION: Primary | ICD-10-CM

## 2024-11-04 DIAGNOSIS — J44.9 CHRONIC OBSTRUCTIVE PULMONARY DISEASE, UNSPECIFIED COPD TYPE: ICD-10-CM

## 2024-11-04 DIAGNOSIS — E55.9 VITAMIN D DEFICIENCY: ICD-10-CM

## 2024-11-04 DIAGNOSIS — E11.9 TYPE 2 DIABETES MELLITUS WITHOUT COMPLICATION, WITHOUT LONG-TERM CURRENT USE OF INSULIN: ICD-10-CM

## 2024-11-04 PROBLEM — I70.209: Status: ACTIVE | Noted: 2024-11-04

## 2024-11-04 PROBLEM — Z87.820 HISTORY OF BRAIN CONCUSSION: Status: ACTIVE | Noted: 2018-05-24

## 2024-11-04 PROBLEM — Z86.0100 HISTORY OF COLON POLYPS: Status: ACTIVE | Noted: 2022-05-27

## 2024-11-04 PROBLEM — Z85.828 HISTORY OF SKIN CANCER: Status: ACTIVE | Noted: 2019-08-20

## 2024-11-04 PROBLEM — Z87.828 HISTORY OF TORN MENISCUS OF LEFT KNEE: Status: ACTIVE | Noted: 2017-10-19

## 2024-11-04 PROBLEM — Z98.890 HISTORY OF RIGHT INGUINAL HERNIA REPAIR: Status: ACTIVE | Noted: 2017-10-19

## 2024-11-04 PROBLEM — I71.43 INFRARENAL ABDOMINAL AORTIC ANEURYSM (AAA) WITHOUT RUPTURE: Status: ACTIVE | Noted: 2023-01-20

## 2024-11-04 PROBLEM — E78.00 HYPERCHOLESTEROLEMIA: Status: ACTIVE | Noted: 2024-11-04

## 2024-11-04 PROBLEM — Z80.1 FAMILY HISTORY OF LUNG CANCER: Status: ACTIVE | Noted: 2018-05-24

## 2024-11-04 PROBLEM — Z91.89 AT RISK FOR MALNUTRITION: Status: RESOLVED | Noted: 2022-08-11 | Resolved: 2024-11-04

## 2024-11-04 PROBLEM — R97.20 ELEVATED PSA: Status: ACTIVE | Noted: 2021-10-05

## 2024-11-04 PROBLEM — Z71.89 COUNSELING REGARDING ADVANCE CARE PLANNING AND GOALS OF CARE: Status: RESOLVED | Noted: 2022-08-11 | Resolved: 2024-11-04

## 2024-11-04 PROBLEM — N40.1 BPH WITH OBSTRUCTION/LOWER URINARY TRACT SYMPTOMS: Status: ACTIVE | Noted: 2023-01-20

## 2024-11-04 PROBLEM — Z87.19 HISTORY OF DIVERTICULITIS: Status: ACTIVE | Noted: 2021-10-04

## 2024-11-04 PROBLEM — E83.52 HYPERCALCEMIA: Status: ACTIVE | Noted: 2018-05-03

## 2024-11-04 PROBLEM — I70.0 AORTO-ILIAC ATHEROSCLEROSIS: Status: ACTIVE | Noted: 2023-01-20

## 2024-11-04 PROBLEM — I25.10 CORONARY ARTERY CALCIFICATION SEEN ON CAT SCAN: Status: ACTIVE | Noted: 2019-01-18

## 2024-11-04 PROBLEM — I70.8 AORTO-ILIAC ATHEROSCLEROSIS: Status: ACTIVE | Noted: 2023-01-20

## 2024-11-04 PROBLEM — Z86.19 HISTORY OF HELICOBACTER PYLORI INFECTION: Status: ACTIVE | Noted: 2018-02-12

## 2024-11-04 PROBLEM — E78.5 HYPERLIPIDEMIA: Status: ACTIVE | Noted: 2024-11-04

## 2024-11-04 PROBLEM — Z87.19 HISTORY OF RIGHT INGUINAL HERNIA REPAIR: Status: ACTIVE | Noted: 2017-10-19

## 2024-11-04 PROBLEM — G47.34 NOCTURNAL HYPOXIA: Status: ACTIVE | Noted: 2018-07-18

## 2024-11-04 PROBLEM — R09.89 LEFT CAROTID BRUIT: Status: ACTIVE | Noted: 2017-10-19

## 2024-11-04 PROBLEM — N13.8 BPH WITH OBSTRUCTION/LOWER URINARY TRACT SYMPTOMS: Status: ACTIVE | Noted: 2023-01-20

## 2024-11-04 PROBLEM — K40.90 LEFT INGUINAL HERNIA: Status: ACTIVE | Noted: 2022-06-27

## 2024-11-04 RX ORDER — LOSARTAN POTASSIUM 50 MG/1
50 TABLET ORAL DAILY
Qty: 90 TABLET | Refills: 1 | Status: SHIPPED | OUTPATIENT
Start: 2024-11-04

## 2024-11-04 RX ORDER — BUDESONIDE, GLYCOPYRROLATE, AND FORMOTEROL FUMARATE 160; 9; 4.8 UG/1; UG/1; UG/1
2 AEROSOL, METERED RESPIRATORY (INHALATION) 2 TIMES DAILY
Qty: 10.7 G | Refills: 2 | Status: SHIPPED | OUTPATIENT
Start: 2024-11-04 | End: 2024-11-05 | Stop reason: SDUPTHER

## 2024-11-04 RX ORDER — TRIAMCINOLONE ACETONIDE 1 MG/G
1 CREAM TOPICAL 2 TIMES DAILY
COMMUNITY

## 2024-11-04 RX ORDER — SIMVASTATIN 20 MG
20 TABLET ORAL NIGHTLY
Qty: 90 TABLET | Refills: 1 | Status: SHIPPED | OUTPATIENT
Start: 2024-11-04

## 2024-11-04 NOTE — PROGRESS NOTES
Subjective     Giovani España is a 79 y.o.. male.     Patient here today to become established.     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    Patient stating he does his own INR at home through [facility] every Tuesday; stating they call results in on Tuesday. Last Tuesday INR 2.4 per Patient. Patient stating he is taking Coumadin 5 mg daily.    Patient c/o leg cramps/muscle spasms rodney. During day and night.     Patient c/o history of left shoulder issues.    Patient's specialists:   Dr. Oscar Xie, cardiology  Dr. Bereket Hudson, hem/onc   Dr. Raulito Joshi, endo  Dr. Newsome, pulmonary  Dr. Cortez, vascular  Dr. Watkins, pulmcardio surg--lung surg  Dr. Rogers, urologist  Dr. Joe, port placement  Dr. Wolf/Dr. Sánchez, derm    Patient stating he gets his oxygen from [facility].     Patient was on Keytruda but it was d/c'd in March 2024    Patient stating he started smoking 2 ppd in his 30's; started decreasing 4-5 years ago, smoking 10-12 cigs a day; has tried chantix, but makes him sleep a lot, trying again now.         The following portions of the patient's history were reviewed and updated as appropriate: allergies, current medications, past family history, past medical history, past social history, past surgical history and problem list.    Past Medical History:   Diagnosis Date    Arthritis     COPD (chronic obstructive pulmonary disease)     O2 3L AT HS    Diabetes mellitus     DIET CONTROL    Diverticulitis     DVT, lower extremity     RLE    Elevated cholesterol     H/O Cervical pain     History of colitis     Hyperlipidemia     Hypertension     Left shoulder pain     Low back pain     Lung cancer 2022    LEFT LUNG    On anticoagulant therapy     Peripheral arterial disease     Right leg claudication     Skin  "cancer     HX    Type 2 diabetes mellitus     Vitamin D deficiency        Past Surgical History:   Procedure Laterality Date    BALLOON ANGIOPLASTY, ARTERY Right 2015    IR Percutaneious IVC filter placement    INGUINAL HERNIA REPAIR Left     KNEE ARTHROSCOPY Left     Torn meniscus    LOBECTOMY Left 09/23/2022    Procedure: BRONCHOSCOPY, LEFT VIDEO ASSISTED THORACOSCOPY, ROBOT ASSISTED TOTAL DECORTICATION  LEFT LUNG, LEFT LOWER LOBE LOBECTOMY, MEDIASTINAL LYMPHECTOMY, INTERCOSTAL NERVE BLOCK;  Surgeon: Nicola Joe III, MD;  Location: [facility];  Service: Robotics - DaVinci;  Laterality: Left;    VENOUS ACCESS DEVICE (PORT) INSERTION Right 11/21/2022    Procedure: INSERTION VENOUS ACCESS DEVICE;  Surgeon: Nicola Joe III, MD;  Location: [facility];  Service: Thoracic;  Laterality: Right;       Review of Systems   Constitutional: Negative.    Respiratory: Negative.     Cardiovascular: Negative.    Gastrointestinal:  Negative for abdominal distention, constipation, diarrhea, nausea and vomiting.        Right upper abd pain off and on  Bm 1 a day   Musculoskeletal:  Positive for arthralgias (chronic left shoulder).        Leg cramps rodney. Day and night       Allergies   Allergen Reactions    Benadryl [Diphenhydramine] Other (See Comments)     Extreme restlessness and insomnia       Objective     Vitals:    11/04/24 1418   BP: 136/76   BP Location: Right arm   Patient Position: Sitting   Cuff Size: Adult   Pulse: 67   Resp: 17   Temp: 98.7 °F (37.1 °C)   TempSrc: Temporal   SpO2: 95%   Weight: 74.9 kg (165 lb 3.2 oz)   Height: 180.3 cm (70.98\")     Body mass index is 23.05 kg/m².    Physical Exam  Vitals reviewed.   HENT:      Head: Normocephalic.   Eyes:      Pupils: Pupils are equal, round, and reactive to light.   Cardiovascular:      Rate and Rhythm: Normal rate and regular rhythm.      Pulses: Normal pulses.   Pulmonary:      Effort: No accessory muscle usage or respiratory distress.      Breath " sounds: No stridor. Decreased breath sounds present. No wheezing, rhonchi or rales.   Musculoskeletal:      Right lower leg: Edema (trace) present.      Left lower leg: No edema.   Skin:     General: Skin is warm and dry.   Neurological:      Mental Status: He is alert and oriented to person, place, and time.           Current Outpatient Medications:     Cholecalciferol 50 MCG (2000 UT) capsule, Take 1 capsule by mouth Daily. Indications: Vitamin D Deficiency, Disp: , Rfl:     fexofenadine (Allegra Allergy) 180 MG tablet, , Disp: , Rfl:     fluticasone (FLONASE) 50 MCG/ACT nasal spray, Administer 1 spray into the nostril(s) as directed by provider Daily. Indications: Allergic Rhinitis, Disp: , Rfl:     hydrocortisone (CORTEF) 10 MG tablet, Take 2 tablets in the morning and 1 tablet in the afternoon plus additional medication for acute illness up to 60 mg daily, Disp: 360 tablet, Rfl: 3    isosorbide mononitrate (IMDUR) 60 MG 24 hr tablet, Take 1 tablet by mouth Every Evening. Indications: hypertension, Disp: , Rfl:     losartan (COZAAR) 50 MG tablet, Take 1 tablet by mouth Daily., Disp: 90 tablet, Rfl: 1    NON FORMULARY, 2-3 L during day; 2L during night; [facility]  Pulmonologist Dr. Newsome #[phone], Disp: , Rfl:     Pseudoephedrine-guaiFENesin ER (Mucus D) 120-1200 MG tablet sustained-release 12 hour, , Disp: , Rfl:     sildenafil (REVATIO) 20 MG tablet, Take 1 tablet by mouth., Disp: , Rfl:     simvastatin (ZOCOR) 20 MG tablet, Take 1 tablet by mouth Every Night. Indications: High Amount of Fats in the Blood, Disp: 90 tablet, Rfl: 1    tamsulosin (FLOMAX) 0.4 MG capsule 24 hr capsule, Take 1 capsule by mouth Daily., Disp: 30 capsule, Rfl: 5    triamcinolone (KENALOG) 0.1 % cream, Apply 1 Application topically to the appropriate area as directed 2 (Two) Times a Day., Disp: , Rfl:     varenicline (CHANTIX) 1 MG tablet, , Disp: , Rfl:     warfarin (COUMADIN) 5 MG tablet, Take 1 tablet by mouth Daily. Take 10mg on  9/29/22 and then resume regular home schedule. (Patient taking differently: Take 1 tablet by mouth Daily. 5mg daily with additional 5mg on Monday and Friday), Disp: , Rfl:     Budeson-Glycopyrrol-Formoterol (Breztri Aerosphere) 160-9-4.8 MCG/ACT aerosol inhaler, Inhale 2 puffs 2 (Two) Times a Day., Disp: 10.7 g, Rfl: 11    Cholecalciferol 25 MCG (1000 UT) tablet, Take 1 tablet by mouth Daily., Disp: , Rfl:     ondansetron (Zofran) 8 MG tablet, Take 1 tablet by mouth Every 8 (Eight) Hours As Needed for Nausea or Vomiting. (Patient not taking: Reported on 11/4/2024), Disp: 30 tablet, Rfl: 1    Recent Results (from the past 12 weeks)   Guardant Reveal    Collection Time: 08/26/24  1:51 PM    Specimen: Kit   Result Value Ref Range    Reference Lab Report     Cortisol    Collection Time: 08/26/24  1:51 PM    Specimen: Blood   Result Value Ref Range    Cortisol 7.67   mcg/dL   CBC Auto Differential    Collection Time: 08/26/24  1:51 PM    Specimen: Blood   Result Value Ref Range    WBC 7.34 3.40 - 10.80 10*3/mm3    RBC 4.58 4.14 - 5.80 10*6/mm3    Hemoglobin 14.2 13.0 - 17.7 g/dL    Hematocrit 43.2 37.5 - 51.0 %    MCV 94.3 79.0 - 97.0 fL    MCH 31.0 26.6 - 33.0 pg    MCHC 32.9 31.5 - 35.7 g/dL    RDW 14.6 12.3 - 15.4 %    RDW-SD 50.7 37.0 - 54.0 fl    MPV 8.5 6.0 - 12.0 fL    Platelets 166 140 - 450 10*3/mm3    Neutrophil % 73.1 42.7 - 76.0 %    Lymphocyte % 15.4 (L) 19.6 - 45.3 %    Monocyte % 8.7 5.0 - 12.0 %    Eosinophil % 1.9 0.3 - 6.2 %    Basophil % 0.5 0.0 - 1.5 %    Immature Grans % 0.4 0.0 - 0.5 %    Neutrophils, Absolute 5.36 1.70 - 7.00 10*3/mm3    Lymphocytes, Absolute 1.13 0.70 - 3.10 10*3/mm3    Monocytes, Absolute 0.64 0.10 - 0.90 10*3/mm3    Eosinophils, Absolute 0.14 0.00 - 0.40 10*3/mm3    Basophils, Absolute 0.04 0.00 - 0.20 10*3/mm3    Immature Grans, Absolute 0.03 0.00 - 0.05 10*3/mm3    nRBC 0.0 0.0 - 0.2 /100 WBC   CBC Auto Differential    Collection Time: 09/20/24 11:11 AM    Specimen: Blood    Result Value Ref Range    WBC 6.71 3.40 - 10.80 10*3/mm3    RBC 4.52 4.14 - 5.80 10*6/mm3    Hemoglobin 13.5 13.0 - 17.7 g/dL    Hematocrit 42.6 37.5 - 51.0 %    MCV 94.2 79.0 - 97.0 fL    MCH 29.9 26.6 - 33.0 pg    MCHC 31.7 31.5 - 35.7 g/dL    RDW 14.5 12.3 - 15.4 %    RDW-SD 50.1 37.0 - 54.0 fl    MPV 8.6 6.0 - 12.0 fL    Platelets 182 140 - 450 10*3/mm3    Neutrophil % 57.1 42.7 - 76.0 %    Lymphocyte % 24.3 19.6 - 45.3 %    Monocyte % 12.4 (H) 5.0 - 12.0 %    Eosinophil % 5.1 0.3 - 6.2 %    Basophil % 0.7 0.0 - 1.5 %    Immature Grans % 0.4 0.0 - 0.5 %    Neutrophils, Absolute 3.83 1.70 - 7.00 10*3/mm3    Lymphocytes, Absolute 1.63 0.70 - 3.10 10*3/mm3    Monocytes, Absolute 0.83 0.10 - 0.90 10*3/mm3    Eosinophils, Absolute 0.34 0.00 - 0.40 10*3/mm3    Basophils, Absolute 0.05 0.00 - 0.20 10*3/mm3    Immature Grans, Absolute 0.03 0.00 - 0.05 10*3/mm3    nRBC 0.0 0.0 - 0.2 /100 WBC   Iron Profile    Collection Time: 11/04/24  3:09 PM    Specimen: Blood   Result Value Ref Range    TIBC 302 250 - 450 ug/dL    UIBC 221 111 - 343 ug/dL    Iron 81 38 - 169 ug/dL    Iron Saturation 27 15 - 55 %   Magnesium    Collection Time: 11/04/24  3:09 PM    Specimen: Blood   Result Value Ref Range    Magnesium 2.0 1.6 - 2.3 mg/dL   Vitamin B12    Collection Time: 11/04/24  3:09 PM    Specimen: Blood   Result Value Ref Range    Vitamin B-12 264 232 - 1,245 pg/mL   Vitamin D,25-Hydroxy    Collection Time: 11/04/24  3:09 PM    Specimen: Blood   Result Value Ref Range    25 Hydroxy, Vitamin D 35.5 30.0 - 100.0 ng/mL       CT Chest With Contrast Diagnostic, CT Abdomen Pelvis With Contrast  CT CHEST W CONTRAST DIAGNOSTIC-, CT ABDOMEN PELVIS W CONTRAST-     Radiation dose reduction techniques were utilized, including automated  exposure control and exposure modulation based on body size.     CLINICAL: Neuroendocrine carcinoma of the lung, follow-up chemotherapy.  Decortication and left lower lobectomy.     COMPARISON: 03/27/2024      FINDINGS:  1. Volume loss left hemithorax as before. There are linear areas of  scarring and/or atelectasis seen within both lungs similar to the  previous examination. There are also stable subcentimeter size nodular  densities within the right lung, the largest of these is located at the  apex, measuring 8 mm and seen on image #23. Other nodules seen on images  31, 37, 62, 65 and 80. No new pulmonary nodule, acute airspace disease  or pleural effusion has developed.     2. Cardiac size within normal limits, no pericardial abnormality. The  esophagus is satisfactory in course and caliber. There is a Mediport  catheter in position. No mediastinal or hilar mass/lymphadenopathy. Few  small lymph nodes as before. Minimal amount of left-sided pleural  thickening likely related to decortication, unchanged in the interim.     3. The liver, pancreas, spleen, adrenal glands and right kidney are  satisfactory in appearance. There are again gallstones within a  collapsed gallbladder, no biliary duct dilatation. Small left renal  cortical cysts. No right or left renal calculus nor obstructive  uropathy. Urinary bladder is satisfactory in appearance.     4. Atherosclerotic calcification and mural thrombus within the abdominal  aorta, having a diameter of 3.8 cm as before. IVC filter in position.  There is diverticulosis of the colon. No indication of acute  diverticulitis or change. The appendix, small bowel and stomach are  satisfactory in appearance. No duodenal abnormality seen. No free air or  free intraperitoneal fluid nor adenopathy seen. No lytic or sclerotic  bone lesion has developed.     CONCLUSION: Stable imaging of the chest, abdomen and pelvis. Stable  subcentimeter size nodules within the right lung. No indication of local  recurrence or distant metastasis.     This report was finalized on 6/22/2024 11:46 AM by Dr. José Dasilva M.D on Workstation: [id number]           Diagnoses and all orders for this  visit:    1. Primary hypertension (Primary)  -     losartan (COZAAR) 50 MG tablet; Take 1 tablet by mouth Daily.  Dispense: 90 tablet; Refill: 1    2. Paroxysmal atrial fibrillation    3. Mixed hyperlipidemia  -     simvastatin (ZOCOR) 20 MG tablet; Take 1 tablet by mouth Every Night. Indications: High Amount of Fats in the Blood  Dispense: 90 tablet; Refill: 1    4. Type 2 diabetes mellitus without complication, without long-term current use of insulin  -     Iron Profile    5. Chronic obstructive pulmonary disease, unspecified COPD type    6. Vitamin D deficiency  -     Vitamin D,25-Hydroxy    7. Muscle spasms of lower extremity  -     Iron Profile  -     Magnesium  -     Vitamin B12  -     Vitamin D,25-Hydroxy    8. Long-term use of high-risk medication    9. Chronic anticoagulation    Other orders  -     Discontinue: Budeson-Glycopyrrol-Formoterol (Breztri Aerosphere) 160-9-4.8 MCG/ACT aerosol inhaler; Inhale 2 puffs 2 (Two) Times a Day.  Dispense: 10.7 g; Refill: 2      Patient Instructions   Hypertension: borderline, continue to monitor; continue losartan 50 mg daily isosorbide 60 mg daily    Afib/chronic anticoagulation/long term use of high risk medication: continue with [facility] on Tuesday, continue with coumadin 5 mg daily at this time.     Hyperlipidemia: continue with simvastatin 20 mg 1 tab daily; recommend Patient to eat a heart healthy low fat low sugar diet, drink plenty of water throughout the day and stay active 2-3 times a week    Diabetes: follow up with Dr. Raulito Joshi, endocrinologist as directed/as needed    COPD: follow up with Dr. Newsome, pulmonologist as directed/as needed    Vitamin D deficiency: continue otc vitamin D supplement    Muscle spasms: ordering labs today for further eval.     Return for fasting labs in 6 months, [employer] [facility].

## 2024-11-05 ENCOUNTER — PATIENT ROUNDING (BHMG ONLY) (OUTPATIENT)
Dept: FAMILY MEDICINE CLINIC | Facility: CLINIC | Age: 79
End: 2024-11-05
Payer: MEDICARE

## 2024-11-05 ENCOUNTER — PATIENT MESSAGE (OUTPATIENT)
Dept: FAMILY MEDICINE CLINIC | Facility: CLINIC | Age: 79
End: 2024-11-05
Payer: MEDICARE

## 2024-11-05 LAB
25(OH)D3+25(OH)D2 SERPL-MCNC: 35.5 NG/ML (ref 30–100)
IRON SATN MFR SERPL: 27 % (ref 15–55)
IRON SERPL-MCNC: 81 UG/DL (ref 38–169)
MAGNESIUM SERPL-MCNC: 2 MG/DL (ref 1.6–2.3)
TIBC SERPL-MCNC: 302 UG/DL (ref 250–450)
UIBC SERPL-MCNC: 221 UG/DL (ref 111–343)
VIT B12 SERPL-MCNC: 264 PG/ML (ref 232–1245)

## 2024-11-05 RX ORDER — BUDESONIDE, GLYCOPYRROLATE, AND FORMOTEROL FUMARATE 160; 9; 4.8 UG/1; UG/1; UG/1
2 AEROSOL, METERED RESPIRATORY (INHALATION) 2 TIMES DAILY
Qty: 10.7 G | Refills: 11 | Status: SHIPPED | OUTPATIENT
Start: 2024-11-05

## 2024-11-05 NOTE — PROGRESS NOTES
Pt rounding done at check out.   
Detail Level: Zone
Detail Level: Detailed
Detail Level: Generalized

## 2024-11-11 ENCOUNTER — TELEPHONE (OUTPATIENT)
Dept: FAMILY MEDICINE CLINIC | Facility: CLINIC | Age: 79
End: 2024-11-11
Payer: MEDICARE

## 2024-11-11 PROBLEM — M62.838 MUSCLE SPASMS OF LOWER EXTREMITY: Status: ACTIVE | Noted: 2024-11-11

## 2024-11-11 PROBLEM — I48.0 PAROXYSMAL ATRIAL FIBRILLATION: Status: ACTIVE | Noted: 2024-11-11

## 2024-11-11 RX ORDER — CHOLECALCIFEROL (VITAMIN D3) 25 MCG
1000 TABLET ORAL DAILY
COMMUNITY

## 2024-11-11 NOTE — PATIENT INSTRUCTIONS
Hypertension: borderline, continue to monitor; continue losartan 50 mg daily isosorbide 60 mg daily    Afib/chronic anticoagulation/long term use of high risk medication: continue with [facility] on Tuesday, continue with coumadin 5 mg daily at this time.     Hyperlipidemia: continue with simvastatin 20 mg 1 tab daily; recommend Patient to eat a heart healthy low fat low sugar diet, drink plenty of water throughout the day and stay active 2-3 times a week    Diabetes: follow up with Dr. Raulito Joshi, endocrinologist as directed/as needed    COPD: follow up with Dr. Newsome, pulmonologist as directed/as needed    Vitamin D deficiency: continue otc vitamin D supplement    Muscle spasms: ordering labs today for further eval.

## 2024-11-13 ENCOUNTER — PATIENT MESSAGE (OUTPATIENT)
Dept: FAMILY MEDICINE CLINIC | Facility: CLINIC | Age: 79
End: 2024-11-13
Payer: MEDICARE

## 2024-11-19 ENCOUNTER — TELEPHONE (OUTPATIENT)
Dept: ONCOLOGY | Facility: CLINIC | Age: 79
End: 2024-11-19
Payer: MEDICARE

## 2024-11-19 DIAGNOSIS — C7A.8 NEUROENDOCRINE CARCINOMA OF LUNG: Primary | ICD-10-CM

## 2024-11-19 NOTE — TELEPHONE ENCOUNTER
Called patient, said that he is having non cardiac chest pain under both sides of his ribs but it varies on the day. Said that the pain kept him up last night and had pain again this morning on both sides but today it was on the right. Says that it is a 9 or 10 of 10 when he haves it, says that it is a sharp pain that has been going since he had his surgery but it has gotten worse over the past 6 months. Said that they have spoken to Dr. Bishop about in the past. Wanting to know if there is anything that Dr. Bishop could do to help this.

## 2024-11-20 NOTE — TELEPHONE ENCOUNTER
Called back patient, let him know that Dr. Bishop wants the patient to get a chest CT done. Pt v/u

## 2024-11-21 ENCOUNTER — HOSPITAL ENCOUNTER (OUTPATIENT)
Dept: CT IMAGING | Facility: HOSPITAL | Age: 79
Discharge: HOME | End: 2024-11-21
Admitting: INTERNAL MEDICINE
Payer: MEDICARE

## 2024-11-21 DIAGNOSIS — C7A.8 NEUROENDOCRINE CARCINOMA OF LUNG: ICD-10-CM

## 2024-11-21 PROCEDURE — 71250 CT THORAX DX C-: CPT

## 2024-12-02 ENCOUNTER — TELEPHONE (OUTPATIENT)
Dept: ONCOLOGY | Facility: CLINIC | Age: 79
End: 2024-12-02
Payer: MEDICARE

## 2024-12-02 NOTE — TELEPHONE ENCOUNTER
"Called patients spouse, let her know per Dr. Bishop \"That certainly could be the case. His chest pain is a bit atypical for that presentation\" and asked them to reach out to their PCP regarding this. No answer, left VM.  "

## 2024-12-02 NOTE — TELEPHONE ENCOUNTER
Caller: Nikhil Torres    Relationship: Emergency Contact    Best call back number: [phone]    What is the best time to reach you: ANYTIME    Who are you requesting to speak with (clinical staff, provider,  specific staff member): CLINICAL    What was the call regarding: NIKHIL REQUESTING CB TO DISCUSS PT'S CT RESULTS FROM 11/20

## 2024-12-02 NOTE — TELEPHONE ENCOUNTER
That certainly could be the case.  We do not ordinarily follow him for this.  His chest pain is a bit atypical for that presentation.  Has he discussed this with his primary care?

## 2024-12-02 NOTE — TELEPHONE ENCOUNTER
Thank you, no clear evidence of recurrent disease but we do plan a follow-up scan in the next 3 to 4 months.  Please let him know this.  Thanks, AMARA

## 2024-12-04 ENCOUNTER — TRANSCRIBE ORDERS (OUTPATIENT)
Dept: ADMINISTRATIVE | Facility: HOSPITAL | Age: 79
End: 2024-12-04
Payer: MEDICARE

## 2024-12-04 DIAGNOSIS — R91.1 LUNG NODULE: Primary | ICD-10-CM

## 2024-12-13 ENCOUNTER — INFUSION (OUTPATIENT)
Dept: ONCOLOGY | Facility: HOSPITAL | Age: 79
End: 2024-12-13
Payer: MEDICARE

## 2024-12-13 DIAGNOSIS — Z45.2 FITTING AND ADJUSTMENT OF VASCULAR CATHETER: Primary | ICD-10-CM

## 2024-12-13 PROCEDURE — 25010000002 HEPARIN LOCK FLUSH PER 10 UNITS: Performed by: NURSE PRACTITIONER

## 2024-12-13 PROCEDURE — 36591 DRAW BLOOD OFF VENOUS DEVICE: CPT

## 2024-12-13 PROCEDURE — 96365 THER/PROPH/DIAG IV INF INIT: CPT

## 2024-12-13 PROCEDURE — 96523 IRRIG DRUG DELIVERY DEVICE: CPT

## 2024-12-13 RX ORDER — SODIUM CHLORIDE 0.9 % (FLUSH) 0.9 %
10 SYRINGE (ML) INJECTION AS NEEDED
Status: DISCONTINUED | OUTPATIENT
Start: 2024-12-13 | End: 2024-12-13 | Stop reason: HOSPADM

## 2024-12-13 RX ORDER — SODIUM CHLORIDE 0.9 % (FLUSH) 0.9 %
10 SYRINGE (ML) INJECTION AS NEEDED
OUTPATIENT
Start: 2024-12-13

## 2024-12-13 RX ORDER — HEPARIN SODIUM (PORCINE) LOCK FLUSH IV SOLN 100 UNIT/ML 100 UNIT/ML
500 SOLUTION INTRAVENOUS AS NEEDED
Status: DISCONTINUED | OUTPATIENT
Start: 2024-12-13 | End: 2024-12-13 | Stop reason: HOSPADM

## 2024-12-13 RX ORDER — HEPARIN SODIUM (PORCINE) LOCK FLUSH IV SOLN 100 UNIT/ML 100 UNIT/ML
500 SOLUTION INTRAVENOUS AS NEEDED
OUTPATIENT
Start: 2024-12-13

## 2024-12-13 RX ADMIN — Medication 500 UNITS: at 13:18

## 2024-12-13 RX ADMIN — Medication 10 ML: at 13:18

## 2024-12-20 ENCOUNTER — HOSPITAL ENCOUNTER (OUTPATIENT)
Facility: HOSPITAL | Age: 79
Discharge: HOME | End: 2024-12-20
Attending: EMERGENCY MEDICINE | Admitting: EMERGENCY MEDICINE
Payer: MEDICARE

## 2024-12-20 ENCOUNTER — APPOINTMENT (OUTPATIENT)
Dept: GENERAL RADIOLOGY | Facility: HOSPITAL | Age: 79
End: 2024-12-20
Payer: MEDICARE

## 2024-12-20 ENCOUNTER — NURSE TRIAGE (OUTPATIENT)
Dept: CALL CENTER | Facility: HOSPITAL | Age: 79
End: 2024-12-20
Payer: MEDICARE

## 2024-12-20 VITALS
OXYGEN SATURATION: 94 % | TEMPERATURE: 97.6 F | RESPIRATION RATE: 14 BRPM | WEIGHT: 160 LBS | HEART RATE: 77 BPM | BODY MASS INDEX: 22.4 KG/M2 | HEIGHT: 71 IN | SYSTOLIC BLOOD PRESSURE: 110 MMHG | DIASTOLIC BLOOD PRESSURE: 69 MMHG

## 2024-12-20 DIAGNOSIS — M70.21 OLECRANON BURSITIS OF RIGHT ELBOW: Primary | ICD-10-CM

## 2024-12-20 PROCEDURE — 73070 X-RAY EXAM OF ELBOW: CPT

## 2024-12-20 PROCEDURE — G0463 HOSPITAL OUTPT CLINIC VISIT: HCPCS | Performed by: EMERGENCY MEDICINE

## 2024-12-20 RX ORDER — CLINDAMYCIN HYDROCHLORIDE 300 MG/1
300 CAPSULE ORAL 3 TIMES DAILY
Qty: 21 CAPSULE | Refills: 0 | Status: SHIPPED | OUTPATIENT
Start: 2024-12-20 | End: 2024-12-27

## 2024-12-20 RX ORDER — DOXYCYCLINE 100 MG/1
100 CAPSULE ORAL 2 TIMES DAILY
Qty: 14 CAPSULE | Refills: 0 | Status: SHIPPED | OUTPATIENT
Start: 2024-12-20 | End: 2024-12-20 | Stop reason: ALTCHOICE

## 2024-12-20 RX ORDER — MELOXICAM 7.5 MG/1
7.5 TABLET ORAL DAILY
Qty: 10 TABLET | Refills: 0 | Status: SHIPPED | OUTPATIENT
Start: 2024-12-20 | End: 2024-12-30

## 2024-12-20 NOTE — DISCHARGE INSTRUCTIONS
Today your exam is consistent with olecranon bursitis.    I would like you to utilize the Ace wrap for the next week.  You may apply it lightly for mild compression.    I did also send in a once daily anti-inflammatory as well as an antibiotic.    Return anytime for increasing pain swelling or fever    Please read all of the instructions in this handout.  If you receive prescriptions please fill them and take them as directed.  Please call your primary care physician for follow-up appointment in the next 5 to 7 days.  If you do not have a physician you may call the [facility] referral line at [phone].    You may return to the emergency department at any time for any concerns such as worsening symptoms.  If you received a work or school note it will be printed at the back of this packet.

## 2024-12-20 NOTE — [facility] PROVIDER NOTE
EMERGENCY DEPARTMENT ENCOUNTER    Room Number:  HALC/C  Date seen:  12/20/2024  Time seen: 12:33 EST  PCP: Eda De Anda APRN  Historian:     Discussed/obtained information from independent historians:     HPI:  Patient is a very nice 79-year-old male.  He presents with a 5 to 7-day history of swelling on the posterior aspect of the right elbow.  He is on Coumadin.  He denies any trauma to the elbow.  He denies any associated pain and also reports full range of motion at the elbow itself.      External (non-ED) record review:        Chronic or social conditions impacting care:    ALLERGIES  Benadryl [diphenhydramine]    PAST MEDICAL HISTORY  Active Ambulatory Problems     Diagnosis Date Noted    Diverticulitis of large intestine with perforation and abscess without bleeding 09/14/2021    Type 2 diabetes mellitus 09/14/2021    COPD (chronic obstructive pulmonary disease) 09/14/2021    HTN (hypertension) 09/14/2021    MGUS (monoclonal gammopathy of unknown significance) 09/14/2021    History of DVT (deep vein thrombosis) 09/14/2021    Diastolic dysfunction 09/14/2021    Presence of IVC filter 09/14/2021    Chronic anticoagulation 09/14/2021    Pulmonary nodule 09/15/2021    Nodule of lower lobe of left lung 08/18/2022    Tobacco use disorder 09/08/2022    Atrial fibrillation with RVR 09/28/2022    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    Long-term use of high-risk medication 11/08/2022    Fitting and adjustment of vascular catheter 12/05/2022    Hyponatremia 07/28/2023    Severe malnutrition 07/29/2023    Adrenal insufficiency 07/30/2023    BPH with obstruction/lower urinary tract symptoms 01/20/2023    Coronary artery calcification seen on CAT scan 01/18/2019    Elevated PSA 10/05/2021    Family history of lung cancer 05/24/2018    High risk medication use 10/19/2017    History of brain concussion 05/24/2018    History of colon polyps 05/27/2022    History of Helicobacter pylori infection 02/12/2018     History of right inguinal hernia repair 10/19/2017    History of skin cancer 08/20/2019    History of diverticulitis 10/04/2021    History of torn meniscus of left knee 10/19/2017    Hypercalcemia 05/03/2018    Hypercholesterolemia 11/04/2024    Hyperlipidemia 11/04/2024    Infrarenal abdominal aortic aneurysm (AAA) without rupture 01/20/2023    Left carotid bruit 10/19/2017    Left inguinal hernia 06/27/2022    Nocturnal hypoxia 07/18/2018    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    Aorto-iliac atherosclerosis 01/20/2023    Vitamin D deficiency 09/04/2021    Muscle spasms of lower extremity 11/11/2024    Paroxysmal atrial fibrillation 11/11/2024     Resolved Ambulatory Problems     Diagnosis Date Noted    Abdominal pain 09/14/2021    Nausea and vomiting 09/14/2021    LEXIE (acute kidney injury) 09/14/2021    Partial small bowel obstruction 09/15/2021    At risk for malnutrition 08/11/2022    Counseling regarding advance care planning and goals of care 08/11/2022    Weakness 07/29/2023    Confusion 07/29/2023     Past Medical History:   Diagnosis Date    Arthritis     Diabetes mellitus     Diverticulitis     DVT, lower extremity     Elevated cholesterol     H/O Cervical pain     History of colitis     Hypertension     Left shoulder pain     Low back pain     Lung cancer 2022    On anticoagulant therapy     Peripheral arterial disease     Right leg claudication     Skin cancer        PAST SURGICAL HISTORY  Past Surgical History:   Procedure Laterality Date    BALLOON ANGIOPLASTY, ARTERY Right 2015    IR Percutaneious IVC filter placement    INGUINAL HERNIA REPAIR Left     KNEE ARTHROSCOPY Left     Torn meniscus    LOBECTOMY Left 09/23/2022    Procedure: BRONCHOSCOPY, LEFT VIDEO ASSISTED THORACOSCOPY, ROBOT ASSISTED TOTAL DECORTICATION  LEFT LUNG, LEFT LOWER LOBE LOBECTOMY, MEDIASTINAL LYMPHECTOMY, INTERCOSTAL NERVE BLOCK;  Surgeon: Nicola Joe III, MD;  Location: [facility];  Service: Robotics - [facility];   Laterality: Left;    VENOUS ACCESS DEVICE (PORT) INSERTION Right 11/21/2022    Procedure: INSERTION VENOUS ACCESS DEVICE;  Surgeon: Nicola Joe III, MD;  Location: [facility] OR;  Service: Thoracic;  Laterality: Right;       FAMILY HISTORY  Family History   Problem Relation Age of Onset    No Known Problems Mother     Cancer Father     Lung cancer Father     Diabetes Brother     No Known Problems Son     Diabetes Brother     Kidney disease Brother     Malig Hyperthermia Neg Hx        SOCIAL HISTORY  Social History     Socioeconomic History    Marital status:     Years of education: 1 year college   Tobacco Use    Smoking status: Every Day     Current packs/day: 0.50     Average packs/day: 0.5 packs/day for 62.0 years (31.0 ttl pk-yrs)     Types: Cigarettes     Start date: 1/1/1963     Passive exposure: Current    Smokeless tobacco: Never    Tobacco comments:     9/8/22: Down to Less than 1 PPD   Vaping Use    Vaping status: Never Used   Substance and Sexual Activity    Alcohol use: Not Currently    Drug use: Never    Sexual activity: Not Currently     Partners: Female       REVIEW OF SYSTEMS  Review of Systems    All systems reviewed and negative except for those discussed in HPI.       PHYSICAL EXAM    I have reviewed the triage vital signs and nursing notes.  Vitals:    12/20/24 1145   BP: 110/69   Pulse: 77   Resp: 14   Temp:    SpO2: 94%     Physical Exam    General: Awake alert no acute distress    Musculoskeletal and skin: Right elbow reveals mild soft tissue swelling to the olecranon bursa consistent with olecranon bursitis.  It is completely nontender to palpation.  There is full range of motion at the elbow without discomfort.  Very minimal overlying redness noted.  Distally the radial ulnar pulses are intact.  Radial ulnar median nerves intact to motor or sensory testing    LAB RESULTS  No results found for this or any previous visit (from the past 24 hours).    Ordered the above labs and  independently interpreted results.  My findings will be discussed in the ED course or medical decision making section below      PROCEDURES:  Procedures    An Ace wrap was applied to the right elbow for light compression.  After application the right upper extremity is noted to be neurovascularly intact      RADIOLOGY RESULTS  XR Elbow 2 View Right    Result Date: 12/20/2024  XR ELBOW 2 VW RIGHT-   INDICATION: Pain and swelling for 1 week  COMPARISON: None  TECHNIQUE: 2 view right elbow  FINDINGS:  No fracture. No bone lesion. No dislocation. Preserved joint space. No effusion. Posterior elbow soft tissue swelling or fluid distended bursa. Tiny soft tissue calcifications over the triceps tendon.       1. No fracture or dislocation. 2. Posterior elbow soft tissue swelling or fluid distended bursa.  This report was finalized on 12/20/2024 11:21 AM by Dr. Kayden Stewart M.D on Workstation: [employer]        Ordered the above noted radiological studies.  Independently interpreted by me.  My findings will be discussed in the medical decision section below.     PROGRESS, DATA ANALYSIS, CONSULTS AND MEDICAL DECISION MAKING    Please note that this section constitutes my independent interpretation of clinical data including lab results, radiology, EKG's.  This constitutes my independent professional opinion regarding differential diagnosis and management of this patient.  It may include any factors such as history from outside sources, review of external records, social determinants of health, management of medications, response to those treatments, and discussions with other providers.       Orders placed during this visit:  Orders Placed This Encounter   Procedures    XR Elbow 2 View Right       Medications - No data to display         Medical Decision Making          DIAGNOSIS  Final diagnoses:   Olecranon bursitis of right elbow          Medication List        New Prescriptions      clindamycin 300 MG  capsule  Commonly known as: CLEOCIN  Take 1 capsule by mouth 3 (Three) Times a Day for 7 days.     meloxicam 7.5 MG tablet  Commonly known as: MOBIC  Take 1 tablet by mouth Daily for 10 days.               Where to Get Your Medications        These medications were sent to [facility] [address] - [facility], [place] - [address] - [phone]  - [phone]  [address] [facility] [address]      Phone: [phone]   clindamycin 300 MG capsule  meloxicam 7.5 MG tablet         FOLLOW-UP  AdilsonEda garcia, APRN  [id number] [facility] [id number] [phone]    In 1 week          Latest Documented Vital Signs:  As of 12:34 EST  BP- 110/69 HR- 77 Temp- 97.6 °F (36.4 °C) (Tympanic) O2 sat- 94%    Appropriate PPE utilized throughout this patient encounter to include mask, if indicated, per current protocol. Hand hygiene was performed before donning PPE and after removal when leaving the room.    Please note that portions of this were completed with a voice recognition program.     Note Disclaimer: At [facility], we believe that sharing information builds trust and better relationships. You are receiving this note because you are receiving care at [facility] or recently visited. It is possible you will see health information before a provider has talked with you about it. This kind of information can be easy to misunderstand. To help you fully understand what it means for your health, we urge you to discuss this note with your provider.

## 2024-12-20 NOTE — TELEPHONE ENCOUNTER
[facility]. Only to take Clindamycin and Meloxicam.    Wife informed and verbalized understanding.    Reason for Disposition   Caller has medicine question only, adult not sick, AND triager answers question    Additional Information   Negative: [1] Intentional drug overdose AND [2] suicidal thoughts or ideas   Negative: Drug overdose and triager unable to answer question   Negative: Caller requesting a renewal or refill of a medicine patient is currently taking   Negative: Caller requesting information unrelated to medicine   Negative: Caller requesting information about COVID-19 Vaccine   Negative: Caller requesting information about Emergency Contraception   Negative: Caller requesting information about Combined Birth Control Pills   Negative: Caller requesting information about Progestin Birth Control Pills   Negative: Caller requesting information about Post-Op pain or medicines   Negative: Caller requesting a prescription antibiotic (such as Penicillin) for Strep throat and has a positive culture result   Negative: Caller requesting a prescription anti-viral med (such as Tamiflu) and has influenza (flu) symptoms   Negative: Immunization reaction suspected   Negative: Rash while taking a medicine or within 3 days of stopping it   Negative: [1] Asthma and [2] having symptoms of asthma (cough, wheezing, etc.)   Negative: [1] Symptom of illness (e.g., headache, abdominal pain, earache, vomiting) AND [2] more than mild   Negative: Breastfeeding questions about mother's medicines and diet   Negative: MORE THAN A DOUBLE DOSE of a prescription or over-the-counter (OTC) drug   Negative: [1] DOUBLE DOSE (an extra dose or lesser amount) of prescription drug AND [2] any symptoms (e.g., dizziness, nausea, pain, sleepiness)   Negative: [1] DOUBLE DOSE (an extra dose or lesser amount) of over-the-counter (OTC) drug AND [2] any symptoms (e.g.,  "dizziness, nausea, pain, sleepiness)   Negative: Took another person's prescription drug   Negative: [1] DOUBLE DOSE (an extra dose or lesser amount) of prescription drug AND [2] NO symptoms  (Exception: A double dose of antibiotics.)   Negative: Diabetes drug error or overdose (e.g., took wrong type of insulin or took extra dose)   Negative: [1] Prescription not at pharmacy AND [2] was prescribed by PCP recently (Exception: Triager has access to EMR and prescription is recorded there. Go to Home Care and confirm for pharmacy.)   Negative: [1] Pharmacy calling with prescription question AND [2] triager unable to answer question   Negative: [1] Caller has URGENT medicine question about med that PCP or specialist prescribed AND [2] triager unable to answer question   Negative: Medicine patch causing local rash or itching   Negative: [1] Caller has medicine question about med NOT prescribed by PCP AND [2] triager unable to answer question (e.g., compatibility with other med, storage)   Negative: Prescription request for new medicine (not a refill)   Negative: [1] Caller has NON-URGENT medicine question about med that PCP prescribed AND [2] triager unable to answer question   Negative: Caller wants to use a complementary or alternative medicine   Negative: [1] Prescription prescribed recently is not at pharmacy AND [2] triager has access to patient's EMR AND [3] prescription is recorded in the EMR   Negative: [1] DOUBLE DOSE (an extra dose or lesser amount) of over-the-counter (OTC) drug AND [2] NO symptoms   Negative: [1] DOUBLE DOSE (an extra dose or lesser amount) of antibiotic drug AND [2] NO symptoms    Answer Assessment - Initial Assessment Questions  1. NAME of MEDICINE: \"What medicine(s) are you calling about?\"      Doxycycline   2. QUESTION: \"What is your question?\" (e.g., double dose of medicine, side effect)      Received this med in addition to Clindamycin and Meloxicam from pharmacy after seen at " "[facility]. AVS only shows to take Clindamycin and Meloxicam  3. PRESCRIBER: \"Who prescribed the medicine?\" Reason: if prescribed by specialist, call should be referred to that group.      Dr. Lopez  4. SYMPTOMS: \"Do you have any symptoms?\" If Yes, ask: \"What symptoms are you having?\"  \"How bad are the symptoms (e.g., mild, moderate, severe)      N/A  5. PREGNANCY:  \"Is there any chance that you are pregnant?\" \"When was your last menstrual period?\"      /A    Protocols used: Medication Question Call-ADULT-    "

## 2024-12-27 DIAGNOSIS — Z79.01 CHRONIC ANTICOAGULATION: ICD-10-CM

## 2024-12-27 DIAGNOSIS — I48.0 PAROXYSMAL ATRIAL FIBRILLATION: Primary | ICD-10-CM

## 2024-12-27 DIAGNOSIS — Z79.899 LONG-TERM USE OF HIGH-RISK MEDICATION: ICD-10-CM

## 2024-12-27 RX ORDER — WARFARIN SODIUM 5 MG/1
5 TABLET ORAL
COMMUNITY
End: 2024-12-27 | Stop reason: SDUPTHER

## 2024-12-27 RX ORDER — WARFARIN SODIUM 5 MG/1
5 TABLET ORAL
Qty: 90 TABLET | Refills: 1 | Status: SHIPPED | OUTPATIENT
Start: 2024-12-27 | End: 2024-12-27

## 2024-12-27 RX ORDER — WARFARIN SODIUM 5 MG/1
TABLET ORAL
Qty: 115 TABLET | Refills: 1 | Status: SHIPPED | OUTPATIENT
Start: 2024-12-27

## 2025-01-24 ENCOUNTER — INFUSION (OUTPATIENT)
Dept: ONCOLOGY | Facility: HOSPITAL | Age: 80
End: 2025-01-24
Payer: MEDICARE

## 2025-01-24 DIAGNOSIS — Z45.2 FITTING AND ADJUSTMENT OF VASCULAR CATHETER: Primary | ICD-10-CM

## 2025-01-24 PROCEDURE — 25010000002 HEPARIN LOCK FLUSH PER 10 UNITS: Performed by: NURSE PRACTITIONER

## 2025-01-24 PROCEDURE — 96523 IRRIG DRUG DELIVERY DEVICE: CPT

## 2025-01-24 RX ORDER — HEPARIN SODIUM (PORCINE) LOCK FLUSH IV SOLN 100 UNIT/ML 100 UNIT/ML
500 SOLUTION INTRAVENOUS AS NEEDED
Status: DISCONTINUED | OUTPATIENT
Start: 2025-01-24 | End: 2025-01-24 | Stop reason: HOSPADM

## 2025-01-24 RX ORDER — HEPARIN SODIUM (PORCINE) LOCK FLUSH IV SOLN 100 UNIT/ML 100 UNIT/ML
500 SOLUTION INTRAVENOUS AS NEEDED
OUTPATIENT
Start: 2025-01-24

## 2025-01-24 RX ORDER — SODIUM CHLORIDE 0.9 % (FLUSH) 0.9 %
10 SYRINGE (ML) INJECTION AS NEEDED
OUTPATIENT
Start: 2025-01-24

## 2025-01-24 RX ADMIN — Medication 500 UNITS: at 13:41

## 2025-01-31 ENCOUNTER — OFFICE VISIT (OUTPATIENT)
Dept: FAMILY MEDICINE CLINIC | Facility: CLINIC | Age: 80
End: 2025-01-31
Payer: MEDICARE

## 2025-01-31 ENCOUNTER — HOSPITAL ENCOUNTER (OUTPATIENT)
Dept: GENERAL RADIOLOGY | Facility: HOSPITAL | Age: 80
Discharge: HOME | End: 2025-01-31
Payer: MEDICARE

## 2025-01-31 VITALS
BODY MASS INDEX: 22.64 KG/M2 | WEIGHT: 161.7 LBS | SYSTOLIC BLOOD PRESSURE: 132 MMHG | OXYGEN SATURATION: 95 % | HEIGHT: 71 IN | TEMPERATURE: 98.1 F | HEART RATE: 82 BPM | RESPIRATION RATE: 17 BRPM | DIASTOLIC BLOOD PRESSURE: 76 MMHG

## 2025-01-31 DIAGNOSIS — M25.421 EFFUSION OF BURSA OF RIGHT ELBOW: ICD-10-CM

## 2025-01-31 DIAGNOSIS — M25.512 ACUTE PAIN OF LEFT SHOULDER: ICD-10-CM
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
## 2025-01-31 DIAGNOSIS — M70.21 OLECRANON BURSITIS OF RIGHT ELBOW: ICD-10-CM

## 2025-01-31 DIAGNOSIS — M19.012 OSTEOARTHRITIS OF LEFT SHOULDER, UNSPECIFIED OSTEOARTHRITIS TYPE: ICD-10-CM

## 2025-01-31 DIAGNOSIS — M25.421 EFFUSION OF BURSA OF RIGHT ELBOW: Primary | ICD-10-CM

## 2025-01-31 PROCEDURE — 99213 OFFICE O/P EST LOW 20 MIN: CPT | Performed by: NURSE PRACTITIONER

## 2025-01-31 PROCEDURE — 73070 X-RAY EXAM OF ELBOW: CPT

## 2025-01-31 PROCEDURE — 73030 X-RAY EXAM OF SHOULDER: CPT

## 2025-01-31 PROCEDURE — 1160F RVW MEDS BY RX/DR IN RCRD: CPT | Performed by: NURSE PRACTITIONER

## 2025-01-31 PROCEDURE — 1125F AMNT PAIN NOTED PAIN PRSNT: CPT | Performed by: NURSE PRACTITIONER

## 2025-01-31 PROCEDURE — 3078F DIAST BP <80 MM HG: CPT | Performed by: NURSE PRACTITIONER

## 2025-01-31 PROCEDURE — 3075F SYST BP GE 130 - 139MM HG: CPT | Performed by: NURSE PRACTITIONER

## 2025-01-31 PROCEDURE — 1159F MED LIST DOCD IN RCRD: CPT | Performed by: NURSE PRACTITIONER

## 2025-01-31 PROCEDURE — 1111F DSCHRG MED/CURRENT MED MERGE: CPT | Performed by: NURSE PRACTITIONER

## 2025-01-31 RX ORDER — CLINDAMYCIN HYDROCHLORIDE 300 MG/1
300 CAPSULE ORAL 3 TIMES DAILY
COMMUNITY

## 2025-01-31 NOTE — PROGRESS NOTES
Subjective   Giovani España is a 79 y.o. male. Patient is here today for   Chief Complaint   Patient presents with    Shoulder Pain    Elbow Injury          Vitals:    01/31/25 1427   BP: 132/76   Pulse: 82   Resp: 17   Temp: 98.1 °F (36.7 °C)   SpO2: 95%     The following portions of the patient's history were reviewed and updated as appropriate: allergies, current medications, past family history, past medical history, past social history, past surgical history and problem list.    Past Medical History:   Diagnosis Date    Arthritis     COPD (chronic obstructive pulmonary disease)     O2 3L AT HS    Diabetes mellitus     DIET CONTROL    Diverticulitis     DVT, lower extremity     RLE    Elevated cholesterol     H/O Cervical pain     History of colitis     Hyperlipidemia     Hypertension     Left shoulder pain     Low back pain     Lung cancer 2022    LEFT LUNG    On anticoagulant therapy     Peripheral arterial disease     Right leg claudication     Skin cancer     HX    Type 2 diabetes mellitus     Vitamin D deficiency       Allergies   Allergen Reactions    Benadryl [Diphenhydramine] Other (See Comments)     Extreme restlessness and insomnia      Social History     Socioeconomic History    Marital status:     Years of education: 1 year college   Tobacco Use    Smoking status: Every Day     Current packs/day: 0.50     Average packs/day: 0.5 packs/day for 62.1 years (31.0 ttl pk-yrs)     Types: Cigarettes     Start date: 1/1/1963     Passive exposure: Current    Smokeless tobacco: Never    Tobacco comments:     9/8/22: Down to Less than 1 PPD   Vaping Use    Vaping status: Never Used   Substance and Sexual Activity    Alcohol use: Not Currently    Drug use: Never    Sexual activity: Not Currently     Partners: Female        Current Outpatient Medications:     Budeson-Glycopyrrol-Formoterol ([facility]) 160-9-4.8 MCG/ACT aerosol inhaler, Inhale 2 puffs 2 (Two) Times a Day., Disp: 10.7 g, Rfl: 11     Cholecalciferol 25 MCG (1000 UT) tablet, Take 1 tablet by mouth Daily., Disp: , Rfl:     clindamycin (CLEOCIN) 300 MG capsule, Take 1 capsule by mouth 3 (Three) Times a Day., Disp: , Rfl:     fexofenadine ([facility]) 180 MG tablet, , Disp: , Rfl:     fluticasone (FLONASE) 50 MCG/ACT nasal spray, Administer 1 spray into the nostril(s) as directed by provider Daily. Indications: Allergic Rhinitis, Disp: , Rfl:     hydrocortisone (CORTEF) 10 MG tablet, Take 2 tablets in the morning and 1 tablet in the afternoon plus additional medication for acute illness up to 60 mg daily, Disp: 360 tablet, Rfl: 3    isosorbide mononitrate (IMDUR) 60 MG 24 hr tablet, Take 1 tablet by mouth Every Evening. Indications: hypertension, Disp: , Rfl:     losartan (COZAAR) 50 MG tablet, Take 1 tablet by mouth Daily., Disp: 90 tablet, Rfl: 1    NON FORMULARY, 2-3 L during day; 2L during night; [facility]  Pulmonologist Dr. Newsome #[phone], Disp: , Rfl:     Pseudoephedrine-guaiFENesin ER (Mucus D) 120-1200 MG tablet sustained-release 12 hour, , Disp: , Rfl:     sildenafil (REVATIO) 20 MG tablet, Take 1 tablet by mouth., Disp: , Rfl:     simvastatin (ZOCOR) 20 MG tablet, Take 1 tablet by mouth Every Night. Indications: High Amount of Fats in the Blood, Disp: 90 tablet, Rfl: 1    tamsulosin (FLOMAX) 0.4 MG capsule 24 hr capsule, Take 1 capsule by mouth Daily., Disp: 30 capsule, Rfl: 5    triamcinolone (KENALOG) 0.1 % cream, Apply 1 Application topically to the appropriate area as directed 2 (Two) Times a Day., Disp: , Rfl:     warfarin (COUMADIN) 5 MG tablet, PT TO TAKE 5MG DAILY X5 DAYS THEN 10MG X2 DAYS  Indications: Other - full anticoagulation, Disp: 115 tablet, Rfl: 1     Objective     History of Present Illness  Patient here today for follow up from [facility] on 12/20 for c/o swelling to posterior right elbow for one week.  He denied any trauma to the elbow.  He denied any associated pain. He had full range of  motion. Right elbow xray showed no fracture. No bone lesion. No dislocation. Preserved joint space. No effusion. Posterior elbow soft tissue swelling or fluid distended bursa. Tiny soft tissue calcifications over the triceps tendon.  No fracture or dislocation. Posterior elbow soft tissue swelling or fluid distended bursa. Patient placed in ace bandage and discharged home on clindamycin 300 MG capsule 1 capsule by mouth 3 Times a Day for 7 days (took 4 days worth, has 8 tabs left over) and meloxicam 7.5 MG tablet 1 tablet by mouth Daily for 10 days (took all of this medication).     Patient stating swelling to right elbow never went down. Patient denies pain to right elbow. Patient denies any ROM issues to right elbow.     Patient c/o pain to left shoulder s/p fall a couple of months back. Patient denied going to have medical provider look at shoulder. Patient denies any numbness/tingling to left shoulder/arm.        Review of Systems   Constitutional: Negative.    Respiratory: Negative.     Cardiovascular: Negative.    Musculoskeletal:  Positive for arthralgias (left shoulder pain) and joint swelling (right elbow).       Physical Exam  Vitals reviewed.   HENT:      Head: Normocephalic.   Eyes:      Pupils: Pupils are equal, round, and reactive to light.   Cardiovascular:      Rate and Rhythm: Normal rate and regular rhythm.      Pulses: Normal pulses.   Pulmonary:      Effort: Pulmonary effort is normal.      Breath sounds: Normal breath sounds.   Musculoskeletal:      Left shoulder: Tenderness and bony tenderness present. No swelling, deformity, effusion, laceration or crepitus. Decreased range of motion. Decreased strength. Normal pulse.      Right elbow: Swelling and effusion present. No deformity or lacerations. Normal range of motion. No tenderness.   Skin:     General: Skin is warm and dry.   Neurological:      Mental Status: He is alert and oriented to person, place, and time.   Psychiatric:          Behavior: Behavior normal.         ASSESSMENT     Problems Addressed this Visit          Musculoskeletal and Injuries    Osteoarthritis of left shoulder    Effusion of bursa of right elbow - Primary    Relevant Orders    XR elbow 2 vw right (Completed)    Ambulatory Referral to Orthopedic Surgery    Olecranon bursitis of right elbow     Other Visit Diagnoses       Fall, initial encounter        Relevant Orders    XR Shoulder 2+ View Left (Completed)    Acute pain of left shoulder        Relevant Orders    XR Shoulder 2+ View Left (Completed)    Ambulatory Referral to Orthopedic Surgery          Diagnoses         Codes Comments    Effusion of bursa of right elbow    -  Primary ICD-10-CM: M25.421  ICD-9-CM: 719.02     Olecranon bursitis of right elbow     ICD-10-CM: M70.21  ICD-9-CM: 726.33     Osteoarthritis of left shoulder, unspecified osteoarthritis type     ICD-10-CM: M19.012  ICD-9-CM: 715.91     Fall, initial encounter     ICD-10-CM: W19.XXXA  ICD-9-CM: E888.9     Acute pain of left shoulder     ICD-10-CM: M25.512  ICD-9-CM: 719.41             Current outpatient and discharge medications have been reconciled for the patient.  Reviewed by: GARCÍA Monteiro      PLAN    Patient Instructions   Effusion of bursa/bursitis: referred to ortho for further eval/tx; ?steroid injection to bursa. Ordering imaging for comparison from where he was one month ago to now.    OA of left shoulder/fall/acute left shoulder pain: ordering imaging for further eval. Recommend cold compress/ice pack 10-15 minutes at a time several times a day as needed, warm compress/heating pad 10-15 minutes at at time several times a day as needed, and over the counter biofreeze/voltaren gel as needed (wash off from skin before using heating pad).  No follow-ups on file.

## 2025-02-03 DIAGNOSIS — M19.012 OSTEOARTHRITIS OF LEFT SHOULDER, UNSPECIFIED OSTEOARTHRITIS TYPE: Primary | ICD-10-CM

## 2025-02-03 PROBLEM — M70.21 OLECRANON BURSITIS OF RIGHT ELBOW: Status: ACTIVE | Noted: 2025-02-03

## 2025-02-03 PROBLEM — M25.421 EFFUSION OF BURSA OF RIGHT ELBOW: Status: ACTIVE | Noted: 2025-02-03

## 2025-02-03 NOTE — PATIENT INSTRUCTIONS
Effusion of bursa/bursitis: referred to ortho for further eval/tx; ?steroid injection to bursa. Ordering imaging for comparison from where he was one month ago to now.    OA of left shoulder/fall/acute left shoulder pain: ordering imaging for further eval. Recommend cold compress/ice pack 10-15 minutes at a time several times a day as needed, warm compress/heating pad 10-15 minutes at at time several times a day as needed, and over the counter biofreeze/voltaren gel as needed (wash off from skin before using heating pad).

## 2025-02-11 ENCOUNTER — OFFICE VISIT (OUTPATIENT)
Dept: ORTHOPEDIC SURGERY | Facility: CLINIC | Age: 80
End: 2025-02-11
Payer: MEDICARE

## 2025-02-11 VITALS — WEIGHT: 164.2 LBS | BODY MASS INDEX: 22.99 KG/M2 | TEMPERATURE: 97.8 F | HEIGHT: 71 IN

## 2025-02-11 DIAGNOSIS — M70.21 OLECRANON BURSITIS, RIGHT ELBOW: ICD-10-CM

## 2025-02-11 DIAGNOSIS — M79.89 SWELLING OF UPPER EXTREMITY: Primary | ICD-10-CM

## 2025-02-11 LAB
APPEARANCE FLD: ABNORMAL
COLOR FLD: ABNORMAL
CRYSTALS FLD MICRO: NORMAL
LYMPHOCYTES NFR FLD MANUAL: 60 %
METHOD: ABNORMAL
MONOCYTES NFR FLD: 10 %
MONOS+MACROS NFR FLD: 21 %
NEUTROPHILS NFR FLD MANUAL: 9 %
NUC CELL # FLD: 143 /MM3
RBC # FLD AUTO: 9000 /MM3

## 2025-02-11 PROCEDURE — 89060 EXAM SYNOVIAL FLUID CRYSTALS: CPT | Performed by: NURSE PRACTITIONER

## 2025-02-11 PROCEDURE — 87075 CULTR BACTERIA EXCEPT BLOOD: CPT | Performed by: NURSE PRACTITIONER

## 2025-02-11 PROCEDURE — 87015 SPECIMEN INFECT AGNT CONCNTJ: CPT | Performed by: NURSE PRACTITIONER

## 2025-02-11 PROCEDURE — 87205 SMEAR GRAM STAIN: CPT | Performed by: NURSE PRACTITIONER

## 2025-02-11 PROCEDURE — 89051 BODY FLUID CELL COUNT: CPT | Performed by: NURSE PRACTITIONER

## 2025-02-11 PROCEDURE — 87070 CULTURE OTHR SPECIMN AEROBIC: CPT | Performed by: NURSE PRACTITIONER

## 2025-02-11 RX ORDER — METHYLPREDNISOLONE ACETATE 80 MG/ML
80 INJECTION, SUSPENSION INTRA-ARTICULAR; INTRALESIONAL; INTRAMUSCULAR; SOFT TISSUE
Status: COMPLETED | OUTPATIENT
Start: 2025-02-11 | End: 2025-02-11

## 2025-02-11 RX ORDER — LIDOCAINE HYDROCHLORIDE 10 MG/ML
2 INJECTION, SOLUTION EPIDURAL; INFILTRATION; INTRACAUDAL; PERINEURAL
Status: COMPLETED | OUTPATIENT
Start: 2025-02-11 | End: 2025-02-11

## 2025-02-11 RX ADMIN — LIDOCAINE HYDROCHLORIDE 2 ML: 10 INJECTION, SOLUTION EPIDURAL; INFILTRATION; INTRACAUDAL; PERINEURAL at 16:04

## 2025-02-11 RX ADMIN — METHYLPREDNISOLONE ACETATE 80 MG: 80 INJECTION, SUSPENSION INTRA-ARTICULAR; INTRALESIONAL; INTRAMUSCULAR; SOFT TISSUE at 16:04

## 2025-02-11 NOTE — PROGRESS NOTES
Patient Name: Giovani España   YOB: 1945  Referring Primary Care Physician: Eda De Anda APRN  BMI: Body mass index is 22.9 kg/m².    Chief Complaint:    Chief Complaint   Patient presents with    Left Shoulder - Initial Evaluation, Pain    Right Elbow - Initial Evaluation, Pain        HPI: New patient to me that presents with his partner has had about 2 weeks of left elbow swelling denies any injury or trauma no history of gout.  History of lung cancer  On mobic  Fininished clindamycin  No hx of mrsa  On blood thinner     Giovani España is a 79 y.o. male who presents today for evaluation of   Chief Complaint   Patient presents with    Left Shoulder - Initial Evaluation, Pain    Right Elbow - Initial Evaluation, Pain         Subjective   Medications:   Home Medications:  Current Outpatient Medications on File Prior to Visit   Medication Sig    Budeson-Glycopyrrol-Formoterol (Breztri Aerosphere) 160-9-4.8 MCG/ACT aerosol inhaler Inhale 2 puffs 2 (Two) Times a Day.    Cholecalciferol 25 MCG (1000 UT) tablet Take 1 tablet by mouth Daily.    clindamycin (CLEOCIN) 300 MG capsule Take 1 capsule by mouth 3 (Three) Times a Day.    fexofenadine (Allegra Allergy) 180 MG tablet     fluticasone (FLONASE) 50 MCG/ACT nasal spray Administer 1 spray into the nostril(s) as directed by provider Daily. Indications: Allergic Rhinitis    hydrocortisone (CORTEF) 10 MG tablet Take 2 tablets in the morning and 1 tablet in the afternoon plus additional medication for acute illness up to 60 mg daily    isosorbide mononitrate (IMDUR) 60 MG 24 hr tablet Take 1 tablet by mouth Every Evening. Indications: hypertension    losartan (COZAAR) 50 MG tablet Take 1 tablet by mouth Daily.    NON FORMULARY 2-3 L during day; 2L during night; [facility]   Pulmonologist Dr. Newsome #[phone]    Pseudoephedrine-guaiFENesin ER (Mucus D) 120-1200 MG tablet sustained-release 12 hour     sildenafil (REVATIO) 20 MG tablet Take 1 tablet by mouth.     simvastatin (ZOCOR) 20 MG tablet Take 1 tablet by mouth Every Night. Indications: High Amount of Fats in the Blood    tamsulosin (FLOMAX) 0.4 MG capsule 24 hr capsule Take 1 capsule by mouth Daily.    triamcinolone (KENALOG) 0.1 % cream Apply 1 Application topically to the appropriate area as directed 2 (Two) Times a Day.    warfarin (COUMADIN) 5 MG tablet PT TO TAKE 5MG DAILY X5 DAYS THEN 10MG X2 DAYS  Indications: Other - full anticoagulation     No current facility-administered medications on file prior to visit.     Current Medications:  Scheduled Meds:  Continuous Infusions:No current facility-administered medications for this visit.    PRN Meds:.    I have reviewed the patient's medical history in detail and updated the computerized patient record.  Review and summarization of old records includes:    Past Medical History:   Diagnosis Date    Arthritis     COPD (chronic obstructive pulmonary disease)     O2 3L AT HS    Diabetes mellitus     DIET CONTROL    Diverticulitis     DVT, lower extremity     RLE    Elevated cholesterol     H/O Cervical pain     History of colitis     Hyperlipidemia     Hypertension     Knee swelling     Left shoulder pain     Low back pain     Low back strain     Lung cancer 2022    LEFT LUNG    Neck strain     On anticoagulant therapy     Peripheral arterial disease     Right leg claudication     Skin cancer     HX    Type 2 diabetes mellitus     Vitamin D deficiency         Past Surgical History:   Procedure Laterality Date    BALLOON ANGIOPLASTY, ARTERY Right 2015    IR Percutaneious IVC filter placement    INGUINAL HERNIA REPAIR Left     KNEE ARTHROSCOPY Left     Torn meniscus    LOBECTOMY Left 09/23/2022    Procedure: BRONCHOSCOPY, LEFT VIDEO ASSISTED THORACOSCOPY, ROBOT ASSISTED TOTAL DECORTICATION  LEFT LUNG, LEFT LOWER LOBE LOBECTOMY, MEDIASTINAL LYMPHECTOMY, INTERCOSTAL NERVE BLOCK;  Surgeon: Nicola Joe III, MD;  Location: [facility];  Service: Robotics -  [place];  Laterality: Left;    VENOUS ACCESS DEVICE (PORT) INSERTION Right 11/21/2022    Procedure: INSERTION VENOUS ACCESS DEVICE;  Surgeon: Nicola Joe III, MD;  Location: [facility];  Service: Thoracic;  Laterality: Right;        Social History     Occupational History     Employer: RETIRED   Tobacco Use    Smoking status: Every Day     Current packs/day: 0.50     Average packs/day: 0.5 packs/day for 62.1 years (31.1 ttl pk-yrs)     Types: Cigarettes     Start date: 1/1/1963     Passive exposure: Current    Smokeless tobacco: Never    Tobacco comments:     9/8/22: Down to Less than 1 PPD   Vaping Use    Vaping status: Never Used   Substance and Sexual Activity    Alcohol use: Not Currently    Drug use: Never    Sexual activity: Not Currently     Partners: Female      Social History     Social History Narrative    Not on file        Family History   Problem Relation Age of Onset    No Known Problems Mother     Cancer Father     Lung cancer Father     Diabetes Brother     No Known Problems Son     Diabetes Brother     Kidney disease Brother     Malig Hyperthermia Neg Hx        ROS: 14 point review of systems was performed and all other systems were reviewed and are negative except for documented findings in HPI and today's encounter.     Allergies:   Allergies   Allergen Reactions    Benadryl [Diphenhydramine] Other (See Comments)     Extreme restlessness and insomnia     Constitutional:  Denies fever, shaking or chills   Eyes:  Denies change in visual acuity   HENT:  Denies nasal congestion or sore throat   Respiratory:  Denies cough or shortness of breath   Cardiovascular:  Denies chest pain or severe LE edema   GI:  Denies abdominal pain, nausea, vomiting, bloody stools or diarrhea   Musculoskeletal:  Numbness, tingling, pain, or loss of motor function only as noted above in history of present illness.  : Denies painful urination or hematuria  Integument:  Denies rash, lesion or ulceration  "  Neurologic:  Denies headache or focal weakness  Endocrine:  Denies lymphadenopathy  Psych:  Denies confusion or change in mental status   Hem:  Denies active bleeding    OBJECTIVE:  Physical Exam: 79 y.o. male  Wt Readings from Last 3 Encounters:   02/11/25 74.5 kg (164 lb 3.2 oz)   01/31/25 73.3 kg (161 lb 11.2 oz)   12/20/24 72.6 kg (160 lb)     Ht Readings from Last 1 Encounters:   02/11/25 180.3 cm (71\")     Body mass index is 22.9 kg/m².  Vitals:    02/11/25 1429   Temp: 97.8 °F (36.6 °C)     Vital signs reviewed.     General Appearance:    Alert, cooperative, in no acute distress                  Eyes: conjunctiva clear  ENT: external ears and nose atraumatic  CV: no peripheral edema  Resp: normal respiratory effort  Skin: no rashes or wounds; normal turgor  Psych: mood and affect appropriate  Lymph: no nodes appreciated  Neuro: gross sensation intact  Vascular:  Palpable peripheral pulse in noted extremity  Musculoskeletal Extremities: Large olecranon bursa no erythema or signs of infection elbow moves freely wrist nontender no bony pain or tenderness    Radiology:     Study Result    Narrative & Impression   XR ELBOW 2 VW RIGHT-     Clinical: Elbow swelling     FINDINGS: There is considerable swelling of the olecranon bursa, likely  underlying bursitis. This can also be seen with distal triceps  pathology. No soft tissue gas or radiopaque foreign body seen.     No elbow effusion, fracture or dislocation seen. No osteochondral defect  identified. Elbow joint with is preserved. The remainder is  unremarkable.     This report was finalized on 2/2/2025 1:53 PM by Dr. José Dasilva M.D  on Workstation: [id number]       Assessment:     ICD-10-CM ICD-9-CM   1. Swelling of upper extremity  M79.89 729.81   2. Olecranon bursitis, right elbow  M70.21 726.33        Medium Joint Arthrocentesis: R olecranon bursa  Date/Time: 2/11/2025 4:04 PM  Consent given by: patient  Site marked: site marked  Timeout: Immediately " prior to procedure a time out was called to verify the correct patient, procedure, equipment, support staff and site/side marked as required   Supporting Documentation  Indications: joint swelling   Procedure Details  Location: elbow - R olecranon bursa  Preparation: Patient was prepped and draped in the usual sterile fashion  Needle gauge: 21.  Medications administered: 80 mg methylPREDNISolone acetate 80 MG/ML; 2 mL lidocaine PF 1% 1 %  Aspirate amount: 40 mL  Aspirate: serous  Analysis: fluid sample sent for laboratory analysis  Patient tolerance: patient tolerated the procedure well with no immediate complications          MDM/Plan:   The diagnosis(es), natural history, pathophysiology and treatment for diagnosis(es) were discussed. Opportunity given and questions answered.  Biomechanics of pertinent body areas discussed.  When appropriate, the use of ambulatory aids discussed.  EXERCISES:  Advice on benefits of, and types of regular/moderate exercise pertaining to orthopedic diagnosis(es).  MEDICATIONS:  The risks, benefits, warnings,side effects and alternatives of medications discussed.  Inflammation/pain control; with cold, heat, elevation and/or liniments discussed as appropriate  Cortisone Injection. See procedure note.  MEDICAL RECORDS reviewed from other provider(s) for past and current medical history pertinent to this complaint.      2/11/2025    Much of this encounter note is an electronic transcription/translation of spoken language to printed text. The electronic translation of spoken language may permit erroneous, or at times, nonsensical words or phrases to be inadvertently transcribed; Although I have reviewed the note for such errors, some may still exist

## 2025-02-13 ENCOUNTER — LAB (OUTPATIENT)
Dept: ONCOLOGY | Facility: HOSPITAL | Age: 80
End: 2025-02-13
Payer: MEDICARE

## 2025-02-13 DIAGNOSIS — C7A.8 NEUROENDOCRINE CARCINOMA OF LUNG: Primary | ICD-10-CM

## 2025-02-13 DIAGNOSIS — Z45.2 FITTING AND ADJUSTMENT OF VASCULAR CATHETER: ICD-10-CM

## 2025-02-13 LAB
ALBUMIN SERPL-MCNC: 4.3 G/DL (ref 3.5–5.2)
ALBUMIN/GLOB SERPL: 1.7 G/DL
ALP SERPL-CCNC: 62 U/L (ref 39–117)
ALT SERPL W P-5'-P-CCNC: 16 U/L (ref 1–41)
ANION GAP SERPL CALCULATED.3IONS-SCNC: 6.9 MMOL/L (ref 5–15)
AST SERPL-CCNC: 15 U/L (ref 1–40)
BASOPHILS # BLD AUTO: 0.01 10*3/MM3 (ref 0–0.2)
BASOPHILS NFR BLD AUTO: 0.1 % (ref 0–1.5)
BILIRUB SERPL-MCNC: 0.5 MG/DL (ref 0–1.2)
BUN SERPL-MCNC: 16 MG/DL (ref 8–23)
BUN/CREAT SERPL: 16.5 (ref 7–25)
CALCIUM SPEC-SCNC: 10.4 MG/DL (ref 8.6–10.5)
CHLORIDE SERPL-SCNC: 98 MMOL/L (ref 98–107)
CO2 SERPL-SCNC: 30.1 MMOL/L (ref 22–29)
CREAT SERPL-MCNC: 0.97 MG/DL (ref 0.76–1.27)
DEPRECATED RDW RBC AUTO: 49.1 FL (ref 37–54)
EGFRCR SERPLBLD CKD-EPI 2021: 80.4 ML/MIN/1.73
EOSINOPHIL # BLD AUTO: 0 10*3/MM3 (ref 0–0.4)
EOSINOPHIL NFR BLD AUTO: 0 % (ref 0.3–6.2)
ERYTHROCYTE [DISTWIDTH] IN BLOOD BY AUTOMATED COUNT: 14.2 % (ref 12.3–15.4)
GLOBULIN UR ELPH-MCNC: 2.5 GM/DL
GLUCOSE SERPL-MCNC: 191 MG/DL (ref 65–99)
HCT VFR BLD AUTO: 44.2 % (ref 37.5–51)
HGB BLD-MCNC: 14.6 G/DL (ref 13–17.7)
IMM GRANULOCYTES # BLD AUTO: 0.08 10*3/MM3 (ref 0–0.05)
IMM GRANULOCYTES NFR BLD AUTO: 0.6 % (ref 0–0.5)
LYMPHOCYTES # BLD AUTO: 1.34 10*3/MM3 (ref 0.7–3.1)
LYMPHOCYTES NFR BLD AUTO: 9.9 % (ref 19.6–45.3)
MCH RBC QN AUTO: 31.1 PG (ref 26.6–33)
MCHC RBC AUTO-ENTMCNC: 33 G/DL (ref 31.5–35.7)
MCV RBC AUTO: 94 FL (ref 79–97)
MONOCYTES # BLD AUTO: 0.91 10*3/MM3 (ref 0.1–0.9)
MONOCYTES NFR BLD AUTO: 6.7 % (ref 5–12)
NEUTROPHILS NFR BLD AUTO: 11.16 10*3/MM3 (ref 1.7–7)
NEUTROPHILS NFR BLD AUTO: 82.7 % (ref 42.7–76)
NRBC BLD AUTO-RTO: 0 /100 WBC (ref 0–0.2)
PLATELET # BLD AUTO: 205 10*3/MM3 (ref 140–450)
PMV BLD AUTO: 8.7 FL (ref 6–12)
POTASSIUM SERPL-SCNC: 4.2 MMOL/L (ref 3.5–5.2)
PROT SERPL-MCNC: 6.8 G/DL (ref 6–8.5)
RBC # BLD AUTO: 4.7 10*6/MM3 (ref 4.14–5.8)
SODIUM SERPL-SCNC: 135 MMOL/L (ref 136–145)
WBC NRBC COR # BLD AUTO: 13.5 10*3/MM3 (ref 3.4–10.8)

## 2025-02-13 PROCEDURE — 25010000002 HEPARIN LOCK FLUSH PER 10 UNITS: Performed by: INTERNAL MEDICINE

## 2025-02-13 PROCEDURE — 36591 DRAW BLOOD OFF VENOUS DEVICE: CPT

## 2025-02-13 PROCEDURE — 85025 COMPLETE CBC W/AUTO DIFF WBC: CPT

## 2025-02-13 PROCEDURE — 36415 COLL VENOUS BLD VENIPUNCTURE: CPT

## 2025-02-13 PROCEDURE — 80053 COMPREHEN METABOLIC PANEL: CPT

## 2025-02-13 RX ORDER — SODIUM CHLORIDE 0.9 % (FLUSH) 0.9 %
10 SYRINGE (ML) INJECTION AS NEEDED
Status: DISCONTINUED | OUTPATIENT
Start: 2025-02-13 | End: 2025-02-13 | Stop reason: HOSPADM

## 2025-02-13 RX ORDER — HEPARIN SODIUM (PORCINE) LOCK FLUSH IV SOLN 100 UNIT/ML 100 UNIT/ML
500 SOLUTION INTRAVENOUS AS NEEDED
Status: DISCONTINUED | OUTPATIENT
Start: 2025-02-13 | End: 2025-02-13 | Stop reason: HOSPADM

## 2025-02-13 RX ADMIN — Medication 10 ML: at 11:06

## 2025-02-13 RX ADMIN — Medication 500 UNITS: at 11:06

## 2025-02-14 LAB
BACTERIA SPEC AEROBE CULT: NORMAL
GRAM STN SPEC: NORMAL

## 2025-02-16 LAB — BACTERIA SPEC ANAEROBE CULT: NORMAL

## 2025-02-20 LAB — REF LAB TEST RESULTS: NORMAL

## 2025-03-03 ENCOUNTER — HOSPITAL ENCOUNTER (OUTPATIENT)
Dept: CT IMAGING | Facility: HOSPITAL | Age: 80
Discharge: HOME | End: 2025-03-03
Admitting: INTERNAL MEDICINE
Payer: MEDICARE

## 2025-03-03 DIAGNOSIS — R91.1 LUNG NODULE: ICD-10-CM

## 2025-03-03 PROCEDURE — 71250 CT THORAX DX C-: CPT

## 2025-03-05 NOTE — PROGRESS NOTES
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  Notably there is invasive carcinoma present at the margin, 2 positive lymph nodes adjacent to the bronchovesicular margin which appears to have been transected difficult to enumerate with extranodal extension present.  *Patient status post chemo RT-11/28/2022, radiation therapy completion date 1/11/2023  *Immunotherapy-Keytruda to be initiated 3/20/2023  *Follow-up repeat scans 9/18/2023 without evidence of recurrent disease  *Subsequent assessment with mild increase in Guardant Reveal, subsequent Guardant Reveal with lower tumor percentage, reassessment 6 months planned.  *Guardant reveal 2/13/2025-negative ctDNA, 0% tumor fraction, follow-up chest CT anticipated.         History of Present Illness      The patient is a 79 y.o. male with the above-mentioned history who returned 3/11/2024 for lab review and evaluation due for cycle 18 pembrolizumab.  Overall he has tolerated Keytruda quite well.  He does complain of some occasional issues with cramps in his legs.  He denies problems with diarrhea.  No issues with increased shortness of breath, and no problems with skin rash.  We proceeded with therapy thus completing this treatment (18 cycles) and follow-up ET chest, abdomen, pelvis was obtained 3/27/2024.  This demonstrated postoperative changes from previous left lower lobectomy, scattered areas of density in the lungs compared to 6/8/2023 were stable with no new lung nodules, 3.4 cm infrarenal abdominal arctic aneurysm stable and no evidence of metastatic disease otherwise in chest, abdomen, pelvis    He is seen formally 4/1/2024.  He is doing quite well with no additional issues except for skin rash on the medial portions of his  lower legs.  This is managed by dermatology treated with triamcinolone.  We discussed surveillance schedule including management of his port every 6 weeks and initial assessment via Guardant Reveal in approximately 9 weeks seeing him in 12 weeks.    The patient's subsequent testing included a Guardant Reveal obtained 6/3/2024 that was positive for ctDNA and 0.083%.  This led to a follow-up CT series 6/21/2024 that was essentially stable.  There is no evidence of distant metastasis or local recurrence.  He is seen with his significant other 6/24 and advised that the exam may have determined an earlier relapse that might be seen on scans and thus we will have to retest him in the near future to determine whether this is continued to be the case.  Symptomatically is unchanged from previous.  The patient was asked undergo a repeat Guardant Reveal examination and this was obtained 8/26/2024.This repeat was positive for ctDNA detected now at less than 0.05%.  This is an improvement with the patient symptomatically essentially unchanged when he is seen back in office 9/20/2024.  We have discussed a follow-up such examination over a longer period of time at approximately 6 months.  He had a follow-up guardant reveal 2/20/2025 again with negative ctDNA detected and 0% tumor fraction.  He is feeling well and had a follow-up per his pulmonary physician that he recently 3/3/2025 demonstrating stable imaging except for a tiny focus of nodularity in the left upper lobe that is slightly more pronounced than previous at 5 mm.  A repeat study is now anticipated in 3 months which we will going to schedule.                             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   He had been assessed particularly in the fall 2021 when he presented with nausea and vomiting and abdominal discomfort leading to assessments that revealed sigmoid diverticulitis with contained rupture and partial small bowel obstruction.  Records from mid September 21 indicating that he was admitted with partial small bowel obstruction and treated medically with very slow recovery.  He has history of COPD with CT demonstrating a pulmonary nodule in the right lung base at 7 mm with follow-up planned.  He was seen by general surgery in later September with repeat scans planned and repeat CT abdomen 10/7/2021 did demonstrate interval improvement of sigmoid diverticulitis.      Record indicates an assessment in late June 2022 at different general surgeon for left inguinal hernia, history of heavy tobacco use with COPD and recommendation for surgical repair of his hernia      The patient underwent a CT scan of the chest 5/7/2022 demonstrating diffuse emphysematous changes, ill-defined density measuring 3 mm in the superior segment of the right lower lobe, multiple ill-defined 3 to 4 mm nodular density thought to be inflammatory involving the right lower lobe and a new spiculated nodule involving the left lower lobe measuring 16 x 14 mm as well as left hilar adenopathy.       Follow-up PET/CT 7/13/2022 reveal a hypermetabolic spiculated lesion medial aspect the left lower lobe adjacent to descending thoracic aorta measuring 1.5 x 1.6 SUV 9.3 with an enlarged hypermetabolic left hilar lymph node measured 1.5 x 1.3 with SUV of 12.1, additional nodules in the lateral aspect right lower lobe and micronodule in the posterior/medial right lower lobe and also additional right lower lobe micronodule.  There is an infrarenal abdominal aortic aneurysm measuring 3.4 cm with a short segment of chronic dissection present.    These clinical findings would be consistent with a possible T1b N1 M0-stage IIb lung cancer.      Recognizing  a diagnosis has not been made his case was discussed in thoracic conference 7/20/2022.  Recommendations include proceeding to pulmonary assessment with EBUS and the patient undergoing a general assessment per his clinical status-older adult assessment as well as assessment by thoracic surgery.  These issues are discussed with the patient and his wife in detail when they are seen 7/28/2022.    The patient proceeded to undergo his hernia surgery 8/2/2022 by Dr. Dotson.  Additionally the patient was unable to undergo assessment by pulmonary until October and, thereafter, was scheduled to see Dr. Joe 8/18/2022 undergoing older adult functional assessment with a JAGUAR score of 11 indicating a risk of functional decline related to cancer therapy.    The patient is seen with his significant other 8/15/2022 and doing very well with recent hernia surgery.  His performance status is reasonably good at present and we have learned now that he would not be able to see pulmonary medicine until October, thoracic surgery is scheduled this week with Dr. Joe.  Perhaps he could be a surgical candidate.  Particularly we know by guardant 360 that T p53 splice site mutation is present and would certainly be consistent as well the most common mutations found in lung cancers particularly squamous cancers.  We have discussed obtaining pulmonary functions at this point, thoracic surgery assessment this week and also restarting Chantix as possible for the patient.    There was difficulty obtaining Chantix and while this was being assessed the patient was reviewed 8/18 by thoracic surgery.  He was felt to have a T1b N1 M0 stage IIb carcinoma left lower lobe along and not a candidate for biopsy.  PFTs were borderline, functional assessment was reasonably good and the patient was felt to be a candidate for robot-assisted biopsy of his nodes and if malignant proceed to left lower lobe lobectomy.  He was reviewed 9/8 and offered 21 mg  nicotine transdermal patching.    He is next seen 9/10/2022.  He is seen with his wife and both are prepared for surgery 9/23/2022.  We discussed that additional therapy may be considered once we have more information about the type and stage.  We would hope to see him postoperatively.    The patient was admitted 9//2022 undergoing 9/23/2022  a bronchoscopy with left video-assisted thoracoscopy with robot-assisted total decortication of the left lung, left lower lobectomy, mediastinal lymphadenectomy and intercostal nerve block with Dr. Nicola Joe.  Fortunately he did fairly well postoperatively though did develop atrial fibrillation with RVR treated with amiodarone converting back to sinus rhythm, treated with IV metoprolol subsequent chronic anticoagulation.  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  Notably there is invasive carcinoma present at the margin.  Is a, and only 2 positive lymph nodes adjacent to the bronchovesicular margin which appears to have been transected difficult to enumerate with extranodal extension present.       The patient is seen 10/27/2022.  He is recovering well from surgery, albeit slowly, and we have discussed his findings that are concerning as to residual disease with a positive margin described as above.  There is also the significance potentially of PD-L1 expression which has been described as producing a poor survival.     Nivolumab has been used as second line therapy to positive effect in the disease and this begs the question as to whether adjuvant therapy plus immunotherapy could be utilized though the patient would be difficult to treat with platinum based chemotherapy as well.       The patient is next assessed 11/7/2022 for significant assessments by supportive oncology at [facility] as well as with plans to see Dr. Marrero 11/9/2022.   Unfortunately he has been very slow to gradually recover from the effects of surgery with reduced appetite and only fair performance status.     At present it would be difficult to give him cisplatin based chemotherapy and we discussed whether we might be able to use Tecentriq (atezolizumab) as his primary adjuvant therapy.  We have contacted pathology about completing Caris testing and a PD-L1 analysis that has not yet completed.         The patient is seen, however, 11/16/2022 and his genomic analysis has returned as being significant for broad sensitivity to immunotherapy.  This would argue for the administration of weekly Carboplatin/Taxol as the patient proceeds to radiation therapy and upon completion, then using Tecentriq as further adjuvant therapy over a year.  This is discussed with the patient and his  in detail as well as discussed with Dr. Marrero of radiation therapy.  We have also discussed the patient require port placement.    The patient proceeded to treatment taking weekly carboplatin Taxol with concurrent radiation therapy last seen 12/12/2022 with third weekly treatment.    He is next seen 12/19/2022 with H&H 11.9 and 38.5, white count 3460 and platelet count of 168,000.  He is feeling well without any significant complications of therapy except for right calf dermatitis that is been developing in the last several days.    The patient continued therapy taking CarboTaxol weekly including 12/28 and 1/4/2023.    His is next seen 1/11/2023 with completion date for radiation therapy 1/11/2023.  Repeat CBC now includes H&H of 11.0 and 33.9, white count 2090, platelet count 128,000, ANC is 800.  He, fortunately, is tolerating treatment well and we have again discussed with him adjuvant immunotherapy would be useful including Tecentriq versus pembrolizumab-keynote 091 study particularly in tumor programmed cell death PD-L1 greater than 50%.    The patient will not be given additional chemotherapy  1/11/2023 we will plan to reassess by follow-up scans in the next 6 weeks CT chest and pelvis making a decision thereafter about adjuvant immunotherapy.    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    We have discussed that considering studies like keynote  091 that the patient's high risk disease could be addressed with additional immunotherapy including the use of Atezolizumab and/or Keytruda.  Considering his findings overall Keytruda every 3 weeks x18 cycles is to be pursued.    The patient was seen 3/20/2023, discussed initiation of Keytruda.  He is agreeable to proceed.    The patient notified the office shortly after treatment that he had pain under his right rib cage and was placed back onto his Neurontin to try to manage this pain.  Approximately week later he still had the pain and also had another rash we switched him at this point to Famvir to try to completely clear this problem.    He is next seen back 4/3/2023.  Fortunately his recent apparent shingles activation has nearly abated and he is feeling reasonably well.  In review of the literature there is no significant difference in activation with immunotherapy though this patient may require suppression.  I have asked him to complete his Famvir at this point.    Patient assessed 4/10/2023 with resolution of his shingles, subsequently placed on maintenance acyclovir, consideration of shingles vaccination post 3 months of Keytruda therapy is stable disease documented.    The patient underwent a CT chest abdomen pelvis 6/8/2023 that demonstrates postsurgical changes of the left hemithorax, stable pulmonary nodules, stable infrarenal abdominal aortic aneurysm.    He is next seen  6/12/2023.  We have discussed continue with his Keytruda with his tolerance being excellent.  He will also reduce his current metoprolol to 12.5 mg p.o. daily.    He continued Keytruda and was seen in the office 7/24/2023 in anticipation of his 7th dose of Keytruda.  He was tolerating treatment without substantial side effects but did have sudden onset nausea and vomiting lasting 48 hours.  He then began to have joint pain bilateral knees and shoulders up to an 8 of 10 for severity.       He was demonstrating worsening hyponatremia at this point with a normal potassium.  Unfortunately his mental status began to worsen with increasing sleepiness, mild diarrhea.  7/28/2023 studies included an INR 3.34, sodium now 125 and he was admitted for his weakness and hyponatremia.    The patient was seen by several services including nephrology and oncology to the IV fluids.  He had been tested with ACT stimulation test and was diagnosed with renal sufficiency started on oral hydrocortisone.  7/29/2023 cortisol was 0.62, subsequent ACTH test was reduced at 5.7.  The patient studies 7/30 included BUN/creatinine of 9 and 0.97, sodium 130, chloride 95, calcium 5.2.      The patient is next seen 8/14/2023.  He remains somewhat weak and with joint discomfort.  We have discussed his findings that would be most consistent with central adrenal insufficiency and that dosing for his hydrocortisone-currently 10 mg p.o. every morning and 5 mg p.o. every afternoon is likely to be too low.  In determining whether we should proceed with treatment replacement therapy could allow us to try to complete his therapy which, on balance, would be the preferred approach.    The patient is seen 9/21/2023 improved per performance status with cortisone replacement therapy, subsequent CT of chest on pelvis 9/18/2023 without evidence of recurrent disease, pembrolizumab continued with plans to reassess likely in December 2023 or at the completion of  therapy.    Patient seen 2/19/2024 for his 17 cycle of Keytruda as patient approaches his completion of immunotherapy in 3 weeks.  Baseline scans will be requested after follow-up visit in 3 week    We proceeded with therapy thus completing this treatment (18 cycles) and follow-up ET chest, abdomen, pelvis was obtained 3/27/2024.  This demonstrated postoperative changes from previous left lower lobectomy, scattered areas of density in the lungs compared to 6/8/2023 were stable with no new lung nodules, 3.4 cm infrarenal abdominal arctic aneurysm stable and no evidence of metastatic disease otherwise in chest, abdomen, pelvis    He is seen formally 4/1/2024.  He is doing quite well with no additional issues except for skin rash on the medial portions of his lower legs.  This is managed by dermatology treated with triamcinolone.  We discussed surveillance schedule including management of his port every 6 weeks and initial assessment via Guardant Reveal in approximately 9 weeks seeing him in 12 weeks.    The patient return for guardant reveal testing 6/3/2024 that, unfortunately, was positive with a tumor fraction of 0.083%.  Attempting to determine the significance of this and CT of chest abdomen pelvis was obtained 6/11/2024 that showed stable subcentimeter nodular density in the right lung, cardiac size without pericardial abnormality, liver and pancreas spleen and adrenal glands right kidney unchanged, atherosclerotic calcification and mural thrombus within the abdominal aorta measuring 3.8 cm, IVC filter in position.  These were essentially stable examinations.    He is next seen 6/24/2024.The patient's subsequent testing included a Guardant Reveal obtained 6/3/2024 that was positive for ctDNA and 0.083%.  This led to a follow-up CT series 6/21/2024 that was essentially stable.  There is no evidence of distant metastasis or local recurrence.  He is seen with his significant other 6/24 and advised that the exam may  have determined an earlier relapse that might be seen on scans and thus we will have to retest him in the near future to determine whether this is continued to be the case.  Symptomatically is unchanged from previous.    The patient was asked undergo a repeat Guardant Reveal examination and this was obtained 8/26/2024.This repeat was positive for ctDNA detected now at less than 0.05%.  This is an improvement with the patient symptomatically essentially unchanged when he is seen back in office 9/20/2024.  We have discussed a follow-up such examination over a longer period of time at approximately 6 months.    He had a follow-up guardant reveal 2/20/2025 again with negative ctDNA detected and 0% tumor fraction.  He is feeling well and had a follow-up per his pulmonary physician that he recently 3/3/2025 demonstrating stable imaging except for a tiny focus of nodularity in the left upper lobe that is slightly more pronounced than previous at 5 mm.  A repeat study is now anticipated in 3 months which we will going to schedule.  This would be a PET/CT considering these areas in question may be a new malignant process.    Past Medical History:   Diagnosis Date    Arthritis     COPD (chronic obstructive pulmonary disease)     O2 3L AT HS    Diabetes mellitus     DIET CONTROL    Diverticulitis     DVT, lower extremity     RLE    Elevated cholesterol     H/O Cervical pain     History of colitis     Hyperlipidemia     Hypertension     Knee swelling     Left shoulder pain     Low back pain     Low back strain     Lung cancer 2022    LEFT LUNG    Neck strain     On anticoagulant therapy     Peripheral arterial disease     Right leg claudication     Skin cancer     HX    Type 2 diabetes mellitus     Vitamin D deficiency         Past Surgical History:   Procedure Laterality Date    BALLOON ANGIOPLASTY, ARTERY Right 2015    IR Percutaneious IVC filter placement    INGUINAL HERNIA REPAIR Left     KNEE ARTHROSCOPY Left     Torn  meniscus    LOBECTOMY Left 09/23/2022    Procedure: BRONCHOSCOPY, LEFT VIDEO ASSISTED THORACOSCOPY, ROBOT ASSISTED TOTAL DECORTICATION  LEFT LUNG, LEFT LOWER LOBE LOBECTOMY, MEDIASTINAL LYMPHECTOMY, INTERCOSTAL NERVE BLOCK;  Surgeon: Nicola Joe III, MD;  Location: [facility];  Service: Robotics - [facility];  Laterality: Left;    VENOUS ACCESS DEVICE (PORT) INSERTION Right 11/21/2022    Procedure: INSERTION VENOUS ACCESS DEVICE;  Surgeon: Nicola Joe III, MD;  Location: [facility];  Service: Thoracic;  Laterality: Right;        Current Outpatient Medications on File Prior to Visit   Medication Sig Dispense Refill    albuterol (PROVENTIL) (5 MG/ML) 0.5% nebulizer solution Take 0.5 mL by nebulization Every 6 (Six) Hours As Needed for Wheezing.      Budeson-Glycopyrrol-Formoterol (Breztri Aerosphere) 160-9-4.8 MCG/ACT aerosol inhaler Inhale 2 puffs 2 (Two) Times a Day. 10.7 g 11    Cholecalciferol 25 MCG (1000 UT) tablet Take 1 tablet by mouth Daily.      fexofenadine (Allegra Allergy) 180 MG tablet       fluticasone (FLONASE) 50 MCG/ACT nasal spray Administer 1 spray into the nostril(s) as directed by provider Daily. Indications: Allergic Rhinitis      hydrocortisone (CORTEF) 10 MG tablet Take 2 tablets in the morning and 1 tablet in the afternoon plus additional medication for acute illness up to 60 mg daily 360 tablet 3    isosorbide mononitrate (IMDUR) 60 MG 24 hr tablet Take 1 tablet by mouth Every Evening. Indications: hypertension      losartan (COZAAR) 50 MG tablet Take 1 tablet by mouth Daily. 90 tablet 1    NON FORMULARY 2-3 L during day; 2L during night; [facility]   Pulmonologist Dr. Newsome #[phone]      Pseudoephedrine-guaiFENesin ER (Mucus D) 120-1200 MG tablet sustained-release 12 hour       sildenafil (REVATIO) 20 MG tablet Take 1 tablet by mouth.      simvastatin (ZOCOR) 20 MG tablet Take 1 tablet by mouth Every Night. Indications: High Amount of Fats in the Blood 90 tablet 1    sodium  "chloride 3 % nebulizer solution Take 4 mL by nebulization As Needed for Cough.      tamsulosin (FLOMAX) 0.4 MG capsule 24 hr capsule Take 1 capsule by mouth Daily. 30 capsule 5    triamcinolone (KENALOG) 0.1 % cream Apply 1 Application topically to the appropriate area as directed 2 (Two) Times a Day.      warfarin (COUMADIN) 5 MG tablet PT TO TAKE 5MG DAILY X5 DAYS THEN 10MG X2 DAYS  Indications: Other - full anticoagulation 115 tablet 1    clindamycin (CLEOCIN) 300 MG capsule Take 1 capsule by mouth 3 (Three) Times a Day.       No current facility-administered medications on file prior to visit.        ALLERGIES:    Allergies   Allergen Reactions    Benadryl [Diphenhydramine] Other (See Comments)     Extreme restlessness and insomnia        Social History     Socioeconomic History    Marital status:     Years of education: 1 year college   Tobacco Use    Smoking status: Every Day     Current packs/day: 0.50     Average packs/day: 0.5 packs/day for 62.2 years (31.1 ttl pk-yrs)     Types: Cigarettes     Start date: 1/1/1963     Passive exposure: Current    Smokeless tobacco: Never    Tobacco comments:     9/8/22: Down to Less than 1 PPD   Vaping Use    Vaping status: Never Used   Substance and Sexual Activity    Alcohol use: Not Currently    Drug use: Never    Sexual activity: Not Currently     Partners: Female        Family History   Problem Relation Age of Onset    No Known Problems Mother     Cancer Father     Lung cancer Father     Diabetes Brother     No Known Problems Son     Diabetes Brother     Kidney disease Brother     Malig Hyperthermia Neg Hx         Review of Systems   ROS as per HPI    Objective     Vitals:    03/06/25 1125   BP: 135/76   Pulse: 72   Resp: 16   Temp: 97.6 °F (36.4 °C)   TempSrc: Oral   SpO2: 96%   Weight: 74 kg (163 lb 3.2 oz)   Height: 180.3 cm (70.98\")   PainSc: 0-No pain                   3/6/2025    11:22 AM   Current Status   ECOG score 0     Physical Exam  Vitals " reviewed.   Constitutional:       General: He is not in acute distress.     Appearance: Normal appearance. He is well-developed.   HENT:      Head: Normocephalic and atraumatic.   Eyes:      Pupils: Pupils are equal, round, and reactive to light.   Cardiovascular:      Rate and Rhythm: Normal rate and regular rhythm.      Heart sounds: Normal heart sounds. No murmur heard.  Pulmonary:      Effort: Pulmonary effort is normal. No respiratory distress.      Breath sounds: Normal breath sounds. No wheezing, rhonchi or rales.   Abdominal:      General: Bowel sounds are normal. There is no distension.      Palpations: Abdomen is soft.   Musculoskeletal:         General: Normal range of motion.      Cervical back: Normal range of motion.   Skin:     General: Skin is warm and dry.      Findings: No rash.   Neurological:      Mental Status: He is alert and oriented to person, place, and time.           RECENT LABS:  WBC   Date Value Ref Range Status   02/13/2025 13.50 (H) 3.40 - 10.80 10*3/mm3 Final   05/09/2022 7.54 4.5 - 11.0 10*3/uL Final     RBC   Date Value Ref Range Status   02/13/2025 4.70 4.14 - 5.80 10*6/mm3 Final   05/09/2022 5.52 4.5 - 5.9 10*6/uL Final     Hemoglobin   Date Value Ref Range Status   02/13/2025 14.6 13.0 - 17.7 g/dL Final   05/09/2022 16.4 13.5 - 17.5 g/dL Final     Hematocrit   Date Value Ref Range Status   02/13/2025 44.2 37.5 - 51.0 % Final   05/09/2022 51.0 41.0 - 53.0 % Final     MCV   Date Value Ref Range Status   02/13/2025 94.0 79.0 - 97.0 fL Final   05/09/2022 92.4 80.0 - 100.0 fL Final     MCH   Date Value Ref Range Status   02/13/2025 31.1 26.6 - 33.0 pg Final   05/09/2022 29.7 26.0 - 34.0 pg Final     MCHC   Date Value Ref Range Status   02/13/2025 33.0 31.5 - 35.7 g/dL Final   05/09/2022 32.2 31.0 - 37.0 g/dL Final     RDW   Date Value Ref Range Status   02/13/2025 14.2 12.3 - 15.4 % Final   05/09/2022 15.6 12.0 - 16.8 % Final     RDW-SD   Date Value Ref Range Status   02/13/2025 49.1  37.0 - 54.0 fl Final     MPV   Date Value Ref Range Status   02/13/2025 8.7 6.0 - 12.0 fL Final   05/09/2022 9.3 8.4 - 12.4 fL Final     Platelets   Date Value Ref Range Status   02/13/2025 205 140 - 450 10*3/mm3 Final   05/09/2022 204 140 - 440 10*3/uL Final     Neutrophil Rel %   Date Value Ref Range Status   05/09/2022 68.4 45 - 80 % Final     Neutrophil %   Date Value Ref Range Status   02/13/2025 82.7 (H) 42.7 - 76.0 % Final     Lymphocyte Rel %   Date Value Ref Range Status   05/09/2022 21.2 15 - 50 % Final     Lymphocyte %   Date Value Ref Range Status   02/13/2025 9.9 (L) 19.6 - 45.3 % Final     Monocyte Rel %   Date Value Ref Range Status   05/09/2022 9.2 0 - 15 % Final     Monocyte %   Date Value Ref Range Status   02/13/2025 6.7 5.0 - 12.0 % Final     Eosinophil %   Date Value Ref Range Status   02/13/2025 0.0 (L) 0.3 - 6.2 % Final   05/09/2022 0.4 0 - 7 % Final     Basophil Rel %   Date Value Ref Range Status   05/09/2022 0.5 0 - 2 % Final     Basophil %   Date Value Ref Range Status   02/13/2025 0.1 0.0 - 1.5 % Final     Immature Grans %   Date Value Ref Range Status   02/13/2025 0.6 (H) 0.0 - 0.5 % Final   05/09/2022 0.3 0.0 - 1.0 % Final     Neutrophils Absolute   Date Value Ref Range Status   05/09/2022 5.16 2.0 - 8.8 10*3/uL Final     Neutrophils, Absolute   Date Value Ref Range Status   02/13/2025 11.16 (H) 1.70 - 7.00 10*3/mm3 Final     Lymphocytes Absolute   Date Value Ref Range Status   05/09/2022 1.60 0.7 - 5.5 10*3/uL Final     Lymphocytes, Absolute   Date Value Ref Range Status   02/13/2025 1.34 0.70 - 3.10 10*3/mm3 Final     Monocytes Absolute   Date Value Ref Range Status   05/09/2022 0.69 0.0 - 1.7 10*3/uL Final     Monocytes, Absolute   Date Value Ref Range Status   02/13/2025 0.91 (H) 0.10 - 0.90 10*3/mm3 Final     Eosinophils Absolute   Date Value Ref Range Status   05/09/2022 0.03 0.0 - 0.8 10*3/uL Final     Eosinophils, Absolute   Date Value Ref Range Status   02/13/2025 0.00 0.00 -  0.40 10*3/mm3 Final     Basophils Absolute   Date Value Ref Range Status   05/09/2022 0.04 0.0 - 0.2 10*3/uL Final     Basophils, Absolute   Date Value Ref Range Status   02/13/2025 0.01 0.00 - 0.20 10*3/mm3 Final     Immature Grans, Absolute   Date Value Ref Range Status   02/13/2025 0.08 (H) 0.00 - 0.05 10*3/mm3 Final   05/09/2022 0.02 0.00 - 0.10 10*3/uL Final     nRBC   Date Value Ref Range Status   02/13/2025 0.0 0.0 - 0.2 /100 WBC Final           Assessment & Plan     79 y.o. male  with medical issues including type 2 diabetes-uncertain control, COPD, hypertension, diastolic heart failure, chronic disease, peripheral vascular disease (follow-up with vascular surgery today), previous DVT on anticoagulation (home monitoring), previous IVC filter placement?,  Status post September 2021 partial small bowel obstruction secondary to sigmoid diverticulitis treated medically.     1.   T1b N1 M0-stage IIb lung cancer.  right lung base nodule noted on scan at that point and in follow-up with primary care had developed a left inguinal hernia with repair planned through a different general surgeon then seen with his initial sigmoid diverticulitis.  PET scan 7/13/2022 demonstrates a hypermetabolic spiculated lesion medial aspect of the left lobe adjacent to descending thoracic aorta measuring1.5 x 1.6 SUV 9.3 with an enlarged hypermetabolic left hilar lymph node measured 1.5 x 1.3 with SUV of 12.1, additional nodules in the lateral aspect right lower lobe and micronodule in the posterior/medial right lower lobe and also additional right lower lobe micronodule.  There is an infrarenal abdominal aortic aneurysm measuring 3.4 cm with a short segment of chronic dissection present.  Clinical findings would be consistent with a possible T1b N1 M0-stage IIb lung cancer.  discussed in thoracic conference 7/20/2022.  Recommendations to proceed with pulmonary assessment with EBUS.  The patient proceeded to undergo his hernia surgery  8/2/2022 by Dr. Dotson.   Guardant 360 that T p53 splice site mutation is present and would certainly be consistent as well the most common mutations found in lung cancers particularly squamous cancers.  The patient was admitted 9//2022 undergoing 9/23/2022  a bronchoscopy with left video-assisted thoracoscopy with robot-assisted total decortication of the left lung, left lower lobectomy, mediastinal lymphadenectomy and intercostal nerve block with Dr. Nicola Joe.  Fortunately he did fairly well postoperatively though did develop atrial fibrillation with RVR treated with amiodarone converting back to sinus rhythm, treated with IV metoprolol subsequent chronic anticoagulation.  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  Notably there is invasive carcinoma present at the margin. only 2 positive lymph nodes adjacent to the bronchovesicular margin which appears to have been transected difficult to enumerate with extranodal extension present.  Options could well be Carboplatin and Taxol considering the patient's comorbidity and worry of using cisplatin-based chemotherapy in this patient followed by atezolizumab with pathology having been notified to assess PD-L1 expression on the tumor as well also await review by [facility] molecular profiling.  Finally radiation therapy consultation be requested.   [facility] analysis indicates benefit from immunotherapy at relatively high levels.  This would allow radiation therapy given with Carboplatin Taxol weekly (better tolerated) and then subsequent atezolizumab adjuvantly.  Weekly carboplatin Taxol initiated 11/28/2022 given concurrently with radiation therapy  12/5/2022 here for cycle 2 weekly carboplatin/Taxol, overall tolerating treatment quite well so far.  12/12/2022: Proceed with cycle #3 weekly carboplatin/Taxol with concurrent radiation.   Continues to tolerate treatment well.  He is next seen 12/19/2022 with H&H 11.9 and 38.5, white count 3460 and platelet count of 168,000.  He is feeling well without any significant complications of therapy except for right calf dermatitis that is been developing in the last several days.  12/28/2022 here for week 5 carbo/Taxol.  Overall tolerating well.  Today WBC 2.45, ANC 1.22, hemoglobin 10.7, platelet count 117,000.  I have reviewed with Dr. Bishop, and we will go ahead and continue with treatment.  1/4/2023: Received with cycle 6 carbo/taxol + XRT.  Continues to tolerate treatment well.  WBC improved to 2.69 with ANC 1.41.  Next 1/11/2023 with completion date 1/11/2023.  Repeat CBC now includes H&H of 11.0 and 33.9, white count 2090, platelet count 128,000, ANC is 800.  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  We have discussed that considering studies like keynote  091 that the patient's high risk disease could be addressed with additional immunotherapy including the use of Atezolizumab and/or Keytruda.  Considering his findings overall Keytruda every 3 weeks x18 cycles is to be pursued.  The patient began Keytruda as planned with his first treatment 3/20/2023.  The patient notified the office shortly after treatment that he had pain under his right rib cage and was placed back onto his Neurontin to try to manage this pain.  Approximately week later he still had the pain and also had another rash we switched him at  this point to Famvir to try to completely clear this problem.  4/3/2023.  Fortunately his recent apparent shingles activation has nearly abated and he is feeling reasonably well.  In review of the literature there is no significant difference in activation with immunotherapy though this patient may require suppression.  He is asked to complete his Famvir.  4/10/2023 cycle 2 Keytruda, overall tolerating well.   Patient seen 5/1/2023, third cycle of Keytruda, status post fourth cycle of Keytruda, 3 months of therapy, subsequent scans will need to be scheduled.  5/22/2023: Proceed with cycle 4 Keytruda.    Follow-up scans-CT of chest, abdomen and pelvis 6/8/2023 with postsurgical changes only.  7/3/2023 cycle 6 Keytruda  Proceed today, 7/24/2023 with cycle 7 Keytruda which is continued every 3 weeks   He was tolerating treatment without substantial side effects but did have sudden onset nausea and vomiting lasting 48 hours.  He then began to have joint pain bilateral knees and shoulders up to an 8 of 10 for severity.     He was demonstrating worsening hyponatremia at this point with a normal potassium.  Unfortunately his mental status began to worsen with increasing sleepiness, mild diarrhea.  7/28/2023 studies included an INR 3.34, sodium now 125 and he was admitted for his weakness and hyponatremia.  The patient was seen by several services including nephrology and oncology to the IV fluids.  He had been tested with ACT stimulation test and was diagnosed with renal sufficiency started on oral hydrocortisone.  7/29/2023 cortisol was 0.62, subsequent ACTH test was reduced at 5.7.  The patient studies 7/30 included BUN/creatinine of 9 and 0.97, sodium 130, chloride 95, calcium 5.2.  The patient is next seen 8/14/2023.  He remains somewhat weak and with joint discomfort.  We have discussed his findings that would be most consistent with central adrenal insufficiency and that dosing for his hydrocortisone-currently 10 mg  p.o. every morning and 5 mg p.o. every afternoon is likely to be too low.  In determining whether we should proceed with treatment replacement therapy could allow us to try to complete his therapy which, on balance, would be the preferred approach.  Hydrocortisone moved to 20 mg p.o. every morning and 10 mg p.o. every afternoon.  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  Replacement therapy ongoing.  9/5/2023 reviewed back today for C9 Keytruda. Patient with improved performance status following increase of hydrocortisone per above.  Improved appetite and decreased joint pain.  Proceed with treatment today with repeat imaging planned thereafter.  Repeat CT chest, abdomen, pelvis 9/18/2023 without evidence of progressive disease.  Plan to proceed with cycle #10 Keytruda.  Patient seen 10/16/2023 for cycle #11, 11/6 for cycle #12 and patient seen 11/27 for cycle #13 again out of 18 total.  Patient seen 12/18/2023 here for cycle 14 Keytruda.  Patient is doing well.  Discussed with Dr. Bishop, and plans to hold off on follow-up scans until the completion of Keytruda (, planning a total of 18 cycles).  Proceed today, 1/29/2024 with cycle 16 Keytruda  Patient seen 2/19/2024 for cycle #17 Keytruda  3/11/2020 for cycle 18 Keytruda.  Will schedule patient for follow-up scans after today's treatment.  We proceeded with therapy thus completing this treatment (18 cycles) and follow-up ET chest, abdomen, pelvis was obtained 3/27/2024.  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  He is doing quite well with no additional issues except for skin rash on the medial  portions of his lower legs.  This is managed by dermatology treated with triamcinolone.  We discussed surveillance schedule including management of his port every 6 weeks and initial assessment via Guardant Reveal in approximately 9 weeks seeing him in 12 weeks.  As we proceed we could reassess his status in terms of adrenal insufficiency and possibly taper him from his steroids.  The patient's subsequent testing included a Guardant Reveal obtained 6/3/2024 that was positive for ctDNA and 0.083%.  This led to a follow-up CT series 6/21/2024 that was essentially stable.  There is no evidence of distant metastasis or local recurrence.  He is seen with his significant other 6/24 and advised that the exam may have determined an earlier relapse that might be seen on scans and thus we will have to retest him in the near future to determine whether this is continued to be the case.  Symptomatically is unchanged from previous.  The patient was asked undergo a repeat Guardant Reveal examination and this was obtained 8/26/2024.This repeat was positive for ctDNA detected now at less than 0.05%.  This is an improvement with the patient symptomatically essentially unchanged when he is seen back in office 9/20/2024.  We have discussed a follow-up such examination over a longer period of time at approximately 6 months.  He had a follow-up guardant reveal 2/20/2025 again with negative ctDNA detected and 0% tumor fraction.  He is feeling well and had a follow-up per his pulmonary physician that he recently 3/3/2025 demonstrating stable imaging except for a tiny focus of nodularity in the left upper lobe that is slightly more pronounced than previous at 5 mm.  A repeat study is now anticipated in 3 months which we will going to schedule.      2.  Herpes zoster: Patient was treated with Famvir.  Rash has resolved, no issues with persistent herpetic neuralgia.  He will be placed on suppression with acyclovir 400 mg twice daily.  We  discussed he will hold off on vaccination for now, given recent infection.  Patient assessed 5/1/2023, continue Keytruda, status post fifth cycle of Keytruda or 3 months of therapy he would undergo repeat scans and, if stable or improved, proceed with Shingrix vaccinations.  Patient now requested to proceed with vaccinations including RSV    3.  Hyponatremia  Likely exacerbated by recent GI toxicity with nausea vomiting and diarrhea.  Symptoms have now resolved with improvement in his appetite and intake  Reviewed 11/27-23 with sodium 137.  Sodium is normal today, 1/29/2024  Reassessed 2/19/2024 at 140  3/11/2024 sodium 138    4. Joint pain.  Baseline knee pain prior to initiation of immunotherapy though he is now exacerbated with shoulder pain as well and requiring ambulation with a walker  Additional inflammatory labs including sed rate and C-reactive protein today to evaluate for inflammation secondary to immunotherapy  Continue Tylenol and occasional Norco for breakthrough pain  Hydrocortisone replacement therapy increased to 20 mg p.o. every morning and 10 mg p.o. every afternoon  The patient denies pain today, 1/29/2024    5.  History of MGUS  Redraw [place], PE, free serum light chains-redraw will be necessary with insufficient specimen    PLAN:   No additional immunotherapy  Continue hydrocortisone 20 mg in the morning, 10 mg in the afternoon.  Continue prophylactic acyclovir 400 mg twice daily.  Q 6-week port flush  11 weeks PET/CT, CBC and CMP  12 weeks MD  Call/ return sooner should the patient develop any new concerns or problems.    Patient is on a high risk medication requiring close monitoring for toxicity.    Kayden Bishop MD  03/06/2025

## 2025-03-06 ENCOUNTER — INFUSION (OUTPATIENT)
Dept: ONCOLOGY | Facility: HOSPITAL | Age: 80
End: 2025-03-06
Payer: MEDICARE

## 2025-03-06 ENCOUNTER — OFFICE VISIT (OUTPATIENT)
Dept: ONCOLOGY | Facility: CLINIC | Age: 80
End: 2025-03-06
Payer: MEDICARE

## 2025-03-06 VITALS
OXYGEN SATURATION: 96 % | BODY MASS INDEX: 22.85 KG/M2 | HEART RATE: 72 BPM | RESPIRATION RATE: 16 BRPM | DIASTOLIC BLOOD PRESSURE: 76 MMHG | HEIGHT: 71 IN | WEIGHT: 163.2 LBS | SYSTOLIC BLOOD PRESSURE: 135 MMHG | TEMPERATURE: 97.6 F

## 2025-03-06 DIAGNOSIS — Z45.2 FITTING AND ADJUSTMENT OF VASCULAR CATHETER: Primary | ICD-10-CM

## 2025-03-06 DIAGNOSIS — C7A.8 NEUROENDOCRINE CARCINOMA OF LUNG: Primary | ICD-10-CM

## 2025-03-06 PROCEDURE — 3078F DIAST BP <80 MM HG: CPT | Performed by: INTERNAL MEDICINE

## 2025-03-06 PROCEDURE — 99214 OFFICE O/P EST MOD 30 MIN: CPT | Performed by: INTERNAL MEDICINE

## 2025-03-06 PROCEDURE — 1126F AMNT PAIN NOTED NONE PRSNT: CPT | Performed by: INTERNAL MEDICINE

## 2025-03-06 PROCEDURE — 96523 IRRIG DRUG DELIVERY DEVICE: CPT

## 2025-03-06 PROCEDURE — 3075F SYST BP GE 130 - 139MM HG: CPT | Performed by: INTERNAL MEDICINE

## 2025-03-06 PROCEDURE — 25010000002 HEPARIN LOCK FLUSH PER 10 UNITS: Performed by: INTERNAL MEDICINE

## 2025-03-06 RX ORDER — SODIUM CHLORIDE FOR INHALATION 3 %
4 VIAL, NEBULIZER (ML) INHALATION AS NEEDED
COMMUNITY

## 2025-03-06 RX ORDER — HEPARIN SODIUM (PORCINE) LOCK FLUSH IV SOLN 100 UNIT/ML 100 UNIT/ML
500 SOLUTION INTRAVENOUS AS NEEDED
Status: DISCONTINUED | OUTPATIENT
Start: 2025-03-06 | End: 2025-03-06 | Stop reason: HOSPADM

## 2025-03-06 RX ORDER — SODIUM CHLORIDE 0.9 % (FLUSH) 0.9 %
10 SYRINGE (ML) INJECTION AS NEEDED
Status: DISCONTINUED | OUTPATIENT
Start: 2025-03-06 | End: 2025-03-06 | Stop reason: HOSPADM

## 2025-03-06 RX ORDER — ALBUTEROL SULFATE 5 MG/ML
2.5 SOLUTION RESPIRATORY (INHALATION) EVERY 6 HOURS PRN
COMMUNITY

## 2025-03-06 RX ADMIN — Medication 10 ML: at 11:14

## 2025-03-06 RX ADMIN — Medication 500 UNITS: at 11:15

## 2025-03-06 NOTE — LETTER
March 6, 2025     GARCÍA Monteiro  [address]  [facility] [address]    Patient: Giovani España   YOB: 1945   Date of Visit: 3/6/2025     Dear GARCÍA Monteiro:       Thank you for referring Giovani España to me for evaluation. Below are the relevant portions of my assessment and plan of care.    If you have questions, please do not hesitate to call me. I look forward to following Giovani along with you.         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  Notably there is invasive carcinoma present at the margin, 2 positive lymph nodes adjacent to the bronchovesicular margin which appears to have been transected difficult to enumerate with extranodal extension present.  *Patient status post chemo RT-11/28/2022, radiation therapy completion date 1/11/2023  *Immunotherapy-Keytruda to be initiated 3/20/2023  *Follow-up repeat scans 9/18/2023 without evidence of recurrent disease  *Subsequent assessment with mild increase in Guardant Reveal, subsequent Guardant Reveal with lower tumor percentage, reassessment 6 months planned.  *Guardant reveal 2/13/2025-negative ctDNA, 0% tumor fraction, follow-up chest CT anticipated.         History of Present Illness      The patient is a 79 y.o. male with the above-mentioned history who returned 3/11/2024 for lab review and evaluation due for cycle 18 pembrolizumab.  Overall he has tolerated Keytruda quite well.  He does complain of some occasional issues with cramps in his legs.  He denies problems with diarrhea.  No issues with  increased shortness of breath, and no problems with skin rash.  We proceeded with therapy thus completing this treatment (18 cycles) and follow-up ET chest, abdomen, pelvis was obtained 3/27/2024.  This demonstrated postoperative changes from previous left lower lobectomy, scattered areas of density in the lungs compared to 6/8/2023 were stable with no new lung nodules, 3.4 cm infrarenal abdominal arctic aneurysm stable and no evidence of metastatic disease otherwise in chest, abdomen, pelvis    He is seen formally 4/1/2024.  He is doing quite well with no additional issues except for skin rash on the medial portions of his lower legs.  This is managed by dermatology treated with triamcinolone.  We discussed surveillance schedule including management of his port every 6 weeks and initial assessment via Guardant Reveal in approximately 9 weeks seeing him in 12 weeks.    The patient's subsequent testing included a Guardant Reveal obtained 6/3/2024 that was positive for ctDNA and 0.083%.  This led to a follow-up CT series 6/21/2024 that was essentially stable.  There is no evidence of distant metastasis or local recurrence.  He is seen with his significant other 6/24 and advised that the exam may have determined an earlier relapse that might be seen on scans and thus we will have to retest him in the near future to determine whether this is continued to be the case.  Symptomatically is unchanged from previous.  The patient was asked undergo a repeat Guardant Reveal examination and this was obtained 8/26/2024.This repeat was positive for ctDNA detected now at less than 0.05%.  This is an improvement with the patient symptomatically essentially unchanged when he is seen back in office 9/20/2024.  We have discussed a follow-up such examination over a longer period of time at approximately 6 months.  He had a follow-up guardant reveal 2/20/2025 again with negative ctDNA detected and 0% tumor fraction.  He is feeling well  and had a follow-up per his pulmonary physician that he recently 3/3/2025 demonstrating stable imaging except for a tiny focus of nodularity in the left upper lobe that is slightly more pronounced than previous at 5 mm.  A repeat study is now anticipated in 3 months which we will going to schedule.                             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  He had been assessed particularly in the fall 2021 when he presented with nausea and vomiting and abdominal discomfort leading to assessments that revealed sigmoid diverticulitis with contained rupture and partial small bowel obstruction.  Records from mid September 21 indicating that he was admitted with partial small bowel obstruction and treated medically with very slow recovery.  He has history of COPD with CT demonstrating a pulmonary nodule in the right lung base at 7 mm with follow-up planned.  He was seen by general surgery in later September with repeat scans planned and repeat CT abdomen 10/7/2021 did demonstrate interval improvement of sigmoid diverticulitis.      Record indicates an assessment in late June 2022 at different general surgeon for left inguinal hernia, history of heavy tobacco use with COPD and recommendation for surgical repair of his hernia      The patient underwent a CT scan of the chest 5/7/2022 demonstrating diffuse emphysematous changes, ill-defined density measuring 3 mm in the superior segment of the right lower lobe, multiple ill-defined 3 to 4 mm nodular density thought to be inflammatory involving the right lower lobe and a new spiculated nodule involving the left lower lobe measuring 16 x 14 mm as well as left hilar adenopathy.       Follow-up PET/CT  7/13/2022 reveal a hypermetabolic spiculated lesion medial aspect the left lower lobe adjacent to descending thoracic aorta measuring 1.5 x 1.6 SUV 9.3 with an enlarged hypermetabolic left hilar lymph node measured 1.5 x 1.3 with SUV of 12.1, additional nodules in the lateral aspect right lower lobe and micronodule in the posterior/medial right lower lobe and also additional right lower lobe micronodule.  There is an infrarenal abdominal aortic aneurysm measuring 3.4 cm with a short segment of chronic dissection present.    These clinical findings would be consistent with a possible T1b N1 M0-stage IIb lung cancer.      Recognizing a diagnosis has not been made his case was discussed in thoracic conference 7/20/2022.  Recommendations include proceeding to pulmonary assessment with EBUS and the patient undergoing a general assessment per his clinical status-older adult assessment as well as assessment by thoracic surgery.  These issues are discussed with the patient and his wife in detail when they are seen 7/28/2022.    The patient proceeded to undergo his hernia surgery 8/2/2022 by Dr. Dotson.  Additionally the patient was unable to undergo assessment by pulmonary until October and, thereafter, was scheduled to see Dr. Joe 8/18/2022 undergoing older adult functional assessment with a JAGUAR score of 11 indicating a risk of functional decline related to cancer therapy.    The patient is seen with his significant other 8/15/2022 and doing very well with recent hernia surgery.  His performance status is reasonably good at present and we have learned now that he would not be able to see pulmonary medicine until October, thoracic surgery is scheduled this week with Dr. Joe.  Perhaps he could be a surgical candidate.  Particularly we know by van 360 that T p53 splice site mutation is present and would certainly be consistent as well the most common mutations found in lung cancers particularly squamous  cancers.  We have discussed obtaining pulmonary functions at this point, thoracic surgery assessment this week and also restarting Chantix as possible for the patient.    There was difficulty obtaining Chantix and while this was being assessed the patient was reviewed 8/18 by thoracic surgery.  He was felt to have a T1b N1 M0 stage IIb carcinoma left lower lobe along and not a candidate for biopsy.  PFTs were borderline, functional assessment was reasonably good and the patient was felt to be a candidate for robot-assisted biopsy of his nodes and if malignant proceed to left lower lobe lobectomy.  He was reviewed 9/8 and offered 21 mg nicotine transdermal patching.    He is next seen 9/10/2022.  He is seen with his wife and both are prepared for surgery 9/23/2022.  We discussed that additional therapy may be considered once we have more information about the type and stage.  We would hope to see him postoperatively.    The patient was admitted 9//2022 undergoing 9/23/2022  a bronchoscopy with left video-assisted thoracoscopy with robot-assisted total decortication of the left lung, left lower lobectomy, mediastinal lymphadenectomy and intercostal nerve block with Dr. Nicola Joe.  Fortunately he did fairly well postoperatively though did develop atrial fibrillation with RVR treated with amiodarone converting back to sinus rhythm, treated with IV metoprolol subsequent chronic anticoagulation.  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  Notably there is invasive carcinoma present at the margin.  Is a, and only 2 positive lymph nodes adjacent to the bronchovesicular margin which appears to have been transected difficult to enumerate with extranodal extension present.       The patient is seen 10/27/2022.  He is recovering well from surgery, albeit slowly, and we have discussed his  findings that are concerning as to residual disease with a positive margin described as above.  There is also the significance potentially of PD-L1 expression which has been described as producing a poor survival.     Nivolumab has been used as second line therapy to positive effect in the disease and this begs the question as to whether adjuvant therapy plus immunotherapy could be utilized though the patient would be difficult to treat with platinum based chemotherapy as well.       The patient is next assessed 11/7/2022 for significant assessments by supportive oncology at [facility] as well as with plans to see Dr. Marrero 11/9/2022.  Unfortunately he has been very slow to gradually recover from the effects of surgery with reduced appetite and only fair performance status.     At present it would be difficult to give him cisplatin based chemotherapy and we discussed whether we might be able to use Tecentriq (atezolizumab) as his primary adjuvant therapy.  We have contacted pathology about completing Caris testing and a PD-L1 analysis that has not yet completed.         The patient is seen, however, 11/16/2022 and his genomic analysis has returned as being significant for broad sensitivity to immunotherapy.  This would argue for the administration of weekly Carboplatin/Taxol as the patient proceeds to radiation therapy and upon completion, then using Tecentriq as further adjuvant therapy over a year.  This is discussed with the patient and his  in detail as well as discussed with Dr. Marrero of radiation therapy.  We have also discussed the patient require port placement.    The patient proceeded to treatment taking weekly carboplatin Taxol with concurrent radiation therapy last seen 12/12/2022 with third weekly treatment.    He is next seen 12/19/2022 with H&H 11.9 and 38.5, white count 3460 and platelet count of 168,000.  He is feeling well without any significant complications of therapy except for right calf  dermatitis that is been developing in the last several days.    The patient continued therapy taking CarboTaxol weekly including 12/28 and 1/4/2023.    His is next seen 1/11/2023 with completion date for radiation therapy 1/11/2023.  Repeat CBC now includes H&H of 11.0 and 33.9, white count 2090, platelet count 128,000, ANC is 800.  He, fortunately, is tolerating treatment well and we have again discussed with him adjuvant immunotherapy would be useful including Tecentriq versus pembrolizumab-keynote 091 study particularly in tumor programmed cell death PD-L1 greater than 50%.    The patient will not be given additional chemotherapy 1/11/2023 we will plan to reassess by follow-up scans in the next 6 weeks CT chest and pelvis making a decision thereafter about adjuvant immunotherapy.    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    We have discussed that considering studies like keynote  091 that the patient's high risk disease could be addressed with additional immunotherapy including the use of Atezolizumab and/or Keytruda.  Considering his findings overall Keytruda every 3 weeks x18 cycles is to be pursued.    The patient was seen 3/20/2023, discussed initiation of Keytruda.  He is agreeable to proceed.    The patient notified the office shortly after treatment that he had pain under his right rib cage and was placed back onto his Neurontin to try to manage this pain.  Approximately week later he still had the pain and also had another rash we switched him at this point to Famvir to try to completely clear this problem.    He is next seen back 4/3/2023.  Fortunately his recent apparent shingles activation has nearly abated and he is feeling  reasonably well.  In review of the literature there is no significant difference in activation with immunotherapy though this patient may require suppression.  I have asked him to complete his Famvir at this point.    Patient assessed 4/10/2023 with resolution of his shingles, subsequently placed on maintenance acyclovir, consideration of shingles vaccination post 3 months of Keytruda therapy is stable disease documented.    The patient underwent a CT chest abdomen pelvis 6/8/2023 that demonstrates postsurgical changes of the left hemithorax, stable pulmonary nodules, stable infrarenal abdominal aortic aneurysm.    He is next seen 6/12/2023.  We have discussed continue with his Keytruda with his tolerance being excellent.  He will also reduce his current metoprolol to 12.5 mg p.o. daily.    He continued Keytruda and was seen in the office 7/24/2023 in anticipation of his 7th dose of Keytruda.  He was tolerating treatment without substantial side effects but did have sudden onset nausea and vomiting lasting 48 hours.  He then began to have joint pain bilateral knees and shoulders up to an 8 of 10 for severity.       He was demonstrating worsening hyponatremia at this point with a normal potassium.  Unfortunately his mental status began to worsen with increasing sleepiness, mild diarrhea.  7/28/2023 studies included an INR 3.34, sodium now 125 and he was admitted for his weakness and hyponatremia.    The patient was seen by several services including nephrology and oncology to the IV fluids.  He had been tested with ACT stimulation test and was diagnosed with renal sufficiency started on oral hydrocortisone.  7/29/2023 cortisol was 0.62, subsequent ACTH test was reduced at 5.7.  The patient studies 7/30 included BUN/creatinine of 9 and 0.97, sodium 130, chloride 95, calcium 5.2.      The patient is next seen 8/14/2023.  He remains somewhat weak and with joint discomfort.  We have discussed his findings that would be  most consistent with central adrenal insufficiency and that dosing for his hydrocortisone-currently 10 mg p.o. every morning and 5 mg p.o. every afternoon is likely to be too low.  In determining whether we should proceed with treatment replacement therapy could allow us to try to complete his therapy which, on balance, would be the preferred approach.    The patient is seen 9/21/2023 improved per performance status with cortisone replacement therapy, subsequent CT of chest on pelvis 9/18/2023 without evidence of recurrent disease, pembrolizumab continued with plans to reassess likely in December 2023 or at the completion of therapy.    Patient seen 2/19/2024 for his 17 cycle of Keytruda as patient approaches his completion of immunotherapy in 3 weeks.  Baseline scans will be requested after follow-up visit in 3 week    We proceeded with therapy thus completing this treatment (18 cycles) and follow-up ET chest, abdomen, pelvis was obtained 3/27/2024.  This demonstrated postoperative changes from previous left lower lobectomy, scattered areas of density in the lungs compared to 6/8/2023 were stable with no new lung nodules, 3.4 cm infrarenal abdominal arctic aneurysm stable and no evidence of metastatic disease otherwise in chest, abdomen, pelvis    He is seen formally 4/1/2024.  He is doing quite well with no additional issues except for skin rash on the medial portions of his lower legs.  This is managed by dermatology treated with triamcinolone.  We discussed surveillance schedule including management of his port every 6 weeks and initial assessment via Guardant Reveal in approximately 9 weeks seeing him in 12 weeks.    The patient return for guardant reveal testing 6/3/2024 that, unfortunately, was positive with a tumor fraction of 0.083%.  Attempting to determine the significance of this and CT of chest abdomen pelvis was obtained 6/11/2024 that showed stable subcentimeter nodular density in the right lung,  cardiac size without pericardial abnormality, liver and pancreas spleen and adrenal glands right kidney unchanged, atherosclerotic calcification and mural thrombus within the abdominal aorta measuring 3.8 cm, IVC filter in position.  These were essentially stable examinations.    He is next seen 6/24/2024.The patient's subsequent testing included a Guardant Reveal obtained 6/3/2024 that was positive for ctDNA and 0.083%.  This led to a follow-up CT series 6/21/2024 that was essentially stable.  There is no evidence of distant metastasis or local recurrence.  He is seen with his significant other 6/24 and advised that the exam may have determined an earlier relapse that might be seen on scans and thus we will have to retest him in the near future to determine whether this is continued to be the case.  Symptomatically is unchanged from previous.    The patient was asked undergo a repeat Guardant Reveal examination and this was obtained 8/26/2024.This repeat was positive for ctDNA detected now at less than 0.05%.  This is an improvement with the patient symptomatically essentially unchanged when he is seen back in office 9/20/2024.  We have discussed a follow-up such examination over a longer period of time at approximately 6 months.    He had a follow-up guardant reveal 2/20/2025 again with negative ctDNA detected and 0% tumor fraction.  He is feeling well and had a follow-up per his pulmonary physician that he recently 3/3/2025 demonstrating stable imaging except for a tiny focus of nodularity in the left upper lobe that is slightly more pronounced than previous at 5 mm.  A repeat study is now anticipated in 3 months which we will going to schedule.  This would be a PET/CT considering these areas in question may be a new malignant process.    Past Medical History:   Diagnosis Date   • Arthritis    • COPD (chronic obstructive pulmonary disease)     O2 3L AT HS   • Diabetes mellitus     DIET CONTROL   • Diverticulitis     • DVT, lower extremity     RLE   • Elevated cholesterol    • H/O Cervical pain    • History of colitis    • Hyperlipidemia    • Hypertension    • Knee swelling    • Left shoulder pain    • Low back pain    • Low back strain    • Lung cancer 2022    LEFT LUNG   • Neck strain    • On anticoagulant therapy    • Peripheral arterial disease    • Right leg claudication    • Skin cancer     HX   • Type 2 diabetes mellitus    • Vitamin D deficiency         Past Surgical History:   Procedure Laterality Date   • BALLOON ANGIOPLASTY, ARTERY Right 2015    IR Percutaneious IVC filter placement   • INGUINAL HERNIA REPAIR Left    • KNEE ARTHROSCOPY Left     Torn meniscus   • LOBECTOMY Left 09/23/2022    Procedure: BRONCHOSCOPY, LEFT VIDEO ASSISTED THORACOSCOPY, ROBOT ASSISTED TOTAL DECORTICATION  LEFT LUNG, LEFT LOWER LOBE LOBECTOMY, MEDIASTINAL LYMPHECTOMY, INTERCOSTAL NERVE BLOCK;  Surgeon: Nicola Joe III, MD;  Location: [facility];  Service: Robotics - [facility];  Laterality: Left;   • VENOUS ACCESS DEVICE (PORT) INSERTION Right 11/21/2022    Procedure: INSERTION VENOUS ACCESS DEVICE;  Surgeon: Nicola Joe III, MD;  Location: [facility];  Service: Thoracic;  Laterality: Right;        Current Outpatient Medications on File Prior to Visit   Medication Sig Dispense Refill   • albuterol (PROVENTIL) (5 MG/ML) 0.5% nebulizer solution Take 0.5 mL by nebulization Every 6 (Six) Hours As Needed for Wheezing.     • Budeson-Glycopyrrol-Formoterol (Breztri Aerosphere) 160-9-4.8 MCG/ACT aerosol inhaler Inhale 2 puffs 2 (Two) Times a Day. 10.7 g 11   • Cholecalciferol 25 MCG (1000 UT) tablet Take 1 tablet by mouth Daily.     • fexofenadine (Allegra Allergy) 180 MG tablet      • fluticasone (FLONASE) 50 MCG/ACT nasal spray Administer 1 spray into the nostril(s) as directed by provider Daily. Indications: Allergic Rhinitis     • hydrocortisone (CORTEF) 10 MG tablet Take 2 tablets in the morning and 1 tablet in the afternoon  plus additional medication for acute illness up to 60 mg daily 360 tablet 3   • isosorbide mononitrate (IMDUR) 60 MG 24 hr tablet Take 1 tablet by mouth Every Evening. Indications: hypertension     • losartan (COZAAR) 50 MG tablet Take 1 tablet by mouth Daily. 90 tablet 1   • NON FORMULARY 2-3 L during day; 2L during night; [facility]   Pulmonologist Dr. Newsome #[phone]     • Pseudoephedrine-guaiFENesin ER (Mucus D) 120-1200 MG tablet sustained-release 12 hour      • sildenafil (REVATIO) 20 MG tablet Take 1 tablet by mouth.     • simvastatin (ZOCOR) 20 MG tablet Take 1 tablet by mouth Every Night. Indications: High Amount of Fats in the Blood 90 tablet 1   • sodium chloride 3 % nebulizer solution Take 4 mL by nebulization As Needed for Cough.     • tamsulosin (FLOMAX) 0.4 MG capsule 24 hr capsule Take 1 capsule by mouth Daily. 30 capsule 5   • triamcinolone (KENALOG) 0.1 % cream Apply 1 Application topically to the appropriate area as directed 2 (Two) Times a Day.     • warfarin (COUMADIN) 5 MG tablet PT TO TAKE 5MG DAILY X5 DAYS THEN 10MG X2 DAYS  Indications: Other - full anticoagulation 115 tablet 1   • clindamycin (CLEOCIN) 300 MG capsule Take 1 capsule by mouth 3 (Three) Times a Day.       No current facility-administered medications on file prior to visit.        ALLERGIES:    Allergies   Allergen Reactions   • Benadryl [Diphenhydramine] Other (See Comments)     Extreme restlessness and insomnia        Social History     Socioeconomic History   • Marital status:    • Years of education: 1 year college   Tobacco Use   • Smoking status: Every Day     Current packs/day: 0.50     Average packs/day: 0.5 packs/day for 62.2 years (31.1 ttl pk-yrs)     Types: Cigarettes     Start date: 1/1/1963     Passive exposure: Current   • Smokeless tobacco: Never   • Tobacco comments:     9/8/22: Down to Less than 1 PPD   Vaping Use   • Vaping status: Never Used   Substance and Sexual Activity   • Alcohol use: Not  "Currently   • Drug use: Never   • Sexual activity: Not Currently     Partners: Female        Family History   Problem Relation Age of Onset   • No Known Problems Mother    • Cancer Father    • Lung cancer Father    • Diabetes Brother    • No Known Problems Son    • Diabetes Brother    • Kidney disease Brother    • Malig Hyperthermia Neg Hx         Review of Systems   ROS as per HPI    Objective    Vitals:    03/06/25 1125   BP: 135/76   Pulse: 72   Resp: 16   Temp: 97.6 °F (36.4 °C)   TempSrc: Oral   SpO2: 96%   Weight: 74 kg (163 lb 3.2 oz)   Height: 180.3 cm (70.98\")   PainSc: 0-No pain                   3/6/2025    11:22 AM   Current Status   ECOG score 0     Physical Exam  Vitals reviewed.   Constitutional:       General: He is not in acute distress.     Appearance: Normal appearance. He is well-developed.   HENT:      Head: Normocephalic and atraumatic.   Eyes:      Pupils: Pupils are equal, round, and reactive to light.   Cardiovascular:      Rate and Rhythm: Normal rate and regular rhythm.      Heart sounds: Normal heart sounds. No murmur heard.  Pulmonary:      Effort: Pulmonary effort is normal. No respiratory distress.      Breath sounds: Normal breath sounds. No wheezing, rhonchi or rales.   Abdominal:      General: Bowel sounds are normal. There is no distension.      Palpations: Abdomen is soft.   Musculoskeletal:         General: Normal range of motion.      Cervical back: Normal range of motion.   Skin:     General: Skin is warm and dry.      Findings: No rash.   Neurological:      Mental Status: He is alert and oriented to person, place, and time.           RECENT LABS:  WBC   Date Value Ref Range Status   02/13/2025 13.50 (H) 3.40 - 10.80 10*3/mm3 Final   05/09/2022 7.54 4.5 - 11.0 10*3/uL Final     RBC   Date Value Ref Range Status   02/13/2025 4.70 4.14 - 5.80 10*6/mm3 Final   05/09/2022 5.52 4.5 - 5.9 10*6/uL Final     Hemoglobin   Date Value Ref Range Status   02/13/2025 14.6 13.0 - 17.7 g/dL " Final   05/09/2022 16.4 13.5 - 17.5 g/dL Final     Hematocrit   Date Value Ref Range Status   02/13/2025 44.2 37.5 - 51.0 % Final   05/09/2022 51.0 41.0 - 53.0 % Final     MCV   Date Value Ref Range Status   02/13/2025 94.0 79.0 - 97.0 fL Final   05/09/2022 92.4 80.0 - 100.0 fL Final     MCH   Date Value Ref Range Status   02/13/2025 31.1 26.6 - 33.0 pg Final   05/09/2022 29.7 26.0 - 34.0 pg Final     MCHC   Date Value Ref Range Status   02/13/2025 33.0 31.5 - 35.7 g/dL Final   05/09/2022 32.2 31.0 - 37.0 g/dL Final     RDW   Date Value Ref Range Status   02/13/2025 14.2 12.3 - 15.4 % Final   05/09/2022 15.6 12.0 - 16.8 % Final     RDW-SD   Date Value Ref Range Status   02/13/2025 49.1 37.0 - 54.0 fl Final     MPV   Date Value Ref Range Status   02/13/2025 8.7 6.0 - 12.0 fL Final   05/09/2022 9.3 8.4 - 12.4 fL Final     Platelets   Date Value Ref Range Status   02/13/2025 205 140 - 450 10*3/mm3 Final   05/09/2022 204 140 - 440 10*3/uL Final     Neutrophil Rel %   Date Value Ref Range Status   05/09/2022 68.4 45 - 80 % Final     Neutrophil %   Date Value Ref Range Status   02/13/2025 82.7 (H) 42.7 - 76.0 % Final     Lymphocyte Rel %   Date Value Ref Range Status   05/09/2022 21.2 15 - 50 % Final     Lymphocyte %   Date Value Ref Range Status   02/13/2025 9.9 (L) 19.6 - 45.3 % Final     Monocyte Rel %   Date Value Ref Range Status   05/09/2022 9.2 0 - 15 % Final     Monocyte %   Date Value Ref Range Status   02/13/2025 6.7 5.0 - 12.0 % Final     Eosinophil %   Date Value Ref Range Status   02/13/2025 0.0 (L) 0.3 - 6.2 % Final   05/09/2022 0.4 0 - 7 % Final     Basophil Rel %   Date Value Ref Range Status   05/09/2022 0.5 0 - 2 % Final     Basophil %   Date Value Ref Range Status   02/13/2025 0.1 0.0 - 1.5 % Final     Immature Grans %   Date Value Ref Range Status   02/13/2025 0.6 (H) 0.0 - 0.5 % Final   05/09/2022 0.3 0.0 - 1.0 % Final     Neutrophils Absolute   Date Value Ref Range Status   05/09/2022 5.16 2.0 - 8.8  10*3/uL Final     Neutrophils, Absolute   Date Value Ref Range Status   02/13/2025 11.16 (H) 1.70 - 7.00 10*3/mm3 Final     Lymphocytes Absolute   Date Value Ref Range Status   05/09/2022 1.60 0.7 - 5.5 10*3/uL Final     Lymphocytes, Absolute   Date Value Ref Range Status   02/13/2025 1.34 0.70 - 3.10 10*3/mm3 Final     Monocytes Absolute   Date Value Ref Range Status   05/09/2022 0.69 0.0 - 1.7 10*3/uL Final     Monocytes, Absolute   Date Value Ref Range Status   02/13/2025 0.91 (H) 0.10 - 0.90 10*3/mm3 Final     Eosinophils Absolute   Date Value Ref Range Status   05/09/2022 0.03 0.0 - 0.8 10*3/uL Final     Eosinophils, Absolute   Date Value Ref Range Status   02/13/2025 0.00 0.00 - 0.40 10*3/mm3 Final     Basophils Absolute   Date Value Ref Range Status   05/09/2022 0.04 0.0 - 0.2 10*3/uL Final     Basophils, Absolute   Date Value Ref Range Status   02/13/2025 0.01 0.00 - 0.20 10*3/mm3 Final     Immature Grans, Absolute   Date Value Ref Range Status   02/13/2025 0.08 (H) 0.00 - 0.05 10*3/mm3 Final   05/09/2022 0.02 0.00 - 0.10 10*3/uL Final     nRBC   Date Value Ref Range Status   02/13/2025 0.0 0.0 - 0.2 /100 WBC Final           Assessment & Plan    79 y.o. male  with medical issues including type 2 diabetes-uncertain control, COPD, hypertension, diastolic heart failure, chronic disease, peripheral vascular disease (follow-up with vascular surgery today), previous DVT on anticoagulation (home monitoring), previous IVC filter placement?,  Status post September 2021 partial small bowel obstruction secondary to sigmoid diverticulitis treated medically.     1.   T1b N1 M0-stage IIb lung cancer.  right lung base nodule noted on scan at that point and in follow-up with primary care had developed a left inguinal hernia with repair planned through a different general surgeon then seen with his initial sigmoid diverticulitis.  PET scan 7/13/2022 demonstrates a hypermetabolic spiculated lesion medial aspect of the left  lobe adjacent to descending thoracic aorta measuring1.5 x 1.6 SUV 9.3 with an enlarged hypermetabolic left hilar lymph node measured 1.5 x 1.3 with SUV of 12.1, additional nodules in the lateral aspect right lower lobe and micronodule in the posterior/medial right lower lobe and also additional right lower lobe micronodule.  There is an infrarenal abdominal aortic aneurysm measuring 3.4 cm with a short segment of chronic dissection present.  Clinical findings would be consistent with a possible T1b N1 M0-stage IIb lung cancer.  discussed in thoracic conference 7/20/2022.  Recommendations to proceed with pulmonary assessment with EBUS.  The patient proceeded to undergo his hernia surgery 8/2/2022 by Dr. Dotson.   Guardant 360 that T p53 splice site mutation is present and would certainly be consistent as well the most common mutations found in lung cancers particularly squamous cancers.  The patient was admitted 9//2022 undergoing 9/23/2022  a bronchoscopy with left video-assisted thoracoscopy with robot-assisted total decortication of the left lung, left lower lobectomy, mediastinal lymphadenectomy and intercostal nerve block with Dr. Nicola Joe.  Fortunately he did fairly well postoperatively though did develop atrial fibrillation with RVR treated with amiodarone converting back to sinus rhythm, treated with IV metoprolol subsequent chronic anticoagulation.  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  Notably there is invasive carcinoma present at the margin. only 2 positive lymph nodes adjacent to the bronchovesicular margin which appears to have been transected difficult to enumerate with extranodal extension present.  Options could well be Carboplatin and Taxol considering the patient's comorbidity and worry of using cisplatin-based chemotherapy in this patient followed by  atezolizumab with pathology having been notified to assess PD-L1 expression on the tumor as well also await review by [facility] molecular profiling.  Finally radiation therapy consultation be requested.   [facility] analysis indicates benefit from immunotherapy at relatively high levels.  This would allow radiation therapy given with Carboplatin Taxol weekly (better tolerated) and then subsequent atezolizumab adjuvantly.  Weekly carboplatin Taxol initiated 11/28/2022 given concurrently with radiation therapy  12/5/2022 here for cycle 2 weekly carboplatin/Taxol, overall tolerating treatment quite well so far.  12/12/2022: Proceed with cycle #3 weekly carboplatin/Taxol with concurrent radiation.  Continues to tolerate treatment well.  He is next seen 12/19/2022 with H&H 11.9 and 38.5, white count 3460 and platelet count of 168,000.  He is feeling well without any significant complications of therapy except for right calf dermatitis that is been developing in the last several days.  12/28/2022 here for week 5 carbo/Taxol.  Overall tolerating well.  Today WBC 2.45, ANC 1.22, hemoglobin 10.7, platelet count 117,000.  I have reviewed with Dr. Bishop, and we will go ahead and continue with treatment.  1/4/2023: Received with cycle 6 carbo/taxol + XRT.  Continues to tolerate treatment well.  WBC improved to 2.69 with ANC 1.41.  Next 1/11/2023 with completion date 1/11/2023.  Repeat CBC now includes H&H of 11.0 and 33.9, white count 2090, platelet count 128,000, ANC is 800.  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  We have discussed that considering studies like keynote  091 that the patient's high risk disease could be addressed with additional immunotherapy including the use of Atezolizumab and/or Keytruda.  Considering his findings overall Keytruda every 3 weeks x18 cycles is to be pursued.  The patient began Keytruda as planned with his first treatment 3/20/2023.  The patient notified the office shortly after treatment that he had pain under his right rib cage and was placed back onto his Neurontin to try to manage this pain.  Approximately week later he still had the pain and also had another rash we switched him at this point to Famvir to try to completely clear this problem.  4/3/2023.  Fortunately his recent apparent shingles activation has nearly abated and he is feeling reasonably well.  In review of the literature there is no significant difference in activation with immunotherapy though this patient may require suppression.  He is asked to complete his Famvir.  4/10/2023 cycle 2 Keytruda, overall tolerating well.   Patient seen 5/1/2023, third cycle of Keytruda, status post fourth cycle of Keytruda, 3 months of therapy, subsequent scans will need to be scheduled.  5/22/2023: Proceed with cycle 4 Keytruda.    Follow-up scans-CT of chest, abdomen and pelvis 6/8/2023 with postsurgical changes only.  7/3/2023 cycle 6 Keytruda  Proceed today, 7/24/2023 with cycle 7 Keytruda which is continued every 3 weeks   He was tolerating treatment without substantial side effects but did have sudden onset nausea and vomiting lasting 48 hours.  He then began to have joint pain bilateral knees and shoulders up to an 8 of 10 for severity.     He was demonstrating worsening hyponatremia at this point with a normal potassium.  Unfortunately his mental status began to worsen with increasing sleepiness, mild diarrhea.  7/28/2023 studies included an INR 3.34,  sodium now 125 and he was admitted for his weakness and hyponatremia.  The patient was seen by several services including nephrology and oncology to the IV fluids.  He had been tested with ACT stimulation test and was diagnosed with renal sufficiency started on oral hydrocortisone.  7/29/2023 cortisol was 0.62, subsequent ACTH test was reduced at 5.7.  The patient studies 7/30 included BUN/creatinine of 9 and 0.97, sodium 130, chloride 95, calcium 5.2.  The patient is next seen 8/14/2023.  He remains somewhat weak and with joint discomfort.  We have discussed his findings that would be most consistent with central adrenal insufficiency and that dosing for his hydrocortisone-currently 10 mg p.o. every morning and 5 mg p.o. every afternoon is likely to be too low.  In determining whether we should proceed with treatment replacement therapy could allow us to try to complete his therapy which, on balance, would be the preferred approach.  Hydrocortisone moved to 20 mg p.o. every morning and 10 mg p.o. every afternoon.  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  Replacement therapy ongoing.  9/5/2023 reviewed back today for C9 Keytruda. Patient with improved performance status following increase of hydrocortisone per above.  Improved appetite and decreased joint pain.  Proceed with treatment today with repeat imaging planned thereafter.  Repeat CT chest, abdomen, pelvis 9/18/2023 without evidence of progressive disease.  Plan to proceed with cycle #10 Keytruda.  Patient seen 10/16/2023 for cycle #11, 11/6 for cycle #12 and patient seen 11/27 for cycle #13 again out of 18 total.  Patient seen 12/18/2023 here for cycle 14 Keytruda.  Patient is doing well.  Discussed with Dr. Bishop, and plans to hold off on follow-up scans until the completion of Keytruda (, planning a total of 18 cycles).  Proceed today, 1/29/2024 with cycle 16  Keytruda  Patient seen 2/19/2024 for cycle #17 Keytruda  3/11/2020 for cycle 18 Keytruda.  Will schedule patient for follow-up scans after today's treatment.  We proceeded with therapy thus completing this treatment (18 cycles) and follow-up ET chest, abdomen, pelvis was obtained 3/27/2024.  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  He is doing quite well with no additional issues except for skin rash on the medial portions of his lower legs.  This is managed by dermatology treated with triamcinolone.  We discussed surveillance schedule including management of his port every 6 weeks and initial assessment via Guardant Reveal in approximately 9 weeks seeing him in 12 weeks.  As we proceed we could reassess his status in terms of adrenal insufficiency and possibly taper him from his steroids.  The patient's subsequent testing included a Guardant Reveal obtained 6/3/2024 that was positive for ctDNA and 0.083%.  This led to a follow-up CT series 6/21/2024 that was essentially stable.  There is no evidence of distant metastasis or local recurrence.  He is seen with his significant other 6/24 and advised that the exam may have determined an earlier relapse that might be seen on scans and thus we will have to retest him in the near future to determine whether this is continued to be the case.  Symptomatically is unchanged from previous.  The patient was asked undergo a repeat Guardant Reveal examination and this was obtained 8/26/2024.This repeat was positive for ctDNA detected now at less than 0.05%.  This is an improvement with the patient symptomatically essentially unchanged when he is seen back in office 9/20/2024.  We have discussed  a follow-up such examination over a longer period of time at approximately 6 months.  He had a follow-up guardant reveal 2/20/2025 again with negative ctDNA detected and 0% tumor fraction.  He is feeling well and had a follow-up per his pulmonary physician that he recently 3/3/2025 demonstrating stable imaging except for a tiny focus of nodularity in the left upper lobe that is slightly more pronounced than previous at 5 mm.  A repeat study is now anticipated in 3 months which we will going to schedule.      2.  Herpes zoster: Patient was treated with Famvir.  Rash has resolved, no issues with persistent herpetic neuralgia.  He will be placed on suppression with acyclovir 400 mg twice daily.  We discussed he will hold off on vaccination for now, given recent infection.  Patient assessed 5/1/2023, continue Keytruda, status post fifth cycle of Keytruda or 3 months of therapy he would undergo repeat scans and, if stable or improved, proceed with Shingrix vaccinations.  Patient now requested to proceed with vaccinations including RSV    3.  Hyponatremia  Likely exacerbated by recent GI toxicity with nausea vomiting and diarrhea.  Symptoms have now resolved with improvement in his appetite and intake  Reviewed 11/27-23 with sodium 137.  Sodium is normal today, 1/29/2024  Reassessed 2/19/2024 at 140  3/11/2024 sodium 138    4. Joint pain.  Baseline knee pain prior to initiation of immunotherapy though he is now exacerbated with shoulder pain as well and requiring ambulation with a walker  Additional inflammatory labs including sed rate and C-reactive protein today to evaluate for inflammation secondary to immunotherapy  Continue Tylenol and occasional Norco for breakthrough pain  Hydrocortisone replacement therapy increased to 20 mg p.o. every morning and 10 mg p.o. every afternoon  The patient denies pain today, 1/29/2024    5.  History of MGUS  Redraw [place], PE, free serum light chains-redraw will be necessary with  insufficient specimen    PLAN:   No additional immunotherapy  Continue hydrocortisone 20 mg in the morning, 10 mg in the afternoon.  Continue prophylactic acyclovir 400 mg twice daily.  Q 6-week port flush  11 weeks PET/CT, CBC and CMP  12 weeks MD  Call/ return sooner should the patient develop any new concerns or problems.    Patient is on a high risk medication requiring close monitoring for toxicity.    Kayden Bishop MD  03/06/2025

## 2025-03-10 ENCOUNTER — TELEPHONE (OUTPATIENT)
Dept: ONCOLOGY | Facility: CLINIC | Age: 80
End: 2025-03-10
Payer: MEDICARE

## 2025-03-10 NOTE — TELEPHONE ENCOUNTER
Returned call to Kate. She states pt's medication list needs to be updated. He is no longer on Clindamycin. This will be removed from his medication list. Kate also states that pt has not taken allopurinol in over a year. She states if Dr. Bishop would like him to restart this, he will need it to be called into his pharmacy. She would like a call back if he is to start this medication. If he does not need to, no need to call back. Per Dr. Bishop, pt does not need to start on allopurinol.

## 2025-03-10 NOTE — TELEPHONE ENCOUNTER
Provider: Dario  Caller: Kate  Relationship to Patient: EC  Call Back Phone Number: [phone]  Reason for Call: she has questions about his medication

## 2025-04-17 ENCOUNTER — INFUSION (OUTPATIENT)
Dept: ONCOLOGY | Facility: HOSPITAL | Age: 80
End: 2025-04-17
Payer: MEDICARE

## 2025-04-17 DIAGNOSIS — Z45.2 FITTING AND ADJUSTMENT OF VASCULAR CATHETER: Primary | ICD-10-CM

## 2025-04-17 PROCEDURE — 25010000002 HEPARIN LOCK FLUSH PER 10 UNITS: Performed by: INTERNAL MEDICINE

## 2025-04-17 PROCEDURE — 96523 IRRIG DRUG DELIVERY DEVICE: CPT

## 2025-04-17 RX ORDER — HEPARIN SODIUM (PORCINE) LOCK FLUSH IV SOLN 100 UNIT/ML 100 UNIT/ML
500 SOLUTION INTRAVENOUS AS NEEDED
Status: DISCONTINUED | OUTPATIENT
Start: 2025-04-17 | End: 2025-04-17 | Stop reason: HOSPADM

## 2025-04-17 RX ORDER — SODIUM CHLORIDE 0.9 % (FLUSH) 0.9 %
10 SYRINGE (ML) INJECTION AS NEEDED
Status: DISCONTINUED | OUTPATIENT
Start: 2025-04-17 | End: 2025-04-17 | Stop reason: HOSPADM

## 2025-04-17 RX ADMIN — Medication 10 ML: at 14:23

## 2025-04-17 RX ADMIN — Medication 500 UNITS: at 14:23

## 2025-05-13 DIAGNOSIS — R97.20 ELEVATED PSA: ICD-10-CM

## 2025-05-13 DIAGNOSIS — M19.012 OSTEOARTHRITIS OF LEFT SHOULDER, UNSPECIFIED OSTEOARTHRITIS TYPE: ICD-10-CM

## 2025-05-13 DIAGNOSIS — E11.9 TYPE 2 DIABETES MELLITUS WITHOUT COMPLICATION, WITHOUT LONG-TERM CURRENT USE OF INSULIN: Primary | ICD-10-CM

## 2025-05-13 DIAGNOSIS — I10 PRIMARY HYPERTENSION: ICD-10-CM

## 2025-05-13 DIAGNOSIS — E78.2 MIXED HYPERLIPIDEMIA: ICD-10-CM

## 2025-05-14 LAB
ALBUMIN SERPL-MCNC: 4.2 G/DL (ref 3.5–5.2)
ALBUMIN/CREAT UR: 3 MG/G CREAT (ref 0–29)
ALBUMIN/GLOB SERPL: 1.8 G/DL
ALP SERPL-CCNC: 58 U/L (ref 39–117)
ALT SERPL-CCNC: 13 U/L (ref 1–41)
AST SERPL-CCNC: 17 U/L (ref 1–40)
BASOPHILS # BLD AUTO: 0.04 10*3/MM3 (ref 0–0.2)
BASOPHILS NFR BLD AUTO: 0.5 % (ref 0–1.5)
BILIRUB SERPL-MCNC: 0.5 MG/DL (ref 0–1.2)
BUN SERPL-MCNC: 16 MG/DL (ref 8–23)
BUN/CREAT SERPL: 15.7 (ref 7–25)
CALCIUM SERPL-MCNC: 10.2 MG/DL (ref 8.6–10.5)
CHLORIDE SERPL-SCNC: 100 MMOL/L (ref 98–107)
CHOLEST SERPL-MCNC: 128 MG/DL (ref 0–200)
CO2 SERPL-SCNC: 31.5 MMOL/L (ref 22–29)
CREAT SERPL-MCNC: 1.02 MG/DL (ref 0.76–1.27)
CREAT UR-MCNC: 110.3 MG/DL
EGFRCR SERPLBLD CKD-EPI 2021: 75.2 ML/MIN/1.73
EOSINOPHIL # BLD AUTO: 0.43 10*3/MM3 (ref 0–0.4)
EOSINOPHIL NFR BLD AUTO: 5.8 % (ref 0.3–6.2)
ERYTHROCYTE [DISTWIDTH] IN BLOOD BY AUTOMATED COUNT: 13.2 % (ref 12.3–15.4)
GLOBULIN SER CALC-MCNC: 2.3 GM/DL
GLUCOSE SERPL-MCNC: 118 MG/DL (ref 65–99)
HBA1C MFR BLD: 9.4 % (ref 4.8–5.6)
HCT VFR BLD AUTO: 44.2 % (ref 37.5–51)
HDLC SERPL-MCNC: 45 MG/DL (ref 40–60)
HGB BLD-MCNC: 14.4 G/DL (ref 13–17.7)
IMM GRANULOCYTES # BLD AUTO: 0.02 10*3/MM3 (ref 0–0.05)
IMM GRANULOCYTES NFR BLD AUTO: 0.3 % (ref 0–0.5)
LDLC SERPL CALC-MCNC: 58 MG/DL (ref 0–100)
LDLC/HDLC SERPL: 1.2 {RATIO}
LYMPHOCYTES # BLD AUTO: 2.25 10*3/MM3 (ref 0.7–3.1)
LYMPHOCYTES NFR BLD AUTO: 30.4 % (ref 19.6–45.3)
MCH RBC QN AUTO: 30.8 PG (ref 26.6–33)
MCHC RBC AUTO-ENTMCNC: 32.6 G/DL (ref 31.5–35.7)
MCV RBC AUTO: 94.4 FL (ref 79–97)
MICROALBUMIN UR-MCNC: 3.3 UG/ML
MONOCYTES # BLD AUTO: 0.88 10*3/MM3 (ref 0.1–0.9)
MONOCYTES NFR BLD AUTO: 11.9 % (ref 5–12)
NEUTROPHILS # BLD AUTO: 3.79 10*3/MM3 (ref 1.7–7)
NEUTROPHILS NFR BLD AUTO: 51.1 % (ref 42.7–76)
NRBC BLD AUTO-RTO: 0 /100 WBC (ref 0–0.2)
PLATELET # BLD AUTO: 196 10*3/MM3 (ref 140–450)
POTASSIUM SERPL-SCNC: 5 MMOL/L (ref 3.5–5.2)
PROT SERPL-MCNC: 6.5 G/DL (ref 6–8.5)
PSA SERPL-MCNC: 4.49 NG/ML (ref 0–4)
RBC # BLD AUTO: 4.68 10*6/MM3 (ref 4.14–5.8)
SODIUM SERPL-SCNC: 139 MMOL/L (ref 136–145)
TRIGL SERPL-MCNC: 144 MG/DL (ref 0–150)
TSH SERPL DL<=0.005 MIU/L-ACNC: 1.65 UIU/ML (ref 0.27–4.2)
VLDLC SERPL CALC-MCNC: 25 MG/DL (ref 5–40)
WBC # BLD AUTO: 7.41 10*3/MM3 (ref 3.4–10.8)

## 2025-05-15 ENCOUNTER — OFFICE VISIT (OUTPATIENT)
Dept: ORTHOPEDIC SURGERY | Facility: CLINIC | Age: 80
End: 2025-05-15
Payer: MEDICARE

## 2025-05-15 VITALS — BODY MASS INDEX: 23.25 KG/M2 | TEMPERATURE: 98.6 F | WEIGHT: 157 LBS | HEIGHT: 69 IN

## 2025-05-15 DIAGNOSIS — M70.21 OLECRANON BURSITIS OF RIGHT ELBOW: Primary | ICD-10-CM

## 2025-05-15 RX ORDER — METHYLPREDNISOLONE ACETATE 80 MG/ML
80 INJECTION, SUSPENSION INTRA-ARTICULAR; INTRALESIONAL; INTRAMUSCULAR; SOFT TISSUE
Status: COMPLETED | OUTPATIENT
Start: 2025-05-15 | End: 2025-05-15

## 2025-05-15 RX ORDER — LIDOCAINE HYDROCHLORIDE 10 MG/ML
2 INJECTION, SOLUTION EPIDURAL; INFILTRATION; INTRACAUDAL; PERINEURAL
Status: COMPLETED | OUTPATIENT
Start: 2025-05-15 | End: 2025-05-15

## 2025-05-15 RX ADMIN — METHYLPREDNISOLONE ACETATE 80 MG: 80 INJECTION, SUSPENSION INTRA-ARTICULAR; INTRALESIONAL; INTRAMUSCULAR; SOFT TISSUE at 15:07

## 2025-05-15 RX ADMIN — LIDOCAINE HYDROCHLORIDE 2 ML: 10 INJECTION, SOLUTION EPIDURAL; INFILTRATION; INTRACAUDAL; PERINEURAL at 15:07

## 2025-05-15 NOTE — PROGRESS NOTES
Patient Name: Giovani España   YOB: 1945  Referring Primary Care Physician: Eda De Anda APRN  BMI: Body mass index is 23.18 kg/m².    Chief Complaint:    Chief Complaint   Patient presents with    Right Elbow - Follow-up        HPI: History of osteoarthritis and elbow olecranon bursitis patient gets repeated pronation and swelling.  Denies any injury or trauma to the elbow no fever no history of gout I seen him in the past for this and sent it off for labs and it was negative for uric acid or infection    Giovani España is a 80 y.o. male who presents today for evaluation of   Chief Complaint   Patient presents with    Right Elbow - Follow-up         Subjective   Medications:   Home Medications:  Current Outpatient Medications on File Prior to Visit   Medication Sig    albuterol (PROVENTIL) (5 MG/ML) 0.5% nebulizer solution Take 0.5 mL by nebulization Every 6 (Six) Hours As Needed for Wheezing.    Budeson-Glycopyrrol-Formoterol (Breztri Aerosphere) 160-9-4.8 MCG/ACT aerosol inhaler Inhale 2 puffs 2 (Two) Times a Day.    Cholecalciferol 25 MCG (1000 UT) tablet Take 1 tablet by mouth Daily.    fexofenadine (Allegra Allergy) 180 MG tablet     fluticasone (FLONASE) 50 MCG/ACT nasal spray Administer 1 spray into the nostril(s) as directed by provider Daily. Indications: Allergic Rhinitis    hydrocortisone (CORTEF) 10 MG tablet Take 2 tablets in the morning and 1 tablet in the afternoon plus additional medication for acute illness up to 60 mg daily    isosorbide mononitrate (IMDUR) 60 MG 24 hr tablet Take 1 tablet by mouth Every Evening. Indications: hypertension    losartan (COZAAR) 50 MG tablet Take 1 tablet by mouth Daily.    NON FORMULARY 2-3 L during day; 2L during night; [facility]   Pulmonologist Dr. Newsome #[phone]    sildenafil (REVATIO) 20 MG tablet Take 1 tablet by mouth.    simvastatin (ZOCOR) 20 MG tablet Take 1 tablet by mouth Every Night. Indications: High Amount of Fats in the Blood     sodium chloride 3 % nebulizer solution Take 4 mL by nebulization As Needed for Cough.    tamsulosin (FLOMAX) 0.4 MG capsule 24 hr capsule Take 1 capsule by mouth Daily.    triamcinolone (KENALOG) 0.1 % cream Apply 1 Application topically to the appropriate area as directed 2 (Two) Times a Day.    warfarin (COUMADIN) 5 MG tablet PT TO TAKE 5MG DAILY X5 DAYS THEN 10MG X2 DAYS  Indications: Other - full anticoagulation    Pseudoephedrine-guaiFENesin ER (Mucus D) 120-1200 MG tablet sustained-release 12 hour      No current facility-administered medications on file prior to visit.     Current Medications:  Scheduled Meds:  Continuous Infusions:No current facility-administered medications for this visit.    PRN Meds:.    I have reviewed the patient's medical history in detail and updated the computerized patient record.  Review and summarization of old records includes:    Past Medical History:   Diagnosis Date    Arthritis     COPD (chronic obstructive pulmonary disease)     O2 3L AT HS    Diabetes mellitus     DIET CONTROL    Diverticulitis     DVT, lower extremity     RLE    Elevated cholesterol     H/O Cervical pain     History of colitis     Hyperlipidemia     Hypertension     Knee swelling     Left shoulder pain     Low back pain     Low back strain     Lung cancer 2022    LEFT LUNG    Neck strain     On anticoagulant therapy     Peripheral arterial disease     Right leg claudication     Skin cancer     HX    Type 2 diabetes mellitus     Vitamin D deficiency         Past Surgical History:   Procedure Laterality Date    BALLOON ANGIOPLASTY, ARTERY Right 2015    IR Percutaneious IVC filter placement    INGUINAL HERNIA REPAIR Left     KNEE ARTHROSCOPY Left     Torn meniscus    LOBECTOMY Left 09/23/2022    Procedure: BRONCHOSCOPY, LEFT VIDEO ASSISTED THORACOSCOPY, ROBOT ASSISTED TOTAL DECORTICATION  LEFT LUNG, LEFT LOWER LOBE LOBECTOMY, MEDIASTINAL LYMPHECTOMY, INTERCOSTAL NERVE BLOCK;  Surgeon: Nicola Joe III,  MD;  Location: [facility] MAIN OR;  Service: Robotics - DaVinci;  Laterality: Left;    VENOUS ACCESS DEVICE (PORT) INSERTION Right 11/21/2022    Procedure: INSERTION VENOUS ACCESS DEVICE;  Surgeon: Nicola Joe III, MD;  Location: [facility] MAIN OR;  Service: Thoracic;  Laterality: Right;        Social History     Occupational History     Employer: RETIRED   Tobacco Use    Smoking status: Every Day     Current packs/day: 0.50     Average packs/day: 0.5 packs/day for 62.4 years (31.2 ttl pk-yrs)     Types: Cigarettes     Start date: 1/1/1963     Passive exposure: Current    Smokeless tobacco: Never    Tobacco comments:     9/8/22: Down to Less than 1 PPD   Vaping Use    Vaping status: Never Used   Substance and Sexual Activity    Alcohol use: Not Currently    Drug use: Never    Sexual activity: Not Currently     Partners: Female      Social History     Social History Narrative    Not on file        Family History   Problem Relation Age of Onset    No Known Problems Mother     Cancer Father     Lung cancer Father     Diabetes Brother     No Known Problems Son     Diabetes Brother     Kidney disease Brother     Malig Hyperthermia Neg Hx        ROS: 14 point review of systems was performed and all other systems were reviewed and are negative except for documented findings in HPI and today's encounter.     Allergies:   Allergies   Allergen Reactions    Benadryl [Diphenhydramine] Other (See Comments)     Extreme restlessness and insomnia     Constitutional:  Denies fever, shaking or chills   Eyes:  Denies change in visual acuity   HENT:  Denies nasal congestion or sore throat   Respiratory:  Denies cough or shortness of breath   Cardiovascular:  Denies chest pain or severe LE edema   GI:  Denies abdominal pain, nausea, vomiting, bloody stools or diarrhea   Musculoskeletal:  Numbness, tingling, pain, or loss of motor function only as noted above in history of present illness.  : Denies painful urination or  "hematuria  Integument:  Denies rash, lesion or ulceration   Neurologic:  Denies headache or focal weakness  Endocrine:  Denies lymphadenopathy  Psych:  Denies confusion or change in mental status   Hem:  Denies active bleeding    OBJECTIVE:  Physical Exam: 80 y.o. male  Wt Readings from Last 3 Encounters:   05/15/25 71.2 kg (157 lb)   03/06/25 74 kg (163 lb 3.2 oz)   02/11/25 74.5 kg (164 lb 3.2 oz)     Ht Readings from Last 1 Encounters:   05/15/25 175.3 cm (69\")     Body mass index is 23.18 kg/m².  Vitals:    05/15/25 1106   Temp: 98.6 °F (37 °C)     Vital signs reviewed.     General Appearance:    Alert, cooperative, in no acute distress                  Eyes: conjunctiva clear  ENT: external ears and nose atraumatic  CV: no peripheral edema  Resp: normal respiratory effort  Skin: no rashes or wounds; normal turgor  Psych: mood and affect appropriate  Lymph: no nodes appreciated  Neuro: gross sensation intact  Vascular:  Palpable peripheral pulse in noted extremity  Musculoskeletal Extremities: Skin warm dry intact with good pulses mood sensation elbow has a large olecranon bursitis that is fluid-filled there is no erythema there is no streaking there is no pain    Radiology:     Study Result    Narrative & Impression   XR ELBOW 2 VW RIGHT-     Clinical: Elbow swelling     FINDINGS: There is considerable swelling of the olecranon bursa, likely  underlying bursitis. This can also be seen with distal triceps  pathology. No soft tissue gas or radiopaque foreign body seen.     No elbow effusion, fracture or dislocation seen. No osteochondral defect  identified. Elbow joint with is preserved. The remainder is  unremarkable.     This report was finalized on 2/2/2025 1:53 PM by Dr. José Dasilva M.D  on Workstation: [id number]          Assessment:     ICD-10-CM ICD-9-CM   1. Olecranon bursitis of right elbow  M70.21 726.33        Medium Joint Arthrocentesis: R olecranon bursa  Date/Time: 5/15/2025 3:07 PM  Consent given " by: patient  Site marked: site marked  Timeout: Immediately prior to procedure a time out was called to verify the correct patient, procedure, equipment, support staff and site/side marked as required   Supporting Documentation  Indications: joint swelling   Procedure Details  Location: elbow - R olecranon bursa  Preparation: Patient was prepped and draped in the usual sterile fashion  Needle gauge: 21.  Medications administered: 80 mg methylPREDNISolone acetate 80 MG/ML; 2 mL lidocaine PF 1% 1 %  Aspirate amount: 30 mL  Aspirate: serous  Patient tolerance: patient tolerated the procedure well with no immediate complications          MDM/Plan:   The diagnosis(es), natural history, pathophysiology and treatment for diagnosis(es) were discussed. Opportunity given and questions answered.  Biomechanics of pertinent body areas discussed.  When appropriate, the use of ambulatory aids discussed.  EXERCISES:  Advice on benefits of, and types of regular/moderate exercise pertaining to orthopedic diagnosis(es).  MEDICATIONS:  The risks, benefits, warnings,side effects and alternatives of medications discussed.  Inflammation/pain control; with cold, heat, elevation and/or liniments discussed as appropriate  Cortisone Injection. See procedure note.  SPECIALTY REFERRAL  MEDICAL RECORDS reviewed from other provider(s) for past and current medical history pertinent to this complaint.  Started Pia for persistent symptoms    5/15/2025    Much of this encounter note is an electronic transcription/translation of spoken language to printed text. The electronic translation of spoken language may permit erroneous, or at times, nonsensical words or phrases to be inadvertently transcribed; Although I have reviewed the note for such errors, some may still exist

## 2025-05-19 DIAGNOSIS — E78.2 MIXED HYPERLIPIDEMIA: ICD-10-CM

## 2025-05-19 RX ORDER — SIMVASTATIN 20 MG
20 TABLET ORAL
Qty: 90 TABLET | Refills: 1 | Status: SHIPPED | OUTPATIENT
Start: 2025-05-19

## 2025-05-20 ENCOUNTER — OFFICE VISIT (OUTPATIENT)
Dept: FAMILY MEDICINE CLINIC | Facility: CLINIC | Age: 80
End: 2025-05-20
Payer: MEDICARE

## 2025-05-20 VITALS
DIASTOLIC BLOOD PRESSURE: 72 MMHG | BODY MASS INDEX: 23.21 KG/M2 | WEIGHT: 156.7 LBS | RESPIRATION RATE: 15 BRPM | TEMPERATURE: 98.1 F | OXYGEN SATURATION: 97 % | HEART RATE: 63 BPM | SYSTOLIC BLOOD PRESSURE: 136 MMHG | HEIGHT: 69 IN

## 2025-05-20 DIAGNOSIS — F17.219 CIGARETTE NICOTINE DEPENDENCE WITH NICOTINE-INDUCED DISORDER: ICD-10-CM

## 2025-05-20 DIAGNOSIS — Z71.6 ENCOUNTER FOR SMOKING CESSATION COUNSELING: ICD-10-CM

## 2025-05-20 DIAGNOSIS — I10 PRIMARY HYPERTENSION: Primary | ICD-10-CM

## 2025-05-20 DIAGNOSIS — Z79.01 CHRONIC ANTICOAGULATION: ICD-10-CM

## 2025-05-20 DIAGNOSIS — E78.2 MIXED HYPERLIPIDEMIA: ICD-10-CM

## 2025-05-20 DIAGNOSIS — C7A.8 NEUROENDOCRINE CARCINOMA OF LUNG: ICD-10-CM

## 2025-05-20 DIAGNOSIS — R97.20 ELEVATED PSA: ICD-10-CM

## 2025-05-20 DIAGNOSIS — Z72.0 TOBACCO USE: ICD-10-CM

## 2025-05-20 DIAGNOSIS — I48.0 PAROXYSMAL ATRIAL FIBRILLATION: ICD-10-CM

## 2025-05-20 DIAGNOSIS — F17.200 TOBACCO USE DISORDER: ICD-10-CM

## 2025-05-20 DIAGNOSIS — J44.9 CHRONIC OBSTRUCTIVE PULMONARY DISEASE, UNSPECIFIED COPD TYPE: ICD-10-CM

## 2025-05-20 DIAGNOSIS — E11.65 TYPE 2 DIABETES MELLITUS WITH HYPERGLYCEMIA, WITHOUT LONG-TERM CURRENT USE OF INSULIN: ICD-10-CM

## 2025-05-20 RX ORDER — BUPROPION HYDROCHLORIDE 150 MG/1
150 TABLET ORAL DAILY
Qty: 90 TABLET | Refills: 1 | Status: SHIPPED | OUTPATIENT
Start: 2025-05-20

## 2025-05-20 RX ORDER — METFORMIN HYDROCHLORIDE 500 MG/1
500 TABLET, EXTENDED RELEASE ORAL
Qty: 90 TABLET | Refills: 3 | Status: SHIPPED | OUTPATIENT
Start: 2025-05-20 | End: 2025-05-22

## 2025-05-20 NOTE — PROGRESS NOTES
Subjective   The ABCs of the Annual Wellness Visit  Medicare Wellness Visit      Giovani España is a 80 y.o. patient who presents for a Medicare Wellness Visit.    The following portions of the patient's history were reviewed and   updated as appropriate: allergies, current medications, past family history, past medical history, past social history, past surgical history, and problem list.    Compared to one year ago, the patient's physical   health is the same. Knee corticosteroid injections; right elbow drain of effusion,   Compared to one year ago, the patient's mental   health is the same.    Patient stating he is eating two meals a day; one consisting of cereal, toast and sausage, oJ; the other consisting of a starch, green vegetable, and meat. Patient stating he likes to snack throughout the day, mostly on sugary foods and is drinking one Boost a day.     Recent Hospitalizations:  This patient has had a [facility] admission record on file within the last 365 days.    Current Medical Providers:  Patient Care Team:  Eda De Anda APRN as PCP - General (Family Medicine)  Tali Greene APRN as Referring Physician (Family Medicine)  Kayden Bishop MD as Consulting Physician (Hematology and Oncology)  Juan Xie MD (Cardiology)  Nicola Joe III, MD as Surgeon (Thoracic Surgery)  Kaz Marrero MD as Consulting Physician (Radiation Oncology)    Outpatient Medications Prior to Visit   Medication Sig Dispense Refill   • albuterol (PROVENTIL) (5 MG/ML) 0.5% nebulizer solution Take 0.5 mL by nebulization Every 6 (Six) Hours As Needed for Wheezing.     • Budeson-Glycopyrrol-Formoterol (Breztri Aerosphere) 160-9-4.8 MCG/ACT aerosol inhaler Inhale 2 puffs 2 (Two) Times a Day. 10.7 g 11   • Cholecalciferol 25 MCG (1000 UT) tablet Take 1 tablet by mouth Daily.     • fexofenadine (Allegra Allergy) 180 MG tablet      • fluticasone (FLONASE) 50 MCG/ACT nasal spray Administer 1 spray into the  nostril(s) as directed by provider Daily. Indications: Allergic Rhinitis     • hydrocortisone (CORTEF) 10 MG tablet Take 2 tablets in the morning and 1 tablet in the afternoon plus additional medication for acute illness up to 60 mg daily 360 tablet 3   • isosorbide mononitrate (IMDUR) 60 MG 24 hr tablet Take 1 tablet by mouth Every Evening. Indications: hypertension     • losartan (COZAAR) 50 MG tablet Take 1 tablet by mouth Daily. 90 tablet 1   • NON FORMULARY 2-3 L during day; 2L during night; [facility]   Pulmonologist Dr. Newsome #[phone]     • Pseudoephedrine-guaiFENesin ER (Mucus D) 120-1200 MG tablet sustained-release 12 hour      • sildenafil (REVATIO) 20 MG tablet Take 1 tablet by mouth.     • simvastatin (ZOCOR) 20 MG tablet TAKE 1 TABLET BY MOUTH AT NIGHT 90 tablet 1   • sodium chloride 3 % nebulizer solution Take 4 mL by nebulization As Needed for Cough.     • tamsulosin (FLOMAX) 0.4 MG capsule 24 hr capsule Take 1 capsule by mouth Daily. 30 capsule 5   • triamcinolone (KENALOG) 0.1 % cream Apply 1 Application topically to the appropriate area as directed 2 (Two) Times a Day.     • warfarin (COUMADIN) 5 MG tablet PT TO TAKE 5MG DAILY X5 DAYS THEN 10MG X2 DAYS  Indications: Other - full anticoagulation 115 tablet 1     No facility-administered medications prior to visit.     No opioid medication identified on active medication list. I have reviewed chart for other potential  high risk medication/s and harmful drug interactions in the elderly.      Aspirin is not on active medication list.  Aspirin use is not indicated based on review of current medical condition/s. Risk of harm outweighs potential benefits.  . Patient on coumadin.    Patient Active Problem List   Diagnosis   • Diverticulitis of large intestine with perforation and abscess without bleeding   • Type 2 diabetes mellitus   • COPD (chronic obstructive pulmonary disease)   • HTN (hypertension)   • MGUS (monoclonal gammopathy of unknown  "significance)   • History of DVT (deep vein thrombosis)   • Diastolic dysfunction   • Presence of IVC filter   • Chronic anticoagulation   • Pulmonary nodule   • Nodule of lower lobe of left lung   • Tobacco use disorder   • Atrial fibrillation with RVR   • Neuroendocrine carcinoma of lung   • Long-term use of high-risk medication   • Fitting and adjustment of vascular catheter   • Hyponatremia   • Severe malnutrition   • Adrenal insufficiency   • BPH with obstruction/lower urinary tract symptoms   • Coronary artery calcification seen on CAT scan   • Elevated PSA   • Family history of lung cancer   • High risk medication use   • History of brain concussion   • History of colon polyps   • History of Helicobacter pylori infection   • History of right inguinal hernia repair   • History of skin cancer   • History of diverticulitis   • History of torn meniscus of left knee   • Hypercalcemia   • Hypercholesterolemia   • Hyperlipidemia   • Infrarenal abdominal aortic aneurysm (AAA) without rupture   • Left carotid bruit   • Left inguinal hernia   • Nocturnal hypoxia   • Occlusion of femoral artery   • Aorto-iliac atherosclerosis   • Vitamin D deficiency   • Muscle spasms of lower extremity   • Paroxysmal atrial fibrillation   • Osteoarthritis of left shoulder   • Effusion of bursa of right elbow   • Olecranon bursitis of right elbow     Advance Care Planning {Advance Care Planning Hyperlink:23}Advance Directive is not on file.  ACP discussion was held with the patient during this visit. Patient has an advance directive (not in EMR), copy requested.        Objective   Vitals:    05/20/25 1306 05/20/25 1348   BP: 136/66 136/72   BP Location: Left arm    Patient Position: Sitting    Cuff Size: Adult    Pulse: 63    Resp: 15    Temp: 98.1 °F (36.7 °C)    TempSrc: Oral    SpO2: 97%    Weight: 71.1 kg (156 lb 11.2 oz)    Height: 175.3 cm (69.02\")        Estimated body mass index is 23.13 kg/m² as calculated from the following:   " " Height as of this encounter: 175.3 cm (69.02\").    Weight as of this encounter: 71.1 kg (156 lb 11.2 oz).    BMI is within normal parameters. No other follow-up for BMI required.    {Help Text;  If you change Medicare Wellness reason for visit to a Welcome to Medicare reason for visit after the encounter has started, please add SmartPhrase .GAITBALANCE to your note for proper gait and balance documentation. This text will disappear after the note is signed:96052}       Does the patient have evidence of cognitive impairment? No  Lab Results   Component Value Date    CHLPL 128 2025    TRIG 144 2025    HDL 45 2025    LDL 58 2025    VLDL 25 2025    HGBA1C 9.40 (H) 2025                                                                                                Health  Risk Assessment    Smoking Status:  Social History     Tobacco Use   Smoking Status Every Day   • Current packs/day: 0.50   • Average packs/day: 0.5 packs/day for 62.4 years (31.2 ttl pk-yrs)   • Types: Cigarettes   • Start date: 1963   • Passive exposure: Current   Smokeless Tobacco Never   Tobacco Comments    22: Down to Less than 1 PPD     Alcohol Consumption:  Social History     Substance and Sexual Activity   Alcohol Use Not Currently       Fall Risk Screen{Jump to Steadi Fall Risk Flowsheet:23}  STEADI Fall Risk Assessment was completed, and patient is at LOW risk for falls.Assessment completed on:2025    Depression Screening{Jump to PHQ SmartForm:23}   Little interest or pleasure in doing things? Not at all   Feeling down, depressed, or hopeless? Not at all   PHQ-2 Total Score 0      Health Habits and Functional and Cognitive Screenin/15/2025     7:46 AM   Functional & Cognitive Status   Do you have difficulty preparing food and eating? No   Do you have difficulty bathing yourself, getting dressed or grooming yourself? No   Do you have difficulty using the toilet? No   Do you have " difficulty moving around from place to place? No   Do you have trouble with steps or getting out of a bed or a chair? No   Current Diet Unhealthy Diet   Dental Exam Not up to date   Eye Exam Up to date   Exercise (times per week) 1 times per week   Current Exercises Include Gardening;Yard Work   Do you need help using the phone?  No   Are you deaf or do you have serious difficulty hearing?  Yes   Do you need help to go to places out of walking distance? No   Do you need help shopping? No   Do you need help preparing meals?  No   Do you need help with housework?  No   Do you need help with laundry? No   Do you need help taking your medications? No   Do you need help managing money? No   Do you ever drive or ride in a car without wearing a seat belt? No   Have you felt unusual stress, anger or loneliness in the last month? No   Who do you live with? Other   If you need help, do you have trouble finding someone available to you? No   Have you been bothered in the last four weeks by sexual problems? No   Do you have difficulty concentrating, remembering or making decisions? Yes           Age-appropriate Screening Schedule:  Refer to the list below for future screening recommendations based on patient's age, sex and/or medical conditions. Orders for these recommended tests are listed in the plan section. The patient has been provided with a written plan.    Health Maintenance List  Health Maintenance   Topic Date Due   • DIABETIC FOOT EXAM  Never done   • DIABETIC EYE EXAM  Never done   • TDAP/TD VACCINES (1 - Tdap) Never done   • ZOSTER VACCINE (1 of 2) Never done   • RSV Vaccine - Adults (1 - 1-dose 75+ series) 11/04/2025 (Originally 4/1/2022)   • COVID-19 Vaccine (7 - Pfizer risk 2024-25 season) 11/20/2025 (Originally 4/28/2025)   • INFLUENZA VACCINE  07/01/2025   • HEMOGLOBIN A1C  11/13/2025   • LIPID PANEL  05/13/2026   • URINE MICROALBUMIN-CREATININE RATIO (uACR)  05/13/2026   • ANNUAL WELLNESS VISIT  05/20/2026    • HEPATITIS C SCREENING  Completed   • Pneumococcal Vaccine 50+  Completed   • LUNG CANCER SCREENING  Discontinued   • COLORECTAL CANCER SCREENING  Discontinued                                                                                                                                                CMS Preventative Services Quick Reference  Risk Factors Identified During Encounter  Immunizations Discussed/Encouraged: Tdap  Inactivity/Sedentary: Patient was advised to exercise at least 150 minutes a week per CDC recommendations.  Tobacco Use/Dependance Risk (use dotphrase .tobaccocessation for documentation)  Dental Screening Recommended  Vision Screening Recommended    The above risks/problems have been discussed with the patient.  Pertinent information has been shared with the patient in the After Visit Summary.  An After Visit Summary and PPPS were made available to the patient.    Follow Up:{Wrapup  Review (Popup)  Advance Care Planning  Labs  CC  Problem List  Visit Diagnosis  Medications  Result Review  Imaging  Health Maintenance  Quality  BestPractice  SmartSets  SnapShot  Encounters  Notes  Media  Procedures :23}   Next Medicare Wellness visit to be scheduled in 1 year.         Additional E&M Note during same encounter follows:  Patient has additional, significant, and separately identifiable condition(s)/problem(s) that require work above and beyond the Medicare Wellness Visit     Chief Complaint  Medicare Wellness-subsequent and Weight Loss    Subjective {Problem List  Visit Diagnosis   Encounters  Notes  Medications  Labs  Result Review Imaging  Media :23}{Help Text;  If performing a Preventative Medicine Visit (e.g. 59443) in addition to AWV, choose the 1st SmartList option below and document any ROS/PE performed.  If performing a separately identifiable E/M service (e.g. 56833), choose 2nd SmartLink option below. This information in red will not appear in the final note  "after note is signed:00616}  HPI  Giovani is also being seen today for additional medical problem/s.    Review of Systems   Respiratory:  Negative for shortness of breath.         O2 at night  O2 during day if needed     Gastrointestinal:  Negative for constipation, diarrhea, nausea and vomiting.        Occ cramp to RUQ  1 bm daily              Objective   Vital Signs:  /72   Pulse 63   Temp 98.1 °F (36.7 °C) (Oral)   Resp 15   Ht 175.3 cm (69.02\")   Wt 71.1 kg (156 lb 11.2 oz)   SpO2 97%   BMI 23.13 kg/m²   Physical Exam  Vitals reviewed.   HENT:      Head: Normocephalic.   Eyes:      Pupils: Pupils are equal, round, and reactive to light.   Cardiovascular:      Rate and Rhythm: Normal rate and regular rhythm.      Pulses: Normal pulses.   Pulmonary:      Effort: Pulmonary effort is normal.   Musculoskeletal:      Right lower leg: Edema (trace) present.      Left lower leg: No edema.   Neurological:      Mental Status: He is alert and oriented to person, place, and time.   Psychiatric:         Behavior: Behavior normal.       The following data was reviewed by: GARCÍA Monteiro on 05/20/2025:  Data reviewed : labs         Assessment and Plan {CC Problem List  Visit Diagnosis   ROS  Review (Popup)  Health Maintenance  Quality  BestPractice  Medications  SmartSets  SnapShot Encounters  Media :23}{Help Text; When performing an adult Preventative Medicine Visit (e.g. 83770) using the optional SmartList below can help when documenting any age-appropriate advice given.  When using the SmartList review and update the information based on the unique discussion or advice given to the patient.  As a reminder, diagnosis code Z00.00 (nl exam) or Z00.01 (abnl exam), should be added when this service is performed.  Text will disappear once the note is signed:28847}{Preventative Physical Additional Health Advice List (Optional):[id number]}     Type 2 diabetes mellitus with hyperglycemia, without " long-term current use of insulin      Orders:  •  empagliflozin (Jardiance) 25 MG tablet tablet; Take 1 tablet by mouth Daily.  •  metFORMIN ER (GLUCOPHAGE-XR) 500 MG 24 hr tablet; Take 1 tablet by mouth Daily With Breakfast.    Cigarette nicotine dependence with nicotine-induced disorder  {Tobacco Abuse A/P Smartblock (Optional):9089319969}                                          Orders:  •  buPROPion XL (Wellbutrin XL) 150 MG 24 hr tablet; Take 1 tablet by mouth Daily.    Medicare annual wellness visit, subsequent         Encounter for smoking cessation counseling    Orders:  •  buPROPion XL (Wellbutrin XL) 150 MG 24 hr tablet; Take 1 tablet by mouth Daily.          {Time Spent (Optional):34597}  Follow Up {Instructions Charge Capture  Follow-up Communications :23}  No follow-ups on file.  Patient was given instructions and counseling regarding his condition or for health maintenance advice. Please see specific information pulled into the AVS if appropriate.

## 2025-05-20 NOTE — PROGRESS NOTES
"Subjective   Giovani CARMONA España is a 80 y.o. male.     History of Present Illness  Patient here today for follow up on hypertension, hyperlipidemia, and diabetes. Patient also with c/o weight loss as of recent. Patient recently having knee corticosteroid injections and his right elbow effusion drained. Patient stating he is eating two meals a day; one consisting of cereal, toast and sausage, oJ; the other consisting of a starch, green vegetable, and meat. Patient stating he likes to snack throughout the day, mostly on sugary foods and is drinking one Boost a day.        The following portions of the patient's history were reviewed and updated as appropriate: allergies, current medications, past family history, past medical history, past social history, past surgical history, and problem list.    Review of Systems   Constitutional: Negative.    Respiratory:  Positive for shortness of breath (at times with exertion). Negative for cough.         O2 at night  O2 during day if needed   Cardiovascular: Negative.    Gastrointestinal: Negative.         Negative for constipation, diarrhea, nausea and vomiting.   Occ cramp to RUQ  1 bm daily        Objective   Vitals:    05/20/25 1306 05/20/25 1348   BP: 136/66 136/72   BP Location: Left arm    Patient Position: Sitting    Cuff Size: Adult    Pulse: 63    Resp: 15    Temp: 98.1 °F (36.7 °C)    TempSrc: Oral    SpO2: 97%    Weight: 71.1 kg (156 lb 11.2 oz)    Height: 175.3 cm (69.02\")        Estimated body mass index is 23.13 kg/m² as calculated from the following:    Height as of this encounter: 175.3 cm (69.02\").    Weight as of this encounter: 71.1 kg (156 lb 11.2 oz).    BMI is within normal parameters. No other follow-up for BMI required.      Social History     Tobacco Use   Smoking Status Every Day    Current packs/day: 0.50    Average packs/day: 0.5 packs/day for 62.4 years (31.2 ttl pk-yrs)    Types: Cigarettes    Start date: 1[birth year]1/1961    Passive exposure: Current "   Smokeless Tobacco Never   Tobacco Comments    9/8/22: Down to Less than 1 PPD     Giovani España  reports that he has been smoking cigarettes. He started smoking about 62 years ago. He has a 31.2 pack-year smoking history. He has been exposed to tobacco smoke. He has never used smokeless tobacco. I have educated him on the risk of diseases from using tobacco products such as cancer, COPD, and heart disease.     I advised him to quit and he is willing to quit. We have discussed the following method/s for tobacco cessation:  Counseling and Prescription Medication.   He will follow up with me in 3 months or sooner to check on his progress.    I spent 10 minutes counseling the patient.        Objective     Physical Exam  Vitals reviewed.   HENT:      Head: Normocephalic.   Eyes:      Pupils: Pupils are equal, round, and reactive to light.   Cardiovascular:      Rate and Rhythm: Normal rate and regular rhythm.      Pulses: Normal pulses.   Pulmonary:      Effort: Pulmonary effort is normal.   Musculoskeletal:      Right lower leg: Edema (trace) present.      Left lower leg: No edema.   Neurological:      Mental Status: He is alert and oriented to person, place, and time.   Psychiatric:         Behavior: Behavior normal.       Assessment & Plan   Problems Addressed this Visit          Cardiac and Vasculature    HTN (hypertension) - Primary    Hyperlipidemia    Paroxysmal atrial fibrillation       Coag and Thromboembolic    Chronic anticoagulation       Endocrine and Metabolic    Type 2 diabetes mellitus        Orders:    empagliflozin (Jardiance) 25 MG tablet tablet; Take 1 tablet by mouth Daily.    metFORMIN ER (GLUCOPHAGE-XR) 500 MG 24 hr tablet; Take 1 tablet by mouth Daily With Breakfast.           Relevant Medications    empagliflozin (Jardiance) 25 MG tablet tablet    metFORMIN ER (GLUCOPHAGE-XR) 500 MG 24 hr tablet    Other Relevant Orders    Comprehensive Metabolic Panel    Hemoglobin A1c       Genitourinary  and Reproductive     Elevated PSA       Hematology and Neoplasia    Neuroendocrine carcinoma of lung       Pulmonary and Pneumonias    COPD (chronic obstructive pulmonary disease)       Tobacco    Tobacco use disorder     Other Visit Diagnoses         Cigarette nicotine dependence with nicotine-induced disorder        Relevant Medications    buPROPion XL (Wellbutrin XL) 150 MG 24 hr tablet      Encounter for smoking cessation counseling        Relevant Medications    buPROPion XL (Wellbutrin XL) 150 MG 24 hr tablet      Tobacco use              Diagnoses         Codes Comments      Primary hypertension    -  Primary ICD-10-CM: I10  ICD-9-CM: 401.9       Paroxysmal atrial fibrillation     ICD-10-CM: I48.0  ICD-9-CM: 427.31       Mixed hyperlipidemia     ICD-10-CM: E78.2  ICD-9-CM: 272.2       Type 2 diabetes mellitus with hyperglycemia, without long-term current use of insulin     ICD-10-CM: E11.65  ICD-9-CM: 250.00, 790.29       Neuroendocrine carcinoma of lung     ICD-10-CM: C7A.8  ICD-9-CM: 209.21       Chronic obstructive pulmonary disease, unspecified COPD type     ICD-10-CM: J44.9  ICD-9-CM: 496       Cigarette nicotine dependence with nicotine-induced disorder     ICD-10-CM: F17.219  ICD-9-CM: 292.9       Tobacco use disorder     ICD-10-CM: F17.200  ICD-9-CM: 305.1       Elevated PSA     ICD-10-CM: R97.20  ICD-9-CM: 790.93       Chronic anticoagulation     ICD-10-CM: Z79.01  ICD-9-CM: V58.61       Encounter for smoking cessation counseling     ICD-10-CM: Z71.6  ICD-9-CM: V65.42, 305.1       Tobacco use     ICD-10-CM: Z72.0  ICD-9-CM: 305.1

## 2025-05-20 NOTE — ASSESSMENT & PLAN NOTE
Orders:    empagliflozin (Jardiance) 25 MG tablet tablet; Take 1 tablet by mouth Daily.    metFORMIN ER (GLUCOPHAGE-XR) 500 MG 24 hr tablet; Take 1 tablet by mouth Daily With Breakfast.

## 2025-05-21 NOTE — PATIENT INSTRUCTIONS
Hypertension: borderline stable, continue losartan 50 mg 1 tab daily    Afib/chronic anticoagulation: INR 2.9 today, continue warfarin 5 mg daily except two day a week take 1/2 tab (2.5 mg). Patient checks INR weekly.    Hyperlipidemia: stable, continue simvastatin 20 mg 1 tab daily    Diabetes: uncontrolled, HgA1c 9.4, starting Patient on Jardiance 25 mg daily (sent home Jardiance 10 mg daily samples for one month) and metformin  mg 1 tab daily; discussed working on eating a more low sugar low carb diet, recommend drinking low sugar boost twice a day; recommend eating a protein with each snack and meal, and discussed changing cereal he is eating. Recommend recheck of labs and follow up in 3 months.    Lung Ca: continue to follow up with Dr. Newsome as directed/as needed; Patient has a PET scan scheduled for this Thursday.    COPD: continue oxygen use as per pulmonology; continue albuterol prn, breztri twice a day    Nicotine dependence: discussed smoking cessation, discussed medication options, starting bupropion  mg daily, discussed not smoking around oxygen tanks and not having oxygen on if smoking. Patient to follow up in 3 months.     Elevated PSA level: follow up with Dr. Rogers

## 2025-05-22 ENCOUNTER — LAB (OUTPATIENT)
Dept: LAB | Facility: HOSPITAL | Age: 80
End: 2025-05-22
Payer: MEDICARE

## 2025-05-22 ENCOUNTER — HOSPITAL ENCOUNTER (OUTPATIENT)
Dept: PET IMAGING | Facility: HOSPITAL | Age: 80
Discharge: HOME | End: 2025-05-22
Payer: MEDICARE

## 2025-05-22 ENCOUNTER — TELEPHONE (OUTPATIENT)
Dept: ONCOLOGY | Facility: CLINIC | Age: 80
End: 2025-05-22
Payer: MEDICARE

## 2025-05-22 DIAGNOSIS — C7A.8 NEUROENDOCRINE CARCINOMA OF LUNG: ICD-10-CM

## 2025-05-22 DIAGNOSIS — D47.2 MGUS (MONOCLONAL GAMMOPATHY OF UNKNOWN SIGNIFICANCE): Primary | ICD-10-CM

## 2025-05-22 DIAGNOSIS — E11.9 TYPE 2 DIABETES MELLITUS WITHOUT COMPLICATION, WITHOUT LONG-TERM CURRENT USE OF INSULIN: Primary | ICD-10-CM

## 2025-05-22 DIAGNOSIS — D47.2 MGUS (MONOCLONAL GAMMOPATHY OF UNKNOWN SIGNIFICANCE): ICD-10-CM

## 2025-05-22 LAB
ALBUMIN SERPL-MCNC: 4.6 G/DL (ref 3.5–5.2)
ALBUMIN/GLOB SERPL: 1.7 G/DL
ALP SERPL-CCNC: 74 U/L (ref 39–117)
ALT SERPL W P-5'-P-CCNC: 17 U/L (ref 1–41)
ANION GAP SERPL CALCULATED.3IONS-SCNC: 12.2 MMOL/L (ref 5–15)
AST SERPL-CCNC: 16 U/L (ref 1–40)
BASOPHILS # BLD AUTO: 0.01 10*3/MM3 (ref 0–0.2)
BASOPHILS NFR BLD AUTO: 0.1 % (ref 0–1.5)
BILIRUB SERPL-MCNC: 0.4 MG/DL (ref 0–1.2)
BUN SERPL-MCNC: 23 MG/DL (ref 8–23)
BUN/CREAT SERPL: 20.7 (ref 7–25)
CA-I SERPL ISE-MCNC: 1.43 MMOL/L (ref 1.15–1.35)
CALCIUM SPEC-SCNC: 10.9 MG/DL (ref 8.6–10.5)
CHLORIDE SERPL-SCNC: 98 MMOL/L (ref 98–107)
CO2 SERPL-SCNC: 26.8 MMOL/L (ref 22–29)
CREAT SERPL-MCNC: 1.11 MG/DL (ref 0.76–1.27)
DEPRECATED RDW RBC AUTO: 51.7 FL (ref 37–54)
EGFRCR SERPLBLD CKD-EPI 2021: 68 ML/MIN/1.73
EOSINOPHIL # BLD AUTO: 0.12 10*3/MM3 (ref 0–0.4)
EOSINOPHIL NFR BLD AUTO: 1.1 % (ref 0.3–6.2)
ERYTHROCYTE [DISTWIDTH] IN BLOOD BY AUTOMATED COUNT: 14.3 % (ref 12.3–15.4)
GLOBULIN UR ELPH-MCNC: 2.7 GM/DL
GLUCOSE BLDC GLUCOMTR-MCNC: 153 MG/DL (ref 70–130)
GLUCOSE SERPL-MCNC: 133 MG/DL (ref 65–99)
HCT VFR BLD AUTO: 49.1 % (ref 37.5–51)
HGB BLD-MCNC: 15.4 G/DL (ref 13–17.7)
IMM GRANULOCYTES # BLD AUTO: 0.14 10*3/MM3 (ref 0–0.05)
IMM GRANULOCYTES NFR BLD AUTO: 1.3 % (ref 0–0.5)
LYMPHOCYTES # BLD AUTO: 2.23 10*3/MM3 (ref 0.7–3.1)
LYMPHOCYTES NFR BLD AUTO: 20.1 % (ref 19.6–45.3)
MCH RBC QN AUTO: 30.7 PG (ref 26.6–33)
MCHC RBC AUTO-ENTMCNC: 31.4 G/DL (ref 31.5–35.7)
MCV RBC AUTO: 98 FL (ref 79–97)
MONOCYTES # BLD AUTO: 1.05 10*3/MM3 (ref 0.1–0.9)
MONOCYTES NFR BLD AUTO: 9.5 % (ref 5–12)
NEUTROPHILS NFR BLD AUTO: 67.9 % (ref 42.7–76)
NEUTROPHILS NFR BLD AUTO: 7.55 10*3/MM3 (ref 1.7–7)
NRBC BLD AUTO-RTO: 0 /100 WBC (ref 0–0.2)
PLATELET # BLD AUTO: 269 10*3/MM3 (ref 140–450)
PMV BLD AUTO: 9.1 FL (ref 6–12)
POTASSIUM SERPL-SCNC: 4.6 MMOL/L (ref 3.5–5.2)
PROT SERPL-MCNC: 7.3 G/DL (ref 6–8.5)
PTH-INTACT SERPL-MCNC: 38.8 PG/ML (ref 15–65)
RBC # BLD AUTO: 5.01 10*6/MM3 (ref 4.14–5.8)
SODIUM SERPL-SCNC: 137 MMOL/L (ref 136–145)
WBC NRBC COR # BLD AUTO: 11.1 10*3/MM3 (ref 3.4–10.8)

## 2025-05-22 PROCEDURE — 80053 COMPREHEN METABOLIC PANEL: CPT

## 2025-05-22 PROCEDURE — 83970 ASSAY OF PARATHORMONE: CPT | Performed by: INTERNAL MEDICINE

## 2025-05-22 PROCEDURE — 82330 ASSAY OF CALCIUM: CPT | Performed by: INTERNAL MEDICINE

## 2025-05-22 PROCEDURE — 36415 COLL VENOUS BLD VENIPUNCTURE: CPT

## 2025-05-22 PROCEDURE — 82948 REAGENT STRIP/BLOOD GLUCOSE: CPT

## 2025-05-22 PROCEDURE — 78815 PET IMAGE W/CT SKULL-THIGH: CPT

## 2025-05-22 PROCEDURE — 34310000005 FLUDEOXYGLUCOSE F18 SOLUTION: Performed by: INTERNAL MEDICINE

## 2025-05-22 PROCEDURE — A9552 F18 FDG: HCPCS | Performed by: INTERNAL MEDICINE

## 2025-05-22 PROCEDURE — 85025 COMPLETE CBC W/AUTO DIFF WBC: CPT

## 2025-05-22 RX ORDER — GLYBURIDE-METFORMIN HYDROCHLORIDE 1.25; 25 MG/1; MG/1
1 TABLET ORAL 2 TIMES DAILY WITH MEALS
Qty: 60 TABLET | Refills: 2 | Status: SHIPPED | OUTPATIENT
Start: 2025-05-22

## 2025-05-22 RX ADMIN — FLUDEOXYGLUCOSE F 18 1 DOSE: 200 INJECTION, SOLUTION INTRAVENOUS at 08:13

## 2025-05-22 NOTE — TELEPHONE ENCOUNTER
Pt's Calcium 10.9 today. Reviewed with Dr. Bishop. He would like to see if he is taking a supplement and ordered additional labs. Called pt regarding elevated calcium level. Pt's significant other Yoselin states he is NOT on a calcium supplement. Advised her we will be checking a few other labs and advised her to have pt decrease foods that are high in calcium. We will recheck when he is here next week. She v/u.

## 2025-05-24 DIAGNOSIS — I10 PRIMARY HYPERTENSION: ICD-10-CM

## 2025-05-27 RX ORDER — LOSARTAN POTASSIUM 50 MG/1
50 TABLET ORAL DAILY
Qty: 90 TABLET | Refills: 1 | Status: SHIPPED | OUTPATIENT
Start: 2025-05-27

## 2025-05-27 NOTE — PROGRESS NOTES
Patient is a [place].    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  Notably there is invasive carcinoma present at the margin, 2 positive lymph nodes adjacent to the bronchovesicular margin which appears to have been transected difficult to enumerate with extranodal extension present.  *Patient status post chemo RT-11/28/2022, radiation therapy completion date 1/11/2023  *Immunotherapy-Keytruda to be initiated 3/20/2023  *Follow-up repeat scans 9/18/2023 without evidence of recurrent disease  *Subsequent assessment with mild increase in Guardant Reveal, subsequent Guardant Reveal with lower tumor percentage, reassessment 6 months planned.  *Guardant reveal 2/13/2025-negative ctDNA, 0% tumor fraction, follow-up chest CT anticipated.  *PET/CT without evidence of progressive disease-5/22/2025         History of Present Illness      The patient is a 80 y.o. male with the above-mentioned history who returned 3/11/2024 for lab review and evaluation due for cycle 18 pembrolizumab.  Overall he has tolerated Keytruda quite well.  He does complain of some occasional issues with cramps in his legs.  He denies problems with diarrhea.  No issues with increased shortness of breath, and no problems with skin rash.  We proceeded with therapy thus completing this treatment (18 cycles) and follow-up ET chest, abdomen, pelvis was obtained 3/27/2024.  This demonstrated postoperative changes from previous left lower lobectomy, scattered areas of density in the lungs compared to 6/8/2023 were stable with no new lung nodules, 3.4 cm infrarenal abdominal arctic aneurysm stable and no evidence of metastatic disease otherwise in chest, abdomen, pelvis    He is seen formally 4/1/2024.  He is doing quite  well with no additional issues except for skin rash on the medial portions of his lower legs.  This is managed by dermatology treated with triamcinolone.  We discussed surveillance schedule including management of his port every 6 weeks and initial assessment via Guardant Reveal in approximately 9 weeks seeing him in 12 weeks.    The patient's subsequent testing included a Guardant Reveal obtained 6/3/2024 that was positive for ctDNA and 0.083%.  This led to a follow-up CT series 6/21/2024 that was essentially stable.  There is no evidence of distant metastasis or local recurrence.  He is seen with his significant other 6/24 and advised that the exam may have determined an earlier relapse that might be seen on scans and thus we will have to retest him in the near future to determine whether this is continued to be the case.  Symptomatically is unchanged from previous.  The patient was asked undergo a repeat Guardant Reveal examination and this was obtained 8/26/2024.This repeat was positive for ctDNA detected now at less than 0.05%.  This is an improvement with the patient symptomatically essentially unchanged when he is seen back in office 9/20/2024.  We have discussed a follow-up such examination over a longer period of time at approximately 6 months.  He had a follow-up guardant reveal 2/20/2025 again with negative ctDNA detected and 0% tumor fraction.  He is feeling well and had a follow-up per his pulmonary physician that he recently 3/3/2025 demonstrating stable imaging except for a tiny focus of nodularity in the left upper lobe that is slightly more pronounced than previous at 5 mm.  A repeat study is now anticipated in 3 months which we will going to schedule.    The patient underwent PET/CT 5/22/2025 revealing mediastinal blood pool SUV 2.2, irregular 1.3 x 0.7 cm right upper lobe nodule passed a SUV of 1.3, SUV right hilum 2.8, no activity in the neck and no suspicious solid organ activity, extensive  atherosclerotic disease and a stable 3.8 cm infrarenal abdominal aortic aneurysm, activity sigmoid colon with multiple diverticula, nonspecific low activity in the large prostate.  There is no bony activity.    He is seen back in office 5/28/2025 with a reasonably good performance status except that he is concerned about the weight loss.  His record indicates that he has had weight loss over the last several months approximately 7 to 8 pounds.  Concurrently, after review, is an elevation of his hemoglobin A1c to 9.4 documented 5/13/2025.  He is currently undergoing therapy through primary care to address his diabetes.  We have discussed that this is likely the reason for his current weight loss.                                 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  He had been assessed particularly in the fall 2021 when he presented with nausea and vomiting and abdominal discomfort leading to assessments that revealed sigmoid diverticulitis with contained rupture and partial small bowel obstruction.  Records from mid September 21 indicating that he was admitted with partial small bowel obstruction and treated medically with very slow recovery.  He has history of COPD with CT demonstrating a pulmonary nodule in the right lung base at 7 mm with follow-up planned.  He was seen by general surgery in later September with repeat scans planned and repeat CT abdomen 10/7/2021 did demonstrate interval improvement of sigmoid diverticulitis.      Record indicates an assessment in late June 2022 at different general surgeon for left inguinal hernia, history of heavy tobacco use with COPD and recommendation for surgical repair of his hernia      The patient  underwent a CT scan of the chest 5/7/2022 demonstrating diffuse emphysematous changes, ill-defined density measuring 3 mm in the superior segment of the right lower lobe, multiple ill-defined 3 to 4 mm nodular density thought to be inflammatory involving the right lower lobe and a new spiculated nodule involving the left lower lobe measuring 16 x 14 mm as well as left hilar adenopathy.       Follow-up PET/CT 7/13/2022 reveal a hypermetabolic spiculated lesion medial aspect the left lower lobe adjacent to descending thoracic aorta measuring 1.5 x 1.6 SUV 9.3 with an enlarged hypermetabolic left hilar lymph node measured 1.5 x 1.3 with SUV of 12.1, additional nodules in the lateral aspect right lower lobe and micronodule in the posterior/medial right lower lobe and also additional right lower lobe micronodule.  There is an infrarenal abdominal aortic aneurysm measuring 3.4 cm with a short segment of chronic dissection present.    These clinical findings would be consistent with a possible T1b N1 M0-stage IIb lung cancer.      Recognizing a diagnosis has not been made his case was discussed in thoracic conference 7/20/2022.  Recommendations include proceeding to pulmonary assessment with EBUS and the patient undergoing a general assessment per his clinical status-older adult assessment as well as assessment by thoracic surgery.  These issues are discussed with the patient and his wife in detail when they are seen 7/28/2022.    The patient proceeded to undergo his hernia surgery 8/2/2022 by Dr. Dotson.  Additionally the patient was unable to undergo assessment by pulmonary until October and, thereafter, was scheduled to see Dr. Joe 8/18/2022 undergoing older adult functional assessment with a JAGUAR score of 11 indicating a risk of functional decline related to cancer therapy.    The patient is seen with his significant other 8/15/2022 and doing very well with recent hernia surgery.  His performance status is  reasonably good at present and we have learned now that he would not be able to see pulmonary medicine until October, thoracic surgery is scheduled this week with Dr. Joe.  Perhaps he could be a surgical candidate.  Particularly we know by guardant 360 that T p53 splice site mutation is present and would certainly be consistent as well the most common mutations found in lung cancers particularly squamous cancers.  We have discussed obtaining pulmonary functions at this point, thoracic surgery assessment this week and also restarting Chantix as possible for the patient.    There was difficulty obtaining Chantix and while this was being assessed the patient was reviewed 8/18 by thoracic surgery.  He was felt to have a T1b N1 M0 stage IIb carcinoma left lower lobe along and not a candidate for biopsy.  PFTs were borderline, functional assessment was reasonably good and the patient was felt to be a candidate for robot-assisted biopsy of his nodes and if malignant proceed to left lower lobe lobectomy.  He was reviewed 9/8 and offered 21 mg nicotine transdermal patching.    He is next seen 9/10/2022.  He is seen with his wife and both are prepared for surgery 9/23/2022.  We discussed that additional therapy may be considered once we have more information about the type and stage.  We would hope to see him postoperatively.    The patient was admitted 9//2022 undergoing 9/23/2022  a bronchoscopy with left video-assisted thoracoscopy with robot-assisted total decortication of the left lung, left lower lobectomy, mediastinal lymphadenectomy and intercostal nerve block with Dr. Nicola Joe.  Fortunately he did fairly well postoperatively though did develop atrial fibrillation with RVR treated with amiodarone converting back to sinus rhythm, treated with IV metoprolol subsequent chronic anticoagulation.  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  Notably there is invasive carcinoma present at the margin.  Is a, and only 2 positive lymph nodes adjacent to the bronchovesicular margin which appears to have been transected difficult to enumerate with extranodal extension present.       The patient is seen 10/27/2022.  He is recovering well from surgery, albeit slowly, and we have discussed his findings that are concerning as to residual disease with a positive margin described as above.  There is also the significance potentially of PD-L1 expression which has been described as producing a poor survival.     Nivolumab has been used as second line therapy to positive effect in the disease and this begs the question as to whether adjuvant therapy plus immunotherapy could be utilized though the patient would be difficult to treat with platinum based chemotherapy as well.       The patient is next assessed 11/7/2022 for significant assessments by supportive oncology at [facility] as well as with plans to see Dr. Marrero 11/9/2022.  Unfortunately he has been very slow to gradually recover from the effects of surgery with reduced appetite and only fair performance status.     At present it would be difficult to give him cisplatin based chemotherapy and we discussed whether we might be able to use Tecentriq (atezolizumab) as his primary adjuvant therapy.  We have contacted pathology about completing Caris testing and a PD-L1 analysis that has not yet completed.         The patient is seen, however, 11/16/2022 and his genomic analysis has returned as being significant for broad sensitivity to immunotherapy.  This would argue for the administration of weekly Carboplatin/Taxol as the patient proceeds to radiation therapy and upon completion, then using Tecentriq as further adjuvant therapy over a year.  This is discussed with the patient and his  in detail as well as discussed with Dr. Marrero of radiation  therapy.  We have also discussed the patient require port placement.    The patient proceeded to treatment taking weekly carboplatin Taxol with concurrent radiation therapy last seen 12/12/2022 with third weekly treatment.    He is next seen 12/19/2022 with H&H 11.9 and 38.5, white count 3460 and platelet count of 168,000.  He is feeling well without any significant complications of therapy except for right calf dermatitis that is been developing in the last several days.    The patient continued therapy taking CarboTaxol weekly including 12/28 and 1/4/2023.    His is next seen 1/11/2023 with completion date for radiation therapy 1/11/2023.  Repeat CBC now includes H&H of 11.0 and 33.9, white count 2090, platelet count 128,000, ANC is 800.  He, fortunately, is tolerating treatment well and we have again discussed with him adjuvant immunotherapy would be useful including Tecentriq versus pembrolizumab-keynote 091 study particularly in tumor programmed cell death PD-L1 greater than 50%.    The patient will not be given additional chemotherapy 1/11/2023 we will plan to reassess by follow-up scans in the next 6 weeks CT chest and pelvis making a decision thereafter about adjuvant immunotherapy.    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    We have discussed that considering studies like keynote  091 that the patient's high risk disease could be addressed with additional immunotherapy including the use of Atezolizumab and/or Keytruda.  Considering his findings overall Keytruda every 3 weeks x18 cycles is to be pursued.    The patient was seen 3/20/2023, discussed initiation of Keytruda.  He is agreeable to proceed.    The patient notified the  office shortly after treatment that he had pain under his right rib cage and was placed back onto his Neurontin to try to manage this pain.  Approximately week later he still had the pain and also had another rash we switched him at this point to Famvir to try to completely clear this problem.    He is next seen back 4/3/2023.  Fortunately his recent apparent shingles activation has nearly abated and he is feeling reasonably well.  In review of the literature there is no significant difference in activation with immunotherapy though this patient may require suppression.  I have asked him to complete his Famvir at this point.    Patient assessed 4/10/2023 with resolution of his shingles, subsequently placed on maintenance acyclovir, consideration of shingles vaccination post 3 months of Keytruda therapy is stable disease documented.    The patient underwent a CT chest abdomen pelvis 6/8/2023 that demonstrates postsurgical changes of the left hemithorax, stable pulmonary nodules, stable infrarenal abdominal aortic aneurysm.    He is next seen 6/12/2023.  We have discussed continue with his Keytruda with his tolerance being excellent.  He will also reduce his current metoprolol to 12.5 mg p.o. daily.    He continued Keytruda and was seen in the office 7/24/2023 in anticipation of his 7th dose of Keytruda.  He was tolerating treatment without substantial side effects but did have sudden onset nausea and vomiting lasting 48 hours.  He then began to have joint pain bilateral knees and shoulders up to an 8 of 10 for severity.       He was demonstrating worsening hyponatremia at this point with a normal potassium.  Unfortunately his mental status began to worsen with increasing sleepiness, mild diarrhea.  7/28/2023 studies included an INR 3.34, sodium now 125 and he was admitted for his weakness and hyponatremia.    The patient was seen by several services including nephrology and oncology to the IV fluids.  He had been  tested with ACT stimulation test and was diagnosed with renal sufficiency started on oral hydrocortisone.  7/29/2023 cortisol was 0.62, subsequent ACTH test was reduced at 5.7.  The patient studies 7/30 included BUN/creatinine of 9 and 0.97, sodium 130, chloride 95, calcium 5.2.      The patient is next seen 8/14/2023.  He remains somewhat weak and with joint discomfort.  We have discussed his findings that would be most consistent with central adrenal insufficiency and that dosing for his hydrocortisone-currently 10 mg p.o. every morning and 5 mg p.o. every afternoon is likely to be too low.  In determining whether we should proceed with treatment replacement therapy could allow us to try to complete his therapy which, on balance, would be the preferred approach.    The patient is seen 9/21/2023 improved per performance status with cortisone replacement therapy, subsequent CT of chest on pelvis 9/18/2023 without evidence of recurrent disease, pembrolizumab continued with plans to reassess likely in December 2023 or at the completion of therapy.    Patient seen 2/19/2024 for his 17 cycle of Keytruda as patient approaches his completion of immunotherapy in 3 weeks.  Baseline scans will be requested after follow-up visit in 3 week    We proceeded with therapy thus completing this treatment (18 cycles) and follow-up ET chest, abdomen, pelvis was obtained 3/27/2024.  This demonstrated postoperative changes from previous left lower lobectomy, scattered areas of density in the lungs compared to 6/8/2023 were stable with no new lung nodules, 3.4 cm infrarenal abdominal arctic aneurysm stable and no evidence of metastatic disease otherwise in chest, abdomen, pelvis    He is seen formally 4/1/2024.  He is doing quite well with no additional issues except for skin rash on the medial portions of his lower legs.  This is managed by dermatology treated with triamcinolone.  We discussed surveillance schedule including management  of his port every 6 weeks and initial assessment via Guardant Reveal in approximately 9 weeks seeing him in 12 weeks.    The patient return for guardant reveal testing 6/3/2024 that, unfortunately, was positive with a tumor fraction of 0.083%.  Attempting to determine the significance of this and CT of chest abdomen pelvis was obtained 6/11/2024 that showed stable subcentimeter nodular density in the right lung, cardiac size without pericardial abnormality, liver and pancreas spleen and adrenal glands right kidney unchanged, atherosclerotic calcification and mural thrombus within the abdominal aorta measuring 3.8 cm, IVC filter in position.  These were essentially stable examinations.    He is next seen 6/24/2024.The patient's subsequent testing included a Guardant Reveal obtained 6/3/2024 that was positive for ctDNA and 0.083%.  This led to a follow-up CT series 6/21/2024 that was essentially stable.  There is no evidence of distant metastasis or local recurrence.  He is seen with his significant other 6/24 and advised that the exam may have determined an earlier relapse that might be seen on scans and thus we will have to retest him in the near future to determine whether this is continued to be the case.  Symptomatically is unchanged from previous.    The patient was asked undergo a repeat Guardant Reveal examination and this was obtained 8/26/2024.This repeat was positive for ctDNA detected now at less than 0.05%.  This is an improvement with the patient symptomatically essentially unchanged when he is seen back in office 9/20/2024.  We have discussed a follow-up such examination over a longer period of time at approximately 6 months.    He had a follow-up guardant reveal 2/20/2025 again with negative ctDNA detected and 0% tumor fraction.  He is feeling well and had a follow-up per his pulmonary physician that he recently 3/3/2025 demonstrating stable imaging except for a tiny focus of nodularity in the left  upper lobe that is slightly more pronounced than previous at 5 mm.  A repeat study is now anticipated in 3 months which we will going to schedule.  This would be a PET/CT considering these areas in question may be a new malignant process.    The patient underwent PET/CT 5/22/2025 revealing mediastinal blood pool SUV 2.2, irregular 1.3 x 0.7 cm right upper lobe nodule passed a SUV of 1.3, SUV right hilum 2.8, no activity in the neck and no suspicious solid organ activity, extensive atherosclerotic disease and a stable 3.8 cm infrarenal abdominal aortic aneurysm, activity sigmoid colon with multiple diverticula, nonspecific low activity in the large prostate.  There is no bony activity.      He is seen back in office 5/28/2025 with a reasonably good performance status except that he is concerned about the weight loss.  His record indicates that he has had weight loss over the last several months approximately 7 to 8 pounds.  Concurrently, after review, is an elevation of his hemoglobin A1c to 9.4 documented 5/13/2025.  He is currently undergoing therapy through primary care to address his diabetes.  We have discussed that this is likely the reason for his current weight loss.      Past Medical History:   Diagnosis Date    Aneurysm     Arthritis     Asthma     Copd    Atrial fibrillation 09/27/22    Cataract     Cholelithiasis     COPD (chronic obstructive pulmonary disease)     O2 3L AT HS    Diabetes mellitus     DIET CONTROL    Diverticulitis     Diverticulosis     DVT, lower extremity     RLE    Elevated cholesterol     H/O Cervical pain     History of colitis     HL (hearing loss)     Hyperlipidemia     Hypertension     Knee swelling     Left shoulder pain     Low back pain     Low back strain     Lung cancer 2022    LEFT LUNG    Neck strain     On anticoagulant therapy     Peripheral arterial disease     Right leg claudication     Skin cancer     HX    Tremor     Type 2 diabetes mellitus     Vitamin D deficiency          Past Surgical History:   Procedure Laterality Date    BALLOON ANGIOPLASTY, ARTERY Right 2015    IR Percutaneious IVC filter placement    INGUINAL HERNIA REPAIR Left     KNEE ARTHROSCOPY Left     Torn meniscus    LOBECTOMY Left 09/23/2022    Procedure: BRONCHOSCOPY, LEFT VIDEO ASSISTED THORACOSCOPY, ROBOT ASSISTED TOTAL DECORTICATION  LEFT LUNG, LEFT LOWER LOBE LOBECTOMY, MEDIASTINAL LYMPHECTOMY, INTERCOSTAL NERVE BLOCK;  Surgeon: Nicola Joe III, MD;  Location: [facility];  Service: Robotics - DaVinci;  Laterality: Left;    VENOUS ACCESS DEVICE (PORT) INSERTION Right 11/21/2022    Procedure: INSERTION VENOUS ACCESS DEVICE;  Surgeon: Nicola Joe III, MD;  Location: [facility];  Service: Thoracic;  Laterality: Right;        Current Outpatient Medications on File Prior to Visit   Medication Sig Dispense Refill    albuterol (PROVENTIL) (5 MG/ML) 0.5% nebulizer solution Take 0.5 mL by nebulization Every 6 (Six) Hours As Needed for Wheezing.      Budeson-Glycopyrrol-Formoterol (Breztri Aerosphere) 160-9-4.8 MCG/ACT aerosol inhaler Inhale 2 puffs 2 (Two) Times a Day. 10.7 g 11    buPROPion XL (Wellbutrin XL) 150 MG 24 hr tablet Take 1 tablet by mouth Daily. 90 tablet 1    Cholecalciferol 25 MCG (1000 UT) tablet Take 1 tablet by mouth Daily.      fexofenadine (Allegra Allergy) 180 MG tablet       fluticasone (FLONASE) 50 MCG/ACT nasal spray Administer 1 spray into the nostril(s) as directed by provider Daily. Indications: Allergic Rhinitis      glyburide-metFORMIN (GLUCOVANCE) 1. MG per tablet Take 1 tablet by mouth 2 (Two) Times a Day With Meals. 60 tablet 2    hydrocortisone (CORTEF) 10 MG tablet Take 2 tablets in the morning and 1 tablet in the afternoon plus additional medication for acute illness up to 60 mg daily 360 tablet 3    isosorbide mononitrate (IMDUR) 60 MG 24 hr tablet Take 1 tablet by mouth Every Evening. Indications: hypertension      losartan (COZAAR) 50 MG tablet Take 1  tablet by mouth once daily 90 tablet 1    NON FORMULARY 2-3 L during day; 2L during night; [facility] [place]   Pulmonologist Dr. Newsome #[id number]      sildenafil (REVATIO) 20 MG tablet Take 1 tablet by mouth.      simvastatin (ZOCOR) 20 MG tablet TAKE 1 TABLET BY MOUTH AT NIGHT 90 tablet 1    sodium chloride 3 % nebulizer solution Take 4 mL by nebulization As Needed for Cough.      tamsulosin (FLOMAX) 0.4 MG capsule 24 hr capsule Take 1 capsule by mouth Daily. 30 capsule 5    triamcinolone (KENALOG) 0.1 % cream Apply 1 Application topically to the appropriate area as directed 2 (Two) Times a Day.      warfarin (COUMADIN) 5 MG tablet PT TO TAKE 5MG DAILY X5 DAYS THEN 10MG X2 DAYS  Indications: Other - full anticoagulation 115 tablet 1    empagliflozin (Jardiance) 25 MG tablet tablet Take 1 tablet by mouth Daily. 90 tablet 3    Pseudoephedrine-guaiFENesin ER (Mucus D) 120-1200 MG tablet sustained-release 12 hour        No current facility-administered medications on file prior to visit.        ALLERGIES:    Allergies   Allergen Reactions    Benadryl [Diphenhydramine] Other (See Comments)     Extreme restlessness and insomnia        Social History     Socioeconomic History    Marital status:     Years of education: 1 year college   Tobacco Use    Smoking status: Every Day     Current packs/day: 0.50     Average packs/day: 0.5 packs/day for 62.4 years (31.2 ttl pk-yrs)     Types: Cigarettes     Start date: 1/1/1963     Passive exposure: Current    Smokeless tobacco: Never    Tobacco comments:     9/8/22: Down to Less than 1 PPD   Vaping Use    Vaping status: Never Used   Substance and Sexual Activity    Alcohol use: Not Currently    Drug use: Never    Sexual activity: Not Currently     Partners: Female        Family History   Problem Relation Age of Onset    No Known Problems Mother     Cancer Father     Lung cancer Father     Diabetes Brother     No Known Problems Son     Diabetes Brother     Kidney disease Brother      "Malig Hyperthermia Neg Hx         Review of Systems   ROS as per HPI    Objective     Vitals:    05/28/25 1406   BP: 133/72   Pulse: 77   Temp: 98.2 °F (36.8 °C)   TempSrc: Oral   SpO2: 97%   Weight: 70.8 kg (156 lb)   Height: 175.3 cm (69.02\")   PainSc: 0-No pain                     5/28/2025     2:10 PM   Current Status   ECOG score 0     Physical Exam  Vitals reviewed.   Constitutional:       General: He is not in acute distress.     Appearance: Normal appearance. He is well-developed.   HENT:      Head: Normocephalic and atraumatic.   Eyes:      Pupils: Pupils are equal, round, and reactive to light.   Cardiovascular:      Rate and Rhythm: Normal rate and regular rhythm.      Heart sounds: Normal heart sounds. No murmur heard.  Pulmonary:      Effort: Pulmonary effort is normal. No respiratory distress.      Breath sounds: Normal breath sounds. No wheezing, rhonchi or rales.   Abdominal:      General: Bowel sounds are normal. There is no distension.      Palpations: Abdomen is soft.   Musculoskeletal:         General: Normal range of motion.      Cervical back: Normal range of motion.   Skin:     General: Skin is warm and dry.      Findings: No rash.   Neurological:      Mental Status: He is alert and oriented to person, place, and time.           RECENT LABS:  WBC   Date Value Ref Range Status   05/22/2025 11.10 (H) 3.40 - 10.80 10*3/mm3 Final   05/13/2025 7.41 3.40 - 10.80 10*3/mm3 Final   05/09/2022 7.54 4.5 - 11.0 10*3/uL Final     RBC   Date Value Ref Range Status   05/22/2025 5.01 4.14 - 5.80 10*6/mm3 Final   05/13/2025 4.68 4.14 - 5.80 10*6/mm3 Final   05/09/2022 5.52 4.5 - 5.9 10*6/uL Final     Hemoglobin   Date Value Ref Range Status   05/22/2025 15.4 13.0 - 17.7 g/dL Final   05/09/2022 16.4 13.5 - 17.5 g/dL Final     Hematocrit   Date Value Ref Range Status   05/22/2025 49.1 37.5 - 51.0 % Final   05/09/2022 51.0 41.0 - 53.0 % Final     MCV   Date Value Ref Range Status   05/22/2025 98.0 (H) 79.0 - 97.0 " fL Final   05/09/2022 92.4 80.0 - 100.0 fL Final     MCH   Date Value Ref Range Status   05/22/2025 30.7 26.6 - 33.0 pg Final   05/09/2022 29.7 26.0 - 34.0 pg Final     MCHC   Date Value Ref Range Status   05/22/2025 31.4 (L) 31.5 - 35.7 g/dL Final   05/09/2022 32.2 31.0 - 37.0 g/dL Final     RDW   Date Value Ref Range Status   05/22/2025 14.3 12.3 - 15.4 % Final   05/09/2022 15.6 12.0 - 16.8 % Final     RDW-SD   Date Value Ref Range Status   05/22/2025 51.7 37.0 - 54.0 fl Final     MPV   Date Value Ref Range Status   05/22/2025 9.1 6.0 - 12.0 fL Final   05/09/2022 9.3 8.4 - 12.4 fL Final     Platelets   Date Value Ref Range Status   05/22/2025 269 140 - 450 10*3/mm3 Final   05/09/2022 204 140 - 440 10*3/uL Final     Neutrophil Rel %   Date Value Ref Range Status   05/09/2022 68.4 45 - 80 % Final     Neutrophil %   Date Value Ref Range Status   05/22/2025 67.9 42.7 - 76.0 % Final     Lymphocyte Rel %   Date Value Ref Range Status   05/09/2022 21.2 15 - 50 % Final     Lymphocyte %   Date Value Ref Range Status   05/22/2025 20.1 19.6 - 45.3 % Final     Monocyte Rel %   Date Value Ref Range Status   05/09/2022 9.2 0 - 15 % Final     Monocyte %   Date Value Ref Range Status   05/22/2025 9.5 5.0 - 12.0 % Final     Eosinophil %   Date Value Ref Range Status   05/22/2025 1.1 0.3 - 6.2 % Final   05/09/2022 0.4 0 - 7 % Final     Basophil Rel %   Date Value Ref Range Status   05/09/2022 0.5 0 - 2 % Final     Basophil %   Date Value Ref Range Status   05/22/2025 0.1 0.0 - 1.5 % Final     Immature Grans %   Date Value Ref Range Status   05/22/2025 1.3 (H) 0.0 - 0.5 % Final   05/09/2022 0.3 0.0 - 1.0 % Final     Neutrophils Absolute   Date Value Ref Range Status   05/09/2022 5.16 2.0 - 8.8 10*3/uL Final     Neutrophils, Absolute   Date Value Ref Range Status   05/22/2025 7.55 (H) 1.70 - 7.00 10*3/mm3 Final     Lymphocytes Absolute   Date Value Ref Range Status   05/09/2022 1.60 0.7 - 5.5 10*3/uL Final     Lymphocytes, Absolute    Date Value Ref Range Status   05/22/2025 2.23 0.70 - 3.10 10*3/mm3 Final     Monocytes Absolute   Date Value Ref Range Status   05/09/2022 0.69 0.0 - 1.7 10*3/uL Final     Monocytes, Absolute   Date Value Ref Range Status   05/22/2025 1.05 (H) 0.10 - 0.90 10*3/mm3 Final     Eosinophils Absolute   Date Value Ref Range Status   05/09/2022 0.03 0.0 - 0.8 10*3/uL Final     Eosinophils, Absolute   Date Value Ref Range Status   05/22/2025 0.12 0.00 - 0.40 10*3/mm3 Final     Basophils Absolute   Date Value Ref Range Status   05/09/2022 0.04 0.0 - 0.2 10*3/uL Final     Basophils, Absolute   Date Value Ref Range Status   05/22/2025 0.01 0.00 - 0.20 10*3/mm3 Final     Immature Grans, Absolute   Date Value Ref Range Status   05/22/2025 0.14 (H) 0.00 - 0.05 10*3/mm3 Final   05/09/2022 0.02 0.00 - 0.10 10*3/uL Final     nRBC   Date Value Ref Range Status   05/22/2025 0.0 0.0 - 0.2 /100 WBC Final           Assessment & Plan     80 y.o. male  with medical issues including type 2 diabetes-uncertain control, COPD, hypertension, diastolic heart failure, chronic disease, peripheral vascular disease (follow-up with vascular surgery today), previous DVT on anticoagulation (home monitoring), previous IVC filter placement?,  Status post September 2021 partial small bowel obstruction secondary to sigmoid diverticulitis treated medically.     1.   T1b N1 M0-stage IIb lung cancer.  right lung base nodule noted on scan at that point and in follow-up with primary care had developed a left inguinal hernia with repair planned through a different general surgeon then seen with his initial sigmoid diverticulitis.  PET scan 7/13/2022 demonstrates a hypermetabolic spiculated lesion medial aspect of the left lobe adjacent to descending thoracic aorta measuring1.5 x 1.6 SUV 9.3 with an enlarged hypermetabolic left hilar lymph node measured 1.5 x 1.3 with SUV of 12.1, additional nodules in the lateral aspect right lower lobe and micronodule in the  posterior/medial right lower lobe and also additional right lower lobe micronodule.  There is an infrarenal abdominal aortic aneurysm measuring 3.4 cm with a short segment of chronic dissection present.  Clinical findings would be consistent with a possible T1b N1 M0-stage IIb lung cancer.  discussed in thoracic conference 7/20/2022.  Recommendations to proceed with pulmonary assessment with EBUS.  The patient proceeded to undergo his hernia surgery 8/2/2022 by Dr. Dotson.   Guardant 360 that T p53 splice site mutation is present and would certainly be consistent as well the most common mutations found in lung cancers particularly squamous cancers.  The patient was admitted 9//2022 undergoing 9/23/2022  a bronchoscopy with left video-assisted thoracoscopy with robot-assisted total decortication of the left lung, left lower lobectomy, mediastinal lymphadenectomy and intercostal nerve block with Dr. Nicola Joe.  Fortunately he did fairly well postoperatively though did develop atrial fibrillation with RVR treated with amiodarone converting back to sinus rhythm, treated with IV metoprolol subsequent chronic anticoagulation.  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  Notably there is invasive carcinoma present at the margin. only 2 positive lymph nodes adjacent to the bronchovesicular margin which appears to have been transected difficult to enumerate with extranodal extension present.  Options could well be Carboplatin and Taxol considering the patient's comorbidity and worry of using cisplatin-based chemotherapy in this patient followed by atezolizumab with pathology having been notified to assess PD-L1 expression on the tumor as well also await review by [facility] molecular profiling.  Finally radiation therapy consultation be requested.   [facility] analysis indicates benefit from  immunotherapy at relatively high levels.  This would allow radiation therapy given with Carboplatin Taxol weekly (better tolerated) and then subsequent atezolizumab adjuvantly.  Weekly carboplatin Taxol initiated 11/28/2022 given concurrently with radiation therapy  12/5/2022 here for cycle 2 weekly carboplatin/Taxol, overall tolerating treatment quite well so far.  12/12/2022: Proceed with cycle #3 weekly carboplatin/Taxol with concurrent radiation.  Continues to tolerate treatment well.  He is next seen 12/19/2022 with H&H 11.9 and 38.5, white count 3460 and platelet count of 168,000.  He is feeling well without any significant complications of therapy except for right calf dermatitis that is been developing in the last several days.  12/28/2022 here for week 5 carbo/Taxol.  Overall tolerating well.  Today WBC 2.45, ANC 1.22, hemoglobin 10.7, platelet count 117,000.  I have reviewed with Dr. Bishop, and we will go ahead and continue with treatment.  1/4/2023: Received with cycle 6 carbo/taxol + XRT.  Continues to tolerate treatment well.  WBC improved to 2.69 with ANC 1.41.  Next 1/11/2023 with completion date 1/11/2023.  Repeat CBC now includes H&H of 11.0 and 33.9, white count 2090, platelet count 128,000, ANC is 800.  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  We have discussed that considering studies like keynote  091 that the patient's high risk disease could be addressed with additional immunotherapy including the use  of Atezolizumab and/or Keytruda.  Considering his findings overall Keytruda every 3 weeks x18 cycles is to be pursued.  The patient began Keytruda as planned with his first treatment 3/20/2023.  The patient notified the office shortly after treatment that he had pain under his right rib cage and was placed back onto his Neurontin to try to manage this pain.  Approximately week later he still had the pain and also had another rash we switched him at this point to Famvir to try to completely clear this problem.  4/3/2023.  Fortunately his recent apparent shingles activation has nearly abated and he is feeling reasonably well.  In review of the literature there is no significant difference in activation with immunotherapy though this patient may require suppression.  He is asked to complete his Famvir.  4/10/2023 cycle 2 Keytruda, overall tolerating well.   Patient seen 5/1/2023, third cycle of Keytruda, status post fourth cycle of Keytruda, 3 months of therapy, subsequent scans will need to be scheduled.  5/22/2023: Proceed with cycle 4 Keytruda.    Follow-up scans-CT of chest, abdomen and pelvis 6/8/2023 with postsurgical changes only.  7/3/2023 cycle 6 Keytruda  Proceed today, 7/24/2023 with cycle 7 Keytruda which is continued every 3 weeks   He was tolerating treatment without substantial side effects but did have sudden onset nausea and vomiting lasting 48 hours.  He then began to have joint pain bilateral knees and shoulders up to an 8 of 10 for severity.     He was demonstrating worsening hyponatremia at this point with a normal potassium.  Unfortunately his mental status began to worsen with increasing sleepiness, mild diarrhea.  7/28/2023 studies included an INR 3.34, sodium now 125 and he was admitted for his weakness and hyponatremia.  The patient was seen by several services including nephrology and oncology to the IV fluids.  He had been tested with ACT stimulation test and was diagnosed with renal  sufficiency started on oral hydrocortisone.  7/29/2023 cortisol was 0.62, subsequent ACTH test was reduced at 5.7.  The patient studies 7/30 included BUN/creatinine of 9 and 0.97, sodium 130, chloride 95, calcium 5.2.  The patient is next seen 8/14/2023.  He remains somewhat weak and with joint discomfort.  We have discussed his findings that would be most consistent with central adrenal insufficiency and that dosing for his hydrocortisone-currently 10 mg p.o. every morning and 5 mg p.o. every afternoon is likely to be too low.  In determining whether we should proceed with treatment replacement therapy could allow us to try to complete his therapy which, on balance, would be the preferred approach.  Hydrocortisone moved to 20 mg p.o. every morning and 10 mg p.o. every afternoon.  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  Replacement therapy ongoing.  9/5/2023 reviewed back today for C9 Keytruda. Patient with improved performance status following increase of hydrocortisone per above.  Improved appetite and decreased joint pain.  Proceed with treatment today with repeat imaging planned thereafter.  Repeat CT chest, abdomen, pelvis 9/18/2023 without evidence of progressive disease.  Plan to proceed with cycle #10 Keytruda.  Patient seen 10/16/2023 for cycle #11, 11/6 for cycle #12 and patient seen 11/27 for cycle #13 again out of 18 total.  Patient seen 12/18/2023 here for cycle 14 Keytruda.  Patient is doing well.  Discussed with Dr. Bishop, and plans to hold off on follow-up scans until the completion of Keytruda (, planning a total of 18 cycles).  Proceed today, 1/29/2024 with cycle 16 Keytruda  Patient seen 2/19/2024 for cycle #17 Keytruda  3/11/2020 for cycle 18 Keytruda.  Will schedule patient for follow-up scans after today's treatment.  We proceeded with therapy thus completing this treatment (18 cycles) and follow-up ET  chest, abdomen, pelvis was obtained 3/27/2024.  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  He is doing quite well with no additional issues except for skin rash on the medial portions of his lower legs.  This is managed by dermatology treated with triamcinolone.  We discussed surveillance schedule including management of his port every 6 weeks and initial assessment via Guardant Reveal in approximately 9 weeks seeing him in 12 weeks.  As we proceed we could reassess his status in terms of adrenal insufficiency and possibly taper him from his steroids.  The patient's subsequent testing included a Guardant Reveal obtained 6/3/2024 that was positive for ctDNA and 0.083%.  This led to a follow-up CT series 6/21/2024 that was essentially stable.  There is no evidence of distant metastasis or local recurrence.  He is seen with his significant other 6/24 and advised that the exam may have determined an earlier relapse that might be seen on scans and thus we will have to retest him in the near future to determine whether this is continued to be the case.  Symptomatically is unchanged from previous.  The patient was asked undergo a repeat Guardant Reveal examination and this was obtained 8/26/2024.This repeat was positive for ctDNA detected now at less than 0.05%.  This is an improvement with the patient symptomatically essentially unchanged when he is seen back in office 9/20/2024.  We have discussed a follow-up such examination over a longer period of time at approximately 6 months.  He had a follow-up guardant reveal 2/20/2025 again with negative ctDNA detected and 0% tumor fraction.  He is feeling well and had a follow-up per his  pulmonary physician that he recently 3/3/2025 demonstrating stable imaging except for a tiny focus of nodularity in the left upper lobe that is slightly more pronounced than previous at 5 mm.  A repeat study is now anticipated in 3 months which we will going to schedule.  The patient underwent PET/CT 5/22/2025 revealing mediastinal blood pool SUV 2.2, irregular 1.3 x 0.7 cm right upper lobe nodule passed a SUV of 1.3, SUV right hilum 2.8, no activity in the neck and no suspicious solid organ activity, extensive atherosclerotic disease and a stable 3.8 cm infrarenal abdominal aortic aneurysm, activity sigmoid colon with multiple diverticula, nonspecific low activity in the large prostate.  There is no bony activity.    He is seen back in office 5/28/2025 with a reasonably good performance status except that he is concerned about the weight loss.  His record indicates that he has had weight loss over the last several months approximately 7 to 8 pounds.  Concurrently, after review, is an elevation of his hemoglobin A1c to 9.4 documented 5/13/2025.  He is currently undergoing therapy through primary care to address his diabetes.  We have discussed that this is likely the reason for his current weight loss.      2.  Herpes zoster: Patient was treated with Famvir.  Rash has resolved, no issues with persistent herpetic neuralgia.  He will be placed on suppression with acyclovir 400 mg twice daily.  We discussed he will hold off on vaccination for now, given recent infection.  Patient assessed 5/1/2023, continue Keytruda, status post fifth cycle of Keytruda or 3 months of therapy he would undergo repeat scans and, if stable or improved, proceed with Shingrix vaccinations.  Patient now requested to proceed with vaccinations including RSV    3.  Hyponatremia  Likely exacerbated by recent GI toxicity with nausea vomiting and diarrhea.  Symptoms have now resolved with improvement in his appetite and intake  Reviewed 11/27-23  with sodium 137.  Sodium is normal today, 1/29/2024  Reassessed 2/19/2024 at 140  3/11/2024 sodium 138  5/28/2025 sodium 137    4. Joint pain.  Baseline knee pain prior to initiation of immunotherapy though he is now exacerbated with shoulder pain as well and requiring ambulation with a walker  Additional inflammatory labs including sed rate and C-reactive protein today to evaluate for inflammation secondary to immunotherapy  Continue Tylenol and occasional Norco for breakthrough pain  Hydrocortisone replacement therapy increased to 20 mg p.o. every morning and 10 mg p.o. every afternoon  The patient denies pain today, 1/29/2024    5.  History of MGUS  Redraw ISRAEL, PE, free serum light chains-redraw will be necessary with insufficient specimen    PLAN:   At this point, no additional immunotherapy  Continue hydrocortisone 20 mg in the morning, 10 mg in the afternoon.  Continue prophylactic acyclovir 400 mg twice daily.  Q 6-week port flush  Primary care addressing diabetes  22 weeks-Guardant Reveal, CBC, CMP, ISRAEL, PE, free serum light chains  24 weeks MD  Call/ return sooner should the patient develop any new concerns or problems.    Patient is on a high risk medication requiring close monitoring for toxicity.    Kayden Bishop MD  05/28/2025

## 2025-05-28 ENCOUNTER — OFFICE VISIT (OUTPATIENT)
Dept: ONCOLOGY | Facility: CLINIC | Age: 80
End: 2025-05-28
Payer: MEDICARE

## 2025-05-28 ENCOUNTER — TELEPHONE (OUTPATIENT)
Dept: ONCOLOGY | Facility: CLINIC | Age: 80
End: 2025-05-28

## 2025-05-28 ENCOUNTER — INFUSION (OUTPATIENT)
Dept: ONCOLOGY | Facility: HOSPITAL | Age: 80
End: 2025-05-28
Payer: MEDICARE

## 2025-05-28 VITALS
DIASTOLIC BLOOD PRESSURE: 72 MMHG | WEIGHT: 156 LBS | BODY MASS INDEX: 23.11 KG/M2 | HEART RATE: 77 BPM | SYSTOLIC BLOOD PRESSURE: 133 MMHG | OXYGEN SATURATION: 97 % | TEMPERATURE: 98.2 F | HEIGHT: 69 IN

## 2025-05-28 DIAGNOSIS — C7A.8 NEUROENDOCRINE CARCINOMA OF LUNG: ICD-10-CM

## 2025-05-28 DIAGNOSIS — E27.40 ADRENAL INSUFFICIENCY: Primary | ICD-10-CM

## 2025-05-28 DIAGNOSIS — D47.2 MGUS (MONOCLONAL GAMMOPATHY OF UNKNOWN SIGNIFICANCE): ICD-10-CM

## 2025-05-28 DIAGNOSIS — Z45.2 FITTING AND ADJUSTMENT OF VASCULAR CATHETER: Primary | ICD-10-CM

## 2025-05-28 LAB
ALBUMIN SERPL-MCNC: 4.5 G/DL (ref 3.5–5.2)
ALBUMIN/GLOB SERPL: 1.9 G/DL
ALP SERPL-CCNC: 62 U/L (ref 39–117)
ALT SERPL W P-5'-P-CCNC: 21 U/L (ref 1–41)
ANION GAP SERPL CALCULATED.3IONS-SCNC: 9.6 MMOL/L (ref 5–15)
AST SERPL-CCNC: 21 U/L (ref 1–40)
BILIRUB SERPL-MCNC: 0.4 MG/DL (ref 0–1.2)
BUN SERPL-MCNC: 18.1 MG/DL (ref 8–23)
BUN/CREAT SERPL: 15.7 (ref 7–25)
CALCIUM SPEC-SCNC: 10.5 MG/DL (ref 8.6–10.5)
CHLORIDE SERPL-SCNC: 99 MMOL/L (ref 98–107)
CO2 SERPL-SCNC: 28.4 MMOL/L (ref 22–29)
CREAT SERPL-MCNC: 1.15 MG/DL (ref 0.76–1.27)
EGFRCR SERPLBLD CKD-EPI 2021: 65.1 ML/MIN/1.73
GLOBULIN UR ELPH-MCNC: 2.4 GM/DL
GLUCOSE SERPL-MCNC: 152 MG/DL (ref 65–99)
POTASSIUM SERPL-SCNC: 4.4 MMOL/L (ref 3.5–5.2)
PROT SERPL-MCNC: 6.9 G/DL (ref 6–8.5)
SODIUM SERPL-SCNC: 137 MMOL/L (ref 136–145)

## 2025-05-28 PROCEDURE — 99214 OFFICE O/P EST MOD 30 MIN: CPT | Performed by: INTERNAL MEDICINE

## 2025-05-28 PROCEDURE — 1126F AMNT PAIN NOTED NONE PRSNT: CPT | Performed by: INTERNAL MEDICINE

## 2025-05-28 PROCEDURE — 36591 DRAW BLOOD OFF VENOUS DEVICE: CPT

## 2025-05-28 PROCEDURE — 25010000002 HEPARIN LOCK FLUSH PER 10 UNITS: Performed by: INTERNAL MEDICINE

## 2025-05-28 PROCEDURE — 3078F DIAST BP <80 MM HG: CPT | Performed by: INTERNAL MEDICINE

## 2025-05-28 PROCEDURE — 3075F SYST BP GE 130 - 139MM HG: CPT | Performed by: INTERNAL MEDICINE

## 2025-05-28 PROCEDURE — 80053 COMPREHEN METABOLIC PANEL: CPT

## 2025-05-28 RX ORDER — HEPARIN SODIUM (PORCINE) LOCK FLUSH IV SOLN 100 UNIT/ML 100 UNIT/ML
500 SOLUTION INTRAVENOUS AS NEEDED
Status: DISCONTINUED | OUTPATIENT
Start: 2025-05-28 | End: 2025-05-28 | Stop reason: HOSPADM

## 2025-05-28 RX ADMIN — Medication 500 UNITS: at 13:45

## 2025-05-28 NOTE — TELEPHONE ENCOUNTER
Caller: Kate Torres - S/O     Relationship: Emergency Contact    Best call back number: [phone]    What is the best time to reach you: ANY     Who are you requesting to speak with (clinical staff, provider,  specific staff member): SCHEDULING     What was the call regarding: IS IT NECESSARY FOR PT TO COME IN ON 10/1/25 & 10/29/25 FOR PORT FLUSHES     Is it okay if the provider responds through MyChart: NO

## 2025-06-12 DIAGNOSIS — E27.40 ADRENAL INSUFFICIENCY: ICD-10-CM

## 2025-06-13 RX ORDER — HYDROCORTISONE 10 MG/1
TABLET ORAL
Qty: 360 TABLET | Refills: 0 | Status: SHIPPED | OUTPATIENT
Start: 2025-06-13

## 2025-06-13 NOTE — TELEPHONE ENCOUNTER
Rx Refill Note  Requested Prescriptions     Pending Prescriptions Disp Refills    hydrocortisone (CORTEF) 10 MG tablet [Pharmacy Med Name: Hydrocortisone 10 MG Oral Tablet] 360 tablet 0     Sig: TAKE 2 TABLETS BY MOUTH IN THE MORNING AND 1 IN THE AFTERNOON PLUS ADDITIONAL FOR ACUTE ILLNESS UP TO 60 MG DAILY      Last office visit with prescribing clinician: 7/31/2024   Last telemedicine visit with prescribing clinician: Visit date not found   Next office visit with prescribing clinician: 7/31/2025                         Would you like a call back once the refill request has been completed: [] Yes [] No    If the office needs to give you a call back, can they leave a voicemail: [] Yes [] No  Peyton Mccall MA  6/13/2025  07:39 EDT

## 2025-06-27 DIAGNOSIS — Z79.01 CHRONIC ANTICOAGULATION: ICD-10-CM

## 2025-06-27 DIAGNOSIS — I48.0 PAROXYSMAL ATRIAL FIBRILLATION: ICD-10-CM

## 2025-06-27 DIAGNOSIS — Z79.899 LONG-TERM USE OF HIGH-RISK MEDICATION: ICD-10-CM

## 2025-06-27 RX ORDER — WARFARIN SODIUM 5 MG/1
TABLET ORAL
Qty: 115 TABLET | Refills: 1 | Status: SHIPPED | OUTPATIENT
Start: 2025-06-27

## 2025-06-27 NOTE — TELEPHONE ENCOUNTER
Rx Refill Note  Requested Prescriptions     Pending Prescriptions Disp Refills    warfarin (COUMADIN) 5 MG tablet 115 tablet 1     Sig: PT TO TAKE 5MG DAILY X5 DAYS THEN 10MG X2 DAYS  Indications: Other - full anticoagulation      Last office visit with prescribing clinician: 5/20/2025   Last telemedicine visit with prescribing clinician: Visit date not found   Next office visit with prescribing clinician: 8/28/2025                         Would you like a call back once the refill request has been completed: [] Yes [] No    If the office needs to give you a call back, can they leave a voicemail: [] Yes [] No    Juany Izaguirre MA  06/27/25, 13:59 EDT

## 2025-07-09 ENCOUNTER — INFUSION (OUTPATIENT)
Dept: ONCOLOGY | Facility: HOSPITAL | Age: 80
End: 2025-07-09
Payer: MEDICARE

## 2025-07-09 DIAGNOSIS — Z45.2 FITTING AND ADJUSTMENT OF VASCULAR CATHETER: Primary | ICD-10-CM

## 2025-07-09 PROCEDURE — 96523 IRRIG DRUG DELIVERY DEVICE: CPT

## 2025-07-09 PROCEDURE — 25010000002 HEPARIN LOCK FLUSH PER 10 UNITS: Performed by: INTERNAL MEDICINE

## 2025-07-09 RX ORDER — HEPARIN SODIUM (PORCINE) LOCK FLUSH IV SOLN 100 UNIT/ML 100 UNIT/ML
500 SOLUTION INTRAVENOUS AS NEEDED
Status: DISCONTINUED | OUTPATIENT
Start: 2025-07-09 | End: 2025-07-09 | Stop reason: HOSPADM

## 2025-07-09 RX ADMIN — Medication 500 UNITS: at 14:19

## 2025-07-31 ENCOUNTER — OFFICE VISIT (OUTPATIENT)
Dept: ENDOCRINOLOGY | Age: 80
End: 2025-07-31
Payer: MEDICARE

## 2025-07-31 VITALS
HEART RATE: 57 BPM | BODY MASS INDEX: 23.4 KG/M2 | DIASTOLIC BLOOD PRESSURE: 86 MMHG | WEIGHT: 158 LBS | SYSTOLIC BLOOD PRESSURE: 128 MMHG | HEIGHT: 69 IN | OXYGEN SATURATION: 97 %

## 2025-07-31 DIAGNOSIS — E27.40 ADRENAL INSUFFICIENCY: Primary | ICD-10-CM

## 2025-07-31 NOTE — PROGRESS NOTES
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      Objective     Vitals:    07/31/25 1011   BP: 128/86   Pulse: 57   SpO2: 97%        Physical Exam  Vitals reviewed.   Constitutional:       Appearance: Normal appearance.   HENT:      Head: Normocephalic and atraumatic.   Eyes:      General: No scleral icterus.  Pulmonary:      Effort: Pulmonary effort is normal. No respiratory distress.   Neurological:      Mental Status: He is alert.      Gait: Gait normal.   Psychiatric:         Mood and Affect: Mood normal.         Behavior: Behavior normal.         Thought Content: Thought content normal.         Judgment: Judgment normal.       Assessment & Plan   Secondary AI  - Secondary to Keytruda  - Cont. hydrocortisone 20 mg in the am and 10 mg in the pm (refill sent)  - Educated on stress dosing as well     - Return to clinic in 1 year

## 2025-08-13 ENCOUNTER — TRANSCRIBE ORDERS (OUTPATIENT)
Dept: ADMINISTRATIVE | Facility: HOSPITAL | Age: 80
End: 2025-08-13
Payer: MEDICARE

## 2025-08-13 DIAGNOSIS — R91.1 LUNG NODULE: Primary | ICD-10-CM

## 2025-08-14 ENCOUNTER — TELEPHONE (OUTPATIENT)
Dept: FAMILY MEDICINE CLINIC | Facility: CLINIC | Age: 80
End: 2025-08-14
Payer: MEDICARE

## 2025-08-14 ENCOUNTER — TELEPHONE (OUTPATIENT)
Dept: ONCOLOGY | Facility: CLINIC | Age: 80
End: 2025-08-14
Payer: MEDICARE

## 2025-08-19 DIAGNOSIS — E11.9 TYPE 2 DIABETES MELLITUS WITHOUT COMPLICATION, WITHOUT LONG-TERM CURRENT USE OF INSULIN: ICD-10-CM

## 2025-08-20 RX ORDER — GLYBURIDE-METFORMIN HYDROCHLORIDE 1.25; 25 MG/1; MG/1
1 TABLET ORAL 2 TIMES DAILY WITH MEALS
Qty: 60 TABLET | Refills: 0 | Status: SHIPPED | OUTPATIENT
Start: 2025-08-20

## 2025-08-22 ENCOUNTER — INFUSION (OUTPATIENT)
Dept: ONCOLOGY | Facility: HOSPITAL | Age: 80
End: 2025-08-22
Payer: MEDICARE

## 2025-08-22 DIAGNOSIS — Z45.2 FITTING AND ADJUSTMENT OF VASCULAR CATHETER: Primary | ICD-10-CM

## 2025-08-22 PROCEDURE — 96523 IRRIG DRUG DELIVERY DEVICE: CPT

## 2025-08-22 PROCEDURE — 25010000002 HEPARIN LOCK FLUSH PER 10 UNITS: Performed by: INTERNAL MEDICINE

## 2025-08-22 RX ORDER — HEPARIN SODIUM (PORCINE) LOCK FLUSH IV SOLN 100 UNIT/ML 100 UNIT/ML
500 SOLUTION INTRAVENOUS AS NEEDED
Status: DISCONTINUED | OUTPATIENT
Start: 2025-08-22 | End: 2025-08-22 | Stop reason: HOSPADM

## 2025-08-22 RX ADMIN — Medication 500 UNITS: at 14:51

## (undated) DEVICE — ADHS SKIN SURG TISS VISC PREMIERPRO EXOFIN HI/VISC FAST/DRY

## (undated) DEVICE — PATIENT RETURN ELECTRODE, SINGLE-USE, CONTACT QUALITY MONITORING, ADULT, WITH 9FT CORD, FOR PATIENTS WEIGING OVER 33LBS. (15KG): Brand: MEGADYNE

## (undated) DEVICE — GLV SURG PREMIERPRO ORTHO LTX PF SZ8 BRN

## (undated) DEVICE — SPNG LAP CIGARETTE KITTNER 5MM STRL PK/5

## (undated) DEVICE — LAPAROSCOPIC SMOKE FILTRATION SYSTEM: Brand: PALL LAPAROSHIELD® PLUS LAPAROSCOPIC SMOKE FILTRATION SYSTEM

## (undated) DEVICE — SPECIMEN RETRIEVAL POUCH: Brand: ENDO CATCH II

## (undated) DEVICE — KT FLTR GAS LN OXYGENATOR 1/4IN STRL

## (undated) DEVICE — ENDOPATH XCEL BLADELESS TROCARS WITH STABILITY SLEEVES: Brand: ENDOPATH XCEL

## (undated) DEVICE — CONTAINER,SPECIMEN,O.R.STRL,4.5OZ: Brand: MEDLINE

## (undated) DEVICE — NDL HYPO PRECISIONGLIDE REG 25G 1 1/2

## (undated) DEVICE — 2, DISPOSABLE SUCTION/IRRIGATOR WITH DISPOSABLE TIP: Brand: STRYKEFLOW

## (undated) DEVICE — STAPLER SHEATH: Brand: ENDOWRIST

## (undated) DEVICE — SYR LUERLOK 5CC

## (undated) DEVICE — BLADELESS OBTURATOR: Brand: WECK VISTA

## (undated) DEVICE — TBG PENCL TELESCP MEGADYNE SMOKE EVAC 10FT

## (undated) DEVICE — ANTIBACTERIAL UNDYED BRAIDED (POLYGLACTIN 910), SYNTHETIC ABSORBABLE SUTURE: Brand: COATED VICRYL

## (undated) DEVICE — VLV PRT BRONCH LIP LTX DISP

## (undated) DEVICE — UNIVERSAL PACK: Brand: CARDINAL HEALTH

## (undated) DEVICE — DRP C/ARM 41X74IN

## (undated) DEVICE — LOU MINOR PROCEDURE: Brand: MEDLINE INDUSTRIES, INC.

## (undated) DEVICE — Device

## (undated) DEVICE — MARKR SKIN W/RULR 2TP

## (undated) DEVICE — SYS PERFUS SEP PLATLT W TIPS CUST

## (undated) DEVICE — 3M™ STERI-DRAPE™ INSTRUMENT POUCH 1018L: Brand: STERI-DRAPE™

## (undated) DEVICE — SUT SILK 2/0 SH CR5 18IN C0125

## (undated) DEVICE — SUREFORM 45 CURVED-TIP: Brand: SUREFORM

## (undated) DEVICE — 28 FR STRAIGHT – SOFT PVC CATHETER: Brand: PVC THORACIC CATHETERS

## (undated) DEVICE — CANNULA SEAL

## (undated) DEVICE — TOTAL TRAY, 16FR 10ML SIL FOLEY, URN: Brand: MEDLINE

## (undated) DEVICE — Device: Brand: TISSUE RETRIEVAL SYSTEM

## (undated) DEVICE — SUT SILK 0 PSL 18IN 580H

## (undated) DEVICE — SOL NACL 0.9PCT 1000ML

## (undated) DEVICE — GLV SURG BIOGEL LTX PF 6 1/2

## (undated) DEVICE — VLV SXN ENDO DISP STRL

## (undated) DEVICE — NDL INJ UROL WILLIAMS CYSTO 3.7F 23G 8MM

## (undated) DEVICE — SUT POLY BR TP 2STRND 1/8X30IN

## (undated) DEVICE — ARM DRAPE

## (undated) DEVICE — 3M™ IOBAN™ 2 ANTIMICROBIAL INCISE DRAPE 6650EZ: Brand: IOBAN™ 2

## (undated) DEVICE — EXTENSION SET, MALE LUER LOCK ADAPTER WITH RETRACTABLE COLLAR

## (undated) DEVICE — TBG INSUFFLATION LUER LOCK: Brand: MEDLINE INDUSTRIES, INC.

## (undated) DEVICE — SYR LUERLOK 20CC BX/50

## (undated) DEVICE — LOU THORACIC: Brand: MEDLINE INDUSTRIES, INC.

## (undated) DEVICE — VISUALIZATION SYSTEM: Brand: CLEARIFY

## (undated) DEVICE — SUT SILK 0 TIES 30IN A306H

## (undated) DEVICE — REDUCER: Brand: ENDOWRIST

## (undated) DEVICE — SOL ANTISTICK CAUTRY ELECTROLUBE LF

## (undated) DEVICE — OASIS DRAIN, SINGLE, INLINE & ATS COMPATIBLE: Brand: OASIS

## (undated) DEVICE — TB PROSHIELD PROTECT PLSTC CLR

## (undated) DEVICE — TRAP FLD MINIVAC MEGADYNE 100ML

## (undated) DEVICE — APPL CHLORAPREP HI/LITE 26ML ORNG

## (undated) DEVICE — DECANTER BAG 9": Brand: MEDLINE INDUSTRIES, INC.

## (undated) DEVICE — KT CLN CLEANOR SCPE

## (undated) DEVICE — PENCL ES MEGADINE EZ/CLEAN BUTN W/HOLSTR 10FT

## (undated) DEVICE — SEAL

## (undated) DEVICE — COLUMN DRAPE